# Patient Record
Sex: MALE | Race: BLACK OR AFRICAN AMERICAN | Employment: UNEMPLOYED | URBAN - METROPOLITAN AREA
[De-identification: names, ages, dates, MRNs, and addresses within clinical notes are randomized per-mention and may not be internally consistent; named-entity substitution may affect disease eponyms.]

---

## 2017-01-01 ENCOUNTER — HOSPITAL ENCOUNTER (EMERGENCY)
Age: 52
Discharge: HOME | End: 2017-01-01
Attending: EMERGENCY MEDICINE
Payer: SELF-PAY

## 2017-01-01 VITALS
SYSTOLIC BLOOD PRESSURE: 137 MMHG | HEART RATE: 89 BPM | TEMPERATURE: 97.7 F | HEIGHT: 66 IN | RESPIRATION RATE: 16 BRPM | WEIGHT: 150 LBS | DIASTOLIC BLOOD PRESSURE: 97 MMHG | BODY MASS INDEX: 24.11 KG/M2 | OXYGEN SATURATION: 100 %

## 2017-01-01 DIAGNOSIS — Z01.30 BLOOD PRESSURE CHECK: Primary | ICD-10-CM

## 2017-01-01 PROCEDURE — 99281 EMR DPT VST MAYX REQ PHY/QHP: CPT

## 2017-01-01 NOTE — ED PROVIDER NOTES
HPI Comments: 7:16 AM Joesph Casper is a 45 y.o. male with a history of HTN who presents to ED for medical check of his blood pressure and vitals. Pt has multiple recent ED visits and was recently prescribed medication to treat Hypertension which he states he hasn't filled yet. In triage the pt's blood pressure was 137/97. Pt admits to smoking and occasional ETOH. No other complaints, associated symptoms or modifying factors at this time. The history is provided by the patient. Past Medical History:   Diagnosis Date    Back pain     Hypertension        Past Surgical History:   Procedure Laterality Date    Hx other surgical       oral sx         History reviewed. No pertinent family history. Social History     Social History    Marital status: LEGALLY      Spouse name: N/A    Number of children: N/A    Years of education: N/A     Occupational History    Not on file. Social History Main Topics    Smoking status: Current Some Day Smoker    Smokeless tobacco: Not on file    Alcohol use Yes      Comment: occasional    Drug use: Yes     Special: Marijuana    Sexual activity: Not on file     Other Topics Concern    Not on file     Social History Narrative         ALLERGIES: Review of patient's allergies indicates no known allergies. Review of Systems   Constitutional: Negative. HENT: Negative. Eyes: Negative. Respiratory: Negative. Cardiovascular: Negative. Gastrointestinal: Negative. Endocrine: Negative. Genitourinary: Negative. Musculoskeletal: Negative. Skin: Negative. Allergic/Immunologic: Negative. Neurological: Negative. Hematological: Negative. Psychiatric/Behavioral: Negative. All other systems reviewed and are negative.       Vitals:    01/01/17 0707   BP: (!) 137/97   Pulse: 89   Resp: 16   Temp: 97.7 °F (36.5 °C)   SpO2: 100%   Weight: 68 kg (150 lb)   Height: 5' 6\" (1.676 m)   Patient is 100% O2 on RA, indicating adequate oxygenation. Physical Exam   Constitutional: He is oriented to person, place, and time. He appears well-developed and well-nourished. HENT:   Head: Normocephalic and atraumatic. Nose: Nose normal.   Mouth/Throat: Oropharynx is clear and moist.   Eyes: Conjunctivae and EOM are normal. Pupils are equal, round, and reactive to light. Neck: Normal range of motion. Neck supple. Cardiovascular: Normal rate, regular rhythm, normal heart sounds and intact distal pulses. Pulmonary/Chest: Effort normal and breath sounds normal.   Abdominal: Soft. Bowel sounds are normal.   Neurological: He is alert and oriented to person, place, and time. Skin: Skin is warm and dry. Psychiatric: He has a normal mood and affect. His behavior is normal. Judgment and thought content normal.   Nursing note and vitals reviewed. MDM  Number of Diagnoses or Management Options  Blood pressure check:   Diagnosis management comments: Patient came for medical check and assessment  His BP was unremarkable  I provided him a resource sheet here in the community and discussed tobacco abuse with him    ED Course       Procedures    Vitals:  Patient Vitals for the past 12 hrs:   Temp Pulse Resp BP SpO2   01/01/17 0707 97.7 °F (36.5 °C) 89 16 (!) 137/97 100 %   Patient is 100% O2 on RA, indicating adequate oxygenation. Progress notes, Consult notes or additional Procedure notes:   7:19 AM Pt reevaluated at this time and is resting comfortably in NAD. Discussed results and findings, as well as, diagnosis and plan for discharge. Pt verbalizes understanding and agreement with plan. All questions addressed at this time. Disposition:  Diagnosis:   1.  Blood pressure check        Disposition: Discharged    Follow-up Information     Follow up With Details Comments [address] Schedule an appointment as soon as possible for a visit in 2 days  [place] [address]  [address] ([facility]) [address] [place] [address] EMERGENCY DEPT  If symptoms worsen [address]           Patient's Medications   Start Taking    No medications on file   Continue Taking    HYDROCHLOROTHIAZIDE (HYDRODIURIL) 25 MG TABLET    Take 1 Tab by mouth daily for 30 days. HYDROCHLOROTHIAZIDE (HYDRODIURIL) 25 MG TABLET    Take 1 Tab by mouth daily for 90 days. Indications: Hypertension   These Medications have changed    No medications on file   Stop Taking    No medications on file       Scribe Attestation:   Rose Marks acting as a scribe for and in the presence of Anais Neely MD January 01, 2017 at 7:18 AM     Signed by: Isrrael Covington, January 01, 2017 at 7:18 AM     Provider Attestation:   I personally performed the services described in the documentation, reviewed the documentation, as recorded by the scribe in my presence, and it accurately and completely records my words and actions.      Reviewed and signed by:  Anais Neely MD

## 2017-01-02 ENCOUNTER — HOSPITAL ENCOUNTER (EMERGENCY)
Age: 52
Discharge: HOME | End: 2017-01-02
Attending: INTERNAL MEDICINE
Payer: SELF-PAY

## 2017-01-02 VITALS
BODY MASS INDEX: 24.11 KG/M2 | OXYGEN SATURATION: 99 % | WEIGHT: 150 LBS | TEMPERATURE: 98.4 F | SYSTOLIC BLOOD PRESSURE: 146 MMHG | RESPIRATION RATE: 20 BRPM | DIASTOLIC BLOOD PRESSURE: 97 MMHG | HEIGHT: 66 IN | HEART RATE: 64 BPM

## 2017-01-02 DIAGNOSIS — Z91.14 NON COMPLIANCE W MEDICATION REGIMEN: ICD-10-CM

## 2017-01-02 DIAGNOSIS — I10 ESSENTIAL HYPERTENSION: Primary | ICD-10-CM

## 2017-01-02 DIAGNOSIS — K08.89 DENTALGIA: ICD-10-CM

## 2017-01-02 PROCEDURE — 93005 ELECTROCARDIOGRAM TRACING: CPT

## 2017-01-02 PROCEDURE — 99285 EMERGENCY DEPT VISIT HI MDM: CPT

## 2017-01-02 PROCEDURE — 74011250637 HC RX REV CODE- 250/637: Performed by: PHYSICIAN ASSISTANT

## 2017-01-02 RX ORDER — HYDROCHLOROTHIAZIDE 25 MG/1
25 TABLET ORAL
Status: COMPLETED | OUTPATIENT
Start: 2017-01-02 | End: 2017-01-02

## 2017-01-02 RX ORDER — IBUPROFEN 400 MG/1
800 TABLET ORAL
Status: COMPLETED | OUTPATIENT
Start: 2017-01-02 | End: 2017-01-02

## 2017-01-02 RX ORDER — HYDROCHLOROTHIAZIDE 25 MG/1
25 TABLET ORAL DAILY
Qty: 30 TAB | Refills: 0 | Status: SHIPPED | OUTPATIENT
Start: 2017-01-02 | End: 2017-01-14 | Stop reason: CLARIF

## 2017-01-02 RX ORDER — PENICILLIN V POTASSIUM 500 MG/1
500 TABLET, FILM COATED ORAL 4 TIMES DAILY
Qty: 28 TAB | Refills: 0 | Status: SHIPPED | OUTPATIENT
Start: 2017-01-02 | End: 2017-01-09

## 2017-01-02 RX ADMIN — IBUPROFEN 800 MG: 400 TABLET ORAL at 12:55

## 2017-01-02 RX ADMIN — HYDROCHLOROTHIAZIDE 25 MG: 25 TABLET ORAL at 12:56

## 2017-01-02 NOTE — DISCHARGE INSTRUCTIONS
I have reviewed discharge instructions with the patient. The patient verbalized understanding. Patient armband removed and given to patient to take home.   Patient was informed of the privacy risks if armband lost or stolen

## 2017-01-02 NOTE — Clinical Note
There are no signs or symptoms to indicate end organ damage. There is no hypertensive emergency at time of the emergency department visit.

## 2017-01-02 NOTE — ED TRIAGE NOTES
Patient was just seen yesterday for dizziness related to hypertension. He was prescribed medication, but hasn't had the chance to get it filled. He was [place] [employer] [place], and started having the same symptoms. He reports it is related to stressful personal situations as well.

## 2017-01-02 NOTE — ED PROVIDER NOTES
HPI Comments: Pt is a 44 yo male with a hx of HTN here today for a blood pressure check because he felt momentarily dizzy prior to arrival walking around the [employer], however dizziness has resolved. He reports he did some meditation and this is why he is feeling better now. Pt was seen in this ED yesterday for the same symptoms and complaints, as well as multiple visits this month for the same. He has been given multiple prescriptions for HCTZ but has yet to get it filled, and reports he was going to today until he had his \"spell\", which he feels was related to his stressful personal situation. He is currently unsure of where his prescription is for the HCTZ at this time and is requesting a new one. He also noted once he got here he is having a toothache, right sided. Pt denies fever, chills, HA, dizziness/lightheadedness at this time, diaphoresis, facial swelling, drainage, gum swelling, CP, SOB, N/V/D, abd pain, weakness, numbness, tingling, or gait abnormalities. Patient is a 45 y.o. male presenting with dizziness and hypertension. The history is provided by the patient. Dizziness   There was no focality noted. Pertinent negatives include no focal weakness, no loss of sensation, no loss of balance, no slurred speech, no speech difficulty, no memory loss, no movement disorder, no agitation, no visual change, no mental status change, no unresponsiveness and no disorientation. There has been no fever. Pertinent negatives include no shortness of breath, no chest pain, no vomiting, no altered mental status, no confusion, no headaches, no choking, no nausea, no bowel incontinence and no bladder incontinence. Associated medical issues do not include trauma, seizures or CVA. Hypertension    Associated symptoms include dizziness.  Pertinent negatives include no chest pain, no orthopnea, no palpitations, no PND, no anxiety, no confusion, no malaise/fatigue, no blurred vision, no headaches, no tinnitus, no neck pain, no peripheral edema, no shortness of breath, no nausea and no vomiting. Risk factors include male gender, hypertension and non-compliant. Past Medical History:   Diagnosis Date    Back pain     Hypertension        Past Surgical History:   Procedure Laterality Date    Hx other surgical       oral sx         History reviewed. No pertinent family history. Social History     Social History    Marital status: LEGALLY      Spouse name: N/A    Number of children: N/A    Years of education: N/A     Occupational History    Not on file. Social History Main Topics    Smoking status: Current Some Day Smoker    Smokeless tobacco: Not on file    Alcohol use Yes      Comment: occasional    Drug use: Yes     Special: Marijuana    Sexual activity: Not on file     Other Topics Concern    Not on file     Social History Narrative         ALLERGIES: Review of patient's allergies indicates no known allergies. Review of Systems   Constitutional: Negative for chills, fever and malaise/fatigue. HENT: Positive for dental problem. Negative for congestion, ear pain, rhinorrhea, sore throat, tinnitus, trouble swallowing and voice change. Eyes: Negative for blurred vision, pain and redness. Respiratory: Negative for cough, choking, shortness of breath and wheezing. Cardiovascular: Negative for chest pain, palpitations, orthopnea, leg swelling and PND. Gastrointestinal: Negative for abdominal pain, bowel incontinence, constipation, diarrhea, nausea and vomiting. Genitourinary: Negative for bladder incontinence and dysuria. Musculoskeletal: Negative for back pain, myalgias and neck pain. Skin: Negative. Negative for rash. Neurological: Positive for dizziness. Negative for focal weakness, speech difficulty, light-headedness, headaches and loss of balance. Psychiatric/Behavioral: Negative. Negative for agitation, confusion and memory loss.        Vitals:    01/02/17 1207 01/02/17 1208 01/02/17 1215   BP: (!) 149/94  (!) 159/92   Pulse: 76 76 77   Resp: 16 18 19   Temp: 98.4 °F (36.9 °C)     SpO2: 100% 100% 100%   Weight: 68 kg (150 lb)     Height: 5' 6\" (1.676 m)              Physical Exam   Constitutional: He is oriented to person, place, and time. He appears well-developed and well-nourished. Non-toxic appearance. He does not have a sickly appearance. He does not appear ill. No distress. HENT:   Head: Normocephalic and atraumatic. Right Ear: Tympanic membrane, external ear and ear canal normal.   Left Ear: Tympanic membrane, external ear and ear canal normal.   Nose: Nose normal. No rhinorrhea. Mouth/Throat: Uvula is midline, oropharynx is clear and moist and mucous membranes are normal. He does not have dentures. No oral lesions. No trismus in the jaw. Abnormal dentition. Dental caries present. No dental abscesses, uvula swelling or lacerations. No oropharyngeal exudate, posterior oropharyngeal edema or posterior oropharyngeal erythema. Dental pain reported at all teeth per patient, no focality when palpating   There IS evidence of dental decay. There IS tenderness on palpation. The airway IS patent. NO adjacent mucobuccal soft tissue swelling, fluctuance or gingival bleeding. NO pus/drainage. NO swelling or elevation of the tongue. NO sublingual swelling or TTP. NO facial swelling. NO neck swelling. Eyes: Conjunctivae and EOM are normal. Pupils are equal, round, and reactive to light. Neck: Normal range of motion. Cardiovascular: Normal rate, regular rhythm, normal heart sounds and intact distal pulses. Exam reveals no gallop and no friction rub. No murmur heard. Pulmonary/Chest: Effort normal and breath sounds normal. No respiratory distress. He has no wheezes. He has no rales. Abdominal: Soft. Bowel sounds are normal. He exhibits no distension. There is no tenderness. Musculoskeletal: Normal range of motion. He exhibits no edema. Lymphadenopathy:     He has no cervical adenopathy. Neurological: He is alert and oriented to person, place, and time. He has normal reflexes. He is not disoriented. He displays no atrophy, no tremor and normal reflexes. No cranial nerve deficit or sensory deficit. He exhibits normal muscle tone. He displays no seizure activity. Coordination and gait normal. GCS eye subscore is 4. GCS verbal subscore is 5. GCS motor subscore is 6. Skin: Skin is warm and dry. He is not diaphoretic. Psychiatric: He has a normal mood and affect. His behavior is normal. Judgment and thought content normal.   Nursing note and vitals reviewed. MDM  Number of Diagnoses or Management Options  Dentalgia:   Essential hypertension:   Non compliance w medication regimen:   Diagnosis management comments: DDx: vertigo, dehydration, anemia, Meniere's disease, Vestibular neuronitis, Labyrinthitis, OM, brain tumor, Acoustic neuroma, motion sickness, migraine, MS, low blood pressure/orthostatics, hypoxemia, MI, hypoglycemia, thyroid disease,  anxiety/panic disorder, hyperventilation, depression DDx: Odontalgia, Dental caries,  Periodontal disease, Periapical abscess, Trigeminal neuralgia,  space infection, Bj's angina, Retropharyngeal space infection, Infection after root canal, Dry Socket, Acute Necrotizing Ulcerative Gingivitis (ANUG). IMPRESSION AND MEDICAL DECISION MAKING:  Based upon the patient's presentation with noted HPI and PE, along with the work up done in the emergency department, I believe that the patient is having elevated blood pressure in a patient with known hypertension. Patient also having generalized right sided dentalgia, without evidence of abscess or cellulitis, will cover with antibiotics. Question possible homelessness, although patient denies. Stressed importance of follow up and medication compliance with patient. There are no signs or symptoms to indicate end organ damage.   There is no hypertensive emergency at time of the emergency department visit. DIAGNOSIS:  1. Hypertension. 2.         Dentalgia    SPECIFIC PATIENT INSTRUCTIONS FROM THE PHYSICIAN WHO TREATED YOU IN THE ER TODAY:  1. Return if any concerns or worsening of condition(s)  2. Continue taking your blood pressure medications as previously prescribed. Take antibiotics as prescribed until finished. 3.  Measure your blood pressure twice a day, at the same times each day, at home with a home blood pressure meter. Keep a log of these blood pressure reading and take them to your primary doctor for your doctor to review in your follow up appointment. 4.  FOLLOW UP APPOINTMENT:  Your primary doctor or the one provided in 1 week. Pt results have been reviewed with them. They have been counseled regarding diagnosis, treatment, and plan. Pt verbally conveys understanding and agreement of the signs, symptoms, diagnosis, treatment and prognosis and additionally agrees to follow up as discussed. Pt also agrees with the care-plan and conveys that all of their questions have been answered. I have also provided discharge instructions for them that include: educational information regarding their diagnosis and treatment, and list of reasons why they would want to return to the ED prior to their follow-up appointment, should their condition change. Ngozi Baltazar PA-C 12:55 PM       ED Course       EKG  Date/Time: 1/2/2017 12:15 PM  Performed by: Taty Muñoz   Authorized by: Taty Muñoz     ECG reviewed by ED Physician in the absence of a cardiologist: yes    Previous ECG:     Previous ECG:  Compared to current    Similarity:  No change  Interpretation:     Interpretation: non-specific    Rate:     ECG rate:  72    ECG rate assessment: normal    Rhythm:     Rhythm: sinus rhythm    Ectopy:     Ectopy: none    QRS:     QRS axis:  Normal  Conduction:     Conduction: normal    ST segments:     ST segments: Non-specific

## 2017-01-03 ENCOUNTER — HOSPITAL ENCOUNTER (EMERGENCY)
Age: 52
Discharge: STILL ADMITTED OR ADMINISTRATIVE | End: 2017-01-03
Attending: EMERGENCY MEDICINE
Payer: SELF-PAY

## 2017-01-03 PROCEDURE — 75810000275 HC EMERGENCY DEPT VISIT NO LEVEL OF CARE

## 2017-01-04 ENCOUNTER — HOSPITAL ENCOUNTER (EMERGENCY)
Age: 52
Discharge: HOME | End: 2017-01-05
Attending: EMERGENCY MEDICINE
Payer: SELF-PAY

## 2017-01-04 VITALS
SYSTOLIC BLOOD PRESSURE: 159 MMHG | WEIGHT: 150 LBS | HEART RATE: 114 BPM | DIASTOLIC BLOOD PRESSURE: 111 MMHG | BODY MASS INDEX: 24.21 KG/M2 | TEMPERATURE: 97.9 F | OXYGEN SATURATION: 99 % | RESPIRATION RATE: 15 BRPM

## 2017-01-04 DIAGNOSIS — Z76.5 MALINGERING: ICD-10-CM

## 2017-01-04 DIAGNOSIS — I10 HYPERTENSION, UNCONTROLLED: Primary | ICD-10-CM

## 2017-01-04 DIAGNOSIS — Z91.14 NONCOMPLIANCE WITH MEDICATION REGIMEN: ICD-10-CM

## 2017-01-04 LAB
ATRIAL RATE: 72 BPM
CALCULATED P AXIS, ECG09: 58 DEGREES
CALCULATED R AXIS, ECG10: -29 DEGREES
CALCULATED T AXIS, ECG11: 101 DEGREES
DIAGNOSIS, 93000: NORMAL
P-R INTERVAL, ECG05: 166 MS
Q-T INTERVAL, ECG07: 402 MS
QRS DURATION, ECG06: 84 MS
QTC CALCULATION (BEZET), ECG08: 440 MS
VENTRICULAR RATE, ECG03: 72 BPM

## 2017-01-04 PROCEDURE — 99283 EMERGENCY DEPT VISIT LOW MDM: CPT

## 2017-01-04 PROCEDURE — 81003 URINALYSIS AUTO W/O SCOPE: CPT | Performed by: EMERGENCY MEDICINE

## 2017-01-05 LAB
APPEARANCE UR: ABNORMAL
BILIRUB UR QL: NEGATIVE
COLOR UR: YELLOW
GLUCOSE UR STRIP.AUTO-MCNC: NEGATIVE MG/DL
HGB UR QL STRIP: NEGATIVE
KETONES UR QL STRIP.AUTO: ABNORMAL MG/DL
LEUKOCYTE ESTERASE UR QL STRIP.AUTO: NEGATIVE
NITRITE UR QL STRIP.AUTO: NEGATIVE
PH UR STRIP: 5 [PH] (ref 5–8)
PROT UR STRIP-MCNC: NEGATIVE MG/DL
SP GR UR REFRACTOMETRY: 1.03 (ref 1–1.03)
UROBILINOGEN UR QL STRIP.AUTO: 0.2 EU/DL (ref 0.2–1)

## 2017-01-05 NOTE — ED PROVIDER NOTES
HPI Comments: Karen Medel is a 45 y.o. Male who [facility] for concerns regarding bp and other various complaints. Has failed to get rx filled nor f/u. Wants to be checked out    The history is provided by the patient and medical records. Past Medical History:   Diagnosis Date    Back pain     Hypertension        Past Surgical History:   Procedure Laterality Date    Hx other surgical       oral sx         History reviewed. No pertinent family history. Social History     Social History    Marital status: LEGALLY      Spouse name: N/A    Number of children: N/A    Years of education: N/A     Occupational History    Not on file. Social History Main Topics    Smoking status: Current Some Day Smoker    Smokeless tobacco: Not on file    Alcohol use Yes      Comment: occasional    Drug use: Yes     Special: Marijuana    Sexual activity: Not on file     Other Topics Concern    Not on file     Social History Narrative         ALLERGIES: Review of patient's allergies indicates no known allergies. Review of Systems   Constitutional: Negative for fever. HENT: Negative for trouble swallowing. Respiratory: Negative for shortness of breath. Cardiovascular: Negative for chest pain. Gastrointestinal: Negative for abdominal pain. Genitourinary: Positive for hematuria. Musculoskeletal: Negative for gait problem. Skin: Negative for rash. Neurological: Positive for light-headedness. Psychiatric/Behavioral: Positive for sleep disturbance. Vitals:    01/04/17 2312   BP: (!) 159/111   Pulse: (!) 114   Resp: 15   Temp: 97.9 °F (36.6 °C)   SpO2: 99%   Weight: 68 kg (150 lb)            Physical Exam   Constitutional: He is oriented to person, place, and time. Non-toxic appearance. He does not appear ill. No distress. HENT:   Head: Normocephalic and atraumatic.    Right Ear: External ear normal.   Left Ear: External ear normal.   Nose: Nose normal. Mouth/Throat: Oropharynx is clear and moist. No oropharyngeal exudate. Eyes: Conjunctivae are normal.   Neck: Normal range of motion. Cardiovascular: Normal rate, regular rhythm, normal heart sounds and intact distal pulses. Pulmonary/Chest: Effort normal and breath sounds normal. No respiratory distress. Abdominal: Soft. There is no tenderness. Musculoskeletal: Normal range of motion. He exhibits no edema. Neurological: He is alert and oriented to person, place, and time. Skin: Skin is warm and dry. He is not diaphoretic. Psychiatric: His behavior is normal.   Nursing note and vitals reviewed. [facility]  ED Course       Procedures  Vitals:  Patient Vitals for the past 12 hrs:   Temp Pulse Resp BP SpO2   01/04/17 2312 97.9 °F (36.6 °C) (!) 114 15 (!) 159/111 99 %         Medications ordered:   Medications - No data to display      Lab findings:  Recent Results (from the past 12 hour(s))   URINALYSIS W/ RFLX MICROSCOPIC    Collection Time: 01/04/17 11:54 PM   Result Value Ref Range    Color YELLOW      Appearance CLOUDY      Specific gravity 1.027 1.005 - 1.030      pH (UA) 5.0 5.0 - 8.0      Protein NEGATIVE  NEG mg/dL    Glucose NEGATIVE  NEG mg/dL    Ketone TRACE (A) NEG mg/dL    Bilirubin NEGATIVE  NEG      Blood NEGATIVE  NEG      Urobilinogen 0.2 0.2 - 1.0 EU/dL    Nitrites NEGATIVE  NEG      Leukocyte Esterase NEGATIVE  NEG         EKG interpretation by ED Physician:      X-Ray, CT or other radiology findings or impressions:  No orders to display       Progress notes, Consult notes or additional Procedure notes:   Long d/w pt regarding need to comply with rec outpatient f/u recommendations and that he is misusing the Er  He does not have an emc nor requires any additional work up here    Reevaluation of patient:   Stable for d/c     Disposition:  Diagnosis:   1. Hypertension, uncontrolled    2. Noncompliance with medication regimen    3.  Malingering        Disposition: home      Follow-up Information     Follow up With Details Comments [address] [facility] (Estefania Castaneda) Schedule an appointment as soon as possible for a visit  [address] [facility] [address]  [address] [facility] Schedule an appointment as soon as possible for a visit  [address]    [address] [facility] Schedule an appointment as soon as possible for a visit  [address]  [phone]            Patient's Medications   Start Taking    No medications on file   Continue Taking    HYDROCHLOROTHIAZIDE (HYDRODIURIL) 25 MG TABLET    Take 1 Tab by mouth daily for 30 days. PENICILLIN V POTASSIUM (VEETID) 500 MG TABLET    Take 1 Tab by mouth four (4) times daily for 7 days.    These Medications have changed    No medications on file   Stop Taking    No medications on file

## 2017-01-05 NOTE — ED TRIAGE NOTES
Pt reports he \"blacked out\" twice today while walking and has blood in his urine. Pt reports symptoms started around 5pm. Pt malodorous and disheveled. Pt seen in ED the last two days and did not fill his prescriptions.

## 2017-01-05 NOTE — ED NOTES
Patient states he has painful urination and bloody urination. Purposeful rounding completed:    Side rails up x 1:  YES  Bed low and wheels and locked: YES  Call bell in reach: YES  Comfort addressed: YES    Toileting needs addressed: YES  Plan of care reviewed/updated with patient and or family members: YES  IV site assessed: N/A  Pain assessed and addressed: YES, 7.

## 2017-01-06 ENCOUNTER — HOSPITAL ENCOUNTER (EMERGENCY)
Age: 52
Discharge: HOME | End: 2017-01-06
Attending: EMERGENCY MEDICINE
Payer: SELF-PAY

## 2017-01-06 VITALS
HEART RATE: 73 BPM | SYSTOLIC BLOOD PRESSURE: 164 MMHG | RESPIRATION RATE: 16 BRPM | BODY MASS INDEX: 25.82 KG/M2 | OXYGEN SATURATION: 100 % | DIASTOLIC BLOOD PRESSURE: 100 MMHG | TEMPERATURE: 98 F | WEIGHT: 160 LBS

## 2017-01-06 DIAGNOSIS — I10 ESSENTIAL HYPERTENSION: Primary | ICD-10-CM

## 2017-01-06 DIAGNOSIS — Z59.00 HOMELESSNESS: ICD-10-CM

## 2017-01-06 PROCEDURE — 99281 EMR DPT VST MAYX REQ PHY/QHP: CPT

## 2017-01-06 SDOH — ECONOMIC STABILITY - HOUSING INSECURITY: HOMELESSNESS UNSPECIFIED: Z59.00

## 2017-01-07 NOTE — ED PROVIDER NOTES
HPI Comments: 7:02PM Nomi Silva is a 45year old male with hx of HTN who presents to ED c/o tiredness. After repeated questioned pt admit he is homeless. Pt denies HA, cp, dizziness, fever, nausea or paint. Pt arrived here walking with EMS across the lot. No further complaints at this time. Patient is a 45 y.o. male presenting with lethargy. The history is provided by the patient. Lethargy   Pertinent negatives include no chest pain, no abdominal pain, no headaches and no shortness of breath. Past Medical History:   Diagnosis Date    Back pain     Hypertension        Past Surgical History:   Procedure Laterality Date    Hx other surgical       oral sx         History reviewed. No pertinent family history. Social History     Social History    Marital status: LEGALLY      Spouse name: N/A    Number of children: N/A    Years of education: N/A     Occupational History    Not on file. Social History Main Topics    Smoking status: Current Some Day Smoker    Smokeless tobacco: Not on file    Alcohol use Yes      Comment: occasional    Drug use: Yes     Special: Marijuana    Sexual activity: Not on file     Other Topics Concern    Not on file     Social History Narrative         ALLERGIES: Review of patient's allergies indicates no known allergies. Review of Systems   Constitutional: Positive for fatigue. Negative for activity change, chills and fever. HENT: Negative for congestion and sore throat. Eyes: Negative for redness and visual disturbance. Respiratory: Negative for cough, shortness of breath and wheezing. Cardiovascular: Negative for chest pain. Gastrointestinal: Negative for abdominal pain and nausea. Endocrine: Negative for cold intolerance and polyuria. Genitourinary: Negative for dysuria. Musculoskeletal: Negative for arthralgias and neck stiffness. Skin: Negative for rash. Neurological: Negative for dizziness, weakness and headaches. Psychiatric/Behavioral: Negative for confusion, hallucinations and sleep disturbance. All other systems reviewed and are negative. Vitals:    01/06/17 1903   BP: (!) 164/100   Pulse: 73   Resp: 16   Temp: 98 °F (36.7 °C)   SpO2: 100%   Weight: 72.6 kg (160 lb)            Physical Exam   Constitutional: He is oriented to person, place, and time. He appears well-developed and well-nourished. No distress. Alert and oriented X3  Ambulating with difficulty   HENT:   Head: Normocephalic and atraumatic. Mouth/Throat: Oropharynx is clear and moist.   FROM of head and neck   Eyes: Conjunctivae are normal. Pupils are equal, round, and reactive to light. No scleral icterus. Neck: Normal range of motion. Neck supple. Cardiovascular: Intact distal pulses. Capillary refill < 3 seconds   Pulmonary/Chest: Effort normal and breath sounds normal. No respiratory distress. He has no wheezes. Lungs clear  Normal heart rate   Abdominal: Soft. Bowel sounds are normal. He exhibits no distension. There is no tenderness. Musculoskeletal: Normal range of motion. He exhibits no edema. Lymphadenopathy:     He has no cervical adenopathy. Neurological: He is alert and oriented to person, place, and time. No cranial nerve deficit. Strength 5/5  no cerebellar or ataxia on finger to nose test     Skin: Skin is warm and dry. He is not diaphoretic. Psychiatric: Thought content normal.   Nursing note and vitals reviewed. MDM  Number of Diagnoses or Management Options  Essential hypertension:   Homelessness:   Diagnosis management comments: Pt seen here in ED multiple times in past 10 days. Pt admits now that he is homeless. He has been given multiple px's for BP meds. His bp is elevated again today. He denies HA, dizziness or cp. He has not filled px. Told him importance of getting bp meds and about risk of uncontrolled bp including cva, mi, death. He fully understands.        Patient Progress  Patient progress: stable    ED Course       Procedures    PROGRESS NOTES  7:08 PM: Bel Ramirez DO arrives to the bedside to evaluate the patient. Answered the patient's questions regarding the treatment plan. 7:11 PM Patient given multiple prescriptions in the past couple weeks for BP. I will issue a discount card. Also gave patient resources for homeless shelters. Will have pt contact . Discussed dc, fu and plan with pt. ED DIAGNOSIS & DISPOSITION INFORMATION  Diagnosis:   1. Essential hypertension    2. Homelessness            Disposition: Discharge    Follow-up Information     Follow up With Details Comments [address] [facility] Schedule an appointment as soon as possible for a visit  [address] [facility] Schedule an appointment as soon as possible for a visit in 3 days  [address]  [phone]            Discharge Medication List as of 1/6/2017  7:13 PM      CONTINUE these medications which have NOT CHANGED    Details   hydroCHLOROthiazide (HYDRODIURIL) 25 mg tablet Take 1 Tab by mouth daily for 30 days. , Print, Disp-30 Tab, R-0      penicillin v potassium (VEETID) 500 mg tablet Take 1 Tab by mouth four (4) times daily for 7 days. , Print, Disp-28 Tab, R-0               SCRIBE ATTESTATION STATEMENT  Documented by: Bryce Jara scribing for, and in the presence of, Bel Ramirez DO 7:02 PM     PROVIDER ATTESTATION STATEMENT  I personally performed the services described in the documentation, reviewed the documentation, as recorded by the scribe in my presence, and it accurately and completely records my words and actions.   Bel Ramirez DO    Signed by: Isrrael Kenney, 1/6/2017, 7:02 PM

## 2017-01-07 NOTE — DISCHARGE INSTRUCTIONS

## 2017-01-07 NOTE — ED NOTES
I have reviewed discharge instructions with the patient. The patient verbalized understanding.  Pt assisted with homeless shelter phonecalls

## 2017-01-14 ENCOUNTER — HOSPITAL ENCOUNTER (EMERGENCY)
Age: 52
Discharge: HOME | End: 2017-01-14
Attending: EMERGENCY MEDICINE
Payer: SELF-PAY

## 2017-01-14 VITALS
BODY MASS INDEX: 24.11 KG/M2 | WEIGHT: 150 LBS | DIASTOLIC BLOOD PRESSURE: 127 MMHG | OXYGEN SATURATION: 98 % | SYSTOLIC BLOOD PRESSURE: 201 MMHG | HEIGHT: 66 IN | TEMPERATURE: 98 F | HEART RATE: 63 BPM | RESPIRATION RATE: 16 BRPM

## 2017-01-14 DIAGNOSIS — I10 ESSENTIAL HYPERTENSION: ICD-10-CM

## 2017-01-14 DIAGNOSIS — Z59.00 HOMELESSNESS: ICD-10-CM

## 2017-01-14 DIAGNOSIS — Z01.30 BLOOD PRESSURE CHECK: Primary | ICD-10-CM

## 2017-01-14 DIAGNOSIS — Z91.14 NON COMPLIANCE W MEDICATION REGIMEN: ICD-10-CM

## 2017-01-14 PROCEDURE — 74011250637 HC RX REV CODE- 250/637: Performed by: PHYSICIAN ASSISTANT

## 2017-01-14 PROCEDURE — 99283 EMERGENCY DEPT VISIT LOW MDM: CPT

## 2017-01-14 RX ORDER — HYDROCHLOROTHIAZIDE 25 MG/1
25 TABLET ORAL DAILY
Qty: 30 TAB | Refills: 0 | Status: SHIPPED | OUTPATIENT
Start: 2017-01-14 | End: 2017-02-04

## 2017-01-14 RX ORDER — HYDROCHLOROTHIAZIDE 25 MG/1
25 TABLET ORAL
Status: COMPLETED | OUTPATIENT
Start: 2017-01-14 | End: 2017-01-14

## 2017-01-14 RX ADMIN — HYDROCHLOROTHIAZIDE 25 MG: 25 TABLET ORAL at 11:54

## 2017-01-14 SDOH — ECONOMIC STABILITY - HOUSING INSECURITY: HOMELESSNESS UNSPECIFIED: Z59.00

## 2017-01-14 NOTE — ED NOTES
Per [place], [place], pt able to be d/c'd with current BP. Pt discharged to home ambulatory and in company of self  Discharge instructions provided via discussion and handout. Teaching to patient. Verbalized understanding. No questions voiced. Discharged with 1 RX.

## 2017-01-14 NOTE — ED PROVIDER NOTES
HPI Comments: Pt is a 46 yo male with hx of HTN and non-compliance here today to [facility] sure his pressure is ok\". He reports that he has gotten his BP medications filled however he hasn't taken the in a week because \"life has been too crazy\". He also notes he isn't sure where the bottle was. Pt had dizziness yesterday and was seen at [facility] with a full work up. He reports he has not been dizzy since that time. He also notes he has ongoing fatigue that has been happening \"for years now\" from all the stresses in his life, including seperation from wife and homelessness. He has been \"living [facility] a [facility]\", but states he has a place to move into in a week. He notes that he uses meditation to help with his stress. He denies fever, chills, HA, blurred vision, lightheadedness, weakness, CP, SOB, palpitations, numbness, tingling, N/V/D. Pt without any other complaints at this time. Patient is a 45 y.o. male presenting with dizziness and fatigue. The history is provided by the patient. Dizziness   The problem has been resolved. There was no focality noted. Pertinent negatives include no focal weakness, no loss of sensation, no loss of balance, no slurred speech, no speech difficulty, no memory loss, no movement disorder, no agitation, no visual change, no auditory change, no mental status change, no unresponsiveness and no disorientation. There has been no fever. Pertinent negatives include no shortness of breath, no chest pain, no vomiting, no altered mental status, no confusion, no headaches, no choking, no nausea, no bowel incontinence and no bladder incontinence. Associated medical issues do not include trauma, mood changes, bleeding disorder, seizures, dementia, CVA or clotting disorder. Fatigue   This is a chronic problem. The problem has not changed since onset. There was no focality noted.  Pertinent negatives include no focal weakness, no loss of sensation, no loss of balance, no slurred speech, no speech difficulty, no memory loss, no movement disorder, no agitation, no visual change, no auditory change, no mental status change, no unresponsiveness and no disorientation. Pertinent negatives include no shortness of breath, no chest pain, no vomiting, no altered mental status, no confusion, no headaches, no choking, no nausea, no bowel incontinence and no bladder incontinence. Associated medical issues do not include trauma, mood changes, bleeding disorder, seizures, dementia, CVA or clotting disorder. Past Medical History:   Diagnosis Date    Back pain     Hypertension        Past Surgical History:   Procedure Laterality Date    Hx other surgical       oral sx         History reviewed. No pertinent family history. Social History     Social History    Marital status: LEGALLY      Spouse name: N/A    Number of children: N/A    Years of education: N/A     Occupational History    Not on file. Social History Main Topics    Smoking status: Current Some Day Smoker    Smokeless tobacco: Not on file    Alcohol use Yes      Comment: occasional    Drug use: Yes     Special: Marijuana, Cocaine    Sexual activity: Not on file     Other Topics Concern    Not on file     Social History Narrative         ALLERGIES: Review of patient's allergies indicates no known allergies. Review of Systems   Constitutional: Positive for fatigue. Negative for activity change, chills, fever and unexpected weight change. HENT: Negative for ear pain, rhinorrhea and sore throat. Eyes: Negative for pain, redness and visual disturbance. Respiratory: Negative for apnea, cough, choking, chest tightness, shortness of breath, wheezing and stridor. Cardiovascular: Negative for chest pain. Gastrointestinal: Negative for abdominal distention, abdominal pain, bowel incontinence, constipation, diarrhea, nausea and vomiting. Genitourinary: Negative for bladder incontinence and dysuria.    Musculoskeletal: Negative for back pain, gait problem, myalgias, neck pain and neck stiffness. Skin: Negative. Neurological: Positive for dizziness. Negative for tremors, focal weakness, seizures, syncope, facial asymmetry, speech difficulty, weakness, light-headedness, numbness, headaches and loss of balance. Hematological: Negative for adenopathy. Psychiatric/Behavioral: Negative. Negative for agitation, confusion and memory loss. Vitals:    01/14/17 1055   BP: (!) 177/116   Pulse: 66   Resp: 16   Temp: 98 °F (36.7 °C)   SpO2: 98%   Weight: 68 kg (150 lb)   Height: 5' 6\" (1.676 m)            Physical Exam   Constitutional: He is oriented to person, place, and time. He appears well-developed and well-nourished. No distress. Disheveled, malodorous   HENT:   Head: Normocephalic and atraumatic. Right Ear: Tympanic membrane, external ear and ear canal normal.   Left Ear: Tympanic membrane, external ear and ear canal normal.   Nose: Nose normal.   Mouth/Throat: Oropharynx is clear and moist and mucous membranes are normal. No oropharyngeal exudate. Eyes: Conjunctivae and EOM are normal. Pupils are equal, round, and reactive to light. Neck: Normal range of motion. Cardiovascular: Normal rate, regular rhythm and intact distal pulses. Exam reveals no gallop and no friction rub. No murmur heard. Pulmonary/Chest: Effort normal and breath sounds normal. No respiratory distress. He has no wheezes. He has no rales. He exhibits no tenderness. Abdominal: Soft. Bowel sounds are normal. He exhibits no distension. There is no tenderness. There is no rebound and no guarding. Musculoskeletal: Normal range of motion. Lymphadenopathy:     He has no cervical adenopathy. Neurological: He is alert and oriented to person, place, and time. He has normal reflexes. He is not disoriented. He displays no atrophy, no tremor and normal reflexes. No cranial nerve deficit or sensory deficit. He exhibits normal muscle tone.  He displays no seizure activity. Coordination and gait normal. GCS eye subscore is 4. GCS verbal subscore is 5. GCS motor subscore is 6. Skin: Skin is warm and dry. He is not diaphoretic. Psychiatric: He has a normal mood and affect. His behavior is normal. Judgment and thought content normal.   Nursing note and vitals reviewed. MDM  Number of Diagnoses or Management Options  Diagnosis management comments: DDx: vertigo, dehydration, anemia, Meniere's disease, Vestibular neuronitis, Labyrinthitis, OM, brain tumor, Acoustic neuroma, motion sickness, migraine, MS, low blood pressure/orthostatics, hypoxemia, MI, hypoglycemia, thyroid disease,  anxiety/panic disorder, hyperventilation, depression    Pt has been seen multiple times for the same complaint. His primary concern today is BP check. He is without symptoms at this time, exam unremarkable. Pt seen yesterday at [facility] with full workup. Discussed with pt importance of taking BP medications daily. Also provided list of shelters. Pt indicated understanding. Will give one dose of BP meds here and give new RX as pt is unsure where his pill bottle went. Blood pressure not elevated or with symptoms/exam that would indicated urgency or emergency. Do not feel further workup needed at this time. Pt stable and non-toxic with normal neuro exam as well. Will discharge pt to home. Pt results have been reviewed with them. They have been counseled regarding diagnosis, treatment, and plan. Pt verbally conveys understanding and agreement of the signs, symptoms, diagnosis, treatment and prognosis and additionally agrees to follow up as discussed. Pt also agrees with the care-plan and conveys that all of their questions have been answered.  I have also provided discharge instructions for them that include: educational information regarding their diagnosis and treatment, and list of reasons why they would want to return to the ED prior to their follow-up appointment, should their condition change. Alek Baldwin PA-C .[email or web address]          Amount and/or Complexity of Data Reviewed  Discussion of test results with the performing providers: yes  Decide to obtain previous medical records or to obtain history from someone other than the patient: yes  Obtain history from someone other than the patient: yes  Review and summarize past medical records: yes  Discuss the patient with other providers: yes  Independent visualization of images, tracings, or specimens: yes    Risk of Complications, Morbidity, and/or Mortality  Presenting problems: low  Diagnostic procedures: low  Management options: low    Patient Progress  Patient progress: stable    ED Course       Procedures      Diagnosis:   1. Blood pressure check    2. Essential hypertension    3. Homelessness    4. Non compliance w medication regimen          Disposition: home    Follow-up Information     Follow up With Details Comments Contact [facility] EMERGENCY DEPT  As needed, If symptoms worsen [address] [place] [address]    [facility]  [place] [id number] [phone]          Patient's Medications   Start Taking    HYDROCHLOROTHIAZIDE (HYDRODIURIL) 25 MG TABLET    Take 1 Tab by mouth daily.    Continue Taking    No medications on file   These Medications have changed    No medications on file   Stop Taking    No medications on file

## 2017-01-14 NOTE — ED TRIAGE NOTES
Patient seen over at [facility] yesterday for dizziness and hypertension, here for same today

## 2017-01-19 ENCOUNTER — HOSPITAL ENCOUNTER (EMERGENCY)
Age: 52
Discharge: HOME | End: 2017-01-19
Attending: EMERGENCY MEDICINE
Payer: SELF-PAY

## 2017-01-19 VITALS
TEMPERATURE: 98.4 F | SYSTOLIC BLOOD PRESSURE: 160 MMHG | HEART RATE: 86 BPM | OXYGEN SATURATION: 100 % | DIASTOLIC BLOOD PRESSURE: 107 MMHG | RESPIRATION RATE: 18 BRPM

## 2017-01-19 DIAGNOSIS — I15.9 SECONDARY HYPERTENSION: Primary | ICD-10-CM

## 2017-01-19 PROCEDURE — 99282 EMERGENCY DEPT VISIT SF MDM: CPT

## 2017-01-20 NOTE — ED PROVIDER NOTES
HPI Comments: Milena Story is a 45 y.o. Male presents for check of his bp. Denies any cp, sob, edema. Felt lightheaded earlier. No syncope. Just at [facility] for same last night. Pt states has not filled rx yet. The history is provided by the patient. Past Medical History:   Diagnosis Date    Back pain     Hypertension        Past Surgical History:   Procedure Laterality Date    Hx other surgical       oral sx         History reviewed. No pertinent family history. Social History     Social History    Marital status: LEGALLY      Spouse name: N/A    Number of children: N/A    Years of education: N/A     Occupational History    Not on file. Social History Main Topics    Smoking status: Current Some Day Smoker    Smokeless tobacco: Not on file    Alcohol use Yes      Comment: occasional    Drug use: Yes     Special: Marijuana, Cocaine    Sexual activity: Not on file     Other Topics Concern    Not on file     Social History Narrative         ALLERGIES: Review of patient's allergies indicates no known allergies. Review of Systems   Constitutional: Negative for fever. HENT: Negative for sore throat. Eyes: Negative for visual disturbance. Respiratory: Negative for shortness of breath. Cardiovascular: Negative for chest pain. Gastrointestinal: Negative for abdominal pain. Genitourinary: Negative for difficulty urinating. Musculoskeletal: Negative for gait problem. Psychiatric/Behavioral: Positive for sleep disturbance. Negative for confusion. Vitals:    01/19/17 2057   BP: (!) 160/107   Pulse: 86   Resp: 18   Temp: 98.4 °F (36.9 °C)   SpO2: 100%            Physical Exam   Constitutional: He is oriented to person, place, and time. Non-toxic appearance. He does not appear ill. No distress. HENT:   Head: Normocephalic and atraumatic.    Right Ear: External ear normal.   Left Ear: External ear normal.   Nose: Nose normal.   Mouth/Throat: Oropharynx is clear and moist. No oropharyngeal exudate. Eyes: Conjunctivae are normal.   Neck: Normal range of motion. Cardiovascular: Normal rate, regular rhythm, normal heart sounds and intact distal pulses. Pulmonary/Chest: Effort normal and breath sounds normal. No respiratory distress. Abdominal: Soft. Musculoskeletal: Normal range of motion. He exhibits no edema. Neurological: He is alert and oriented to person, place, and time. Skin: Skin is warm and dry. He is not diaphoretic. Psychiatric: His behavior is normal.   Nursing note and vitals reviewed. [facility]  ED Course       Procedures    Vitals:  Patient Vitals for the past 12 hrs:   Temp Pulse Resp BP SpO2   01/19/17 2057 98.4 °F (36.9 °C) 86 18 (!) 160/107 100 %         Medications ordered:   Medications - No data to display      Lab findings:  No results found for this or any previous visit (from the past 12 hour(s)). EKG interpretation by ED Physician:      X-Ray, CT or other radiology findings or impressions:  No orders to display       Progress notes, Consult notes or additional Procedure notes:   Doubt need for any work up. Pt continued to be noncompliant with getting meds filled. D/w pt need for compliance    Reevaluation of patient:   Stable for d/c     Disposition:  Diagnosis:   1. Secondary hypertension        Disposition: home      Follow-up Information     Follow up With Details Comments [address] [place] [address] ([address] [facility]) Schedule an appointment as soon as possible for a visit  [facility]  [phone]            Patient's Medications   Start Taking    No medications on file   Continue Taking    HYDROCHLOROTHIAZIDE (HYDRODIURIL) 25 MG TABLET    Take 1 Tab by mouth daily.    These Medications have changed    No medications on file   Stop Taking    No medications on file

## 2017-01-22 ENCOUNTER — HOSPITAL ENCOUNTER (EMERGENCY)
Age: 52
Discharge: HOME | End: 2017-01-22
Attending: EMERGENCY MEDICINE
Payer: SELF-PAY

## 2017-01-22 VITALS
RESPIRATION RATE: 18 BRPM | HEART RATE: 82 BPM | SYSTOLIC BLOOD PRESSURE: 134 MMHG | TEMPERATURE: 98 F | OXYGEN SATURATION: 100 % | DIASTOLIC BLOOD PRESSURE: 85 MMHG

## 2017-01-22 DIAGNOSIS — Z01.30 BLOOD PRESSURE CHECK: Primary | ICD-10-CM

## 2017-01-22 DIAGNOSIS — Z59.00 HOMELESSNESS: ICD-10-CM

## 2017-01-22 PROCEDURE — 99282 EMERGENCY DEPT VISIT SF MDM: CPT

## 2017-01-22 SDOH — ECONOMIC STABILITY - HOUSING INSECURITY: HOMELESSNESS UNSPECIFIED: Z59.00

## 2017-01-22 NOTE — ED TRIAGE NOTES
Pt states he experienced an \"out of body\" experience while walking so he got scared. Has hx of HTN and states that he means to take his medicine but doesn't have the time to do so because his medications are in a different place than he is.

## 2017-01-22 NOTE — DISCHARGE INSTRUCTIONS

## 2017-01-22 NOTE — ED PROVIDER NOTES
HPI Comments: 4:44 PM Shahnaz Diggs is a 45 y.o. male with a history of hypertension presenting to the ED with light headedness that began today. He states he was walking down the street when the lightheadedness began. He describes the lightheadedness as [place] out of body experience. \" He is not experiencing any pain currently. He was last seen in this ED 3 days ago for these symptoms and seen here 15 times in the past 2 months for these symptoms as well. He was prescribed medication for his hypertension and has not been complaint. He states he hasn't had time to take his medication. Pt denies nausea, vomiting, diarrhea, fever, CP, and cough. He also describes having a \"fear of dying\" which is why he frequents the emergency room so often. No other complaints at this time. The history is provided by the patient. Past Medical History:   Diagnosis Date    Back pain     Hypertension        Past Surgical History:   Procedure Laterality Date    Hx other surgical       oral sx         History reviewed. No pertinent family history. Social History     Social History    Marital status: LEGALLY      Spouse name: N/A    Number of children: N/A    Years of education: N/A     Occupational History    Not on file. Social History Main Topics    Smoking status: Current Some Day Smoker    Smokeless tobacco: Not on file    Alcohol use Yes      Comment: occasional    Drug use: Yes     Special: Marijuana, Cocaine    Sexual activity: Not on file     Other Topics Concern    Not on file     Social History Narrative         ALLERGIES: Review of patient's allergies indicates no known allergies. Review of Systems   Constitutional: Negative for appetite change, chills, diaphoresis, fatigue, fever and unexpected weight change.         Hypertension   HENT: Negative for congestion, dental problem, ear discharge, ear pain, hearing loss, nosebleeds, rhinorrhea, sinus pressure, sore throat, tinnitus, trouble swallowing and voice change. Eyes: Negative for photophobia, pain, discharge, redness and visual disturbance. Respiratory: Negative for cough, choking, chest tightness, shortness of breath, wheezing and stridor. Cardiovascular: Negative for chest pain, palpitations and leg swelling. Gastrointestinal: Negative for abdominal distention, abdominal pain, anal bleeding, blood in stool, constipation, diarrhea, nausea and vomiting. Genitourinary: Negative for decreased urine volume, difficulty urinating, discharge, dysuria, flank pain, frequency, genital sores, hematuria, penile pain, penile swelling, scrotal swelling, testicular pain and urgency. Musculoskeletal: Negative for neck pain and neck stiffness. Neurological: Negative for tremors, seizures, syncope, speech difficulty, weakness, light-headedness and headaches. Hematological: Negative for adenopathy. Does not bruise/bleed easily. Psychiatric/Behavioral: Negative for agitation, behavioral problems, confusion, hallucinations, self-injury, sleep disturbance and suicidal ideas. The patient is not nervous/anxious and is not hyperactive. Vitals:    01/22/17 1645   BP: 134/85   Pulse: 82   Resp: 18   Temp: 98 °F (36.7 °C)   SpO2: 100%            Physical Exam   Constitutional: He is oriented to person, place, and time. He appears well-developed and well-nourished. No distress. 45year old  male in no acute distress   HENT:   Head: Normocephalic and atraumatic. Right Ear: External ear normal.   Left Ear: External ear normal.   Nose: Nose normal.   Mouth/Throat: Oropharynx is clear and moist. No oropharyngeal exudate. Eyes: Conjunctivae and EOM are normal. Pupils are equal, round, and reactive to light. Right eye exhibits no discharge. Left eye exhibits no discharge. No scleral icterus. Neck: Normal range of motion. Neck supple. No JVD present. No tracheal deviation present. No thyromegaly present. Cardiovascular: Normal rate, regular rhythm, normal heart sounds and intact distal pulses. Exam reveals no gallop and no friction rub. No murmur heard. Pulmonary/Chest: Effort normal and breath sounds normal. No stridor. No respiratory distress. He has no wheezes. He has no rales. He exhibits no tenderness. Abdominal: Soft. Bowel sounds are normal. He exhibits no distension and no mass. There is no tenderness. There is no rebound and no guarding. Musculoskeletal: Normal range of motion. He exhibits no edema or tenderness. Lymphadenopathy:     He has no cervical adenopathy. Neurological: He is alert and oriented to person, place, and time. No cranial nerve deficit. Skin: Skin is warm and dry. No rash noted. He is not diaphoretic. No erythema. No pallor. Psychiatric: He has a normal mood and affect. His behavior is normal. Judgment and thought content normal.   Nursing note and vitals reviewed. MDM  Number of Diagnoses or Management Options  Blood pressure check:   Homelessness:   Diagnosis management comments: Patient is homeless and has many visits to this ED and [facility] in the past 30 days. Amount and/or Complexity of Data Reviewed  Decide to obtain previous medical records or to obtain history from someone other than the patient: yes  Review and summarize past medical records: yes    Risk of Complications, Morbidity, and/or Mortality  Presenting problems: low  Diagnostic procedures: minimal  Management options: low    Patient Progress  Patient progress: stable    ED Course       Procedures    -------------------------------------------------------------------------------------------------------------------  PROGRESS NOTE:  5:07 PM Pt reevaluated at this time and is resting comfortably in NAD. Discussed results and findings, as well as, diagnosis and plan for discharge. Pt verbalizes understanding and agreement with plan. All questions addressed at this time.         ORDERS:  No orders of the defined types were placed in this encounter. DISPOSITION:  Diagnosis:   1. Blood pressure check    2. Homelessness            Disposition: discharged      Follow-up Information     Follow up With Details Comments Contact Info    [employer] Schedule an appointment as soon as possible for a visit Return to ED, If symptoms worsen Paul Zheng  [phone]                 Patient's Medications   Start Taking    No medications on file   Continue Taking    HYDROCHLOROTHIAZIDE (HYDRODIURIL) 25 MG TABLET    Take 1 Tab by mouth daily. These Medications have changed    No medications on file   Stop Taking    No medications on file         -------------------------------------------------------------------------------------------------------------------  ICharisse, am scribing for and in the presence of Milena Sánchez DO. Signed by: Isrrael Baumann, 01/22/17, 4:50 PM        Provider Attestation:  I personally performed the services described in the documentation, reviewed the documentation, as recorded by the scribe in my presence, and it accurately and completely records my words and actions. Dr. Johanne Benson.  Graeme VALLADARES 4:50 PM

## 2017-02-04 ENCOUNTER — HOSPITAL ENCOUNTER (EMERGENCY)
Age: 52
Discharge: HOME | End: 2017-02-04
Attending: EMERGENCY MEDICINE | Admitting: EMERGENCY MEDICINE
Payer: SELF-PAY

## 2017-02-04 VITALS
DIASTOLIC BLOOD PRESSURE: 121 MMHG | HEIGHT: 67 IN | HEART RATE: 70 BPM | SYSTOLIC BLOOD PRESSURE: 171 MMHG | RESPIRATION RATE: 14 BRPM | WEIGHT: 155 LBS | BODY MASS INDEX: 24.33 KG/M2 | OXYGEN SATURATION: 98 % | TEMPERATURE: 97.9 F

## 2017-02-04 DIAGNOSIS — Z91.14 NONCOMPLIANCE WITH MEDICATION REGIMEN: ICD-10-CM

## 2017-02-04 PROCEDURE — 74011250637 HC RX REV CODE- 250/637: Performed by: EMERGENCY MEDICINE

## 2017-02-04 PROCEDURE — 99283 EMERGENCY DEPT VISIT LOW MDM: CPT

## 2017-02-04 RX ORDER — HYDROCHLOROTHIAZIDE 25 MG/1
25 TABLET ORAL DAILY
Qty: 30 TAB | Refills: 0 | Status: SHIPPED | OUTPATIENT
Start: 2017-02-04 | End: 2017-02-13

## 2017-02-04 RX ORDER — LISINOPRIL 20 MG/1
20 TABLET ORAL DAILY
Qty: 30 TAB | Refills: 0 | Status: SHIPPED | OUTPATIENT
Start: 2017-02-04 | End: 2017-02-13

## 2017-02-04 RX ORDER — HYDROCHLOROTHIAZIDE 25 MG/1
50 TABLET ORAL ONCE
Status: COMPLETED | OUTPATIENT
Start: 2017-02-04 | End: 2017-02-04

## 2017-02-04 RX ADMIN — HYDROCHLOROTHIAZIDE 50 MG: 25 TABLET ORAL at 12:22

## 2017-02-04 NOTE — DISCHARGE INSTRUCTIONS
Acute High Blood Pressure: Care Instructions  Your Care Instructions  Acute high blood pressure is very high blood pressure. It's a serious problem. Very high blood pressure can damage your brain, heart, eyes, and kidneys. You may have been given medicines to lower your blood pressure. You may have gotten them as pills or through a needle in one of your veins. This is called an IV. And maybe you were given other medicines too. These can be needed when high blood pressure causes other problems. To keep your blood pressure at a lower level, you may need to make healthy lifestyle changes. And you will probably need to take medicines. Be sure to follow up with your doctor about your blood pressure and what you can do about it. Follow-up care is a key part of your treatment and safety. Be sure to make and go to all appointments, and call your doctor if you are having problems. It's also a good idea to know your test results and keep a list of the medicines you take. How can you care for yourself at home? · See your doctor as often as he or she recommends. This is to make sure your blood pressure is under control. You will probably go at least 2 times a year. But it may be more often at first.  · Take your blood pressure medicine exactly as prescribed. You may take one or more types. They include diuretics, beta-blockers, ACE inhibitors, calcium channel blockers, and angiotensin II receptor blockers. Call your doctor if you think you are having a problem with your medicine. · If you take blood pressure medicine, talk to your doctor before you take decongestants or anti-inflammatory medicine, such as ibuprofen. These can raise blood pressure. · Learn how to check your blood pressure at home. Check it often. · Ask your doctor if it's okay to drink alcohol. · Talk to your doctor about lifestyle changes that can help blood pressure. These include being active and not smoking.   When should you call for help?  Call 911 anytime you think you may need emergency care. This may mean having symptoms that suggest that your blood pressure is causing a serious heart or blood vessel problem. Your blood pressure may be over 180/110. For example, call 911 if:  · You have symptoms of a heart attack. These may include:  ¨ Chest pain or pressure, or a strange feeling in the chest.  ¨ Sweating. ¨ Shortness of breath. ¨ Nausea or vomiting. ¨ Pain, pressure, or a strange feeling in the back, neck, jaw, or upper belly or in one or both shoulders or arms. ¨ Lightheadedness or sudden weakness. ¨ A fast or irregular heartbeat. · You have symptoms of a stroke. These may include:  ¨ Sudden numbness, tingling, weakness, or loss of movement in your face, arm, or leg, especially on only one side of your body. ¨ Sudden vision changes. ¨ Sudden trouble speaking. ¨ Sudden confusion or trouble understanding simple statements. ¨ Sudden problems with walking or balance. ¨ A sudden, severe headache that is different from past headaches. · You have severe back or belly pain. Do not wait until your blood pressure comes down on its own. Get help right away. Call your doctor now or seek immediate care if:  · Your blood pressure is much higher than normal (such as 180/110 or higher), but you don't have symptoms. · You think high blood pressure is causing symptoms, such as:  ¨ Severe headache. ¨ Blurry vision. Watch closely for changes in your health, and be sure to contact your doctor if:  · Your blood pressure measures 140/90 or higher at least 2 times. That means the top number is 140 or higher or the bottom number is 90 or higher, or both. · You think you may be having side effects from your blood pressure medicine. · Your blood pressure is usually normal, but it goes above normal at least 2 times. Where can you learn more? Go to http://jasper-teresa.info/.   Enter Q562 in the search box to learn more about \"Acute High Blood Pressure: Care Instructions. \"  Current as of: August 8, 2016  Content Version: 11.1  © 1467-3997 [employer]. Care instructions adapted under license by [employer] (which disclaims liability or warranty for this information). If you have questions about a medical condition or this instruction, always ask your healthcare professional. [address] any warranty or liability for your use of this information.

## 2017-02-04 NOTE — ED PROVIDER NOTES
HPI Comments: 12:11 PM Karen Medel is a 45 y.o. Male with a hx of HTN who presents to the ED c/o elevated blood pressure over the past several days. He reports that he accidentally left his HCTZ at a friend's house and has been unable to get to it. He is reporting to the ED in order to get his blood pressure regulated. He was recently assigned a PCP at [facility]. He denies headache, chest pain, leg swelling, SOB, or ant further complaints      The history is provided by the patient. Past Medical History:   Diagnosis Date    Back pain     Hypertension        Past Surgical History:   Procedure Laterality Date    Hx other surgical       oral sx         History reviewed. No pertinent family history. Social History     Social History    Marital status: LEGALLY      Spouse name: N/A    Number of children: N/A    Years of education: N/A     Occupational History    Not on file. Social History Main Topics    Smoking status: Current Some Day Smoker    Smokeless tobacco: Not on file    Alcohol use Yes      Comment: occasional    Drug use: Yes     Special: Marijuana, Cocaine    Sexual activity: Not on file     Other Topics Concern    Not on file     Social History Narrative         ALLERGIES: Review of patient's allergies indicates no known allergies. Review of Systems   Respiratory: Negative for shortness of breath. Cardiovascular: Negative for chest pain and leg swelling. Positive for elevated BP   Neurological: Negative for headaches. All other systems reviewed and are negative. Vitals:    02/04/17 1202   BP: (!) 180/132   Pulse: 72   Resp: 15   Temp: 97.9 °F (36.6 °C)   SpO2: 99%   Weight: 70.3 kg (155 lb)   Height: 5' 7\" (1.702 m)            Physical Exam   Constitutional:   General:  Well-developed, well-nourished, no apparent distress. Head:  Normocephalic atraumatic. Eyes:  Pupils midrange extraocular movements intact.   No pallor or conjunctival injection. Nose:  No rhinorrhea, inspection grossly normal.    Ears:  Grossly normal to inspection, no discharge. Mouth:  Mucous membranes moist, no appreciable intraoral lesion. Neck:  Trachea midline, no asymmetry. No JVD  Chest:  Grossly normal inspection, symmetric chest rise. Pulmonary:  Clear to auscultation bilaterally no wheezes rhonchi or rales. Cardiovascular:  S1-S2 no murmurs rubs or gallops. Extremities:  Grossly normal to inspection, peripheral pulses intact. No edema  Neurologic:  Alert and oriented no appreciable focal neurologic deficit. Psychiatric:  Grossly normal mood and affect. Nursing note reviewed, vital signs reviewed. MDM  Number of Diagnoses or Management Options  Elevated blood pressure:   Noncompliance with medication regimen:   Diagnosis management comments: ED course:  Patient presents with elevated blood pressure noncompliant medication since he LEFT at a friend's house. He has no symptoms at this time. No chest pain or shortness breath no headache. No signs of heart failure    Recent labs done December 20, 2016 creatinine within normal limits    He had no indications for labs at this time, will be given blood pressure medication and understands the importance of follow-up with primary care doctor. He did have a primary care doctor arrange for him during his last visit to [facility]. Patient noted to have elevated blood pressure with  prior diagnosis/treatment of hypertension. No indication for emergent blood pressure management at this time, patient comfortable with outpatient recheck within 1 week and possible medical management by outpatient healthcare provider. Will return here with any concerns. Patient's presentation, history, physical exam and laboratory evaluations were reviewed.   At this time patient was felt to be stable for outpatient management and follow with primary care/specialist.  Patient was instructed to return to the emergency department with any concerns. Disposition:    Discharged home      Portions of this chart were created with Dragon medical speech to text program.   Unrecognized errors may be present. ED Course       Procedures    SCRIBE ATTESTATION STATEMENT  Documented by: Kaye Patel for, and in the presence of, Jabari Hankins MD 12:16 PM     Signed by: Isrrael Coronado, 02/04/17 12:16 PM    PROVIDER ATTESTATION STATEMENT  I personally performed the services described in the documentation, reviewed the documentation, as recorded by the scribe in my presence, and it accurately and completely records my words and actions.   Jabari Hankins MD

## 2017-02-11 ENCOUNTER — HOSPITAL ENCOUNTER (EMERGENCY)
Age: 52
Discharge: HOME | End: 2017-02-11
Attending: EMERGENCY MEDICINE
Payer: SELF-PAY

## 2017-02-11 ENCOUNTER — HOSPITAL ENCOUNTER (EMERGENCY)
Age: 52
Discharge: HOME | End: 2017-02-11
Attending: EMERGENCY MEDICINE | Admitting: EMERGENCY MEDICINE
Payer: SELF-PAY

## 2017-02-11 VITALS
TEMPERATURE: 98.7 F | HEART RATE: 70 BPM | OXYGEN SATURATION: 100 % | RESPIRATION RATE: 18 BRPM | SYSTOLIC BLOOD PRESSURE: 150 MMHG | DIASTOLIC BLOOD PRESSURE: 102 MMHG

## 2017-02-11 VITALS
OXYGEN SATURATION: 100 % | HEIGHT: 66 IN | BODY MASS INDEX: 24.11 KG/M2 | WEIGHT: 150 LBS | HEART RATE: 63 BPM | TEMPERATURE: 99 F | RESPIRATION RATE: 17 BRPM | SYSTOLIC BLOOD PRESSURE: 155 MMHG | DIASTOLIC BLOOD PRESSURE: 96 MMHG

## 2017-02-11 DIAGNOSIS — I10 ESSENTIAL HYPERTENSION: Primary | ICD-10-CM

## 2017-02-11 LAB
ANION GAP BLD CALC-SCNC: 6 MMOL/L (ref 3–18)
BASOPHILS # BLD AUTO: 0 K/UL (ref 0–0.06)
BASOPHILS # BLD: 0 % (ref 0–2)
BUN SERPL-MCNC: 11 MG/DL (ref 7–18)
BUN/CREAT SERPL: 14 (ref 12–20)
CALCIUM SERPL-MCNC: 8.5 MG/DL (ref 8.5–10.1)
CHLORIDE SERPL-SCNC: 106 MMOL/L (ref 100–108)
CK MB CFR SERPL CALC: 1.4 % (ref 0–4)
CK MB SERPL-MCNC: 2.5 NG/ML (ref 0.5–3.6)
CK SERPL-CCNC: 174 U/L (ref 39–308)
CO2 SERPL-SCNC: 32 MMOL/L (ref 21–32)
CREAT SERPL-MCNC: 0.81 MG/DL (ref 0.6–1.3)
DIFFERENTIAL METHOD BLD: ABNORMAL
EOSINOPHIL # BLD: 0 K/UL (ref 0–0.4)
EOSINOPHIL NFR BLD: 1 % (ref 0–5)
ERYTHROCYTE [DISTWIDTH] IN BLOOD BY AUTOMATED COUNT: 15.9 % (ref 11.6–14.5)
GLUCOSE SERPL-MCNC: 99 MG/DL (ref 74–99)
HCT VFR BLD AUTO: 35.7 % (ref 36–48)
HGB BLD-MCNC: 11.4 G/DL (ref 13–16)
LYMPHOCYTES # BLD AUTO: 33 % (ref 21–52)
LYMPHOCYTES # BLD: 1.6 K/UL (ref 0.9–3.6)
MCH RBC QN AUTO: 26.3 PG (ref 24–34)
MCHC RBC AUTO-ENTMCNC: 31.9 G/DL (ref 31–37)
MCV RBC AUTO: 82.3 FL (ref 74–97)
MONOCYTES # BLD: 0.3 K/UL (ref 0.05–1.2)
MONOCYTES NFR BLD AUTO: 6 % (ref 3–10)
NEUTS SEG # BLD: 2.8 K/UL (ref 1.8–8)
NEUTS SEG NFR BLD AUTO: 60 % (ref 40–73)
PLATELET # BLD AUTO: 223 K/UL (ref 135–420)
PMV BLD AUTO: 9.8 FL (ref 9.2–11.8)
POTASSIUM SERPL-SCNC: 3.2 MMOL/L (ref 3.5–5.5)
RBC # BLD AUTO: 4.34 M/UL (ref 4.7–5.5)
SODIUM SERPL-SCNC: 144 MMOL/L (ref 136–145)
TROPONIN I SERPL-MCNC: <0.02 NG/ML (ref 0–0.04)
WBC # BLD AUTO: 4.7 K/UL (ref 4.6–13.2)

## 2017-02-11 PROCEDURE — 80048 BASIC METABOLIC PNL TOTAL CA: CPT | Performed by: PHYSICIAN ASSISTANT

## 2017-02-11 PROCEDURE — 99283 EMERGENCY DEPT VISIT LOW MDM: CPT

## 2017-02-11 PROCEDURE — 99282 EMERGENCY DEPT VISIT SF MDM: CPT

## 2017-02-11 PROCEDURE — 96360 HYDRATION IV INFUSION INIT: CPT

## 2017-02-11 PROCEDURE — 93005 ELECTROCARDIOGRAM TRACING: CPT

## 2017-02-11 PROCEDURE — 74011250636 HC RX REV CODE- 250/636: Performed by: PHYSICIAN ASSISTANT

## 2017-02-11 PROCEDURE — 85025 COMPLETE CBC W/AUTO DIFF WBC: CPT | Performed by: PHYSICIAN ASSISTANT

## 2017-02-11 PROCEDURE — 82550 ASSAY OF CK (CPK): CPT | Performed by: PHYSICIAN ASSISTANT

## 2017-02-11 RX ADMIN — SODIUM CHLORIDE 1000 ML: 900 INJECTION, SOLUTION INTRAVENOUS at 02:39

## 2017-02-11 NOTE — ED NOTES
Orthostatic blood pressure completed    Lying 144/97, HR 60  Sitting 150/110, HR 64  Standing 160/114, HR 63

## 2017-02-11 NOTE — ED PROVIDER NOTES
HPI Comments: 44yo M with h/o HTN presents with complaint of near syncope. He was walking earlier today and felt like he was about to \"blank out\". This was not associated with exertion or positional changes. He has been prescribed blood pressure medication but does not take it for financial reasons. He denies headache, chest pain, shortness of breath, dizziness. His only complaint today is lightheadedness. Patient is a 45 y.o. male presenting with hypertension. Hypertension    Pertinent negatives include no chest pain, no confusion, no headaches, no neck pain, no dizziness and no shortness of breath. Past Medical History:   Diagnosis Date    Back pain     Hypertension        Past Surgical History:   Procedure Laterality Date    Hx other surgical       oral sx         History reviewed. No pertinent family history. Social History     Social History    Marital status: LEGALLY      Spouse name: N/A    Number of children: N/A    Years of education: N/A     Occupational History    Not on file. Social History Main Topics    Smoking status: Current Some Day Smoker    Smokeless tobacco: Not on file    Alcohol use Yes      Comment: occasional    Drug use: Yes     Special: Marijuana, Cocaine    Sexual activity: Not on file     Other Topics Concern    Not on file     Social History Narrative         ALLERGIES: Review of patient's allergies indicates no known allergies. Review of Systems   Constitutional: Negative for fever. HENT: Negative for facial swelling. Eyes: Negative for visual disturbance. Respiratory: Negative for shortness of breath. Cardiovascular: Negative for chest pain. Gastrointestinal: Negative for abdominal pain. Genitourinary: Negative for dysuria. Musculoskeletal: Negative for neck pain. Skin: Negative for rash. Neurological: Positive for light-headedness. Negative for dizziness, syncope, weakness, numbness and headaches. Psychiatric/Behavioral: Negative for confusion. All other systems reviewed and are negative. Vitals:    02/11/17 0219   BP: (!) 161/112   Pulse: 88   Resp: 17   Temp: 99 °F (37.2 °C)   SpO2: 100%   Weight: 68 kg (150 lb)   Height: 5' 6\" (1.676 m)            Physical Exam   Constitutional: He is oriented to person, place, and time. He appears well-developed and well-nourished. No distress. HENT:   Head: Normocephalic and atraumatic. Eyes: Conjunctivae are normal.   Neck: Normal range of motion. Neck supple. Cardiovascular: Normal rate, regular rhythm and normal heart sounds. Pulmonary/Chest: Effort normal and breath sounds normal.   Abdominal: Soft. Bowel sounds are normal.   Musculoskeletal: Normal range of motion. Neurological: He is alert and oriented to person, place, and time. No cranial nerve deficit. Skin: Skin is warm and dry. He is not diaphoretic. Psychiatric: He has a normal mood and affect. Nursing note and vitals reviewed. MDM  Number of Diagnoses or Management Options  Diagnosis management comments: Lightheadedness and near syncope earlier today. Normal exam.  Vitals stable, BP elevated. Lowering BP in ED is not recommended,  This is a long term issue that should be corrected over a more chronic time period. Care will be turned over to Dr. Gretel Crowe at shift change 0300.          Amount and/or Complexity of Data Reviewed  Clinical lab tests: ordered and reviewed  Tests in the medicine section of CPT®: ordered and reviewed    Risk of Complications, Morbidity, and/or Mortality  Presenting problems: moderate  Diagnostic procedures: moderate  Management options: moderate      ED Course       Procedures

## 2017-02-11 NOTE — ED NOTES
Rounded on patient who is resting comfortably on the stretcher in no distress. Will continue to monitor.

## 2017-02-11 NOTE — DISCHARGE INSTRUCTIONS

## 2017-02-12 LAB
ATRIAL RATE: 62 BPM
CALCULATED P AXIS, ECG09: 57 DEGREES
CALCULATED R AXIS, ECG10: -26 DEGREES
CALCULATED T AXIS, ECG11: 145 DEGREES
DIAGNOSIS, 93000: NORMAL
P-R INTERVAL, ECG05: 194 MS
Q-T INTERVAL, ECG07: 424 MS
QRS DURATION, ECG06: 96 MS
QTC CALCULATION (BEZET), ECG08: 430 MS
VENTRICULAR RATE, ECG03: 62 BPM

## 2017-02-12 NOTE — DISCHARGE INSTRUCTIONS

## 2017-02-12 NOTE — ED NOTES
I have reviewed discharge instructions with the patient. The patient verbalized understanding. Patient armband removed and shredded. Pt d/c'd awake, alert and in NAD. All questions answered.

## 2017-02-12 NOTE — ED PROVIDER NOTES
HPI Comments: 10:43 PM Bettina Donato is a 45 y.o. male with a history of HTN who presents to the ED for elevated blood pressure. Pt claims he is here for a \"follow up\" from yesterday. Pt came in yesterday for same complaint. States he does have a place to go. Per nursing staff patient was found sleeping in the lobby. When told he could not sleep there, patient then requested to be evaluated. No other concerns. The history is provided by the patient. Past Medical History:   Diagnosis Date    Back pain     Hypertension        Past Surgical History:   Procedure Laterality Date    Hx other surgical       oral sx         History reviewed. No pertinent family history. Social History     Social History    Marital status: LEGALLY      Spouse name: N/A    Number of children: N/A    Years of education: N/A     Occupational History    Not on file. Social History Main Topics    Smoking status: Current Some Day Smoker    Smokeless tobacco: Not on file    Alcohol use Yes      Comment: occasional    Drug use: Yes     Special: Marijuana, Cocaine    Sexual activity: Not on file     Other Topics Concern    Not on file     Social History Narrative         ALLERGIES: Review of patient's allergies indicates no known allergies. Review of Systems   Constitutional: Negative for fever. HENT: Negative for trouble swallowing. Respiratory: Negative for shortness of breath. Cardiovascular: Negative for chest pain. Gastrointestinal: Negative for abdominal pain, diarrhea and vomiting. Genitourinary: Negative for difficulty urinating. Musculoskeletal: Negative for back pain and neck pain. Skin: Negative for wound. Neurological: Negative for syncope. Psychiatric/Behavioral: Negative for behavioral problems. All other systems reviewed and are negative.       Vitals:    02/11/17 2233   BP: (!) 150/102   Pulse: 70   Resp: 18   Temp: 98.7 °F (37.1 °C)   SpO2: 100%            Physical Exam   Constitutional: He is oriented to person, place, and time. He appears well-developed. No distress. Chronically ill-appearing, nad   HENT:   Head: Normocephalic and atraumatic. Eyes: EOM are normal.   Neck: Normal range of motion. Cardiovascular: Normal rate. Pulmonary/Chest: Effort normal and breath sounds normal. No respiratory distress. Abdominal: Soft. There is no tenderness. Musculoskeletal: Normal range of motion. Mechanically stable   Neurological: He is alert and oriented to person, place, and time. No focal deficits noted   Psychiatric: His behavior is normal.   Nursing note and vitals reviewed. MDM  Number of Diagnoses or Management Options  Essential hypertension:   Diagnosis management comments: 46 yo AAM with PMHx HTN presents with uncertain complaints. Pt was here yesterday, seen and evaluated by me for HTN. Pt was reportedly sleeping in hospital lobby and chose to check in when asked to leave. Pt tells me he is here for \"follow-up\" of his HTN, has no other complaints. Examination unremarkable. I do not suspect hypertensive emergency or other emergent medical condition. Dc home, symptom management, follow-up, return precautions. ED Course       Procedures      Vitals:  Patient Vitals for the past 12 hrs:   Temp Pulse Resp BP SpO2   02/11/17 2233 98.7 °F (37.1 °C) 70 18 (!) 150/102 100 %   100%. Percentage is within normal limits. Progress Notes: Pt reevaluated at this time and is resting comfortably in NAD. Discussed results and findings, as well as, diagnosis and plan for discharge. Pt verbalizes understanding and agreement with plan. All questions addressed at this time. Disposition: Discharge    Diagnosis:   1.  Essential hypertension        Follow-up Information     Follow up With Details Comments [facility] Call As needed [address] [facility] EMERGENCY DEPT Go to If symptoms worsen, As needed [address]  [phone]          Scribe Attestation:     Joseph Thakkar, scribing for and in the presence of Karen Gao MD February 11, 2017 at 10:48 PM     Signed by: Isrrael Robison, February 11, 2017 at 10:48 PM     Physician Attestation:   I personally performed the services described in this documentation, reviewed and edited the documentation which was dictated to the scribe in my presence, and it accurately records my words and actions.  Kaern Gao MD  February 11, 2017

## 2017-02-12 NOTE — ED TRIAGE NOTES
PT was seen this morning for HTN and discharged with the instructions to follow up at [facility]. Pt returned stating - i haven't had a chance to follow up.

## 2017-02-13 ENCOUNTER — HOSPITAL ENCOUNTER (EMERGENCY)
Age: 52
Discharge: HOME | End: 2017-02-13
Attending: EMERGENCY MEDICINE
Payer: SELF-PAY

## 2017-02-13 VITALS
OXYGEN SATURATION: 100 % | SYSTOLIC BLOOD PRESSURE: 171 MMHG | DIASTOLIC BLOOD PRESSURE: 115 MMHG | RESPIRATION RATE: 16 BRPM | WEIGHT: 160 LBS | HEART RATE: 66 BPM | TEMPERATURE: 98 F | BODY MASS INDEX: 25.82 KG/M2

## 2017-02-13 DIAGNOSIS — Z91.14 NONCOMPLIANCE WITH MEDICATION REGIMEN: ICD-10-CM

## 2017-02-13 DIAGNOSIS — I10 HYPERTENSION, UNCONTROLLED: Primary | ICD-10-CM

## 2017-02-13 PROCEDURE — 99282 EMERGENCY DEPT VISIT SF MDM: CPT

## 2017-02-13 RX ORDER — HYDROCHLOROTHIAZIDE 25 MG/1
25 TABLET ORAL DAILY
Qty: 30 TAB | Refills: 1 | Status: SHIPPED | OUTPATIENT
Start: 2017-02-13 | End: 2017-04-23

## 2017-02-13 RX ORDER — LISINOPRIL 20 MG/1
20 TABLET ORAL DAILY
Qty: 30 TAB | Refills: 1 | Status: SHIPPED | OUTPATIENT
Start: 2017-02-13 | End: 2017-04-23

## 2017-02-13 NOTE — ED NOTES
I have reviewed discharge instructions with the patient. The patient verbalized understanding. Reviewed discharge instructions with patient and patient verbalized understanding of all instructions. Patient signed paper discharge instructions due to computer signature pad unavailable at this time.

## 2017-02-13 NOTE — ED PROVIDER NOTES
HPI Comments: Marilee Suarez is a 45 y.o. Male who presents again for recheck of his blood pressure. Seen at [facility] about 5 hours ago for same and here or other ers almost everyday for same. Has not gotten meds filled. No nvd, headache, cp, edema    The history is provided by the patient. Past Medical History:   Diagnosis Date    Back pain     Hypertension        Past Surgical History:   Procedure Laterality Date    Hx other surgical       oral sx         History reviewed. No pertinent family history. Social History     Social History    Marital status: LEGALLY      Spouse name: N/A    Number of children: N/A    Years of education: N/A     Occupational History    Not on file. Social History Main Topics    Smoking status: Current Some Day Smoker    Smokeless tobacco: Not on file    Alcohol use Yes      Comment: occasional    Drug use: Yes     Special: Marijuana, Cocaine    Sexual activity: Not on file     Other Topics Concern    Not on file     Social History Narrative         ALLERGIES: Review of patient's allergies indicates no known allergies. Review of Systems   Constitutional: Negative for fever. HENT: Negative for trouble swallowing. Respiratory: Negative for chest tightness. Cardiovascular: Negative for chest pain. Gastrointestinal: Negative for abdominal pain. Musculoskeletal: Negative for back pain. Skin: Negative for wound. Neurological: Negative for syncope. Psychiatric/Behavioral: Positive for sleep disturbance. There were no vitals filed for this visit. Physical Exam   Constitutional: He is oriented to person, place, and time. Non-toxic appearance. He does not appear ill. No distress. HENT:   Head: Normocephalic and atraumatic. Right Ear: External ear normal.   Left Ear: External ear normal.   Nose: Nose normal.   Mouth/Throat: Oropharynx is clear and moist.   Eyes: Conjunctivae are normal.   Neck: Normal range of motion. Cardiovascular: Normal rate, regular rhythm, normal heart sounds and intact distal pulses. Pulmonary/Chest: Effort normal and breath sounds normal. No respiratory distress. Abdominal: Soft. There is no tenderness. Musculoskeletal: Normal range of motion. He exhibits no edema. Neurological: He is alert and oriented to person, place, and time. Skin: Skin is warm and dry. He is not diaphoretic. Psychiatric: His behavior is normal.   Nursing note and vitals reviewed. [facility]  ED Course       Procedures  Vitals:  Patient Vitals for the past 12 hrs:   Temp Pulse Resp BP SpO2   02/13/17 0307 98 °F (36.7 °C) 66 16 (!) 171/115 100 %         Medications ordered:   Medications - No data to display      Lab findings:  No results found for this or any previous visit (from the past 12 hour(s)). EKG interpretation by ED Physician:      X-Ray, CT or other radiology findings or impressions:  No orders to display       Progress notes, Consult notes or additional Procedure notes:   Strongly suspect pt is malingering and had to be told to leave waiting room last night  Will refill his rx again as he is unsure whether he has one or not    Reevaluation of patient:   Stable for d/c    Disposition:  Diagnosis:   1. Hypertension, uncontrolled    2. Noncompliance with medication regimen        Disposition: home      Follow-up Information     Follow up With Details Comments [address] [place] [address] Schedule an appointment as soon as possible for a visit  [address] [place] [address]  [phone]            Patient's Medications   Start Taking    No medications on file   Continue Taking    No medications on file   These Medications have changed    Modified Medication Previous Medication    HYDROCHLOROTHIAZIDE (HYDRODIURIL) 25 MG TABLET hydroCHLOROthiazide (HYDRODIURIL) 25 mg tablet       Take 1 Tab by mouth daily. Take 1 Tab by mouth daily.     LISINOPRIL (Yasmine [place]) 20 MG TABLET lisinopril (PRINIVIL, ZESTRIL) 20 mg tablet       Take 1 Tab by mouth daily. Take 1 Tab by mouth daily.    Stop Taking    No medications on file

## 2017-02-13 NOTE — ED TRIAGE NOTES
Pt reports he is here for a blood pressure check. Hx of Hypertension. Pt reports he has not filled the prescriptions for his blood pressure medicine. Pt seen at [facility] earlier tonight.

## 2017-02-15 ENCOUNTER — HOSPITAL ENCOUNTER (EMERGENCY)
Age: 52
Discharge: HOME | End: 2017-02-15
Attending: EMERGENCY MEDICINE
Payer: SELF-PAY

## 2017-02-15 VITALS
TEMPERATURE: 98.5 F | DIASTOLIC BLOOD PRESSURE: 105 MMHG | HEART RATE: 77 BPM | SYSTOLIC BLOOD PRESSURE: 157 MMHG | OXYGEN SATURATION: 100 % | RESPIRATION RATE: 16 BRPM

## 2017-02-15 DIAGNOSIS — Z91.199 NON COMPLIANCE WITH MEDICAL TREATMENT: ICD-10-CM

## 2017-02-15 DIAGNOSIS — Z59.00 HOMELESSNESS: Primary | ICD-10-CM

## 2017-02-15 DIAGNOSIS — I10 ESSENTIAL HYPERTENSION: ICD-10-CM

## 2017-02-15 PROCEDURE — 99282 EMERGENCY DEPT VISIT SF MDM: CPT

## 2017-02-15 SDOH — ECONOMIC STABILITY - HOUSING INSECURITY: HOMELESSNESS UNSPECIFIED: Z59.00

## 2017-02-16 NOTE — ED PROVIDER NOTES
HPI Comments: 7:01 PM Félix Leal is a 45 y.o. Male with hypertension presenting to the ED with elevated blood pressure. The patient has been in the ED numerous times with the same complaints. He states that he has prescriptions for his medicaiton but was unable to fill the prescription because they are at his ex wife's house. He is concerned that he will \"stroke out. \" Pt denies nausea, vomiting, diarrhea, fever, CP, and cough. No other complaints at this time. The history is provided by the patient. Past Medical History:   Diagnosis Date    Back pain     Hypertension        Past Surgical History:   Procedure Laterality Date    Hx other surgical       oral sx         History reviewed. No pertinent family history. Social History     Social History    Marital status: LEGALLY      Spouse name: N/A    Number of children: N/A    Years of education: N/A     Occupational History    Not on file. Social History Main Topics    Smoking status: Current Some Day Smoker    Smokeless tobacco: Not on file    Alcohol use Yes      Comment: occasional    Drug use: Yes     Special: Marijuana, Cocaine    Sexual activity: Not on file     Other Topics Concern    Not on file     Social History Narrative         ALLERGIES: Review of patient's allergies indicates no known allergies. Review of Systems   Constitutional: Negative for appetite change, chills, diaphoresis, fatigue, fever and unexpected weight change. HENT: Negative for congestion, dental problem, ear discharge, ear pain, hearing loss, nosebleeds, rhinorrhea, sinus pressure, sore throat, tinnitus, trouble swallowing and voice change. Eyes: Negative for photophobia, pain, discharge, redness and visual disturbance. Respiratory: Negative for cough, choking, chest tightness, shortness of breath, wheezing and stridor. Cardiovascular: Negative for chest pain, palpitations and leg swelling.    Gastrointestinal: Negative for abdominal distention, abdominal pain, anal bleeding, blood in stool, constipation, diarrhea, nausea and vomiting. Genitourinary: Negative for decreased urine volume, difficulty urinating, discharge, dysuria, flank pain, frequency, genital sores, hematuria, penile pain, penile swelling, scrotal swelling, testicular pain and urgency. Musculoskeletal: Negative for neck pain and neck stiffness. Neurological: Negative for tremors, seizures, syncope, speech difficulty, weakness, light-headedness and headaches. Hematological: Negative for adenopathy. Does not bruise/bleed easily. Psychiatric/Behavioral: Negative for agitation, behavioral problems, confusion, hallucinations, self-injury, sleep disturbance and suicidal ideas. The patient is not nervous/anxious and is not hyperactive. Vitals:    02/15/17 1904   BP: (!) 157/105   Pulse: 77   Resp: 16   Temp: 98.5 °F (36.9 °C)   SpO2: 100%            Physical Exam   Constitutional: He is oriented to person, place, and time. He appears well-developed and well-nourished. No distress. 45year old  male in no acute distress. Disheveled   HENT:   Head: Normocephalic and atraumatic. Right Ear: External ear normal.   Left Ear: External ear normal.   Nose: Nose normal.   Mouth/Throat: Oropharynx is clear and moist. No oropharyngeal exudate. Eyes: Conjunctivae and EOM are normal. Pupils are equal, round, and reactive to light. Right eye exhibits no discharge. Left eye exhibits no discharge. No scleral icterus. Neck: Normal range of motion. Neck supple. No JVD present. No tracheal deviation present. No thyromegaly present. Cardiovascular: Normal rate, regular rhythm, normal heart sounds and intact distal pulses. Exam reveals no gallop and no friction rub. No murmur heard. Pulmonary/Chest: Effort normal and breath sounds normal. No stridor. No respiratory distress. He has no wheezes. He has no rales. He exhibits no tenderness. Abdominal: Soft. Bowel sounds are normal. He exhibits no distension and no mass. There is no tenderness. There is no rebound and no guarding. Musculoskeletal: Normal range of motion. He exhibits no edema, tenderness or deformity. Lymphadenopathy:     He has no cervical adenopathy. Neurological: He is alert and oriented to person, place, and time. No cranial nerve deficit. Skin: Skin is warm and dry. No rash noted. He is not diaphoretic. No erythema. No pallor. Psychiatric: He has a normal mood and affect. His behavior is normal. Judgment and thought content normal.   Nursing note and vitals reviewed. MDM  Number of Diagnoses or Management Options  Essential hypertension:   Homelessness:   Non compliance with medical treatment:   Diagnosis management comments: Patient has over 40 visit to ED's in this area over the past 3 month. He was seen in this ED on 2/13 and at [facility] on 2/14 for the same complaint. He was counseled extensively on the need to follow up as instructed and to fill his prescriptions. He was offered medication assistance and a  referral       Amount and/or Complexity of Data Reviewed  Decide to obtain previous medical records or to obtain history from someone other than the patient: yes  Obtain history from someone other than the patient: yes  Review and summarize past medical records: yes    Risk of Complications, Morbidity, and/or Mortality  Presenting problems: low  Diagnostic procedures: minimal  Management options: low      ED Course       Procedures      -------------------------------------------------------------------------------------------------------------------  PROGRESS NOTE:  7:10 PM Pt reevaluated at this time and is resting comfortably in NAD. Discussed diagnosis and plan for discharge. Pt verbalizes understanding and agreement with plan. All questions addressed at this time.         ORDERS:  No orders of the defined types were placed in this encounter. DISPOSITION:  Diagnosis:   1. Homelessness    2. Essential hypertension    3. Non compliance with medical treatment            Disposition: discharged      Follow-up Information     Follow up With Details Comments [address] Schedule an appointment as soon as possible for a visit Return to ED, If symptoms worsen [address]  [phone]                 Discharge Medication List as of 2/15/2017  7:25 PM      CONTINUE these medications which have NOT CHANGED    Details   lisinopril (PRINIVIL, ZESTRIL) 20 mg tablet Take 1 Tab by mouth daily. , Print, Disp-30 Tab, R-1      hydroCHLOROthiazide (HYDRODIURIL) 25 mg tablet Take 1 Tab by mouth daily. , Print, Disp-30 Tab, R-1               -------------------------------------------------------------------------------------------------------------------  I, Stiven Nassar, am scribing for and in the presence of Vandana Harrison DO. Signed by: Isrrael Caraballo, 02/15/17, 7:03 PM        Provider Attestation:  I personally performed the services described in the documentation, reviewed the documentation, as recorded by the scribe in my presence, and it accurately and completely records my words and actions. Dr. Mone Robles.  Graeme VALLADARES 7:03 PM

## 2017-02-16 NOTE — ED TRIAGE NOTES
Pt states his pressure is high and he doesn't want to Fillistorf out. \" When questioned as to why he is not taking his BP medications that have been prescribed to him several times, he states that there are \"complications\" and that he cannot get to his medications because they are at his ex's house.

## 2017-02-18 ENCOUNTER — HOSPITAL ENCOUNTER (EMERGENCY)
Age: 52
Discharge: HOME | End: 2017-02-18
Attending: EMERGENCY MEDICINE
Payer: SELF-PAY

## 2017-02-18 VITALS
WEIGHT: 160 LBS | OXYGEN SATURATION: 98 % | DIASTOLIC BLOOD PRESSURE: 83 MMHG | RESPIRATION RATE: 18 BRPM | HEART RATE: 104 BPM | SYSTOLIC BLOOD PRESSURE: 140 MMHG | TEMPERATURE: 98.7 F | BODY MASS INDEX: 25.82 KG/M2

## 2017-02-18 DIAGNOSIS — I10 ESSENTIAL HYPERTENSION: Primary | ICD-10-CM

## 2017-02-18 PROCEDURE — 93005 ELECTROCARDIOGRAM TRACING: CPT

## 2017-02-18 PROCEDURE — 99283 EMERGENCY DEPT VISIT LOW MDM: CPT

## 2017-02-19 LAB
ATRIAL RATE: 84 BPM
CALCULATED P AXIS, ECG09: 64 DEGREES
CALCULATED R AXIS, ECG10: -16 DEGREES
CALCULATED T AXIS, ECG11: 93 DEGREES
DIAGNOSIS, 93000: NORMAL
P-R INTERVAL, ECG05: 174 MS
Q-T INTERVAL, ECG07: 378 MS
QRS DURATION, ECG06: 90 MS
QTC CALCULATION (BEZET), ECG08: 446 MS
VENTRICULAR RATE, ECG03: 84 BPM

## 2017-02-19 NOTE — ED TRIAGE NOTES
Pt came in today stating that his pressure feels high. Pt seen multiple times for this issue and is homeless.

## 2017-02-19 NOTE — ED PROVIDER NOTES
HPI Comments: 46yo M requests to have a \"check up\" on his heart because his \"heart feels funny\" and \"somethings wrong with it\". He denies chest pain. He has no specific symptoms. No abdominal pain, nausea, or dizziness. No other complaints today. Patient is a 45 y.o. male presenting with hypertension. Hypertension    Pertinent negatives include no chest pain, no confusion, no neck pain, no dizziness and no shortness of breath. Past Medical History:   Diagnosis Date    Back pain     Hypertension        Past Surgical History:   Procedure Laterality Date    Hx other surgical       oral sx         History reviewed. No pertinent family history. Social History     Social History    Marital status: LEGALLY      Spouse name: N/A    Number of children: N/A    Years of education: N/A     Occupational History    Not on file. Social History Main Topics    Smoking status: Current Some Day Smoker    Smokeless tobacco: Not on file    Alcohol use Yes      Comment: occasional    Drug use: Yes     Special: Marijuana, Cocaine    Sexual activity: Not on file     Other Topics Concern    Not on file     Social History Narrative         ALLERGIES: Review of patient's allergies indicates no known allergies. Review of Systems   Constitutional: Negative for fever. HENT: Negative for facial swelling. Eyes: Negative for visual disturbance. Respiratory: Negative for shortness of breath. Cardiovascular: Negative for chest pain. Gastrointestinal: Negative for abdominal pain. Genitourinary: Negative for dysuria. Musculoskeletal: Negative for neck pain. Skin: Negative for rash. Neurological: Negative for dizziness. Psychiatric/Behavioral: Negative for confusion. All other systems reviewed and are negative.       Vitals:    02/18/17 2115   BP: 140/83   Pulse: (!) 104   Resp: 18   Temp: 98.7 °F (37.1 °C)   SpO2: 98%   Weight: 72.6 kg (160 lb)            Physical Exam Constitutional: He appears well-developed and well-nourished. No distress. HENT:   Head: Normocephalic and atraumatic. Eyes: Conjunctivae are normal.   Neck: Normal range of motion. Neck supple. Cardiovascular: Normal rate and regular rhythm. Murmur heard. Pulmonary/Chest: Effort normal and breath sounds normal. He has no wheezes. He has no rales. Abdominal: Soft. Musculoskeletal: Normal range of motion. Neurological: He is alert. Skin: Skin is warm and dry. He is not diaphoretic. Psychiatric: He has a normal mood and affect. Nursing note and vitals reviewed. MDM  Number of Diagnoses or Management Options  Essential hypertension:   Diagnosis management comments: Abnormal EKG but unchanged from all previous EKGs. No chest pain or symptoms. Only requesting \"check up\". Discharge, as no specific symptoms were described. Instructed to return if chest pain, abdominal pain, arm pain, nausea or dizziness occurs. ED Course       Procedures           Diagnosis:   1. Essential hypertension          Disposition: home    Follow-up Information     Follow up With Details Comments [address] Schedule an appointment as soon as possible for a visit in 2 days  [place] [address]  [address] ([facility]) [facility] EMERGENCY DEPT  Immediately if symptoms worsen [address]  [phone]          Patient's Medications   Start Taking    No medications on file   Continue Taking    HYDROCHLOROTHIAZIDE (HYDRODIURIL) 25 MG TABLET    Take 1 Tab by mouth daily. LISINOPRIL (PRINIVIL, ZESTRIL) 20 MG TABLET    Take 1 Tab by mouth daily.    These Medications have changed    No medications on file   Stop Taking    No medications on file     Bogdan Dent PA-C

## 2017-02-19 NOTE — DISCHARGE INSTRUCTIONS
Continue medications as directed. Follow up with PCP if symptoms do not improve. Return to ER if you develop chest pain, shortness of breath, nausea, dizziness, fevers, or other worsening symptoms. High Blood Pressure: Care Instructions  Your Care Instructions  If your blood pressure is usually above 140/90, you have high blood pressure, or hypertension. That means the top number is 140 or higher or the bottom number is 90 or higher, or both. Despite what a lot of people think, high blood pressure usually doesn't cause headaches or make you feel dizzy or lightheaded. It usually has no symptoms. But it does increase your risk for heart attack, stroke, and kidney or eye damage. The higher your blood pressure, the more your risk increases. Your doctor will give you a goal for your blood pressure. Your goal will be based on your health and your age. An example of a goal is to keep your blood pressure below 140/90. Lifestyle changes, such as eating healthy and being active, are always important to help lower blood pressure. You might also take medicine to reach your blood pressure goal.  Follow-up care is a key part of your treatment and safety. Be sure to make and go to all appointments, and call your doctor if you are having problems. It's also a good idea to know your test results and keep a list of the medicines you take. How can you care for yourself at home? Medical treatment  · If you stop taking your medicine, your blood pressure will go back up. You may take one or more types of medicine to lower your blood pressure. Be safe with medicines. Take your medicine exactly as prescribed. Call your doctor if you think you are having a problem with your medicine. · Talk to your doctor before you start taking aspirin every day. Aspirin can help certain people lower their risk of a heart attack or stroke. But taking aspirin isn't right for everyone, because it can cause serious bleeding.   · See your doctor regularly. You may need to see the doctor more often at first or until your blood pressure comes down. · If you are taking blood pressure medicine, talk to your doctor before you take decongestants or anti-inflammatory medicine, such as ibuprofen. Some of these medicines can raise blood pressure. · Learn how to check your blood pressure at home. Lifestyle changes  · Stay at a healthy weight. This is especially important if you put on weight around the waist. Losing even 10 pounds can help you lower your blood pressure. · If your doctor recommends it, get more exercise. Walking is a good choice. Bit by bit, increase the amount you walk every day. Try for at least 30 minutes on most days of the week. You also may want to swim, bike, or do other activities. · Avoid or limit alcohol. Talk to your doctor about whether you can drink any alcohol. · Try to limit how much sodium you eat to less than 2,300 milligrams (mg) a day. Your doctor may ask you to try to eat less than 1,500 mg a day. · Eat plenty of fruits (such as bananas and oranges), vegetables, legumes, whole grains, and low-fat dairy products. · Lower the amount of saturated fat in your diet. Saturated fat is found in animal products such as milk, cheese, and meat. Limiting these foods may help you lose weight and also lower your risk for heart disease. · Do not smoke. Smoking increases your risk for heart attack and stroke. If you need help quitting, talk to your doctor about stop-smoking programs and medicines. These can increase your chances of quitting for good. When should you call for help? Call 911 anytime you think you may need emergency care. This may mean having symptoms that suggest that your blood pressure is causing a serious heart or blood vessel problem. Your blood pressure may be over 180/110. For example, call 911 if:  · You have symptoms of a heart attack.  These may include:  ¨ Chest pain or pressure, or a strange feeling in the chest.  ¨ Sweating. ¨ Shortness of breath. ¨ Nausea or vomiting. ¨ Pain, pressure, or a strange feeling in the back, neck, jaw, or upper belly or in one or both shoulders or arms. ¨ Lightheadedness or sudden weakness. ¨ A fast or irregular heartbeat. · You have symptoms of a stroke. These may include:  ¨ Sudden numbness, tingling, weakness, or loss of movement in your face, arm, or leg, especially on only one side of your body. ¨ Sudden vision changes. ¨ Sudden trouble speaking. ¨ Sudden confusion or trouble understanding simple statements. ¨ Sudden problems with walking or balance. ¨ A sudden, severe headache that is different from past headaches. · You have severe back or belly pain. Do not wait until your blood pressure comes down on its own. Get help right away. Call your doctor now or seek immediate care if:  · Your blood pressure is much higher than normal (such as 180/110 or higher), but you don't have symptoms. · You think high blood pressure is causing symptoms, such as:  ¨ Severe headache. ¨ Blurry vision. Watch closely for changes in your health, and be sure to contact your doctor if:  · Your blood pressure measures 140/90 or higher at least 2 times. That means the top number is 140 or higher or the bottom number is 90 or higher, or both. · You think you may be having side effects from your blood pressure medicine. · Your blood pressure is usually normal, but it goes above normal at least 2 times. Where can you learn more? Go to http://jasper-teresa.info/. Enter T169 in the search box to learn more about \"High Blood Pressure: Care Instructions. \"  Current as of: August 8, 2016  Content Version: 11.1  © 8609-5521 [employer]. Care instructions adapted under license by [employer] (which disclaims liability or warranty for this information).  If you have questions about a medical condition or this instruction, always ask your healthcare professional. [address] any warranty or liability for your use of this information. DASH Diet: Care Instructions  Your Care Instructions  The DASH diet is an eating plan that can help lower your blood pressure. DASH stands for Dietary Approaches to Stop Hypertension. Hypertension is high blood pressure. The DASH diet focuses on eating foods that are high in calcium, potassium, and magnesium. These nutrients can lower blood pressure. The foods that are highest in these nutrients are fruits, vegetables, low-fat dairy products, nuts, seeds, and legumes. But taking calcium, potassium, and magnesium supplements instead of eating foods that are high in those nutrients does not have the same effect. The DASH diet also includes whole grains, fish, and poultry. The DASH diet is one of several lifestyle changes your doctor may recommend to lower your high blood pressure. Your doctor may also want you to decrease the amount of sodium in your diet. Lowering sodium while following the DASH diet can lower blood pressure even further than just the DASH diet alone. Follow-up care is a key part of your treatment and safety. Be sure to make and go to all appointments, and call your doctor if you are having problems. It's also a good idea to know your test results and keep a list of the medicines you take. How can you care for yourself at home? Following the DASH diet  · Eat 4 to 5 servings of fruit each day. A serving is 1 medium-sized piece of fruit, ½ cup chopped or canned fruit, 1/4 cup dried fruit, or 4 ounces (½ cup) of fruit juice. Choose fruit more often than fruit juice. · Eat 4 to 5 servings of vegetables each day. A serving is 1 cup of lettuce or raw leafy vegetables, ½ cup of chopped or cooked vegetables, or 4 ounces (½ cup) of vegetable juice. Choose vegetables more often than vegetable juice. · Get 2 to 3 servings of low-fat and fat-free dairy each day.  A serving is 8 ounces of milk, 1 cup of yogurt, or 1 ½ ounces of cheese. · Eat 6 to 8 servings of grains each day. A serving is 1 slice of bread, 1 ounce of dry cereal, or ½ cup of cooked rice, pasta, or cooked cereal. Try to choose whole-grain products as much as possible. · Limit lean meat, poultry, and fish to 2 servings each day. A serving is 3 ounces, about the size of a deck of cards. · Eat 4 to 5 servings of nuts, seeds, and legumes (cooked dried beans, lentils, and split peas) each week. A serving is 1/3 cup of nuts, 2 tablespoons of seeds, or ½ cup of cooked beans or peas. · Limit fats and oils to 2 to 3 servings each day. A serving is 1 teaspoon of vegetable oil or 2 tablespoons of salad dressing. · Limit sweets and added sugars to 5 servings or less a week. A serving is 1 tablespoon jelly or jam, ½ cup sorbet, or 1 cup of lemonade. · Eat less than 2,300 milligrams (mg) of sodium a day. If you limit your sodium to 1,500 mg a day, you can lower your blood pressure even more. Tips for success  · Start small. Do not try to make dramatic changes to your diet all at once. You might feel that you are missing out on your favorite foods and then be more likely to not follow the plan. Make small changes, and stick with them. Once those changes become habit, add a few more changes. · Try some of the following:  ¨ Make it a goal to eat a fruit or vegetable at every meal and at snacks. This will make it easy to get the recommended amount of fruits and vegetables each day. ¨ Try yogurt topped with fruit and nuts for a snack or healthy dessert. ¨ Add lettuce, tomato, cucumber, and onion to sandwiches. ¨ Combine a ready-made pizza crust with low-fat mozzarella cheese and lots of vegetable toppings. Try using tomatoes, squash, spinach, broccoli, carrots, cauliflower, and onions. ¨ Have a variety of cut-up vegetables with a low-fat dip as an appetizer instead of chips and dip. ¨ Sprinkle sunflower seeds or chopped almonds over salads. Or try adding chopped walnuts or almonds to cooked vegetables. ¨ Try some vegetarian meals using beans and peas. Add garbanzo or kidney beans to salads. Make burritos and tacos with mashed ni beans or black beans. Where can you learn more? Go to http://jasper-teresa.info/. Enter X091 in the search box to learn more about \"DASH Diet: Care Instructions. \"  Current as of: March 23, 2016  Content Version: 11.1  © 8031-9329 [employer]. Care instructions adapted under license by [employer] (which disclaims liability or warranty for this information). If you have questions about a medical condition or this instruction, always ask your healthcare professional. [address] any warranty or liability for your use of this information.

## 2017-02-20 PROCEDURE — 99283 EMERGENCY DEPT VISIT LOW MDM: CPT

## 2017-02-21 ENCOUNTER — HOSPITAL ENCOUNTER (EMERGENCY)
Age: 52
Discharge: HOME | End: 2017-02-21
Attending: EMERGENCY MEDICINE
Payer: SELF-PAY

## 2017-02-21 VITALS
TEMPERATURE: 97.7 F | RESPIRATION RATE: 16 BRPM | OXYGEN SATURATION: 97 % | DIASTOLIC BLOOD PRESSURE: 92 MMHG | SYSTOLIC BLOOD PRESSURE: 135 MMHG | HEART RATE: 89 BPM

## 2017-02-21 DIAGNOSIS — I10 ESSENTIAL HYPERTENSION: Primary | ICD-10-CM

## 2017-02-21 DIAGNOSIS — F10.929 ALCOHOL INTOXICATION, WITH UNSPECIFIED COMPLICATION (HCC): ICD-10-CM

## 2017-02-21 NOTE — DISCHARGE INSTRUCTIONS
Alcohol, Drug, or Poison Ingestion: Care Instructions  Your Care Instructions  A person can become very sick, or die, from swallowing or using alcohol, drugs, or poisons. Alcohol poisoning occurs when a person drinks a large amount of alcohol. Alcohol can stop nerve signals that control breathing. It can also stop the gag reflex that prevents choking. Alcohol poisoning is serious. It can lead to brain damage or death if it's not treated right away. Drugs can be used by accident or on purpose. They can be swallowed, inhaled, injected, or absorbed through the skin. Drugs include over-the-counter medicine (such as aspirin or acetaminophen) and prescription medicine. They also include vitamins and supplements. And they include illegal drugs such as cocaine and heroin. And poisons are all around us. They include household , cosmetics, houseplants, and garden chemicals. The doctor has checked you carefully, but problems can develop later. If you notice any problems or new symptoms, get medical treatment right away. Follow-up care is a key part of your treatment and safety. Be sure to make and go to all appointments, and call your doctor if you are having problems. It's also a good idea to know your test results and keep a list of the medicines you take. How can you care for yourself at home? Alcohol problems  · Talk to your doctor or counselor about programs that can help you stop using alcohol. · Plan ways to avoid being tempted to drink. ¨ Get rid of all alcohol in your home. ¨ Avoid places where you tend to drink. ¨ Stay away from places or events that offer alcohol. ¨ Stay away from people who drink a lot. Drug problems  · Talk to your doctor about programs that can help you stop using drugs. · Get rid of any drugs you might be tempted to misuse. · Learn how to say no when other people use drugs. · Don't spend time with people who use drugs.   Poison prevention  · Keep products in the containers they came in. Keep them with the original labels. · Be careful when you use cleaning products, paints, solvents, and pesticides. Read labels before use. Use a fan to move strong odors and fumes out of your home. · Do not mix cleaning products. Try to use nontoxic . These include vinegar, lemon juice, and baking soda. When should you call for help? Poison control centers, hospitals, or your doctor can give immediate advice in the case of a poisoning. [facility] [employer] number is [phone]. Have the poison container with you so you can give complete information to the poison control center, such as what the poison or substance is, how much was taken and when. Do not try to make the person vomit. Call 911 anytime you think you may need emergency care. For example, call if you or someone else:  · Has used or currently uses alcohol or drugs and is very confused or can't stay awake. · Has passed out (lost consciousness). · Has severe trouble breathing. · Is having a seizure. Call your doctor now or seek immediate medical care if you or someone else:  · Has new symptoms, or is not acting normally. Watch closely for changes in your health, and be sure to contact your doctor if:  · You do not get better as expected. · You need help with drug or alcohol problems. · You have problems with depression or other mental health issues. Where can you learn more? Go to http://jasper-teresa.info/. Enter S491 in the search box to learn more about \"Alcohol, Drug, or Poison Ingestion: Care Instructions. \"  Current as of: May 27, 2016  Content Version: 11.1  © 6603-3020 [employer]. Care instructions adapted under license by [employer] (which disclaims liability or warranty for this information).  If you have questions about a medical condition or this instruction, always ask your healthcare professional. [employer] disclaims any warranty or liability for your use of this information. DASH Diet: Care Instructions  Your Care Instructions  The DASH diet is an eating plan that can help lower your blood pressure. DASH stands for Dietary Approaches to Stop Hypertension. Hypertension is high blood pressure. The DASH diet focuses on eating foods that are high in calcium, potassium, and magnesium. These nutrients can lower blood pressure. The foods that are highest in these nutrients are fruits, vegetables, low-fat dairy products, nuts, seeds, and legumes. But taking calcium, potassium, and magnesium supplements instead of eating foods that are high in those nutrients does not have the same effect. The DASH diet also includes whole grains, fish, and poultry. The DASH diet is one of several lifestyle changes your doctor may recommend to lower your high blood pressure. Your doctor may also want you to decrease the amount of sodium in your diet. Lowering sodium while following the DASH diet can lower blood pressure even further than just the DASH diet alone. Follow-up care is a key part of your treatment and safety. Be sure to make and go to all appointments, and call your doctor if you are having problems. It's also a good idea to know your test results and keep a list of the medicines you take. How can you care for yourself at home? Following the DASH diet  · Eat 4 to 5 servings of fruit each day. A serving is 1 medium-sized piece of fruit, ½ cup chopped or canned fruit, 1/4 cup dried fruit, or 4 ounces (½ cup) of fruit juice. Choose fruit more often than fruit juice. · Eat 4 to 5 servings of vegetables each day. A serving is 1 cup of lettuce or raw leafy vegetables, ½ cup of chopped or cooked vegetables, or 4 ounces (½ cup) of vegetable juice. Choose vegetables more often than vegetable juice. · Get 2 to 3 servings of low-fat and fat-free dairy each day.  A serving is 8 ounces of milk, 1 cup of yogurt, or 1 ½ ounces of cheese. · Eat 6 to 8 servings of grains each day. A serving is 1 slice of bread, 1 ounce of dry cereal, or ½ cup of cooked rice, pasta, or cooked cereal. Try to choose whole-grain products as much as possible. · Limit lean meat, poultry, and fish to 2 servings each day. A serving is 3 ounces, about the size of a deck of cards. · Eat 4 to 5 servings of nuts, seeds, and legumes (cooked dried beans, lentils, and split peas) each week. A serving is 1/3 cup of nuts, 2 tablespoons of seeds, or ½ cup of cooked beans or peas. · Limit fats and oils to 2 to 3 servings each day. A serving is 1 teaspoon of vegetable oil or 2 tablespoons of salad dressing. · Limit sweets and added sugars to 5 servings or less a week. A serving is 1 tablespoon jelly or jam, ½ cup sorbet, or 1 cup of lemonade. · Eat less than 2,300 milligrams (mg) of sodium a day. If you limit your sodium to 1,500 mg a day, you can lower your blood pressure even more. Tips for success  · Start small. Do not try to make dramatic changes to your diet all at once. You might feel that you are missing out on your favorite foods and then be more likely to not follow the plan. Make small changes, and stick with them. Once those changes become habit, add a few more changes. · Try some of the following:  ¨ Make it a goal to eat a fruit or vegetable at every meal and at snacks. This will make it easy to get the recommended amount of fruits and vegetables each day. ¨ Try yogurt topped with fruit and nuts for a snack or healthy dessert. ¨ Add lettuce, tomato, cucumber, and onion to sandwiches. ¨ Combine a ready-made pizza crust with low-fat mozzarella cheese and lots of vegetable toppings. Try using tomatoes, squash, spinach, broccoli, carrots, cauliflower, and onions. ¨ Have a variety of cut-up vegetables with a low-fat dip as an appetizer instead of chips and dip. ¨ Sprinkle sunflower seeds or chopped almonds over salads.  Or try adding chopped walnuts or almonds to cooked vegetables. ¨ Try some vegetarian meals using beans and peas. Add garbanzo or kidney beans to salads. Make burritos and tacos with mashed ni beans or black beans. Where can you learn more? Go to http://jasper-teresa.info/. Enter S492 in the search box to learn more about \"DASH Diet: Care Instructions. \"  Current as of: March 23, 2016  Content Version: 11.1  © 3926-4142 [employer]. Care instructions adapted under license by [employer] (which disclaims liability or warranty for this information). If you have questions about a medical condition or this instruction, always ask your healthcare professional. [address] any warranty or liability for your use of this information.

## 2017-02-21 NOTE — ED PROVIDER NOTES
HPI Comments: 2:54 AM Stephanie Ta is a 45 y.o. male with hx of HTN who presents to the ED for a medical evaluation. Patient states that he wants to get his blood pressure checked, because he is having troubles regulating it. Notes that he hasn't been able to get any of his prescribed HTN medications. Denies fever, chest pain, abdominal pain, and any other pertinent sx. Admits to alcohol consumption tonight. No further complaints at this time. The history is provided by the patient. Past Medical History:   Diagnosis Date    Back pain     Hypertension        Past Surgical History:   Procedure Laterality Date    Hx other surgical       oral sx         History reviewed. No pertinent family history. Social History     Social History    Marital status: LEGALLY      Spouse name: N/A    Number of children: N/A    Years of education: N/A     Occupational History    Not on file. Social History Main Topics    Smoking status: Current Some Day Smoker    Smokeless tobacco: Not on file    Alcohol use Yes      Comment: occasional    Drug use: Yes     Special: Marijuana, Cocaine    Sexual activity: Not on file     Other Topics Concern    Not on file     Social History Narrative         ALLERGIES: Review of patient's allergies indicates no known allergies. Review of Systems   Constitutional: Negative for fever. HENT: Negative for trouble swallowing. Respiratory: Negative for shortness of breath. Cardiovascular: Negative for chest pain. Gastrointestinal: Negative for abdominal pain, diarrhea and vomiting. Genitourinary: Negative for difficulty urinating. Musculoskeletal: Negative for back pain and neck pain. Skin: Negative for wound. Neurological: Negative for syncope. Psychiatric/Behavioral: Negative for behavioral problems. All other systems reviewed and are negative.       Vitals:    02/21/17 0000 02/21/17 0250   BP: (!) 155/114 (!) 135/92   Pulse: 74 89   Resp: 17 16   Temp: 97.7 °F (36.5 °C)    SpO2: 97%             Physical Exam   Constitutional: He is oriented to person, place, and time. He appears well-developed. No distress. intoxicated-appearing, nad   HENT:   Head: Normocephalic and atraumatic. Eyes: EOM are normal.   Neck: Normal range of motion. Cardiovascular: Normal rate. Pulmonary/Chest: Effort normal and breath sounds normal. No respiratory distress. Abdominal: Soft. There is no tenderness. Musculoskeletal: Normal range of motion. Mechanically stable   Neurological: He is alert and oriented to person, place, and time. No focal deficits noted   Psychiatric: His behavior is normal.   Nursing note and vitals reviewed. MDM  Number of Diagnoses or Management Options  Alcohol intoxication, with unspecified complication [facility]):   Essential hypertension:   Diagnosis management comments: 46 yo AAM with PMHx HTN presents with HTN. Pt well known to me and to ED staff, presents multiple times for similar, usually related to needing place to sleep. Pt seems intoxicated, admits to +etoh, otherwise at baseline. BP ok with no signs or symptoms of hypertensive emergency. Pt with asymptomatic HTN, has been given prescriptions multiple times but does not fill. Dc home, symptom management, follow-up, return precautions. ED Course       Procedures    Vitals:  Patient Vitals for the past 12 hrs:   Temp Pulse Resp BP SpO2   02/21/17 0250 - 89 16 (!) 135/92 -   02/21/17 0000 97.7 °F (36.5 °C) 74 17 (!) 155/114 97 %     97% on RA, indicating adequate oxygenation. Reevaluation of patient:   3:00 AM  I have reassessed the patient. Patient was discharged in stable condition. Patient is to return to emergency department if any new or worsening condition. Disposition:  Diagnosis:   1. Essential hypertension    2.  Alcohol intoxication, with unspecified complication [facility])        Disposition: Discharge    Follow-up Information     Follow up With Details Comments [address] Call in 1 day For ED visit follow up, As needed [address] [facility] EMERGENCY DEPT  As needed, If symptoms worsen [address] [facility] 76. [phone]           Patient's Medications   Start Taking    No medications on file   Continue Taking    HYDROCHLOROTHIAZIDE (HYDRODIURIL) 25 MG TABLET    Take 1 Tab by mouth daily. LISINOPRIL (PRINIVIL, ZESTRIL) 20 MG TABLET    Take 1 Tab by mouth daily. These Medications have changed    No medications on file   Stop Taking    No medications on file       SCRIBE ATTESTATION STATEMENT  Documented by: Sunitha Guerrier scribing for, and in the presence of, Rasheed Oconnor MD 3:00 AM     Signed by: Isrrael Amaya, 2/21/2017, 3:00 AM    PROVIDER ATTESTATION STATEMENT  I personally performed the services described in the documentation, reviewed the documentation, as recorded by the scribe in my presence, and it accurately and completely records my words and actions.   Rasheed Oconnor MD

## 2017-02-21 NOTE — ED NOTES
Patient instructed to assure that he fills the HTN prescriptions that were given to him. Patient states that he has been busy. Instructed patient to make it his first priority to get the prescriptions filled. Patient verbalized that he will.

## 2017-02-21 NOTE — ED TRIAGE NOTES
Pt reports he is having \"problems with my psychiatric being\". Pt states he does not know how to answer the questions if he is suicidal, homicidal or hearing voices etc., because that is \"too deep of a question and I need time to think about it rational\". Pt is homeless and has been seen multiple times at this ED and surrounding hospitals for homelessness and malingering.

## 2017-02-21 NOTE — ED NOTES
Pt called to [address] Pt acknowledged nurse but continues to sit in chair and goes back to sleep.

## 2017-02-22 ENCOUNTER — HOSPITAL ENCOUNTER (EMERGENCY)
Age: 52
Discharge: LEFT BEFORE DISCHARGE | End: 2017-02-22
Attending: EMERGENCY MEDICINE
Payer: SELF-PAY

## 2017-02-22 VITALS
RESPIRATION RATE: 16 BRPM | DIASTOLIC BLOOD PRESSURE: 105 MMHG | SYSTOLIC BLOOD PRESSURE: 153 MMHG | WEIGHT: 160 LBS | BODY MASS INDEX: 25.82 KG/M2 | HEART RATE: 71 BPM | TEMPERATURE: 98.5 F | OXYGEN SATURATION: 98 %

## 2017-02-22 DIAGNOSIS — I10 HYPERTENSION, UNCONTROLLED: ICD-10-CM

## 2017-02-22 DIAGNOSIS — Z59.00 HOMELESS: Primary | ICD-10-CM

## 2017-02-22 PROCEDURE — 99281 EMR DPT VST MAYX REQ PHY/QHP: CPT

## 2017-02-22 SDOH — ECONOMIC STABILITY - HOUSING INSECURITY: HOMELESSNESS UNSPECIFIED: Z59.00

## 2017-02-23 NOTE — ED TRIAGE NOTES
Pt reports \"just help me please\". Pt seen multiple times each week at different hospitals in the area. Pt is homeless.

## 2017-02-23 NOTE — ED PROVIDER NOTES
HPI Comments: Idris Sequeira is a 45 y.o. Male who is here and other er daily for evaluation of his bp and not feeling well. Keeps attempting to find reason so he can stay in er. Does not have a current physical complaint. When I attempted to ask him what he does during the day, how he eats, where he stays, he got upset and did not want to answer any more questions. He refused any further questioning and refused a medical screening exam at this time    The history is provided by the patient. Past Medical History:   Diagnosis Date    Back pain     Hypertension        Past Surgical History:   Procedure Laterality Date    HX OTHER SURGICAL      oral sx         History reviewed. No pertinent family history. Social History     Social History    Marital status: LEGALLY      Spouse name: N/A    Number of children: N/A    Years of education: N/A     Occupational History    Not on file. Social History Main Topics    Smoking status: Current Some Day Smoker    Smokeless tobacco: Not on file    Alcohol use Yes      Comment: occasional    Drug use: Yes     Special: Marijuana, Cocaine    Sexual activity: Not on file     Other Topics Concern    Not on file     Social History Narrative         ALLERGIES: Review of patient's allergies indicates no known allergies. Review of Systems   Unable to perform ROS: Other (refused medical screening exam)       Vitals:    02/22/17 2023   BP: (!) 153/105   Pulse: 71   Resp: 16   Temp: 98.5 °F (36.9 °C)   SpO2: 98%   Weight: 72.6 kg (160 lb)            Physical Exam   Constitutional: He is oriented to person, place, and time. He appears well-developed and well-nourished. No distress. Neurological: He is alert and oriented to person, place, and time. Skin: He is not diaphoretic. Nursing note and vitals reviewed.        [facility]  ED Course       Procedures  Vitals:  Patient Vitals for the past 12 hrs:   Temp Pulse Resp BP SpO2   02/22/17 2023 98.5 °F (36.9 °C) 71 16 (!) 153/105 98 %         Medications ordered:   Medications - No data to display      Lab findings:  No results found for this or any previous visit (from the past 12 hour(s)). EKG interpretation by ED Physician:      X-Ray, CT or other radiology findings or impressions:  No orders to display       Progress notes, Consult notes or additional Procedure notes:   Pt refused medical screening exam. Walked out of er without paperwork    Reevaluation of patient:   stable    Disposition:  Diagnosis:   1. Homeless    2. Hypertension, uncontrolled        Disposition: left prior to d/c    Follow-up Information     Follow up With Details Comments [address] [facility] Call office in the morning for further help Gaudencio Mares [address] [facility]            Patient's Medications   Start Taking    No medications on file   Continue Taking    HYDROCHLOROTHIAZIDE (HYDRODIURIL) 25 MG TABLET    Take 1 Tab by mouth daily. LISINOPRIL (PRINIVIL, ZESTRIL) 20 MG TABLET    Take 1 Tab by mouth daily.    These Medications have changed    No medications on file   Stop Taking    No medications on file

## 2017-02-25 ENCOUNTER — HOSPITAL ENCOUNTER (EMERGENCY)
Age: 52
Discharge: HOME | End: 2017-02-26
Attending: EMERGENCY MEDICINE
Payer: SELF-PAY

## 2017-02-25 DIAGNOSIS — R42 DIZZINESS: ICD-10-CM

## 2017-02-25 PROCEDURE — 99285 EMERGENCY DEPT VISIT HI MDM: CPT

## 2017-02-25 PROCEDURE — 93005 ELECTROCARDIOGRAM TRACING: CPT

## 2017-02-26 VITALS
BODY MASS INDEX: 25.82 KG/M2 | HEART RATE: 75 BPM | DIASTOLIC BLOOD PRESSURE: 95 MMHG | OXYGEN SATURATION: 97 % | SYSTOLIC BLOOD PRESSURE: 156 MMHG | TEMPERATURE: 99.1 F | WEIGHT: 160 LBS | RESPIRATION RATE: 16 BRPM

## 2017-02-26 LAB
ATRIAL RATE: 70 BPM
CALCULATED P AXIS, ECG09: 54 DEGREES
CALCULATED R AXIS, ECG10: -27 DEGREES
CALCULATED T AXIS, ECG11: 86 DEGREES
DIAGNOSIS, 93000: NORMAL
P-R INTERVAL, ECG05: 190 MS
Q-T INTERVAL, ECG07: 406 MS
QRS DURATION, ECG06: 88 MS
QTC CALCULATION (BEZET), ECG08: 438 MS
VENTRICULAR RATE, ECG03: 70 BPM

## 2017-02-26 NOTE — DISCHARGE INSTRUCTIONS
High Blood Pressure: Care Instructions  Your Care Instructions  If your blood pressure is usually above 140/90, you have high blood pressure, or hypertension. That means the top number is 140 or higher or the bottom number is 90 or higher, or both. Despite what a lot of people think, high blood pressure usually doesn't cause headaches or make you feel dizzy or lightheaded. It usually has no symptoms. But it does increase your risk for heart attack, stroke, and kidney or eye damage. The higher your blood pressure, the more your risk increases. Your doctor will give you a goal for your blood pressure. Your goal will be based on your health and your age. An example of a goal is to keep your blood pressure below 140/90. Lifestyle changes, such as eating healthy and being active, are always important to help lower blood pressure. You might also take medicine to reach your blood pressure goal.  Follow-up care is a key part of your treatment and safety. Be sure to make and go to all appointments, and call your doctor if you are having problems. It's also a good idea to know your test results and keep a list of the medicines you take. How can you care for yourself at home? Medical treatment  · If you stop taking your medicine, your blood pressure will go back up. You may take one or more types of medicine to lower your blood pressure. Be safe with medicines. Take your medicine exactly as prescribed. Call your doctor if you think you are having a problem with your medicine. · Talk to your doctor before you start taking aspirin every day. Aspirin can help certain people lower their risk of a heart attack or stroke. But taking aspirin isn't right for everyone, because it can cause serious bleeding. · See your doctor regularly. You may need to see the doctor more often at first or until your blood pressure comes down.   · If you are taking blood pressure medicine, talk to your doctor before you take decongestants or anti-inflammatory medicine, such as ibuprofen. Some of these medicines can raise blood pressure. · Learn how to check your blood pressure at home. Lifestyle changes  · Stay at a healthy weight. This is especially important if you put on weight around the waist. Losing even 10 pounds can help you lower your blood pressure. · If your doctor recommends it, get more exercise. Walking is a good choice. Bit by bit, increase the amount you walk every day. Try for at least 30 minutes on most days of the week. You also may want to swim, bike, or do other activities. · Avoid or limit alcohol. Talk to your doctor about whether you can drink any alcohol. · Try to limit how much sodium you eat to less than 2,300 milligrams (mg) a day. Your doctor may ask you to try to eat less than 1,500 mg a day. · Eat plenty of fruits (such as bananas and oranges), vegetables, legumes, whole grains, and low-fat dairy products. · Lower the amount of saturated fat in your diet. Saturated fat is found in animal products such as milk, cheese, and meat. Limiting these foods may help you lose weight and also lower your risk for heart disease. · Do not smoke. Smoking increases your risk for heart attack and stroke. If you need help quitting, talk to your doctor about stop-smoking programs and medicines. These can increase your chances of quitting for good. When should you call for help? Call 911 anytime you think you may need emergency care. This may mean having symptoms that suggest that your blood pressure is causing a serious heart or blood vessel problem. Your blood pressure may be over 180/110. For example, call 911 if:  · You have symptoms of a heart attack. These may include:  ¨ Chest pain or pressure, or a strange feeling in the chest.  ¨ Sweating. ¨ Shortness of breath. ¨ Nausea or vomiting. ¨ Pain, pressure, or a strange feeling in the back, neck, jaw, or upper belly or in one or both shoulders or arms.   ¨ Lightheadedness or sudden weakness. ¨ A fast or irregular heartbeat. · You have symptoms of a stroke. These may include:  ¨ Sudden numbness, tingling, weakness, or loss of movement in your face, arm, or leg, especially on only one side of your body. ¨ Sudden vision changes. ¨ Sudden trouble speaking. ¨ Sudden confusion or trouble understanding simple statements. ¨ Sudden problems with walking or balance. ¨ A sudden, severe headache that is different from past headaches. · You have severe back or belly pain. Do not wait until your blood pressure comes down on its own. Get help right away. Call your doctor now or seek immediate care if:  · Your blood pressure is much higher than normal (such as 180/110 or higher), but you don't have symptoms. · You think high blood pressure is causing symptoms, such as:  ¨ Severe headache. ¨ Blurry vision. Watch closely for changes in your health, and be sure to contact your doctor if:  · Your blood pressure measures 140/90 or higher at least 2 times. That means the top number is 140 or higher or the bottom number is 90 or higher, or both. · You think you may be having side effects from your blood pressure medicine. · Your blood pressure is usually normal, but it goes above normal at least 2 times. Where can you learn more? Go to http://jasper-teresa.info/. Enter S204 in the search box to learn more about \"High Blood Pressure: Care Instructions. \"  Current as of: August 8, 2016  Content Version: 11.1  © 7464-3721 [employer]. Care instructions adapted under license by [employer] (which disclaims liability or warranty for this information). If you have questions about a medical condition or this instruction, always ask your healthcare professional. [place] [address] any warranty or liability for your use of this information.          Dizziness: Care Instructions  Your Care Instructions  Dizziness is the feeling of unsteadiness or fuzziness in your head. It is different than having vertigo, which is a feeling that the room is spinning or that you are moving or falling. It is also different from lightheadedness, which is the feeling that you are about to faint. It can be hard to know what causes dizziness. Some people feel dizzy when they have migraine headaches. Sometimes bouts of flu can make you feel dizzy. Some medical conditions, such as heart problems or high blood pressure, can make you feel dizzy. Many medicines can cause dizziness, including medicines for high blood pressure, pain, or anxiety. If a medicine causes your symptoms, your doctor may recommend that you stop or change the medicine. If it is a problem with your heart, you may need medicine to help your heart work better. If there is no clear reason for your symptoms, your doctor may suggest watching and waiting for a while to see if the dizziness goes away on its own. Follow-up care is a key part of your treatment and safety. Be sure to make and go to all appointments, and call your doctor if you are having problems. It's also a good idea to know your test results and keep a list of the medicines you take. How can you care for yourself at home? · If your doctor recommends or prescribes medicine, take it exactly as directed. Call your doctor if you think you are having a problem with your medicine. · Do not drive while you feel dizzy. · Try to prevent falls. Steps you can take include:  ¨ Using nonskid mats, adding grab bars near the tub, and using night-lights. ¨ Clearing your home so that walkways are free of anything you might trip on. ¨ Letting family and friends know that you have been feeling dizzy. This will help them know how to help you. When should you call for help? Call 911 anytime you think you may need emergency care. For example, call if:  · You passed out (lost consciousness). · You have dizziness along with symptoms of a heart attack.  These may include:  ¨ Chest pain or pressure, or a strange feeling in the chest.  ¨ Sweating. ¨ Shortness of breath. ¨ Nausea or vomiting. ¨ Pain, pressure, or a strange feeling in the back, neck, jaw, or upper belly or in one or both shoulders or arms. ¨ Lightheadedness or sudden weakness. ¨ A fast or irregular heartbeat. · You have symptoms of a stroke. These may include:  ¨ Sudden numbness, tingling, weakness, or loss of movement in your face, arm, or leg, especially on only one side of your body. ¨ Sudden vision changes. ¨ Sudden trouble speaking. ¨ Sudden confusion or trouble understanding simple statements. ¨ Sudden problems with walking or balance. ¨ A sudden, severe headache that is different from past headaches. Call your doctor now or seek immediate medical care if:  · You feel dizzy and have a fever, headache, or ringing in your ears. · You have new or increased nausea and vomiting. · Your dizziness does not go away or comes back. Watch closely for changes in your health, and be sure to contact your doctor if:  · You do not get better as expected. Where can you learn more? Go to http://jasper-teresa.info/. Enter Y706 in the search box to learn more about \"Dizziness: Care Instructions. \"  Current as of: May 27, 2016  Content Version: 11.1  © 1185-4363 [employer]. Care instructions adapted under license by [employer] (which disclaims liability or warranty for this information). If you have questions about a medical condition or this instruction, always ask your healthcare professional. [place] [address] any warranty or liability for your use of this information. [employer] Activation    Thank you for requesting access to [employer]. Please follow the instructions below to securely access and download your online medical record.  [employer] allows you to send messages to your doctor, view your test results, renew your prescriptions, schedule appointments, and more. How Do I Sign Up? 1. In your internet browser, go to [email or web address]  2. Click on the First Time User? Click Here link in the Sign In box. You will be redirect to the New Member Sign Up page. 3. Enter your elmenus Access Code exactly as it appears below. You will not need to use this code after youve completed the sign-up process. If you do not sign up before the expiration date, you must request a new code. elmenus Access Code: [email or web address]  Expires: 2017  5:26 PM (This is the date your elmenus access code will )    4. Enter the last four digits of your Social Security Number (xxxx) and Date of Birth (mm/dd/yyyy) as indicated and click Submit. You will be taken to the next sign-up page. 5. Create a elmenus ID. This will be your elmenus login ID and cannot be changed, so think of one that is secure and easy to remember. 6. Create a elmenus password. You can change your password at any time. 7. Enter your Password Reset Question and Answer. This can be used at a later time if you forget your password. 8. Enter your e-mail address. You will receive e-mail notification when new information is available in [address] 9. Click Sign Up. You can now view and download portions of your medical record. 10. Click the Download Summary menu link to download a portable copy of your medical information. Additional Information    If you have questions, please visit the Frequently Asked Questions section of the elmenus website at [email or web address] Remember, elmenus is NOT to be used for urgent needs. For medical emergencies, dial 911.

## 2017-02-26 NOTE — ED PROVIDER NOTES
HPI Comments:   9:44 PM   45 y.o. male presents to ED C/O elevated blood pressure. Patient has a HX of back pain and HTN. Patient reports he is coming to the ED for his blood pressure. patient reports he has had issues with his blood pressure recently. Patient started HCTZ yesterday has had had two doses. patient seen in the department multiple times for same complaint, he was given a prescription for HCTZ and lisinopril, he only got HCTZ filled, he only didn't get Lisinopril filled. Patient reports short episode of dizziness while in the park earlier today, this occurred at 5pm, only lasted for a few minutes. Patient denies CP, SOB, diaphoresis now or when he was dizzy. Patient denies complaints at this time. Patient smokes 3 black and milds daily. Patient admits to 1 beer at 6pm.    Pt denies any other sxs or complaints. Written by Nena HONG      The history is provided by the patient. History limited by: No language barrier. Past Medical History:   Diagnosis Date    Back pain     Hypertension        Past Surgical History:   Procedure Laterality Date    HX OTHER SURGICAL      oral sx         History reviewed. No pertinent family history. Social History     Social History    Marital status: LEGALLY      Spouse name: N/A    Number of children: N/A    Years of education: N/A     Occupational History    Not on file. Social History Main Topics    Smoking status: Current Some Day Smoker    Smokeless tobacco: Not on file    Alcohol use Yes      Comment: occasional    Drug use: Yes     Special: Marijuana, Cocaine    Sexual activity: Not on file     Other Topics Concern    Not on file     Social History Narrative         ALLERGIES: Review of patient's allergies indicates no known allergies. Review of Systems   Constitutional: Negative for activity change, appetite change, chills, fatigue and fever.    HENT: Negative for congestion, dental problem, ear pain, rhinorrhea, sinus pressure, sneezing, sore throat, trouble swallowing and voice change. Eyes: Negative for pain, discharge, redness and itching. Respiratory: Negative for cough, chest tightness, shortness of breath and wheezing. Cardiovascular: Negative for chest pain and palpitations. Gastrointestinal: Negative for abdominal distention, abdominal pain, blood in stool, constipation, diarrhea, nausea, rectal pain and vomiting. Endocrine: Negative for polyuria. Genitourinary: Negative for discharge, dysuria, flank pain, hematuria, penile pain and testicular pain. Musculoskeletal: Negative for arthralgias, back pain, joint swelling, neck pain and neck stiffness. Skin: Negative for color change, rash and wound. Allergic/Immunologic: Negative for immunocompromised state. Neurological: Positive for dizziness. Negative for weakness, light-headedness, numbness and headaches. Hematological: Negative for adenopathy. Psychiatric/Behavioral: Negative for agitation and behavioral problems. The patient is not nervous/anxious. Vitals:    02/25/17 2044 02/25/17 2308 02/25/17 2334   BP: (!) 163/115 (!) 143/92 146/90   Pulse: 81  75   Resp: 18  16   Temp: 99.1 °F (37.3 °C)     SpO2: 96%  98%   Weight: 72.6 kg (160 lb)              Physical Exam   Constitutional: He appears well-developed and well-nourished. No distress. HENT:   Head: Normocephalic and atraumatic. Right Ear: External ear normal.   Left Ear: External ear normal.   Eyes: Conjunctivae and EOM are normal. Pupils are equal, round, and reactive to light. Neck: Normal range of motion. Neck supple. Cardiovascular: Normal rate, regular rhythm and normal heart sounds. Pulmonary/Chest: Effort normal and breath sounds normal. No respiratory distress. He has no wheezes. He has no rales. Musculoskeletal: Normal range of motion. Neurological: He is alert. He has normal strength. No sensory deficit.  Coordination and gait normal. GCS eye subscore is 4. GCS verbal subscore is 5. GCS motor subscore is 6. Cranial nerves 2-12 checked and are intact. No unilateral weakness,  strength equal bilaterally, gait steady, speech clear, no facial droop noted. Skin: Skin is warm and dry. No rash noted. He is not diaphoretic. No erythema. No pallor. Psychiatric: He has a normal mood and affect. His behavior is normal. Judgment and thought content normal.   Nursing note and vitals reviewed. MDM  Number of Diagnoses or Management Options  Dizziness:   Elevated blood pressure:   Diagnosis management comments: Clinical Impression - elevated blood pressure    MDM:  Plan - EKG -  EKG - Normal sinus rhythm T wave abnormality, consider lateral ischemia Abnormal ECG When compared with ECG of 18-FEB-2017 21:24, T wave inversion no longer evident in Anterior leads   -patient has been seen multiple times for complaints for elevated blood pressure, he has been noncomplaint with medication, recently started HCTZ only two doses. At this time patient's blood pressure is WNL, improved from arrival.  No indication for imagining or blood work at this time, patient has no complaints, has had no chest pain, no dizziness, steady gait. Patient educated to return to the ED for any new or worsening symptoms. Patient denies questions.       ED Course       Procedures             RESULTS:    No orders to display       Labs Reviewed - No data to display    Recent Results (from the past 12 hour(s))   EKG, 12 LEAD, INITIAL    Collection Time: 02/25/17 11:04 PM   Result Value Ref Range    Ventricular Rate 70 BPM    Atrial Rate 70 BPM    P-R Interval 190 ms    QRS Duration 88 ms    Q-T Interval 406 ms    QTC Calculation (Bezet) 438 ms    Calculated P Axis 54 degrees    Calculated R Axis -27 degrees    Calculated T Axis 86 degrees    Diagnosis       Normal sinus rhythm  T wave abnormality, consider lateral ischemia  Abnormal ECG  When compared with ECG of 18-FEB-2017 21:24,  T wave inversion no longer evident in Anterior leads         PROGRESS NOTE:   9:45 PM   Initial assessment completed. Written by Nancy HONG     One or more blood pressure readings were noted elevated during the Pt's presentation in the emergency department this date. This abnormal reading has been cited in the Pt's diagnosis, and they have been encouraged to follow up with their primary care physician, or referred to a consultant for further evaluation and treatment. Mickey Hoover NP 12:20 AM     DISCHARGE NOTE:  12:20 AM   Rox Damico's  results have been reviewed with him. He has been counseled regarding his diagnosis, treatment, and plan. He verbally conveys understanding and agreement of the signs, symptoms, diagnosis, treatment and prognosis and additionally agrees to follow up as discussed. He also agrees with the care-plan and conveys that all of his questions have been answered. I have also provided discharge instructions for him that include: educational information regarding their diagnosis and treatment, and list of reasons why they would want to return to the ED prior to their follow-up appointment, should his condition change. CLINICAL IMPRESSION:    1. Elevated blood pressure    2. Dizziness        AFTER VISIT PLAN:    Current Discharge Medication List      CONTINUE these medications which have NOT CHANGED    Details   hydroCHLOROthiazide (HYDRODIURIL) 25 mg tablet Take 1 Tab by mouth daily. Qty: 30 Tab, Refills: 1      lisinopril (PRINIVIL, ZESTRIL) 20 mg tablet Take 1 Tab by mouth daily.   Qty: 30 Tab, Refills: 1              Follow-up Information     Follow up With Details Comments [address] Schedule an appointment as soon as possible for a visit in 3 days Further evaluation [address]  [phone]           Written by Nancy HONG

## 2017-02-26 NOTE — ED TRIAGE NOTES
Pt seen multiple times for blood pressure issues. States he followed up at [facility] this week and started taking meds yesterday. C/o dizziness today. Pt has one bottle of pills with him and that is HCTZ. And states he filled it yesterday.

## 2017-02-28 ENCOUNTER — HOSPITAL ENCOUNTER (EMERGENCY)
Age: 52
Discharge: HOME | End: 2017-02-28
Attending: EMERGENCY MEDICINE
Payer: SELF-PAY

## 2017-02-28 VITALS
WEIGHT: 155 LBS | TEMPERATURE: 98.8 F | SYSTOLIC BLOOD PRESSURE: 147 MMHG | OXYGEN SATURATION: 97 % | DIASTOLIC BLOOD PRESSURE: 90 MMHG | RESPIRATION RATE: 19 BRPM | HEIGHT: 66 IN | BODY MASS INDEX: 24.91 KG/M2 | HEART RATE: 79 BPM

## 2017-02-28 DIAGNOSIS — R42 ORTHOSTATIC LIGHTHEADEDNESS: Primary | ICD-10-CM

## 2017-02-28 DIAGNOSIS — I15.9 SECONDARY HYPERTENSION: ICD-10-CM

## 2017-02-28 LAB
ALBUMIN SERPL BCP-MCNC: 3.9 G/DL (ref 3.4–5)
ALBUMIN/GLOB SERPL: 1.3 {RATIO} (ref 0.8–1.7)
ALP SERPL-CCNC: 67 U/L (ref 45–117)
ALT SERPL-CCNC: 26 U/L (ref 16–61)
ANION GAP BLD CALC-SCNC: 12 MMOL/L (ref 3–18)
AST SERPL W P-5'-P-CCNC: 14 U/L (ref 15–37)
BASOPHILS # BLD AUTO: 0 K/UL (ref 0–0.06)
BASOPHILS # BLD: 0 % (ref 0–2)
BILIRUB SERPL-MCNC: 0.4 MG/DL (ref 0.2–1)
BUN SERPL-MCNC: 24 MG/DL (ref 7–18)
BUN/CREAT SERPL: 25 (ref 12–20)
CALCIUM SERPL-MCNC: 8.8 MG/DL (ref 8.5–10.1)
CHLORIDE SERPL-SCNC: 101 MMOL/L (ref 100–108)
CK MB CFR SERPL CALC: 1.2 % (ref 0–4)
CK MB SERPL-MCNC: 2.2 NG/ML (ref 5–25)
CK SERPL-CCNC: 183 U/L (ref 39–308)
CO2 SERPL-SCNC: 28 MMOL/L (ref 21–32)
CREAT SERPL-MCNC: 0.96 MG/DL (ref 0.6–1.3)
DIFFERENTIAL METHOD BLD: ABNORMAL
EOSINOPHIL # BLD: 0.1 K/UL (ref 0–0.4)
EOSINOPHIL NFR BLD: 1 % (ref 0–5)
ERYTHROCYTE [DISTWIDTH] IN BLOOD BY AUTOMATED COUNT: 15.6 % (ref 11.6–14.5)
GLOBULIN SER CALC-MCNC: 3.1 G/DL (ref 2–4)
GLUCOSE SERPL-MCNC: 110 MG/DL (ref 74–99)
HCT VFR BLD AUTO: 40.8 % (ref 36–48)
HGB BLD-MCNC: 12.8 G/DL (ref 13–16)
LYMPHOCYTES # BLD AUTO: 34 % (ref 21–52)
LYMPHOCYTES # BLD: 1.6 K/UL (ref 0.9–3.6)
MAGNESIUM SERPL-MCNC: 2.2 MG/DL (ref 1.8–2.4)
MCH RBC QN AUTO: 26 PG (ref 24–34)
MCHC RBC AUTO-ENTMCNC: 31.4 G/DL (ref 31–37)
MCV RBC AUTO: 82.8 FL (ref 74–97)
MONOCYTES # BLD: 0.3 K/UL (ref 0.05–1.2)
MONOCYTES NFR BLD AUTO: 6 % (ref 3–10)
NEUTS SEG # BLD: 2.7 K/UL (ref 1.8–8)
NEUTS SEG NFR BLD AUTO: 59 % (ref 40–73)
PLATELET # BLD AUTO: 223 K/UL (ref 135–420)
PMV BLD AUTO: 9.9 FL (ref 9.2–11.8)
POTASSIUM SERPL-SCNC: 3.1 MMOL/L (ref 3.5–5.5)
PROT SERPL-MCNC: 7 G/DL (ref 6.4–8.2)
RBC # BLD AUTO: 4.93 M/UL (ref 4.7–5.5)
SODIUM SERPL-SCNC: 141 MMOL/L (ref 136–145)
TROPONIN I SERPL-MCNC: <0.02 NG/ML (ref 0–0.04)
WBC # BLD AUTO: 4.6 K/UL (ref 4.6–13.2)

## 2017-02-28 PROCEDURE — 74011250636 HC RX REV CODE- 250/636: Performed by: EMERGENCY MEDICINE

## 2017-02-28 PROCEDURE — 83735 ASSAY OF MAGNESIUM: CPT | Performed by: EMERGENCY MEDICINE

## 2017-02-28 PROCEDURE — 96360 HYDRATION IV INFUSION INIT: CPT

## 2017-02-28 PROCEDURE — 80053 COMPREHEN METABOLIC PANEL: CPT | Performed by: EMERGENCY MEDICINE

## 2017-02-28 PROCEDURE — 74011250637 HC RX REV CODE- 250/637: Performed by: EMERGENCY MEDICINE

## 2017-02-28 PROCEDURE — 82550 ASSAY OF CK (CPK): CPT | Performed by: EMERGENCY MEDICINE

## 2017-02-28 PROCEDURE — 85025 COMPLETE CBC W/AUTO DIFF WBC: CPT | Performed by: EMERGENCY MEDICINE

## 2017-02-28 PROCEDURE — 99285 EMERGENCY DEPT VISIT HI MDM: CPT

## 2017-02-28 PROCEDURE — 93005 ELECTROCARDIOGRAM TRACING: CPT

## 2017-02-28 RX ORDER — POTASSIUM CHLORIDE 20 MEQ/1
40 TABLET, EXTENDED RELEASE ORAL
Status: COMPLETED | OUTPATIENT
Start: 2017-02-28 | End: 2017-02-28

## 2017-02-28 RX ADMIN — POTASSIUM CHLORIDE 40 MEQ: 20 TABLET, EXTENDED RELEASE ORAL at 20:38

## 2017-02-28 RX ADMIN — SODIUM CHLORIDE 1000 ML: 900 INJECTION, SOLUTION INTRAVENOUS at 19:57

## 2017-02-28 RX ADMIN — SODIUM CHLORIDE 2000 ML: 900 INJECTION, SOLUTION INTRAVENOUS at 19:58

## 2017-03-01 NOTE — ED PROVIDER NOTES
HPI Comments: Stephen Carcamo is a 45 y.o. Male who freq er with c/o feeling more weak tonight, like he was going to pass out. Per bottle of hctz he had filled at [facility] it appears pt has taken 8 hctz over last 3 days instead of one per day. No cp, sob, abd pain, edema, nvd. No syncope, but felt lightheaded. Denies taking too much medication    The history is provided by the patient and medical records. Past Medical History:   Diagnosis Date    Back pain     Hypertension        Past Surgical History:   Procedure Laterality Date    HX OTHER SURGICAL      oral sx         No family history on file. Social History     Social History    Marital status: LEGALLY      Spouse name: N/A    Number of children: N/A    Years of education: N/A     Occupational History    Not on file. Social History Main Topics    Smoking status: Current Some Day Smoker    Smokeless tobacco: Not on file    Alcohol use Yes      Comment: occasional    Drug use: Yes     Special: Marijuana, Cocaine    Sexual activity: Not on file     Other Topics Concern    Not on file     Social History Narrative         ALLERGIES: Review of patient's allergies indicates no known allergies. Review of Systems   Constitutional: Negative for fever. HENT: Negative for sore throat. Eyes: Negative for visual disturbance. Respiratory: Negative for cough and shortness of breath. Cardiovascular: Negative for chest pain and leg swelling. Gastrointestinal: Negative for abdominal pain. Endocrine: Negative for polyuria. Genitourinary: Positive for frequency. Negative for hematuria. Musculoskeletal: Negative for gait problem. Skin: Negative for rash. Neurological: Positive for weakness and light-headedness. Negative for syncope, facial asymmetry and headaches. Hematological: Does not bruise/bleed easily. Psychiatric/Behavioral: Negative for suicidal ideas.        Vitals:    02/28/17 2000 02/28/17 2015 02/28/17 2030 02/28/17 2102   BP: 143/82 (!) 146/91 (!) 150/95    Pulse: (!) 103 100 98 93   Resp: 20 19 19    Temp:       SpO2: 98% 98% 98%    Weight:       Height:                Physical Exam   Constitutional: He is oriented to person, place, and time. Non-toxic appearance. He does not appear ill. No distress. HENT:   Head: Normocephalic and atraumatic. Right Ear: External ear normal.   Left Ear: External ear normal.   Nose: Nose normal.   Mouth/Throat: Oropharynx is clear and moist. No oropharyngeal exudate. Eyes: Conjunctivae are normal.   Neck: Normal range of motion. Cardiovascular: Normal rate, regular rhythm, normal heart sounds and intact distal pulses. Pulmonary/Chest: Effort normal and breath sounds normal. No respiratory distress. Abdominal: Soft. There is no tenderness. Musculoskeletal: Normal range of motion. He exhibits no edema. Neurological: He is alert and oriented to person, place, and time. Skin: Skin is warm and dry. He is not diaphoretic. Psychiatric: His behavior is normal.   Nursing note and vitals reviewed.        [facility]  ED Course       Procedures  Vitals:  Patient Vitals for the past 12 hrs:   Temp Pulse Resp BP SpO2   02/28/17 2102 - 93 - - -   02/28/17 2030 - 98 19 (!) 150/95 98 %   02/28/17 2015 - 100 19 (!) 146/91 98 %   02/28/17 2000 - (!) 103 20 143/82 98 %   02/28/17 1945 - (!) 110 19 131/84 98 %   02/28/17 1943 98.8 °F (37.1 °C) (!) 107 16 130/86 100 %         Medications ordered:   Medications   sodium chloride 0.9 % bolus infusion 2,000 mL (2,000 mL IntraVENous New Bag 2/28/17 1958)   potassium chloride (K-DUR, KLOR-CON) SR tablet 40 mEq (40 mEq Oral Given 2/28/17 2038)         Lab findings:  Recent Results (from the past 12 hour(s))   EKG, 12 LEAD, INITIAL    Collection Time: 02/28/17  7:52 PM   Result Value Ref Range    Ventricular Rate 112 BPM    Atrial Rate 112 BPM    P-R Interval 180 ms    QRS Duration 84 ms    Q-T Interval 340 ms    QTC Calculation (Bezet) 464 ms Calculated P Axis 66 degrees    Calculated R Axis -35 degrees    Calculated T Axis 65 degrees    Diagnosis       Sinus tachycardia  Possible Left atrial enlargement  Left axis deviation  Abnormal ECG  When compared with ECG of 25-FEB-2017 23:04,  Vent. rate has increased BY  42 BPM  T wave inversion no longer evident in Lateral leads     CBC WITH AUTOMATED DIFF    Collection Time: 02/28/17  8:00 PM   Result Value Ref Range    WBC 4.6 4.6 - 13.2 K/uL    RBC 4.93 4.70 - 5.50 M/uL    HGB 12.8 (L) 13.0 - 16.0 g/dL    HCT 40.8 36.0 - 48.0 %    MCV 82.8 74.0 - 97.0 FL    MCH 26.0 24.0 - 34.0 PG    MCHC 31.4 31.0 - 37.0 g/dL    RDW 15.6 (H) 11.6 - 14.5 %    PLATELET 918 608 - 971 K/uL    MPV 9.9 9.2 - 11.8 FL    NEUTROPHILS 59 40 - 73 %    LYMPHOCYTES 34 21 - 52 %    MONOCYTES 6 3 - 10 %    EOSINOPHILS 1 0 - 5 %    BASOPHILS 0 0 - 2 %    ABS. NEUTROPHILS 2.7 1.8 - 8.0 K/UL    ABS. LYMPHOCYTES 1.6 0.9 - 3.6 K/UL    ABS. MONOCYTES 0.3 0.05 - 1.2 K/UL    ABS. EOSINOPHILS 0.1 0.0 - 0.4 K/UL    ABS. BASOPHILS 0.0 0.0 - 0.06 K/UL    DF AUTOMATED     METABOLIC PANEL, COMPREHENSIVE    Collection Time: 02/28/17  8:00 PM   Result Value Ref Range    Sodium 141 136 - 145 mmol/L    Potassium 3.1 (L) 3.5 - 5.5 mmol/L    Chloride 101 100 - 108 mmol/L    CO2 28 21 - 32 mmol/L    Anion gap 12 3.0 - 18 mmol/L    Glucose 110 (H) 74 - 99 mg/dL    BUN 24 (H) 7.0 - 18 MG/DL    Creatinine 0.96 0.6 - 1.3 MG/DL    BUN/Creatinine ratio 25 (H) 12 - 20      GFR est AA >60 >60 ml/min/1.73m2    GFR est non-AA >60 >60 ml/min/1.73m2    Calcium 8.8 8.5 - 10.1 MG/DL    Bilirubin, total 0.4 0.2 - 1.0 MG/DL    ALT (SGPT) 26 16 - 61 U/L    AST (SGOT) 14 (L) 15 - 37 U/L    Alk.  phosphatase 67 45 - 117 U/L    Protein, total 7.0 6.4 - 8.2 g/dL    Albumin 3.9 3.4 - 5.0 g/dL    Globulin 3.1 2.0 - 4.0 g/dL    A-G Ratio 1.3 0.8 - 1.7     MAGNESIUM    Collection Time: 02/28/17  8:00 PM   Result Value Ref Range    Magnesium 2.2 1.8 - 2.4 mg/dL   CARDIAC PANEL,(CK, CKMB & TROPONIN)    Collection Time: 02/28/17  8:00 PM   Result Value Ref Range     39 - 308 U/L    CK - MB 2.2 <3.6 ng/ml    CK-MB Index 1.2 0.0 - 4.0 %    Troponin-I, Qt. <0.02 0.0 - 0.045 NG/ML       EKG interpretation by ED Physician:  Cardiac monitor: sinus tach with no ectopy    Sinus tach with no acute st tw changes  Rate 112, qtc 464        X-Ray, CT or other radiology findings or impressions:  No orders to display       Progress notes, Consult notes or additional Procedure notes:   Doubt intentional overdose. D/w pt need for compliance with rec dosing    Reevaluation of patient:   Stable for d/ c    Disposition:  Diagnosis:   1. Orthostatic lightheadedness    2. Secondary hypertension        Disposition: home      Follow-up Information     Follow up With Details Comments [address] [place] [address] ([place]) Schedule an appointment as soon as possible for a visit  [facility]  [phone]            Patient's Medications   Start Taking    No medications on file   Continue Taking    HYDROCHLOROTHIAZIDE (HYDRODIURIL) 25 MG TABLET    Take 1 Tab by mouth daily. LISINOPRIL (PRINIVIL, ZESTRIL) 20 MG TABLET    Take 1 Tab by mouth daily.    These Medications have changed    No medications on file   Stop Taking    No medications on file

## 2017-03-01 NOTE — ED TRIAGE NOTES
Per patient he feels \"faint\", he was on his way to [facility] when it happened. Per EMS patient may have taken extra blood pressure medicine. Pt denies taking extra. There are 22/30 pills in the bottle. It was refilled on the 02/25/17.

## 2017-03-02 LAB
ATRIAL RATE: 112 BPM
CALCULATED P AXIS, ECG09: 66 DEGREES
CALCULATED R AXIS, ECG10: -35 DEGREES
CALCULATED T AXIS, ECG11: 65 DEGREES
DIAGNOSIS, 93000: NORMAL
P-R INTERVAL, ECG05: 180 MS
Q-T INTERVAL, ECG07: 340 MS
QRS DURATION, ECG06: 84 MS
QTC CALCULATION (BEZET), ECG08: 464 MS
VENTRICULAR RATE, ECG03: 112 BPM

## 2017-03-06 ENCOUNTER — HOSPITAL ENCOUNTER (EMERGENCY)
Age: 52
Discharge: HOME | End: 2017-03-07
Attending: EMERGENCY MEDICINE
Payer: SELF-PAY

## 2017-03-06 VITALS
BODY MASS INDEX: 24.11 KG/M2 | HEART RATE: 94 BPM | DIASTOLIC BLOOD PRESSURE: 88 MMHG | HEIGHT: 66 IN | TEMPERATURE: 98.8 F | OXYGEN SATURATION: 97 % | WEIGHT: 150 LBS | SYSTOLIC BLOOD PRESSURE: 143 MMHG | RESPIRATION RATE: 13 BRPM

## 2017-03-06 DIAGNOSIS — R06.02 SOB (SHORTNESS OF BREATH): Primary | ICD-10-CM

## 2017-03-06 PROCEDURE — 93005 ELECTROCARDIOGRAM TRACING: CPT

## 2017-03-06 PROCEDURE — 99283 EMERGENCY DEPT VISIT LOW MDM: CPT

## 2017-03-07 ENCOUNTER — APPOINTMENT (OUTPATIENT)
Dept: GENERAL RADIOLOGY | Age: 52
End: 2017-03-07
Attending: NURSE PRACTITIONER
Payer: SELF-PAY

## 2017-03-07 LAB
ATRIAL RATE: 92 BPM
CALCULATED P AXIS, ECG09: 67 DEGREES
CALCULATED R AXIS, ECG10: -25 DEGREES
CALCULATED T AXIS, ECG11: 59 DEGREES
DIAGNOSIS, 93000: NORMAL
P-R INTERVAL, ECG05: 210 MS
Q-T INTERVAL, ECG07: 384 MS
QRS DURATION, ECG06: 100 MS
QTC CALCULATION (BEZET), ECG08: 474 MS
VENTRICULAR RATE, ECG03: 92 BPM

## 2017-03-07 PROCEDURE — 71020 XR CHEST PA LAT: CPT

## 2017-03-07 NOTE — ED NOTES
Pt sleeping when called to a bed. Denies any complaint at this time.   States he wants to get his blood pressure taken

## 2017-03-07 NOTE — ED PROVIDER NOTES
HPI Comments:   12:15 AM   45 y.o. male presents to ED C/O fatigue, shortness of breath. Patient has a HX of Back pain and HTN. Patient reports he was feeling fatigued and short of breath earlier today while walking. Patient denied any chest pain or dizziness when he was feeling fatigued or short of breath, patient believes symptoms were due to having his blood pressure treated. Patient denies CP, SOB, dizziness, fatigue, cough, fever. Patient smokes 1 black and mild daily, patient reports compliance with his blood pressure medication. Pt denies any other sxs or complaints. Written by Tal HONG      The history is provided by the patient. History limited by: no language barrier. Past Medical History:   Diagnosis Date    Back pain     Hypertension        Past Surgical History:   Procedure Laterality Date    HX OTHER SURGICAL      oral sx         History reviewed. No pertinent family history. Social History     Social History    Marital status: LEGALLY      Spouse name: N/A    Number of children: N/A    Years of education: N/A     Occupational History    Not on file. Social History Main Topics    Smoking status: Current Some Day Smoker    Smokeless tobacco: Not on file    Alcohol use Yes      Comment: occasional    Drug use: Yes     Special: Marijuana, Cocaine    Sexual activity: Not on file     Other Topics Concern    Not on file     Social History Narrative         ALLERGIES: Review of patient's allergies indicates no known allergies. Review of Systems   Constitutional: Positive for fatigue. Negative for activity change, appetite change, chills and fever. HENT: Negative for congestion, dental problem, ear pain, rhinorrhea, sinus pressure, sneezing, sore throat, trouble swallowing and voice change. Eyes: Negative for pain, discharge, redness and itching. Respiratory: Positive for shortness of breath. Negative for cough, chest tightness and wheezing. Cardiovascular: Negative for chest pain and palpitations. Gastrointestinal: Negative for abdominal distention, abdominal pain, blood in stool, constipation, diarrhea, nausea, rectal pain and vomiting. Endocrine: Negative for polyuria. Genitourinary: Negative for discharge, dysuria, flank pain, hematuria, penile pain and testicular pain. Musculoskeletal: Negative for arthralgias, back pain, joint swelling, neck pain and neck stiffness. Skin: Negative for color change, rash and wound. Allergic/Immunologic: Negative for immunocompromised state. Neurological: Negative for dizziness, weakness, light-headedness, numbness and headaches. Hematological: Negative for adenopathy. Psychiatric/Behavioral: Negative for agitation and behavioral problems. The patient is not nervous/anxious. Vitals:    03/06/17 2148   BP: 143/88   Pulse: 94   Resp: 13   Temp: 98.8 °F (37.1 °C)   SpO2: 97%   Weight: 68 kg (150 lb)   Height: 5' 6\" (1.676 m)            Physical Exam   Constitutional: He is oriented to person, place, and time. He appears well-developed and well-nourished. No distress. Speaking in complete sentences, appears in no distress. HENT:   Head: Normocephalic and atraumatic. Right Ear: External ear normal.   Left Ear: External ear normal.   Nose: Nose normal.   Eyes: Conjunctivae and EOM are normal.   Cardiovascular: Normal rate, regular rhythm and normal heart sounds. No peripheral edema noted. Pulmonary/Chest: Effort normal and breath sounds normal. No respiratory distress. He has no wheezes. He has no rales. Musculoskeletal: Normal range of motion. Neurological: He is alert and oriented to person, place, and time. He exhibits normal muscle tone. Coordination normal.   Skin: Skin is warm and dry. No rash noted. He is not diaphoretic. No erythema. No pallor. Psychiatric: He has a normal mood and affect.  His behavior is normal. Judgment and thought content normal.   Nursing note and vitals reviewed. MDM  Number of Diagnoses or Management Options  SOB (shortness of breath):   Diagnosis management comments: Clinical Impression - Shortness of breath    MDM:  Plan - Chest xray, EKG  Progress chest xray - no acute cardiopulmonary abnormality noted. EKG - Sinus rhythm with 1st degree AV block Otherwise normal ECG When compared with ECG of 28-FEB-2017 19:52, MI interval has increased   2:39 AM Patient is well appearing, he has denied any complaints the entire time i have been taking care of him, he basically admitted he just wanted his blood pressure checked when he checked in, which is WNL. Patient is well known to this department for similar complaints, has been worked up multiple times for same. At this time I do not believe blood work is indicated, patient has no complaint, and has had no chest pain or dizziness today. Patient educated to return to the ED for any new or worsening symptoms. Patient denies questions. Amount and/or Complexity of Data Reviewed  Clinical lab tests: ordered and reviewed      ED Course       Procedures             RESULTS:    XR CHEST PA LAT    (Results Pending)       Labs Reviewed - No data to display    Recent Results (from the past 12 hour(s))   EKG, 12 LEAD, INITIAL    Collection Time: 03/06/17  9:10 PM   Result Value Ref Range    Ventricular Rate 92 BPM    Atrial Rate 92 BPM    P-R Interval 210 ms    QRS Duration 100 ms    Q-T Interval 384 ms    QTC Calculation (Bezet) 474 ms    Calculated P Axis 67 degrees    Calculated R Axis -25 degrees    Calculated T Axis 59 degrees    Diagnosis       Sinus rhythm with 1st degree AV block  Otherwise normal ECG  When compared with ECG of 28-FEB-2017 19:52,  MI interval has increased         PROGRESS NOTE:   12:15 AM   Initial assessment completed. Written by Agustina HOGN     DISCHARGE NOTE:  2:42 AM   Chante Damico's  results have been reviewed with him.   He has been counseled regarding his diagnosis, treatment, and plan. He verbally conveys understanding and agreement of the signs, symptoms, diagnosis, treatment and prognosis and additionally agrees to follow up as discussed. He also agrees with the care-plan and conveys that all of his questions have been answered. I have also provided discharge instructions for him that include: educational information regarding their diagnosis and treatment, and list of reasons why they would want to return to the ED prior to their follow-up appointment, should his condition change. CLINICAL IMPRESSION:    1. SOB (shortness of breath)        AFTER VISIT PLAN:    Current Discharge Medication List      CONTINUE these medications which have NOT CHANGED    Details   lisinopril (PRINIVIL, ZESTRIL) 20 mg tablet Take 1 Tab by mouth daily. Qty: 30 Tab, Refills: 1      hydroCHLOROthiazide (HYDRODIURIL) 25 mg tablet Take 1 Tab by mouth daily.   Qty: 30 Tab, Refills: 1              Follow-up Information     Follow up With Details Comments [address] Schedule an appointment as soon as possible for a visit in 3 days Further evaluation [address]  [phone]           Written by True HONG

## 2017-03-07 NOTE — DISCHARGE INSTRUCTIONS
Shortness of Breath: Care Instructions  Your Care Instructions  Shortness of breath has many causes. Sometimes conditions such as anxiety can lead to shortness of breath. Some people get mild shortness of breath when they exercise. Trouble breathing also can be a symptom of a serious problem, such as asthma, lung disease, emphysema, heart problems, and pneumonia. If your shortness of breath continues, you may need tests and treatment. Watch for any changes in your breathing and other symptoms. Follow-up care is a key part of your treatment and safety. Be sure to make and go to all appointments, and call your doctor if you are having problems. Its also a good idea to know your test results and keep a list of the medicines you take. How can you care for yourself at home? · Do not smoke or allow others to smoke around you. If you need help quitting, talk to your doctor about stop-smoking programs and medicines. These can increase your chances of quitting for good. · Get plenty of rest and sleep. · Take your medicines exactly as prescribed. Call your doctor if you think you are having a problem with your medicine. · Find healthy ways to deal with stress. ¨ Exercise daily. ¨ Get plenty of sleep. ¨ Eat regularly and well. When should you call for help? Call 911 anytime you think you may need emergency care. For example, call if:  · You have severe shortness of breath. · You have symptoms of a heart attack. These may include:  ¨ Chest pain or pressure, or a strange feeling in the chest.  ¨ Sweating. ¨ Shortness of breath. ¨ Nausea or vomiting. ¨ Pain, pressure, or a strange feeling in the back, neck, jaw, or upper belly or in one or both shoulders or arms. ¨ Lightheadedness or sudden weakness. ¨ A fast or irregular heartbeat. After you call 911, the  may tell you to chew 1 adult-strength or 2 to 4 low-dose aspirin. Wait for an ambulance. Do not try to drive yourself.   Call your doctor now or seek immediate medical care if:  · Your shortness of breath gets worse or you start to wheeze. Wheezing is a high-pitched sound when you breathe. · You wake up at night out of breath or have to prop your head up on several pillows to breathe. · You are short of breath after only light activity or while at rest.  Watch closely for changes in your health, and be sure to contact your doctor if:  · You do not get better over the next 1 to 2 days. Where can you learn more? Go to http://jasper-teresa.info/. Enter S780 in the search box to learn more about \"Shortness of Breath: Care Instructions. \"  Current as of: May 23, 2016  Content Version: 11.1  © 0171-7165 [employer]. Care instructions adapted under license by [employer] (which disclaims liability or warranty for this information). If you have questions about a medical condition or this instruction, always ask your healthcare professional. [place] [address] any warranty or liability for your use of this information. MyChart Activation    Thank you for requesting access to [employer]. Please follow the instructions below to securely access and download your online medical record. [employer] allows you to send messages to your doctor, view your test results, renew your prescriptions, schedule appointments, and more. How Do I Sign Up? 1. In your internet browser, go to [email or web address]  2. Click on the First Time User? Click Here link in the Sign In box. You will be redirect to the New Member Sign Up page. 3. Enter your [employer] Access Code exactly as it appears below. You will not need to use this code after youve completed the sign-up process. If you do not sign up before the expiration date, you must request a new code. [employer] Access Code: [email or web address]  Expires: 2017  8:58 PM (This is the date your [employer] access code will )    4.  Enter the last four digits of your Social Security Number (xxxx) and Date of Birth (mm/dd/yyyy) as indicated and click Submit. You will be taken to the next sign-up page. 5. Create a Verifico ID. This will be your Verifico login ID and cannot be changed, so think of one that is secure and easy to remember. 6. Create a Verifico password. You can change your password at any time. 7. Enter your Password Reset Question and Answer. This can be used at a later time if you forget your password. 8. Enter your e-mail address. You will receive e-mail notification when new information is available in [address] 9. Click Sign Up. You can now view and download portions of your medical record. 10. Click the Download Summary menu link to download a portable copy of your medical information. Additional Information    If you have questions, please visit the Frequently Asked Questions section of the Verifico website at [email or web address] Remember, Ver[employer] is NOT to be used for urgent needs. For medical emergencies, dial 911.

## 2017-03-11 ENCOUNTER — HOSPITAL ENCOUNTER (EMERGENCY)
Age: 52
Discharge: HOME | End: 2017-03-11
Attending: EMERGENCY MEDICINE
Payer: SELF-PAY

## 2017-03-11 VITALS
HEART RATE: 83 BPM | DIASTOLIC BLOOD PRESSURE: 96 MMHG | BODY MASS INDEX: 24.21 KG/M2 | TEMPERATURE: 97.7 F | SYSTOLIC BLOOD PRESSURE: 166 MMHG | OXYGEN SATURATION: 100 % | WEIGHT: 150 LBS | RESPIRATION RATE: 18 BRPM

## 2017-03-11 DIAGNOSIS — R42 DIZZINESS: Primary | ICD-10-CM

## 2017-03-11 DIAGNOSIS — I15.9 SECONDARY HYPERTENSION: ICD-10-CM

## 2017-03-11 LAB — GLUCOSE BLD STRIP.AUTO-MCNC: 98 MG/DL (ref 70–110)

## 2017-03-11 PROCEDURE — 82962 GLUCOSE BLOOD TEST: CPT

## 2017-03-11 PROCEDURE — 99284 EMERGENCY DEPT VISIT MOD MDM: CPT

## 2017-03-11 PROCEDURE — 93005 ELECTROCARDIOGRAM TRACING: CPT

## 2017-03-12 LAB
ATRIAL RATE: 79 BPM
CALCULATED P AXIS, ECG09: 53 DEGREES
CALCULATED R AXIS, ECG10: -35 DEGREES
CALCULATED T AXIS, ECG11: 46 DEGREES
DIAGNOSIS, 93000: NORMAL
P-R INTERVAL, ECG05: 170 MS
Q-T INTERVAL, ECG07: 386 MS
QRS DURATION, ECG06: 100 MS
QTC CALCULATION (BEZET), ECG08: 442 MS
VENTRICULAR RATE, ECG03: 79 BPM

## 2017-03-12 NOTE — ED NOTES
I have reviewed discharge instructions with the patient. The patient verbalized understanding. Patient armband removed and shredded. Patient discharged ambulatory to [facility].

## 2017-03-12 NOTE — DISCHARGE INSTRUCTIONS
One or more blood pressure readings were noted elevated above normal while you were here in the emergency department. This finding was included in your diagnosis. You are encouraged to follow up with your family doctor or the consultant provided for further evaluation and treatment. Dizziness: Care Instructions  Your Care Instructions  Dizziness is the feeling of unsteadiness or fuzziness in your head. It is different than having vertigo, which is a feeling that the room is spinning or that you are moving or falling. It is also different from lightheadedness, which is the feeling that you are about to faint. It can be hard to know what causes dizziness. Some people feel dizzy when they have migraine headaches. Sometimes bouts of flu can make you feel dizzy. Some medical conditions, such as heart problems or high blood pressure, can make you feel dizzy. Many medicines can cause dizziness, including medicines for high blood pressure, pain, or anxiety. If a medicine causes your symptoms, your doctor may recommend that you stop or change the medicine. If it is a problem with your heart, you may need medicine to help your heart work better. If there is no clear reason for your symptoms, your doctor may suggest watching and waiting for a while to see if the dizziness goes away on its own. Follow-up care is a key part of your treatment and safety. Be sure to make and go to all appointments, and call your doctor if you are having problems. It's also a good idea to know your test results and keep a list of the medicines you take. How can you care for yourself at home? · If your doctor recommends or prescribes medicine, take it exactly as directed. Call your doctor if you think you are having a problem with your medicine. · Do not drive while you feel dizzy. · Try to prevent falls. Steps you can take include:  ¨ Using nonskid mats, adding grab bars near the tub, and using night-lights.   ¨ Clearing your home so that walkways are free of anything you might trip on. ¨ Letting family and friends know that you have been feeling dizzy. This will help them know how to help you. When should you call for help? Call 911 anytime you think you may need emergency care. For example, call if:  · You passed out (lost consciousness). · You have dizziness along with symptoms of a heart attack. These may include:  ¨ Chest pain or pressure, or a strange feeling in the chest.  ¨ Sweating. ¨ Shortness of breath. ¨ Nausea or vomiting. ¨ Pain, pressure, or a strange feeling in the back, neck, jaw, or upper belly or in one or both shoulders or arms. ¨ Lightheadedness or sudden weakness. ¨ A fast or irregular heartbeat. · You have symptoms of a stroke. These may include:  ¨ Sudden numbness, tingling, weakness, or loss of movement in your face, arm, or leg, especially on only one side of your body. ¨ Sudden vision changes. ¨ Sudden trouble speaking. ¨ Sudden confusion or trouble understanding simple statements. ¨ Sudden problems with walking or balance. ¨ A sudden, severe headache that is different from past headaches. Call your doctor now or seek immediate medical care if:  · You feel dizzy and have a fever, headache, or ringing in your ears. · You have new or increased nausea and vomiting. · Your dizziness does not go away or comes back. Watch closely for changes in your health, and be sure to contact your doctor if:  · You do not get better as expected. Where can you learn more? Go to http://jasper-teresa.info/. Enter D507 in the search box to learn more about \"Dizziness: Care Instructions. \"  Current as of: May 27, 2016  Content Version: 11.1  © 4038-1811 [employer]. Care instructions adapted under license by [employer] (which disclaims liability or warranty for this information).  If you have questions about a medical condition or this instruction, always ask your healthcare professional. [address] any warranty or liability for your use of this information.

## 2017-03-12 NOTE — ED PROVIDER NOTES
HPI Comments: 9:50 PM Nicanor Neely is a 45 y.o. male with a hx of chronic back pain and HTN who presents to the ED c/o dizziness in 5 minute intervals. Pt states \"I got scared and blanked out\". No other associated symptoms or modifying factors at this time. The history is provided by the patient. Past Medical History:   Diagnosis Date    Back pain     Hypertension        Past Surgical History:   Procedure Laterality Date    HX OTHER SURGICAL      oral sx         History reviewed. No pertinent family history. Social History     Social History    Marital status: LEGALLY      Spouse name: N/A    Number of children: N/A    Years of education: N/A     Occupational History    Not on file. Social History Main Topics    Smoking status: Current Some Day Smoker    Smokeless tobacco: Not on file    Alcohol use Yes      Comment: occasional    Drug use: Yes     Special: Marijuana, Cocaine    Sexual activity: Not on file     Other Topics Concern    Not on file     Social History Narrative         ALLERGIES: Review of patient's allergies indicates no known allergies. Review of Systems   Constitutional: Negative. HENT: Negative. Eyes: Negative. Respiratory: Negative. Cardiovascular: Negative. Gastrointestinal: Negative. Endocrine: Negative. Genitourinary: Negative. Musculoskeletal: Negative. Skin: Negative. Allergic/Immunologic: Negative. Neurological: Positive for dizziness. Hematological: Negative. Psychiatric/Behavioral: Negative. All other systems reviewed and are negative. Vitals:    03/11/17 2145   BP: (!) 166/96   Pulse: 83   Resp: 18   Temp: 97.7 °F (36.5 °C)   SpO2: 100%   Weight: 68 kg (150 lb)            Physical Exam   Constitutional: He is oriented to person, place, and time. He appears well-developed and well-nourished. HENT:   Head: Normocephalic and atraumatic.    Nose: Nose normal.   Mouth/Throat: Oropharynx is clear and moist.   Nares are clear   Eyes: Conjunctivae and EOM are normal. Pupils are equal, round, and reactive to light. 3mm   Neck: Normal range of motion. Neck supple. Cardiovascular: Normal rate, regular rhythm, normal heart sounds and intact distal pulses. Pulses:       Radial pulses are 2+ on the right side, and 2+ on the left side. Pulmonary/Chest: Effort normal and breath sounds normal.   Abdominal: Soft. Bowel sounds are normal.   Neurological: He is alert and oriented to person, place, and time. Skin: Skin is warm and dry. Psychiatric: He has a normal mood and affect. His behavior is normal. Judgment and thought content normal.   Nursing note and vitals reviewed. [facility]  ED Course       Procedures  Vitals:  Patient Vitals for the past 12 hrs:   Temp Pulse Resp BP SpO2   03/11/17 2145 97.7 °F (36.5 °C) 83 18 (!) 166/96 100 %         Medications ordered:   Medications - No data to display      Lab findings:  Recent Results (from the past 12 hour(s))   EKG, 12 LEAD, INITIAL    Collection Time: 03/11/17  9:35 PM   Result Value Ref Range    Ventricular Rate 79 BPM    Atrial Rate 79 BPM    P-R Interval 170 ms    QRS Duration 100 ms    Q-T Interval 386 ms    QTC Calculation (Bezet) 442 ms    Calculated P Axis 53 degrees    Calculated R Axis -35 degrees    Calculated T Axis 46 degrees    Diagnosis       Normal sinus rhythm  Possible Left atrial enlargement  Left axis deviation  T wave abnormality, consider inferolateral ischemia  Abnormal ECG  When compared with ECG of 06-MAR-2017 21:10,  MS interval has decreased  T wave inversion now evident in Inferior leads     GLUCOSE, POC    Collection Time: 03/11/17  9:44 PM   Result Value Ref Range    Glucose (POC) 98 70 - 110 mg/dL     EKG interpretation by ED physcian:   21:45 Normal sinus rhythm with a rate of 79. Left axis deviation. MS interval: 170. QRS duration: 100. QTc:442. Inverted t wave in V6/II/Avf.     Progress notes, Consult notes or additional Procedure notes:  9:52 PM I have reassessed the patient and discussed their results and diagnosis. I discussed elevated blood pressure and told him to follow up with PCP regarding the matter. Pt will be discharged in stable condition. Patient is to return to emergency department if any new or worsening condition. Patient understands and verbalizes agreement with plan. Disposition:  Diagnosis:   1. Dizziness    2. Secondary hypertension        Disposition: Discharged    Follow-up Information     Follow up With Details Comments [address] Schedule an appointment as soon as possible for a visit in 2 days  [address] EMERGENCY DEPT  If symptoms worsen [address]  [phone]           Patient's Medications   Start Taking    No medications on file   Continue Taking    HYDROCHLOROTHIAZIDE (HYDRODIURIL) 25 MG TABLET    Take 1 Tab by mouth daily. LISINOPRIL (PRINIVIL, ZESTRIL) 20 MG TABLET    Take 1 Tab by mouth daily. These Medications have changed    No medications on file   Stop Taking    No medications on file     SCRIBE ATTESTATION STATEMENT  Documented by: Fransico Lee for, and in the presence of, Cheryl Flowers MD 9:52 PM    Signed by: Isrrael Barnes, 03/11/17 9:52 PM    PROVIDER ATTESTATION STATEMENT  I personally performed the services described in the documentation, reviewed the documentation, as recorded by the scribe in my presence, and it accurately and completely records my words and actions.   Cheryl Flowers MD

## 2017-03-29 ENCOUNTER — HOSPITAL ENCOUNTER (EMERGENCY)
Age: 52
Discharge: HOME | End: 2017-03-29
Attending: EMERGENCY MEDICINE
Payer: SELF-PAY

## 2017-03-29 VITALS
RESPIRATION RATE: 18 BRPM | HEART RATE: 80 BPM | SYSTOLIC BLOOD PRESSURE: 150 MMHG | TEMPERATURE: 99.3 F | DIASTOLIC BLOOD PRESSURE: 106 MMHG | OXYGEN SATURATION: 98 %

## 2017-03-29 DIAGNOSIS — Z59.00 HOMELESSNESS: ICD-10-CM

## 2017-03-29 DIAGNOSIS — M54.50 CHRONIC BILATERAL LOW BACK PAIN WITHOUT SCIATICA: Primary | ICD-10-CM

## 2017-03-29 DIAGNOSIS — G89.29 CHRONIC BILATERAL LOW BACK PAIN WITHOUT SCIATICA: Primary | ICD-10-CM

## 2017-03-29 DIAGNOSIS — I10 HYPERTENSION, UNCONTROLLED: ICD-10-CM

## 2017-03-29 PROCEDURE — 99282 EMERGENCY DEPT VISIT SF MDM: CPT

## 2017-03-29 SDOH — ECONOMIC STABILITY - HOUSING INSECURITY: HOMELESSNESS UNSPECIFIED: Z59.00

## 2017-03-29 NOTE — DISCHARGE INSTRUCTIONS
Back Pain: Care Instructions  Your Care Instructions    Back pain has many possible causes. It is often related to problems with muscles and ligaments of the back. It may also be related to problems with the nerves, discs, or bones of the back. Moving, lifting, standing, sitting, or sleeping in an awkward way can strain the back. Sometimes you don't notice the injury until later. Arthritis is another common cause of back pain. Although it may hurt a lot, back pain usually improves on its own within several weeks. Most people recover in 12 weeks or less. Using good home treatment and being careful not to stress your back can help you feel better sooner. Follow-up care is a key part of your treatment and safety. Be sure to make and go to all appointments, and call your doctor if you are having problems. Its also a good idea to know your test results and keep a list of the medicines you take. How can you care for yourself at home? · Sit or lie in positions that are most comfortable and reduce your pain. Try one of these positions when you lie down:  ¨ Lie on your back with your knees bent and supported by large pillows. ¨ Lie on the floor with your legs on the seat of a sofa or chair. [place]  on your side with your knees and hips bent and a pillow between your legs. ¨ Lie on your stomach if it does not make pain worse. · Do not sit up in bed, and avoid soft couches and twisted positions. Bed rest can help relieve pain at first, but it delays healing. Avoid bed rest after the first day of back pain. · Change positions every 30 minutes. If you must sit for long periods of time, take breaks from sitting. Get up and walk around, or lie in a comfortable position. · Try using a heating pad on a low or medium setting for 15 to 20 minutes every 2 or 3 hours. Try a warm shower in place of one session with the heating pad. · You can also try an ice pack for 10 to 15 minutes every 2 to 3 hours.  Put a thin cloth between the ice pack and your skin. · Take pain medicines exactly as directed. ¨ If the doctor gave you a prescription medicine for pain, take it as prescribed. ¨ If you are not taking a prescription pain medicine, ask your doctor if you can take an over-the-counter medicine. · Take short walks several times a day. You can start with 5 to 10 minutes, 3 or 4 times a day, and work up to longer walks. Walk on level surfaces and avoid hills and stairs until your back is better. · Return to work and other activities as soon as you can. Continued rest without activity is usually not good for your back. · To prevent future back pain, do exercises to stretch and strengthen your back and stomach. Learn how to use good posture, safe lifting techniques, and proper body mechanics. When should you call for help? Call your doctor now or seek immediate medical care if:  · You have new or worsening numbness in your legs. · You have new or worsening weakness in your legs. (This could make it hard to stand up.)  · You lose control of your bladder or bowels. Watch closely for changes in your health, and be sure to contact your doctor if:  · Your pain gets worse. · You are not getting better after 2 weeks. Where can you learn more? Go to http://jasper-teresa.info/. Enter K639 in the search box to learn more about \"Back Pain: Care Instructions. \"  Current as of: May 23, 2016  Content Version: 11.2  © 5224-4021 [employer]. Care instructions adapted under license by [employer] (which disclaims liability or warranty for this information). If you have questions about a medical condition or this instruction, always ask your healthcare professional. [address] any warranty or liability for your use of this information.          Chronic Pain: Care Instructions  Your Care Instructions  Chronic pain is pain that lasts a long time (months or even years) and may or may not have a clear cause. It is different from acute pain, which usually does have a clear cause--like an injury or illness--and gets better over time. Chronic pain:  · Lasts over time but may vary from day to day. · Does not go away despite efforts to end it. · May disrupt your sleep and lead to fatigue. · May cause depression or anxiety. · May make your muscles tense, causing more pain. · Can disrupt your work, hobbies, home life, and relationships with friends and family. Chronic pain is a very real condition. It is not just in your head. Treatment can help and usually includes several methods used together, such as medicines, physical therapy, exercise, and other treatments. Learning how to relax and changing negative thought patterns can also help you cope. Chronic pain is complex. Taking an active role in your treatment will help you better manage your pain. Tell your doctor if you have trouble dealing with your pain. You may have to try several things before you find what works best for you. Follow-up care is a key part of your treatment and safety. Be sure to make and go to all appointments, and call your doctor if you are having problems. Its also a good idea to know your test results and keep a list of the medicines you take. How can you care for yourself at home? · Pace yourself. Break up large jobs into smaller tasks. Save harder tasks for days when you have less pain, or go back and forth between hard tasks and easier ones. Take rest breaks. · Relax, and reduce stress. Relaxation techniques such as deep breathing or meditation can help. · Keep moving. Gentle, daily exercise can help reduce pain over the long run. Try low- or no-impact exercises such as walking, swimming, and stationary biking. Do stretches to stay flexible. · Try heat, cold packs, and massage. · Get enough sleep. Chronic pain can make you tired and drain your energy.  Talk with your doctor if you have trouble sleeping because of pain. · Think positive. Your thoughts can affect your pain level. Do things that you enjoy to distract yourself when you have pain instead of focusing on the pain. See a movie, read a book, listen to music, or spend time with a friend. · If you think you are depressed, talk to your doctor about treatment. · Keep a daily pain diary. Record how your moods, thoughts, sleep patterns, activities, and medicine affect your pain. You may find that your pain is worse during or after certain activities or when you are feeling a certain emotion. Having a record of your pain can help you and your doctor find the best ways to treat your pain. · Take pain medicines exactly as directed. ¨ If the doctor gave you a prescription medicine for pain, take it as prescribed. ¨ If you are not taking a prescription pain medicine, ask your doctor if you can take an over-the-counter medicine. Reducing constipation caused by pain medicine  · Include fruits, vegetables, beans, and whole grains in your diet each day. These foods are high in fiber. · Drink plenty of fluids, enough so that your urine is light yellow or clear like water. If you have kidney, heart, or liver disease and have to limit fluids, talk with your doctor before you increase the amount of fluids you drink. · If your doctor recommends it, get more exercise. Walking is a good choice. Bit by bit, increase the amount you walk every day. Try for at least 30 minutes on most days of the week. · Schedule time each day for a bowel movement. A daily routine may help. Take your time and do not strain when having a bowel movement. When should you call for help? Call your doctor now or seek immediate medical care if:  · Your pain gets worse or is out of control. · You feel down or blue, or you do not enjoy things like you once did. You may be depressed, which is common in people with chronic pain. Depression can be treated.   · You have vomiting or cramps for more than 2 hours. Watch closely for changes in your health, and be sure to contact your doctor if:  · You cannot sleep because of pain. · You are very worried or anxious about your pain. · You have trouble taking your pain medicine. · You have any concerns about your pain medicine. · You have trouble with bowel movements, such as:  ¨ No bowel movement in 3 days. ¨ Blood in the anal area, in your stool, or on the toilet paper. ¨ Diarrhea for more than 24 hours. Where can you learn more? Go to http://jasper-teresa.info/. Enter N004 in the search box to learn more about \"Chronic Pain: Care Instructions. \"  Current as of: October 14, 2016  Content Version: 11.2  © 3262-5722 [employer]. Care instructions adapted under license by [employer] (which disclaims liability or warranty for this information). If you have questions about a medical condition or this instruction, always ask your healthcare professional. [place] [address] any warranty or liability for your use of this information. High Blood Pressure: Care Instructions  Your Care Instructions  If your blood pressure is usually above 140/90, you have high blood pressure, or hypertension. That means the top number is 140 or higher or the bottom number is 90 or higher, or both. Despite what a lot of people think, high blood pressure usually doesn't cause headaches or make you feel dizzy or lightheaded. It usually has no symptoms. But it does increase your risk for heart attack, stroke, and kidney or eye damage. The higher your blood pressure, the more your risk increases. Your doctor will give you a goal for your blood pressure. Your goal will be based on your health and your age. An example of a goal is to keep your blood pressure below 140/90. Lifestyle changes, such as eating healthy and being active, are always important to help lower blood pressure.  You might also take medicine to reach your blood pressure goal.  Follow-up care is a key part of your treatment and safety. Be sure to make and go to all appointments, and call your doctor if you are having problems. It's also a good idea to know your test results and keep a list of the medicines you take. How can you care for yourself at home? Medical treatment  · If you stop taking your medicine, your blood pressure will go back up. You may take one or more types of medicine to lower your blood pressure. Be safe with medicines. Take your medicine exactly as prescribed. Call your doctor if you think you are having a problem with your medicine. · Talk to your doctor before you start taking aspirin every day. Aspirin can help certain people lower their risk of a heart attack or stroke. But taking aspirin isn't right for everyone, because it can cause serious bleeding. · See your doctor regularly. You may need to see the doctor more often at first or until your blood pressure comes down. · If you are taking blood pressure medicine, talk to your doctor before you take decongestants or anti-inflammatory medicine, such as ibuprofen. Some of these medicines can raise blood pressure. · Learn how to check your blood pressure at home. Lifestyle changes  · Stay at a healthy weight. This is especially important if you put on weight around the waist. Losing even 10 pounds can help you lower your blood pressure. · If your doctor recommends it, get more exercise. Walking is a good choice. Bit by bit, increase the amount you walk every day. Try for at least 30 minutes on most days of the week. You also may want to swim, bike, or do other activities. · Avoid or limit alcohol. Talk to your doctor about whether you can drink any alcohol. · Try to limit how much sodium you eat to less than 2,300 milligrams (mg) a day. Your doctor may ask you to try to eat less than 1,500 mg a day.   · Eat plenty of fruits (such as bananas and oranges), vegetables, legumes, whole grains, and low-fat dairy products. · Lower the amount of saturated fat in your diet. Saturated fat is found in animal products such as milk, cheese, and meat. Limiting these foods may help you lose weight and also lower your risk for heart disease. · Do not smoke. Smoking increases your risk for heart attack and stroke. If you need help quitting, talk to your doctor about stop-smoking programs and medicines. These can increase your chances of quitting for good. When should you call for help? Call 911 anytime you think you may need emergency care. This may mean having symptoms that suggest that your blood pressure is causing a serious heart or blood vessel problem. Your blood pressure may be over 180/110. For example, call 911 if:  · You have symptoms of a heart attack. These may include:  ¨ Chest pain or pressure, or a strange feeling in the chest.  ¨ Sweating. ¨ Shortness of breath. ¨ Nausea or vomiting. ¨ Pain, pressure, or a strange feeling in the back, neck, jaw, or upper belly or in one or both shoulders or arms. ¨ Lightheadedness or sudden weakness. ¨ A fast or irregular heartbeat. · You have symptoms of a stroke. These may include:  ¨ Sudden numbness, tingling, weakness, or loss of movement in your face, arm, or leg, especially on only one side of your body. ¨ Sudden vision changes. ¨ Sudden trouble speaking. ¨ Sudden confusion or trouble understanding simple statements. ¨ Sudden problems with walking or balance. ¨ A sudden, severe headache that is different from past headaches. · You have severe back or belly pain. Do not wait until your blood pressure comes down on its own. Get help right away. Call your doctor now or seek immediate care if:  · Your blood pressure is much higher than normal (such as 180/110 or higher), but you don't have symptoms. · You think high blood pressure is causing symptoms, such as:  ¨ Severe headache. ¨ Blurry vision.   Watch closely for changes in your health, and be sure to contact your doctor if:  · Your blood pressure measures 140/90 or higher at least 2 times. That means the top number is 140 or higher or the bottom number is 90 or higher, or both. · You think you may be having side effects from your blood pressure medicine. · Your blood pressure is usually normal, but it goes above normal at least 2 times. Where can you learn more? Go to http://jasper-teresa.info/. Enter L097 in the search box to learn more about \"High Blood Pressure: Care Instructions. \"  Current as of: August 8, 2016  Content Version: 11.2  © 1735-7165 [employer]. Care instructions adapted under license by [employer] (which disclaims liability or warranty for this information). If you have questions about a medical condition or this instruction, always ask your healthcare professional. [address] any warranty or liability for your use of this information.

## 2017-03-29 NOTE — ED PROVIDER NOTES
HPI Comments: 4:27 PM Bettina Donato is a 45 y.o. male with a history of HTN presenting to the ED with intermittent lower back pain that began 5 month ago. He denies any recent injury or trauma. Pt denies urinary symptoms, nausea, vomiting, diarrhea, fever, CP, and cough. The patient was seen at [facility] earlier today for the same symptoms and prescribed Valium, Tramadol, and Ibuprofen. He states he is at [place] [address] for a second opinion. No other complaints at this time. The history is provided by the patient. Past Medical History:   Diagnosis Date    Back pain     Hypertension        Past Surgical History:   Procedure Laterality Date    HX OTHER SURGICAL      oral sx         History reviewed. No pertinent family history. Social History     Social History    Marital status: LEGALLY      Spouse name: N/A    Number of children: N/A    Years of education: N/A     Occupational History    Not on file. Social History Main Topics    Smoking status: Current Some Day Smoker    Smokeless tobacco: Not on file    Alcohol use Yes      Comment: occasional    Drug use: Yes     Special: Marijuana, Cocaine    Sexual activity: Not on file     Other Topics Concern    Not on file     Social History Narrative         ALLERGIES: Review of patient's allergies indicates no known allergies. Review of Systems   Constitutional: Negative for appetite change, chills, diaphoresis, fatigue, fever and unexpected weight change. HENT: Negative for congestion, dental problem, ear discharge, ear pain, hearing loss, nosebleeds, rhinorrhea, sinus pressure, sore throat, tinnitus, trouble swallowing and voice change. Eyes: Negative for photophobia, pain, discharge, redness and visual disturbance. Respiratory: Negative for cough, choking, chest tightness, shortness of breath, wheezing and stridor. Cardiovascular: Negative for chest pain, palpitations and leg swelling.    Gastrointestinal: Negative for abdominal distention, abdominal pain, anal bleeding, blood in stool, constipation, diarrhea, nausea and vomiting. Genitourinary: Negative for decreased urine volume, difficulty urinating, discharge, dysuria, flank pain, frequency, genital sores, hematuria, penile pain, penile swelling, scrotal swelling, testicular pain and urgency. Musculoskeletal: Positive for back pain. Negative for neck pain and neck stiffness. Neurological: Negative for tremors, seizures, syncope, speech difficulty, weakness, light-headedness and headaches. Hematological: Negative for adenopathy. Does not bruise/bleed easily. Psychiatric/Behavioral: Negative for agitation, behavioral problems, confusion, hallucinations, self-injury, sleep disturbance and suicidal ideas. The patient is not nervous/anxious and is not hyperactive. Vitals:    03/29/17 1628   BP: (!) 150/106   Pulse: 80   Resp: 18   Temp: 99.3 °F (37.4 °C)   SpO2: 98%            Physical Exam   Constitutional: He is oriented to person, place, and time. He appears well-developed and well-nourished. No distress. 45year old  male in no acute distress. Disheveled. HENT:   Head: Normocephalic and atraumatic. Right Ear: External ear normal.   Left Ear: External ear normal.   Nose: Nose normal.   Mouth/Throat: Oropharynx is clear and moist. No oropharyngeal exudate. Eyes: Conjunctivae and EOM are normal. Pupils are equal, round, and reactive to light. Right eye exhibits no discharge. Left eye exhibits no discharge. No scleral icterus. Neck: Normal range of motion. Neck supple. No JVD present. No tracheal deviation present. No thyromegaly present. Cardiovascular: Normal rate, regular rhythm, normal heart sounds and intact distal pulses. Exam reveals no gallop and no friction rub. No murmur heard. Pulmonary/Chest: Effort normal and breath sounds normal. No stridor. No respiratory distress. He has no wheezes. He has no rales.  He exhibits no tenderness. Abdominal: Soft. Bowel sounds are normal. He exhibits no distension and no mass. There is no tenderness. There is no rebound and no guarding. Musculoskeletal: Normal range of motion. He exhibits tenderness. He exhibits no edema or deformity. Mild lumbar bilateral para-spinal tenderness. No midline tenderness. Lymphadenopathy:     He has no cervical adenopathy. Neurological: He is alert and oriented to person, place, and time. No cranial nerve deficit. He exhibits normal muscle tone. Coordination normal.   Skin: Skin is warm and dry. No rash noted. He is not diaphoretic. No erythema. No pallor. Psychiatric: He has a normal mood and affect. His behavior is normal. Judgment and thought content normal.   Nursing note and vitals reviewed. MDM  Number of Diagnoses or Management Options  Chronic bilateral low back pain without sciatica:   Homelessness:   Hypertension, uncontrolled:   Diagnosis management comments: Patient with multiple visit to various ED in this community for different complaints. He was seen at [facility] for similar complaint today. Amount and/or Complexity of Data Reviewed  Decide to obtain previous medical records or to obtain history from someone other than the patient: yes  Review and summarize past medical records: yes    Risk of Complications, Morbidity, and/or Mortality  Presenting problems: low  Diagnostic procedures: minimal  Management options: low    Patient Progress  Patient progress: stable    ED Course       Procedures    -------------------------------------------------------------------------------------------------------------------  PROGRESS NOTE:  4:35 PM Pt reevaluated at this time and is resting comfortably in NAD. Discussed results and findings, as well as, diagnosis and plan for discharge. Pt verbalizes understanding and agreement with plan. All questions addressed at this time.         ORDERS:  No orders of the defined types were placed in this encounter. DISPOSITION:  Diagnosis:   1. Chronic bilateral low back pain without sciatica    2. Homelessness    3. Hypertension, uncontrolled          Disposition: discharged      Follow-up Information     Follow up With Details Comments [address] [facility] Schedule an appointment as soon as possible for a visit Return to ED, If symptoms worsen [address] [facility]  [phone]             Patient's Medications   Start Taking    No medications on file   Continue Taking    HYDROCHLOROTHIAZIDE (HYDRODIURIL) 25 MG TABLET    Take 1 Tab by mouth daily. LISINOPRIL (PRINIVIL, ZESTRIL) 20 MG TABLET    Take 1 Tab by mouth daily. These Medications have changed    No medications on file   Stop Taking    No medications on file         -------------------------------------------------------------------------------------------------------------------  Scribe Attestation:  I, Jenny Ceron, am scribing for and in the presence of Dedrick Vasquez DO. Signed by: Isrrael Nelson, 03/29/17, 4:29 PM      Provider Attestation:  I personally performed the services described in the documentation, reviewed the documentation, as recorded by the scribe in my presence, and it accurately and completely records my words and actions. Dr. Gina Brandt.  Boneta Handler PANTERAOMilka 4:29 PM

## 2017-04-13 ENCOUNTER — HOSPITAL ENCOUNTER (EMERGENCY)
Age: 52
Discharge: HOME | End: 2017-04-13
Attending: EMERGENCY MEDICINE
Payer: SELF-PAY

## 2017-04-13 VITALS
OXYGEN SATURATION: 97 % | WEIGHT: 156 LBS | BODY MASS INDEX: 25.18 KG/M2 | TEMPERATURE: 98.4 F | HEART RATE: 68 BPM | SYSTOLIC BLOOD PRESSURE: 156 MMHG | RESPIRATION RATE: 16 BRPM | DIASTOLIC BLOOD PRESSURE: 114 MMHG

## 2017-04-13 DIAGNOSIS — G89.29 CHRONIC BILATERAL LOW BACK PAIN WITHOUT SCIATICA: Primary | ICD-10-CM

## 2017-04-13 DIAGNOSIS — M54.50 CHRONIC BILATERAL LOW BACK PAIN WITHOUT SCIATICA: Primary | ICD-10-CM

## 2017-04-13 DIAGNOSIS — Z91.14 NONCOMPLIANCE WITH MEDICATION REGIMEN: ICD-10-CM

## 2017-04-13 DIAGNOSIS — I10 HYPERTENSION, UNCONTROLLED: ICD-10-CM

## 2017-04-13 PROCEDURE — 99282 EMERGENCY DEPT VISIT SF MDM: CPT

## 2017-04-14 NOTE — ED PROVIDER NOTES
HPI Comments: Sandra Epperson is a 45 y.o. Male with c/o continued bilateral low back pain, and also concerned about his bp. No cp, sob, nvd, fcs, diff urinating, walking, weakness. Seen for similar issues in past and remains noncompliant with medication regimen    The history is provided by the patient. Past Medical History:   Diagnosis Date    Back pain     Hypertension        Past Surgical History:   Procedure Laterality Date    HX OTHER SURGICAL      oral sx         History reviewed. No pertinent family history. Social History     Social History    Marital status: LEGALLY      Spouse name: N/A    Number of children: N/A    Years of education: N/A     Occupational History    Not on file. Social History Main Topics    Smoking status: Current Some Day Smoker    Smokeless tobacco: Not on file    Alcohol use Yes      Comment: occasional    Drug use: Yes     Special: Marijuana, Cocaine    Sexual activity: Not on file     Other Topics Concern    Not on file     Social History Narrative         ALLERGIES: Review of patient's allergies indicates no known allergies. Review of Systems   Constitutional: Negative for fever. HENT: Negative for trouble swallowing. Eyes: Negative for visual disturbance. Respiratory: Negative for shortness of breath. Cardiovascular: Negative for chest pain. Gastrointestinal: Negative for abdominal pain. Endocrine: Negative for polyuria. Genitourinary: Negative for difficulty urinating. Musculoskeletal: Negative for gait problem. Skin: Negative for rash. Neurological: Negative for syncope. Psychiatric/Behavioral: Positive for sleep disturbance. Vitals:    04/13/17 2216   BP: (!) 156/114   Pulse: 68   Resp: 16   Temp: 98.4 °F (36.9 °C)   SpO2: 97%   Weight: 70.8 kg (156 lb)            Physical Exam   Constitutional: He is oriented to person, place, and time. Non-toxic appearance. He does not appear ill. No distress.    HENT: Head: Normocephalic and atraumatic. Right Ear: External ear normal.   Left Ear: External ear normal.   Nose: Nose normal.   Mouth/Throat: Oropharynx is clear and moist. No oropharyngeal exudate. Eyes: Conjunctivae are normal.   Neck: Normal range of motion. Cardiovascular: Normal rate, regular rhythm, normal heart sounds and intact distal pulses. Pulmonary/Chest: Effort normal and breath sounds normal. No respiratory distress. Abdominal: Soft. There is no tenderness. Musculoskeletal: Normal range of motion. He exhibits no edema. Neurological: He is alert and oriented to person, place, and time. Skin: Skin is warm and dry. He is not diaphoretic. Psychiatric: His behavior is normal.   Nursing note and vitals reviewed. [facility]  ED Course       Procedures  Vitals:  Patient Vitals for the past 12 hrs:   Temp Pulse Resp BP SpO2   04/13/17 2216 98.4 °F (36.9 °C) 68 16 (!) 156/114 97 %         Medications ordered:   Medications - No data to display      Lab findings:  No results found for this or any previous visit (from the past 12 hour(s)). EKG interpretation by ED Physician:      X-Ray, CT or other radiology findings or impressions:  No orders to display       Progress notes, Consult notes or additional Procedure notes: All chronic issues. Doubt need for any labs other work up. D/w pt need for compliance with medication    Reevaluation of patient:   Stable for d/c     Disposition:  Diagnosis:   1. Chronic bilateral low back pain without sciatica    2. Hypertension, uncontrolled    3.  Noncompliance with medication regimen        Disposition: home      Follow-up Information     Follow up With Details Comments [address] [place] [address] ([address] [facility]) Schedule an appointment as soon as possible for a visit  [facility]  [phone]            Patient's Medications   Start Taking    No medications on file   Continue Taking HYDROCHLOROTHIAZIDE (HYDRODIURIL) 25 MG TABLET    Take 1 Tab by mouth daily. LISINOPRIL (PRINIVIL, ZESTRIL) 20 MG TABLET    Take 1 Tab by mouth daily.    These Medications have changed    No medications on file   Stop Taking    No medications on file no dryness/no itching/no change in size/color of mole/no rash

## 2017-04-15 NOTE — ED NOTES
I have reviewed discharge instructions with the patient. The patient verbalized understanding. Discharge instructions reviewed with patient and patient verbalized understanding of all discharge instructions. Patient signed paper discharge instructions due to electronic signature pad unavailable. (2) potential problem

## 2017-04-23 ENCOUNTER — HOSPITAL ENCOUNTER (EMERGENCY)
Age: 52
Discharge: HOME | End: 2017-04-23
Attending: EMERGENCY MEDICINE
Payer: SELF-PAY

## 2017-04-23 VITALS
RESPIRATION RATE: 16 BRPM | HEART RATE: 56 BPM | DIASTOLIC BLOOD PRESSURE: 100 MMHG | TEMPERATURE: 97.7 F | SYSTOLIC BLOOD PRESSURE: 165 MMHG | OXYGEN SATURATION: 98 %

## 2017-04-23 DIAGNOSIS — I10 ESSENTIAL HYPERTENSION: Primary | ICD-10-CM

## 2017-04-23 PROCEDURE — 74011250637 HC RX REV CODE- 250/637: Performed by: EMERGENCY MEDICINE

## 2017-04-23 PROCEDURE — 99284 EMERGENCY DEPT VISIT MOD MDM: CPT

## 2017-04-23 RX ORDER — LISINOPRIL 20 MG/1
20 TABLET ORAL
Status: COMPLETED | OUTPATIENT
Start: 2017-04-23 | End: 2017-04-23

## 2017-04-23 RX ORDER — LISINOPRIL 20 MG/1
20 TABLET ORAL DAILY
Qty: 30 TAB | Refills: 1 | Status: SHIPPED | OUTPATIENT
Start: 2017-04-23 | End: 2017-07-14

## 2017-04-23 RX ORDER — HYDROCHLOROTHIAZIDE 25 MG/1
25 TABLET ORAL DAILY
Qty: 30 TAB | Refills: 1 | Status: SHIPPED | OUTPATIENT
Start: 2017-04-23 | End: 2017-07-14

## 2017-04-23 RX ORDER — HYDROCHLOROTHIAZIDE 25 MG/1
25 TABLET ORAL
Status: COMPLETED | OUTPATIENT
Start: 2017-04-23 | End: 2017-04-23

## 2017-04-23 RX ADMIN — LISINOPRIL 20 MG: 20 TABLET ORAL at 19:21

## 2017-04-23 RX ADMIN — HYDROCHLOROTHIAZIDE 25 MG: 25 TABLET ORAL at 19:21

## 2017-04-23 NOTE — DISCHARGE INSTRUCTIONS

## 2017-04-23 NOTE — ED NOTES
I have reviewed discharge instructions with the patient. The patient verbalized understanding. Patient armband removed and shredded. Patient discharged ambulatory to [facility].

## 2017-04-23 NOTE — ED PROVIDER NOTES
HPI Comments: 7:15 PM Sandra Epperson is a 45 y.o. male with a hx of HTN who presents to the ED via EMS c/o lightheadedness X 2 days secondary to HTN complications. Pt states that he lost his BP medication (HCTZ) and he \"feels his BP elevating\". He was seen yesterday at [facility] and was discharged. He states that they were not helpful. He also c/o blurry vision. He denies fever, CP, SOB, difficulty urinating, cough, and abdominal pain. No other associated symptoms or modifying factors at this time. The history is provided by the patient. Past Medical History:   Diagnosis Date    Back pain     Hypertension        Past Surgical History:   Procedure Laterality Date    HX OTHER SURGICAL      oral sx         History reviewed. No pertinent family history. Social History     Social History    Marital status: LEGALLY      Spouse name: N/A    Number of children: N/A    Years of education: N/A     Occupational History    Not on file. Social History Main Topics    Smoking status: Current Some Day Smoker    Smokeless tobacco: Not on file    Alcohol use Yes      Comment: occasional    Drug use: Yes     Special: Marijuana, Cocaine    Sexual activity: Not on file     Other Topics Concern    Not on file     Social History Narrative         ALLERGIES: Review of patient's allergies indicates no known allergies. Review of Systems   Constitutional: Negative for fever. HENT: Negative for trouble swallowing. Eyes: Positive for visual disturbance (blurry). Respiratory: Negative for cough and shortness of breath. Cardiovascular: Negative for chest pain. Gastrointestinal: Negative for abdominal pain, diarrhea and vomiting. Genitourinary: Negative for difficulty urinating. Musculoskeletal: Negative for back pain and neck pain. Skin: Negative for wound. Neurological: Positive for light-headedness. Negative for syncope. Psychiatric/Behavioral: Negative for behavioral problems. All other systems reviewed and are negative. Vitals:    04/23/17 1913 04/23/17 1921   BP: (!) 185/122 (!) 181/110   Pulse: 73 (!) 56   Resp: 16    Temp: 97.7 °F (36.5 °C)    SpO2: 98%             Physical Exam   Constitutional: He is oriented to person, place, and time. He appears well-developed. No distress. Chronically ill-appearing, nad   HENT:   Head: Normocephalic and atraumatic. Eyes: EOM are normal. Pupils are equal, round, and reactive to light. Neck: Normal range of motion. Cardiovascular: Normal rate. Pulmonary/Chest: Effort normal and breath sounds normal. No respiratory distress. Abdominal: Soft. There is no tenderness. Musculoskeletal: Normal range of motion. Mechanically stable   Neurological: He is alert and oriented to person, place, and time. No cranial nerve deficit. Coordination normal. GCS eye subscore is 4. GCS verbal subscore is 5. GCS motor subscore is 6. No focal deficits noted   Psychiatric: His behavior is normal.   Nursing note and vitals reviewed. MDM  Number of Diagnoses or Management Options  Essential hypertension:   Diagnosis management comments: 44 yo AAM with PMHx HTN presents by EMS for HTN. Pt well known to me and to ED, many similar presentations for same. Pt BP is elevated, but pt has no signs or symptoms of hypertensive emergency. Pt also with psychiatric history, went to [facility] yesterday for psych evaluation, but has no psych complaints here today. Examination unremarkable. Will give home meds in ED, new rx for home. Dc home, symptom management, follow-up, return precautions.     ED Course       Procedures  Vitals:  Patient Vitals for the past 12 hrs:   Temp Pulse Resp BP SpO2   04/23/17 1921 - (!) 56 - (!) 181/110 -   04/23/17 1913 97.7 °F (36.5 °C) 73 16 (!) 185/122 98 %         Medications ordered:   Medications   lisinopril (PRINIVIL, ZESTRIL) tablet 20 mg (20 mg Oral Given 4/23/17 1921)   hydroCHLOROthiazide (HYDRODIURIL) tablet 25 mg (25 mg Oral Given 4/23/17 1921)         Lab findings:  No results found for this or any previous visit (from the past 12 hour(s)). Disposition:  Diagnosis:   1. Essential hypertension        Disposition: Discharged    Follow-up Information     Follow up With Details Comments [address] Schedule an appointment as soon as possible for a visit follow up [address] [facility] EMERGENCY DEPT Go to As needed, If symptoms worsen [address]  [phone]           Patient's Medications   Start Taking    No medications on file   Continue Taking    No medications on file   These Medications have changed    Modified Medication Previous Medication    HYDROCHLOROTHIAZIDE (HYDRODIURIL) 25 MG TABLET hydroCHLOROthiazide (HYDRODIURIL) 25 mg tablet       Take 1 Tab by mouth daily. Take 1 Tab by mouth daily. LISINOPRIL (PRINIVIL, ZESTRIL) 20 MG TABLET lisinopril (PRINIVIL, ZESTRIL) 20 mg tablet       Take 1 Tab by mouth daily. Take 1 Tab by mouth daily. Stop Taking    No medications on file     SCRIBE ATTESTATION STATEMENT  Documented by: Ishmael San for, and in the presence of, Antonette Lopez MD 7:19 PM    Signed by: Isrrael Vargas, 04/23/17 7:19 PM    PROVIDER ATTESTATION STATEMENT  I personally performed the services described in the documentation, reviewed the documentation, as recorded by the scribe in my presence, and it accurately and completely records my words and actions.   Antonette Lopez MD

## 2017-05-07 ENCOUNTER — HOSPITAL ENCOUNTER (EMERGENCY)
Age: 52
Discharge: HOME | End: 2017-05-07
Attending: EMERGENCY MEDICINE
Payer: SELF-PAY

## 2017-05-07 VITALS
HEART RATE: 69 BPM | OXYGEN SATURATION: 98 % | DIASTOLIC BLOOD PRESSURE: 120 MMHG | TEMPERATURE: 98.3 F | SYSTOLIC BLOOD PRESSURE: 164 MMHG | RESPIRATION RATE: 18 BRPM

## 2017-05-07 VITALS
BODY MASS INDEX: 25.02 KG/M2 | HEART RATE: 76 BPM | SYSTOLIC BLOOD PRESSURE: 170 MMHG | WEIGHT: 155 LBS | RESPIRATION RATE: 18 BRPM | DIASTOLIC BLOOD PRESSURE: 98 MMHG | OXYGEN SATURATION: 95 % | TEMPERATURE: 97.8 F

## 2017-05-07 DIAGNOSIS — Z91.14 NONCOMPLIANCE WITH MEDICATION REGIMEN: ICD-10-CM

## 2017-05-07 DIAGNOSIS — I10 HYPERTENSION, UNCONTROLLED: Primary | ICD-10-CM

## 2017-05-07 DIAGNOSIS — F22 DELUSION (HCC): Primary | ICD-10-CM

## 2017-05-07 LAB
AMPHET UR QL SCN: NEGATIVE
ANION GAP BLD CALC-SCNC: 8 MMOL/L (ref 3–18)
APAP SERPL-MCNC: <2 UG/ML (ref 10–30)
BARBITURATES UR QL SCN: NEGATIVE
BASOPHILS # BLD AUTO: 0 K/UL (ref 0–0.06)
BASOPHILS # BLD: 0 % (ref 0–2)
BENZODIAZ UR QL: NEGATIVE
BUN SERPL-MCNC: 19 MG/DL (ref 7–18)
BUN/CREAT SERPL: 18 (ref 12–20)
CALCIUM SERPL-MCNC: 8.6 MG/DL (ref 8.5–10.1)
CANNABINOIDS UR QL SCN: NEGATIVE
CHLORIDE SERPL-SCNC: 104 MMOL/L (ref 100–108)
CO2 SERPL-SCNC: 30 MMOL/L (ref 21–32)
COCAINE UR QL SCN: NEGATIVE
CREAT SERPL-MCNC: 1.07 MG/DL (ref 0.6–1.3)
DIFFERENTIAL METHOD BLD: ABNORMAL
EOSINOPHIL # BLD: 0.1 K/UL (ref 0–0.4)
EOSINOPHIL NFR BLD: 1 % (ref 0–5)
ERYTHROCYTE [DISTWIDTH] IN BLOOD BY AUTOMATED COUNT: 16 % (ref 11.6–14.5)
ETHANOL SERPL-MCNC: <3 MG/DL (ref 0–3)
GLUCOSE SERPL-MCNC: 150 MG/DL (ref 74–99)
HCT VFR BLD AUTO: 40.6 % (ref 36–48)
HDSCOM,HDSCOM: NORMAL
HGB BLD-MCNC: 13.2 G/DL (ref 13–16)
LYMPHOCYTES # BLD AUTO: 41 % (ref 21–52)
LYMPHOCYTES # BLD: 1.8 K/UL (ref 0.9–3.6)
MCH RBC QN AUTO: 25.7 PG (ref 24–34)
MCHC RBC AUTO-ENTMCNC: 32.5 G/DL (ref 31–37)
MCV RBC AUTO: 79 FL (ref 74–97)
METHADONE UR QL: NEGATIVE
MONOCYTES # BLD: 0.2 K/UL (ref 0.05–1.2)
MONOCYTES NFR BLD AUTO: 3 % (ref 3–10)
NEUTS SEG # BLD: 2.4 K/UL (ref 1.8–8)
NEUTS SEG NFR BLD AUTO: 55 % (ref 40–73)
OPIATES UR QL: NEGATIVE
PCP UR QL: NEGATIVE
PLATELET # BLD AUTO: 236 K/UL (ref 135–420)
PMV BLD AUTO: 9.8 FL (ref 9.2–11.8)
POTASSIUM SERPL-SCNC: 3.2 MMOL/L (ref 3.5–5.5)
RBC # BLD AUTO: 5.14 M/UL (ref 4.7–5.5)
SALICYLATES SERPL-MCNC: <2.8 MG/DL (ref 2.8–20)
SODIUM SERPL-SCNC: 142 MMOL/L (ref 136–145)
WBC # BLD AUTO: 4.5 K/UL (ref 4.6–13.2)

## 2017-05-07 PROCEDURE — 99284 EMERGENCY DEPT VISIT MOD MDM: CPT

## 2017-05-07 PROCEDURE — 85025 COMPLETE CBC W/AUTO DIFF WBC: CPT | Performed by: EMERGENCY MEDICINE

## 2017-05-07 PROCEDURE — 80307 DRUG TEST PRSMV CHEM ANLYZR: CPT | Performed by: EMERGENCY MEDICINE

## 2017-05-07 PROCEDURE — 80048 BASIC METABOLIC PNL TOTAL CA: CPT | Performed by: EMERGENCY MEDICINE

## 2017-05-07 PROCEDURE — 99282 EMERGENCY DEPT VISIT SF MDM: CPT

## 2017-05-07 NOTE — ED PROVIDER NOTES
HPI Comments: 4:56 AM Mary Jo Betancur is a 45 y.o. male with a hx of back pain and HTN who presents to the ED c/o SI. Pt states \"I have been experiencing delusions of grander, psychosomatic  symptoms, and has had thoughts of jumping off the [place] bridge\". He also c/o HI and high blood pressure complications. No other associated symptoms or modifying factors at this time. The history is provided by the patient. Past Medical History:   Diagnosis Date    Back pain     Hypertension     Psychiatric disorder     hallucinations       Past Surgical History:   Procedure Laterality Date    HX OTHER SURGICAL      oral sx         History reviewed. No pertinent family history. Social History     Social History    Marital status:      Spouse name: N/A    Number of children: N/A    Years of education: N/A     Occupational History    Not on file. Social History Main Topics    Smoking status: Current Some Day Smoker    Smokeless tobacco: Not on file    Alcohol use Yes      Comment: beer    Drug use: Yes     Special: Marijuana, Cocaine    Sexual activity: Not on file     Other Topics Concern    Not on file     Social History Narrative         ALLERGIES: Review of patient's allergies indicates no known allergies. Review of Systems   Constitutional: Negative. HENT: Negative. Eyes: Negative. Respiratory: Negative. Cardiovascular: Negative. Gastrointestinal: Negative. Endocrine: Negative. Genitourinary: Negative. Musculoskeletal: Negative. Skin: Negative. Allergic/Immunologic: Negative. Neurological: Negative. Hematological: Negative. Psychiatric/Behavioral: Positive for suicidal ideas. All other systems reviewed and are negative.       Vitals:    05/07/17 0439 05/07/17 0831   BP: (!) 150/114 (!) 170/98   Pulse: 76 76   Resp: 16 18   Temp: 97.8 °F (36.6 °C)    SpO2: 95%    Weight: 70.3 kg (155 lb)             Physical Exam   Constitutional: He is oriented to person, place, and time. He appears well-developed and well-nourished. HENT:   Head: Normocephalic and atraumatic. Nose: Nose normal.   Mouth/Throat: Oropharynx is clear and moist.   Eyes: Conjunctivae and EOM are normal. Pupils are equal, round, and reactive to light. Neck: Normal range of motion. Neck supple. Cardiovascular: Normal rate, regular rhythm, normal heart sounds and intact distal pulses. Pulses:       Radial pulses are 2+ on the left side. Pulmonary/Chest: Effort normal and breath sounds normal.   Lungs clear to auscultation X 4 fields    Abdominal: Soft. Bowel sounds are normal.   Neurological: He is alert and oriented to person, place, and time. Skin: Skin is warm and dry. Psychiatric: He expresses homicidal and suicidal ideation. Nursing note and vitals reviewed. [facility]  ED Course       Procedures  Vitals:  No data found. Pulsox interpreted within normal limits. Medications ordered:   Medications - No data to display      Lab findings:  No results found for this or any previous visit (from the past 12 hour(s)). Progress notes, Consult notes or additional Procedure notes:   I informed the patient about their BP and instructed them to follow up with their PCP regarding their elevated blood pressure. 5:16 AM Call out to telepsych made. 6:56 AM Consulted with Dr. Hakeem Guidry, ED attending concerning patient Rogelio Maddox. Standard discussion of reason for visit, HPI, ROS, PE, and current results available. Report was given at this time. He will assume care at this time. Pending psych stabilization. Disposition:  Diagnosis:   1.  Delusion [facility])        Disposition: Pending    Follow-up Information     Follow up With Details Comments [address] [facility] [address]  [phone]           Discharge Medication List as of 5/7/2017  7:30 AM      CONTINUE these medications which have NOT CHANGED    Details lisinopril (PRINIVIL, ZESTRIL) 20 mg tablet Take 1 Tab by mouth daily. , Print, Disp-30 Tab, R-1      hydroCHLOROthiazide (HYDRODIURIL) 25 mg tablet Take 1 Tab by mouth daily. , Print, Disp-30 Tab, R-1             SCRIBE ATTESTATION STATEMENT  Documented by: Ashok Bedolla scribing for, and in the presence of, Josy Bang MD 4:57 AM    Signed by: Isrrael Duran, 05/07/17 4:57 AM    PROVIDER ATTESTATION STATEMENT  I personally performed the services described in the documentation, reviewed the documentation, as recorded by the scribe in my presence, and it accurately and completely records my words and actions.   Josy Bang MD

## 2017-05-07 NOTE — ED TRIAGE NOTES
\"I was walking here and I almost jumped off the [place] bridge then I saw the ground and I stopped. I need some mental help. \"  Denies SI and HI at this time. Reports auditory hallucinations.

## 2017-05-07 NOTE — CONSULTS
PSYCHIATRIC  CONSULTATION    HPI: The patient is a 70-year-old -American male with no psychiatric history. He presents to the hospital complaining of \"delusions of grandeur\" and thought of jumping off a bridge. According to the hospital staff, this patient presents to the hospital numerous times per week with various medical complaints. He is treated in the ER and then released home. Psychiatry was consulted. When interviewed by psychiatry, the patient stated that he came to the hospital initially because of problems with hypertension. The patient was reminded that this doctor was a psychiatrist and was referred to the patient due to psychiatric symptoms. The patient paused briefly before, complementing the hospital staff and the treatment that he received. \"You know, I received such good care at this hospital that I feel better. \" The patient denied any thoughts of hurting himself or anyone else. He denied auditory, visual, tactile hallucinations. No delusions were elicited. Patient was unable to articulate why he was stressed or upset earlier. Past Psychiatric History: No prior psychiatric admissions. Current outpatient treatment-none. No prior suicide attempts. Family Psychiatric History:  father-he was a mental patient  Medical History: HTN  Current Meds: high blood pressure medication, Im not sure which one. 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   Mental Status Examination: Cooperative, good eye contact, speech within normal limits no psychomotor retardation, restricted affect, ok mood, linear thought processes,  no  hallucinations, no delusions, no SI, no HI, AAO x 3  Diagnosis:  Adjustment Disorder with depressed mood  Assessment/Plan: The patient has a history of the numerous presentations to the emergency room for various complaints. Tonight, he reported \"delusions of grandeur\" and suicidal thoughts. When interviewed by psychiatry, the patient stated that he felt better and had no symptoms. Outpatient care recommended. Georgina Ruiz M.D.   [facility]

## 2017-05-07 NOTE — ED NOTES
7:26 AM Dr. Dharmesh Lopes discussed care with Dr. Isabella Young [facility]). Standard discussion; including history of patients chief complaint, available diagnostic results, and treatment course. Pt is denying any symptoms, states he feels much better. He is well known to this department. 7:27 AM Dr. Dharmesh Lopes spoke with the patient who states he feels better. He will return should any symptoms recur. Will discharge home. SCRIBE ATTESTATION STATEMENT  Documented by: Cyril lora for, and in the presence Augustine Clayton MD      Signed by: Isrrael Becerra, 05/07/17, 7:28 AM    PROVIDER ATTESTATION STATEMENT  I personally performed the services described in the documentation, reviewed the documentation, as recorded by the scribe in my presence, and it accurately and completely records my words and actions.   Nay Fine MD

## 2017-05-08 NOTE — ED NOTES
Pt states he has not gotten his medications scripts filled yet. States it's because he's been \"feeling this way. \"

## 2017-05-08 NOTE — ED PROVIDER NOTES
HPI Comments: Gordon Rosario is a 45 y.o. Male here for check of his bp. Feels like it is high. No other complaints. Just seen this morning by telepsych and rec for outpt treatment. Also at [facility] for same. Has been given mult rx in past but has not filled    The history is provided by the patient and medical records. Past Medical History:   Diagnosis Date    Back pain     Hypertension        Past Surgical History:   Procedure Laterality Date    HX OTHER SURGICAL      oral sx         No family history on file. Social History     Social History    Marital status:      Spouse name: N/A    Number of children: N/A    Years of education: N/A     Occupational History    Not on file. Social History Main Topics    Smoking status: Current Some Day Smoker    Smokeless tobacco: Not on file    Alcohol use Yes      Comment: occasional    Drug use: Yes     Special: Marijuana, Cocaine    Sexual activity: Not on file     Other Topics Concern    Not on file     Social History Narrative         ALLERGIES: Review of patient's allergies indicates no known allergies. Review of Systems   Constitutional: Negative for fever. HENT: Negative for trouble swallowing. Respiratory: Negative for cough. Cardiovascular: Negative for chest pain. Gastrointestinal: Negative for abdominal pain. Genitourinary: Negative for difficulty urinating. Musculoskeletal: Negative for gait problem. Skin: Negative for rash. Neurological: Negative for syncope. Psychiatric/Behavioral: Negative for suicidal ideas. There were no vitals filed for this visit. Physical Exam   Constitutional: He is oriented to person, place, and time. Non-toxic appearance. He does not appear ill. No distress. HENT:   Head: Normocephalic and atraumatic. Right Ear: External ear normal.   Left Ear: External ear normal.   Nose: Nose normal.   Mouth/Throat: Oropharynx is clear and moist. No oropharyngeal exudate. Eyes: Conjunctivae are normal.   Neck: Normal range of motion. Cardiovascular: Normal rate, regular rhythm, normal heart sounds and intact distal pulses. Pulmonary/Chest: Effort normal and breath sounds normal. No respiratory distress. Abdominal: Soft. There is no tenderness. Musculoskeletal: Normal range of motion. He exhibits no edema. Neurological: He is alert and oriented to person, place, and time. Skin: Skin is warm and dry. He is not diaphoretic. Psychiatric: He has a normal mood and affect. His speech is normal and behavior is normal. He expresses no homicidal and no suicidal ideation. Nursing note and vitals reviewed. [facility]  ED Course       Procedures  Vitals:  Patient Vitals for the past 12 hrs:   Temp Pulse Resp BP SpO2   05/07/17 2128 98.3 °F (36.8 °C) 69 18 (!) 164/120 98 %         Medications ordered:   Medications - No data to display      Lab findings:  No results found for this or any previous visit (from the past 12 hour(s)). EKG interpretation by ED Physician:    X-Ray, CT or other radiology findings or impressions:  No orders to display       Progress notes, Consult notes or additional Procedure notes:   D/w pt need for compliance with meds. No need for new work up    Reevaluation of patient:   Stable for d/ c    Disposition:  Diagnosis:   1. Hypertension, uncontrolled    2. Noncompliance with medication regimen        Disposition: home      Follow-up Information     Follow up With Details Comments [address] [place] [address] ([facility]) Schedule an appointment as soon as possible for a visit  [facility]  [phone]            Patient's Medications   Start Taking    No medications on file   Continue Taking    HYDROCHLOROTHIAZIDE (HYDRODIURIL) 25 MG TABLET    Take 1 Tab by mouth daily. LISINOPRIL (PRINIVIL, ZESTRIL) 20 MG TABLET    Take 1 Tab by mouth daily.    These Medications have changed    No medications on file   Stop Taking    No medications on file

## 2017-05-22 ENCOUNTER — HOSPITAL ENCOUNTER (EMERGENCY)
Age: 52
Discharge: HOME | End: 2017-05-23
Attending: EMERGENCY MEDICINE
Payer: SELF-PAY

## 2017-05-22 VITALS
SYSTOLIC BLOOD PRESSURE: 148 MMHG | DIASTOLIC BLOOD PRESSURE: 98 MMHG | OXYGEN SATURATION: 95 % | RESPIRATION RATE: 20 BRPM | TEMPERATURE: 98.7 F | HEART RATE: 71 BPM

## 2017-05-22 DIAGNOSIS — R44.3 HALLUCINATIONS: Primary | ICD-10-CM

## 2017-05-22 DIAGNOSIS — I15.9 SECONDARY HYPERTENSION: ICD-10-CM

## 2017-05-22 LAB
AMPHET UR QL SCN: NEGATIVE
ANION GAP BLD CALC-SCNC: 10 MMOL/L (ref 3–18)
BARBITURATES UR QL SCN: NEGATIVE
BASOPHILS # BLD AUTO: 0 K/UL (ref 0–0.06)
BASOPHILS # BLD: 0 % (ref 0–2)
BENZODIAZ UR QL: NEGATIVE
BUN SERPL-MCNC: 13 MG/DL (ref 7–18)
BUN/CREAT SERPL: 13 (ref 12–20)
CALCIUM SERPL-MCNC: 9.3 MG/DL (ref 8.5–10.1)
CANNABINOIDS UR QL SCN: NEGATIVE
CHLORIDE SERPL-SCNC: 107 MMOL/L (ref 100–108)
CO2 SERPL-SCNC: 27 MMOL/L (ref 21–32)
COCAINE UR QL SCN: NEGATIVE
CREAT SERPL-MCNC: 1.04 MG/DL (ref 0.6–1.3)
DIFFERENTIAL METHOD BLD: ABNORMAL
EOSINOPHIL # BLD: 0.1 K/UL (ref 0–0.4)
EOSINOPHIL NFR BLD: 1 % (ref 0–5)
ERYTHROCYTE [DISTWIDTH] IN BLOOD BY AUTOMATED COUNT: 16.6 % (ref 11.6–14.5)
ETHANOL SERPL-MCNC: <3 MG/DL (ref 0–3)
GLUCOSE SERPL-MCNC: 94 MG/DL (ref 74–99)
HCT VFR BLD AUTO: 39 % (ref 36–48)
HDSCOM,HDSCOM: NORMAL
HGB BLD-MCNC: 12.7 G/DL (ref 13–16)
LYMPHOCYTES # BLD AUTO: 48 % (ref 21–52)
LYMPHOCYTES # BLD: 2.2 K/UL (ref 0.9–3.6)
MCH RBC QN AUTO: 25.4 PG (ref 24–34)
MCHC RBC AUTO-ENTMCNC: 32.6 G/DL (ref 31–37)
MCV RBC AUTO: 78 FL (ref 74–97)
METHADONE UR QL: NEGATIVE
MONOCYTES # BLD: 0.3 K/UL (ref 0.05–1.2)
MONOCYTES NFR BLD AUTO: 7 % (ref 3–10)
NEUTS SEG # BLD: 2 K/UL (ref 1.8–8)
NEUTS SEG NFR BLD AUTO: 44 % (ref 40–73)
OPIATES UR QL: NEGATIVE
PCP UR QL: NEGATIVE
PLATELET # BLD AUTO: 210 K/UL (ref 135–420)
PMV BLD AUTO: 9.6 FL (ref 9.2–11.8)
POTASSIUM SERPL-SCNC: 3.9 MMOL/L (ref 3.5–5.5)
RBC # BLD AUTO: 5 M/UL (ref 4.7–5.5)
SODIUM SERPL-SCNC: 144 MMOL/L (ref 136–145)
WBC # BLD AUTO: 4.6 K/UL (ref 4.6–13.2)

## 2017-05-22 PROCEDURE — 80307 DRUG TEST PRSMV CHEM ANLYZR: CPT | Performed by: EMERGENCY MEDICINE

## 2017-05-22 PROCEDURE — 99284 EMERGENCY DEPT VISIT MOD MDM: CPT

## 2017-05-22 PROCEDURE — 85025 COMPLETE CBC W/AUTO DIFF WBC: CPT | Performed by: EMERGENCY MEDICINE

## 2017-05-22 PROCEDURE — 80048 BASIC METABOLIC PNL TOTAL CA: CPT | Performed by: EMERGENCY MEDICINE

## 2017-05-23 ENCOUNTER — HOSPITAL ENCOUNTER (EMERGENCY)
Age: 52
Discharge: HOME | End: 2017-05-24
Attending: EMERGENCY MEDICINE
Payer: SELF-PAY

## 2017-05-23 DIAGNOSIS — I15.9 SECONDARY HYPERTENSION: Primary | ICD-10-CM

## 2017-05-23 PROCEDURE — 99283 EMERGENCY DEPT VISIT LOW MDM: CPT

## 2017-05-23 NOTE — ED NOTES
Auditory hallucinations to jump off bridge. I performed a brief evaluation, including history and physical, of the patient here in triage and I have determined that pt will need further treatment and evaluation from the main side ER physician. I have placed initial orders to help in expediting patients care.      May 22, 2017 at 8:38 PM - Dale Montgomery MD

## 2017-05-23 NOTE — ED PROVIDER NOTES
HPI Comments: Ilda Bal is a 45 y.o. [facility] with initial complaint of hearing voices. Denies to me any desire to self harm at this time. Denies voices at this time. The history is provided by the patient and medical records. Past Medical History:   Diagnosis Date    Back pain     Hypertension     Psychiatric disorder     hallucinations       Past Surgical History:   Procedure Laterality Date    HX OTHER SURGICAL      oral sx         No family history on file. Social History     Social History    Marital status:      Spouse name: N/A    Number of children: N/A    Years of education: N/A     Occupational History    Not on file. Social History Main Topics    Smoking status: Current Some Day Smoker    Smokeless tobacco: Not on file    Alcohol use Yes      Comment: beer    Drug use: Yes     Special: Marijuana, Cocaine    Sexual activity: Not on file     Other Topics Concern    Not on file     Social History Narrative         ALLERGIES: Review of patient's allergies indicates no known allergies. Review of Systems   Constitutional: Negative for fever. HENT: Negative for sore throat. Eyes: Negative for visual disturbance. Respiratory: Negative for shortness of breath. Cardiovascular: Negative for chest pain. Gastrointestinal: Negative for abdominal pain. Endocrine: Negative for polyuria. Genitourinary: Negative for difficulty urinating. Musculoskeletal: Negative for gait problem. Skin: Negative for rash. Allergic/Immunologic: Negative for immunocompromised state. Neurological: Negative for facial asymmetry. Psychiatric/Behavioral: Negative for sleep disturbance and suicidal ideas. The patient is nervous/anxious.         Vitals:    05/22/17 2040   BP: (!) 147/101   Pulse: 92   Resp: 20   Temp: 98.8 °F (37.1 °C)   SpO2: 97%            Physical Exam   Constitutional: He is oriented to person, place, and time. Non-toxic appearance. He does not appear ill. No distress. HENT:   Head: Normocephalic and atraumatic. Right Ear: External ear normal.   Left Ear: External ear normal.   Nose: Nose normal.   Mouth/Throat: Oropharynx is clear and moist. No oropharyngeal exudate. Eyes: Conjunctivae are normal.   Neck: Normal range of motion. Cardiovascular: Normal rate, regular rhythm, normal heart sounds and intact distal pulses. Pulmonary/Chest: Effort normal and breath sounds normal. No respiratory distress. Abdominal: Soft. There is no tenderness. Musculoskeletal: Normal range of motion. He exhibits no edema. Neurological: He is alert and oriented to person, place, and time. Skin: Skin is warm and dry. He is not diaphoretic. Psychiatric: His behavior is normal. Thought content is not paranoid. He expresses no homicidal and no suicidal ideation. Nursing note and vitals reviewed.        [facility]  ED Course       Procedures    Vitals:  Patient Vitals for the past 12 hrs:   Temp Pulse Resp BP SpO2   05/22/17 2250 98.7 °F (37.1 °C) 71 20 (!) 148/98 95 %   05/22/17 2040 98.8 °F (37.1 °C) 92 20 (!) 147/101 97 %         Medications ordered:   Medications - No data to display      Lab findings:  Recent Results (from the past 12 hour(s))   DRUG SCREEN, URINE    Collection Time: 05/22/17  8:15 PM   Result Value Ref Range    BENZODIAZEPINE NEGATIVE  NEG      BARBITURATES NEGATIVE  NEG      THC (TH-CANNABINOL) NEGATIVE  NEG      OPIATES NEGATIVE  NEG      PCP(PHENCYCLIDINE) NEGATIVE  NEG      COCAINE NEGATIVE  NEG      AMPHETAMINE NEGATIVE  NEG      METHADONE NEGATIVE       HDSCOM (NOTE)    CBC WITH AUTOMATED DIFF    Collection Time: 05/22/17  9:45 PM   Result Value Ref Range    WBC 4.6 4.6 - 13.2 K/uL    RBC 5.00 4.70 - 5.50 M/uL    HGB 12.7 (L) 13.0 - 16.0 g/dL    HCT 39.0 36.0 - 48.0 %    MCV 78.0 74.0 - 97.0 FL    MCH 25.4 24.0 - 34.0 PG    MCHC 32.6 31.0 - 37.0 g/dL    RDW 16.6 (H) 11.6 - 14.5 % PLATELET 299 080 - 753 K/uL    MPV 9.6 9.2 - 11.8 FL    NEUTROPHILS 44 40 - 73 %    LYMPHOCYTES 48 21 - 52 %    MONOCYTES 7 3 - 10 %    EOSINOPHILS 1 0 - 5 %    BASOPHILS 0 0 - 2 %    ABS. NEUTROPHILS 2.0 1.8 - 8.0 K/UL    ABS. LYMPHOCYTES 2.2 0.9 - 3.6 K/UL    ABS. MONOCYTES 0.3 0.05 - 1.2 K/UL    ABS. EOSINOPHILS 0.1 0.0 - 0.4 K/UL    ABS. BASOPHILS 0.0 0.0 - 0.06 K/UL    DF AUTOMATED     METABOLIC PANEL, BASIC    Collection Time: 05/22/17  9:45 PM   Result Value Ref Range    Sodium 144 136 - 145 mmol/L    Potassium 3.9 3.5 - 5.5 mmol/L    Chloride 107 100 - 108 mmol/L    CO2 27 21 - 32 mmol/L    Anion gap 10 3.0 - 18 mmol/L    Glucose 94 74 - 99 mg/dL    BUN 13 7.0 - 18 MG/DL    Creatinine 1.04 0.6 - 1.3 MG/DL    BUN/Creatinine ratio 13 12 - 20      GFR est AA >60 >60 ml/min/1.73m2    GFR est non-AA >60 >60 ml/min/1.73m2    Calcium 9.3 8.5 - 10.1 MG/DL   ETHYL ALCOHOL    Collection Time: 05/22/17  9:45 PM   Result Value Ref Range    ALCOHOL(ETHYL),SERUM <3 0 - 3 MG/DL       EKG interpretation by ED Physician:      X-Ray, CT or other radiology findings or impressions:  No orders to display       Progress notes, Consult notes or additional Procedure notes:   Doubt need for emergent telepsych eval. These are chronic issues which he has had before in the past      Reevaluation of patient:   Stable for d/ c    Disposition:  Diagnosis:   1. Hallucinations    2. Secondary hypertension        Disposition: home      Follow-up Information     Follow up With Details Comments Contact [facility] Schedule an appointment as soon as possible for a visit  [facility]  [phone]            Patient's Medications   Start Taking    No medications on file   Continue Taking    HYDROCHLOROTHIAZIDE (HYDRODIURIL) 25 MG TABLET    Take 1 Tab by mouth daily. LISINOPRIL (PRINIVIL, ZESTRIL) 20 MG TABLET    Take 1 Tab by mouth daily.    These Medications have changed    No medications on file   Stop Taking    No medications on file

## 2017-05-24 VITALS
WEIGHT: 155 LBS | TEMPERATURE: 97.4 F | HEART RATE: 60 BPM | BODY MASS INDEX: 24.33 KG/M2 | RESPIRATION RATE: 15 BRPM | DIASTOLIC BLOOD PRESSURE: 110 MMHG | OXYGEN SATURATION: 96 % | HEIGHT: 67 IN | SYSTOLIC BLOOD PRESSURE: 154 MMHG

## 2017-05-24 NOTE — ED PROVIDER NOTES
HPI Comments: Robina Palma is a 45 y.o. Male here for bp check. Felt lightheaded. Seen mult times for same    The history is provided by the patient. Past Medical History:   Diagnosis Date    Back pain     Hypertension     Psychiatric disorder     hallucinations       Past Surgical History:   Procedure Laterality Date    HX OTHER SURGICAL      oral sx         History reviewed. No pertinent family history. Social History     Social History    Marital status:      Spouse name: N/A    Number of children: N/A    Years of education: N/A     Occupational History    Not on file. Social History Main Topics    Smoking status: Current Some Day Smoker    Smokeless tobacco: Not on file    Alcohol use Yes      Comment: beer    Drug use: Yes     Special: Marijuana, Cocaine    Sexual activity: Not on file     Other Topics Concern    Not on file     Social History Narrative         ALLERGIES: Review of patient's allergies indicates no known allergies. Review of Systems   Constitutional: Negative for fever. HENT: Negative for trouble swallowing. Cardiovascular: Negative for chest pain. Gastrointestinal: Negative for abdominal pain. Genitourinary: Negative for difficulty urinating. Musculoskeletal: Negative for gait problem. Neurological: Negative for syncope. Psychiatric/Behavioral: Positive for sleep disturbance. Vitals:    05/23/17 2308   BP: (!) 154/121   Pulse: 80   Resp: 18   Temp: 97.4 °F (36.3 °C)   SpO2: 98%   Weight: 70.3 kg (155 lb)   Height: 5' 7\" (1.702 m)            Physical Exam   Constitutional: He is oriented to person, place, and time. Non-toxic appearance. He does not appear ill. No distress. HENT:   Head: Normocephalic and atraumatic. Right Ear: External ear normal.   Left Ear: External ear normal.   Nose: Nose normal.   Mouth/Throat: Oropharynx is clear and moist. No oropharyngeal exudate.    Eyes: Conjunctivae are normal.   Neck: Normal range of motion. Cardiovascular: Normal rate, regular rhythm, normal heart sounds and intact distal pulses. Pulmonary/Chest: Effort normal and breath sounds normal. No respiratory distress. Abdominal: Soft. There is no tenderness. Musculoskeletal: Normal range of motion. He exhibits no edema. Neurological: He is alert and oriented to person, place, and time. Skin: Skin is warm and dry. He is not diaphoretic. Psychiatric: His behavior is normal.   Nursing note and vitals reviewed. [facility]  ED Course       Procedures    Vitals:  Patient Vitals for the past 12 hrs:   Temp Pulse Resp BP SpO2   05/23/17 2308 97.4 °F (36.3 °C) 80 18 (!) 154/121 98 %         Medications ordered:   Medications - No data to display      Lab findings:  No results found for this or any previous visit (from the past 12 hour(s)). EKG interpretation by ED Physician:      X-Ray, CT or other radiology findings or impressions:  No orders to display       Progress notes, Consult notes or additional Procedure notes:   Doubt need for any further evaluation. Pt with cont noncompliance. No emc    Reevaluation of patient:   Stable for d/c    Disposition:  Diagnosis:   1. Secondary hypertension        Disposition: home      Follow-up Information     Follow up With Details Comments [address] [place] [address] ([facility]) Schedule an appointment as soon as possible for a visit  [facility]  [phone]            Patient's Medications   Start Taking    No medications on file   Continue Taking    HYDROCHLOROTHIAZIDE (HYDRODIURIL) 25 MG TABLET    Take 1 Tab by mouth daily. LISINOPRIL (PRINIVIL, ZESTRIL) 20 MG TABLET    Take 1 Tab by mouth daily.    These Medications have changed    No medications on file   Stop Taking    No medications on file

## 2017-05-24 NOTE — ED NOTES
Pt arrives alert and oriented c/o \"I just need to take my blood pressure medicine. \" Pt denies any complaints to the RN. No distress noted.

## 2017-07-14 ENCOUNTER — HOSPITAL ENCOUNTER (EMERGENCY)
Age: 52
Discharge: HOME | End: 2017-07-14
Attending: EMERGENCY MEDICINE | Admitting: EMERGENCY MEDICINE
Payer: SELF-PAY

## 2017-07-14 VITALS
HEART RATE: 71 BPM | RESPIRATION RATE: 16 BRPM | BODY MASS INDEX: 24.28 KG/M2 | WEIGHT: 155 LBS | OXYGEN SATURATION: 98 % | DIASTOLIC BLOOD PRESSURE: 109 MMHG | TEMPERATURE: 98.4 F | SYSTOLIC BLOOD PRESSURE: 156 MMHG

## 2017-07-14 DIAGNOSIS — I10 ACCELERATED HYPERTENSION: Primary | ICD-10-CM

## 2017-07-14 PROCEDURE — 99284 EMERGENCY DEPT VISIT MOD MDM: CPT

## 2017-07-14 PROCEDURE — 93005 ELECTROCARDIOGRAM TRACING: CPT

## 2017-07-14 PROCEDURE — 74011250637 HC RX REV CODE- 250/637: Performed by: EMERGENCY MEDICINE

## 2017-07-14 RX ORDER — LISINOPRIL 20 MG/1
20 TABLET ORAL
Status: COMPLETED | OUTPATIENT
Start: 2017-07-14 | End: 2017-07-14

## 2017-07-14 RX ORDER — LISINOPRIL 10 MG/1
20 TABLET ORAL DAILY
Qty: 20 TAB | Refills: 0 | Status: SHIPPED | OUTPATIENT
Start: 2017-07-14 | End: 2017-07-24

## 2017-07-14 RX ORDER — HYDROCHLOROTHIAZIDE 25 MG/1
25 TABLET ORAL
Status: COMPLETED | OUTPATIENT
Start: 2017-07-14 | End: 2017-07-14

## 2017-07-14 RX ORDER — HYDROCHLOROTHIAZIDE 25 MG/1
25 TABLET ORAL DAILY
Qty: 10 TAB | Refills: 0 | Status: SHIPPED | OUTPATIENT
Start: 2017-07-14 | End: 2017-07-24

## 2017-07-14 RX ADMIN — LISINOPRIL 20 MG: 20 TABLET ORAL at 20:32

## 2017-07-14 RX ADMIN — HYDROCHLOROTHIAZIDE 25 MG: 25 TABLET ORAL at 20:32

## 2017-07-15 LAB
ATRIAL RATE: 57 BPM
CALCULATED P AXIS, ECG09: 54 DEGREES
CALCULATED R AXIS, ECG10: -9 DEGREES
CALCULATED T AXIS, ECG11: 69 DEGREES
DIAGNOSIS, 93000: NORMAL
P-R INTERVAL, ECG05: 180 MS
Q-T INTERVAL, ECG07: 444 MS
QRS DURATION, ECG06: 100 MS
QTC CALCULATION (BEZET), ECG08: 432 MS
VENTRICULAR RATE, ECG03: 57 BPM

## 2017-07-15 NOTE — DISCHARGE INSTRUCTIONS
Return immediately for increasing pain, fever, weakness, difficulty breathing, or any other concerns  Resume taking your blood pressure medications     High Blood Pressure: Care Instructions  Your Care Instructions  If your blood pressure is usually above 140/90, you have high blood pressure, or hypertension. That means the top number is 140 or higher or the bottom number is 90 or higher, or both. Despite what a lot of people think, high blood pressure usually doesn't cause headaches or make you feel dizzy or lightheaded. It usually has no symptoms. But it does increase your risk for heart attack, stroke, and kidney or eye damage. The higher your blood pressure, the more your risk increases. Your doctor will give you a goal for your blood pressure. Your goal will be based on your health and your age. An example of a goal is to keep your blood pressure below 140/90. Lifestyle changes, such as eating healthy and being active, are always important to help lower blood pressure. You might also take medicine to reach your blood pressure goal.  Follow-up care is a key part of your treatment and safety. Be sure to make and go to all appointments, and call your doctor if you are having problems. It's also a good idea to know your test results and keep a list of the medicines you take. How can you care for yourself at home? Medical treatment  · If you stop taking your medicine, your blood pressure will go back up. You may take one or more types of medicine to lower your blood pressure. Be safe with medicines. Take your medicine exactly as prescribed. Call your doctor if you think you are having a problem with your medicine. · Talk to your doctor before you start taking aspirin every day. Aspirin can help certain people lower their risk of a heart attack or stroke. But taking aspirin isn't right for everyone, because it can cause serious bleeding. · See your doctor regularly.  You may need to see the doctor more often at first or until your blood pressure comes down. · If you are taking blood pressure medicine, talk to your doctor before you take decongestants or anti-inflammatory medicine, such as ibuprofen. Some of these medicines can raise blood pressure. · Learn how to check your blood pressure at home. Lifestyle changes  · Stay at a healthy weight. This is especially important if you put on weight around the waist. Losing even 10 pounds can help you lower your blood pressure. · If your doctor recommends it, get more exercise. Walking is a good choice. Bit by bit, increase the amount you walk every day. Try for at least 30 minutes on most days of the week. You also may want to swim, bike, or do other activities. · Avoid or limit alcohol. Talk to your doctor about whether you can drink any alcohol. · Try to limit how much sodium you eat to less than 2,300 milligrams (mg) a day. Your doctor may ask you to try to eat less than 1,500 mg a day. · Eat plenty of fruits (such as bananas and oranges), vegetables, legumes, whole grains, and low-fat dairy products. · Lower the amount of saturated fat in your diet. Saturated fat is found in animal products such as milk, cheese, and meat. Limiting these foods may help you lose weight and also lower your risk for heart disease. · Do not smoke. Smoking increases your risk for heart attack and stroke. If you need help quitting, talk to your doctor about stop-smoking programs and medicines. These can increase your chances of quitting for good. When should you call for help? Call 911 anytime you think you may need emergency care. This may mean having symptoms that suggest that your blood pressure is causing a serious heart or blood vessel problem. Your blood pressure may be over 180/110. For example, call 911 if:  · You have symptoms of a heart attack. These may include:  ¨ Chest pain or pressure, or a strange feeling in the chest.  ¨ Sweating. ¨ Shortness of breath.   ¨ Nausea or vomiting. ¨ Pain, pressure, or a strange feeling in the back, neck, jaw, or upper belly or in one or both shoulders or arms. ¨ Lightheadedness or sudden weakness. ¨ A fast or irregular heartbeat. · You have symptoms of a stroke. These may include:  ¨ Sudden numbness, tingling, weakness, or loss of movement in your face, arm, or leg, especially on only one side of your body. ¨ Sudden vision changes. ¨ Sudden trouble speaking. ¨ Sudden confusion or trouble understanding simple statements. ¨ Sudden problems with walking or balance. ¨ A sudden, severe headache that is different from past headaches. · You have severe back or belly pain. Do not wait until your blood pressure comes down on its own. Get help right away. Call your doctor now or seek immediate care if:  · Your blood pressure is much higher than normal (such as 180/110 or higher), but you don't have symptoms. · You think high blood pressure is causing symptoms, such as:  ¨ Severe headache. ¨ Blurry vision. Watch closely for changes in your health, and be sure to contact your doctor if:  · Your blood pressure measures 140/90 or higher at least 2 times. That means the top number is 140 or higher or the bottom number is 90 or higher, or both. · You think you may be having side effects from your blood pressure medicine. · Your blood pressure is usually normal, but it goes above normal at least 2 times. Where can you learn more? Go to http://jasper-teresa.info/. Enter A609 in the search box to learn more about \"High Blood Pressure: Care Instructions. \"  Current as of: August 8, 2016  Content Version: 11.3  © 7512-1427 [employer]. Care instructions adapted under license by [employer] (which disclaims liability or warranty for this information).  If you have questions about a medical condition or this instruction, always ask your healthcare professional. [employer] disclaims any warranty or liability for your use of this information.

## 2017-07-15 NOTE — ED TRIAGE NOTES
Pt states he feels faint and SOB and states \"i can tell when my blood pressure is up. \" Denies taking anything for his pressure.

## 2017-07-15 NOTE — ED PROVIDER NOTES
HPI Comments: 8:20 PM Shea Borrero is a 47 y.o. male who presents to the ED c/o feeling faint for the past 45 minutes. He notes associated SOB. He states that his sx are similar to his past sx when his blood pressure is elevated. He states that he is supposed to be on HCTZ and Lisinopril; however, he has not had any for two weeks. He states that he went on a long walk today in the heat. He denies chest pain, LOC, fever, recent cold sx, HA, and any further complaints. The history is provided by the patient. Past Medical History:   Diagnosis Date    Back pain     Hypertension     Psychiatric disorder     hallucinations       Past Surgical History:   Procedure Laterality Date    HX OTHER SURGICAL      oral sx         History reviewed. No pertinent family history. Social History     Social History    Marital status:      Spouse name: N/A    Number of children: N/A    Years of education: N/A     Occupational History    Not on file. Social History Main Topics    Smoking status: Current Some Day Smoker    Smokeless tobacco: Not on file    Alcohol use Yes      Comment: beer    Drug use: Yes     Special: Marijuana, Cocaine    Sexual activity: Not on file     Other Topics Concern    Not on file     Social History Narrative         ALLERGIES: Review of patient's allergies indicates no known allergies. Review of Systems   Constitutional: Negative for activity change, appetite change, chills, diaphoresis, fatigue, fever and unexpected weight change. HENT: Negative for congestion, dental problem, drooling, ear discharge, ear pain, facial swelling, hearing loss, mouth sores, nosebleeds, postnasal drip, rhinorrhea, sinus pressure, sneezing, sore throat, tinnitus and trouble swallowing. Eyes: Negative for photophobia, pain, discharge, redness, itching and visual disturbance. Respiratory: Positive for shortness of breath.  Negative for apnea, cough, choking, chest tightness, wheezing and stridor. Cardiovascular: Negative for chest pain, palpitations and leg swelling. Positive for elevated blood pressure   Gastrointestinal: Negative for abdominal distention, abdominal pain, anal bleeding, blood in stool, constipation, diarrhea, nausea, rectal pain and vomiting. Endocrine: Negative for cold intolerance, heat intolerance, polydipsia, polyphagia and polyuria. Genitourinary: Negative for decreased urine volume, difficulty urinating, dysuria, enuresis, flank pain, frequency, genital sores, hematuria and urgency. Musculoskeletal: Negative for arthralgias, back pain, gait problem, joint swelling, myalgias, neck pain and neck stiffness. Skin: Negative for color change, pallor, rash and wound. Allergic/Immunologic: Negative for environmental allergies, food allergies and immunocompromised state. Neurological: Positive for light-headedness. Negative for dizziness, tremors, seizures, syncope, facial asymmetry, speech difficulty, weakness, numbness and headaches. Positive for feeling faint   Hematological: Negative for adenopathy. Does not bruise/bleed easily. Psychiatric/Behavioral: Negative for agitation, behavioral problems, confusion, decreased concentration, dysphoric mood, hallucinations, self-injury, sleep disturbance and suicidal ideas. The patient is not nervous/anxious and is not hyperactive. Vitals:    07/14/17 2018   BP: (!) 156/109   Pulse: 71   Resp: 16   Temp: 98.4 °F (36.9 °C)   SpO2: 98%   Weight: 70.3 kg (155 lb)            Physical Exam   Constitutional: He is oriented to person, place, and time. He appears well-developed and well-nourished. Alert and appropriate in no apparent marked discomfort or acute respiratory distress   HENT:   Head: Normocephalic and atraumatic. Right Ear: External ear normal.   Left Ear: External ear normal.   Mouth/Throat: Oropharynx is clear and moist. No oropharyngeal exudate.    Eyes: Conjunctivae and EOM are normal. Pupils are equal, round, and reactive to light. Right eye exhibits no discharge. Left eye exhibits no discharge. No scleral icterus. Neck: Normal range of motion. Neck supple. No JVD present. No tracheal deviation present. No thyromegaly present. Cardiovascular: Normal rate, regular rhythm, normal heart sounds and intact distal pulses. Exam reveals no gallop and no friction rub. No murmur heard. Pulmonary/Chest: Effort normal and breath sounds normal. No stridor. No respiratory distress. He has no wheezes. He has no rales. Abdominal: Soft. Bowel sounds are normal. He exhibits no distension and no mass. There is no tenderness. There is no rebound and no guarding. Musculoskeletal: Normal range of motion. He exhibits no edema or tenderness. Lymphadenopathy:     He has no cervical adenopathy. Neurological: He is alert and oriented to person, place, and time. No cranial nerve deficit. Coordination normal.   Skin: Skin is warm. No rash noted. No erythema. Psychiatric: He has a normal mood and affect. His behavior is normal. Judgment and thought content normal.   Nursing note and vitals reviewed. MDM  Number of Diagnoses or Management Options  Accelerated hypertension:   Diagnosis management comments: Patient here with lightheadedness and mild SOB with reassuring examination and multiple visits for similar symptoms in the past. Prior visits reviewed and work-ups have been reassuring. Exam today reveals a normal neurologic and cardiovascular examination. Mild hypertension is c/w his mediation non-compliance. Will restart medications and check an EKG for possible arrhythmia or strain pattern. If EKG and serial examinations are reassuring, patient appears appropriate for close outpatient follow-up. No signs of hypertensive emergency, sepsis, ACS, or dehydration. The patient agrees with this plan.         Amount and/or Complexity of Data Reviewed  Review and summarize past medical records: yes  Independent visualization of images, tracings, or specimens: yes    Risk of Complications, Morbidity, and/or Mortality  Presenting problems: moderate  Diagnostic procedures: moderate  Management options: moderate    Patient Progress  Patient progress: improved    ED Course       Procedures    Vitals:  No data found. Pulse ox reviewed and WNL    Medications ordered:   Medications   lisinopril (PRINIVIL, ZESTRIL) tablet 20 mg (20 mg Oral Given 7/14/17 2032)   hydroCHLOROthiazide (HYDRODIURIL) tablet 25 mg (25 mg Oral Given 7/14/17 2032)         Lab findings:  No results found for this or any previous visit (from the past 12 hour(s)). EKG interpretation by ED Physician:  Sinus bradycardia, rate 57BPM, normal axis, normal intervals, T-wave flattening in the lateral leads without significant change from prior EKG per Dr. Greg Davies MD    Progress notes, Consult notes or additional Procedure notes:   8:44 PM Pt reevaluated at this time and is resting comfortably in NAD. Discussed results and findings, as well as, diagnosis, plan for discharge, and strict return precautions. Pt verbalizes understanding and agreement with plan. All questions addressed at this time. Disposition:  Diagnosis:   1. Accelerated hypertension        Disposition: Discharge    Follow-up Information     Follow up With Details Comments Contact Info    [facility] EMERGENCY DEPT  As needed, If symptoms worsen [address] [place] [address] Rd In 3 days  Neeraj  [phone]           Discharge Medication List as of 7/14/2017  8:45 PM      CONTINUE these medications which have CHANGED    Details   lisinopril (PRINIVIL) 10 mg tablet Take 2 Tabs by mouth daily for 10 days. , Print, Disp-20 Tab, R-0      hydroCHLOROthiazide (HYDRODIURIL) 25 mg tablet Take 1 Tab by mouth daily for 10 days. , Print, Disp-10 Tab, R-0               SCRIBE ATTESTATION STATEMENT  Documented by: Con House for, and in the presence of, Rika High MD 11:49 AM     Signed by: Isrrael Lara, 07/15/17 11:49 AM     PROVIDER ATTESTATION STATEMENT  I personally performed the services described in the documentation, reviewed the documentation, as recorded by the scribe in my presence, and it accurately and completely records my words and actions.   iRka High MD

## 2017-07-26 ENCOUNTER — HOSPITAL ENCOUNTER (EMERGENCY)
Age: 52
Discharge: HOME | End: 2017-07-26
Attending: EMERGENCY MEDICINE
Payer: SELF-PAY

## 2017-07-26 VITALS
WEIGHT: 155 LBS | DIASTOLIC BLOOD PRESSURE: 95 MMHG | RESPIRATION RATE: 20 BRPM | SYSTOLIC BLOOD PRESSURE: 158 MMHG | TEMPERATURE: 98.8 F | BODY MASS INDEX: 24.28 KG/M2 | HEART RATE: 88 BPM | OXYGEN SATURATION: 96 %

## 2017-07-26 DIAGNOSIS — Z01.30 BLOOD PRESSURE CHECK: Primary | ICD-10-CM

## 2017-07-26 PROCEDURE — 99282 EMERGENCY DEPT VISIT SF MDM: CPT

## 2017-07-27 NOTE — ED TRIAGE NOTES
Pt states \"i am seeing things come out of the water. \" Pt is not specific about what he saw tonight but states it scared him. Denies SI and HI and denies hearing voices. States he does not see anything right now. Pt states this happened while he was walking here to get his blood pressure checked tonight.

## 2017-07-27 NOTE — ED PROVIDER NOTES
HPI Comments: Gurinder Hernandez is a 47 y.o. Male here for psychological issue that he cannot specify. Thinks he may have seen something weird earlier but no current hallucinations. Denies si/hi. Seen freq for similar issues. The history is provided by the patient. Past Medical History:   Diagnosis Date    Back pain     Hypertension     Psychiatric disorder     hallucinations       Past Surgical History:   Procedure Laterality Date    HX OTHER SURGICAL      oral sx         History reviewed. No pertinent family history. Social History     Social History    Marital status:      Spouse name: N/A    Number of children: N/A    Years of education: N/A     Occupational History    Not on file. Social History Main Topics    Smoking status: Current Some Day Smoker    Smokeless tobacco: Not on file    Alcohol use Yes      Comment: beer    Drug use: Yes     Special: Marijuana, Cocaine    Sexual activity: Not on file     Other Topics Concern    Not on file     Social History Narrative         ALLERGIES: Review of patient's allergies indicates no known allergies. Review of Systems   Constitutional: Negative for fever. HENT: Negative for trouble swallowing. Gastrointestinal: Negative for abdominal pain. Genitourinary: Negative for difficulty urinating. Musculoskeletal: Negative for gait problem. Skin: Negative for rash. Neurological: Negative for syncope. Psychiatric/Behavioral: Positive for sleep disturbance. Vitals:    07/26/17 2126   BP: (!) 158/95   Pulse: 88   Resp: 20   Temp: 98.8 °F (37.1 °C)   SpO2: 96%   Weight: 70.3 kg (155 lb)            Physical Exam   Constitutional: He is oriented to person, place, and time. Non-toxic appearance. He does not appear ill. No distress. HENT:   Head: Normocephalic and atraumatic.    Right Ear: External ear normal.   Left Ear: External ear normal.   Nose: Nose normal.   Mouth/Throat: Oropharynx is clear and moist. No oropharyngeal exudate. Eyes: Conjunctivae are normal.   Neck: Normal range of motion. Cardiovascular: Normal rate, regular rhythm, normal heart sounds and intact distal pulses. Pulmonary/Chest: Effort normal and breath sounds normal. No respiratory distress. Abdominal: Soft. There is no tenderness. Musculoskeletal: Normal range of motion. He exhibits no edema. Neurological: He is alert and oriented to person, place, and time. Skin: Skin is warm and dry. He is not diaphoretic. Psychiatric: His behavior is normal. Thought content is not paranoid. He expresses no homicidal and no suicidal ideation. Nursing note and vitals reviewed. [facility]  ED Course       Procedures    Vitals:  Patient Vitals for the past 12 hrs:   Temp Pulse Resp BP SpO2   07/26/17 2126 98.8 °F (37.1 °C) 88 20 (!) 158/95 96 %         Medications ordered:   Medications - No data to display      Lab findings:  No results found for this or any previous visit (from the past 12 hour(s)). EKG interpretation by ED Physician:      X-Ray, CT or other radiology findings or impressions:  No orders to display       Progress notes, Consult notes or additional Procedure notes:   No emc. Doubt need for any work up. No si/hi  I have discussed with patient and/or family/sig other the results, interpretation of any imaging if performed, suspected diagnosis and treatment plan to include instructions regarding the diagnoses listed to which understanding was expressed with all questions answered      Reevaluation of patient:   Stable for dc    Disposition:  Diagnosis:   1. Blood pressure check        Disposition: home      Follow-up Information     Follow up With Details Comments [address] [place] [address]) Schedule an appointment as soon as possible for a visit  [facility]  [phone]            There are no discharge medications for this patient.

## 2017-08-06 ENCOUNTER — HOSPITAL ENCOUNTER (EMERGENCY)
Age: 52
Discharge: PSYCHIATRIC OR SUBSTANCE USE FACILITY | End: 2017-08-07
Attending: EMERGENCY MEDICINE
Payer: SELF-PAY

## 2017-08-06 DIAGNOSIS — I10 ESSENTIAL HYPERTENSION: Primary | ICD-10-CM

## 2017-08-06 DIAGNOSIS — R45.851 SUICIDAL IDEATIONS: ICD-10-CM

## 2017-08-06 LAB
ALBUMIN SERPL BCP-MCNC: 3.9 G/DL (ref 3.4–5)
ALBUMIN/GLOB SERPL: 1.3 {RATIO} (ref 0.8–1.7)
ALP SERPL-CCNC: 74 U/L (ref 45–117)
ALT SERPL-CCNC: 20 U/L (ref 16–61)
AMPHET UR QL SCN: NEGATIVE
ANION GAP BLD CALC-SCNC: 6 MMOL/L (ref 3–18)
APAP SERPL-MCNC: <2 UG/ML (ref 10–30)
AST SERPL W P-5'-P-CCNC: 12 U/L (ref 15–37)
BARBITURATES UR QL SCN: NEGATIVE
BASOPHILS # BLD AUTO: 0 K/UL (ref 0–0.06)
BASOPHILS # BLD: 0 % (ref 0–2)
BENZODIAZ UR QL: NEGATIVE
BILIRUB SERPL-MCNC: 1 MG/DL (ref 0.2–1)
BUN SERPL-MCNC: 13 MG/DL (ref 7–18)
BUN/CREAT SERPL: 15 (ref 12–20)
CALCIUM SERPL-MCNC: 8.9 MG/DL (ref 8.5–10.1)
CANNABINOIDS UR QL SCN: NEGATIVE
CHLORIDE SERPL-SCNC: 109 MMOL/L (ref 100–108)
CO2 SERPL-SCNC: 26 MMOL/L (ref 21–32)
COCAINE UR QL SCN: NEGATIVE
CREAT SERPL-MCNC: 0.85 MG/DL (ref 0.6–1.3)
DIFFERENTIAL METHOD BLD: ABNORMAL
EOSINOPHIL # BLD: 0 K/UL (ref 0–0.4)
EOSINOPHIL NFR BLD: 1 % (ref 0–5)
ERYTHROCYTE [DISTWIDTH] IN BLOOD BY AUTOMATED COUNT: 17 % (ref 11.6–14.5)
ETHANOL SERPL-MCNC: <3 MG/DL (ref 0–3)
GLOBULIN SER CALC-MCNC: 2.9 G/DL (ref 2–4)
GLUCOSE SERPL-MCNC: 86 MG/DL (ref 74–99)
HCT VFR BLD AUTO: 36.2 % (ref 36–48)
HDSCOM,HDSCOM: NORMAL
HGB BLD-MCNC: 11.8 G/DL (ref 13–16)
LYMPHOCYTES # BLD AUTO: 44 % (ref 21–52)
LYMPHOCYTES # BLD: 1.7 K/UL (ref 0.9–3.6)
MCH RBC QN AUTO: 26.3 PG (ref 24–34)
MCHC RBC AUTO-ENTMCNC: 32.6 G/DL (ref 31–37)
MCV RBC AUTO: 80.6 FL (ref 74–97)
METHADONE UR QL: NEGATIVE
MONOCYTES # BLD: 0.2 K/UL (ref 0.05–1.2)
MONOCYTES NFR BLD AUTO: 6 % (ref 3–10)
NEUTS SEG # BLD: 1.9 K/UL (ref 1.8–8)
NEUTS SEG NFR BLD AUTO: 49 % (ref 40–73)
OPIATES UR QL: NEGATIVE
PCP UR QL: NEGATIVE
PLATELET # BLD AUTO: 220 K/UL (ref 135–420)
PMV BLD AUTO: 9.7 FL (ref 9.2–11.8)
POTASSIUM SERPL-SCNC: 3.8 MMOL/L (ref 3.5–5.5)
PROT SERPL-MCNC: 6.8 G/DL (ref 6.4–8.2)
RBC # BLD AUTO: 4.49 M/UL (ref 4.7–5.5)
SALICYLATES SERPL-MCNC: <2.8 MG/DL (ref 2.8–20)
SODIUM SERPL-SCNC: 141 MMOL/L (ref 136–145)
WBC # BLD AUTO: 3.8 K/UL (ref 4.6–13.2)

## 2017-08-06 PROCEDURE — 80307 DRUG TEST PRSMV CHEM ANLYZR: CPT | Performed by: EMERGENCY MEDICINE

## 2017-08-06 PROCEDURE — 99285 EMERGENCY DEPT VISIT HI MDM: CPT

## 2017-08-06 PROCEDURE — 85025 COMPLETE CBC W/AUTO DIFF WBC: CPT | Performed by: EMERGENCY MEDICINE

## 2017-08-06 PROCEDURE — 74011250637 HC RX REV CODE- 250/637: Performed by: PHYSICIAN ASSISTANT

## 2017-08-06 PROCEDURE — 80053 COMPREHEN METABOLIC PANEL: CPT | Performed by: EMERGENCY MEDICINE

## 2017-08-06 RX ORDER — IBUPROFEN 600 MG/1
600 TABLET ORAL
Status: COMPLETED | OUTPATIENT
Start: 2017-08-06 | End: 2017-08-06

## 2017-08-06 RX ORDER — IPRATROPIUM BROMIDE AND ALBUTEROL SULFATE 2.5; .5 MG/3ML; MG/3ML
3 SOLUTION RESPIRATORY (INHALATION)
Status: DISCONTINUED | OUTPATIENT
Start: 2017-08-06 | End: 2017-08-06

## 2017-08-06 RX ORDER — LISINOPRIL 20 MG/1
20 TABLET ORAL
Status: COMPLETED | OUTPATIENT
Start: 2017-08-06 | End: 2017-08-06

## 2017-08-06 RX ORDER — HYDROCHLOROTHIAZIDE 25 MG/1
25 TABLET ORAL
Status: COMPLETED | OUTPATIENT
Start: 2017-08-06 | End: 2017-08-06

## 2017-08-06 RX ADMIN — LISINOPRIL 20 MG: 20 TABLET ORAL at 20:41

## 2017-08-06 RX ADMIN — HYDROCHLOROTHIAZIDE 25 MG: 25 TABLET ORAL at 20:41

## 2017-08-06 RX ADMIN — IBUPROFEN 600 MG: 600 TABLET, FILM COATED ORAL at 20:19

## 2017-08-06 NOTE — ED PROVIDER NOTES
HPI Comments: 47 yr old male, hx htn, suicidal thoughts and hallucinations presents to the ED complaining of suicidal thoughts over the past several days. Pt states he is currently homeless and \"has no one to help. \" Pt denies any direct plans or homicidal thoughts at this time. Pt is noted to be hypertensive in the ED but denies headache, blurred vision, and dizziness. Pt states he was prescribed lisinopril/hctz several weeks ago but has lost that medication. Pt is unsure of the dosage. No other complaints. Patient is a 47 y.o. male presenting with suicidal ideation. Suicidal   Pertinent negatives include no shortness of breath, no chest pain, no vomiting, no headaches and no nausea. Past Medical History:   Diagnosis Date    Back pain     Hypertension     Psychiatric disorder     hallucinations       Past Surgical History:   Procedure Laterality Date    HX OTHER SURGICAL      oral sx         History reviewed. No pertinent family history. Social History     Social History    Marital status:      Spouse name: N/A    Number of children: N/A    Years of education: N/A     Occupational History    Not on file. Social History Main Topics    Smoking status: Current Some Day Smoker    Smokeless tobacco: Never Used    Alcohol use Yes      Comment: beer    Drug use: Yes     Special: Marijuana, Cocaine    Sexual activity: Not on file     Other Topics Concern    Not on file     Social History Narrative         ALLERGIES: Review of patient's allergies indicates no known allergies. Review of Systems   Constitutional: Negative. Negative for chills, diaphoresis, fatigue and fever. HENT: Negative. Negative for congestion, ear pain, rhinorrhea and sore throat. Eyes: Negative. Negative for pain and redness. Respiratory: Negative. Negative for cough, shortness of breath, wheezing and stridor. Cardiovascular: Negative. Negative for chest pain, palpitations and leg swelling. Gastrointestinal: Negative. Negative for abdominal pain, constipation, diarrhea, nausea and vomiting. Endocrine: Negative. Genitourinary: Negative. Negative for dysuria, flank pain, frequency and hematuria. Musculoskeletal: Negative. Negative for back pain, myalgias, neck pain and neck stiffness. Skin: Negative. Negative for rash and wound. Allergic/Immunologic: Negative. Neurological: Negative. Negative for dizziness, seizures, syncope and headaches. Hematological: Negative. Psychiatric/Behavioral: Positive for suicidal ideas. Negative for hallucinations. All other systems reviewed and are negative. Vitals:    08/06/17 1842   BP: (!) 193/136   Pulse: 63   Resp: 18   Temp: 98.3 °F (36.8 °C)   SpO2: 100%   Weight: 70.3 kg (155 lb)   Height: 5' 7\" (1.702 m)            Physical Exam   Constitutional: He is oriented to person, place, and time. He appears well-developed and well-nourished. No distress. HENT:   Head: Normocephalic. Neck: Normal range of motion. Neck supple. Cardiovascular: Normal rate, regular rhythm and normal heart sounds. Exam reveals no gallop and no friction rub. No murmur heard. Pulmonary/Chest: Effort normal and breath sounds normal. No stridor. No respiratory distress. He has no wheezes. He has no rales. Musculoskeletal: Normal range of motion. Neurological: He is alert and oriented to person, place, and time. Coordination normal.   Skin: Skin is warm and dry. No rash noted. He is not diaphoretic. No erythema. Psychiatric:   Flat affect. Pt reports suicidal thoughts and a depressed mood, but is otherwise pleasant. Nursing note and vitals reviewed.        MDM  Number of Diagnoses or Management Options  Essential hypertension:   Suicidal ideations:   Diagnosis management comments: Impression:  Htn, SI    WBC 3.8, RBC 4.49, hgb 11.8, RDW 17, Cl 109, SGOT 12,  UDS negative    salic < 2.8, actmn < 2, alcohol negative     Progress: pt has remained hypertensive in the ED. Htn is asymptomatic. I have consulted with Dr. Maki Huerta who agrees with the plan to give the pt his lisinopril/hctz in the ED in order to expedite medically clearing the pt. I have reviewed recent ED noted and found his late dose of lisinopril/hctz was 20 mg/25 mg. This medication has been ordered. 8:38 PM     10:08 PM pt's BP is trending down, still hypertensive will place tele-psych consultation     11:32 PM Spoke with Dr. Maddi Aceves, tele-psych on call, who agrees to evaluate the pt.     3:00 AM Pt is turned over to Dr. Maki Huerta who agrees to assume care of this pt at this time. Discussed this case with the doctor who agrees with the plan to continue to closely monitor the pt while awaiting psych evaluation and recommendation for bed placement.  Yesy Hutchison PA-C        Amount and/or Complexity of Data Reviewed  Clinical lab tests: ordered and reviewed    Risk of Complications, Morbidity, and/or Mortality  Presenting problems: moderate  Diagnostic procedures: moderate  Management options: moderate    Patient Progress  Patient progress: stable    ED Course       Procedures

## 2017-08-07 ENCOUNTER — HOSPITAL ENCOUNTER (INPATIENT)
Age: 52
LOS: 8 days | Discharge: HOME | DRG: 885 | End: 2017-08-15
Attending: STUDENT IN AN ORGANIZED HEALTH CARE EDUCATION/TRAINING PROGRAM | Admitting: STUDENT IN AN ORGANIZED HEALTH CARE EDUCATION/TRAINING PROGRAM
Payer: SELF-PAY

## 2017-08-07 VITALS
WEIGHT: 155 LBS | TEMPERATURE: 99.3 F | BODY MASS INDEX: 24.33 KG/M2 | RESPIRATION RATE: 18 BRPM | HEIGHT: 67 IN | DIASTOLIC BLOOD PRESSURE: 100 MMHG | HEART RATE: 62 BPM | OXYGEN SATURATION: 99 % | SYSTOLIC BLOOD PRESSURE: 155 MMHG

## 2017-08-07 PROCEDURE — 74011250637 HC RX REV CODE- 250/637: Performed by: PSYCHIATRY & NEUROLOGY

## 2017-08-07 PROCEDURE — 74011250637 HC RX REV CODE- 250/637: Performed by: EMERGENCY MEDICINE

## 2017-08-07 PROCEDURE — 74011250637 HC RX REV CODE- 250/637: Performed by: STUDENT IN AN ORGANIZED HEALTH CARE EDUCATION/TRAINING PROGRAM

## 2017-08-07 PROCEDURE — 65220000005 HC RM SEMIPRIVATE PSYCH 3 OR 4 BED

## 2017-08-07 PROCEDURE — 77030034848

## 2017-08-07 RX ORDER — LORAZEPAM 1 MG/1
1-2 TABLET ORAL
Status: DISCONTINUED | OUTPATIENT
Start: 2017-08-07 | End: 2017-08-10

## 2017-08-07 RX ORDER — HYDROCHLOROTHIAZIDE 12.5 MG/1
12.5 TABLET ORAL DAILY
COMMUNITY
End: 2017-08-26

## 2017-08-07 RX ORDER — HYDROCHLOROTHIAZIDE 25 MG/1
25 TABLET ORAL DAILY
Status: DISCONTINUED | OUTPATIENT
Start: 2017-08-08 | End: 2017-08-14

## 2017-08-07 RX ORDER — HYDROCHLOROTHIAZIDE 25 MG/1
25 TABLET ORAL DAILY
Status: DISCONTINUED | OUTPATIENT
Start: 2017-08-07 | End: 2017-08-07 | Stop reason: HOSPADM

## 2017-08-07 RX ORDER — HALOPERIDOL 5 MG/ML
5 INJECTION INTRAMUSCULAR
Status: DISCONTINUED | OUTPATIENT
Start: 2017-08-07 | End: 2017-08-15 | Stop reason: HOSPADM

## 2017-08-07 RX ORDER — HALOPERIDOL 5 MG/1
5 TABLET ORAL
Status: DISCONTINUED | OUTPATIENT
Start: 2017-08-07 | End: 2017-08-15 | Stop reason: HOSPADM

## 2017-08-07 RX ORDER — LISINOPRIL 10 MG/1
10 TABLET ORAL DAILY
COMMUNITY
End: 2017-08-24 | Stop reason: SDUPTHER

## 2017-08-07 RX ORDER — LORAZEPAM 2 MG/ML
1-2 INJECTION INTRAMUSCULAR
Status: DISCONTINUED | OUTPATIENT
Start: 2017-08-07 | End: 2017-08-15 | Stop reason: HOSPADM

## 2017-08-07 RX ORDER — ACETAMINOPHEN 500 MG
1000 TABLET ORAL
Status: COMPLETED | OUTPATIENT
Start: 2017-08-07 | End: 2017-08-07

## 2017-08-07 RX ORDER — ACETAMINOPHEN 500 MG
1000 TABLET ORAL
Status: DISCONTINUED | OUTPATIENT
Start: 2017-08-07 | End: 2017-08-07 | Stop reason: HOSPADM

## 2017-08-07 RX ORDER — LISINOPRIL 20 MG/1
20 TABLET ORAL DAILY
Status: DISCONTINUED | OUTPATIENT
Start: 2017-08-07 | End: 2017-08-07 | Stop reason: HOSPADM

## 2017-08-07 RX ORDER — CLONIDINE HYDROCHLORIDE 0.1 MG/1
0.1 TABLET ORAL
Status: COMPLETED | OUTPATIENT
Start: 2017-08-07 | End: 2017-08-07

## 2017-08-07 RX ORDER — IBUPROFEN 400 MG/1
400 TABLET ORAL
Status: DISCONTINUED | OUTPATIENT
Start: 2017-08-07 | End: 2017-08-15 | Stop reason: HOSPADM

## 2017-08-07 RX ORDER — LISINOPRIL 20 MG/1
20 TABLET ORAL DAILY
Status: DISCONTINUED | OUTPATIENT
Start: 2017-08-08 | End: 2017-08-09

## 2017-08-07 RX ORDER — LISINOPRIL 20 MG/1
20 TABLET ORAL
Status: DISCONTINUED | OUTPATIENT
Start: 2017-08-07 | End: 2017-08-07

## 2017-08-07 RX ADMIN — ACETAMINOPHEN 1000 MG: 500 TABLET ORAL at 07:54

## 2017-08-07 RX ADMIN — ACETAMINOPHEN 1000 MG: 500 TABLET ORAL at 16:02

## 2017-08-07 RX ADMIN — LORAZEPAM 1 MG: 1 TABLET ORAL at 22:16

## 2017-08-07 RX ADMIN — CLONIDINE HYDROCHLORIDE 0.1 MG: 0.1 TABLET ORAL at 23:06

## 2017-08-07 RX ADMIN — IBUPROFEN 400 MG: 400 TABLET ORAL at 22:37

## 2017-08-07 RX ADMIN — HYDROCHLOROTHIAZIDE 25 MG: 25 TABLET ORAL at 10:57

## 2017-08-07 RX ADMIN — LISINOPRIL 20 MG: 20 TABLET ORAL at 10:57

## 2017-08-07 NOTE — ED NOTES
Attempted to call report to [facility] behavioral health. Call placed to give report x3 and phone immediately disconnected. Call placed again and instructed to call back in 10 minutes to give report.

## 2017-08-07 NOTE — IP AVS SNAPSHOT
Letty Sotelo 
 
 
 [address] Patient: Velvet Skiff MRN: [id number] :1965 You are allergic to the following No active allergies Recent Documentation Smoking Status Current Some Day Smoker Emergency Contacts Name Discharge Info Relation Home Work Mobile Skye Barney DISCHARGE CAREGIVER [3] Friend [5] [phone] About your hospitalization You were admitted on:  2017 You last received care in the:  [facility] 1 SPECIAL [facility] 1 You were discharged on:  August 15, 2017 Unit phone number:  [phone] Why you were hospitalized Your primary diagnosis was:  Not on File Your diagnoses also included:  Depression Providers Seen During Your Hospitalizations Provider Role Specialty Primary office phone Mitra Boss MD Attending Provider Psychiatry [phone] Your Primary Care Physician (PCP) Primary Care Physician Office Phone Office Fax NONE ** None ** ** None ** Follow-up Information Follow up With Details Comments Contact Info [address] On 2017 Patient will follow up with [facility] and will attend Intake at the [facility] location on  at 1:00 pm. Dilsh[place] bynum [address] [phone] Pt will be referred to intake (welcome group) at [address] [place] [address] #[phone]. .. At [facility], [address] [place] [address] ... Will need to go there on 17 @ 1PM.  
Pt also has an appt with Dr Jarrell Novoa  for high blood pressure at [facility] on 17 at 3:00 PM [address] [place] [address]. 0358 phone [phone] Current Discharge Medication List  
  
START taking these medications Dose & Instructions Dispensing Information Comments Morning Noon Evening Bedtime * buPROPion  mg tablet Commonly known as:  Axel Ranks Your last dose was: Your next dose is:    
   
   
 Dose:  150 mg Take 1 Tab by mouth every morning. Indications: major depressive disorder Quantity:  30 Tab Refills:  0  
     
   
   
   
  
 * buPROPion  mg XL tablet Commonly known as:  Axel Ranks Your last dose was: Your next dose is:    
   
   
 Dose:  300 mg Take 1 Tab by mouth every morning. Indications: major depressive disorder Quantity:  30 Tab Refills:  0  
     
   
   
   
  
 propranolol 40 mg tablet Commonly known as:  INDERAL Your last dose was: Your next dose is:    
   
   
 Dose:  40 mg Take 1 Tab by mouth two (2) times a day. Indications: hypertension Quantity:  14 Tab Refills:  0  
     
   
   
   
  
 * Notice: This list has 2 medication(s) that are the same as other medications prescribed for you. Read the directions carefully, and ask your doctor or other care provider to review them with you. CONTINUE these medications which have CHANGED Dose & Instructions Dispensing Information Comments Morning Noon Evening Bedtime * hydroCHLOROthiazide 12.5 mg tablet Commonly known as:  HYDRODIURIL What changed:  Another medication with the same name was added. Make sure you understand how and when to take each. Your last dose was: Your next dose is:    
   
   
 Dose:  12.5 mg Take 12.5 mg by mouth daily. Indications: Patient does not recall dosage of HCTZ Refills:  0  
     
   
   
   
  
 * hydroCHLOROthiazide 50 mg tablet Commonly known as:  HYDRODIURIL What changed: You were already taking a medication with the same name, and this prescription was added. Make sure you understand how and when to take each. Your last dose was:     
   
Your next dose is:    
   
   
 Dose:  50 mg  
 Take 1 Tab by mouth daily. Indications: hypertension Quantity:  14 Tab Refills:  0  
     
   
   
   
  
 * lisinopril 10 mg tablet Commonly known as:  Lisa Golder What changed:  Another medication with the same name was added. Make sure you understand how and when to take each. Your last dose was: Your next dose is:    
   
   
 Dose:  10 mg Take 10 mg by mouth daily. Refills:  0  
     
   
   
   
  
 * lisinopril 40 mg tablet Commonly known as:  Lisa Golder What changed: You were already taking a medication with the same name, and this prescription was added. Make sure you understand how and when to take each. Your last dose was: Your next dose is:    
   
   
 Dose:  40 mg Take 1 Tab by mouth daily. Indications: hypertension Quantity:  14 Tab Refills:  0  
     
   
   
   
  
 * Notice: This list has 4 medication(s) that are the same as other medications prescribed for you. Read the directions carefully, and ask your doctor or other care provider to review them with you. Where to Get Your Medications Information on where to get these meds will be given to you by the nurse or doctor. ! Ask your nurse or doctor about these medications buPROPion  mg tablet buPROPion  mg XL tablet  
 hydroCHLOROthiazide 50 mg tablet  
 lisinopril 40 mg tablet  
 propranolol 40 mg tablet Discharge Instructions BEHAVIORAL HEALTH NURSING DISCHARGE NOTE The following personal items collected during your admission are returned to you:  
Dental Appliance: Dental Appliances: None Vision: Visual Aid: None Hearing Aid:   
Jewelry: Jewelry: None Clothing: Clothing: Footwear, Belt, Pants, Shirt ([place] and sneakers; Locker 106-4) Other Valuables: Other Valuables: Lighter/matches (3 Lighters; Locker 106-4) Valuables sent to safe: Personal Items Sent to Safe: None PATIENT INSTRUCTIONS: 
 
 
 Follow-up with Pt will be referred to intake (welcome group) at [address] #[phone]. .. At [facility], [facility] [address]. ... Will need to go there on 17 @ 1PM.  
 
Pt also has an appt with Dr Jeffry Moore  for high blood pressure at [facility] on 17 at 3:00 PM [address], [place]. 8[address] phone [phone] Numbers to remember [facility] Desk [facility] 36 Emergency Services [phone] Suicide [phone](talk) The discharge information has been reviewed with the patient. The patient verbalized understanding. Leotus Activation Thank you for requesting access to Leotus. Please follow the instructions below to securely access and download your online medical record. Leotus allows you to send messages to your doctor, view your test results, renew your prescriptions, schedule appointments, and more. How Do I Sign Up? 1. In your internet browser, go to [email or web address] 
2. Click on the First Time User? Click Here link in the Sign In box. You will be redirect to the New Member Sign Up page. 3. Enter your Leotus Access Code exactly as it appears below. You will not need to use this code after youve completed the sign-up process. If you do not sign up before the expiration date, you must request a new code. Leotus Access Code: [email or web address] Expires: 2017  8:20 PM (This is the date your Leotus access code will ) 4. Enter the last four digits of your Social Security Number (xxxx) and Date of Birth (mm/dd/yyyy) as indicated and click Submit. You will be taken to the next sign-up page. 5. Create a Leotus ID. This will be your Leotus login ID and cannot be changed, so think of one that is secure and easy to remember. 6. Create a Leotus password. You can change your password at any time. 7. Enter your Password Reset Question and Answer. This can be used at a later time if you forget your password. 8. Enter your e-mail address. You will receive e-mail notification when new information is available in [address] 9. Click Sign Up. You can now view and download portions of your medical record. 10. Click the Download Summary menu link to download a portable copy of your medical information. Additional Information If you have questions, please visit the Frequently Asked Questions section of the c-crowd website at [email or web address]. [email or web address] Website/. Remember, c-crowd is NOT to be used for urgent needs. For medical emergencies, dial 911. Patient armband removed and shredded BEHAVIORAL HEALTH NURSING DISCHARGE NOTE Emergency Numbers [facility] Desk: [phone] [facility] Emergency Services: [phone] Suicide Prevention Line: [phone] (TALK) The following personal items collected during your admission are returned to you:  
Dental Appliance: Dental Appliances: None Vision: Visual Aid: None Hearing Aid:   
Jewelry: Jewelry: None Clothing: Clothing: Footwear, Belt, Pants, Shirt ([place] and sneakers; Locker 106-4) Other Valuables: Other Valuables: Lighter/matches (3 Lighters; Locker 106-4) Valuables sent to safe: Personal Items Sent to Safe: None The discharge information has been reviewed with the patient. The patient verbalized understanding. c-crowd Activation Thank you for requesting access to c-crowd. Please follow the instructions below to securely access and download your online medical record. c-crowd allows you to send messages to your doctor, view your test results, renew your prescriptions, schedule appointments, and more. How Do I Sign Up? 
 
11. In your internet browser, go to [email or web address] 
12. Click on the First Time User? Click Here link in the Sign In box. You will be redirect to the New Member Sign Up page. 15. Enter your c-crowd Access Code exactly as it appears below.  You will not need to use this code after youve completed the sign-up process. If you do not sign up before the expiration date, you must request a new code. unbound technologies Access Code: [email or web address] Expires: 2017  8:20 PM (This is the date your unbound technologies access code will ) 14. Enter the last four digits of your Social Security Number (xxxx) and Date of Birth (mm/dd/yyyy) as indicated and click Submit. You will be taken to the next sign-up page. 15. Create a unbound technologies ID. This will be your unbound technologies login ID and cannot be changed, so think of one that is secure and easy to remember. 12. Create a unbound technologies password. You can change your password at any time. 16. Enter your Password Reset Question and Answer. This can be used at a later time if you forget your password. 25. Enter your e-mail address. You will receive e-mail notification when new information is available in [address] 19. Click Sign Up. You can now view and download portions of your medical record. 20. Click the Download Summary menu link to download a portable copy of your medical information. Additional Information If you have questions, please visit the Frequently Asked Questions section of the unbound technologies website at [email or web address] Light and Power Grouphart/. Remember, unbound technologies is NOT to be used for urgent needs. For medical emergencies, dial 911. Patient armband removed and shredded Discharge Orders None Introducing [facility] & HEALTH SERVICES! [employer] introduces unbound technologies patient portal. Now you can access parts of your medical record, email your doctor's office, and request medication refills online. 1. In your internet browser, go to [email or web address] [employer] Grouphart 2. Click on the First Time User? Click Here link in the Sign In box. You will see the New Member Sign Up page. 3. Enter your unbound technologies Access Code exactly as it appears below. You will not need to use this code after youve completed the sign-up process.  If you do not sign up before the expiration date, you must request a new code. · XRONet Access Code: [email or web address] Expires: 9/2/2017  8:20 PM 
 
4. Enter the last four digits of your Social Security Number (xxxx) and Date of Birth (mm/dd/yyyy) as indicated and click Submit. You will be taken to the next sign-up page. 5. Create a XRONet ID. This will be your XRONet login ID and cannot be changed, so think of one that is secure and easy to remember. 6. Create a XRONet password. You can change your password at any time. 7. Enter your Password Reset Question and Answer. This can be used at a later time if you forget your password. 8. Enter your e-mail address. You will receive e-mail notification when new information is available in [address] 9. Click Sign Up. You can now view and download portions of your medical record. 10. Click the Download Summary menu link to download a portable copy of your medical information. If you have questions, please visit the Frequently Asked Questions section of the XRONet website. Remember, XRONet is NOT to be used for urgent needs. For medical emergencies, dial 911. Now available from your iPhone and Android! General Information Please provide this summary of care documentation to your next provider. Patient Signature:  ____________________________________________________________ Date:  ____________________________________________________________  
  
Cici Cintron Provider Signature:  ____________________________________________________________ Date:  ____________________________________________________________

## 2017-08-07 NOTE — PROGRESS NOTES
No bed available at [facility]. Spoke with pt regarding placement in [place] and he's agreeable. Referred to [facility] and spoke with Sergio.

## 2017-08-07 NOTE — PROGRESS NOTES
Seen by Rose Ge from [address] and pt not appropriate for CSU and [facility] made aware.

## 2017-08-07 NOTE — PROGRESS NOTES
Referred to [facility] for CSU pre screening, spoke with Dane Escamilla.

## 2017-08-07 NOTE — ED NOTES
Verbal shift change report given to Claudine Garcia (oncoming nurse) by Helga Jerez RN (offgoing nurse). Report included the following information SBAR, Kardex and MAR.

## 2017-08-07 NOTE — CONSULTS
History of Present Illness: Pt is a 46 yo male who reports a hx of depression, psychosis and polysubtance abuse. Reports currently abstinent. Pt presents self referred due to suicidality. The pt reports an approximate 2 month period of worsening depressive sxs. Cites sxs. Inclusive of depressed mood, poor sleep, feelings of hopelessness and helplessness. Reports over the past several days having thoughts of killing himself. Reports he has been thinking of quick and painless ways to harm himself. Including to jump off a bridge. Reports that he always has easy access to guns. On ROS, pt reports CAHs to kill himself. Denies VHs, delusions nor HI. Reports continued SI and contemplating plans. Pt presents as a danger to himself and requires acute inpt psychiatric admission for safety, stabilization and treatment. Pt is voluntary for inpt treatment. Inpt  prior admissions  Outpt  non compliant  SAttempts  none reported  Medical History: see chart  Substance Use Hx: hx of EOH   Forensic Hx : prior hx of incarceration  Access to weapons/guns: Reports I always have access to guns very easily.     Medications & Freq: non compliant  Allergies: NKDA  Mental Status Exam:   Appearance and attire: Dressed in hospital attire  Attitude and behavior: cooperative  Speech: WNL  Affect and mood: depressed/constricted  Association and thought processes: linear  Thought content: Denies delusions. Denies HI.  + SI with plan   Perceptual: + CAHs to kill self. Denies VHs   Sensorium, memory, and orientation: AxOx2, date July 2017  Insight and judgment: poor / impaired   Diagnosis:   Unspecified Depressive Disorder, Other psychotic disorder, Alcohol Use Disorder  Recommendations:  Pt requires acute inpt psychiatric admission  For safety, stabilization and treatment  Pt is voluntary for inpt treatment.

## 2017-08-07 NOTE — PROGRESS NOTES
Referred pt to [facility] and spoke with Mandi Ortiz, charge nurse. Chart faxed.

## 2017-08-07 NOTE — ED NOTES
Vitals:  Patient Vitals for the past 12 hrs:   Temp Pulse Resp BP SpO2   08/07/17 1611 99 °F (37.2 °C) 74 14 138/81 99 %   08/07/17 1304 98.1 °F (36.7 °C) (!) 54 16 (!) 178/110 99 %   08/07/17 0812 97.3 °F (36.3 °C) (!) 53 20 168/89 99 %         Medications ordered:   Medications   hydroCHLOROthiazide (HYDRODIURIL) tablet 25 mg (25 mg Oral Given 8/7/17 1057)   lisinopril (PRINIVIL, ZESTRIL) tablet 20 mg (20 mg Oral Given 8/7/17 1057)   acetaminophen (TYLENOL) tablet 1,000 mg (1,000 mg Oral Given 8/7/17 1602)   ibuprofen (MOTRIN) tablet 600 mg (600 mg Oral Given 8/6/17 2019)   lisinopril (PRINIVIL, ZESTRIL) tablet 20 mg (20 mg Oral Given 8/6/17 2041)   hydroCHLOROthiazide (HYDRODIURIL) tablet 25 mg (25 mg Oral Given 8/6/17 2041)   acetaminophen (TYLENOL) tablet 1,000 mg (1,000 mg Oral Given 8/7/17 0754)         Lab findings:  No results found for this or any previous visit (from the past 12 hour(s)). EKG interpretation by ED Physician:      X-Ray, CT or other radiology findings or impressions:  No orders to display       Progress notes, Consult notes or additional Procedure notes:   T/o from dr Wen Miller to follow pt as he is pending placement for psychiatric issues  Pt accepted at [facility] crisis [facility]    Reevaluation of patient:   Stable for transfer    Disposition:  Diagnosis:   1. Essential hypertension    2.  Suicidal ideations        Disposition: [facility] crisis inpt    Follow-up Information     None            Patient's Medications    No medications on file

## 2017-08-07 NOTE — ED NOTES
Bedside and Verbal shift change report given to Grabiel Win (oncoming nurse) by Sofia Peñaloza (offgoing nurse). Report included the following information SBAR, ED Summary and MAR.

## 2017-08-07 NOTE — IP AVS SNAPSHOT
Summary of Care Report The Summary of Care report has been created to help improve care coordination. Users with access to [employer] or [address] (Web-based application) may access additional patient information including the Discharge Summary. If you are not currently a [address] user and need more information, please call the number listed below in the [place] section and ask to be connected with Medical Records. Facility Information Name Address Phone [address] [facility]  [address] [facility] [phone] Patient Information Patient Name Sex  Cornelia Schreiber ([id number]) Male 1965 Discharge Information Admitting Provider Service Area Unit Randall Jessica MD / [address] / [phone] Discharge Provider Discharge Date/Time Discharge Disposition Destination (none) 8/15/2017 (Pending) AHR (none) Patient Language Language ENGLISH [13] Hospital Problems as of 8/15/2017  Never Reviewed Class Noted - Resolved Last Modified POA Active Problems Depression  2017 - Present 2017 by Randall Jessica MD Unknown Entered by Randall Jessica MD  
  
You are allergic to the following No active allergies Current Discharge Medication List  
  
START taking these medications Dose & Instructions Dispensing Information Comments * buPROPion  mg tablet Commonly known as:  Irwin Presser Dose:  150 mg Take 1 Tab by mouth every morning. Indications: major depressive disorder Quantity:  30 Tab Refills:  0  
   
 * buPROPion  mg XL tablet Commonly known as:  Irwin Presser Dose:  300 mg Take 1 Tab by mouth every morning. Indications: major depressive disorder Quantity:  30 Tab Refills:  0 propranolol 40 mg tablet Commonly known as:  INDERAL Dose:  40 mg Take 1 Tab by mouth two (2) times a day. Indications: hypertension Quantity:  14 Tab Refills:  0  
   
 * Notice: This list has 2 medication(s) that are the same as other medications prescribed for you. Read the directions carefully, and ask your doctor or other care provider to review them with you. CONTINUE these medications which have CHANGED Dose & Instructions Dispensing Information Comments * hydroCHLOROthiazide 12.5 mg tablet Commonly known as:  HYDRODIURIL What changed:  Another medication with the same name was added. Make sure you understand how and when to take each. Dose:  12.5 mg Take 12.5 mg by mouth daily. Indications: Patient does not recall dosage of HCTZ Refills:  0  
   
 * hydroCHLOROthiazide 50 mg tablet Commonly known as:  HYDRODIURIL What changed: You were already taking a medication with the same name, and this prescription was added. Make sure you understand how and when to take each. Dose:  50 mg Take 1 Tab by mouth daily. Indications: hypertension Quantity:  14 Tab Refills:  0  
   
 * lisinopril 10 mg tablet Commonly known as:  Helon Estrin What changed:  Another medication with the same name was added. Make sure you understand how and when to take each. Dose:  10 mg Take 10 mg by mouth daily. Refills:  0  
   
 * lisinopril 40 mg tablet Commonly known as:  Helon Estrin What changed: You were already taking a medication with the same name, and this prescription was added. Make sure you understand how and when to take each. Dose:  40 mg Take 1 Tab by mouth daily. Indications: hypertension Quantity:  14 Tab Refills:  0  
   
 * Notice: This list has 4 medication(s) that are the same as other medications prescribed for you. Read the directions carefully, and ask your doctor or other care provider to review them with you. Current Immunizations Name Date Influenza Vaccine 3/2/2017 Follow-up Information Follow up With Details Comments Contact Info [address] [place] On 8/17/2017 Patient will follow up with [facility] and will attend Intake at the [facility] on Thursday August 17th, 2017 at 1:00 pm. Dilshad Aguayo [address] 
[phone] Pt will be referred to intake (welcome group) at [address] [place] [address] #[phone]. .. At [facility], [address] [place] [address]. ... Will need to go there on Thursday 8/17/17 @ 1PM.  
Pt also has an appt with Dr Ryder Ellis  for high blood pressure at [facility] on 8/22/17 at 3:00 PM [address] [place] [address]. [phone] phone [phone] Discharge Instructions BEHAVIORAL HEALTH NURSING DISCHARGE NOTE The following personal items collected during your admission are returned to you:  
Dental Appliance: Dental Appliances: None Vision: Visual Aid: None Hearing Aid:   
Jewelry: Jewelry: None Clothing: Clothing: Footwear, Belt, Pants, Shirt ([place] and sneakers; Locker 106-4) Other Valuables: Other Valuables: Lighter/matches (3 Lighters; Locker 106-4) Valuables sent to safe: Personal Items Sent to Safe: None PATIENT INSTRUCTIONS: 
 
 
Follow-up with Pt will be referred to intake (welcome group) at [address] [place] [address] #[phone]. .. At [facility], [address] [place] [address]. ... Will need to go there on Thursday 8/17/17 @ 1PM.  
 
Pt also has an appt with Dr Ryder Ellis  for high blood pressure at [facility] on 8/22/17 at 3:00 PM [address] [place] [address]. [phone] phone [phone] Numbers to remember [facility] Desk [place] [address] Emergency Services [phone] Suicide [phone](talk) The discharge information has been reviewed with the patient. The patient verbalized understanding. MyChart Activation Thank you for requesting access to [email or web address]. Please follow the instructions below to securely access and download your online medical record. [email or web address] allows you to send messages to your doctor, view your test results, renew your prescriptions, schedule appointments, and more. How Do I Sign Up? 1. In your internet browser, go to [email or web address] 
2. Click on the First Time User? Click Here link in the Sign In box. You will be redirect to the New Member Sign Up page. 3. Enter your [email or web address] Access Code exactly as it appears below. You will not need to use this code after youve completed the sign-up process. If you do not sign up before the expiration date, you must request a new code. [email or web address] Access Code: [email or web address] Expires: 2017  8:20 PM (This is the date your [email or web address] access code will ) 4. Enter the last four digits of your Social Security Number (xxxx) and Date of Birth (mm/dd/yyyy) as indicated and click Submit. You will be taken to the next sign-up page. 5. Create a [email or web address] ID. This will be your [email or web address] login ID and cannot be changed, so think of one that is secure and easy to remember. 6. Create a [email or web address] password. You can change your password at any time. 7. Enter your Password Reset Question and Answer. This can be used at a later time if you forget your password. 8. Enter your e-mail address. You will receive e-mail notification when new information is available in [address] 9. Click Sign Up. You can now view and download portions of your medical record. 10. Click the Download Summary menu link to download a portable copy of your medical information. Additional Information If you have questions, please visit the Frequently Asked Questions section of the [email or web address] website at [email or web address] Remember, [email or web address] is NOT to be used for urgent needs. For medical emergencies, dial 911. Patient armband removed and shredded BEHAVIORAL HEALTH NURSING DISCHARGE NOTE Emergency Numbers [address] [facility] Desk: [phone] [place] [facility]: [phone] Suicide Prevention Line: [phone] (TALK) The following personal items collected during your admission are returned to you:  
Dental Appliance: Dental Appliances: None Vision: Visual Aid: None Hearing Aid:   
Jewelry: Jewelry: None Clothing: Clothing: Footwear, Belt, Pants, Shirt ([place] and sneakers; Locker 106-4) Other Valuables: Other Valuables: Lighter/matches (3 Lighters; Locker 106-4) Valuables sent to safe: Personal Items Sent to Safe: None The discharge information has been reviewed with the patient. The patient verbalized understanding. Fanzila Activation Thank you for requesting access to Fanzila. Please follow the instructions below to securely access and download your online medical record. Fanzila allows you to send messages to your doctor, view your test results, renew your prescriptions, schedule appointments, and more. How Do I Sign Up? 
 
11. In your internet browser, go to [email or web address] 
12. Click on the First Time User? Click Here link in the Sign In box. You will be redirect to the New Member Sign Up page. 15. Enter your Fanzila Access Code exactly as it appears below. You will not need to use this code after youve completed the sign-up process. If you do not sign up before the expiration date, you must request a new code. Fanzila Access Code: [email or web address] Expires: 2017  8:20 PM (This is the date your Fanzila access code will ) 14. Enter the last four digits of your Social Security Number (xxxx) and Date of Birth (mm/dd/yyyy) as indicated and click Submit. You will be taken to the next sign-up page. 15. Create a Fanzila ID. This will be your Fanzila login ID and cannot be changed, so think of one that is secure and easy to remember. 12. Create a [facility] password. You can change your password at any time. 16. Enter your Password Reset Question and Answer. This can be used at a later time if you forget your password. 25. Enter your e-mail address. You will receive e-mail notification when new information is available in [address] 19. Click Sign Up. You can now view and download portions of your medical record. 20. Click the Download Summary menu link to download a portable copy of your medical information. Additional Information If you have questions, please visit the Frequently Asked Questions section of the [employer] website at https://[email or web address] [email or web address]. [employer] ([employer])hart/. Remember, [facility] [facility] is NOT to be used for urgent needs. For medical emergencies, dial 911. Patient armband removed and shredded Chart Review Routing History No Routing History on File

## 2017-08-07 NOTE — PROGRESS NOTES
Sree Gómez from [facility] called back and states that they will accept pt only on a TDO.

## 2017-08-07 NOTE — IP AVS SNAPSHOT
[address] [place] [id number] [address] Patient: Claudio Schneider MRN: [id number] :1965 Current Discharge Medication List  
  
START taking these medications Dose & Instructions Dispensing Information Comments Morning Noon Evening Bedtime * buPROPion  mg tablet Commonly known as:  Fede Correia Your last dose was: Your next dose is:    
   
   
 Dose:  150 mg Take 1 Tab by mouth every morning. Indications: major depressive disorder Quantity:  30 Tab Refills:  0  
     
   
   
   
  
 * buPROPion  mg XL tablet Commonly known as:  Fede Correia Your last dose was: Your next dose is:    
   
   
 Dose:  300 mg Take 1 Tab by mouth every morning. Indications: major depressive disorder Quantity:  30 Tab Refills:  0  
     
   
   
   
  
 propranolol 40 mg tablet Commonly known as:  INDERAL Your last dose was: Your next dose is:    
   
   
 Dose:  40 mg Take 1 Tab by mouth two (2) times a day. Indications: hypertension Quantity:  14 Tab Refills:  0  
     
   
   
   
  
 * Notice: This list has 2 medication(s) that are the same as other medications prescribed for you. Read the directions carefully, and ask your doctor or other care provider to review them with you. CONTINUE these medications which have CHANGED Dose & Instructions Dispensing Information Comments Morning Noon Evening Bedtime * hydroCHLOROthiazide 12.5 mg tablet Commonly known as:  HYDRODIURIL What changed:  Another medication with the same name was added. Make sure you understand how and when to take each. Your last dose was: Your next dose is:    
   
   
 Dose:  12.5 mg Take 12.5 mg by mouth daily. Indications: Patient does not recall dosage of HCTZ Refills:  0  
     
   
   
   
  
 * hydroCHLOROthiazide 50 mg tablet Commonly known as:  HYDRODIURIL What changed: You were already taking a medication with the same name, and this prescription was added. Make sure you understand how and when to take each. Your last dose was: Your next dose is:    
   
   
 Dose:  50 mg Take 1 Tab by mouth daily. Indications: hypertension Quantity:  14 Tab Refills:  0  
     
   
   
   
  
 * lisinopril 10 mg tablet Commonly known as:  Francois Guerrier What changed:  Another medication with the same name was added. Make sure you understand how and when to take each. Your last dose was: Your next dose is:    
   
   
 Dose:  10 mg Take 10 mg by mouth daily. Refills:  0  
     
   
   
   
  
 * lisinopril 40 mg tablet Commonly known as:  Francois Guerrier What changed: You were already taking a medication with the same name, and this prescription was added. Make sure you understand how and when to take each. Your last dose was: Your next dose is:    
   
   
 Dose:  40 mg Take 1 Tab by mouth daily. Indications: hypertension Quantity:  14 Tab Refills:  0  
     
   
   
   
  
 * Notice: This list has 4 medication(s) that are the same as other medications prescribed for you. Read the directions carefully, and ask your doctor or other care provider to review them with you. Where to Get Your Medications Information on where to get these meds will be given to you by the nurse or doctor. ! Ask your nurse or doctor about these medications buPROPion  mg tablet buPROPion  mg XL tablet  
 hydroCHLOROthiazide 50 mg tablet  
 lisinopril 40 mg tablet  
 propranolol 40 mg tablet

## 2017-08-07 NOTE — ED NOTES
0 700: I assumed care of this patient from Dr. Antonieta Jewell. Patient presented with suicidal ideation, was medically cleared and evaluated by psychiatry. Pending placement at this time    Patient complains of RIGHT leg pain, history of ankle fracture diagnosed at [facility] on 7/3/17, he was placed in a splint initially but had taken it off himself and now wears a ankle wrap. He is 2+ dorsalis pedis pulses, no tenderness on palpation, requested Tylenol and was obliged    Patient was evaluated by [facility] felt to be too acute for crisis stabilization and will be referred to inpatient psychiatry. 1100 Consult:  Discussed care with Mike Garrett. Standard discussion; including history of patients chief complaint, available diagnostic results, and treatment course. It is now the end of my shift, I am still awaiting placement. Patient will be signed out to the oncoming physician Dr. Mahesh Durham at [phone]. Disposition:    Pending      Portions of this chart were created with Dragon medical speech to text program.   Unrecognized errors may be present.

## 2017-08-07 NOTE — ED NOTES
April PA notified that patients BP is trending down however still elevated. No orders received at this time.

## 2017-08-08 PROBLEM — F32.A DEPRESSION: Status: ACTIVE | Noted: 2017-08-08

## 2017-08-08 LAB
ALBUMIN SERPL BCP-MCNC: 3.3 G/DL (ref 3.4–5)
ALBUMIN/GLOB SERPL: 1.1 {RATIO} (ref 0.8–1.7)
ALP SERPL-CCNC: 67 U/L (ref 45–117)
ALT SERPL-CCNC: 24 U/L (ref 16–61)
ANION GAP BLD CALC-SCNC: 5 MMOL/L (ref 3–18)
AST SERPL W P-5'-P-CCNC: 13 U/L (ref 15–37)
BASOPHILS # BLD AUTO: 0 K/UL (ref 0–0.06)
BASOPHILS # BLD: 0 % (ref 0–2)
BILIRUB SERPL-MCNC: 0.5 MG/DL (ref 0.2–1)
BUN SERPL-MCNC: 14 MG/DL (ref 7–18)
BUN/CREAT SERPL: 14 (ref 12–20)
CALCIUM SERPL-MCNC: 8.7 MG/DL (ref 8.5–10.1)
CHLORIDE SERPL-SCNC: 105 MMOL/L (ref 100–108)
CO2 SERPL-SCNC: 31 MMOL/L (ref 21–32)
CREAT SERPL-MCNC: 0.99 MG/DL (ref 0.6–1.3)
DIFFERENTIAL METHOD BLD: ABNORMAL
EOSINOPHIL # BLD: 0 K/UL (ref 0–0.4)
EOSINOPHIL NFR BLD: 1 % (ref 0–5)
ERYTHROCYTE [DISTWIDTH] IN BLOOD BY AUTOMATED COUNT: 16.6 % (ref 11.6–14.5)
GLOBULIN SER CALC-MCNC: 3.1 G/DL (ref 2–4)
GLUCOSE SERPL-MCNC: 92 MG/DL (ref 74–99)
HCT VFR BLD AUTO: 37.6 % (ref 36–48)
HGB BLD-MCNC: 12 G/DL (ref 13–16)
LYMPHOCYTES # BLD AUTO: 37 % (ref 21–52)
LYMPHOCYTES # BLD: 1.4 K/UL (ref 0.9–3.6)
MCH RBC QN AUTO: 25.9 PG (ref 24–34)
MCHC RBC AUTO-ENTMCNC: 31.9 G/DL (ref 31–37)
MCV RBC AUTO: 81 FL (ref 74–97)
MONOCYTES # BLD: 0.3 K/UL (ref 0.05–1.2)
MONOCYTES NFR BLD AUTO: 7 % (ref 3–10)
NEUTS SEG # BLD: 2.1 K/UL (ref 1.8–8)
NEUTS SEG NFR BLD AUTO: 55 % (ref 40–73)
PLATELET # BLD AUTO: 218 K/UL (ref 135–420)
PMV BLD AUTO: 10.4 FL (ref 9.2–11.8)
POTASSIUM SERPL-SCNC: 3.4 MMOL/L (ref 3.5–5.5)
PROT SERPL-MCNC: 6.4 G/DL (ref 6.4–8.2)
RBC # BLD AUTO: 4.64 M/UL (ref 4.7–5.5)
SODIUM SERPL-SCNC: 141 MMOL/L (ref 136–145)
TSH SERPL DL<=0.05 MIU/L-ACNC: 0.7 UIU/ML (ref 0.36–3.74)
WBC # BLD AUTO: 3.7 K/UL (ref 4.6–13.2)

## 2017-08-08 PROCEDURE — 65220000005 HC RM SEMIPRIVATE PSYCH 3 OR 4 BED

## 2017-08-08 PROCEDURE — 85025 COMPLETE CBC W/AUTO DIFF WBC: CPT | Performed by: PSYCHIATRY & NEUROLOGY

## 2017-08-08 PROCEDURE — 74011250637 HC RX REV CODE- 250/637: Performed by: PSYCHIATRY & NEUROLOGY

## 2017-08-08 PROCEDURE — 80053 COMPREHEN METABOLIC PANEL: CPT | Performed by: PSYCHIATRY & NEUROLOGY

## 2017-08-08 PROCEDURE — 84443 ASSAY THYROID STIM HORMONE: CPT | Performed by: PSYCHIATRY & NEUROLOGY

## 2017-08-08 PROCEDURE — 36415 COLL VENOUS BLD VENIPUNCTURE: CPT | Performed by: PSYCHIATRY & NEUROLOGY

## 2017-08-08 PROCEDURE — 74011250637 HC RX REV CODE- 250/637: Performed by: STUDENT IN AN ORGANIZED HEALTH CARE EDUCATION/TRAINING PROGRAM

## 2017-08-08 RX ADMIN — HYDROCHLOROTHIAZIDE 25 MG: 25 TABLET ORAL at 08:20

## 2017-08-08 RX ADMIN — LORAZEPAM 2 MG: 1 TABLET ORAL at 20:29

## 2017-08-08 RX ADMIN — LISINOPRIL 20 MG: 20 TABLET ORAL at 08:20

## 2017-08-08 RX ADMIN — IBUPROFEN 400 MG: 400 TABLET ORAL at 10:26

## 2017-08-08 NOTE — H&P
[address] [facility] ADMIT NOTE-P    Name:  Monica Oates  MR#:  [id number]  :  1965  Account #:  [de-identified]  Date of Adm:  2017      CHIEF COMPLAINT: \"I guess I am here because of psychological  distress. \"    HISTORY OF PRESENT ILLNESS: This is a 30-year-old [facility]  American male with unclear psychiatric history who presents to the  hospital for possible worsening of depression and psychological  distress in the context of homelessness and separation from his wife  and recent death of his sister and his mother. Of note, the patient is  well known to [facility]. From chart  review, he was recently discharged from [address] [place] [address] on 2017 for similar complaints. The patient has been on the  streets. 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 The patient is somewhat bizarre and a little  disoriented. He has significant profound difficulty concentrating and  answering questions, though does not quite appear internally  distracted. His thought process is tangential and there is difficulty  redirecting the patient at times in interview. He admits that his biggest  stressor began about 4 years ago when he  from his wife  and says that divorce papers are not yet finalized. Also, he has a  stressor of not seeing his children. He has 3 children, 2 are in college  and one is in high school. He is not interested in medications at this  time, but more interested in \"psychological evaluation\" for his  symptoms. The patient admits to depressed mood, feeling more guilt. Denies hallucinations. No overt delusions but some paranoia toward  brother-in-law. His brother-in-law \"worked his way into the family\" to  separate he and his ex-wife.  Collateral will be required to determine  further history for the patient as the patient could not provide sufficient  information. PSYCHIATRIC HISTORY: He has been diagnosed with unspecified  depression and mood disorder. He never followed up with any  outpatient providers. It does appear that he has had a few  hospitalizations at [facility] for similar psychiatric symptoms. No history of suicide attempts noted. The patient is not currently on  medications and denies any medications in the past.    PAST MEDICAL/SURGICAL HISTORY: The patient has high blood  pressure. ALLERGIES: NO KNOWN DRUG ALLERGIES. FAMILY HISTORY: Mental illnesses unknown. SOCIAL HISTORY: The patient grew up locally. 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 He is still  legally , but  from his wife with 3 children and 1 son in  high school and 2 daughters in college. He finished high school and  went to some trade school for IT. 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 No outstanding legal issues as noted. Substance  use: The patient denies alcohol or illicit drug use. Smokes about 1/2  pack of cigarettes a day. REVIEW OF SYSTEMS: Review of 10-organ systems was negative  except as noted in HPI. MENTAL STATUS: The patient is significantly malodorous and  disheveled, poor grooming. He is calm, cooperative. Concentration is  impaired. Motor positive for psychomotor retardation. Speech is slowed  and soft and difficulty in answering questions fully, though clear speech  when he does speak. Mood is \"I am not too good these days. \" Affect is  flat. Thought process is impoverished and tangential. Thought content:  He denies suicidal and homicidal ideation. Perception: He denies  auditory or visual hallucinations. Memory is impaired. The patient could  not remember 3 objects after 5 minutes, even with prompting and  difficulty giving full information and long-term memory as well. Abstract  thinking is impaired.  The patient is somewhat concrete. The patient's  clock drawing is also impaired, and executive functioning appears to  be poor, though cognition overall intellect appears mostly intact. Insight  is poor. judgment is fair. Risk assessment, he has no prior suicide  attempts, no current ideation or plan. No access to weapons. No active  substance use, though he has very limited protective factors and  multiple stressors of recent death of loved ones and separation from  family, overall social isolation, homelessness and no access to [facility] at this time. ASSESSMENT: This is a 48-year-old [facility] American male with  unclear psychiatric history who presents to the psychiatric unit for  worsening depression. Per history and chart review, it does appear  that the patient may suffer from major depressive disorder with  significant cognitive impairments including memory, concentration  and abstract thinking deficits as well as executive functioning deficits. This could also be due to an underlying general medical condition,  possibly worsening that if he does not have good outpatient medical  services. He also could be suffering from underlying psychotic  disorder, predominantly negative symptoms, although there is no  active positive symptoms of psychosis at this time. Bipolar is less likely  as well as post-traumatic stress disorder is less likely at this time. DIAGNOSES:  1. Major depressive disorder, severe, with cognitive deficit. 2. Adjustment disorder with depressed mood. PLAN:  1. Continue with inpatient psychiatric treatment for safety stabilization  and medication management. 2. Continue with suicide and assault precautions. 3. The patient is to continue with art and OT and family therapy  sessions. 4. We will need to talk with outpatient providers and continue to review  chart for more information.   5. We will need to talk with any family, friends or other contacts for  possibly more collateral.  6. We will hold on psychiatric medications at this time as the patient is  not agreeable. 7. Labs are reviewed, within normal limits. 8. Social work to help with disposition. The patient will need outpatient  psychiatric followup. 9. Estimated length of stay 5-7 days. ANGE Aguayo MD SR / PILAR  D:  08/08/2017   13:25  T:  08/08/2017   14:07  Job #:  [id number]

## 2017-08-08 NOTE — PROGRESS NOTES
Problem: Suicide/Homicide (Adult/Pediatric)  Goal: *STG: Remains safe in hospital  Patient will demonstrate safe behavior and contract for safety every shift during hospital stay. Outcome: Progressing Towards Goal  No attempts to harm self or others   Goal: *STG: Attends activities and groups  Patient will attend 2-3 groups daily during hospital stay. Outcome: Progressing Towards Goal  Not participating   Goal: *STG/LTG: No longer expresses self destructive or suicidal/homicidal thoughts  Patient will no longer express suicidal/homicidal thoughts during hospital stay. Outcome: Progressing Towards Goal  Denies     Problem: Hypertension  Goal: *Blood pressure within specified parameters  Outcome: Progressing Towards Goal  B/P elevated but pt was compliant with HTN medications today    Comments:   Pt is mostly quiet and isolative but pleasant on approach. He states he was suicidal because he was overwhelmed. Pt states he lost both his sister and mother. He stated he jumped from a bridge but it wasn't high and he landed on the ground. Pt denies feeling suicidal today. This nurse explained he would see the doctor today and MD may want to start him on medication. Pt was resistant to being on medication. Explained to pt that may or may not be what the doctor would do. Pt is very malodorous.

## 2017-08-08 NOTE — H&P
History and Physical        Patient: Ana Bowers               Sex: male          DOA: 8/7/2017         YOB: 1965      Age:  47 y.o.        LOS:  LOS: 1 day        HPI:     Ana Bowers is a 47 y.o. male who was admitted experiencing hopelessness, helplessness, suicidal ideation with a plan to jump off a bridge. Active Problems:    * No active hospital problems. *      Past Medical History:   Diagnosis Date    Back pain     Hypertension     Psychiatric disorder     hallucinations       Past Surgical History:   Procedure Laterality Date    HX OTHER SURGICAL      oral sx       No family history on file. Social History     Social History    Marital status:      Spouse name: N/A    Number of children: 3    Years of education:  graduate     Social History Main Topics    Smoking status: Current Some Day Smoker    Smokeless tobacco: Never Used    Alcohol use Yes      Comment: beer    Drug use: Yes     Special: Marijuana, Cocaine    Sexual activity: Not on file     Other Topics Concern    Not on file     Social History Narrative   Patient states that he homeless. States appetite and sleep are poor. He states he works for [employer]. Prior to Admission medications    Medication Sig Start Date End Date Taking? Authorizing Provider   lisinopril (PRINIVIL, ZESTRIL) 10 mg tablet Take 10 mg by mouth daily. Yes Historical Provider   hydroCHLOROthiazide (HYDRODIURIL) 12.5 mg tablet Take 12.5 mg by mouth daily. Indications: Patient does not recall dosage of HCTZ   Yes Historical Provider       No Known Allergies    Review of Systems  A comprehensive review of systems was negative. Physical Exam:      Visit Vitals    BP (!) 156/109 (BP 1 Location: Right arm, BP Patient Position: Sitting)    Pulse 68    Temp 97.8 °F (36.6 °C)    Resp 17       Physical Exam:    General:  Alert, cooperative, well developed, well nourished AA male, no distress, appears stated age.    Eyes: Conjunctivae/corneas clear. PERRL, EOMs intact. Fundi benign   Ears:  Normal TMs and external ear canals both ears. Nose: Nares normal. Septum midline. Mucosa normal. No drainage or sinus tenderness. Mouth/Throat: Lips, mucosa, and tongue normal. Poor dentition and gums normal.   Neck: Supple, symmetrical, trachea midline, no adenopathy, thyroid: no enlargement/tenderness/nodules, no carotid bruit and no JVD. Back:   Symmetric, no curvature. ROM normal. No CVA tenderness. Lungs:   Clear to auscultation bilaterally. Heart:  Regular rate and rhythm, S1, S2 normal, no murmur, click, rub or gallop. Abdomen:   Soft, non-tender. Bowel sounds normal. No masses,  No organomegaly. Extremities: Right ankle is swollen, wrapped with ACE bandage. Pulses: 2+ and symmetric all extremities. Skin: Skin color, texture, turgor normal. No rashes or lesions   Lymph nodes: Cervical, supraclavicular, and axillary nodes normal.   Neurologic: CNII-XII intact. Normal strength, sensation and reflexes throughout.              Assessment/Plan     Hopeless  Helpless  Depression  Suicidal ideation  Right ankle bandaged  Labs reviewed   Continue treatment per physician's orders

## 2017-08-08 NOTE — BH NOTES
GROUP THERAPY PROGRESS NOTE    Shea Borrero is not  participating in Goals Group and Community. Staff encouraged and pt refused.

## 2017-08-08 NOTE — BSMART NOTE
OCCUPATIONAL THERAPY PROGRESS NOTE    Group Time:  8276  Attendance: The patient attended full group. .  Participation:  The patient participated with moderate elaboration in the activity. Attention:  The patient was able to focus on the activity. Interaction:  The patient occasionally  interacts with others. Participated as asked with occasional spontaneous comments (superficial).

## 2017-08-08 NOTE — BSMART NOTE
SW Contact:  While introducing self to pt he seemed somewhat distracted but made effort to focus. His brief summary of hospitalization is consistent with Dr & charge nurse admission notes. . Theme of being overwhelmed by numerous stressors impacts / triggers an apparent overload of negative self statements & irrational thinking.  Presented pt with goal sheets to start working on to review with his

## 2017-08-08 NOTE — BH NOTES
Patient arrived to unit alert and oriented X 3 via stretcher, accompanied by 2 paramedics and a hospital . Patient cooperative with admission assessment, patient rights explained and he verbalized understanding of patient rights. Patient denies thoughts to harm self or others and contracted to safety. Patient stated that is has been depressed for couple of months due to homelessness and inability to care for self properly. 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 Patient stated that this is his 2nd psych admission in which the 1st was at [facility] [place]. Patient observed with a mildly swollen right ankle, he stated that it was fractured in that late June into July of 2017 and the ankle is healing. Patient verbalized pain on a scale of 7/10 to his right ankle, received Motrin 400 mg PO PRN. Patient observed ambulates unsteadily without any assistance, encouraged to utilize non slip socks for safety.  Dr Bel Tucker was notified about patient's increased blood pressure readings, new order received and carried out

## 2017-08-08 NOTE — BH NOTES
Pt came to the desk asking for aspirin for his knee shortly after being medicated for pain with Motrin. He did not remember taking the Motrin. He now appears very distracted with an odd affect.

## 2017-08-08 NOTE — BH NOTES
GROUP THERAPY PROGRESS NOTE    Flaco Valencia is not participating in Recreational Therapy. Pt chose to stay in bed and rest, due to stated leg pain.

## 2017-08-09 ENCOUNTER — APPOINTMENT (OUTPATIENT)
Dept: CT IMAGING | Age: 52
DRG: 885 | End: 2017-08-09
Attending: STUDENT IN AN ORGANIZED HEALTH CARE EDUCATION/TRAINING PROGRAM
Payer: SELF-PAY

## 2017-08-09 PROCEDURE — 70450 CT HEAD/BRAIN W/O DYE: CPT

## 2017-08-09 PROCEDURE — 65220000005 HC RM SEMIPRIVATE PSYCH 3 OR 4 BED

## 2017-08-09 PROCEDURE — 74011250637 HC RX REV CODE- 250/637: Performed by: STUDENT IN AN ORGANIZED HEALTH CARE EDUCATION/TRAINING PROGRAM

## 2017-08-09 PROCEDURE — 74011250637 HC RX REV CODE- 250/637: Performed by: PSYCHIATRY & NEUROLOGY

## 2017-08-09 RX ORDER — LISINOPRIL 40 MG/1
40 TABLET ORAL DAILY
Status: DISCONTINUED | OUTPATIENT
Start: 2017-08-10 | End: 2017-08-15 | Stop reason: HOSPADM

## 2017-08-09 RX ORDER — CLONIDINE HYDROCHLORIDE 0.1 MG/1
0.1 TABLET ORAL
Status: COMPLETED | OUTPATIENT
Start: 2017-08-09 | End: 2017-08-09

## 2017-08-09 RX ORDER — BUPROPION HYDROCHLORIDE 150 MG/1
150 TABLET ORAL
Status: DISCONTINUED | OUTPATIENT
Start: 2017-08-09 | End: 2017-08-14

## 2017-08-09 RX ADMIN — IBUPROFEN 400 MG: 400 TABLET ORAL at 22:07

## 2017-08-09 RX ADMIN — IBUPROFEN 400 MG: 400 TABLET ORAL at 10:34

## 2017-08-09 RX ADMIN — CLONIDINE HYDROCHLORIDE 0.1 MG: 0.1 TABLET ORAL at 21:12

## 2017-08-09 RX ADMIN — IBUPROFEN 400 MG: 400 TABLET ORAL at 17:20

## 2017-08-09 RX ADMIN — LISINOPRIL 20 MG: 20 TABLET ORAL at 08:21

## 2017-08-09 RX ADMIN — BUPROPION HYDROCHLORIDE 150 MG: 150 TABLET, FILM COATED, EXTENDED RELEASE ORAL at 14:15

## 2017-08-09 RX ADMIN — HYDROCHLOROTHIAZIDE 25 MG: 25 TABLET ORAL at 08:21

## 2017-08-09 NOTE — BH NOTES
Psychiatric Progress Note    Date: 17  Patient Name: Ed Pandey  : 1965  MRN: [id number]  Hospital Day: 3      INTERVAL HISTORY:   Patient is calm, slept \"okay\" last night, eating well, endorses depressed mood and says he \"needs help to piece his life back together,\" though can't state what that means. Still disheveled and unaware of surroundings, poor memory, confusion, slowed speech and movements. Denies hallucinations. Not suicidal/ homicidal at this time. MENTAL STATUS EXAM:  Appearance: Dressed in hospital gown with poor grooming and hygiene  Behavior: Cooperative with good eye contact  Motor: + psychomotor retardation  Speech: slowed, soft speech   Mood: \"depressed\"  Affect: flat  Thought Process: thought blocking, impoverished  Thought Content: Denies SI and HI  Perception: Denies AH or VH  Concentration: fair  Memory: impaired  Cognition: Alert and oriented  Insight: Fair  Judgment: Fair    RISK ASSESSMENT:   Prior Attempts: no noted prior  Lethality of Attempts: none noted prior  Current Ideation/Plan: denies  Protective Factors: limited  Future Orientation: limited    ASSESSMENT:   This is a 21[facility] with unclear psychiatric history who presents to the hospital for possible worsening of depression and possible psychotic features. 17- Pt remains flat affect, poor concentration, thought blocking, memory deficits. Depressed mood. Diagnoses:  Major Depressive Disorder with psychotic features    Plan:  1. Continue with inpatient psychiatric treatment for containment, stabilization and medication management  2. Patient is to continue with group therapy  3. Medications: Wellbutrin  mg po daily for depression, concentration, focus  4. Head CT to rule out medical etiology  5. SW to help with disposition  6.  ELOS 5-7 days

## 2017-08-09 NOTE — PROGRESS NOTES
conducted an initial consultation and Spiritual Assessment for Otilia Welch, who is a 47 y. o.,male. Patients Primary Language is: [place]. According to the patients EMR Shinto Affiliation is: Other. The reason the Patient came to the hospital is:   Patient Active Problem List    Diagnosis Date Noted    Depression 08/08/2017        The  provided the following Interventions:  Initiated a relationship of care and support. Explored issues of angela, belief, spirituality. Listened empathically. Offered prayer on patient's behalf. Chart reviewed. The following outcomes where achieved:  Patient shared limited information about both their medical narrative and spiritual journey/beliefs. Patient processed feelings. Patient expressed gratitude for 's visit. Assessment:  Patient does not have any Rastafari/cultural needs that will affect patients preferences in health care. There are no spiritual or Rastafari issues which require intervention at this time. Plan:  Chaplains will continue to follow and will provide pastoral care on an as needed/requested basis.  recommends bedside caregivers page  on duty if patient shows signs of acute spiritual or emotional distress. Bard Chol.  [place] [phone]

## 2017-08-09 NOTE — BSMART NOTE
ART THERAPY GROUP PROGRESS NOTE    PATIENT SCHEDULED FOR GROUP AT: 14:45    ATTENDANCE: Full    PARTICIPATION LEVEL: Participates fully in the art process    ATTENTION LEVEL: Able to focus on task    FOCUS: Problem-solving    SYMBOLIC & THEMATIC CONTENT AS NOTED IN IMAGERY: He was alert and made appropriate eye-contact with this writer, and also interacted with group members. His associations were relevant at times, however had a loose quality.

## 2017-08-09 NOTE — BH NOTES
GROUP THERAPY PROGRESS NOTE    Velvet Skiff is not  participating in [place]. Group time: 30 minutes    Personal goal for participation: rules/ regulations    Goal orientation: community    Group therapy participation: pt refused group     Therapeutic interventions reviewed and discussed: Pt refused group with much encouragement by staff. Impression of participation: The above pt refused to participate in group this shift.

## 2017-08-09 NOTE — PROGRESS NOTES
conducted an initial consultation and Spiritual Assessment for Belia Nicolas, who is a 47 y. o.,male. Patients Primary Language is: [place]. According to the patients EMR Jewish Affiliation is: Other. The reason the Patient came to the hospital is:   Patient Active Problem List    Diagnosis Date Noted    Depression 08/08/2017        The  provided the following Interventions:  Initiated a relationship of care and support. Explored issues of angela, belief, spirituality and Mormon/ritual needs while hospitalized. Listened empathically as patient shared. Provided chaplaincy education. Provided information about Spiritual Care Services. Offered prayer and assurance of continued prayers on patient's behalf. Chart reviewed. The following outcomes where achieved:  Patient shared limited information about both their medical narrative and spiritual journey/beliefs. Patient processed feeling about current hospitalization. Patient expressed gratitude for 's visit. Plan:  Chaplains will continue to follow and will provide pastoral care on an as needed/requested basis.  recommends bedside caregivers page  on duty if patient shows signs of acute spiritual or emotional distress.       Clarisa Alford [facility]  [phone]

## 2017-08-09 NOTE — PROGRESS NOTES
Problem: Suicide/Homicide (Adult/Pediatric)  Goal: *STG: Remains safe in hospital  Patient will demonstrate safe behavior and contract for safety every shift during hospital stay. Outcome: Progressing Towards Goal  Pt has not engaged in any self injurious behaviors  Goal: *STG/LTG: No longer expresses self destructive or suicidal/homicidal thoughts  Patient will no longer express suicidal/homicidal thoughts during hospital stay. Outcome: Progressing Towards Goal  Pt not verbalizing SI    Comments:   Pt isolating to self in room. Pt pleasant and cooperative with staff however does not seem interested in engaging in conversations related to admission. Pt at times has difficulty remembering recent behaviors in re: vs being obtained and compliance with medications. Pt has asked this writer twice if his BP has been taken and if he needed to take his BP meds. Pt does report feeling better from a medical standpoint. Pt approached desk stating \"I guess they are going to get ready to discharge me. I have to prepare myself mentally and wrap my head around it\" again pt seemed uninterested when informed no arrangements for discharge had been made. Pt not verbalizing SI at present time. Rounds maintained q 15 mins for safety.  Staff will continue to offer a safe and supportive environment

## 2017-08-09 NOTE — BH NOTES
GROUP THERAPY PROGRESS NOTE    Fely Dunaway is participating in Recreational Therapy. Group time: 45 minutes    Personal goal for participation: Exercise,Social    Goal orientation: social    Group therapy participation: active    Therapeutic interventions reviewed and discussed: Exercise, Social    Impression of participation: Pt fully engaged in Recreational group. Pt played basketball,social with peers, and played corn hole with this writer. Pt was competitive ( in a good way) and encouraging. Pt appeared to really enjoy getting off the unit, especially since he was not up to it yesterday. Expressed his gratitude to staff.

## 2017-08-09 NOTE — BH NOTES
Patient B/P 168/98 Pulse 64 Temp 97.2 RR 18 on call physician  notified of patient B/P patient is asymptomatic no new orders given will continue to monitor.

## 2017-08-09 NOTE — BH NOTES
Pt has been very pleasant this evening. \"I'm feeling good today. \" I feel a lot more relaxed\". I participate in groups more. \" Pt did not participate in Recreational group due to stated leg pain. \"I was doing work on a roof and fell. \" Patient's affect brightened up even more when he spoke to this writer and a fellow MHT about his love for football and wrestling. \"I may be small, but I'm very fast.\" \"They couldn't catch me back in the day:\". Pt ate all of his dinner meal and snack. Pt did not have visitors this shift. Pt is utlizing non-skid footwear and is free of falls. Pt denies SI, HI, AH, and VH. Pt contarcts for safety on the unit. Will continue to monitor.

## 2017-08-10 PROCEDURE — 74011250637 HC RX REV CODE- 250/637: Performed by: STUDENT IN AN ORGANIZED HEALTH CARE EDUCATION/TRAINING PROGRAM

## 2017-08-10 PROCEDURE — 65220000005 HC RM SEMIPRIVATE PSYCH 3 OR 4 BED

## 2017-08-10 PROCEDURE — 74011250637 HC RX REV CODE- 250/637: Performed by: PSYCHIATRY & NEUROLOGY

## 2017-08-10 RX ORDER — LORAZEPAM 1 MG/1
1 TABLET ORAL
Status: DISCONTINUED | OUTPATIENT
Start: 2017-08-10 | End: 2017-08-14

## 2017-08-10 RX ADMIN — LORAZEPAM 1 MG: 1 TABLET ORAL at 17:29

## 2017-08-10 RX ADMIN — LORAZEPAM 1 MG: 1 TABLET ORAL at 14:39

## 2017-08-10 RX ADMIN — LISINOPRIL 40 MG: 40 TABLET ORAL at 08:34

## 2017-08-10 RX ADMIN — LORAZEPAM 1 MG: 1 TABLET ORAL at 21:12

## 2017-08-10 RX ADMIN — BUPROPION HYDROCHLORIDE 150 MG: 150 TABLET, FILM COATED, EXTENDED RELEASE ORAL at 06:26

## 2017-08-10 RX ADMIN — HYDROCHLOROTHIAZIDE 25 MG: 25 TABLET ORAL at 08:34

## 2017-08-10 RX ADMIN — IBUPROFEN 400 MG: 400 TABLET ORAL at 21:12

## 2017-08-10 NOTE — BSMART NOTE
Patient displayed a bland affect during shift. Patient stated he fells a lot better since he had a chance to clear his head. Patient has know thoughts of harming himself. Patient will talk to staff when feeling un safe.

## 2017-08-10 NOTE — PROGRESS NOTES
Problem: Suicide/Homicide (Adult/Pediatric)  Goal: *STG: Remains safe in hospital  Patient will demonstrate safe behavior and contract for safety every shift during hospital stay. Outcome: Progressing Towards Goal  Patient contracts for safety and demonstrated safe behavior on the unit; monitored for safety per protocol. Patient is alert and oriented x 4; pleasant and cooperative, denied thoughts of harm to self or others; denied hallucinations; dull affect with euthymic mood, \"not really depressed. \" Patient stated that he's feeling better. Artist Nicholas Artkenya Peterson \"just got to get my game plan together! My direction in life was broken and my fortitude tested, but right now, I feel outside the box. \" Patient stated the he needed to be in the hospital to get back on track; attended and participated in group activities; \"pretty good\" appetite and completed personal hygiene care this am; complaint with medications; will continue to monitor and maintain safe environment.

## 2017-08-10 NOTE — BH NOTES
Pt has been thanking staff repeatedly, throughout the entire shift, for our service and professionalism  to him. \" I had a good day\". \"This is like a vacation\". \"I have a chance to clear my head and relax\". Pt quickly forgets that he's either made a statement or a task has been performed already; has to be constantly reminded of what he's said or done. Pt is able to carry on a meaningful conversation, but does exhibit thought blocking. Pt is very pleasant and mild mannered; not a management issue. Pt played cards with this writer at the end of the day and picked on the rules of the game rather quickly;apperared to have a lot of fun. Pt reminded to speak to his physician concerning his discharge; states he's ready for a \"fresh start\". Informed him that the MHTs do not decide when he's discharged. Encouraged to write down questions and to come up with a step-by-step game plan due to to being homeless. Appeared to be taking in the advice and encouragement given by staff. He did not have visitors, nor was he observed on the phone. Pt ate 100% of his dinner meal and snack. Pt is utilizing non-skid footwear and is free of falls. Pt denies SI,HI. VH and AH. Pt contracts for safety and agrees to speak to staff if  feels unsafe towards himself or others. Will continue to monitor.

## 2017-08-10 NOTE — BH NOTES
Psychiatric Progress Note    Date: 08/10/17  Patient Name: Lyle Serrano  : 1965  MRN: [id number]  Hospital Day: 4      INTERVAL HISTORY:   Patient is calm, moving slowly. Takes several minutes to respond when name is called and even longer to walk down the hallway to meet with this provider. Bizarre communication and concrete thinking. He can't say whether he is depressed or not, then eventually says \"yes depressed\" when we were talking about something unrelated. He makes odd statements like \"I'm seeking help\" \"It's hard to tell what great changes physically and emotionally I could expect\" and \"hard to feel the mind changes\" but out of context to conversation and not answering any particular questions. He then randomly says he's \"clairvoyant\" for no apparent reason and can't state why he said that. Denies hallucinations. No delusions apparent. Not suicidal/ homicidal at this time. MENTAL STATUS EXAM:  Appearance: Dressed in hospital gown with poor grooming and hygiene  Behavior: Cooperative with good eye contact  Motor: + psychomotor retardation  Speech: slowed, soft speech   Mood: \"depressed\"  Affect: flat  Thought Process: thought blocking, impoverished  Thought Content: Denies SI and HI  Perception: Denies AH or VH  Concentration: fair  Memory: impaired  Cognition: Alert and oriented  Insight: Fair  Judgment: Fair    RISK ASSESSMENT:   Prior Attempts: no noted prior  Lethality of Attempts: none noted prior  Current Ideation/Plan: denies  Protective Factors: limited  Future Orientation: limited    ASSESSMENT:   This is a 59-year-old [facility] American male with unclear psychiatric history who presents to the hospital for possible worsening of depression and possible psychotic features. 8/10/17- Pt continues to exhibit odd clinical picture, including slowed movements, slow to answer questions, confusion, flat affect, poor concentration, thought blocking, memory deficits.  Depressed mood.      Diagnoses:  Major Depressive Disorder with psychotic features    Plan:  1. Continue with inpatient psychiatric treatment for containment, stabilization and medication management  2. Patient is to continue with group therapy  3. Medications: Wellbutrin  mg po daily for depression, concentration, focus  Start Ativan 1 mg po q4h while awake for catatonia like features  4. Head CT to rule out medical etiology  5. SW to help with disposition  6.  ELOS 5-7 days

## 2017-08-10 NOTE — BH NOTES
GROUP THERAPY PROGRESS NOTE    Olamide Rouse is participating in Goals Group.      Group time: 30 min    Personal goal for participation: Staying focus on goals and follow treatment plan    Goal orientation: personal    Group therapy participation: active    Therapeutic interventions reviewed and discussed: Having goals and coping skills in place upon discharge    Impression of participation: Pt actively participated in group activity

## 2017-08-10 NOTE — PROGRESS NOTES
Dr. Carolyne Horton made aware of pt's b/p 158/110; order received to give Now dose of Clonidine 0.1 mg po; patient given Clonidine 0.1 mg po as ordered; will continue to monitor. 22:05 pm  B/P-140/96, patient without c/o or distress noted.

## 2017-08-10 NOTE — BSMART NOTE
SW Contact:  Worked with pt at looking for housing / shelter options. .. Also helped him look at some basic CBT principles. Handouts for both given. Although upbeat there appears to be some thought blocking. He also was encouraged to journal to help sort out relationship between negative statements & his dysphoric mood.

## 2017-08-10 NOTE — BH NOTES
GROUP THERAPY PROGRESS NOTE    Fely Dunaway is participating in [place].      Group time: 30 minutes    Personal goal for participation: write down a plan for fresh start    Goal orientation: community    Group therapy participation: active    Therapeutic interventions reviewed and discussed: unit rules and personal goals

## 2017-08-11 PROCEDURE — 74011250637 HC RX REV CODE- 250/637: Performed by: STUDENT IN AN ORGANIZED HEALTH CARE EDUCATION/TRAINING PROGRAM

## 2017-08-11 PROCEDURE — 65220000005 HC RM SEMIPRIVATE PSYCH 3 OR 4 BED

## 2017-08-11 PROCEDURE — 74011250637 HC RX REV CODE- 250/637: Performed by: PSYCHIATRY & NEUROLOGY

## 2017-08-11 RX ORDER — BUPROPION HYDROCHLORIDE 150 MG/1
150 TABLET ORAL
Qty: 30 TAB | Refills: 0 | Status: SHIPPED | OUTPATIENT
Start: 2017-08-11 | End: 2017-08-26

## 2017-08-11 RX ADMIN — LORAZEPAM 1 MG: 1 TABLET ORAL at 19:51

## 2017-08-11 RX ADMIN — BUPROPION HYDROCHLORIDE 150 MG: 150 TABLET, FILM COATED, EXTENDED RELEASE ORAL at 08:22

## 2017-08-11 RX ADMIN — LISINOPRIL 40 MG: 40 TABLET ORAL at 08:22

## 2017-08-11 RX ADMIN — LORAZEPAM 1 MG: 1 TABLET ORAL at 15:30

## 2017-08-11 RX ADMIN — LORAZEPAM 1 MG: 1 TABLET ORAL at 11:19

## 2017-08-11 RX ADMIN — HYDROCHLOROTHIAZIDE 25 MG: 25 TABLET ORAL at 08:22

## 2017-08-11 NOTE — BH NOTES
Patient is pending discharge to self and will follow up with [address] [place] [address]. Patient has been provided with information regarding mental health follow up care, with medications and follow up appointment in place. Patient has been educated regarding seeking additional support if needed with information listed on discharge paper work.

## 2017-08-11 NOTE — BSMART NOTE
SW Contact:  Aftercare plan reviewed with client to include Intake appointment at [facility] (see fyi). . Safety plan also discussed.

## 2017-08-11 NOTE — BH NOTES
GROUP THERAPY PROGRESS NOTE    Myrna Desouza is not participating in [place].      Group time: 30 minutes    Personal goal for participation: none    Goal orientation: community    Group therapy participation: sleeping    Therapeutic interventions reviewed and discussed: goals and procedures    Impression of participation: enocouraged

## 2017-08-11 NOTE — BSMART NOTE
ACTIVITIES THERAPY PROGRESS NOTE    Group time:1220    The patient did not refuse, but, did not come to group.

## 2017-08-11 NOTE — PROGRESS NOTES
Problem: Suicide/Homicide (Adult/Pediatric)  Goal: *STG/LTG: Complies with medication therapy  Daily, patient will comply with medications and verbalize the importance of medication compliance. Outcome: Progressing Towards Goal  Patient has been compliant with prescribed medications. Goal: *STG/LTG: No longer expresses self destructive or suicidal/homicidal thoughts  Patient will no longer express suicidal/homicidal thoughts during hospital stay. Outcome: Progressing Towards Goal  Patient has been free of suicidal/homicidal ideation. Comments:   Writer attempted to contact MD regarding patient's change in condition, Dr. HARRELL notified of change in patient's condition with discharge cancelled and patient to be evaluated by MD in the AM. Patient preparing for discharge and appeared confused and disorganized stating \"yeah the medicine you guys gave me helped me\". Patient then continued to focus on belongings that were not present within facility. Patient was unaware of his previous locations stating \"ah I have a strong mind, but I guess I just lost my thoughts for a while or so\". Patient standing at desk confused and unsure of what to do \"ah should I stay here with this or should I do what? \". Patient asking writer to use cell phone with no phone in property along with credit card and various other items not in patient's property. Patient requesting cake from t stating \"another piece please\", when advised there was no cake patient stated Helena Luevano there is always an extra slice of cake, please\". Patient's medication placed in locked medication cabinet. Will discuss BP medication scripts with MD prior to pending discharge. Will monitor for safety.

## 2017-08-11 NOTE — BH NOTES
GROUP THERAPY PROGRESS NOTE    Ekaterina Liriano is participating in Positive thoughts.      Group time: 30 minutes    Personal goal for participation: remaining positive    Goal orientation: personal    Group therapy participation: active    Therapeutic interventions reviewed and discussed: PT was encouraged by staff    Impression of participation: calm

## 2017-08-12 PROCEDURE — 65220000005 HC RM SEMIPRIVATE PSYCH 3 OR 4 BED

## 2017-08-12 PROCEDURE — 74011250637 HC RX REV CODE- 250/637: Performed by: PSYCHIATRY & NEUROLOGY

## 2017-08-12 PROCEDURE — 74011250637 HC RX REV CODE- 250/637: Performed by: STUDENT IN AN ORGANIZED HEALTH CARE EDUCATION/TRAINING PROGRAM

## 2017-08-12 RX ADMIN — IBUPROFEN 400 MG: 400 TABLET ORAL at 07:12

## 2017-08-12 RX ADMIN — LISINOPRIL 40 MG: 40 TABLET ORAL at 08:11

## 2017-08-12 RX ADMIN — LORAZEPAM 1 MG: 1 TABLET ORAL at 14:09

## 2017-08-12 RX ADMIN — LORAZEPAM 1 MG: 1 TABLET ORAL at 06:40

## 2017-08-12 RX ADMIN — IBUPROFEN 400 MG: 400 TABLET ORAL at 18:07

## 2017-08-12 RX ADMIN — BUPROPION HYDROCHLORIDE 150 MG: 150 TABLET, FILM COATED, EXTENDED RELEASE ORAL at 06:40

## 2017-08-12 RX ADMIN — HYDROCHLOROTHIAZIDE 25 MG: 25 TABLET ORAL at 08:10

## 2017-08-12 NOTE — BH NOTES
Patient spent time in day room participating in groups, socializing with peers, reading and watching a movie. Patient presents calm.  Will continue to monitor per safety precautions

## 2017-08-12 NOTE — BH NOTES
GROUP THERAPY PROGRESS NOTE    Velvet Skiff is not participating in Recreational Therapy. Group time: 30 minutes    Personal goal for participation: The Pt will learn new ways to utilize exercise and relaxation technique to deal with stress. Goal orientation: relaxation    Group therapy participation: The Pt refused to participate in the group    Therapeutic interventions reviewed and discussed:     Impression of participation: The Pt refused to participate in the group.

## 2017-08-12 NOTE — PROGRESS NOTES
Problem: Suicide/Homicide (Adult/Pediatric)  Goal: *STG: Remains safe in hospital  Patient will demonstrate safe behavior and contract for safety every shift during hospital stay. Outcome: Progressing Towards Goal  Pt has not engaged in any self injurious behaviors  Goal: *STG/LTG: Complies with medication therapy  Daily, patient will comply with medications and verbalize the importance of medication compliance. Outcome: Progressing Towards Goal  Pt compliant with prescribed medications    Comments:   Pt has been in day area for majority of shift. Pt denies current SI. Pt continues to demonstrate poverty of thought and appears confused at times. Pt stated that ativan is helping him to feel \"calm\". Rounds maintained Q 15 mins for safety.  Staff will continue to offer a safe and supportive environment

## 2017-08-12 NOTE — PROGRESS NOTES
GROUP THERAPY PROGRESS NOTE    Jessica Hill is participating in Medication Group. Group time: 30 minutes    Personal goal for participation: Medication compliance. Goal orientation: medication education    Group therapy participation: active    Therapeutic interventions reviewed and discussed:     Impression of participation: Patient discussed staying compliant, barriers that keep pt. s from taking medication, for ex. cost, insurance and transportation.

## 2017-08-12 NOTE — BH NOTES
GROUP THERAPY PROGRESS NOTE    Veronica Cervantes is not participating in [place]. Group time: 15 minutes    Patient was encouraged to attend. He remained in bed.

## 2017-08-13 PROCEDURE — 74011250637 HC RX REV CODE- 250/637: Performed by: STUDENT IN AN ORGANIZED HEALTH CARE EDUCATION/TRAINING PROGRAM

## 2017-08-13 PROCEDURE — 65220000005 HC RM SEMIPRIVATE PSYCH 3 OR 4 BED

## 2017-08-13 PROCEDURE — 74011250637 HC RX REV CODE- 250/637: Performed by: PSYCHIATRY & NEUROLOGY

## 2017-08-13 RX ORDER — PROPRANOLOL HYDROCHLORIDE 20 MG/1
20 TABLET ORAL 2 TIMES DAILY
Status: DISCONTINUED | OUTPATIENT
Start: 2017-08-13 | End: 2017-08-14

## 2017-08-13 RX ADMIN — LISINOPRIL 40 MG: 40 TABLET ORAL at 08:08

## 2017-08-13 RX ADMIN — BUPROPION HYDROCHLORIDE 150 MG: 150 TABLET, FILM COATED, EXTENDED RELEASE ORAL at 06:28

## 2017-08-13 RX ADMIN — LORAZEPAM 1 MG: 1 TABLET ORAL at 18:03

## 2017-08-13 RX ADMIN — PROPRANOLOL HYDROCHLORIDE 20 MG: 20 TABLET ORAL at 20:20

## 2017-08-13 RX ADMIN — PROPRANOLOL HYDROCHLORIDE 20 MG: 20 TABLET ORAL at 14:18

## 2017-08-13 RX ADMIN — HYDROCHLOROTHIAZIDE 25 MG: 25 TABLET ORAL at 08:08

## 2017-08-13 RX ADMIN — LORAZEPAM 1 MG: 1 TABLET ORAL at 14:13

## 2017-08-13 RX ADMIN — IBUPROFEN 400 MG: 400 TABLET ORAL at 08:08

## 2017-08-13 RX ADMIN — IBUPROFEN 400 MG: 400 TABLET ORAL at 18:03

## 2017-08-13 NOTE — PROGRESS NOTES
Problem: Suicide/Homicide (Adult/Pediatric)  Goal: *STG: Remains safe in hospital  Patient will demonstrate safe behavior and contract for safety every shift during hospital stay. Outcome: Progressing Towards Goal  No attempts to harm self or others   Goal: *STG: Attends activities and groups  Patient will attend 2-3 groups daily during hospital stay. Outcome: Progressing Towards Goal  Participating in an occasional gorup  Goal: *STG/LTG: Complies with medication therapy  Daily, patient will comply with medications and verbalize the importance of medication compliance. Outcome: Progressing Towards Goal  Compliant   Goal: *STG/LTG: No longer expresses self destructive or suicidal/homicidal thoughts  Patient will no longer express suicidal/homicidal thoughts during hospital stay. Outcome: Progressing Towards Goal  Denies     Problem: Falls - Risk of  Goal: *Absence of falls  Patient will remain free from falls while hospitalized. Outcome: Progressing Towards Goal  No falls     Problem: Hypertension  Goal: *Blood pressure within specified parameters  Vital signs will remain within defined limits; report abnormal reading to MD as needed. Outcome: Not Progressing Towards Goal  B/P is low     Problem: Falls - Risk of  Goal: *Absence of Falls  Document Roxann Fall Risk and appropriate interventions in the flowsheet. Outcome: Not Progressing Towards Goal  Fall Risk Interventions:    yellow arm band   Fall sign   Close to nursing station                                    Comments:   Pt was compliant with morning medications. He is attending groups occasionally. He is isolative and quiet. When asked why he came to the hospital he stated we have been so good to him he can't remember why he came here. Pt then stated he came here to get better. When asked better from what he stated to get better from his position. When asked what position he was talking about he could not answer.   Pt appears confused at times with pauses in his responses. He has difficulty getting his thoughts out. He denies any thoughts of harming self or others and hallucinations of any kind.

## 2017-08-13 NOTE — BH NOTES
The pt was discussed with nursing staff and was seen during rounds. The pt reports doing well. He is tolerating medications without difficulty. He states he is concerned about his BP and his \"state of mind. \"  He further described his state of mind as his \"comprehension. \"  He is eating and sleeping well. MSE-  49yo AAM.  Kateryna Crews stated age. Cooperative. Speech is normal in rate, volume, and tone. No psychomotor agitation or retardation present. Stated mood is, \"Going forward to get back to the real world. \"  Affect is mildly constricted. Thoughts are mostly goal-directed with few episodes of unrelated discussion and tangential thoughts. Denies SI and HI. Denies psychosis. Insight and judgment are mildly impaired. A/P-MDD with psychotic features  1. Continue Wellbutrin XL 150mg daily. 2. Continue Ativan 1mg q4 hrs while awake. 3. Continue hospitalization.

## 2017-08-13 NOTE — BH NOTES
GROUP THERAPY PROGRESS NOTE    Khalida Ryder is participating in [place].      Group time: 30 minutes    Personal goal for participation: discuss unit issues and guideline compliance, discuss daily Tx goal(s)                 Goal orientation: personal    Group therapy participation: active

## 2017-08-13 NOTE — BH NOTES
The pt was discussed with nursing staff and was seen during rounds. The pt reports tolerating meds without difficulty. He was educated about elevated BP. Pt agreeable to starting Inderal.  The pt is eating and sleeping well. He states he feels ready for discharge.      MSE-  49yo AAM.  Carmen Person stated age. Cooperative. Speech is normal in rate, volume, and tone. No psychomotor agitation or retardation present. Stated mood is, \"Calm. \"  Affect is mildly constricted. Thoughts are goal-directed. Denies SI and HI. Denies psychosis. Insight and judgment are fair.      A/P-MDD with psychotic features  1. Continue Wellbutrin XL 150mg daily. 2. Continue Ativan 1mg q4 hrs while awake. 3. Will add Inderal 20mg bid for elevated BP. 4. Continue hospitalization.

## 2017-08-13 NOTE — BH NOTES
GROUP THERAPY PROGRESS NOTE    Sanjay Lim is participating in Recreational Therapy. Group time: 30 min    Personal goal for participation: Being able to use coping skills to relax. Goal orientation: relaxation    Group therapy participation: active    Therapeutic interventions reviewed and discussed: Ways to relax. Impression of participation: Pt actively participated in group activity.

## 2017-08-14 PROCEDURE — 74011250637 HC RX REV CODE- 250/637: Performed by: PSYCHIATRY & NEUROLOGY

## 2017-08-14 PROCEDURE — 74011250637 HC RX REV CODE- 250/637: Performed by: STUDENT IN AN ORGANIZED HEALTH CARE EDUCATION/TRAINING PROGRAM

## 2017-08-14 PROCEDURE — 65220000005 HC RM SEMIPRIVATE PSYCH 3 OR 4 BED

## 2017-08-14 RX ORDER — HYDROCHLOROTHIAZIDE 25 MG/1
50 TABLET ORAL DAILY
Status: DISCONTINUED | OUTPATIENT
Start: 2017-08-15 | End: 2017-08-15 | Stop reason: HOSPADM

## 2017-08-14 RX ORDER — ESCITALOPRAM OXALATE 10 MG/1
10 TABLET ORAL DAILY
Status: DISCONTINUED | OUTPATIENT
Start: 2017-08-14 | End: 2017-08-14

## 2017-08-14 RX ORDER — PROPRANOLOL HYDROCHLORIDE 20 MG/1
40 TABLET ORAL 2 TIMES DAILY
Status: DISCONTINUED | OUTPATIENT
Start: 2017-08-14 | End: 2017-08-15 | Stop reason: HOSPADM

## 2017-08-14 RX ORDER — BUPROPION HYDROCHLORIDE 150 MG/1
300 TABLET ORAL
Status: DISCONTINUED | OUTPATIENT
Start: 2017-08-14 | End: 2017-08-15 | Stop reason: HOSPADM

## 2017-08-14 RX ADMIN — HYDROCHLOROTHIAZIDE 25 MG: 25 TABLET ORAL at 08:12

## 2017-08-14 RX ADMIN — PROPRANOLOL HYDROCHLORIDE 20 MG: 20 TABLET ORAL at 08:12

## 2017-08-14 RX ADMIN — BUPROPION HYDROCHLORIDE 300 MG: 150 TABLET, FILM COATED, EXTENDED RELEASE ORAL at 11:09

## 2017-08-14 RX ADMIN — LORAZEPAM 1 MG: 1 TABLET ORAL at 10:16

## 2017-08-14 RX ADMIN — IBUPROFEN 400 MG: 400 TABLET ORAL at 21:16

## 2017-08-14 RX ADMIN — LISINOPRIL 40 MG: 40 TABLET ORAL at 08:12

## 2017-08-14 RX ADMIN — PROPRANOLOL HYDROCHLORIDE 40 MG: 20 TABLET ORAL at 20:25

## 2017-08-14 RX ADMIN — BUPROPION HYDROCHLORIDE 150 MG: 150 TABLET, FILM COATED, EXTENDED RELEASE ORAL at 06:27

## 2017-08-14 RX ADMIN — LORAZEPAM 1 MG: 1 TABLET ORAL at 14:12

## 2017-08-14 NOTE — BH NOTES
Pt's /100 at 1900. Pt started Inderal 20 mg today. Recheck of BP after administration of Inderal: 154/100 with a pulse of 61. MD on call notified and BP reviewed. MD on call recommends consulting with attending about possible consult with Internal Medicine.  Pt asymptomatic at present time

## 2017-08-14 NOTE — BH NOTES
GROUP THERAPY PROGRESS NOTE    Myrna Desouza is not  participating in [place]. Staff encouraged and pt refused.

## 2017-08-14 NOTE — BSMART NOTE
SW Contact:  Pt having some concerns about medical issues (B P mostly). Doesn't recall much about his episode of confusion last week, which extended his d/c date. .. Still willing to comply with outpt & go to [facility] for outpt tx (see FYI).

## 2017-08-14 NOTE — PROGRESS NOTES
Problem: Suicide/Homicide (Adult/Pediatric)  Goal: *STG: Remains safe in hospital  Patient will demonstrate safe behavior and contract for safety every shift during hospital stay. Outcome: Progressing Towards Goal  No attempts to harm self or others   Goal: *STG: Attends activities and groups  Patient will attend 2-3 groups daily during hospital stay. Outcome: Not Progressing Towards Goal  Variance: Patient Condition  Comments: Not attending   Goal: *STG/LTG: Complies with medication therapy  Daily, patient will comply with medications and verbalize the importance of medication compliance. Outcome: Progressing Towards Goal  Compliant   Goal: *STG/LTG: No longer expresses self destructive or suicidal/homicidal thoughts  Patient will no longer express suicidal/homicidal thoughts during hospital stay. Outcome: Progressing Towards Goal  Denies     Problem: Hypertension  Goal: *Blood pressure within specified parameters  Vital signs will remain within defined limits; report abnormal reading to MD as needed. Outcome: Not Progressing Towards Goal  Variance: Patient Condition  Comments: B/P elevated MD is aware and pt is compliant with HTN medications    Comments:   Pt continues to have difficulty getting his thoughts out. He has pauses with his responses and does not seem to process what he is told at times. He also does not remember what he is told. Pt denies any thoughts of harming self or others and hallucinations. He told this nurse he thinks about things for a moment before responding. He asked for some of his belongings today from behind the nurse's station that he did not come in with because they were not listed on his belongings sheet. Pt mostly stays in bed but comes out for meals and meds.

## 2017-08-14 NOTE — PROGRESS NOTES
NUTRITION    Nutrition Screen      RECOMMENDATIONS / PLAN:     - No nutrition intervention indicated at this time. Will re-screen as appropriate. NUTRITION DIAGNOSIS & INTERVENTIONS:     [x] Meals/Snacks: general/healthful diet, bedtime snack  [] Medical food supplementation   [] Coordination of nutrition care     Nutrition Diagnosis:  No nutrition diagnosis at this time    ASSESSMENT:     Pt reports good po intake, requesting larger portions. Average intake adequate to meet patients estimated nutritional needs:   [x] Yes     [] No      [] Unable to determine at this time    Diet: DIET REGULAR    Food Allergies: none  Current Appetite:   [x] Good     [] Fair     [] Poor     [] Other:  Appetite/meal intake prior to admission:   [] Good     [x] Fair     [] Poor     [] Other:   Feeding Limitations:  [] Swallowing Difficulty       [] Chewing Difficulty       [] Other   Current Meal Intake: No data found. Gastrointestinal Issues:  [] Yes    [x] No LBM 8/13  Skin Integrity:  WDL    Pertinent Medications:  Reviewed   Labs:  Reviewed     Anthropometrics:  Ht Readings from Last 1 Encounters:   08/06/17 5' 7\" (1.702 m)       There were no vitals filed for this visit. There is no height or weight on file to calculate BMI.       BMI 24.28  Weight History:   Weight Metrics 8/6/2017 7/26/2017 7/14/2017 5/23/2017 5/7/2017 4/13/2017 3/11/2017   Weight 155 lb 155 lb 155 lb 155 lb 155 lb 156 lb 150 lb   BMI 24.28 kg/m2 24.28 kg/m2 24.28 kg/m2 24.28 kg/m2 25.02 kg/m2 25.18 kg/m2 24.21 kg/m2       Admitting Diagnosis: Depression  Depression  Past Medical History:   Diagnosis Date    Back pain     Hypertension     Psychiatric disorder     hallucinations        Education Needs:        [x] None identified  [] Identified - Not appropriate at this time  []  Identified and addressed - refer to education log  Learning Limitations:   [x] None identified  [] Identified    Cultural, Temple & ethnic food preferences identified:  [x] None    [] Yes      ESTIMATED NUTRITION NEEDS:     7103-4571 kcal (MSJx1.2-1.3),  56-70 gm protein (0.8-1 gm/kg), 1 mL/kcal  Based on: 70.31 kg       [x] Actual BW      [] IBW          MONITORING & EVALUATION:     Nutrition Goal(s):   1. Po intake of meals will meet >75% of patient estimated nutritional needs within the next 7 days.   Outcome:   [] Met    []  Not Met   [x] New/Initial Goal    Monitor:  [x] Meal intake    [x] Diet tolerance    [] Supplement intake    Previous Recommendations (for follow-up assessments only):     []   Implemented       []   Not Implemented (RD to address)    [] No Recommendation Made      Discharge Planning: regular diet   [x]  Participated in care planning, discharge planning, & interdisciplinary rounds as appropriate      Marilee Trent, [address] [place] [address]   Pager: [phone]

## 2017-08-14 NOTE — BSMART NOTE
ACTIVITIES THERAPY PROGRESS NOTE    Group time:1220    The patient declined group. Said he had just had medicine. In bed.

## 2017-08-14 NOTE — BH NOTES
Psychiatric Progress Note    Date: 17  Patient Name: Fely Dunaway  : 1965  MRN: [id number]  Hospital Day: 8      INTERVAL HISTORY:   Patient is cooperative, says he's still \"working through things. \" Mood is \"okay\" but still feels depressed. When discussing plans to leave hospital, he cannot state where he will go, how he will care for self, how he will pay for things or how to accomplish ADL's. Takes long to answer questions and still impaired. Not suicidal/ homicidal at this time. MENTAL STATUS EXAM:  Appearance: Dressed in casual clothes with fair grooming and hygiene  Behavior: Cooperative with good eye contact  Motor: No psychomotor agitation/retardation  Speech: soft volume  Mood: \"okay\"  Affect: constricted  Thought Process: impoverished, thought blocking  Thought Content: Denies SI and HI  Perception: Denies AH or VH  Concentration: fair  Memory: poor  Cognition: Alert and oriented, unaware of situation, exec function deficits, impaired clock drawing  Insight: Fair  Judgment: Fair    RISK ASSESSMENT:   Prior Attempts: no noted prior  Lethality of Attempts: none noted prior  Current Ideation/Plan: denies  Protective Factors: limited  Future Orientation: limited    ASSESSMENT:   This is a 48 yo AAM unclear psych hx and uncontrolled HTN admitted for confusion and worsening depression. Improved minimally in hospital, continues to have thought blocking and depressed mood. Unaware of situation and cognitive deficits make him high risk for harm if outside hospital.    Diagnoses:  1. Major depressive disorder, severe, with cognitive deficit. 2. Adjustment disorder with depressed mood. Plan:  1. Continue with inpatient psychiatric treatment for containment, stabilization and medication management  2. Patient is to continue with group therapy  3. Medications: Wellbutrin  mg po daily  Incr Indural to 40 mg po bid for HTN--> to monitor BP q4h while awake  4. SW to help with disposition  5. ELOS 5-7 days

## 2017-08-15 ENCOUNTER — HOSPITAL ENCOUNTER (EMERGENCY)
Age: 52
Discharge: HOME | End: 2017-08-15
Attending: EMERGENCY MEDICINE
Payer: SELF-PAY

## 2017-08-15 VITALS
TEMPERATURE: 98.4 F | DIASTOLIC BLOOD PRESSURE: 76 MMHG | HEART RATE: 76 BPM | SYSTOLIC BLOOD PRESSURE: 112 MMHG | RESPIRATION RATE: 18 BRPM

## 2017-08-15 VITALS
WEIGHT: 150 LBS | HEART RATE: 77 BPM | OXYGEN SATURATION: 99 % | HEIGHT: 67 IN | SYSTOLIC BLOOD PRESSURE: 112 MMHG | RESPIRATION RATE: 16 BRPM | DIASTOLIC BLOOD PRESSURE: 69 MMHG | TEMPERATURE: 98.8 F | BODY MASS INDEX: 23.54 KG/M2

## 2017-08-15 DIAGNOSIS — R42 DIZZINESS: Primary | ICD-10-CM

## 2017-08-15 LAB
ANION GAP BLD CALC-SCNC: 17 MMOL/L (ref 10–20)
BUN BLD-MCNC: 16 MG/DL (ref 7–18)
CA-I BLD-MCNC: 1.14 MMOL/L (ref 1.12–1.32)
CHLORIDE BLD-SCNC: 100 MMOL/L (ref 100–108)
CO2 BLD-SCNC: 27 MMOL/L (ref 19–24)
CREAT UR-MCNC: 1.3 MG/DL (ref 0.6–1.3)
GLUCOSE BLD STRIP.AUTO-MCNC: 125 MG/DL (ref 74–106)
HCT VFR BLD CALC: 42 % (ref 36–49)
HGB BLD-MCNC: 14.3 G/DL (ref 12–16)
POTASSIUM BLD-SCNC: 3.4 MMOL/L (ref 3.5–5.5)
SODIUM BLD-SCNC: 140 MMOL/L (ref 136–145)

## 2017-08-15 PROCEDURE — 74011250637 HC RX REV CODE- 250/637: Performed by: STUDENT IN AN ORGANIZED HEALTH CARE EDUCATION/TRAINING PROGRAM

## 2017-08-15 PROCEDURE — 93005 ELECTROCARDIOGRAM TRACING: CPT

## 2017-08-15 PROCEDURE — 99284 EMERGENCY DEPT VISIT MOD MDM: CPT

## 2017-08-15 PROCEDURE — 80047 BASIC METABLC PNL IONIZED CA: CPT

## 2017-08-15 RX ORDER — PROPRANOLOL HYDROCHLORIDE 40 MG/1
40 TABLET ORAL 2 TIMES DAILY
Qty: 14 TAB | Refills: 0 | Status: SHIPPED | OUTPATIENT
Start: 2017-08-15 | End: 2017-08-26

## 2017-08-15 RX ORDER — BUPROPION HYDROCHLORIDE 300 MG/1
300 TABLET ORAL
Qty: 30 TAB | Refills: 0 | Status: SHIPPED | OUTPATIENT
Start: 2017-08-15 | End: 2017-08-26

## 2017-08-15 RX ORDER — HYDROCHLOROTHIAZIDE 50 MG/1
50 TABLET ORAL DAILY
Qty: 14 TAB | Refills: 0 | Status: SHIPPED | OUTPATIENT
Start: 2017-08-15 | End: 2017-08-26

## 2017-08-15 RX ORDER — LISINOPRIL 40 MG/1
40 TABLET ORAL DAILY
Qty: 14 TAB | Refills: 0 | Status: SHIPPED | OUTPATIENT
Start: 2017-08-15 | End: 2017-08-26

## 2017-08-15 RX ADMIN — HYDROCHLOROTHIAZIDE 50 MG: 25 TABLET ORAL at 08:21

## 2017-08-15 RX ADMIN — BUPROPION HYDROCHLORIDE 300 MG: 150 TABLET, FILM COATED, EXTENDED RELEASE ORAL at 08:21

## 2017-08-15 RX ADMIN — PROPRANOLOL HYDROCHLORIDE 40 MG: 20 TABLET ORAL at 08:21

## 2017-08-15 RX ADMIN — LISINOPRIL 40 MG: 40 TABLET ORAL at 08:21

## 2017-08-15 NOTE — BH NOTES
Discharge instructions reviewed and given to pt along with Indigent meds. All belongings returned to pt. Follow up intact with [facility] and with [facility] for his HTN. Pt transported home by staff with [facility]. Discharged as ordered.

## 2017-08-15 NOTE — BSMART NOTE
SW Contact:  Pt still isolating. Writer & nursing staff continue to emphasize need for pt to comply with outpt services. Reality orientation continues along with general review of \"safety\" plan.

## 2017-08-15 NOTE — DISCHARGE INSTRUCTIONS
BEHAVIORAL HEALTH NURSING DISCHARGE NOTE      The following personal items collected during your admission are returned to you:   Dental Appliance: Dental Appliances: None  Vision: Visual Aid: None  Hearing Aid:    Jewelry: Jewelry: None  Clothing: Clothing: Footwear, Belt, Pants, Shirt ([place] and sneakers; [place] 106-4)  Other Valuables: Other Valuables: Lighter/matches (3 Lighters; Marilyn [place] 106-4)  Valuables sent to safe: Personal Items Sent to Safe: None      PATIENT INSTRUCTIONS:      Follow-up with Pt will be referred to intake (welcome group) at [phone]. .. At [facility], [address] [place] [address] ... Will need to go there on 17 @ 1PM.     Pt also has an appt with Dr Josias Fraire  for high blood pressure at [facility] on 17 at 3:00 PM [address] [place], [place]. [place] phone [phone]     Numbers to remember [facility] [phone] Suicide [phone](talk)    The discharge information has been reviewed with the patient. The patient verbalized understanding. [employer] Activation    Thank you for requesting access to [employer]. Please follow the instructions below to securely access and download your online medical record. [employer] allows you to send messages to your doctor, view your test results, renew your prescriptions, schedule appointments, and more. How Do I Sign Up? 1. In your internet browser, go to [email or web address]  2. Click on the First Time User? Click Here link in the Sign In box. You will be redirect to the New Member Sign Up page. 3. Enter your [employer] Access Code exactly as it appears below. You will not need to use this code after youve completed the sign-up process. If you do not sign up before the expiration date, you must request a new code. [employer] Access Code: [email or web address]  Expires: 2017  8:20 PM (This is the date your [employer] access code will )    4.  Enter the last four digits of your Social Security Number (xxxx) and Date of Birth (mm/dd/yyyy) as indicated and click Submit. You will be taken to the next sign-up page. 5. Create a [employer] ID. This will be your [employer] login ID and cannot be changed, so think of one that is secure and easy to remember. 6. Create a [facility] password. You can change your password at any time. 7. Enter your Password Reset Question and Answer. This can be used at a later time if you forget your password. 8. Enter your e-mail address. You will receive e-mail notification when new information is available in [address] 9. Click Sign Up. You can now view and download portions of your medical record. 10. Click the Download Summary menu link to download a portable copy of your medical information. Additional Information    If you have questions, please visit the Frequently Asked Questions section of the [employer] website at [email or web address] Remember, [employer] is NOT to be used for urgent needs. For medical emergencies, dial 911. Patient armband removed and shredded    [address] NOTE      Emergency Numbers    : [facility]: [phone]  Suicide Prevention Line: 1 [phone] (TALK)      The following personal items collected during your admission are returned to you:   Dental Appliance: Dental Appliances: None  Vision: Visual Aid: None  Hearing Aid:    Jewelry: Jewelry: None  Clothing: Clothing: Footwear, Belt, Pants, Shirt ([place] and sneakers; [facility] 106-4)  Other Valuables: Other Valuables: Lighter/matches (3 Lighters; [place] 106-4)  Valuables sent to safe: Personal Items Sent to Safe: None        The discharge information has been reviewed with the patient. The patient verbalized understanding. Ec[employer] Activation    Thank you for requesting access to [employer].  Please follow the instructions below to securely access and download your online medical record. [facility] allows you to send messages to your doctor, view your test results, renew your prescriptions, schedule appointments, and more. How Do I Sign Up?    11. In your internet browser, go to [email or web address]  12. Click on the First Time User? Click Here link in the Sign In box. You will be redirect to the New Member Sign Up page. 15. Enter your [facility] Access Code exactly as it appears below. You will not need to use this code after youve completed the sign-up process. If you do not sign up before the expiration date, you must request a new code. [facility] Access Code: [email or web address]  Expires: 2017  8:20 PM (This is the date your [facility] access code will )    14. Enter the last four digits of your Social Security Number (xxxx) and Date of Birth (mm/dd/yyyy) as indicated and click Submit. You will be taken to the next sign-up page. 15. Create a [facility] ID. This will be your [facility] login ID and cannot be changed, so think of one that is secure and easy to remember. 12. Create a [facility] password. You can change your password at any time. 16. Enter your Password Reset Question and Answer. This can be used at a later time if you forget your password. 25. Enter your e-mail address. You will receive e-mail notification when new information is available in [address] 19. Click Sign Up. You can now view and download portions of your medical record. 20. Click the Download Summary menu link to download a portable copy of your medical information. Additional Information    If you have questions, please visit the Frequently Asked Questions section of the [facility] website at [email or web address] Remember, [facility] is NOT to be used for urgent needs. For medical emergencies, dial 911.     Patient armband removed and shredded

## 2017-08-15 NOTE — BH NOTES
PT did not participate in group today , he came out of his room and sat with the other patients he took his  medication and ate all meals staff will continue to monitor fr health and safety.

## 2017-08-15 NOTE — PROGRESS NOTES
Problem: Suicide/Homicide (Adult/Pediatric)  Goal: *STG: Remains safe in hospital  Patient will demonstrate safe behavior and contract for safety every shift during hospital stay. Outcome: Progressing Towards Goal  No attempts to harm self or others   Goal: *STG: Attends activities and groups  Patient will attend 2-3 groups daily during hospital stay. Outcome: Progressing Towards Goal  Not participating   Goal: *STG/LTG: Complies with medication therapy  Daily, patient will comply with medications and verbalize the importance of medication compliance. Outcome: Progressing Towards Goal  Compliant   Goal: *STG/LTG: No longer expresses self destructive or suicidal/homicidal thoughts  Patient will no longer express suicidal/homicidal thoughts during hospital stay. Outcome: Progressing Towards Goal  Denies     Problem: Hypertension  Goal: *Blood pressure within specified parameters  Vital signs will remain within defined limits; report abnormal reading to MD as needed. Outcome: Not Progressing Towards Goal  B/P remains elevated. Pt is compliant with medications and meds have been increased     Comments:   Pt smiling in one to one today. He continues to be slow with his responses. He stays mostly in bed is not attending groups but is compliant with medications. Pt denies any thoughts of harming self or others and hallucinations.

## 2017-08-16 LAB
ATRIAL RATE: 75 BPM
CALCULATED P AXIS, ECG09: 55 DEGREES
CALCULATED R AXIS, ECG10: -20 DEGREES
CALCULATED T AXIS, ECG11: 67 DEGREES
DIAGNOSIS, 93000: NORMAL
P-R INTERVAL, ECG05: 212 MS
Q-T INTERVAL, ECG07: 418 MS
QRS DURATION, ECG06: 88 MS
QTC CALCULATION (BEZET), ECG08: 466 MS
VENTRICULAR RATE, ECG03: 75 BPM

## 2017-08-16 NOTE — ED PROVIDER NOTES
HPI Comments: Ana Bowers is a 47 y.o. Male who is well known to me with c/o feeling dizzy, not right about 20 min ago. Was drinking a beer then stopped because he was feeling strange. No cp, sob, sweats, nvd, abd pain, syncope, headache. No fcs. The history is provided by the patient. Past Medical History:   Diagnosis Date    Back pain     Hypertension     Psychiatric disorder     hallucinations       Past Surgical History:   Procedure Laterality Date    HX OTHER SURGICAL      oral sx         History reviewed. No pertinent family history. Social History     Social History    Marital status:      Spouse name: N/A    Number of children: N/A    Years of education: N/A     Occupational History    Not on file. Social History Main Topics    Smoking status: Current Some Day Smoker    Smokeless tobacco: Never Used    Alcohol use Yes      Comment: beer    Drug use: Yes     Special: Marijuana, Cocaine    Sexual activity: Not on file     Other Topics Concern    Not on file     Social History Narrative         ALLERGIES: Review of patient's allergies indicates no known allergies. Review of Systems   Constitutional: Negative for fever. HENT: Negative for sore throat. Gastrointestinal: Negative for abdominal pain. Genitourinary: Negative for difficulty urinating. Musculoskeletal: Negative for gait problem. Skin: Negative for rash. Hematological: Does not bruise/bleed easily. Psychiatric/Behavioral: Positive for sleep disturbance. Vitals:    08/15/17 2114 08/15/17 2123   BP: 112/69    Pulse: 77    Resp: 16    Temp:  98.8 °F (37.1 °C)   SpO2: 99%    Weight: 68 kg (150 lb)    Height: 5' 7\" (1.702 m)             Physical Exam   Constitutional: He is oriented to person, place, and time. Non-toxic appearance. He does not appear ill. No distress. HENT:   Head: Normocephalic and atraumatic.    Right Ear: External ear normal.   Left Ear: External ear normal.   Nose: Nose normal.   Mouth/Throat: Oropharynx is clear and moist. No oropharyngeal exudate. Eyes: Conjunctivae are normal.   Neck: Normal range of motion. Cardiovascular: Normal rate, regular rhythm, normal heart sounds and intact distal pulses. Pulmonary/Chest: Effort normal and breath sounds normal. No respiratory distress. Abdominal: Soft. There is no tenderness. Musculoskeletal: Normal range of motion. He exhibits no edema. Neurological: He is alert and oriented to person, place, and time. Skin: Skin is warm and dry. He is not diaphoretic. Psychiatric: His behavior is normal.   Nursing note and vitals reviewed. [facility]  ED Course       Procedures    Vitals:  Patient Vitals for the past 12 hrs:   Temp Pulse Resp BP SpO2   08/15/17 2123 98.8 °F (37.1 °C) - - - -   08/15/17 2114 - 77 16 112/69 99 %         Medications ordered:   Medications - No data to display      Lab findings:  Recent Results (from the past 12 hour(s))   EKG, 12 LEAD, SUBSEQUENT    Collection Time: 08/15/17  9:27 PM   Result Value Ref Range    Ventricular Rate 75 BPM    Atrial Rate 75 BPM    P-R Interval 212 ms    QRS Duration 88 ms    Q-T Interval 418 ms    QTC Calculation (Bezet) 466 ms    Calculated P Axis 55 degrees    Calculated R Axis -20 degrees    Calculated T Axis 67 degrees    Diagnosis       Sinus rhythm with 1st degree AV block  Possible Left atrial enlargement  Borderline ECG  When compared with ECG of 14-JUL-2017 20:32,  IL interval has increased         EKG interpretation by ED Physician:  Sinus with 1st avb. No acute st tw changes  Rate 75, qtc 466  Not sig changed      X-Ray, CT or other radiology findings or impressions:  No orders to display       Progress notes, Consult notes or additional Procedure notes:   Doubt need for other work up.    I have discussed with patient and/or family/sig other the results, interpretation of any imaging if performed, suspected diagnosis and treatment plan to include instructions regarding the diagnoses listed to which understanding was expressed with all questions answered      Reevaluation of patient:   Stable for dc    Disposition:  Diagnosis:   1. Dizziness        Disposition: home      Follow-up Information     Follow up With Details Comments [address] ([address] [facility]) Schedule an appointment as soon as possible for a visit  [facility]  [phone]            Patient's Medications   Start Taking    No medications on file   Continue Taking    BUPROPION XL (WELLBUTRIN XL) 150 MG TABLET    Take 1 Tab by mouth every morning. Indications: major depressive disorder    BUPROPION XL (WELLBUTRIN XL) 300 MG XL TABLET    Take 1 Tab by mouth every morning. Indications: major depressive disorder    HYDROCHLOROTHIAZIDE (HYDRODIURIL) 12.5 MG TABLET    Take 12.5 mg by mouth daily. Indications: Patient does not recall dosage of HCTZ    HYDROCHLOROTHIAZIDE (HYDRODIURIL) 50 MG TABLET    Take 1 Tab by mouth daily. Indications: hypertension    LISINOPRIL (PRINIVIL, ZESTRIL) 10 MG TABLET    Take 10 mg by mouth daily. LISINOPRIL (PRINIVIL, ZESTRIL) 40 MG TABLET    Take 1 Tab by mouth daily. Indications: hypertension    PROPRANOLOL (INDERAL) 40 MG TABLET    Take 1 Tab by mouth two (2) times a day.  Indications: hypertension   These Medications have changed    No medications on file   Stop Taking    No medications on file

## 2017-08-16 NOTE — DISCHARGE SUMMARY
Psychiatric Discharge Summary    Date: 17   Patient Name: Jessica Hill  : 1965  MRN: [id number]  Admission Date: 2017  Discharge Date: 8/15/2017    HISTORY OF PRESENT ILLNESS: This is a 53-year-old [facility]  American male with unclear psychiatric history who presents to the  hospital for possible worsening of depression and psychological  distress in the context of homelessness and separation from his wife  and recent death of his sister and his mother. Of note, the patient is  well known to [facility] Emergency [facility]. From chart  review, he was recently discharged from [address] [place] [address] on 2017 for similar complaints. The patient has been on the  streets. 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 The patient is somewhat bizarre and a little  disoriented. He has significant profound difficulty concentrating and  answering questions, though does not quite appear internally  distracted. His thought process is tangential and there is difficulty  redirecting the patient at times in interview. He admits that his biggest  stressor began about 4 years ago when he  from his wife  and says that divorce papers are not yet finalized. Also, he has a  stressor of not seeing his children. He has 3 children, 2 are in college  and one is in high school. He is not interested in medications at this  time, but more interested in \"psychological evaluation\" for his  symptoms. The patient admits to depressed mood, feeling more guilt. Denies hallucinations. No overt delusions but some paranoia toward  brother-in-law. His brother-in-law \"worked his way into the family\" to  separate he and his ex-wife. Collateral will be required to determine  further history for the patient as the patient could not provide sufficient  information.     HOSPITAL COURSE:   Once on the unit, patient was very impaired. He barely could answer questions. His exec functioning and abstract thinking was severely impaired. He was calm, pleasant, but was very unaware of surroundings. C/o depressed ssx. Started on wellbutrin xl 150 and titrated up to 300 mg over 5 days. Patient's blood pressure was uncontrolled on admission. Pt had been off HTN meds and couldn't state names, so new meds were started and titrated. Blood pressure was elevated for 3 days before coming down. Due to patient's cognitive and exec functioning impairments, Head CT was obtained which showed:  Similar mild global volume loss. Mild periventricular and subcortical white  matter hypoattenuation, presumed chronic microvascular ischemic change. Probable  old lacunar infarcts bilateral basal ganglia, left thalamus and left corona  radiata. There is no intracranial hemorrhage, extra-axial collection, mass, mass  effect or midline shift. The basilar cisterns are open. No acute infarct is  identified. The bone windows demonstrate no acute abnormalities. The visualized  portions of the paranasal sinuses and mastoid air cells are clear. It was determined patient's clinical presentation likely worsened by elevated blood pressure and brain damage from likely old infarcts from uncontrolled blood pressure. On day of discharge, patient was more talkative, still some thought blocking at times, but much improved and not suicidal/ homicidal, not psychotic.     MENTAL STATUS EXAM:  Orientation: oriented to person, place, time, and situation  Appearance: Dressed in casual clothes with fair grooming and hygiene  Behavior: Cooperative with good eye contact  Motor: No psychomotor agitation/retardation  Speech: Normal rate, tone and volume  Mood: \"better \"  Affect: euthymic  Thought Process: linear, goal-directed  Thought Content: Denies SI and HI  Perception: Denies AH or VH  Concentration: fair  Memory: fair  Cognition: Alert and oriented  Insight: fair  Judgment: fair    ASSESSMENT at time of discharge:   Stable, calm, cooperative, not suicidal, not homicidal, not psychotic    Diagnoses:  Major Depressive Disorder, recurrent, severe    Discharge Instructions:  1. Continue psychiatric medications of Wellbutrin  mg po daily  2. Please continue taking all meds for blood pressure and make appointment with primary care physician  3. Please make all follow up appointments with doctors and , as provided by inpatient behavioral health . 4. If you feel unsafe or begin experiencing suicidal thoughts again, please call 9-1-1 or return to the nearest emergency department. Disposition:  Homeless shelter with outpatient follow-up      Fabby Carter MD

## 2017-08-16 NOTE — ED NOTES
Ricky Ahumada  [facility] EMERGENCY DEPT      47 y.o. male with noted past medical history who presents to the emergency department with feeling of being 'disoriented' and having trouble concentrating that began 1 hour ago. He states it was difficult trying to drive to ER and he actions/ movements were much slower than normal.     I performed a brief evaluation, including history and physical, of the patient here in triage and I have determined that pt will need further treatment and evaluation from the main side ER physician. I have placed initial orders to help in expediting patients care. Rose Marie Lee M.D.

## 2017-08-22 ENCOUNTER — HOSPITAL ENCOUNTER (EMERGENCY)
Age: 52
Discharge: HOME | End: 2017-08-26
Attending: EMERGENCY MEDICINE | Admitting: EMERGENCY MEDICINE
Payer: SELF-PAY

## 2017-08-22 DIAGNOSIS — F32.A DEPRESSION, UNSPECIFIED DEPRESSION TYPE: Primary | ICD-10-CM

## 2017-08-22 LAB
BASOPHILS # BLD: 0 K/UL (ref 0–0.06)
BASOPHILS NFR BLD: 0 % (ref 0–2)
DIFFERENTIAL METHOD BLD: ABNORMAL
EOSINOPHIL # BLD: 0.1 K/UL (ref 0–0.4)
EOSINOPHIL NFR BLD: 1 % (ref 0–5)
ERYTHROCYTE [DISTWIDTH] IN BLOOD BY AUTOMATED COUNT: 16.2 % (ref 11.6–14.5)
HCT VFR BLD AUTO: 37.6 % (ref 36–48)
HGB BLD-MCNC: 12.5 G/DL (ref 13–16)
LYMPHOCYTES # BLD: 2.2 K/UL (ref 0.9–3.6)
LYMPHOCYTES NFR BLD: 42 % (ref 21–52)
MCH RBC QN AUTO: 27 PG (ref 24–34)
MCHC RBC AUTO-ENTMCNC: 33.2 G/DL (ref 31–37)
MCV RBC AUTO: 81.2 FL (ref 74–97)
MONOCYTES # BLD: 0.3 K/UL (ref 0.05–1.2)
MONOCYTES NFR BLD: 5 % (ref 3–10)
NEUTS SEG # BLD: 2.7 K/UL (ref 1.8–8)
NEUTS SEG NFR BLD: 52 % (ref 40–73)
PLATELET # BLD AUTO: 194 K/UL (ref 135–420)
PMV BLD AUTO: 9.5 FL (ref 9.2–11.8)
RBC # BLD AUTO: 4.63 M/UL (ref 4.7–5.5)
WBC # BLD AUTO: 5.2 K/UL (ref 4.6–13.2)

## 2017-08-22 PROCEDURE — 85025 COMPLETE CBC W/AUTO DIFF WBC: CPT | Performed by: EMERGENCY MEDICINE

## 2017-08-22 PROCEDURE — 80048 BASIC METABOLIC PNL TOTAL CA: CPT | Performed by: EMERGENCY MEDICINE

## 2017-08-22 PROCEDURE — 80307 DRUG TEST PRSMV CHEM ANLYZR: CPT | Performed by: EMERGENCY MEDICINE

## 2017-08-22 PROCEDURE — 99285 EMERGENCY DEPT VISIT HI MDM: CPT

## 2017-08-23 LAB
ALBUMIN SERPL-MCNC: 3.6 G/DL (ref 3.4–5)
ALBUMIN/GLOB SERPL: 1.3 {RATIO} (ref 0.8–1.7)
ALP SERPL-CCNC: 79 U/L (ref 45–117)
ALT SERPL-CCNC: 29 U/L (ref 16–61)
AMPHET UR QL SCN: NEGATIVE
ANION GAP SERPL CALC-SCNC: 9 MMOL/L (ref 3–18)
AST SERPL-CCNC: 15 U/L (ref 15–37)
BARBITURATES UR QL SCN: NEGATIVE
BENZODIAZ UR QL: NEGATIVE
BILIRUB DIRECT SERPL-MCNC: 0.1 MG/DL (ref 0–0.2)
BILIRUB SERPL-MCNC: 0.5 MG/DL (ref 0.2–1)
BUN SERPL-MCNC: 15 MG/DL (ref 7–18)
BUN/CREAT SERPL: 14 (ref 12–20)
CALCIUM SERPL-MCNC: 8.4 MG/DL (ref 8.5–10.1)
CANNABINOIDS UR QL SCN: NEGATIVE
CHLORIDE SERPL-SCNC: 110 MMOL/L (ref 100–108)
CO2 SERPL-SCNC: 27 MMOL/L (ref 21–32)
COCAINE UR QL SCN: NEGATIVE
CREAT SERPL-MCNC: 1.11 MG/DL (ref 0.6–1.3)
ETHANOL SERPL-MCNC: 85 MG/DL (ref 0–3)
GLOBULIN SER CALC-MCNC: 2.8 G/DL (ref 2–4)
GLUCOSE SERPL-MCNC: 97 MG/DL (ref 74–99)
HDSCOM,HDSCOM: NORMAL
METHADONE UR QL: NEGATIVE
OPIATES UR QL: NEGATIVE
PCP UR QL: NEGATIVE
POTASSIUM SERPL-SCNC: 3.3 MMOL/L (ref 3.5–5.5)
PROT SERPL-MCNC: 6.4 G/DL (ref 6.4–8.2)
SODIUM SERPL-SCNC: 146 MMOL/L (ref 136–145)

## 2017-08-23 PROCEDURE — 80076 HEPATIC FUNCTION PANEL: CPT | Performed by: EMERGENCY MEDICINE

## 2017-08-23 PROCEDURE — 74011250637 HC RX REV CODE- 250/637: Performed by: EMERGENCY MEDICINE

## 2017-08-23 RX ORDER — PROPRANOLOL HYDROCHLORIDE 40 MG/1
40 TABLET ORAL 2 TIMES DAILY
Status: DISCONTINUED | OUTPATIENT
Start: 2017-08-23 | End: 2017-08-26 | Stop reason: HOSPADM

## 2017-08-23 RX ORDER — BUPROPION HYDROCHLORIDE 300 MG/1
300 TABLET ORAL
Status: DISCONTINUED | OUTPATIENT
Start: 2017-08-23 | End: 2017-08-26 | Stop reason: HOSPADM

## 2017-08-23 RX ORDER — HYDROCHLOROTHIAZIDE 25 MG/1
50 TABLET ORAL DAILY
Status: DISCONTINUED | OUTPATIENT
Start: 2017-08-23 | End: 2017-08-26 | Stop reason: HOSPADM

## 2017-08-23 RX ADMIN — BUPROPION HYDROCHLORIDE 300 MG: 300 TABLET, FILM COATED, EXTENDED RELEASE ORAL at 09:35

## 2017-08-23 RX ADMIN — PROPRANOLOL HYDROCHLORIDE 40 MG: 40 TABLET ORAL at 18:48

## 2017-08-23 RX ADMIN — PROPRANOLOL HYDROCHLORIDE 40 MG: 40 TABLET ORAL at 09:34

## 2017-08-23 RX ADMIN — HYDROCHLOROTHIAZIDE 50 MG: 25 TABLET ORAL at 09:33

## 2017-08-23 NOTE — PROGRESS NOTES
Lab results faxed to [facility] and Regina Cowden confirmed receipt.

## 2017-08-23 NOTE — ED NOTES
Per [facility] patient is voluntary and [facility] bed will be sought at this time.  Dr. Sara Polanco and charge nurse Bairon Andrew.RICH made aware

## 2017-08-23 NOTE — ED NOTES
Purposeful rounding completed:    Side rails up x 1:  YES  Bed in low position and wheels locked: YES  Call bell within reach: NO  Comfort addressed: YES    Toileting needs addressed: NO  Plan of care reviewed/updated with patient and or family members: NO  IV site assessed: N/A  Pain assessed and addressed: NO,     Patient sleeping in bed at this time.  Sitter at bedside

## 2017-08-23 NOTE — ED NOTES
Received report from nurse, sitter at the bedside, patient sleeping,  This patient has been recommended for inpatient treatment and is awaiting placement. The ED provider has reviewed the patients history and medications, diet, and treatments and will order as necessary.  The patient will be observed and attended to as their medical needs require and/or policy dictates

## 2017-08-23 NOTE — ED NOTES
\"just trying to get better\", endorses depression. No SI or HI, +etoh    I performed a brief evaluation, including history and physical, of the patient here in triage and I have determined that pt will need further treatment and evaluation from the main side ER physician. I have placed initial orders to help in expediting patients care. August 22, 2017 at 10:42 PM - Dana Zarco MD        There were no vitals taken for this visit.

## 2017-08-23 NOTE — CONSULTS
PSYCHIATRIC  CONSULTATION  This evaluation was conducted via telepsychiatry with the assistance of onsite staff. HPI: The patient is a 53-year-old -American male with a history of depression. He presents to the hospital in a confused state reporting that he was depressed. Psychiatry was consulted. When interviewed by psychiatry, the patient was bizarre and confused. He had trouble answering questions. When asked why he was there, the patient responded \"my reasoning was off. \" \"I have trouble understanding reality. \" The psychiatrist asked for clarification. The patient was unable to explain what he meant and became visibly irritated with the interview. He stated that he was having trouble going to work and \"difficulty completing small tasks. \" \"I'm not seeing things correctly. \" The patient denied auditory or visual hallucinations. He admitted to problems sleeping. When asked if he had thoughts of suicide, the patient responded \"I was contemplating it. \" He stated that he began playing depressed. Approximately one month ago after jumping off a bridge and fracturing his ankle. The patient then corrected himself and said he jumped off a building instead of a bridge. He reported that he walked to the hospital, but he did not know where he was and could not give the name of the hospital. He was oriented to month, but not the year. \"I don't know what year it is. \" He knew the current President, but he was unable to provide the names of the past 2 President (news that Alfred was the current Pres. and said that Annemarie Reardon was the previous present). He is currently prescribed Wellbutrin  mg daily. He states that the medication was started 2 days ago by persons unknown. \"I don't know what his medical qualifications were. \"  SI/ Self harm: See above  HI/Violence: none  Access to weapons: none  Past Psychiatric History: No prior inpatient admissions. Current outpatient treatment-sees PCP.  2 prior suicide attempts Family Psychiatric History:  father-committed to [facility] in the past  Medical History: Hypertension    Current Meds: see list in chart  Allergies: pollen  Substance Abuse History: Alcohol-drinks 40 oz daily, last drink yesterday. History of MJ use, details unknown. Social History: , homeless, living with friends, college grad, [employer]   History: none   Abuse: possession of marijuana, failure to appear in court  Legal History: possession of marijuana, failure to appear in court   Mental Status Examination: Cooperative, good eye contact, speech within normal limits, no psychomotor agitation, anxious affect, sad mood, linear thought processes,  no  hallucinations, bizarre delusions, + SI, no HI, AAO x 1  Diagnosis:  Major Depressive Disorder, Recurrent Severe with Psychotic Features, Alcohol Use Disorder, Cannabis Use Disorder  Assessment/Plan: The patient is a 59-year-old male with a history of depression. He presents with suicidal ideations with no plan. He is confused and giving bizarre responses to questions. Other questions are difficult to answer. The patient seems to have problems articulating his thoughts. The patient is currently unable to care for himself. He had trouble providing basic information about his history and current treatment. He has extreme difficulty answering questions regarding his current presentation. He is disoriented to year and location. He appears to be abusing alcohol and possibly marijuana. Furthermore, the patient has a history of two previous suicide attempts and one in the past month. Inpatient care is recommended. Admit as voluntary. Ginette Reyes M.D.   [facility]

## 2017-08-23 NOTE — ED NOTES
Spoke with Deion Samples from [facility] and he stated they are still looking for [facility] bed and there will be none available today.  Dr. Celestino Lundberg and charge nurse Elise Miller, RN made aware

## 2017-08-23 NOTE — ED NOTES
Purposeful rounding completed:    Side rails up x 0:  YES  Bed in low position and wheels locked: YES  Call bell within reach: YES  Comfort addressed: YES    Toileting needs addressed: YES  Plan of care reviewed/updated with patient and or family members: YES  IV site assessed: N/A (No IV)  Pain assessed and addressed: YES, 0    Patient ambulated to bathroom.

## 2017-08-23 NOTE — ED NOTES
Patient states that he wants to [facility] like himself again. \" Stated he was started on a new medication 2 days ago and it does not feel like it is working. Reports SI without plan, denies HI. Denies any pain.

## 2017-08-23 NOTE — ED NOTES
Checked on patient. He is awake and quiet in bed. Pt asked me about food and we spoke about recipes. Pt is calm and cooperative.

## 2017-08-23 NOTE — ED NOTES
Spoke with [employer] from [address] [place] [address]. Requesting a liver enzyme panel to be drawn and the results to be faxed to [phone] and [phone].

## 2017-08-23 NOTE — ED NOTES
Received patient report from Lieutenant Cheikh RN at this time.  Patient resting in bed with sitter at bedside at this time

## 2017-08-23 NOTE — ED NOTES
Purposeful rounding completed:    Side rails up x 2:  YES  Bed in low position and wheels locked: YES  Call bell within reach: YES  Comfort addressed: YES    Toileting needs addressed: YES  Plan of care reviewed/updated with patient and or family members: YES  IV site assessed: N/A (No IV)  Pain assessed and addressed: YES, 0  Patient resting quietly in bed. Sitter at bedside.

## 2017-08-23 NOTE — ED PROVIDER NOTES
[facility] EMERGENCY DEPT      47 y.o. male with noted past medical history who presents to the emergency department c/o increased depression. Patient states that he wants to [facility] like himself again. \" Stated he was started on a new medication 2 days ago and it does not feel like it is working. Reports he wants to get better so he can go back to work. Reports having a recent Ankle fx, ~ 1 month ago, which adds to stress and anxiety. Admits to EtOH use daily and smoking 1-2 small cigars daily. Reports currently sleeping between friends' residency at the moment. Denies SI/HI, pain, and any other sxs or complaints. No other complaints. Nursing nurses regarding the HPI and triage nursing notes were reviewed. No current facility-administered medications for this encounter. Current Outpatient Prescriptions   Medication Sig    buPROPion XL (WELLBUTRIN XL) 300 mg XL tablet Take 1 Tab by mouth every morning. Indications: major depressive disorder    hydroCHLOROthiazide (HYDRODIURIL) 50 mg tablet Take 1 Tab by mouth daily. Indications: hypertension    propranolol (INDERAL) 40 mg tablet Take 1 Tab by mouth two (2) times a day. Indications: hypertension    lisinopril (PRINIVIL, ZESTRIL) 40 mg tablet Take 1 Tab by mouth daily. Indications: hypertension    buPROPion XL (WELLBUTRIN XL) 150 mg tablet Take 1 Tab by mouth every morning. Indications: major depressive disorder    lisinopril (PRINIVIL, ZESTRIL) 10 mg tablet Take 10 mg by mouth daily.  hydroCHLOROthiazide (HYDRODIURIL) 12.5 mg tablet Take 12.5 mg by mouth daily. Indications: Patient does not recall dosage of HCTZ       Past Medical History:   Diagnosis Date    Back pain     Hypertension     Psychiatric disorder     hallucinations       Past Surgical History:   Procedure Laterality Date    HX OTHER SURGICAL      oral sx       No family history on file.     Social History     Social History    Marital status:      Spouse name: N/A    Number of children: N/A    Years of education: N/A     Occupational History    Not on file. Social History Main Topics    Smoking status: Current Some Day Smoker    Smokeless tobacco: Never Used    Alcohol use Yes      Comment: beer    Drug use: Yes     Special: Marijuana, Cocaine    Sexual activity: Not on file     Other Topics Concern    Not on file     Social History Narrative       No Known Allergies    Patient's primary care provider (as noted in EPIC):  None    REVIEW OF SYSTEMS:    Constitutional:  Negative for diaphoresis. Eyes:  Negative for diploplia. HENT:  Negative for congestion. Respiratory:  Negative for stridor. Cardiovascular:  Negative for palpitations. Gastrointestinal:  Negative for diarrhea. Genitourinary:  Negative for flank pain. Musculoskeletal:  Negative for back pain. Skin:  Negative for pallor. Neurological:  Negative for weakness. Psychiatric:  Negative for hallucinations. Negative for SI/HI. (+) Dysphoric mood. Visit Vitals    /87 (BP 1 Location: Right arm, BP Patient Position: At rest)    Pulse (!) 117    Temp 98.4 °F (36.9 °C)    Resp 18    Ht 5' 6\" (1.676 m)    Wt 63.5 kg (140 lb)    SpO2 99%    BMI 22.6 kg/m2       PHYSICAL EXAM:    CONSTITUTIONAL:  Alert, in no apparent distress;  well developed;  well nourished. HEAD:  Normocephalic, atraumatic. EYES:  EOMI. Non-icteric sclera. Normal conjunctiva. ENTM:  Nose:  no rhinorrhea. Throat:  no erythema or exudate, mucous membranes moist.  NECK:  No JVD. Supple  RESPIRATORY:  Chest clear, equal breath sounds, good air movement. CARDIOVASCULAR:  Regular rate and rhythm. No murmurs, rubs, or gallops. GI:  Normal bowel sounds, abdomen soft and non-tender. No rebound or guarding. BACK:  Non-tender. UPPER EXT:  Normal inspection. LOWER EXT:  No edema, no calf tenderness. Distal pulses intact.   NEURO:  Moves all four extremities, and grossly normal motor exam.  SKIN:  No rashes;  Normal for age. PSYCH:  Alert and normal affect. DIFFERENTIAL DIAGNOSES/ MEDICAL DECISION MAKING:   Differential diagnoses/impression: Need to rule out obvious organic causes versus psychological etiology. Based on patient's presentation and lab work, I do not believe that there is an obvious organic etiology for the patient's suicidal ideation. I believe the patient needs psychiatric evaluation and treatment for the suicidal ideation. ED COURSE:      Abnormal lab results from this emergency department encounter:  Labs Reviewed   ETHYL ALCOHOL - Abnormal; Notable for the following:        Result Value    ALCOHOL(ETHYL),SERUM 85 (*)     All other components within normal limits   CBC WITH AUTOMATED DIFF - Abnormal; Notable for the following:     RBC 4.63 (*)     HGB 12.5 (*)     RDW 16.2 (*)     All other components within normal limits   METABOLIC PANEL, BASIC - Abnormal; Notable for the following:     Sodium 146 (*)     Potassium 3.3 (*)     Chloride 110 (*)     Calcium 8.4 (*)     All other components within normal limits   DRUG SCREEN, URINE       Lab values for this patient within approximately the last 12 hours:  Recent Results (from the past 12 hour(s))   ETHYL ALCOHOL    Collection Time: 08/22/17 11:35 PM   Result Value Ref Range    ALCOHOL(ETHYL),SERUM 85 (H) 0 - 3 MG/DL   CBC WITH AUTOMATED DIFF    Collection Time: 08/22/17 11:35 PM   Result Value Ref Range    WBC 5.2 4.6 - 13.2 K/uL    RBC 4.63 (L) 4.70 - 5.50 M/uL    HGB 12.5 (L) 13.0 - 16.0 g/dL    HCT 37.6 36.0 - 48.0 %    MCV 81.2 74.0 - 97.0 FL    MCH 27.0 24.0 - 34.0 PG    MCHC 33.2 31.0 - 37.0 g/dL    RDW 16.2 (H) 11.6 - 14.5 %    PLATELET 661 427 - 329 K/uL    MPV 9.5 9.2 - 11.8 FL    NEUTROPHILS 52 40 - 73 %    LYMPHOCYTES 42 21 - 52 %    MONOCYTES 5 3 - 10 %    EOSINOPHILS 1 0 - 5 %    BASOPHILS 0 0 - 2 %    ABS. NEUTROPHILS 2.7 1.8 - 8.0 K/UL    ABS. LYMPHOCYTES 2.2 0.9 - 3.6 K/UL    ABS.  MONOCYTES 0.3 0.05 - 1.2 K/UL ABS. EOSINOPHILS 0.1 0.0 - 0.4 K/UL    ABS. BASOPHILS 0.0 0.0 - 0.06 K/UL    DF AUTOMATED     METABOLIC PANEL, BASIC    Collection Time: 08/22/17 11:35 PM   Result Value Ref Range    Sodium 146 (H) 136 - 145 mmol/L    Potassium 3.3 (L) 3.5 - 5.5 mmol/L    Chloride 110 (H) 100 - 108 mmol/L    CO2 27 21 - 32 mmol/L    Anion gap 9 3.0 - 18 mmol/L    Glucose 97 74 - 99 mg/dL    BUN 15 7.0 - 18 MG/DL    Creatinine 1.11 0.6 - 1.3 MG/DL    BUN/Creatinine ratio 14 12 - 20      GFR est AA >60 >60 ml/min/1.73m2    GFR est non-AA >60 >60 ml/min/1.73m2    Calcium 8.4 (L) 8.5 - 10.1 MG/DL       Radiologist and cardiologist interpretations if available at time of this note:  No results found. Medication(s) ordered for patient during this emergency visit encounter:  Medications - No data to display    ED COURSE:  The patient has no active medical issues. I believe that the patient is medically cleared for admission to a psychiatric unit if this is deemed appropriate by the crisis staff after their evaluation of the patient. IMPRESSION AND MEDICAL DECISION MAKING:  Based upon the patient's presentation with noted HPI and PE, along with the work up done in the emergency department, I believe that the patient is having depression that MAY require admission and further evaluation on a psychiatric/ behavioral medicine unit. THE PATIENT IS MEDICALLY CLEARED FOR ADMISSION TO A PSYCHIATRIC UNIT. I spoke to the [address] [place] [address], Dr. Imani Peoples, who recommends inpatient admission of depressed patient. Final disposition of the patient will be determined based on the recommendation    Condition:  Stable    Signout Note      Pt care transferred to Dr. Sony Warren  ,ED provider. History of patient complaint(s), available diagnostic reports and current treatment plan has been discussed thoroughly. Bedside rounding on patient occured : no .   Intended disposition of patient : Transfer  Pending diagnostics reports and/or labs (please list): Transfer of VOLUNTARY patient to community behavioral medicine bed [facility]    DIAGNOSIS:  1. Depression. PLAN:   1. Admit. SMITH Gastelum Attestation:   August 23, 2017 at 12:16 AM - [address] [place] [address] for and in the presence of Yessy Hand M.D. Signed by: Isrrael Singh, 08/23/17 12:16 AM    Provider Attestation:  If a scribe was utilized in generation of this patient record, I personally performed the services described in the documentation, reviewed the documentation, as recorded by the scribe in my presence, and it accurately records the patient's history of presenting illness, review of systems, patient physical examination, and procedures performed by me as the attending physician. Yessy Hand M.D. [facility] Board Certified Emergency Physician  8/22/2017.  12:16 AM    Note:  Assuming care of patient at beginning of shift    8:01 PM  I, Elizabeth Kang MD, assumed care of patient at the beginning of my shift. Nursing nurses regarding the HPI and triage nursing notes were reviewed. Current Facility-Administered Medications   Medication Dose Route Frequency    buPROPion XL (WELLBUTRIN XL) tablet 300 mg  300 mg Oral 7am    hydroCHLOROthiazide (HYDRODIURIL) tablet 50 mg  50 mg Oral DAILY    propranolol (INDERAL) tablet 40 mg  40 mg Oral BID     Current Outpatient Prescriptions   Medication Sig    buPROPion XL (WELLBUTRIN XL) 300 mg XL tablet Take 1 Tab by mouth every morning. Indications: major depressive disorder    hydroCHLOROthiazide (HYDRODIURIL) 50 mg tablet Take 1 Tab by mouth daily. Indications: hypertension    propranolol (INDERAL) 40 mg tablet Take 1 Tab by mouth two (2) times a day. Indications: hypertension    lisinopril (PRINIVIL, ZESTRIL) 40 mg tablet Take 1 Tab by mouth daily. Indications: hypertension    buPROPion XL (WELLBUTRIN XL) 150 mg tablet Take 1 Tab by mouth every morning.  Indications: major depressive disorder    lisinopril (PRINIVIL, ZESTRIL) 10 mg tablet Take 10 mg by mouth daily.  hydroCHLOROthiazide (HYDRODIURIL) 12.5 mg tablet Take 12.5 mg by mouth daily. Indications: Patient does not recall dosage of HCTZ       Past Medical History:   Diagnosis Date    Back pain     Hypertension     Psychiatric disorder     hallucinations       Past Surgical History:   Procedure Laterality Date    HX OTHER SURGICAL      oral sx       No family history on file. Social History     Social History    Marital status:      Spouse name: N/A    Number of children: N/A    Years of education: N/A     Occupational History    Not on file. Social History Main Topics    Smoking status: Current Some Day Smoker    Smokeless tobacco: Never Used    Alcohol use Yes      Comment: beer    Drug use: Yes     Special: Marijuana, Cocaine    Sexual activity: Not on file     Other Topics Concern    Not on file     Social History Narrative       No Known Allergies    Patient's primary care provider (as noted in EPIC):  None    Abnormal lab results from this emergency department encounter:  Labs Reviewed   ETHYL ALCOHOL - Abnormal; Notable for the following:        Result Value    ALCOHOL(ETHYL),SERUM 85 (*)     All other components within normal limits   CBC WITH AUTOMATED DIFF - Abnormal; Notable for the following:     RBC 4.63 (*)     HGB 12.5 (*)     RDW 16.2 (*)     All other components within normal limits   METABOLIC PANEL, BASIC - Abnormal; Notable for the following:     Sodium 146 (*)     Potassium 3.3 (*)     Chloride 110 (*)     Calcium 8.4 (*)     All other components within normal limits   DRUG SCREEN, URINE   HEPATIC FUNCTION PANEL       Lab values for this patient within approximately the last 12 hours:  No results found for this or any previous visit (from the past 12 hour(s)).     Radiologist and cardiologist interpretations if available at time of this note:  Radiology results:  No results found. Medication(s) ordered for patient during this emergency visit encounter:  Medications   buPROPion XL (WELLBUTRIN XL) tablet 300 mg (300 mg Oral Given 8/23/17 0935)   hydroCHLOROthiazide (HYDRODIURIL) tablet 50 mg (50 mg Oral Given 8/23/17 0933)   propranolol (INDERAL) tablet 40 mg (40 mg Oral Given 8/23/17 5848)       Pt care assumed from Dr. Tae Kirby , ED provider. Pt complaint(s), current treatment plan, progression and available diagnostic results have been discussed thoroughly. Rounding occurred: no  Intended Disposition: Transfer   Pending diagnostic reports and/or labs (please list): Transfer of VOLUNTARY patient to community behavioral medicine bed by the [facility] worder. 5:51 AM  Patient resting comfortably overnight. DIAGNOSIS:  1. Depression. Lacy Casey M.D. Signout Note      Pt care transferred to Dr. Booker Noland ,ED provider. History of patient complaint(s), available diagnostic reports and current treatment plan has been discussed thoroughly. Bedside rounding on patient occured : yes . Intended disposition of patient : Transfer  Pending diagnostics reports and/or labs (please list): Transfer of VOLUNTARY patient to community behavioral medicine bed by the [facility] worder    Lacy Casey M.D. Note:  Assuming care of patient at beginning of shift    7:23 PM  I, Juan José Madera MD, assumed care of patient at the beginning of my shift. Nursing nurses regarding the HPI and triage nursing notes were reviewed.      Current Facility-Administered Medications   Medication Dose Route Frequency    lisinopril (PRINIVIL, ZESTRIL) tablet 40 mg  40 mg Oral DAILY    [START ON 8/25/2017] Thiamine Mononitrate (B-1) tablet 100 mg  100 mg Oral DAILY    [START ON 6/62/1666] folic acid (FOLVITE) tablet 1 mg  1 mg Oral DAILY    LORazepam (ATIVAN) tablet 2 mg  2 mg Oral Q2H PRN    LORazepam (ATIVAN) tablet 1 mg  1 mg Oral Q2H PRN    buPROPion XL (WELLBUTRIN XL) tablet 300 mg  300 mg Oral 7am    hydroCHLOROthiazide (HYDRODIURIL) tablet 50 mg  50 mg Oral DAILY    propranolol (INDERAL) tablet 40 mg  40 mg Oral BID     Current Outpatient Prescriptions   Medication Sig    buPROPion XL (WELLBUTRIN XL) 300 mg XL tablet Take 1 Tab by mouth every morning. Indications: major depressive disorder    hydroCHLOROthiazide (HYDRODIURIL) 50 mg tablet Take 1 Tab by mouth daily. Indications: hypertension    propranolol (INDERAL) 40 mg tablet Take 1 Tab by mouth two (2) times a day. Indications: hypertension    lisinopril (PRINIVIL, ZESTRIL) 40 mg tablet Take 1 Tab by mouth daily. Indications: hypertension    buPROPion XL (WELLBUTRIN XL) 150 mg tablet Take 1 Tab by mouth every morning. Indications: major depressive disorder    hydroCHLOROthiazide (HYDRODIURIL) 12.5 mg tablet Take 12.5 mg by mouth daily. Indications: Patient does not recall dosage of HCTZ       Past Medical History:   Diagnosis Date    Back pain     Hypertension     Psychiatric disorder     hallucinations       Past Surgical History:   Procedure Laterality Date    HX OTHER SURGICAL      oral sx       No family history on file. Social History     Social History    Marital status:      Spouse name: N/A    Number of children: N/A    Years of education: N/A     Occupational History    Not on file.      Social History Main Topics    Smoking status: Current Some Day Smoker    Smokeless tobacco: Never Used    Alcohol use Yes      Comment: beer    Drug use: Yes     Special: Marijuana, Cocaine    Sexual activity: Not on file     Other Topics Concern    Not on file     Social History Narrative       No Known Allergies    Patient's primary care provider (as noted in EPIC):  None    Abnormal lab results from this emergency department encounter:  Labs Reviewed   ETHYL ALCOHOL - Abnormal; Notable for the following:        Result Value    ALCOHOL(ETHYL),SERUM 85 (*)     All other components within normal limits   CBC WITH AUTOMATED DIFF - Abnormal; Notable for the following:     RBC 4.63 (*)     HGB 12.5 (*)     RDW 16.2 (*)     All other components within normal limits   METABOLIC PANEL, BASIC - Abnormal; Notable for the following:     Sodium 146 (*)     Potassium 3.3 (*)     Chloride 110 (*)     Calcium 8.4 (*)     All other components within normal limits   DRUG SCREEN, URINE   HEPATIC FUNCTION PANEL       Lab values for this patient within approximately the last 12 hours:  No results found for this or any previous visit (from the past 12 hour(s)). Radiologist and cardiologist interpretations if available at time of this note:  Radiology results:  No results found. Medication(s) ordered for patient during this emergency visit encounter:  Medications   buPROPion XL (WELLBUTRIN XL) tablet 300 mg (300 mg Oral Given 8/24/17 1044)   hydroCHLOROthiazide (HYDRODIURIL) tablet 50 mg (50 mg Oral Given 8/24/17 0937)   propranolol (INDERAL) tablet 40 mg (40 mg Oral Given 8/24/17 1833)   lisinopril (PRINIVIL, ZESTRIL) tablet 40 mg (40 mg Oral Given 8/24/17 0936)   Thiamine Mononitrate (B-1) tablet 100 mg (not administered)   folic acid (FOLVITE) tablet 1 mg (not administered)   LORazepam (ATIVAN) tablet 2 mg (not administered)   LORazepam (ATIVAN) tablet 1 mg (not administered)   acetaminophen (TYLENOL) tablet 1,000 mg (1,000 mg Oral Given 8/24/17 1741)       Pt care assumed from Dr. Sanjay Whaley , ED provider. Pt complaint(s), current treatment plan, progression and available diagnostic results have been discussed thoroughly. Rounding occurred: no  Intended Disposition: Transfer   Pending diagnostic reports and/or labs (please list): Transfer of VOLUNTARY patient to [facility] behavioral medicine bed by the [facility] crisis worder. Diagnoses:  1. Depression    7:24 PM  Patient is resting comfortably and is aware of transfer to behavioral medicine bed. Amye Rouse L. Romayne Monica, M.D.     Signout Note      Pt care transferred to Dr. Eileen Lundberg  ,ED provider. History of patient complaint(s), available diagnostic reports and current treatment plan has been discussed thoroughly. Bedside rounding on patient occured : no . Intended disposition of patient : Transfer  Pending diagnostics reports and/or labs (please list): Transfer of VOLUNTARY patient to community behavioral medicine [facility] wor[facility]. Shanell Cage M.D.

## 2017-08-23 NOTE — ED NOTES
7:00 AM :Pt care assumed from Dr. Tiffanie Benson, ED provider. Pt complaint(s), current treatment plan, progression and available diagnostic results have been discussed thoroughly. Rounding occurred: yes  Intended Disposition: Transfer   Pending diagnostic reports and/or labs (please list): Transfer of VOLUNTARY patient to community behavioral medicine bed by the [facility] worderTransfer of VOLUNTARY patient to community behavioral medicine bed by the [facility] worder    Scribe [id number] Lamont Daniels acting as a scribe for and in the presence of [address] [place] [address]      August 23, 2017 at 9:13 AM       Provider Attestation:      I personally performed the services described in the documentation, reviewed the documentation, as recorded by the scribe in my presence, and it accurately and completely records my words and actions. August 23, 2017 at 9:13 AM - [address] [place] [address]      Pt remained stable without complaints in the ED while awaiting placement. 7:00 PM : Pt care transferred to Dr. Jessenia Sanchez, ED provider. History of patient complaint(s), available diagnostic reports and current treatment plan has been discussed thoroughly.    Bedside rounding on patient occured : yes   Intended disposition of patient : TBD  Pending diagnostics reports and/or labs (please list): Transfer of VOLUNTARY patient to [facility] behavioral medicine bed by the [facility] worderTransfer of VOLUNTARY patient to community behavioral medicine bed by the [facility] word

## 2017-08-24 PROCEDURE — 74011250637 HC RX REV CODE- 250/637: Performed by: EMERGENCY MEDICINE

## 2017-08-24 PROCEDURE — 74011250637 HC RX REV CODE- 250/637

## 2017-08-24 RX ORDER — FOLIC ACID 1 MG/1
1 TABLET ORAL DAILY
Status: DISCONTINUED | OUTPATIENT
Start: 2017-08-25 | End: 2017-08-26 | Stop reason: HOSPADM

## 2017-08-24 RX ORDER — ACETAMINOPHEN 500 MG
1000 TABLET ORAL
Status: COMPLETED | OUTPATIENT
Start: 2017-08-24 | End: 2017-08-24

## 2017-08-24 RX ORDER — LORAZEPAM 1 MG/1
1 TABLET ORAL
Status: DISCONTINUED | OUTPATIENT
Start: 2017-08-24 | End: 2017-08-26 | Stop reason: HOSPADM

## 2017-08-24 RX ORDER — LORAZEPAM 1 MG/1
2 TABLET ORAL
Status: DISCONTINUED | OUTPATIENT
Start: 2017-08-24 | End: 2017-08-26 | Stop reason: HOSPADM

## 2017-08-24 RX ORDER — LISINOPRIL 20 MG/1
40 TABLET ORAL DAILY
Status: DISCONTINUED | OUTPATIENT
Start: 2017-08-24 | End: 2017-08-26 | Stop reason: HOSPADM

## 2017-08-24 RX ORDER — ASPIRIN 325 MG/1
100 TABLET, FILM COATED ORAL DAILY
Status: DISCONTINUED | OUTPATIENT
Start: 2017-08-25 | End: 2017-08-26 | Stop reason: HOSPADM

## 2017-08-24 RX ORDER — ACETAMINOPHEN 500 MG
TABLET ORAL
Status: COMPLETED
Start: 2017-08-24 | End: 2017-08-24

## 2017-08-24 RX ADMIN — PROPRANOLOL HYDROCHLORIDE 40 MG: 40 TABLET ORAL at 18:33

## 2017-08-24 RX ADMIN — ACETAMINOPHEN 1000 MG: 500 TABLET ORAL at 17:41

## 2017-08-24 RX ADMIN — LISINOPRIL 40 MG: 20 TABLET ORAL at 09:36

## 2017-08-24 RX ADMIN — BUPROPION HYDROCHLORIDE 300 MG: 300 TABLET, FILM COATED, EXTENDED RELEASE ORAL at 10:44

## 2017-08-24 RX ADMIN — PROPRANOLOL HYDROCHLORIDE 40 MG: 40 TABLET ORAL at 10:45

## 2017-08-24 RX ADMIN — HYDROCHLOROTHIAZIDE 50 MG: 25 TABLET ORAL at 09:37

## 2017-08-24 RX ADMIN — Medication 1000 MG: at 22:26

## 2017-08-24 RX ADMIN — ACETAMINOPHEN 1000 MG: 500 TABLET ORAL at 22:26

## 2017-08-24 NOTE — ED NOTES
6:29 PM : Pt care transferred to Dr. Dr. Barrera Galvez, ED provider. History of patient complaint(s), available diagnostic reports and current treatment plan has been discussed thoroughly.

## 2017-08-24 NOTE — ED NOTES
Purposeful rounding completed:    Side rails up x 2:  YES  Bed in low position and wheels locked: YES  Call bell within reach: YES  Comfort addressed: YES    Toileting needs addressed: YES  Plan of care reviewed/updated with patient and or family members: YES  IV site assessed: N/A  Pain assessed and addressed: YES, 0

## 2017-08-24 NOTE — ED NOTES
Assumed care of patient, received report from Esa Saldana. Patient resting in bed with no complaints at this time. Hospital sitter at bedside. See scanned suicidal check sheet.

## 2017-08-24 NOTE — ED NOTES
Verbal shift change report given to Arlo Blizzard, RN (oncoming nurse) by Juan Ramon Reyes RN (offgoing nurse). Report included the following information SBAR.

## 2017-08-24 NOTE — CONSULTS
Chief Complaint: Telepsychiatry consultation   Location of patient:  [facility] ED   Location of doctor: [facility]  This evaluation was conducted via Telepsychiatry with the assistance of onsite staff. History of Present Illness: This pt is a 47 yr old male who came to the ED on 8/22. At that time he was endorsing feeling confused, decreased sleep, depression, SI, recent suicide attempt    Pt was previously seen by telepsychiatry services. He is currently under voluntary status and is pending placement. He has been restarted on his home med of wellbutrin XL 300mg qam.      Telepsychiatry was consulted for follow up. Pt reports that he came to the hospital because he was feeling confused, is going through a lot, has been feeling depressed and suicidal. He reports daily alcohol use in the past week. UDS negative. He denies any hx of seizures and denies any current withdrawal sx. His vitals are stable now but sometimes elevated- pulse 56, /101. Mental Status Exam:   Appearance and attire: hospital attire, lying in bed  Attitude and behavior:  Calm but guarded  Speech:  Clear, coherent   Affect and mood:  constricted  Association and thought processes: goal directed   Thought content: no overt delusions. +SI  Perception: pt does not appear to be responding to internal sitmuil  Sensorium, memory, and orientation: AAOX3  Intellectual functioning: unable to assess  Insight and judgment: fair    Diagnosis:  Unspecified depressive disorder, Alcohol use disorder, Cannabis use disorder. Impression/Risk Assessment/Treatment Recommendations: The patient presents with depression, SI, recent suicide attempt, alcohol use. The patient thus continues to be an acute danger to self and requires inpatient psychiatric hospitalization for stabilization and treatment. Pt is currently under voluntary status and still agrees to hospitalization and treatment.  If he should change his mind regarding his status, recommend contacting [facility] for TDO. Continue his current med of wellbutrin. Pt denies hx of seizures. Start alcohol detox protocol : vitals with CIWA q4hrs, ativan 2mg PO q4hr prn for withdrawal sx as based on elevated vitals/ciwa scales (hold for somnolence, ataxia or dysarthria),  multivit 1 tab daily, thiamine 887OY daily, folic acid 1 mg daily. Pt expressed agreement with this plan. Case discussed with ED attending.          Aziza Polanco MD  Telepsychiatry

## 2017-08-24 NOTE — ED NOTES
Verbal shift change report given to Carson Mandel RN (oncoming nurse) by Adelaida Cody RN (offgoing nurse). Report included the following information SBAR. This patient has been recommended for inpatient treatment and is awaiting placement. The ED provider has reviewed the patients history and medications, diet, and treatments and will order as necessary. The patient will be observed and attended to as their medical needs require and/or policy dictates. Pt sitting up in stretcher in NAD at this time, sitter at bedside. Continues to await placement.

## 2017-08-24 NOTE — ED NOTES
7:12 AM : Pt care transferred to Dr. Shilpa Li, ED provider. History of patient complaint(s), available diagnostic reports and current treatment plan has been discussed thoroughly.

## 2017-08-25 PROCEDURE — 74011250637 HC RX REV CODE- 250/637: Performed by: EMERGENCY MEDICINE

## 2017-08-25 RX ADMIN — LISINOPRIL 40 MG: 20 TABLET ORAL at 09:48

## 2017-08-25 RX ADMIN — BUPROPION HYDROCHLORIDE 300 MG: 300 TABLET, FILM COATED, EXTENDED RELEASE ORAL at 09:47

## 2017-08-25 RX ADMIN — FOLIC ACID 1 MG: 1 TABLET ORAL at 09:47

## 2017-08-25 RX ADMIN — THIAMINE HCL TAB 100 MG 100 MG: 100 TAB at 09:48

## 2017-08-25 RX ADMIN — HYDROCHLOROTHIAZIDE 50 MG: 25 TABLET ORAL at 09:48

## 2017-08-25 NOTE — ED NOTES
Pt administered medications per MAR. Behavioral Health Assessment complete. Pt sitting quietly in bed, denies SI/HI.

## 2017-08-25 NOTE — ED NOTES
Assumed care of patient from [facility]. Patient is currently awake in bed. Spoke with patient. He is pleasant and cooperative. Denies any needs at this time.

## 2017-08-25 NOTE — ED NOTES
7:06 AM :Pt care assumed from Dr. Laura Lee, ED provider. Pt complaint(s), current treatment plan, progression and available diagnostic results have been discussed thoroughly.   Rounding occurred: yes  Intended Disposition: Transfer   Pending diagnostic reports and/or labs (please list): psych bed placement      4:42 PM]  Pt not candidate for [facility] called [facility] no male beds available     7:10 AM 8/26/2017    Continuing boarding at present looking for bed availability   l  7:04 PM  Care turned over Dr Vijay Harmon for further care and disposition

## 2017-08-25 NOTE — ED NOTES
7:59 PM :Pt care assumed from Dr. Cyndy Phillips , ED provider. Pt complaint(s), current treatment plan, progression and available diagnostic results have been discussed thoroughly. Rounding occurred: yes  Intended Disposition: Transfer Patient awaiting placement possibly at [facility] when available. Pending diagnostic reports and/or labs (please list):    7:00 AM : Pt care transferred to Dr. Cyndy Phillips ED provider. History of patient complaint(s), available diagnostic reports and current treatment plan has been discussed thoroughly. Bedside rounding on patient occured : yes .   Intended disposition of patient : Transfer  Pending diagnostics reports and/or labs (please list):

## 2017-08-25 NOTE — ANCILLARY DISCHARGE INSTRUCTIONS
Unit Ozan stated that [facility] called asking if we can see if [facility] has any beds available because patient does not qualify for crisis stabilization. Called madt to Emily rodriguez, waiting for a return call.

## 2017-08-26 VITALS
TEMPERATURE: 98.6 F | HEIGHT: 66 IN | RESPIRATION RATE: 16 BRPM | DIASTOLIC BLOOD PRESSURE: 75 MMHG | HEART RATE: 67 BPM | OXYGEN SATURATION: 96 % | BODY MASS INDEX: 22.5 KG/M2 | WEIGHT: 140 LBS | SYSTOLIC BLOOD PRESSURE: 115 MMHG

## 2017-08-26 PROCEDURE — 74011250637 HC RX REV CODE- 250/637: Performed by: EMERGENCY MEDICINE

## 2017-08-26 RX ORDER — HYDROCHLOROTHIAZIDE 50 MG/1
50 TABLET ORAL DAILY
Qty: 30 TAB | Refills: 0 | Status: SHIPPED | OUTPATIENT
Start: 2017-08-26 | End: 2017-10-13

## 2017-08-26 RX ORDER — BUPROPION HYDROCHLORIDE 300 MG/1
300 TABLET ORAL
Qty: 30 TAB | Refills: 0 | Status: SHIPPED | OUTPATIENT
Start: 2017-08-26 | End: 2017-11-10

## 2017-08-26 RX ORDER — PROPRANOLOL HYDROCHLORIDE 40 MG/1
40 TABLET ORAL 2 TIMES DAILY
Qty: 60 TAB | Refills: 0 | Status: SHIPPED | OUTPATIENT
Start: 2017-08-26 | End: 2017-09-25

## 2017-08-26 RX ORDER — LISINOPRIL 40 MG/1
40 TABLET ORAL DAILY
Qty: 30 TAB | Refills: 0 | Status: SHIPPED | OUTPATIENT
Start: 2017-08-26 | End: 2017-10-13

## 2017-08-26 RX ADMIN — LISINOPRIL 40 MG: 20 TABLET ORAL at 09:51

## 2017-08-26 RX ADMIN — PROPRANOLOL HYDROCHLORIDE 40 MG: 40 TABLET ORAL at 09:51

## 2017-08-26 RX ADMIN — FOLIC ACID 1 MG: 1 TABLET ORAL at 09:51

## 2017-08-26 RX ADMIN — THIAMINE HCL TAB 100 MG 100 MG: 100 TAB at 09:51

## 2017-08-26 RX ADMIN — BUPROPION HYDROCHLORIDE 300 MG: 300 TABLET, FILM COATED, EXTENDED RELEASE ORAL at 09:51

## 2017-08-26 RX ADMIN — PROPRANOLOL HYDROCHLORIDE 40 MG: 40 TABLET ORAL at 19:32

## 2017-08-26 RX ADMIN — HYDROCHLOROTHIAZIDE 50 MG: 25 TABLET ORAL at 09:51

## 2017-08-26 NOTE — ED NOTES
Assumed care of patient, report received from [address]. Patient resting on stretcher in no apparent distress, 1:1 with sitter at this time. Patient in paper scrubs, belongings secured by prior shift.

## 2017-08-26 NOTE — ED NOTES
.7:20 PM :Pt care assumed from Dr. Wan Prasad, ED provider. Pt complaint(s), current treatment plan, progression and available diagnostic results have been discussed thoroughly. Rounding occurred: Yes  Intended Disposition: TBD  Pending diagnostic reports and/or labs (please list): Evaluation and placement       Vitals:  Patient Vitals for the past 12 hrs:   Temp Pulse Resp BP SpO2   08/26/17 1546 98.6 °F (37 °C) 67 16 115/75 96 %   08/26/17 0830 97.6 °F (36.4 °C) 65 18 124/81 98 %       Medications ordered:   Medications   buPROPion XL (WELLBUTRIN XL) tablet 300 mg (300 mg Oral Given 8/26/17 0951)   hydroCHLOROthiazide (HYDRODIURIL) tablet 50 mg (50 mg Oral Given 8/26/17 0951)   propranolol (INDERAL) tablet 40 mg (40 mg Oral Given 8/26/17 1932)   lisinopril (PRINIVIL, ZESTRIL) tablet 40 mg (40 mg Oral Given 8/26/17 0951)   Thiamine Mononitrate (B-1) tablet 100 mg (100 mg Oral Given 3/90/79 9598)   folic acid (FOLVITE) tablet 1 mg (1 mg Oral Given 8/26/17 0951)   LORazepam (ATIVAN) tablet 2 mg (not administered)   LORazepam (ATIVAN) tablet 1 mg (not administered)   acetaminophen (TYLENOL) tablet 1,000 mg (1,000 mg Oral Given 8/24/17 1741)   acetaminophen (TYLENOL) tablet 1,000 mg (1,000 mg Oral Given 8/24/17 2226)         Lab findings:  No results found for this or any previous visit (from the past 12 hour(s)). EKG interpretation by ED Physician:    X-Ray, CT or other radiology findings or impressions:  No orders to display       Progress notes, Consult notes or additional Procedure notes:   7:35 PM, 8/22/2017   Consult:  Discussed care with celina Hernandez. Standard discussion; including history of patients chief complaint, available diagnostic results, and treatment course.  reviewed chart; appears pt has been seen by CS[facility] a few days ago and was awaiting a CSU bed; she will re-contact [facility] to follow up as there has been no notes in the past 3 days regarding this         Reevaluation of patient:   I have reassessed the patient. Pt seen by Yoshi Kay, is no longer suicidal, feeling better although is homeless. Contracts for safety. Will give scripts for meds. Follow up with miriam ZURITA. Stable for dc home. Disposition:  Diagnosis:   1. Depression, unspecified depression type        Disposition: discharged    Follow-up Information     Follow up With Details Comments [address] [facility] Call in 1 day for re-evaluation and further treatment [place] [address] [id number]  [phone]    [facility] EMERGENCY DEPT  As needed, If symptoms worsen [address] [facility] [address]  [phone]           Patient's Medications   Start Taking    No medications on file   Continue Taking    No medications on file   These Medications have changed    Modified Medication Previous Medication    BUPROPION XL (WELLBUTRIN XL) 300 MG XL TABLET buPROPion XL (WELLBUTRIN XL) 300 mg XL tablet       Take 1 Tab by mouth every morning. Indications: major depressive disorder    Take 1 Tab by mouth every morning. Indications: major depressive disorder    HYDROCHLOROTHIAZIDE (HYDRODIURIL) 50 MG TABLET hydroCHLOROthiazide (HYDRODIURIL) 50 mg tablet       Take 1 Tab by mouth daily. Indications: hypertension    Take 1 Tab by mouth daily. Indications: hypertension    LISINOPRIL (PRINIVIL, ZESTRIL) 40 MG TABLET lisinopril (PRINIVIL, ZESTRIL) 40 mg tablet       Take 1 Tab by mouth daily. Indications: hypertension    Take 1 Tab by mouth daily. Indications: hypertension    PROPRANOLOL (INDERAL) 40 MG TABLET propranolol (INDERAL) 40 mg tablet       Take 1 Tab by mouth two (2) times a day for 30 days. Indications: hypertension    Take 1 Tab by mouth two (2) times a day. Indications: hypertension   Stop Taking    BUPROPION XL (WELLBUTRIN XL) 150 MG TABLET    Take 1 Tab by mouth every morning. Indications: major depressive disorder    HYDROCHLOROTHIAZIDE (HYDRODIURIL) 12.5 MG TABLET    Take 12.5 mg by mouth daily.  Indications: Patient does not recall dosage of HCTZ    LISINOPRIL (PRINIVIL, ZESTRIL) 10 MG TABLET    Take 10 mg by mouth daily. Scribe Attestation:   I, Fabiana Alfaro, am scribing for and in the presence of Lennox Jason DO on this day 08/26/17 at 7:21 PM   ernestina Bloom    Provider Attestation:  I personally performed the services described in the documentation, reviewed the documentation, as recorded by the scribe in my presence, and it accurately and completely records my words and actions. Lennox Jason DO.        Signed by: Ernestina Bloom, 7:21 PM

## 2017-08-26 NOTE — ED NOTES
Purposeful rounding completed:    Side rails up x 2:  YES  Bed in low position and wheels locked: YES  Call bell within reach: YES  Comfort addressed: YES    Toileting needs addressed: YES  Plan of care reviewed/updated with patient and or family members: YES  IV site assessed: N/A  Pain assessed and addressed: YES, 0    Patient laying in bed. Speaking with bedside sitter.

## 2017-08-26 NOTE — ED NOTES
Purposeful rounding completed at this time by this RN. Patient on suicide precautions and has a sitter 1:1 at this time. Patient resting on stretcher in no apparent distress, side rails up and reporting no pain at this time. Patient aware of current plan of care and has no concerns at this time. This RN to continue to monitor patient.

## 2017-08-27 ENCOUNTER — HOSPITAL ENCOUNTER (EMERGENCY)
Age: 52
Discharge: HOME | End: 2017-08-27
Attending: EMERGENCY MEDICINE
Payer: SELF-PAY

## 2017-08-27 VITALS
OXYGEN SATURATION: 97 % | TEMPERATURE: 97.4 F | DIASTOLIC BLOOD PRESSURE: 70 MMHG | BODY MASS INDEX: 24.11 KG/M2 | HEART RATE: 90 BPM | WEIGHT: 150 LBS | RESPIRATION RATE: 16 BRPM | SYSTOLIC BLOOD PRESSURE: 112 MMHG | HEIGHT: 66 IN

## 2017-08-27 DIAGNOSIS — Z87.81 HISTORY OF FRACTURE OF RIGHT ANKLE: ICD-10-CM

## 2017-08-27 DIAGNOSIS — M25.571 CHRONIC PAIN OF RIGHT ANKLE: Primary | ICD-10-CM

## 2017-08-27 DIAGNOSIS — G89.29 CHRONIC PAIN OF RIGHT ANKLE: Primary | ICD-10-CM

## 2017-08-27 PROCEDURE — 99283 EMERGENCY DEPT VISIT LOW MDM: CPT

## 2017-08-27 NOTE — BSMART NOTE
Emily Goldsmith was seen at the request of Dr. Mandi Alcantar for re-evaluation since being in the ER for 92 hours awaiting a voluntary psychiatric bed. Per [facility] this morning, there were no male beds available. Upon meeting with patient, he was pleasant, cooperative and stated that he was doing much better and is feeling good. He was oriented to person, place, month and year and was uncertain of the date due to being here for four days. He states that he has been taking his medications for the past few days and they have circulated through his system and that has improved his mental state. He denies suicidal or homicidal thoughts. He has a bright affect and appropriate mood. We discussed that fact that his condition has improved and he does not appear to meet hospital criteria at this point. 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 Mr. Bea Khan was able to complete a safety contract and sign it. He was provided a brochure with information about the [facility] and the phone numbers to call for intake. He was asked if I could call someone for him and he stated that there was no one that could be called at the present time. He stated that he is afraid that he lost his phone. Talked with Dr. Mandi Alcantar about the patient's current mental status and she agrees that he can be discharged. Jose Hernandez.  [place], [place]  Crisis Intervention

## 2017-08-27 NOTE — DISCHARGE INSTRUCTIONS
Learning About Depression Screening  What is depression screening? Depression screening is a way to see if you have depression symptoms. It may be done by a doctor or counselor. This screening is often part of a routine checkup. That's because your mental health is just as important as your physical health. Depression is a medical illness that affects how you feel, think, and act. You may:  · Have less energy. · Lose interest in your daily activities. · Feel sad and grouchy for a long time. Depression is very common. It affects men and women of all ages. Many things can trigger depression. Some people become depressed after they have a stroke or find out they have a major illness like cancer or heart disease. The death of a loved one, a breakup, or changes in the natural brain chemicals may lead to depression. It can run in families. Most experts believe that a combination of family history (a person's genes) and stressful life events can cause depression. What happens during screening? Your doctor may ask about your feelings, any changes in eating habits, your energy level, and your interest in your daily tasks. He or she may ask other questions, such as how well you are sleeping and if you can focus on the things you do. This may be an informal talk between the two of you. Or your doctor may ask you to fill out a quick form and then talk about your answers. Some diseases or changes in your body can cause symptoms that look like depression. So your doctor may do blood tests to help rule out other problems, such as hormone changes, a low thyroid level, or anemia. What happens after screening? If you have signs of depression, your doctor will talk to you about your options. Doctors usually treat depression with medicines or counseling. Often, combining the two works best. Many people don't get help because they think that they'll get over the depression on their own.  But people with depression may not get better unless they get treatment. Many people feel embarrassed or ashamed about having depression. But it isn't a sign of personal weakness. It's not a character flaw. A person who is depressed is not \"crazy. \" Depression is caused by changes in the brain. A serious symptom of depression is thinking about death or suicide. If you or someone you care about talks about this or about feeling hopeless, get help right away. It's important to know that depression can be treated. The first step toward feeling better is often just seeing that the problem exists. Where can you learn more? Go to http://jasper[employer]anghai Xik[employer] Technologyteresa.info/. Enter T185 in the search box to learn more about \"Learning About Depression Screening. \"  Current as of: October 28, 2016  Content Version: 11.3  © 4780-5341 [employer]. Care instructions adapted under license by [employer] (which disclaims liability or warranty for this information). If you have questions about a medical condition or this instruction, always ask your healthcare professional. [place] [address] any warranty or liability for your use of this information. Preventing a Relapse of Depression in Teens: Care Instructions  Your Care Instructions  A relapse of depression means your symptoms have come back after you have gotten better. This happens to many people who have depression. But you can do a lot to keep depression from coming back. Follow-up care is a key part of your treatment and safety. Be sure to make and go to all appointments, and call your doctor if you are having problems. It's also a good idea to know your test results and keep a list of the medicines you take. What do you need to know? Know your risk of relapse  Some people are more likely to have a relapse than others. Talk to your doctor to find out how likely you are to have a relapse.   You may be at risk for relapse if:  · You have a family member who has had depression. · You are dealing with serious problems in a relationship or a job or at school. · You have a serious medical condition. · You are abusing drugs or alcohol. If you know your risk of relapse and the warning signs, you will be better able to prevent a relapse. Know the warning signs of relapse  The two most common signs of relapse are:  · Feeling sad or hopeless. · Losing interest in your daily activities. You may have other symptoms, such as:  · You lose or gain weight. · You sleep too much or not enough. · You feel restless and unable to sit still. · You feel unable to move. · You feel tired all the time. · You feel unworthy or guilty without an obvious reason. · You have problems concentrating, remembering, or making decisions. · You think often about death or suicide. · You feel angry or have panic attacks. How can you care for yourself at home? · Take your medicines exactly as prescribed. Call your doctor if you think you are having a problem with your medicine. ¨ Many people take their antidepressant medicines for at least 6 months after they have recovered. This often helps keep symptoms from coming back. ¨ If your depression keeps coming back, you may have to take antidepressants for the rest of your life. · Continue counseling even after you have stopped taking medicine. · Eat healthy foods. Include fruits, vegetables, beans, and whole grains in your diet each day. · Get plenty of exercise every day. Go for a walk or jog, ride your bike, or play sports with friends. · See your doctor right away if you have new symptoms or feel that your depression is coming back. · Keep a regular sleep schedule. Try for 8 hours of sleep a night. · Do not drink alcohol or use illegal drugs. · Keep the numbers for these national suicide hotlines: 8-851-456-TALK (3-501.545.8370) and 9-173-CWMJNSV (5-890.845.5337).  If you or someone you know talks about suicide or feeling hopeless, get help right away. When should you call for help? Call 911 anytime you think you may need emergency care. For example, call if:  · You are thinking about suicide or are threatening suicide. · You feel you cannot stop from hurting yourself or someone else. · You hear or see things that aren't real.  · You think or speak in a bizarre way that is not like your usual behavior. Call your doctor now or seek immediate medical care if:  · You are drinking a lot of alcohol or using illegal drugs. · You are talking or writing about death. Watch closely for changes in your health, and be sure to contact your doctor if:  · You find it hard or it's getting harder to deal with school, a job, family, or friends. · You think your treatment is not helping or you are not getting better. · Your symptoms get worse or you get new symptoms. · You have any problems with your antidepressant medicines, such as side effects, or you are thinking about stopping your medicine. · You are having manic behavior, such as having very high energy, needing less sleep than normal, or showing risky behavior such as spending money you don't have or abusing others verbally or physically. Where can you learn more? Go to http://jasper-teresa.info/. Enter I508 in the search box to learn more about \"Preventing a Relapse of Depression in Teens: Care Instructions. \"  Current as of: July 26, 2016  Content Version: 11.3  © 2897-1854 [employer]. Care instructions adapted under license by [employer] (which disclaims liability or warranty for this information). If you have questions about a medical condition or this instruction, always ask your healthcare professional. [place] [address] any warranty or liability for your use of this information.          Recovering From Depression: Care Instructions  Your Care Instructions  Taking good care of yourself is important as you recover from depression. In time, your symptoms will fade as your treatment takes hold. Do not give up. Instead, focus your energy on getting better. Your mood will improve. It just takes some time. Focus on things that can help you feel better, such as being with friends and family, eating well, and getting enough rest. But take things slowly. Do not do too much too soon. You will begin to feel better gradually. Follow-up care is a key part of your treatment and safety. Be sure to make and go to all appointments, and call your doctor if you are having problems. It's also a good idea to know your test results and keep a list of the medicines you take. How can you care for yourself at home? Be realistic  · If you have a large task to do, break it up into smaller steps you can handle, and just do what you can. · You may want to put off important decisions until your depression has lifted. If you have plans that will have a major impact on your life, such as marriage, divorce, or a job change, try to wait a bit. Talk it over with friends and loved ones who can help you look at the overall picture first.  · Reaching out to people for help is important. Do not isolate yourself. Let your family and friends help you. Find someone you can trust and confide in, and talk to that person. · Be patient, and be kind to yourself. Remember that depression is not your fault and is not something you can overcome with willpower alone. Treatment is necessary for depression, just like for any other illness. Feeling better takes time, and your mood will improve little by little. Stay active  · Stay busy and get outside. Take a walk, or try some other light exercise. · Talk with your doctor about an exercise program. Exercise can help with mild depression. · Go to a movie or concert. Take part in a Jehovah's witness activity or other social gathering. Go to a ball game. · Ask a friend to have dinner with you.   Take care of yourself  · Eat a balanced diet with plenty of fresh fruits and vegetables, whole grains, and lean protein. If you have lost your appetite, eat small snacks rather than large meals. · Avoid drinking alcohol or using illegal drugs. Do not take medicines that have not been prescribed for you. They may interfere with medicines you may be taking for depression, or they may make your depression worse. · Take your medicines exactly as they are prescribed. You may start to feel better within 1 to 3 weeks of taking antidepressant medicine. But it can take as many as 6 to 8 weeks to see more improvement. If you have questions or concerns about your medicines, or if you do not notice any improvement by 3 weeks, talk to your doctor. · If you have any side effects from your medicine, tell your doctor. Antidepressants can make you feel tired, dizzy, or nervous. Some people have dry mouth, constipation, headaches, sexual problems, or diarrhea. Many of these side effects are mild and will go away on their own after you have been taking the medicine for a few weeks. Some may last longer. Talk to your doctor if side effects are bothering you too much. You might be able to try a different medicine. · Get enough sleep. If you have problems sleeping:  ¨ Go to bed at the same time every night, and get up at the same time every morning. ¨ Keep your bedroom dark and quiet. ¨ Do not exercise after 5:00 p.m. ¨ Avoid drinks with caffeine after 5:00 p.m. · Avoid sleeping pills unless they are prescribed by the doctor treating your depression. Sleeping pills may make you groggy during the day, and they may interact with other medicine you are taking. · If you have any other illnesses, such as diabetes, heart disease, or high blood pressure, make sure to continue with your treatment. Tell your doctor about all of the medicines you take, including those with or without a prescription.   · Keep the numbers for these national suicide hotlines: 9-169-940-TALK (4-584.307.8034) and 7-666-EGXXSEW (6-778.913.8370). If you or someone you know talks about suicide or feeling hopeless, get help right away. When should you call for help? Call 911 anytime you think you may need emergency care. For example, call if:  · You feel like hurting yourself or someone else. · Someone you know has depression and is about to attempt or is attempting suicide. Call your doctor now or seek immediate medical care if:  · You hear voices. · Someone you know has depression and:  ¨ Starts to give away his or her possessions. ¨ Uses illegal drugs or drinks alcohol heavily. ¨ Talks or writes about death, including writing suicide notes or talking about guns, knives, or pills. ¨ Starts to spend a lot of time alone. ¨ Acts very aggressively or suddenly appears calm. Watch closely for changes in your health, and be sure to contact your doctor if:  · You do not get better as expected. Where can you learn more? Go to http://jasper-teresa.info/. Enter E608 in the search box to learn more about \"Recovering From Depression: Care Instructions. \"  Current as of: July 26, 2016  Content Version: 11.3  © 6460-7340 [employer]. Care instructions adapted under license by [employer] (which disclaims liability or warranty for this information). If you have questions about a medical condition or this instruction, always ask your healthcare professional. [place] [address] any warranty or liability for your use of this information.

## 2017-08-27 NOTE — ED NOTES
I have reviewed discharge instruction and prescriptions with patient at length. Patient verbalized understanding and has no further questions at this time. Education taught and patient verbalized understanding of education. Teach back method used. Belonging bag and bag from security obtained and given back to patient. Patients pain 0/10. Belongings given to patient. Patient discharged with himself to home. Patient denies any SI/ HI at this time.

## 2017-08-28 NOTE — ED PROVIDER NOTES
HPI Comments: Frances Lopez is a 47 y.o. Male who has h/o right ankle fracture, seen at [facility] previously for same, placed in splint. Seen late last week with repeat xray showing no worsening. No new fall, other injury, new swelling. Worse with walking. The history is provided by the patient. Past Medical History:   Diagnosis Date    Back pain     Hypertension     Psychiatric disorder     hallucinations       Past Surgical History:   Procedure Laterality Date    HX OTHER SURGICAL      oral sx         History reviewed. No pertinent family history. Social History     Social History    Marital status:      Spouse name: N/A    Number of children: N/A    Years of education: N/A     Occupational History    Not on file. Social History Main Topics    Smoking status: Current Some Day Smoker    Smokeless tobacco: Never Used    Alcohol use Yes      Comment: beer    Drug use: Yes     Special: Marijuana, Cocaine    Sexual activity: Not on file     Other Topics Concern    Not on file     Social History Narrative         ALLERGIES: Review of patient's allergies indicates no known allergies. Review of Systems   Constitutional: Negative for fever. Respiratory: Negative for shortness of breath. Cardiovascular: Negative for chest pain. Gastrointestinal: Negative for abdominal pain. Musculoskeletal: Negative for gait problem and joint swelling. Skin: Negative for rash. Neurological: Negative for numbness. Psychiatric/Behavioral: Positive for sleep disturbance. Vitals:    08/27/17 2234   BP: 112/70   Pulse: 90   Resp: 16   Temp: 97.4 °F (36.3 °C)   SpO2: 97%   Weight: 68 kg (150 lb)   Height: 5' 6\" (1.676 m)            Physical Exam   Constitutional: He is oriented to person, place, and time. Non-toxic appearance. He does not appear ill. No distress. HENT:   Head: Normocephalic and atraumatic.    Right Ear: External ear normal.   Left Ear: External ear normal.   Nose: Nose normal.   Mouth/Throat: Oropharynx is clear and moist.   Eyes: Conjunctivae are normal.   Neck: Normal range of motion. Cardiovascular: Normal rate, regular rhythm, normal heart sounds and intact distal pulses. Pulmonary/Chest: Effort normal and breath sounds normal. No respiratory distress. Abdominal: Soft. There is no tenderness. Musculoskeletal: Normal range of motion. He exhibits tenderness (there is only min lateral ttp to right ankle. no swelling, bruising. nl pulses, sensation gross touch. no instability). He exhibits no edema. Neurological: He is alert and oriented to person, place, and time. Skin: Skin is warm and dry. He is not diaphoretic. Psychiatric: His behavior is normal.   Nursing note and vitals reviewed. [facility]  ED Course       Procedures    Vitals:  Patient Vitals for the past 12 hrs:   Temp Pulse Resp BP SpO2   08/27/17 2234 97.4 °F (36.3 °C) 90 16 112/70 97 %         Medications ordered:   Medications - No data to display      Lab findings:  No results found for this or any previous visit (from the past 12 hour(s)). EKG interpretation by ED Physician:      X-Ray, CT or other radiology findings or impressions:  No orders to display       Progress notes, Consult notes or additional Procedure notes:   Doubt need for reimaging. Reviewed recent imaging from [address] No new swelling on exam, sig pain to palpation  Has splint in place which I rec he cont wearing until f/u    Reevaluation of patient:   Stable for dc    Disposition:  Diagnosis:   1. Chronic pain of right ankle    2.  History of fracture of right ankle        Disposition: home      Follow-up Information     Follow up With Details Comments [address] , DPM Schedule an appointment as soon as possible for a visit  [address] [facility]  [phone]              Patient's Medications   Start Taking    No medications on file   Continue Taking    BUPROPION XL (WELLBUTRIN XL) 300 MG XL TABLET    Take 1 Tab by mouth every morning. Indications: major depressive disorder    HYDROCHLOROTHIAZIDE (HYDRODIURIL) 50 MG TABLET    Take 1 Tab by mouth daily. Indications: hypertension    LISINOPRIL (PRINIVIL, ZESTRIL) 40 MG TABLET    Take 1 Tab by mouth daily. Indications: hypertension    PROPRANOLOL (INDERAL) 40 MG TABLET    Take 1 Tab by mouth two (2) times a day for 30 days.  Indications: hypertension   These Medications have changed    No medications on file   Stop Taking    No medications on file

## 2017-08-29 ENCOUNTER — HOSPITAL ENCOUNTER (EMERGENCY)
Age: 52
Discharge: HOME | End: 2017-08-30
Attending: EMERGENCY MEDICINE
Payer: SELF-PAY

## 2017-08-29 VITALS
OXYGEN SATURATION: 100 % | SYSTOLIC BLOOD PRESSURE: 158 MMHG | HEART RATE: 76 BPM | RESPIRATION RATE: 16 BRPM | BODY MASS INDEX: 25.02 KG/M2 | WEIGHT: 155 LBS | DIASTOLIC BLOOD PRESSURE: 108 MMHG | TEMPERATURE: 98.5 F

## 2017-08-29 DIAGNOSIS — F32.A CHRONIC DEPRESSION: ICD-10-CM

## 2017-08-29 DIAGNOSIS — I10 HYPERTENSION, UNCONTROLLED: Primary | ICD-10-CM

## 2017-08-29 LAB
ALBUMIN SERPL-MCNC: 3.9 G/DL (ref 3.4–5)
ALBUMIN/GLOB SERPL: 1.3 {RATIO} (ref 0.8–1.7)
ALP SERPL-CCNC: 78 U/L (ref 45–117)
ALT SERPL-CCNC: 44 U/L (ref 16–61)
AMPHET UR QL SCN: NEGATIVE
ANION GAP SERPL CALC-SCNC: 6 MMOL/L (ref 3–18)
AST SERPL-CCNC: 19 U/L (ref 15–37)
BARBITURATES UR QL SCN: NEGATIVE
BASOPHILS # BLD: 0 K/UL (ref 0–0.06)
BASOPHILS NFR BLD: 0 % (ref 0–2)
BENZODIAZ UR QL: NEGATIVE
BILIRUB SERPL-MCNC: 0.5 MG/DL (ref 0.2–1)
BUN SERPL-MCNC: 17 MG/DL (ref 7–18)
BUN/CREAT SERPL: 15 (ref 12–20)
CALCIUM SERPL-MCNC: 9.2 MG/DL (ref 8.5–10.1)
CANNABINOIDS UR QL SCN: NEGATIVE
CHLORIDE SERPL-SCNC: 105 MMOL/L (ref 100–108)
CO2 SERPL-SCNC: 31 MMOL/L (ref 21–32)
COCAINE UR QL SCN: NEGATIVE
CREAT SERPL-MCNC: 1.1 MG/DL (ref 0.6–1.3)
DIFFERENTIAL METHOD BLD: ABNORMAL
EOSINOPHIL # BLD: 0.1 K/UL (ref 0–0.4)
EOSINOPHIL NFR BLD: 1 % (ref 0–5)
ERYTHROCYTE [DISTWIDTH] IN BLOOD BY AUTOMATED COUNT: 15.8 % (ref 11.6–14.5)
ETHANOL SERPL-MCNC: <3 MG/DL (ref 0–3)
GLOBULIN SER CALC-MCNC: 3.1 G/DL (ref 2–4)
GLUCOSE SERPL-MCNC: 89 MG/DL (ref 74–99)
HCT VFR BLD AUTO: 40.5 % (ref 36–48)
HDSCOM,HDSCOM: NORMAL
HGB BLD-MCNC: 13.3 G/DL (ref 13–16)
LYMPHOCYTES # BLD: 1.6 K/UL (ref 0.9–3.6)
LYMPHOCYTES NFR BLD: 45 % (ref 21–52)
MCH RBC QN AUTO: 26.5 PG (ref 24–34)
MCHC RBC AUTO-ENTMCNC: 32.8 G/DL (ref 31–37)
MCV RBC AUTO: 80.8 FL (ref 74–97)
METHADONE UR QL: NEGATIVE
MONOCYTES # BLD: 0.2 K/UL (ref 0.05–1.2)
MONOCYTES NFR BLD: 6 % (ref 3–10)
NEUTS SEG # BLD: 1.7 K/UL (ref 1.8–8)
NEUTS SEG NFR BLD: 48 % (ref 40–73)
OPIATES UR QL: NEGATIVE
PCP UR QL: NEGATIVE
PLATELET # BLD AUTO: 198 K/UL (ref 135–420)
PMV BLD AUTO: 9.9 FL (ref 9.2–11.8)
POTASSIUM SERPL-SCNC: 3.8 MMOL/L (ref 3.5–5.5)
PROT SERPL-MCNC: 7 G/DL (ref 6.4–8.2)
RBC # BLD AUTO: 5.01 M/UL (ref 4.7–5.5)
SODIUM SERPL-SCNC: 142 MMOL/L (ref 136–145)
WBC # BLD AUTO: 3.6 K/UL (ref 4.6–13.2)

## 2017-08-29 PROCEDURE — 80053 COMPREHEN METABOLIC PANEL: CPT | Performed by: EMERGENCY MEDICINE

## 2017-08-29 PROCEDURE — 80307 DRUG TEST PRSMV CHEM ANLYZR: CPT | Performed by: EMERGENCY MEDICINE

## 2017-08-29 PROCEDURE — 99285 EMERGENCY DEPT VISIT HI MDM: CPT

## 2017-08-29 PROCEDURE — 85025 COMPLETE CBC W/AUTO DIFF WBC: CPT | Performed by: EMERGENCY MEDICINE

## 2017-08-29 RX ORDER — LISINOPRIL 20 MG/1
40 TABLET ORAL
Status: COMPLETED | OUTPATIENT
Start: 2017-08-29 | End: 2017-08-30

## 2017-08-29 RX ORDER — HYDROCHLOROTHIAZIDE 25 MG/1
25 TABLET ORAL
Status: COMPLETED | OUTPATIENT
Start: 2017-08-29 | End: 2017-08-30

## 2017-08-29 NOTE — ED TRIAGE NOTES
Pt states he was on his way here to see a psych doctor so he wouldn't feel like hurting himself and then while on the way here he decided he wants to hurt himself. Plans to jump off [address] bridge that he walked past to get here.

## 2017-08-30 PROCEDURE — 74011250637 HC RX REV CODE- 250/637: Performed by: EMERGENCY MEDICINE

## 2017-08-30 RX ADMIN — LISINOPRIL 40 MG: 20 TABLET ORAL at 00:49

## 2017-08-30 RX ADMIN — HYDROCHLOROTHIAZIDE 25 MG: 25 TABLET ORAL at 00:49

## 2017-08-30 NOTE — ED NOTES
I have reviewed discharge instructions with the patient. The patient verbalized understanding. Pt in nad ambulatory to ED arturo clifford agreeable to dc plan.

## 2017-08-30 NOTE — ED PROVIDER NOTES
HPI Comments: Claudio Schneider is a 47 y.o. Male who tells me he is here for his blood pressure being high and then also some mental health issues. When I asked him to further explain the mental health issues he states it was hard to explain. He had noted at triage he had thought of jumping off bridge earlier but denies any thoughts of hurting himself or others at this time and states \"he would never do that\" pt has been seen mult times for same both here and [facility] and actually stayed in our ER for 2 days then was cleared to go home after he contracted for safety. Denies any current cp, sob, abd pain, fcs. Currently still homeless. The history is provided by the patient and medical records. Past Medical History:   Diagnosis Date    Back pain     Hypertension     Psychiatric disorder     hallucinations       Past Surgical History:   Procedure Laterality Date    HX OTHER SURGICAL      oral sx         History reviewed. No pertinent family history. Social History     Social History    Marital status:      Spouse name: N/A    Number of children: N/A    Years of education: N/A     Occupational History    Not on file. Social History Main Topics    Smoking status: Current Some Day Smoker    Smokeless tobacco: Never Used    Alcohol use Yes      Comment: beer    Drug use: Yes     Special: Marijuana, Cocaine    Sexual activity: Not on file     Other Topics Concern    Not on file     Social History Narrative         ALLERGIES: Review of patient's allergies indicates no known allergies. Review of Systems   Constitutional: Negative for fever. HENT: Negative for sore throat. Eyes: Negative for visual disturbance. Respiratory: Negative for cough and shortness of breath. Gastrointestinal: Negative for abdominal pain. Genitourinary: Negative for difficulty urinating. Musculoskeletal: Negative for gait problem. Skin: Negative for rash. Neurological: Negative for syncope. Psychiatric/Behavioral: Positive for sleep disturbance. Vitals:    08/29/17 1956   BP: (!) 158/108   Pulse: 76   Resp: 16   Temp: 98.5 °F (36.9 °C)   SpO2: 100%   Weight: 70.3 kg (155 lb)            Physical Exam   Constitutional: He is oriented to person, place, and time. Non-toxic appearance. He does not appear ill. No distress. HENT:   Head: Normocephalic and atraumatic. Right Ear: External ear normal.   Left Ear: External ear normal.   Nose: Nose normal.   Mouth/Throat: Oropharynx is clear and moist. No oropharyngeal exudate. Eyes: Conjunctivae are normal.   Neck: Normal range of motion. Cardiovascular: Normal rate, regular rhythm, normal heart sounds and intact distal pulses. Pulmonary/Chest: Effort normal and breath sounds normal. No respiratory distress. Abdominal: Soft. There is no tenderness. Musculoskeletal: Normal range of motion. He exhibits no edema. Neurological: He is alert and oriented to person, place, and time. Skin: Skin is warm and dry. He is not diaphoretic. Psychiatric: His speech is normal and behavior is normal. Thought content normal. Thought content is not paranoid. He expresses no homicidal and no suicidal ideation. Nursing note and vitals reviewed.        [facility]  ED Course       Procedures    Vitals:  Patient Vitals for the past 12 hrs:   Temp Pulse Resp BP SpO2   08/29/17 1956 98.5 °F (36.9 °C) 76 16 (!) 158/108 100 %         Medications ordered:   Medications   lisinopril (PRINIVIL, ZESTRIL) tablet 40 mg (40 mg Oral Given 8/30/17 0049)   hydroCHLOROthiazide (HYDRODIURIL) tablet 25 mg (25 mg Oral Given 8/30/17 0049)         Lab findings:  Recent Results (from the past 12 hour(s))   DRUG SCREEN, URINE    Collection Time: 08/29/17  8:10 PM   Result Value Ref Range    BENZODIAZEPINE NEGATIVE  NEG      BARBITURATES NEGATIVE  NEG      THC (TH-CANNABINOL) NEGATIVE  NEG      OPIATES NEGATIVE  NEG      PCP(PHENCYCLIDINE) NEGATIVE  NEG      COCAINE NEGATIVE  NEG AMPHETAMINES NEGATIVE  NEG      METHADONE NEGATIVE       HDSCOM (NOTE)    CBC WITH AUTOMATED DIFF    Collection Time: 08/29/17  8:25 PM   Result Value Ref Range    WBC 3.6 (L) 4.6 - 13.2 K/uL    RBC 5.01 4.70 - 5.50 M/uL    HGB 13.3 13.0 - 16.0 g/dL    HCT 40.5 36.0 - 48.0 %    MCV 80.8 74.0 - 97.0 FL    MCH 26.5 24.0 - 34.0 PG    MCHC 32.8 31.0 - 37.0 g/dL    RDW 15.8 (H) 11.6 - 14.5 %    PLATELET 300 547 - 481 K/uL    MPV 9.9 9.2 - 11.8 FL    NEUTROPHILS 48 40 - 73 %    LYMPHOCYTES 45 21 - 52 %    MONOCYTES 6 3 - 10 %    EOSINOPHILS 1 0 - 5 %    BASOPHILS 0 0 - 2 %    ABS. NEUTROPHILS 1.7 (L) 1.8 - 8.0 K/UL    ABS. LYMPHOCYTES 1.6 0.9 - 3.6 K/UL    ABS. MONOCYTES 0.2 0.05 - 1.2 K/UL    ABS. EOSINOPHILS 0.1 0.0 - 0.4 K/UL    ABS. BASOPHILS 0.0 0.0 - 0.06 K/UL    DF AUTOMATED     METABOLIC PANEL, COMPREHENSIVE    Collection Time: 08/29/17  8:25 PM   Result Value Ref Range    Sodium 142 136 - 145 mmol/L    Potassium 3.8 3.5 - 5.5 mmol/L    Chloride 105 100 - 108 mmol/L    CO2 31 21 - 32 mmol/L    Anion gap 6 3.0 - 18 mmol/L    Glucose 89 74 - 99 mg/dL    BUN 17 7.0 - 18 MG/DL    Creatinine 1.10 0.6 - 1.3 MG/DL    BUN/Creatinine ratio 15 12 - 20      GFR est AA >60 >60 ml/min/1.73m2    GFR est non-AA >60 >60 ml/min/1.73m2    Calcium 9.2 8.5 - 10.1 MG/DL    Bilirubin, total 0.5 0.2 - 1.0 MG/DL    ALT (SGPT) 44 16 - 61 U/L    AST (SGOT) 19 15 - 37 U/L    Alk. phosphatase 78 45 - 117 U/L    Protein, total 7.0 6.4 - 8.2 g/dL    Albumin 3.9 3.4 - 5.0 g/dL    Globulin 3.1 2.0 - 4.0 g/dL    A-G Ratio 1.3 0.8 - 1.7     ETHYL ALCOHOL    Collection Time: 08/29/17  8:25 PM   Result Value Ref Range    ALCOHOL(ETHYL),SERUM <3 0 - 3 MG/DL       EKG interpretation by ED Physician:      X-Ray, CT or other radiology findings or impressions:  No orders to display       Progress notes, Consult notes or additional Procedure notes:     No sig lab abnl.  Pt not currently suicidal, homicidal. Doubt need for formal psych evaluation at this time    Reevaluation of patient:   Stable with no acute medical condition that would require admission for medical management    Disposition:  Diagnosis:   1. Hypertension, uncontrolled    2. Chronic depression        Disposition: home    Follow-up Information     Follow up With Details Comments Contact Info    [employer] Schedule an appointment as soon as possible for a visit  Paul Zheng  [phone]            Patient's Medications   Start Taking    No medications on file   Continue Taking    BUPROPION XL (WELLBUTRIN XL) 300 MG XL TABLET    Take 1 Tab by mouth every morning. Indications: major depressive disorder    HYDROCHLOROTHIAZIDE (HYDRODIURIL) 50 MG TABLET    Take 1 Tab by mouth daily. Indications: hypertension    LISINOPRIL (PRINIVIL, ZESTRIL) 40 MG TABLET    Take 1 Tab by mouth daily. Indications: hypertension    PROPRANOLOL (INDERAL) 40 MG TABLET    Take 1 Tab by mouth two (2) times a day for 30 days.  Indications: hypertension   These Medications have changed    No medications on file   Stop Taking    No medications on file

## 2017-09-15 ENCOUNTER — HOSPITAL ENCOUNTER (EMERGENCY)
Age: 52
Discharge: HOME | End: 2017-09-15
Attending: GENERAL PRACTICE
Payer: SELF-PAY

## 2017-09-15 VITALS
HEART RATE: 78 BPM | OXYGEN SATURATION: 100 % | RESPIRATION RATE: 16 BRPM | BODY MASS INDEX: 24.11 KG/M2 | DIASTOLIC BLOOD PRESSURE: 85 MMHG | HEIGHT: 66 IN | TEMPERATURE: 97.8 F | WEIGHT: 150 LBS | SYSTOLIC BLOOD PRESSURE: 122 MMHG

## 2017-09-15 DIAGNOSIS — Z01.30 BLOOD PRESSURE CHECK: Primary | ICD-10-CM

## 2017-09-15 PROCEDURE — 99282 EMERGENCY DEPT VISIT SF MDM: CPT

## 2017-09-15 NOTE — ED PROVIDER NOTES
HPI Comments: 7:00 PM Leonel Lemus is a 47 y.o. male who presents to the ED for evaluation of blood pressure. Pt claimed he was feeling faint, which brought him to the ED. Pt did not take BP medication this morning because he did not feel bad. Pt claims he has had suicidal ideations at times but not currently in the ED. Pt did not take psychiatric medications today either. Pt has no other sx or complaints. He denies chest pain, headache, shortness of breath. States he would like his blood pressure checked. Has no way to monitor at home. Past Medical History:   Diagnosis Date    Back pain     Hypertension     Psychiatric disorder     hallucinations       Past Surgical History:   Procedure Laterality Date    HX OTHER SURGICAL      oral sx         History reviewed. No pertinent family history. Social History     Social History    Marital status:      Spouse name: N/A    Number of children: N/A    Years of education: N/A     Occupational History    Not on file. Social History Main Topics    Smoking status: Current Some Day Smoker    Smokeless tobacco: Never Used    Alcohol use Yes      Comment: beer    Drug use: Yes     Special: Marijuana, Cocaine    Sexual activity: Not on file     Other Topics Concern    Not on file     Social History Narrative         ALLERGIES: Review of patient's allergies indicates no known allergies. Review of Systems   Constitutional: Negative. Negative for chills and fever. HENT: Negative. Negative for congestion. Eyes: Negative. Negative for visual disturbance. Respiratory: Negative. Negative for chest tightness and shortness of breath. Cardiovascular: Negative. Negative for chest pain and leg swelling. Gastrointestinal: Negative. Negative for abdominal pain, diarrhea, nausea and vomiting. Genitourinary: Negative. Negative for difficulty urinating and dysuria. Musculoskeletal: Negative. Negative for back pain and myalgias. Skin: Negative. Negative for rash and wound. Neurological: Negative. Negative for dizziness, speech difficulty, weakness and light-headedness. Psychiatric/Behavioral: Negative. Negative for self-injury. All other systems reviewed and are negative. Vitals:    09/15/17 1854   BP: 122/85   Pulse: 78   Resp: 16   Temp: 97.8 °F (36.6 °C)   SpO2: 100%   Weight: 68 kg (150 lb)   Height: 5' 6\" (1.676 m)            Physical Exam   Constitutional: He is oriented to person, place, and time. He appears well-developed and well-nourished. No distress. HENT:   Head: Normocephalic and atraumatic. Eyes: Conjunctivae and EOM are normal. Pupils are equal, round, and reactive to light. No scleral icterus. Neck: Normal range of motion. Neck supple. No JVD present. No thyromegaly present. Cardiovascular: Normal rate, regular rhythm, S1 normal and S2 normal.  Exam reveals no gallop and no friction rub. Murmur heard. Systolic murmur is present with a grade of 2/6   Pulmonary/Chest: Effort normal and breath sounds normal. No accessory muscle usage. No respiratory distress. Abdominal: Soft. Normal appearance. He exhibits no distension. There is no tenderness. There is no rigidity, no rebound and no guarding. Musculoskeletal: Normal range of motion. He exhibits no edema or tenderness. Neurological: He is alert and oriented to person, place, and time. He has normal strength. No cranial nerve deficit or sensory deficit. Coordination normal.   Skin: Skin is warm and intact. No rash noted. Psychiatric: His speech is normal and behavior is normal.   Flat affect, calm and cooperative   Vitals reviewed. MDM  Number of Diagnoses or Management Options  Diagnosis management comments: Pt presents for blood pressure check; pt's blood pressure is normal at this time; pt advised to take all medications as prescribed; pt to follow up with PCP in 1 week.      ED Course       Procedures  Scribe Attestation      Karthikeyan Weston Jr Matthews acting as a scribe for and in the presence of Lorena Castanon MD      September 15, 2017 at 7:00 PM       Provider Attestation:      I personally performed the services described in the documentation, reviewed the documentation, as recorded by the scribe in my presence, and it accurately and completely records my words and actions.  September 15, 2017 at 7:00 PM - Lorena Castanon MD

## 2017-09-22 ENCOUNTER — HOSPITAL ENCOUNTER (EMERGENCY)
Age: 52
Discharge: PSYCHIATRIC OR SUBSTANCE USE FACILITY | End: 2017-09-23
Attending: EMERGENCY MEDICINE
Payer: SELF-PAY

## 2017-09-22 DIAGNOSIS — F32.A DEPRESSION, UNSPECIFIED DEPRESSION TYPE: Primary | ICD-10-CM

## 2017-09-22 DIAGNOSIS — R44.3 HALLUCINATIONS: ICD-10-CM

## 2017-09-22 LAB
AMPHET UR QL SCN: NEGATIVE
ANION GAP SERPL CALC-SCNC: 5 MMOL/L (ref 3–18)
APAP SERPL-MCNC: <2 UG/ML (ref 10–30)
APPEARANCE UR: CLEAR
BACTERIA URNS QL MICRO: NEGATIVE /HPF
BARBITURATES UR QL SCN: NEGATIVE
BASOPHILS # BLD: 0 K/UL (ref 0–0.06)
BASOPHILS NFR BLD: 0 % (ref 0–2)
BENZODIAZ UR QL: NEGATIVE
BILIRUB UR QL: NEGATIVE
BUN SERPL-MCNC: 15 MG/DL (ref 7–18)
BUN/CREAT SERPL: 16 (ref 12–20)
CALCIUM SERPL-MCNC: 8.7 MG/DL (ref 8.5–10.1)
CANNABINOIDS UR QL SCN: NEGATIVE
CHLORIDE SERPL-SCNC: 107 MMOL/L (ref 100–108)
CO2 SERPL-SCNC: 30 MMOL/L (ref 21–32)
COCAINE UR QL SCN: NEGATIVE
COLOR UR: YELLOW
CREAT SERPL-MCNC: 0.92 MG/DL (ref 0.6–1.3)
DIFFERENTIAL METHOD BLD: ABNORMAL
EOSINOPHIL # BLD: 0 K/UL (ref 0–0.4)
EOSINOPHIL NFR BLD: 1 % (ref 0–5)
EPITH CASTS URNS QL MICRO: NORMAL /LPF (ref 0–5)
ERYTHROCYTE [DISTWIDTH] IN BLOOD BY AUTOMATED COUNT: 16 % (ref 11.6–14.5)
ETHANOL SERPL-MCNC: <3 MG/DL (ref 0–3)
GLUCOSE SERPL-MCNC: 125 MG/DL (ref 74–99)
GLUCOSE UR STRIP.AUTO-MCNC: NEGATIVE MG/DL
HCT VFR BLD AUTO: 35 % (ref 36–48)
HDSCOM,HDSCOM: NORMAL
HGB BLD-MCNC: 11.5 G/DL (ref 13–16)
HGB UR QL STRIP: NEGATIVE
KETONES UR QL STRIP.AUTO: NEGATIVE MG/DL
LEUKOCYTE ESTERASE UR QL STRIP.AUTO: NEGATIVE
LYMPHOCYTES # BLD: 1.8 K/UL (ref 0.9–3.6)
LYMPHOCYTES NFR BLD: 38 % (ref 21–52)
MCH RBC QN AUTO: 26.4 PG (ref 24–34)
MCHC RBC AUTO-ENTMCNC: 32.9 G/DL (ref 31–37)
MCV RBC AUTO: 80.5 FL (ref 74–97)
METHADONE UR QL: NEGATIVE
MONOCYTES # BLD: 0.5 K/UL (ref 0.05–1.2)
MONOCYTES NFR BLD: 11 % (ref 3–10)
NEUTS SEG # BLD: 2.3 K/UL (ref 1.8–8)
NEUTS SEG NFR BLD: 50 % (ref 40–73)
NITRITE UR QL STRIP.AUTO: NEGATIVE
OPIATES UR QL: NEGATIVE
PCP UR QL: NEGATIVE
PH UR STRIP: 6 [PH] (ref 5–8)
PLATELET # BLD AUTO: 248 K/UL (ref 135–420)
PMV BLD AUTO: 9.6 FL (ref 9.2–11.8)
POTASSIUM SERPL-SCNC: 3.7 MMOL/L (ref 3.5–5.5)
PROT UR STRIP-MCNC: ABNORMAL MG/DL
RBC # BLD AUTO: 4.35 M/UL (ref 4.7–5.5)
RBC #/AREA URNS HPF: 0 /HPF (ref 0–5)
SALICYLATES SERPL-MCNC: <2.8 MG/DL (ref 2.8–20)
SODIUM SERPL-SCNC: 142 MMOL/L (ref 136–145)
SP GR UR REFRACTOMETRY: 1.02 (ref 1–1.03)
UROBILINOGEN UR QL STRIP.AUTO: 1 EU/DL (ref 0.2–1)
WBC # BLD AUTO: 4.7 K/UL (ref 4.6–13.2)
WBC URNS QL MICRO: 0 /HPF (ref 0–4)

## 2017-09-22 PROCEDURE — 99285 EMERGENCY DEPT VISIT HI MDM: CPT

## 2017-09-22 PROCEDURE — 85025 COMPLETE CBC W/AUTO DIFF WBC: CPT | Performed by: NURSE PRACTITIONER

## 2017-09-22 PROCEDURE — 80307 DRUG TEST PRSMV CHEM ANLYZR: CPT | Performed by: NURSE PRACTITIONER

## 2017-09-22 PROCEDURE — 81001 URINALYSIS AUTO W/SCOPE: CPT | Performed by: NURSE PRACTITIONER

## 2017-09-22 PROCEDURE — 80048 BASIC METABOLIC PNL TOTAL CA: CPT | Performed by: NURSE PRACTITIONER

## 2017-09-22 NOTE — CONSULTS
History of Present Illness: Pt seen via televideo with the help on onsite staff. Pt is a 48 yo male who reports a hx of Schizophrenia and depression. Pt seen, chart reviewed and appreciated. Per chart review, the pt presented to the ED, self referred, reporting increased psychosis. Pt reported CAHs to kill himself. Pt reports he has been off his medications for a while.   States he lost his medications after his last inpt admission. Reports since loosing his medication he has been seeing and hearing things. Specifically reports CAHs to kill himself with a specific plan to jump off a bridge. Also reports VHs of formed shadows around him. States he has also been feeling more depressed. He reports he feels that he has to harm himself due to the voices but state he wants help. Reports he has limited supports, has been unable to work and also reports limited access to care currently. On ROS, pt reports CAHs to kill self via jumping off a bridge. Denies VHs. Reports non specific paranoid ideation. Denies HI.  + SI with plan. Pt presents as a danger to himself and requires acute inpt psychiatric admission for safety, stabilization and treatment. Pt is voluntary for inpt treatment. Inpt  prior admissions  Outpt  non compliant  SAttempts: prior hx of attempts  Medical History: see chart  Medications & Freq: see chart  Allergies: NKDA  Substance Use Hx: denies  Forensic Hx: prior hx of arrest and incarceration. Did not want to disclose  Access to weapons/guns: reports he has friends who own guns. Denies personal ownership  Family Psychiatric hx: unknown  Mental Status Exam:   Appearance and attire: Dressed in hospital attire  Attitude and behavior: cooperative  Affect and mood:  depressed / constricted  Association and thought processes: linear  Thought content: non specific paranoid ideation. Denies HI.  + SI with plan   Perceptual: + CAHs to kill self.   Denies VHs    Sensorium, memory, and orientation AxOx3  Insight and judgment: poor impaired  Diagnosis:   Schizophrenia, by hx, unspecified Depressive Disorder  Recommendations:  Pt requires acute inpt psychiatric admission   For safety, stabilization and treatment  Pt is voluntary for inpt treatment

## 2017-09-22 NOTE — ANCILLARY DISCHARGE INSTRUCTIONS
Called [phone], Spoke with Claudio Thurman, she states that Dr. Mao Hebert has accepted this patient  [address] [facility] [address] [facility]. Patient can arrive tomorrow 9/23/17 at 10:00 am. Nurse should call report to [phone]. ED provider made aware.       Luke Snider LPN    [phone] (office)  [phone] (pager)

## 2017-09-22 NOTE — ED NOTES
Patient sitting on the floor, refusing to sit in her bed, requesting meds, security paged as it is not safe for the patient to be sitting outside of the room and on the floor

## 2017-09-22 NOTE — ED PROVIDER NOTES
HPI Comments: 7:59 AM   47 y.o. male presents to ED C/O hallucinations. Patient has a HX of HTN, schizophrenia. Patient reports he is worried about his mental health, he feels like he is seeing things that are not real, he says it is like walking through virtual reality, seeing things that he knows are not there. patient recently discharged from [facility], he \"ina\" was was psych part, has lost prescriptions he was discharged with, including Prozac which was prescribed and his BP medication. Patient denies recent ETOH or drug use. Patient denies SI or HI, although reported SI to triage nurse. Patient smokes 1 black and mild daily. Spoke with triage nurse, patient told her the voices he is hearing are telling him to walk to a bridge and jump off. Patient denies any other symptoms or complaints. The history is provided by the patient. History limited by: No language barrier. Past Medical History:   Diagnosis Date    Back pain     Hypertension     Psychiatric disorder     hallucinations       Past Surgical History:   Procedure Laterality Date    HX OTHER SURGICAL      oral sx         History reviewed. No pertinent family history. Social History     Social History    Marital status:      Spouse name: N/A    Number of children: N/A    Years of education: N/A     Occupational History    Not on file. Social History Main Topics    Smoking status: Current Some Day Smoker    Smokeless tobacco: Never Used    Alcohol use Yes      Comment: beer    Drug use: Yes     Special: Marijuana, Cocaine    Sexual activity: Not on file     Other Topics Concern    Not on file     Social History Narrative         ALLERGIES: Review of patient's allergies indicates no known allergies. Review of Systems   Constitutional: Negative for activity change, appetite change, chills, fatigue and fever. HENT: Negative for congestion, ear pain, rhinorrhea and sore throat.     Eyes: Negative for pain, discharge, redness and itching. Respiratory: Negative for cough, chest tightness, shortness of breath and wheezing. Cardiovascular: Negative for chest pain and palpitations. Gastrointestinal: Negative for abdominal pain, blood in stool, constipation, diarrhea, nausea and vomiting. Endocrine: Negative for polyuria. Genitourinary: Negative for discharge, dysuria, flank pain, hematuria, penile pain and testicular pain. Musculoskeletal: Negative for back pain, joint swelling and neck pain. Skin: Negative for rash and wound. Allergic/Immunologic: Negative for immunocompromised state. Neurological: Negative for dizziness, weakness, light-headedness, numbness and headaches. Hematological: Negative for adenopathy. Psychiatric/Behavioral: Positive for agitation, hallucinations and suicidal ideas. Negative for confusion. The patient is not nervous/anxious. Vitals:    09/22/17 0750 09/22/17 1131 09/22/17 1513   BP: (!) 157/100 126/83 127/83   Pulse: 90  75   Resp: 17 18 18   Temp: 98 °F (36.7 °C) 98.7 °F (37.1 °C) 98.1 °F (36.7 °C)   SpO2: 95% 100% 100%   Weight: 74.8 kg (165 lb)              Physical Exam   Constitutional: He is oriented to person, place, and time. He appears well-developed and well-nourished. No distress. HENT:   Head: Normocephalic and atraumatic. Right Ear: External ear normal.   Left Ear: External ear normal.   Nose: Nose normal.   Cardiovascular: Normal rate, regular rhythm and normal heart sounds. Pulmonary/Chest: Effort normal and breath sounds normal. No respiratory distress. He has no wheezes. He has no rales. Musculoskeletal: Normal range of motion. Neurological: He is alert and oriented to person, place, and time. He exhibits normal muscle tone. Coordination normal.   Skin: He is not diaphoretic. Psychiatric: His speech is normal. He is actively hallucinating. He expresses inappropriate judgment. He expresses suicidal ideation.    Nursing note and vitals reviewed. MDM  Number of Diagnoses or Management Options  Diagnosis management comments: Clinical Impression - Hallucinations, suicidal ideations , elevated blood pressure    MDM:  Plan - labs for medical clearance and psych consult, meal tray ordered   10:30 AM Progress - mildly decreased H&H, BMP nonconcerning, negative UDS, UA, ACTMN, ALC, or SALIC. Patient medically cleared, awaiting tele psych consult   CONSULT NOTE:   10:56 AM  I spoke with Dr Hodges Gave: Psych  Discussed pt's hx, disposition, and available diagnostic and imaging results. Reviewed care plans. Consulting physician agrees with plans as outlined. Will evaluate patient. .   12:03 PM Dr Cheryle Christy recommendations  - Recommendations:  Pt requires acute inpt psychiatric admission   For safety, stabilization and treatment  Pt is voluntary for inpt treatment   12:04 PM Carla from [facility] is speaking with patient. 12:44 PM Carla with [facility] has interviewed patient and will look for crisis stab bed.   4:07 PM Called to check in with [facility] concerning placement, informed one facility was contemplating  accepting, requesting full ED chart, this has been fazed to B as requested. 6:32 PM : Pt care transferred to [facility] provider. History of patient complaint(s), available diagnostic reports and current treatment plan has been discussed thoroughly. Bedside rounding on patient occured : no .   Intended disposition of patient : TBD  Pending diagnostics reports and/or labs (please list): awaiting [facility] placement          Amount and/or Complexity of Data Reviewed  Clinical lab tests: ordered and reviewed      ED Course       Procedures             RESULTS:    No orders to display       Labs Reviewed   CBC WITH AUTOMATED DIFF - Abnormal; Notable for the following:        Result Value    RBC 4.35 (*)     HGB 11.5 (*)     HCT 35.0 (*)     RDW 16.0 (*)     MONOCYTES 11 (*)     All other components within normal limits   METABOLIC PANEL, BASIC - Abnormal; Notable for the following:     Glucose 125 (*)     All other components within normal limits   URINALYSIS W/ RFLX MICROSCOPIC - Abnormal; Notable for the following:     Protein TRACE (*)     All other components within normal limits   SALICYLATE - Abnormal; Notable for the following:     Salicylate level <7.6 (*)     All other components within normal limits   ACETAMINOPHEN - Abnormal; Notable for the following:     Acetaminophen level <2 (*)     All other components within normal limits   DRUG SCREEN, URINE   ETHYL ALCOHOL   URINE MICROSCOPIC ONLY       Recent Results (from the past 12 hour(s))   CBC WITH AUTOMATED DIFF    Collection Time: 09/22/17  9:10 AM   Result Value Ref Range    WBC 4.7 4.6 - 13.2 K/uL    RBC 4.35 (L) 4.70 - 5.50 M/uL    HGB 11.5 (L) 13.0 - 16.0 g/dL    HCT 35.0 (L) 36.0 - 48.0 %    MCV 80.5 74.0 - 97.0 FL    MCH 26.4 24.0 - 34.0 PG    MCHC 32.9 31.0 - 37.0 g/dL    RDW 16.0 (H) 11.6 - 14.5 %    PLATELET 034 291 - 936 K/uL    MPV 9.6 9.2 - 11.8 FL    NEUTROPHILS 50 40 - 73 %    LYMPHOCYTES 38 21 - 52 %    MONOCYTES 11 (H) 3 - 10 %    EOSINOPHILS 1 0 - 5 %    BASOPHILS 0 0 - 2 %    ABS. NEUTROPHILS 2.3 1.8 - 8.0 K/UL    ABS. LYMPHOCYTES 1.8 0.9 - 3.6 K/UL    ABS. MONOCYTES 0.5 0.05 - 1.2 K/UL    ABS. EOSINOPHILS 0.0 0.0 - 0.4 K/UL    ABS.  BASOPHILS 0.0 0.0 - 0.06 K/UL    DF AUTOMATED     METABOLIC PANEL, BASIC    Collection Time: 09/22/17  9:10 AM   Result Value Ref Range    Sodium 142 136 - 145 mmol/L    Potassium 3.7 3.5 - 5.5 mmol/L    Chloride 107 100 - 108 mmol/L    CO2 30 21 - 32 mmol/L    Anion gap 5 3.0 - 18 mmol/L    Glucose 125 (H) 74 - 99 mg/dL    BUN 15 7.0 - 18 MG/DL    Creatinine 0.92 0.6 - 1.3 MG/DL    BUN/Creatinine ratio 16 12 - 20      GFR est AA >60 >60 ml/min/1.73m2    GFR est non-AA >60 >60 ml/min/1.73m2    Calcium 8.7 8.5 - 10.1 MG/DL   URINALYSIS W/ RFLX MICROSCOPIC    Collection Time: 09/22/17  9:10 AM   Result Value Ref Range    Color YELLOW Appearance CLEAR      Specific gravity 1.024 1.005 - 1.030      pH (UA) 6.0 5.0 - 8.0      Protein TRACE (A) NEG mg/dL    Glucose NEGATIVE  NEG mg/dL    Ketone NEGATIVE  NEG mg/dL    Bilirubin NEGATIVE  NEG      Blood NEGATIVE  NEG      Urobilinogen 1.0 0.2 - 1.0 EU/dL    Nitrites NEGATIVE  NEG      Leukocyte Esterase NEGATIVE  NEG     DRUG SCREEN, URINE    Collection Time: 09/22/17  9:10 AM   Result Value Ref Range    BENZODIAZEPINES NEGATIVE  NEG      BARBITURATES NEGATIVE  NEG      THC (TH-CANNABINOL) NEGATIVE  NEG      OPIATES NEGATIVE  NEG      PCP(PHENCYCLIDINE) NEGATIVE  NEG      COCAINE NEGATIVE  NEG      AMPHETAMINES NEGATIVE  NEG      METHADONE NEGATIVE       HDSCOM (NOTE)    SALICYLATE    Collection Time: 09/22/17  9:10 AM   Result Value Ref Range    Salicylate level <3.4 (L) 2.8 - 20.0 MG/DL   ACETAMINOPHEN    Collection Time: 09/22/17  9:10 AM   Result Value Ref Range    Acetaminophen level <2 (L) 10 - 30 ug/mL   ETHYL ALCOHOL    Collection Time: 09/22/17  9:10 AM   Result Value Ref Range    ALCOHOL(ETHYL),SERUM <3 0 - 3 MG/DL   URINE MICROSCOPIC ONLY    Collection Time: 09/22/17  9:10 AM   Result Value Ref Range    WBC 0 0 - 4 /hpf    RBC 0 0 - 5 /hpf    Epithelial cells 1+ 0 - 5 /lpf    Bacteria NEGATIVE  NEG /hpf       PROGRESS NOTE:   7:59 AM   Initial assessment completed. Written by Peter Burroughs NP-C    Note:  Assuming care of patient   from leaving provider    2:35 AM  Raymundo JUSTICE MD, assumed care of patient from another provider who is ending their shift in the emergency department . Nursing nurses regarding the HPI and triage nursing notes were reviewed. No current facility-administered medications for this encounter. Current Outpatient Prescriptions   Medication Sig    propranolol (INDERAL) 40 mg tablet Take 1 Tab by mouth two (2) times a day for 30 days. Indications: hypertension    hydroCHLOROthiazide (HYDRODIURIL) 50 mg tablet Take 1 Tab by mouth daily. Indications: hypertension    buPROPion XL (WELLBUTRIN XL) 300 mg XL tablet Take 1 Tab by mouth every morning. Indications: major depressive disorder    lisinopril (PRINIVIL, ZESTRIL) 40 mg tablet Take 1 Tab by mouth daily. Indications: hypertension       Past Medical History:   Diagnosis Date    Back pain     Hypertension     Psychiatric disorder     hallucinations       Past Surgical History:   Procedure Laterality Date    HX OTHER SURGICAL      oral sx       History reviewed. No pertinent family history. Social History     Social History    Marital status:      Spouse name: N/A    Number of children: N/A    Years of education: N/A     Occupational History    Not on file.      Social History Main Topics    Smoking status: Current Some Day Smoker    Smokeless tobacco: Never Used    Alcohol use Yes      Comment: beer    Drug use: Yes     Special: Marijuana, Cocaine    Sexual activity: Not on file     Other Topics Concern    Not on file     Social History Narrative       No Known Allergies    Patient's primary care provider (as noted in EPIC):  None    Abnormal lab results from this emergency department encounter:  Labs Reviewed   CBC WITH AUTOMATED DIFF - Abnormal; Notable for the following:        Result Value    RBC 4.35 (*)     HGB 11.5 (*)     HCT 35.0 (*)     RDW 16.0 (*)     MONOCYTES 11 (*)     All other components within normal limits   METABOLIC PANEL, BASIC - Abnormal; Notable for the following:     Glucose 125 (*)     All other components within normal limits   URINALYSIS W/ RFLX MICROSCOPIC - Abnormal; Notable for the following:     Protein TRACE (*)     All other components within normal limits   SALICYLATE - Abnormal; Notable for the following:     Salicylate level <3.2 (*)     All other components within normal limits   ACETAMINOPHEN - Abnormal; Notable for the following:     Acetaminophen level <2 (*)     All other components within normal limits   DRUG SCREEN, URINE   ETHYL ALCOHOL   URINE MICROSCOPIC ONLY       Lab values for this patient within approximately the last 12 hours:  No results found for this or any previous visit (from the past 12 hour(s)). Radiologist and cardiologist interpretations if available at time of this note:  Radiology results:  No results found. Medication(s) ordered for patient during this emergency visit encounter:  Medications - No data to display    Pt care assumed from ANGELIC Ruiz , ED provider. Pt complaint(s), current treatment plan, progression and available diagnostic results have been discussed thoroughly. Rounding occurred: yes  Intended Disposition: Transfer   Pending diagnostic reports and/or labs (please list): [facility] placement of patient in [facility] behavioral medicine bed. Diagnoses:  1. Hallucinations  2. History of schizophrenia. Signout Note      Pt care transferred to Dr. Rula Whitehead  ,ED provider. History of patient complaint(s), available diagnostic reports and current treatment plan has been discussed thoroughly. Bedside rounding on patient occured : no . Intended disposition of patient : Transfer  Pending diagnostics reports and/or labs (please list): [facility] placement of patient in [facility] behavioral medicine bed. Cee Cassidy M.D.

## 2017-09-22 NOTE — ED TRIAGE NOTES
Just discharged from [facility] 2 days ago. Says \"I have mental illness\". Just today realized he has mental illness. Hears voices and sees things. SI with plans to jump off [facility].

## 2017-09-22 NOTE — PROGRESS NOTES
Per Jael Powell with [facility], pt is appropriate for crisis stabilization unit, and [facility] will locate a CSU bed.            Chance Gentile RN, BSN, [facility]  ED Outcomes Manager  Thursdays & Fridays   [phone] (phone)  [phone] (pager)

## 2017-09-23 VITALS
TEMPERATURE: 97.8 F | SYSTOLIC BLOOD PRESSURE: 146 MMHG | HEART RATE: 64 BPM | RESPIRATION RATE: 16 BRPM | BODY MASS INDEX: 26.63 KG/M2 | OXYGEN SATURATION: 100 % | DIASTOLIC BLOOD PRESSURE: 96 MMHG | WEIGHT: 165 LBS

## 2017-09-23 NOTE — ED NOTES
Purposeful rounding completed:    Side rails up x 2:  YES  Bed in low position and wheels locked: YES  Call bell within reach: [place] addressed: YES    Toileting needs addressed: YES  Plan of care reviewed/updated with patient and or family members: YES  IV site assessed: N/A  Pain assessed and addressed: YES, 0

## 2017-09-23 NOTE — ED NOTES
Purposeful rounding completed:    Side rails up x 2:  YES  Bed in low position and wheels locked: YES  Call bell within reach: NO suicidal  Comfort addressed: YES    Toileting needs addressed: YES  Plan of care reviewed/updated with patient and or family members: YES  IV site assessed: N/A  Pain assessed and addressed: YES, 0

## 2017-09-23 NOTE — ED NOTES
Pt resting in no distress, awaits transport to inpt psych @1000. Denies needs currently. Dashawn @bs.

## 2017-09-23 NOTE — ED NOTES
Purposeful rounding completed:    Side rails up x 2:  YES  Bed in low position and wheels locked: YES  Call bell within reach: NO  Comfort addressed: YES    Toileting needs addressed: YES  Plan of care reviewed/updated with patient and or family members: YES  IV site assessed: N/A  Pain assessed and addressed: YES, 0

## 2017-09-23 NOTE — ED NOTES
PATIENT TO BE TAKEN TO [facility] CRISIS STABILIZATION, [address] [phone], DR. MAR IS THE ACCEPTING PHYSICIAN (PLEASE OBTAIN FIRST NAME WHEN CALLING REPORT), COLLIN IS TO BE AT THE FACILITY ON 9/23 AT 1000. 74

## 2017-09-23 NOTE — ED NOTES
Discussed pt with . Pt will be going to [address] [place] [address] in [place] ([phone]) at 10 am tomorrow. Accepting Dr is Dr Kelly Hutchinson. Crisis did not know first name of doctor. 2:23 AM discussed pt with Dr Laura Phan who will assume care of patient at this time.

## 2017-09-23 NOTE — ED NOTES
Purposeful rounding completed:    Side rails up x 2:  YES  Bed in low position and wheels locked: YES  Call bell within reach: NO  Comfort addressed: YES    Toileting needs addressed: YES  Plan of care reviewed/updated with patient and or family members: YES  IV site assessed: NA  Pain assessed and addressed: YES, 0

## 2017-09-23 NOTE — ED NOTES
7:35 AM : Pt care assumed from Dr. Teressa Vasquez , ED provider. Pt complaint(s), current treatment plan, progression and available diagnostic results have been discussed thoroughly.   Rounding occurred: {Yes No:[id number]}  Intended Disposition: {TBD}   Pending diagnostic reports and/or labs (please list):

## 2017-09-23 NOTE — ED NOTES
Purposeful rounding completed:    Side rails up x 2:  YES  Bed in low position and wheels locked: YES  Call bell within reach: NO suicidal  Comfort addressed: YES    Toileting needs addressed: YES  Plan of care reviewed/updated with patient and or family members: YES  IV site assessed: YES, NA  Pain assessed and addressed: YES, 0

## 2017-10-13 ENCOUNTER — HOSPITAL ENCOUNTER (EMERGENCY)
Age: 52
Discharge: HOME | End: 2017-10-13
Attending: EMERGENCY MEDICINE
Payer: SELF-PAY

## 2017-10-13 VITALS
DIASTOLIC BLOOD PRESSURE: 118 MMHG | BODY MASS INDEX: 24.33 KG/M2 | RESPIRATION RATE: 18 BRPM | HEIGHT: 67 IN | HEART RATE: 79 BPM | SYSTOLIC BLOOD PRESSURE: 169 MMHG | TEMPERATURE: 98.4 F | WEIGHT: 155 LBS | OXYGEN SATURATION: 100 %

## 2017-10-13 DIAGNOSIS — I10 ESSENTIAL HYPERTENSION: Primary | ICD-10-CM

## 2017-10-13 PROCEDURE — 74011250637 HC RX REV CODE- 250/637: Performed by: EMERGENCY MEDICINE

## 2017-10-13 PROCEDURE — 99283 EMERGENCY DEPT VISIT LOW MDM: CPT

## 2017-10-13 RX ORDER — LISINOPRIL 40 MG/1
40 TABLET ORAL DAILY
Qty: 30 TAB | Refills: 1 | Status: SHIPPED | OUTPATIENT
Start: 2017-10-13 | End: 2017-11-10

## 2017-10-13 RX ORDER — LISINOPRIL 20 MG/1
40 TABLET ORAL
Status: COMPLETED | OUTPATIENT
Start: 2017-10-13 | End: 2017-10-13

## 2017-10-13 RX ORDER — HYDROCHLOROTHIAZIDE 50 MG/1
50 TABLET ORAL DAILY
Qty: 30 TAB | Refills: 1 | Status: SHIPPED | OUTPATIENT
Start: 2017-10-13 | End: 2017-11-10

## 2017-10-13 RX ORDER — HYDROCHLOROTHIAZIDE 25 MG/1
25 TABLET ORAL
Status: COMPLETED | OUTPATIENT
Start: 2017-10-13 | End: 2017-10-13

## 2017-10-13 RX ADMIN — HYDROCHLOROTHIAZIDE 25 MG: 25 TABLET ORAL at 05:49

## 2017-10-13 RX ADMIN — LISINOPRIL 40 MG: 20 TABLET ORAL at 05:49

## 2017-10-13 NOTE — DISCHARGE INSTRUCTIONS
DASH Diet: Care Instructions  Your Care Instructions  The DASH diet is an eating plan that can help lower your blood pressure. DASH stands for Dietary Approaches to Stop Hypertension. Hypertension is high blood pressure. The DASH diet focuses on eating foods that are high in calcium, potassium, and magnesium. These nutrients can lower blood pressure. The foods that are highest in these nutrients are fruits, vegetables, low-fat dairy products, nuts, seeds, and legumes. But taking calcium, potassium, and magnesium supplements instead of eating foods that are high in those nutrients does not have the same effect. The DASH diet also includes whole grains, fish, and poultry. The DASH diet is one of several lifestyle changes your doctor may recommend to lower your high blood pressure. Your doctor may also want you to decrease the amount of sodium in your diet. Lowering sodium while following the DASH diet can lower blood pressure even further than just the DASH diet alone. Follow-up care is a key part of your treatment and safety. Be sure to make and go to all appointments, and call your doctor if you are having problems. It's also a good idea to know your test results and keep a list of the medicines you take. How can you care for yourself at home? Following the DASH diet  · Eat 4 to 5 servings of fruit each day. A serving is 1 medium-sized piece of fruit, ½ cup chopped or canned fruit, 1/4 cup dried fruit, or 4 ounces (½ cup) of fruit juice. Choose fruit more often than fruit juice. · Eat 4 to 5 servings of vegetables each day. A serving is 1 cup of lettuce or raw leafy vegetables, ½ cup of chopped or cooked vegetables, or 4 ounces (½ cup) of vegetable juice. Choose vegetables more often than vegetable juice. · Get 2 to 3 servings of low-fat and fat-free dairy each day. A serving is 8 ounces of milk, 1 cup of yogurt, or 1 ½ ounces of cheese. · Eat 6 to 8 servings of grains each day.  A serving is 1 slice of bread, 1 ounce of dry cereal, or ½ cup of cooked rice, pasta, or cooked cereal. Try to choose whole-grain products as much as possible. · Limit lean meat, poultry, and fish to 2 servings each day. A serving is 3 ounces, about the size of a deck of cards. · Eat 4 to 5 servings of nuts, seeds, and legumes (cooked dried beans, lentils, and split peas) each week. A serving is 1/3 cup of nuts, 2 tablespoons of seeds, or ½ cup of cooked beans or peas. · Limit fats and oils to 2 to 3 servings each day. A serving is 1 teaspoon of vegetable oil or 2 tablespoons of salad dressing. · Limit sweets and added sugars to 5 servings or less a week. A serving is 1 tablespoon jelly or jam, ½ cup sorbet, or 1 cup of lemonade. · Eat less than 2,300 milligrams (mg) of sodium a day. If you limit your sodium to 1,500 mg a day, you can lower your blood pressure even more. Tips for success  · Start small. Do not try to make dramatic changes to your diet all at once. You might feel that you are missing out on your favorite foods and then be more likely to not follow the plan. Make small changes, and stick with them. Once those changes become habit, add a few more changes. · Try some of the following:  ¨ Make it a goal to eat a fruit or vegetable at every meal and at snacks. This will make it easy to get the recommended amount of fruits and vegetables each day. ¨ Try yogurt topped with fruit and nuts for a snack or healthy dessert. ¨ Add lettuce, tomato, cucumber, and onion to sandwiches. ¨ Combine a ready-made pizza crust with low-fat mozzarella cheese and lots of vegetable toppings. Try using tomatoes, squash, spinach, broccoli, carrots, cauliflower, and onions. ¨ Have a variety of cut-up vegetables with a low-fat dip as an appetizer instead of chips and dip. ¨ Sprinkle sunflower seeds or chopped almonds over salads. Or try adding chopped walnuts or almonds to cooked vegetables. ¨ Try some vegetarian meals using beans and peas. Add garbanzo or kidney beans to salads. Make burritos and tacos with mashed ni beans or black beans. Where can you learn more? Go to http://jasper-teresa.info/. Enter H766 in the search box to learn more about \"DASH Diet: Care Instructions. \"  Current as of: April 3, 2017  Content Version: 11.3  © 3284-1005 [employer]. Care instructions adapted under license by [employer] (which disclaims liability or warranty for this information). If you have questions about a medical condition or this instruction, always ask your healthcare professional. [place] [address] any warranty or liability for your use of this information. High Blood Pressure: Care Instructions  Your Care Instructions  If your blood pressure is usually above 140/90, you have high blood pressure, or hypertension. That means the top number is 140 or higher or the bottom number is 90 or higher, or both. Despite what a lot of people think, high blood pressure usually doesn't cause headaches or make you feel dizzy or lightheaded. It usually has no symptoms. But it does increase your risk for heart attack, stroke, and kidney or eye damage. The higher your blood pressure, the more your risk increases. Your doctor will give you a goal for your blood pressure. Your goal will be based on your health and your age. An example of a goal is to keep your blood pressure below 140/90. Lifestyle changes, such as eating healthy and being active, are always important to help lower blood pressure. You might also take medicine to reach your blood pressure goal.  Follow-up care is a key part of your treatment and safety. Be sure to make and go to all appointments, and call your doctor if you are having problems. It's also a good idea to know your test results and keep a list of the medicines you take. How can you care for yourself at home?   Medical treatment  · If you stop taking your medicine, your blood pressure will go back up. You may take one or more types of medicine to lower your blood pressure. Be safe with medicines. Take your medicine exactly as prescribed. Call your doctor if you think you are having a problem with your medicine. · Talk to your doctor before you start taking aspirin every day. Aspirin can help certain people lower their risk of a heart attack or stroke. But taking aspirin isn't right for everyone, because it can cause serious bleeding. · See your doctor regularly. You may need to see the doctor more often at first or until your blood pressure comes down. · If you are taking blood pressure medicine, talk to your doctor before you take decongestants or anti-inflammatory medicine, such as ibuprofen. Some of these medicines can raise blood pressure. · Learn how to check your blood pressure at home. Lifestyle changes  · Stay at a healthy weight. This is especially important if you put on weight around the waist. Losing even 10 pounds can help you lower your blood pressure. · If your doctor recommends it, get more exercise. Walking is a good choice. Bit by bit, increase the amount you walk every day. Try for at least 30 minutes on most days of the week. You also may want to swim, bike, or do other activities. · Avoid or limit alcohol. Talk to your doctor about whether you can drink any alcohol. · Try to limit how much sodium you eat to less than 2,300 milligrams (mg) a day. Your doctor may ask you to try to eat less than 1,500 mg a day. · Eat plenty of fruits (such as bananas and oranges), vegetables, legumes, whole grains, and low-fat dairy products. · Lower the amount of saturated fat in your diet. Saturated fat is found in animal products such as milk, cheese, and meat. Limiting these foods may help you lose weight and also lower your risk for heart disease. · Do not smoke. Smoking increases your risk for heart attack and stroke.  If you need help quitting, talk to your doctor about stop-smoking programs and medicines. These can increase your chances of quitting for good. When should you call for help? Call 911 anytime you think you may need emergency care. This may mean having symptoms that suggest that your blood pressure is causing a serious heart or blood vessel problem. Your blood pressure may be over 180/110. For example, call 911 if:  · You have symptoms of a heart attack. These may include:  ¨ Chest pain or pressure, or a strange feeling in the chest.  ¨ Sweating. ¨ Shortness of breath. ¨ Nausea or vomiting. ¨ Pain, pressure, or a strange feeling in the back, neck, jaw, or upper belly or in one or both shoulders or arms. ¨ Lightheadedness or sudden weakness. ¨ A fast or irregular heartbeat. · You have symptoms of a stroke. These may include:  ¨ Sudden numbness, tingling, weakness, or loss of movement in your face, arm, or leg, especially on only one side of your body. ¨ Sudden vision changes. ¨ Sudden trouble speaking. ¨ Sudden confusion or trouble understanding simple statements. ¨ Sudden problems with walking or balance. ¨ A sudden, severe headache that is different from past headaches. · You have severe back or belly pain. Do not wait until your blood pressure comes down on its own. Get help right away. Call your doctor now or seek immediate care if:  · Your blood pressure is much higher than normal (such as 180/110 or higher), but you don't have symptoms. · You think high blood pressure is causing symptoms, such as:  ¨ Severe headache. ¨ Blurry vision. Watch closely for changes in your health, and be sure to contact your doctor if:  · Your blood pressure measures 140/90 or higher at least 2 times. That means the top number is 140 or higher or the bottom number is 90 or higher, or both. · You think you may be having side effects from your blood pressure medicine. · Your blood pressure is usually normal, but it goes above normal at least 2 times.   Where can you learn more? Go to http://jasper-teresa.info/. Enter G265 in the search box to learn more about \"High Blood Pressure: Care Instructions. \"  Current as of: August 8, 2016  Content Version: 11.3  © 4708-1091 [employer]. Care instructions adapted under license by [employer] (which disclaims liability or warranty for this information). If you have questions about a medical condition or this instruction, always ask your healthcare professional. [address] any warranty or liability for your use of this information. Learning About High Blood Pressure  What is high blood pressure? Blood pressure is a measure of how hard the blood pushes against the walls of your arteries. It's normal for blood pressure to go up and down throughout the day, but if it stays up, you have high blood pressure. Another name for high blood pressure is hypertension. Two numbers tell you your blood pressure. The first number is the systolic pressure. It shows how hard the blood pushes when your heart is pumping. The second number is the diastolic pressure. It shows how hard the blood pushes between heartbeats, when your heart is relaxed and filling with blood. A blood pressure of less than 120/80 (say \"120 over 80\") is ideal for an adult. High blood pressure is 140/90 or higher. You have high blood pressure if your top number is 140 or higher or your bottom number is 90 or higher, or both. Many people fall into the category in between, called prehypertension. People with prehypertension need to make lifestyle changes to bring their blood pressure down and help prevent or delay high blood pressure. What happens when you have high blood pressure? · Blood flows through your arteries with too much force. Over time, this damages the walls of your arteries. But you can't feel it. High blood pressure usually doesn't cause symptoms.   · Fat and calcium start to build up in your arteries. This buildup is called plaque. Plaque makes your arteries narrower and stiffer. Blood can't flow through them as easily. · This lack of good blood flow starts to damage some of the organs in your body. This can lead to problems such as coronary artery disease and heart attack, heart failure, stroke, kidney failure, and eye damage. How can you prevent high blood pressure? · Stay at a healthy weight. · Try to limit how much sodium you eat to less than 2,300 milligrams (mg) a day. If you limit your sodium to 1,500 mg a day, you can lower your blood pressure even more. ¨ Buy foods that are labeled \"unsalted,\" \"sodium-free,\" or \"low-sodium. \" Foods labeled \"reduced-sodium\" and \"light sodium\" may still have too much sodium. ¨ Flavor your food with garlic, lemon juice, onion, vinegar, herbs, and spices instead of salt. Do not use soy sauce, steak sauce, onion salt, garlic salt, mustard, or ketchup on your food. ¨ Use less salt (or none) when recipes call for it. You can often use half the salt a recipe calls for without losing flavor. · Be physically active. Get at least 30 minutes of exercise on most days of the week. Walking is a good choice. You also may want to do other activities, such as running, swimming, cycling, or playing tennis or team sports. · Limit alcohol to 2 drinks a day for men and 1 drink a day for women. · Eat plenty of fruits, vegetables, and low-fat dairy products. Eat less saturated and total fats. How is high blood pressure treated? · Your doctor will suggest making lifestyle changes. For example, your doctor may ask you to eat healthy foods, quit smoking, lose extra weight, and be more active. · If lifestyle changes don't help enough or your blood pressure is very high, you will have to take medicine every day. Follow-up care is a key part of your treatment and safety. Be sure to make and go to all appointments, and call your doctor if you are having problems.  It's also a good idea to know your test results and keep a list of the medicines you take. Where can you learn more? Go to http://jasper-teresa.info/. Enter P501 in the search box to learn more about \"Learning About High Blood Pressure. \"  Current as of: March 23, 2016  Content Version: 11.3  © 1756-0970 [employer]. Care instructions adapted under license by [employer] (which disclaims liability or warranty for this information). If you have questions about a medical condition or this instruction, always ask your healthcare professional. [place] [address] any warranty or liability for your use of this information.

## 2017-10-13 NOTE — ED TRIAGE NOTES
Alert male c/o elevated bp over past week, out of bp medications, denies pain. Pt reports \"I can just tell its running high\".

## 2017-10-13 NOTE — ED PROVIDER NOTES
Patient is a 47 y.o. male presenting with hypertension. The history is provided by the patient. Hypertension       pt presents requesting Rx refill on his HTN meds. 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 Denies HA, CP, SOB. Smokes 1 cigarette daily; infrequent ETOH. No longer does recreational drugs. Past Medical History:   Diagnosis Date    Back pain     Hypertension     Psychiatric disorder     hallucinations       Past Surgical History:   Procedure Laterality Date    HX OTHER SURGICAL      oral sx         History reviewed. No pertinent family history. Social History     Social History    Marital status:      Spouse name: N/A    Number of children: N/A    Years of education: N/A     Occupational History    Not on file. Social History Main Topics    Smoking status: Current Some Day Smoker    Smokeless tobacco: Never Used    Alcohol use Yes      Comment: beer    Drug use: Yes     Special: Marijuana, Cocaine    Sexual activity: Not on file     Other Topics Concern    Not on file     Social History Narrative         ALLERGIES: Review of patient's allergies indicates no known allergies. Review of Systems   Neurological: Positive for light-headedness. All other systems reviewed and are negative. Vitals:    10/13/17 0537 10/13/17 0541 10/13/17 0616   BP: (!) 202/141  (!) 169/118   Pulse: 98  79   Resp: 16  18   Temp:  98.4 °F (36.9 °C)    SpO2: 99%  100%   Weight: 70.3 kg (155 lb)     Height: 5' 7\" (1.702 m)              Physical Exam   Constitutional: He is oriented to person, place, and time. Vital signs are normal. He appears well-developed and well-nourished. He is active. Non-toxic appearance. He does not appear ill. No distress. HENT:   Head: Normocephalic and atraumatic. Eyes: EOM are normal.   Neck: Normal range of motion.  Neck supple. Carotid bruit is not present. No tracheal deviation present. No thyromegaly present. No carotid bruits auscultated. Cardiovascular: Normal rate, regular rhythm and normal heart sounds. Exam reveals no gallop and no friction rub. No murmur heard. Pulmonary/Chest: Effort normal and breath sounds normal. No stridor. No respiratory distress. He has no wheezes. He has no rales. He exhibits no tenderness. Abdominal: Soft. He exhibits no distension and no mass. There is no tenderness. There is no rebound, no guarding and no CVA tenderness. Musculoskeletal: Normal range of motion. Neurological: He is alert and oriented to person, place, and time. No cranial nerve deficit. Skin: Skin is warm, dry and intact. He is not diaphoretic. No pallor. Psychiatric: He has a normal mood and affect. His speech is normal and behavior is normal. Judgment and thought content normal.   Nursing note and vitals reviewed. MDM  Number of Diagnoses or Management Options  Essential hypertension:   Diagnosis management comments: Differential:HTN; vertigo; arrhythmia    Pt is currently asymptomatic. Stressed to him need for regular PCP care.  referral requested. D/c home with Rx. ED Course       Procedures      6:34 AM  Diagnosis:   1.  Essential hypertension          Disposition: home    Follow-up Information     Follow up With Details Comments [address] Schedule an appointment as soon as possible for a visit today  [address] [facility] [address]  [facility] Call today ask for assistance in making your follow up appointment [address]  [phone]    Bel Busch MD Schedule an appointment as soon as possible for a visit in 2 days  [facility] EMERGENCY DEPT  If symptoms worsen return immediately Teena Mccartney [address] [place] [address]  [phone]          Patient's Medications   Start Taking    No medications on file   Continue Taking    BUPROPION XL (WELLBUTRIN XL) 300 MG XL TABLET    Take 1 Tab by mouth every morning. Indications: major depressive disorder   These Medications have changed    Modified Medication Previous Medication    HYDROCHLOROTHIAZIDE (HYDRODIURIL) 50 MG TABLET hydroCHLOROthiazide (HYDRODIURIL) 50 mg tablet       Take 1 Tab by mouth daily. Indications: hypertension    Take 1 Tab by mouth daily. Indications: hypertension    LISINOPRIL (PRINIVIL, ZESTRIL) 40 MG TABLET lisinopril (PRINIVIL, ZESTRIL) 40 mg tablet       Take 1 Tab by mouth daily. Indications: hypertension    Take 1 Tab by mouth daily.  Indications: hypertension   Stop Taking    No medications on file

## 2017-10-14 ENCOUNTER — HOSPITAL ENCOUNTER (EMERGENCY)
Age: 52
Discharge: HOME | End: 2017-10-17
Attending: EMERGENCY MEDICINE
Payer: SELF-PAY

## 2017-10-14 DIAGNOSIS — F29 PSYCHOSIS, UNSPECIFIED PSYCHOSIS TYPE (HCC): ICD-10-CM

## 2017-10-14 DIAGNOSIS — R44.3 HALLUCINATIONS: ICD-10-CM

## 2017-10-14 DIAGNOSIS — I10 HYPERTENSION, ACCELERATED: ICD-10-CM

## 2017-10-14 DIAGNOSIS — R45.851 SUICIDAL IDEATION: Primary | ICD-10-CM

## 2017-10-14 LAB
AMPHET UR QL SCN: NEGATIVE
ANION GAP SERPL CALC-SCNC: 8 MMOL/L (ref 3–18)
APAP SERPL-MCNC: <2 UG/ML (ref 10–30)
APPEARANCE UR: CLEAR
BACTERIA URNS QL MICRO: NEGATIVE /HPF
BARBITURATES UR QL SCN: NEGATIVE
BASOPHILS # BLD: 0 K/UL (ref 0–0.06)
BASOPHILS NFR BLD: 0 % (ref 0–2)
BENZODIAZ UR QL: NEGATIVE
BILIRUB UR QL: NEGATIVE
BUN SERPL-MCNC: 14 MG/DL (ref 7–18)
BUN/CREAT SERPL: 15 (ref 12–20)
CALCIUM SERPL-MCNC: 9 MG/DL (ref 8.5–10.1)
CANNABINOIDS UR QL SCN: NEGATIVE
CHLORIDE SERPL-SCNC: 105 MMOL/L (ref 100–108)
CO2 SERPL-SCNC: 27 MMOL/L (ref 21–32)
COCAINE UR QL SCN: NEGATIVE
COLOR UR: YELLOW
CREAT SERPL-MCNC: 0.95 MG/DL (ref 0.6–1.3)
DIFFERENTIAL METHOD BLD: ABNORMAL
EOSINOPHIL # BLD: 0 K/UL (ref 0–0.4)
EOSINOPHIL NFR BLD: 1 % (ref 0–5)
EPITH CASTS URNS QL MICRO: NORMAL /LPF (ref 0–5)
ERYTHROCYTE [DISTWIDTH] IN BLOOD BY AUTOMATED COUNT: 16 % (ref 11.6–14.5)
ETHANOL SERPL-MCNC: 5 MG/DL (ref 0–3)
GLUCOSE SERPL-MCNC: 91 MG/DL (ref 74–99)
GLUCOSE UR STRIP.AUTO-MCNC: NEGATIVE MG/DL
HCT VFR BLD AUTO: 38 % (ref 36–48)
HDSCOM,HDSCOM: NORMAL
HGB BLD-MCNC: 12.7 G/DL (ref 13–16)
HGB UR QL STRIP: NEGATIVE
HYALINE CASTS URNS QL MICRO: NORMAL /LPF (ref 0–2)
KETONES UR QL STRIP.AUTO: NEGATIVE MG/DL
LEUKOCYTE ESTERASE UR QL STRIP.AUTO: NEGATIVE
LYMPHOCYTES # BLD: 1.4 K/UL (ref 0.9–3.6)
LYMPHOCYTES NFR BLD: 34 % (ref 21–52)
MCH RBC QN AUTO: 26.6 PG (ref 24–34)
MCHC RBC AUTO-ENTMCNC: 33.4 G/DL (ref 31–37)
MCV RBC AUTO: 79.7 FL (ref 74–97)
METHADONE UR QL: NEGATIVE
MONOCYTES # BLD: 0.3 K/UL (ref 0.05–1.2)
MONOCYTES NFR BLD: 6 % (ref 3–10)
NEUTS SEG # BLD: 2.5 K/UL (ref 1.8–8)
NEUTS SEG NFR BLD: 59 % (ref 40–73)
NITRITE UR QL STRIP.AUTO: NEGATIVE
OPIATES UR QL: NEGATIVE
PCP UR QL: NEGATIVE
PH UR STRIP: 5 [PH] (ref 5–8)
PLATELET # BLD AUTO: 238 K/UL (ref 135–420)
PMV BLD AUTO: 9.4 FL (ref 9.2–11.8)
POTASSIUM SERPL-SCNC: 3.7 MMOL/L (ref 3.5–5.5)
PROT UR STRIP-MCNC: ABNORMAL MG/DL
RBC # BLD AUTO: 4.77 M/UL (ref 4.7–5.5)
RBC #/AREA URNS HPF: NORMAL /HPF (ref 0–5)
SALICYLATES SERPL-MCNC: <2.8 MG/DL (ref 2.8–20)
SODIUM SERPL-SCNC: 140 MMOL/L (ref 136–145)
SP GR UR REFRACTOMETRY: 1.02 (ref 1–1.03)
UROBILINOGEN UR QL STRIP.AUTO: 0.2 EU/DL (ref 0.2–1)
WBC # BLD AUTO: 4.2 K/UL (ref 4.6–13.2)
WBC URNS QL MICRO: NORMAL /HPF (ref 0–4)

## 2017-10-14 PROCEDURE — 85025 COMPLETE CBC W/AUTO DIFF WBC: CPT | Performed by: EMERGENCY MEDICINE

## 2017-10-14 PROCEDURE — 80307 DRUG TEST PRSMV CHEM ANLYZR: CPT | Performed by: EMERGENCY MEDICINE

## 2017-10-14 PROCEDURE — 80048 BASIC METABOLIC PNL TOTAL CA: CPT | Performed by: EMERGENCY MEDICINE

## 2017-10-14 PROCEDURE — 74011250637 HC RX REV CODE- 250/637: Performed by: EMERGENCY MEDICINE

## 2017-10-14 PROCEDURE — 99285 EMERGENCY DEPT VISIT HI MDM: CPT

## 2017-10-14 PROCEDURE — 74011250637 HC RX REV CODE- 250/637: Performed by: PHYSICIAN ASSISTANT

## 2017-10-14 PROCEDURE — 93005 ELECTROCARDIOGRAM TRACING: CPT

## 2017-10-14 PROCEDURE — 81001 URINALYSIS AUTO W/SCOPE: CPT | Performed by: EMERGENCY MEDICINE

## 2017-10-14 RX ORDER — ARIPIPRAZOLE 2 MG/1
2 TABLET ORAL DAILY
Status: DISCONTINUED | OUTPATIENT
Start: 2017-10-14 | End: 2017-10-17 | Stop reason: HOSPADM

## 2017-10-14 RX ORDER — ACETAMINOPHEN 500 MG
500 TABLET ORAL
Status: COMPLETED | OUTPATIENT
Start: 2017-10-14 | End: 2017-10-14

## 2017-10-14 RX ADMIN — ARIPIPRAZOLE 2 MG: 2 TABLET ORAL at 13:00

## 2017-10-14 RX ADMIN — ACETAMINOPHEN 500 MG: 500 TABLET ORAL at 19:51

## 2017-10-14 NOTE — BSMART NOTE
Counselor contacted [employer] and spoke with Harriet Katie. Informed her about the pt to determine if he could be assessed for CSU. Harriet Wilson stated due to the command hallucinations, the pt was too acute for Crisis Stabilization. Lindsey Siddiqui.  Juan Antonio Valdes, Ph.D., LPC, CSAC, BCPC, CCTP, CAMS-I  Crisis Counselor

## 2017-10-14 NOTE — BSMART NOTE
Counselor contacted [facility] and spoke with Jazmine Masters. Informed Jazmine Masters that the pt was too acute for crisis stabilization and requested that the pt be put on the list to be considered once beds were available. Jazmine Masters agreed. Sisi Hua.  Levi Bonilla, Ph.D., LPC, CSAC, BCPC, CCTP, CAMS-I  Crisis Counselor

## 2017-10-14 NOTE — ED TRIAGE NOTES
Patient states he is hearing voices and seeing things. Patient states the voices tell him to hurt himself and he has had \"thoughts of hurting himself if the voices cant be stopped. \"

## 2017-10-14 NOTE — CONSULTS
Name: Rosalie Heredia  Date: 10/14/2017  Time: 11:58 AM via telepsychiatry  : 1965  Reason for consult: hallucinations, SI   History of Present Illness: Rosalie Heredia is a 47 y.o. man with recurrent depression with psychosis returning to the ED with complaints of hallucinations, paranoia, and SI. He sees shadows and hears voices telling him to \"jump. \" His suicidal ideation comes and goes and he does not currently have a plan but says that he does not feel safe in the outpatient setting. He denies any HI but does fear that others might try to hurt him. No specific fears of any plots against him. He describes his mood as depressed. He says that his wellbutrin has been helpful for him in the past but cannot remember other meds. He has not been able to restart on psych meds since discharge from the psych hospital several months ago. Patient presented to the ED at least twice in the last couple months and recommended for inpatient psychiatry. However, patient cleared and was eventually discharged back out into the community on no meds. Patient is again seeking inpatient psychiatry and is amenable to trial back on meds while boarding in the ED.     SI/HI/Self harm/Violence: +SI with past attempts; -HI    Collateral: EMR, hospital staff: ANGELIC Gaona  Psychiatric History/Treatment History: inpatient ~5months ago; no current outpatient     Drug/Alcohol History: UDS: pending but 17 was negative, serum alcohol 5 today  Medical History:   Past Medical History:   Diagnosis Date    Back pain     Hypertension     Psychiatric disorder     hallucinations     Medications & Freq: has not been on wellbutrin for months  Prior to Admission medications    Medication Sig Start Date End Date Taking? Authorizing Provider   lisinopril (PRINIVIL, ZESTRIL) 40 mg tablet Take 1 Tab by mouth daily. Indications: hypertension 10/13/17  Yes ANGELIC Ni   hydroCHLOROthiazide (HYDRODIURIL) 50 mg tablet Take 1 Tab by mouth daily. Indications: hypertension 10/13/17  Yes ANGELIC Medrano   buPROPion XL (WELLBUTRIN XL) 300 mg XL tablet Take 1 Tab by mouth every morning. Indications: major depressive disorder 8/26/17  Yes Ephraim Caraballo DO     Allergies: No Known Allergies  Family Psych History/History of suicide: father had a psychotic disorder  Social History:    Employment: none   Living situation: homeless   Stressors: limited supports    Mental Status Exam:   Appearance and attire: hospital attire in no distress  Attitude and behavior: cooperative  Speech: soft, somewhat latent  Affect and mood: blunted, \"depressed\"  Association and thought processes: goal directed  Thought content: SI: intermittent, currently passive; HI: denies  Perception: AVH: commands to harm self, shadows; Delusions: nonspecific paranoia  Sensorium and orientation: drowsy but oriented to situation  Memory and Intellectual functioning: grossly unimpaired  Insight and judgment: fair    Impression/Risk Assessment:   Marilee Suarez is a 47 y.o. man with recurrent depression with psychosis returns to the ED with complaints of hallucinations, paranoia, and SI. He does not feel safe outside of the hospital and had had past suicide attempts. He is amenable to both inpatient psychiatry and trial of psychiatric medication while waiting for placement. No HI. Principal Diagnosis: F33.3 MDD recurrent with psychosis  Other Diagnoses:  Patient Active Problem List   Diagnosis Code    Depression F32.9     Treatment Recommendations:  1. Disposition: inpatient psychiatry  2. Psychiatric medications: abilify 2mg daily    The above were discussed with the patient and the referring provider; able parties stated understanding and agreement with the recommendations.

## 2017-10-14 NOTE — ED PROVIDER NOTES
Patient is a 47 y.o. male presenting with mental health disorder. The history is provided by the patient. Mental Health Problem       pt with h/o schizophrenia is c/o auditory and visual hallucinations telling him to 'jump' and kill himself. Denies HI. Prior hospitalization perhaps 4-5mos ago; is unsure. Not currently taking any medications. Doesn't follow with [facility] or psychiatry. Drinks 12 beers weekly. 1-2 cigars daily. Uses marijuana every other day. Past Medical History:   Diagnosis Date    Back pain     Hypertension     Psychiatric disorder     hallucinations       Past Surgical History:   Procedure Laterality Date    HX OTHER SURGICAL      oral sx         No family history on file. Social History     Social History    Marital status:      Spouse name: N/A    Number of children: N/A    Years of education: N/A     Occupational History    Not on file. Social History Main Topics    Smoking status: Current Some Day Smoker     Packs/day: 0.25    Smokeless tobacco: Never Used    Alcohol use Yes      Comment: beer    Drug use: Yes     Special: Marijuana, Cocaine    Sexual activity: Not on file     Other Topics Concern    Not on file     Social History Narrative         ALLERGIES: Review of patient's allergies indicates no known allergies. Review of Systems   Psychiatric/Behavioral: Positive for suicidal ideas. All other systems reviewed and are negative. Vitals:    10/14/17 0930   BP: (!) 151/110   Pulse: 84   Resp: 16   Temp: 97.6 °F (36.4 °C)   SpO2: 100%   Weight: 68 kg (150 lb)   Height: 5' 6\" (1.676 m)            Physical Exam   Constitutional: Vital signs are normal. He appears well-developed and well-nourished. He is active. Non-toxic appearance. He does not appear ill. No distress. HENT:   Head: Normocephalic and atraumatic. Neck: Normal range of motion. Neck supple. Carotid bruit is not present. No tracheal deviation present. No thyromegaly present. Cardiovascular: Normal rate, regular rhythm and normal heart sounds. Exam reveals no gallop and no friction rub. No murmur heard. Pulmonary/Chest: Effort normal and breath sounds normal. No stridor. No respiratory distress. He has no wheezes. He has no rales. He exhibits no tenderness. Abdominal: Soft. He exhibits no distension and no mass. There is no tenderness. There is no rebound, no guarding and no CVA tenderness. Musculoskeletal: Normal range of motion. Neurological: He is alert. Skin: Skin is warm, dry and intact. He is not diaphoretic. No pallor. Psychiatric: He has a normal mood and affect. His speech is normal and behavior is normal. Judgment and thought content normal.   Nursing note and vitals reviewed. MDM  Number of Diagnoses or Management Options  Hallucinations:   Psychosis, unspecified psychosis type:   Suicidal ideation:   Diagnosis management comments: Differential: psychosis; SI; ETO intoxication; drug abuse    11:59 AM  Dr. Jaret Flood coming to interview patient now. 12:28 PM  Dr. Jaret Flood recommending inpatient txmt for his issues. Recommending 2mg Abilify daily. Have ordered. Sarkis Brown from crisis is here and will help w/placement. 2:06 PM  Pt too acute for [facility] (command hallucinations and w/in 24hrs ofr being SI). On [facility] waiting list, confirmed with Polly Roland at [facility].    3:01 AM  Consulted with Dr. David Green  concerning patient Idris Sequeira, standard discussion of reason for visit, HPI, ROS, PE, and current results available. Report was given at this time and pt was turned over to the provider above, who will assume care of pt at this time and disposition.  ANGELIC Gordon          Amount and/or Complexity of Data Reviewed  Clinical lab tests: ordered and reviewed      ED Course       Procedures      Recent Results (from the past 12 hour(s))   CBC WITH AUTOMATED DIFF    Collection Time: 10/14/17  9:51 AM   Result Value Ref Range    WBC 4.2 (L) 4.6 - 13.2 K/uL    RBC 4. 77 4.70 - 5.50 M/uL    HGB 12.7 (L) 13.0 - 16.0 g/dL    HCT 38.0 36.0 - 48.0 %    MCV 79.7 74.0 - 97.0 FL    MCH 26.6 24.0 - 34.0 PG    MCHC 33.4 31.0 - 37.0 g/dL    RDW 16.0 (H) 11.6 - 14.5 %    PLATELET 979 705 - 489 K/uL    MPV 9.4 9.2 - 11.8 FL    NEUTROPHILS 59 40 - 73 %    LYMPHOCYTES 34 21 - 52 %    MONOCYTES 6 3 - 10 %    EOSINOPHILS 1 0 - 5 %    BASOPHILS 0 0 - 2 %    ABS. NEUTROPHILS 2.5 1.8 - 8.0 K/UL    ABS. LYMPHOCYTES 1.4 0.9 - 3.6 K/UL    ABS. MONOCYTES 0.3 0.05 - 1.2 K/UL    ABS. EOSINOPHILS 0.0 0.0 - 0.4 K/UL    ABS.  BASOPHILS 0.0 0.0 - 0.06 K/UL    DF AUTOMATED     METABOLIC PANEL, BASIC    Collection Time: 10/14/17  9:51 AM   Result Value Ref Range    Sodium 140 136 - 145 mmol/L    Potassium 3.7 3.5 - 5.5 mmol/L    Chloride 105 100 - 108 mmol/L    CO2 27 21 - 32 mmol/L    Anion gap 8 3.0 - 18 mmol/L    Glucose 91 74 - 99 mg/dL    BUN 14 7.0 - 18 MG/DL    Creatinine 0.95 0.6 - 1.3 MG/DL    BUN/Creatinine ratio 15 12 - 20      GFR est AA >60 >60 ml/min/1.73m2    GFR est non-AA >60 >60 ml/min/1.73m2    Calcium 9.0 8.5 - 10.1 MG/DL   ETHYL ALCOHOL    Collection Time: 10/14/17  9:51 AM   Result Value Ref Range    ALCOHOL(ETHYL),SERUM 5 (H) 0 - 3 MG/DL   SALICYLATE    Collection Time: 10/14/17  9:51 AM   Result Value Ref Range    Salicylate level <9.1 (L) 2.8 - 20.0 MG/DL   ACETAMINOPHEN    Collection Time: 10/14/17  9:51 AM   Result Value Ref Range    Acetaminophen level <2 (L) 10 - 30 ug/mL   EKG, 12 LEAD, INITIAL    Collection Time: 10/14/17 10:36 AM   Result Value Ref Range    Ventricular Rate 70 BPM    Atrial Rate 70 BPM    P-R Interval 166 ms    QRS Duration 82 ms    Q-T Interval 418 ms    QTC Calculation (Bezet) 451 ms    Calculated P Axis 52 degrees    Calculated R Axis -32 degrees    Calculated T Axis 90 degrees    Diagnosis       Sinus rhythm with premature ventricular complexes or fusion complexes  Left axis deviation  ST & T wave abnormality, consider lateral ischemia  Abnormal ECG  When compared with ECG of 15-AUG-2017 21:27,  fusion complexes are now present  premature ventricular complexes are now present  KY interval has decreased  Nonspecific T wave abnormality now evident in Inferior leads  T wave inversion now evident in Lateral leads     URINALYSIS W/ RFLX MICROSCOPIC    Collection Time: 10/14/17 10:40 AM   Result Value Ref Range    Color YELLOW      Appearance CLEAR      Specific gravity 1.023 1.005 - 1.030      pH (UA) 5.0 5.0 - 8.0      Protein TRACE (A) NEG mg/dL    Glucose NEGATIVE  NEG mg/dL    Ketone NEGATIVE  NEG mg/dL    Bilirubin NEGATIVE  NEG      Blood NEGATIVE  NEG      Urobilinogen 0.2 0.2 - 1.0 EU/dL    Nitrites NEGATIVE  NEG      Leukocyte Esterase NEGATIVE  NEG     DRUG SCREEN, URINE    Collection Time: 10/14/17 10:40 AM   Result Value Ref Range    BENZODIAZEPINES NEGATIVE  NEG      BARBITURATES NEGATIVE  NEG      THC (TH-CANNABINOL) NEGATIVE  NEG      OPIATES NEGATIVE  NEG      PCP(PHENCYCLIDINE) NEGATIVE  NEG      COCAINE NEGATIVE  NEG      AMPHETAMINES NEGATIVE  NEG      METHADONE NEGATIVE       HDSCOM (NOTE)    URINE MICROSCOPIC ONLY    Collection Time: 10/14/17 10:40 AM   Result Value Ref Range    WBC NONE 0 - 4 /hpf    RBC NONE 0 - 5 /hpf    Epithelial cells FEW 0 - 5 /lpf    Bacteria NEGATIVE  NEG /hpf    Hyaline cast 0 to 3 0 - 2 /lpf     5:47 PM  Diagnosis:   1. Suicidal ideation    2. Psychosis, unspecified psychosis type    3. Hallucinations          Disposition: tbd; care turned over to oncoming provider at end of shift    Follow-up Information     None          Patient's Medications   Start Taking    No medications on file   Continue Taking    BUPROPION XL (WELLBUTRIN XL) 300 MG XL TABLET    Take 1 Tab by mouth every morning. Indications: major depressive disorder    HYDROCHLOROTHIAZIDE (HYDRODIURIL) 50 MG TABLET    Take 1 Tab by mouth daily. Indications: hypertension    LISINOPRIL (PRINIVIL, ZESTRIL) 40 MG TABLET    Take 1 Tab by mouth daily.  Indications: hypertension   These Medications have changed    No medications on file   Stop Taking    No medications on file        Signout Note      Pt care transferred to Dr. oVn Patterson  ,ED provider. History of patient complaint(s), available diagnostic reports and current treatment plan has been discussed thoroughly. Bedside rounding on patient occured : no . Intended disposition of patient : Transfer  Pending diagnostics reports and/or labs (please list):     Diagnosis:   1. Suicidal ideation    2. Psychosis, unspecified psychosis type    3. Hallucinations      Satnam Medrano M.D.

## 2017-10-15 PROCEDURE — 74011250637 HC RX REV CODE- 250/637: Performed by: PHYSICIAN ASSISTANT

## 2017-10-15 PROCEDURE — 74011250637 HC RX REV CODE- 250/637: Performed by: EMERGENCY MEDICINE

## 2017-10-15 RX ORDER — HYDROCHLOROTHIAZIDE 25 MG/1
25 TABLET ORAL
Status: COMPLETED | OUTPATIENT
Start: 2017-10-15 | End: 2017-10-15

## 2017-10-15 RX ORDER — LISINOPRIL 20 MG/1
20 TABLET ORAL
Status: COMPLETED | OUTPATIENT
Start: 2017-10-15 | End: 2017-10-15

## 2017-10-15 RX ORDER — ACETAMINOPHEN 500 MG
1000 TABLET ORAL ONCE
Status: COMPLETED | OUTPATIENT
Start: 2017-10-15 | End: 2017-10-15

## 2017-10-15 RX ORDER — ACETAMINOPHEN 500 MG
1000 TABLET ORAL
Status: COMPLETED | OUTPATIENT
Start: 2017-10-15 | End: 2017-10-15

## 2017-10-15 RX ADMIN — HYDROCHLOROTHIAZIDE 25 MG: 25 TABLET ORAL at 17:56

## 2017-10-15 RX ADMIN — LISINOPRIL 20 MG: 20 TABLET ORAL at 17:56

## 2017-10-15 RX ADMIN — ARIPIPRAZOLE 2 MG: 2 TABLET ORAL at 09:12

## 2017-10-15 RX ADMIN — ACETAMINOPHEN 1000 MG: 500 TABLET ORAL at 20:47

## 2017-10-15 RX ADMIN — ACETAMINOPHEN 1000 MG: 500 TABLET ORAL at 16:22

## 2017-10-15 NOTE — ED NOTES
Patient asking for something for his back pain. Provider notified. Patient awake on stretcher. Denies any other needs at this time. Sitter at the bedside.

## 2017-10-15 NOTE — ED NOTES
Pt cooperative all day. No new bed assignment.  Care to be turned over to oncoming provider at end of shift. - mario, noah

## 2017-10-15 NOTE — ED NOTES
Vitals:  Patient Vitals for the past 12 hrs:   Temp Pulse Resp BP SpO2   10/15/17 2141 96.8 °F (36 °C) 70 18 (!) 148/101 (!) 10 %   10/15/17 1729 97.3 °F (36.3 °C) 84 - (!) 165/110 99 %   10/15/17 1434 - - - (!) 148/99 -         Medications ordered:   Medications   ARIPiprazole (ABILIFY) tablet 2 mg (2 mg Oral Given 10/15/17 0912)   acetaminophen (TYLENOL) tablet 500 mg (500 mg Oral Given 10/14/17 1951)   acetaminophen (TYLENOL) tablet 1,000 mg (1,000 mg Oral Given 10/15/17 1622)   hydroCHLOROthiazide (HYDRODIURIL) tablet 25 mg (25 mg Oral Given 10/15/17 1756)   lisinopril (PRINIVIL, ZESTRIL) tablet 20 mg (20 mg Oral Given 10/15/17 1756)   acetaminophen (TYLENOL) tablet 1,000 mg (1,000 mg Oral Given 10/15/17 2047)         Lab findings:  No results found for this or any previous visit (from the past 12 hour(s)). EKG interpretation by ED Physician:      X-Ray, CT or other radiology findings or impressions:  No orders to display       Progress notes, Consult notes or additional Procedure notes:   Assumed care of patient at approx 7pm as he is pending placement for suicidal ideation, depression  Will turn over to dr Nic Lawson at approx 7am in the morning for same    Reevaluation of patient:   stable    Disposition:  Diagnosis:   1. Suicidal ideation    2. Psychosis, unspecified psychosis type    3. Hallucinations        Disposition: pending placement    Follow-up Information     None            Patient's Medications   Start Taking    No medications on file   Continue Taking    BUPROPION XL (WELLBUTRIN XL) 300 MG XL TABLET    Take 1 Tab by mouth every morning. Indications: major depressive disorder    HYDROCHLOROTHIAZIDE (HYDRODIURIL) 50 MG TABLET    Take 1 Tab by mouth daily. Indications: hypertension    LISINOPRIL (PRINIVIL, ZESTRIL) 40 MG TABLET    Take 1 Tab by mouth daily.  Indications: hypertension   These Medications have changed    No medications on file   Stop Taking    No medications on file

## 2017-10-16 LAB
ATRIAL RATE: 70 BPM
CALCULATED P AXIS, ECG09: 52 DEGREES
CALCULATED R AXIS, ECG10: -32 DEGREES
CALCULATED T AXIS, ECG11: 90 DEGREES
DIAGNOSIS, 93000: NORMAL
P-R INTERVAL, ECG05: 166 MS
Q-T INTERVAL, ECG07: 418 MS
QRS DURATION, ECG06: 82 MS
QTC CALCULATION (BEZET), ECG08: 451 MS
VENTRICULAR RATE, ECG03: 70 BPM

## 2017-10-16 PROCEDURE — 74011250637 HC RX REV CODE- 250/637: Performed by: EMERGENCY MEDICINE

## 2017-10-16 RX ORDER — LISINOPRIL 20 MG/1
20 TABLET ORAL DAILY
Status: DISCONTINUED | OUTPATIENT
Start: 2017-10-16 | End: 2017-10-17 | Stop reason: HOSPADM

## 2017-10-16 RX ORDER — IBUPROFEN 400 MG/1
400 TABLET ORAL
Status: DISCONTINUED | OUTPATIENT
Start: 2017-10-16 | End: 2017-10-17 | Stop reason: HOSPADM

## 2017-10-16 RX ORDER — AMLODIPINE BESYLATE 5 MG/1
5 TABLET ORAL DAILY
Status: DISCONTINUED | OUTPATIENT
Start: 2017-10-16 | End: 2017-10-17 | Stop reason: HOSPADM

## 2017-10-16 RX ORDER — HYDROCHLOROTHIAZIDE 25 MG/1
25 TABLET ORAL DAILY
Status: DISCONTINUED | OUTPATIENT
Start: 2017-10-16 | End: 2017-10-17 | Stop reason: HOSPADM

## 2017-10-16 RX ORDER — ACETAMINOPHEN 500 MG
1000 TABLET ORAL
Status: DISCONTINUED | OUTPATIENT
Start: 2017-10-16 | End: 2017-10-17 | Stop reason: HOSPADM

## 2017-10-16 RX ADMIN — HYDROCHLOROTHIAZIDE 25 MG: 25 TABLET ORAL at 09:32

## 2017-10-16 RX ADMIN — ARIPIPRAZOLE 2 MG: 2 TABLET ORAL at 09:32

## 2017-10-16 RX ADMIN — LISINOPRIL 20 MG: 20 TABLET ORAL at 09:32

## 2017-10-16 RX ADMIN — AMLODIPINE BESYLATE 5 MG: 5 TABLET ORAL at 23:21

## 2017-10-16 RX ADMIN — IBUPROFEN 400 MG: 400 TABLET, FILM COATED ORAL at 23:21

## 2017-10-16 RX ADMIN — ACETAMINOPHEN 1000 MG: 500 TABLET ORAL at 23:12

## 2017-10-16 NOTE — PROGRESS NOTES
Dayana Hudson from [facility] made me aware that [facility] declined pt and [facility] not appropriate due no substance abuse.

## 2017-10-16 NOTE — ED NOTES
Vitals:  Patient Vitals for the past 12 hrs:   Temp Pulse Resp BP SpO2   10/16/17 2113 97.6 °F (36.4 °C) 88 16 (!) 159/116 100 %   10/16/17 1558 97.6 °F (36.4 °C) 83 16 (!) 161/91 100 %         Medications ordered:   Medications   ARIPiprazole (ABILIFY) tablet 2 mg (2 mg Oral Given 10/16/17 0932)   lisinopril (PRINIVIL, ZESTRIL) tablet 20 mg (20 mg Oral Given 10/16/17 0932)   hydroCHLOROthiazide (HYDRODIURIL) tablet 25 mg (25 mg Oral Given 10/16/17 0932)   acetaminophen (TYLENOL) tablet 1,000 mg (1,000 mg Oral Given 10/16/17 2312)   ibuprofen (MOTRIN) tablet 400 mg (400 mg Oral Given 10/16/17 2321)   amLODIPine (NORVASC) tablet 5 mg (5 mg Oral Given 10/16/17 2321)   acetaminophen (TYLENOL) tablet 500 mg (500 mg Oral Given 10/14/17 1951)   acetaminophen (TYLENOL) tablet 1,000 mg (1,000 mg Oral Given 10/15/17 1622)   hydroCHLOROthiazide (HYDRODIURIL) tablet 25 mg (25 mg Oral Given 10/15/17 1756)   lisinopril (PRINIVIL, ZESTRIL) tablet 20 mg (20 mg Oral Given 10/15/17 1756)   acetaminophen (TYLENOL) tablet 1,000 mg (1,000 mg Oral Given 10/15/17 2047)         Lab findings:  No results found for this or any previous visit (from the past 12 hour(s)). EKG interpretation by ED Physician:      X-Ray, CT or other radiology findings or impressions:  No orders to display       Progress notes, Consult notes or additional Procedure notes:   Assumed care from dr Viri Li pending placement  Seen again by telepsych who still feels warrants inpt treatment  bp remained not well controlled. Will add norvasc for additional bp control  Will turn over to dr Caesar Crain in the morning pending placement    Reevaluation of patient:   stable    Disposition:  Diagnosis:   1. Suicidal ideation    2. Psychosis, unspecified psychosis type    3.  Hallucinations        Disposition: pending placement    Follow-up Information     None            Patient's Medications   Start Taking    No medications on file   Continue Taking    BUPROPION XL (WELLBUTRIN XL) 300 MG XL TABLET    Take 1 Tab by mouth every morning. Indications: major depressive disorder    HYDROCHLOROTHIAZIDE (HYDRODIURIL) 50 MG TABLET    Take 1 Tab by mouth daily. Indications: hypertension    LISINOPRIL (PRINIVIL, ZESTRIL) 40 MG TABLET    Take 1 Tab by mouth daily.  Indications: hypertension   These Medications have changed    No medications on file   Stop Taking    No medications on file

## 2017-10-16 NOTE — ED NOTES
Sitter at the bedside recording 15 minute checks     This patient has been recommended for inpatient treatment and is awaiting placement. The ED provider has reviewed the patients history and medications, diet, and treatments and will order as necessary.  The patient will be observed and attended to as their medical needs require and/or policy dictates

## 2017-10-16 NOTE — ED NOTES
7:24 AM :Pt care assumed from Dr. Pat Contreras, ED provider. Pt complaint(s), current treatment plan, progression and available diagnostic results have been discussed thoroughly. Rounding occurred: No  Intended Disposition: waiting for placement    Patient Vitals for the past 12 hrs:   Temp Pulse Resp BP SpO2   10/16/17 1558 97.6 °F (36.4 °C) 83 16 (!) 161/91 100 %   10/16/17 1208 - 62 16 (!) 149/104 97 %   10/16/17 0931 - - 16 (!) 150/106 -   10/16/17 0931 - 70 18 (!) 150/105 99 %     Pt remains stable and calm. Awaiting placement. Care transferred to Dr. Pat Contreras at change of shift.

## 2017-10-16 NOTE — ED NOTES
Pt updated on bed search status. Pt verbalizes understanding. Pt cooperative and quiet. This patient has been recommended for inpatient treatment and is awaiting placement. The ED provider has reviewed the patients history and medications, diet, and treatments and will order as necessary.  The patient will be observed and attended to as their medical needs require and/or policy dictates

## 2017-10-17 VITALS
HEART RATE: 66 BPM | SYSTOLIC BLOOD PRESSURE: 129 MMHG | TEMPERATURE: 97.6 F | RESPIRATION RATE: 16 BRPM | HEIGHT: 66 IN | DIASTOLIC BLOOD PRESSURE: 93 MMHG | OXYGEN SATURATION: 99 % | BODY MASS INDEX: 24.11 KG/M2 | WEIGHT: 150 LBS

## 2017-10-17 PROCEDURE — 74011250637 HC RX REV CODE- 250/637: Performed by: EMERGENCY MEDICINE

## 2017-10-17 RX ADMIN — HYDROCHLOROTHIAZIDE 25 MG: 25 TABLET ORAL at 08:13

## 2017-10-17 RX ADMIN — LISINOPRIL 20 MG: 20 TABLET ORAL at 08:13

## 2017-10-17 RX ADMIN — ARIPIPRAZOLE 2 MG: 2 TABLET ORAL at 08:13

## 2017-10-17 NOTE — ED NOTES
11:00 AM :Pt care assumed from Dr. Chucky Montgomery , ED provider. Pt complaint(s), current treatment plan, progression and available diagnostic results have been discussed thoroughly. Rounding occurred: yes  Intended Disposition: Transfer   Pending diagnostic reports and/or labs (please list): Awaiting transfer to [address] [place] [address] after 1 PM.      Scribe Attestation      [employer] acting as a scribe for and in the presence of Low Burroughs MD      October 17, 2017 at 11:50 AM       Provider Attestation:      I personally performed the services described in the documentation, reviewed the documentation, as recorded by the scribe in my presence, and it accurately and completely records my words and actions.  October 17, 2017 at 11:50 AM - Low Burroughs MD

## 2017-10-17 NOTE — CONSULTS
Reason for Consult: Depression/ SI/ Hallucinations  History of Present Illness: Pt seen via televideo with the help on onsite staff. Pt is a 46 yo male who reports a hx of depression with psychosis. Pt is currently awaiting inpt placement on a voluntary status. Pt initially presented to the ED on 10/14 reporting a several month hx of worsening depressive sxs. Cites sxs inclusive of depressed mood, poor sleep, decreased appetite, feelings of helplessness and hopelessness. Reports also during this period with increased thoughts of death and suicide. Prior to coming to the ED reports suicidal thoughts intensified over the course of an approximate week. Reports had thoughts to jump off a bridge. Reports going to a bridge prior to coming to the ED. States he looked over however decided instead to come to the ED to get help. Reports a prior hx of 2 suicide attempts. Last he reports was 1 approximately 1 year prior. Reports multiple stressors and limited supports. Reports he feels safe in the hospital however worries about his possible actions towards harming himself outside of the hospital.  States he has been non compliant with treatment and medications for over a year. On ROS, pt reports AHs, negative and derogatory. Prior to coming into the hospital reports the voices told him to jump, now they are more derogatory. Denies VHS, delusions nor HI. Reports SI however states he is suppressing it in the hospital as he feels safe. Not able to contract for safety outside of the hospital.  Pt presents as a danger to himself and requires inpt psychiatric admission for safety, stabilization and treatment. Pt states he needs help and continues to be voluntary for inpt psychiatric admission. Inpt Admission  prior admissions  Outpt  non compliant  SAttempts  2 prior attempts  Medical History: see chart  Substance Use Hx: denies current substance abuse.    Medications & Freq: see chart  Allergies: NKDA  Family Hx: none reported  Forensic Hx: prior hx of arrest/incarceration  Access to weapons: denies  Mental Status Exam:   Appearance and attire: hospital attire   Attitude and behavior: guarded  Affect and mood:  depressed / constricted  Association and thought processes: linear  Thought content:  Denies delusions nor HI.  + SI   Perceptual: + AHs, Denies VH    Sensorium, memory, and orientation AxOx3  Insight and judgment: fair/fair  Diagnosis:   MDD recurrent, severe, with psychotic features  Recommendations:  Pt requires acute inpt psychiatric admission  For safety, stabilization and treatment  Pt continues to be voluntary for inpt treatment   Med Reccs:  Start Abilify 10mg Daily

## 2017-10-17 NOTE — ED NOTES
Bedside and Verbal shift change report given to [address] [place] [address] (oncoming nurse) by Kolton Hong (offgoing nurse). Report included the following information SBAR.

## 2017-10-17 NOTE — PROGRESS NOTES
Pt accepted at [facility] by Dr Deanna Hayward after 1300 today. Nursing to call report to [phone]. ED MD and RN made aware.

## 2017-10-17 NOTE — ED NOTES
TRANSFER - OUT REPORT:    Verbal report given to [employer], RN(name) on Rodrick Shante  being transferred to [facility] Unit(unit) for transfer and admission. Report consisted of patients Situation, Background, Assessment and   Recommendations(SBAR). Information from the following report(s) SBAR was reviewed with the receiving nurse. Opportunity for questions and clarification was provided.

## 2017-10-17 NOTE — ED NOTES
Pt escorted by EDT per patient request to [facility] with visit to REBEKA and Ken Anders. Pt was transported via wheelchair.

## 2017-10-17 NOTE — ED NOTES
Two bags of belongings given to transport team. 1 bag from hospital safe obtained as well. EMTALA and chart given to transport staff. Pt was provided with lunch tray. No new complaints or concerns. Denies further needs. Transferred out of department in stable condition.

## 2017-10-17 NOTE — ED NOTES
7:08 AM :Pt care assumed from Dr. Missy Clark , ED provider. Pt complaint(s), current treatment plan, progression and available diagnostic results have been discussed thoroughly. Rounding occurred: yes  Intended Disposition: Transfer   Pending diagnostic reports and/or labs (please list): Awaiting placement at [facility]    11:09 AM : Pt care transferred to Dr. Elbert Mendez  ,ED provider. History of patient complaint(s), available diagnostic reports and current treatment plan has been discussed thoroughly. Bedside rounding on patient occured : yes . Intended disposition of patient : Transfer  Pending diagnostics reports and/or labs (please list): Patient has placement at [facility] [address] with [address]. Almaz Verma. Scribe Attestation      [employer] acting as a scribe for and in the presence of Dharmesh Thompson MD      October 17, 2017 at 11:10 AM       Provider Attestation:      I personally performed the services described in the documentation, reviewed the documentation, as recorded by the scribe in my presence, and it accurately and completely records my words and actions.  October 17, 2017 at 11:10 AM - Dharmesh Thompson MD

## 2017-10-17 NOTE — ED NOTES
Verbal shift change report given to Gretel Jane RN (oncoming nurse) by RICH Lee (offgoing nurse). Report included the following information SBAR. This patient has been recommended for inpatient treatment and is awaiting placement. The ED provider has reviewed the patients history and medications, diet, and treatments and will order as necessary. The patient will be observed and attended to as their medical needs require and/or policy dictates. Pt was accepted at [address] [facility] at 1300 today. Will attempt to call report shortly. Pt remains with sitter, no new complaints or concerns at this time.

## 2017-11-10 ENCOUNTER — HOSPITAL ENCOUNTER (INPATIENT)
Age: 52
LOS: 7 days | Discharge: HOME | DRG: 885 | End: 2017-11-17
Attending: PSYCHIATRY & NEUROLOGY | Admitting: PSYCHIATRY & NEUROLOGY
Payer: SELF-PAY

## 2017-11-10 ENCOUNTER — HOSPITAL ENCOUNTER (EMERGENCY)
Age: 52
Discharge: ANOTHER HOSPITAL | End: 2017-11-10
Attending: EMERGENCY MEDICINE
Payer: SELF-PAY

## 2017-11-10 VITALS
WEIGHT: 160 LBS | SYSTOLIC BLOOD PRESSURE: 151 MMHG | DIASTOLIC BLOOD PRESSURE: 111 MMHG | OXYGEN SATURATION: 100 % | TEMPERATURE: 97.9 F | BODY MASS INDEX: 25.11 KG/M2 | RESPIRATION RATE: 18 BRPM | HEIGHT: 67 IN | HEART RATE: 63 BPM

## 2017-11-10 DIAGNOSIS — F32.A DEPRESSION, UNSPECIFIED DEPRESSION TYPE: ICD-10-CM

## 2017-11-10 DIAGNOSIS — R45.851 SUICIDAL IDEATIONS: Primary | ICD-10-CM

## 2017-11-10 LAB
AMPHET UR QL SCN: NEGATIVE
ANION GAP SERPL CALC-SCNC: 7 MMOL/L (ref 3–18)
APAP SERPL-MCNC: <2 UG/ML (ref 10–30)
APPEARANCE UR: ABNORMAL
ATRIAL RATE: 78 BPM
BACTERIA URNS QL MICRO: ABNORMAL /HPF
BARBITURATES UR QL SCN: NEGATIVE
BASOPHILS # BLD: 0 K/UL (ref 0–0.06)
BASOPHILS NFR BLD: 0 % (ref 0–2)
BENZODIAZ UR QL: NEGATIVE
BILIRUB UR QL: NEGATIVE
BUN SERPL-MCNC: 12 MG/DL (ref 7–18)
BUN/CREAT SERPL: 11 (ref 12–20)
CALCIUM SERPL-MCNC: 8.5 MG/DL (ref 8.5–10.1)
CALCULATED P AXIS, ECG09: 63 DEGREES
CALCULATED R AXIS, ECG10: 6 DEGREES
CALCULATED T AXIS, ECG11: 53 DEGREES
CANNABINOIDS UR QL SCN: NEGATIVE
CHLORIDE SERPL-SCNC: 106 MMOL/L (ref 100–108)
CO2 SERPL-SCNC: 28 MMOL/L (ref 21–32)
COCAINE UR QL SCN: NEGATIVE
COLOR UR: YELLOW
CREAT SERPL-MCNC: 1.06 MG/DL (ref 0.6–1.3)
DIAGNOSIS, 93000: NORMAL
DIFFERENTIAL METHOD BLD: ABNORMAL
EOSINOPHIL # BLD: 0.1 K/UL (ref 0–0.4)
EOSINOPHIL NFR BLD: 1 % (ref 0–5)
EPITH CASTS URNS QL MICRO: ABNORMAL /LPF (ref 0–5)
ERYTHROCYTE [DISTWIDTH] IN BLOOD BY AUTOMATED COUNT: 15.8 % (ref 11.6–14.5)
ETHANOL SERPL-MCNC: <3 MG/DL (ref 0–3)
GLUCOSE SERPL-MCNC: 92 MG/DL (ref 74–99)
GLUCOSE UR STRIP.AUTO-MCNC: NEGATIVE MG/DL
GRAN CASTS URNS QL MICRO: ABNORMAL /LPF
HCT VFR BLD AUTO: 37.7 % (ref 36–48)
HDSCOM,HDSCOM: NORMAL
HGB BLD-MCNC: 12.4 G/DL (ref 13–16)
HGB UR QL STRIP: NEGATIVE
HYALINE CASTS URNS QL MICRO: ABNORMAL /LPF (ref 0–2)
KETONES UR QL STRIP.AUTO: ABNORMAL MG/DL
LEUKOCYTE ESTERASE UR QL STRIP.AUTO: NEGATIVE
LYMPHOCYTES # BLD: 1.7 K/UL (ref 0.9–3.6)
LYMPHOCYTES NFR BLD: 36 % (ref 21–52)
MCH RBC QN AUTO: 26.3 PG (ref 24–34)
MCHC RBC AUTO-ENTMCNC: 32.9 G/DL (ref 31–37)
MCV RBC AUTO: 79.9 FL (ref 74–97)
METHADONE UR QL: NEGATIVE
MONOCYTES # BLD: 0.3 K/UL (ref 0.05–1.2)
MONOCYTES NFR BLD: 5 % (ref 3–10)
MUCOUS THREADS URNS QL MICRO: ABNORMAL /LPF
NEUTS SEG # BLD: 2.7 K/UL (ref 1.8–8)
NEUTS SEG NFR BLD: 58 % (ref 40–73)
NITRITE UR QL STRIP.AUTO: NEGATIVE
OPIATES UR QL: NEGATIVE
P-R INTERVAL, ECG05: 178 MS
PCP UR QL: NEGATIVE
PH UR STRIP: 5 [PH] (ref 5–8)
PLATELET # BLD AUTO: 200 K/UL (ref 135–420)
PMV BLD AUTO: 9.5 FL (ref 9.2–11.8)
POTASSIUM SERPL-SCNC: 3.7 MMOL/L (ref 3.5–5.5)
PROT UR STRIP-MCNC: ABNORMAL MG/DL
Q-T INTERVAL, ECG07: 394 MS
QRS DURATION, ECG06: 82 MS
QTC CALCULATION (BEZET), ECG08: 449 MS
RBC # BLD AUTO: 4.72 M/UL (ref 4.7–5.5)
RBC #/AREA URNS HPF: ABNORMAL /HPF (ref 0–5)
SALICYLATES SERPL-MCNC: <2.8 MG/DL (ref 2.8–20)
SODIUM SERPL-SCNC: 141 MMOL/L (ref 136–145)
SP GR UR REFRACTOMETRY: 1.03 (ref 1–1.03)
UROBILINOGEN UR QL STRIP.AUTO: 0.2 EU/DL (ref 0.2–1)
VENTRICULAR RATE, ECG03: 78 BPM
WBC # BLD AUTO: 4.7 K/UL (ref 4.6–13.2)
WBC URNS QL MICRO: ABNORMAL /HPF (ref 0–4)

## 2017-11-10 PROCEDURE — 85025 COMPLETE CBC W/AUTO DIFF WBC: CPT | Performed by: NURSE PRACTITIONER

## 2017-11-10 PROCEDURE — 65220000005 HC RM SEMIPRIVATE PSYCH 3 OR 4 BED

## 2017-11-10 PROCEDURE — 80307 DRUG TEST PRSMV CHEM ANLYZR: CPT | Performed by: NURSE PRACTITIONER

## 2017-11-10 PROCEDURE — 81001 URINALYSIS AUTO W/SCOPE: CPT | Performed by: NURSE PRACTITIONER

## 2017-11-10 PROCEDURE — 74011250637 HC RX REV CODE- 250/637: Performed by: NURSE PRACTITIONER

## 2017-11-10 PROCEDURE — 99285 EMERGENCY DEPT VISIT HI MDM: CPT

## 2017-11-10 PROCEDURE — 93005 ELECTROCARDIOGRAM TRACING: CPT

## 2017-11-10 PROCEDURE — 80048 BASIC METABOLIC PNL TOTAL CA: CPT | Performed by: NURSE PRACTITIONER

## 2017-11-10 RX ORDER — ACETAMINOPHEN 500 MG
1000 TABLET ORAL
Status: COMPLETED | OUTPATIENT
Start: 2017-11-10 | End: 2017-11-10

## 2017-11-10 RX ORDER — SERTRALINE HYDROCHLORIDE 25 MG/1
25 TABLET, FILM COATED ORAL EVERY EVENING
Status: DISCONTINUED | OUTPATIENT
Start: 2017-11-10 | End: 2017-11-10 | Stop reason: HOSPADM

## 2017-11-10 RX ORDER — HALOPERIDOL DECANOATE 100 MG/ML
50 INJECTION INTRAMUSCULAR
Status: CANCELLED | OUTPATIENT
Start: 2017-11-10

## 2017-11-10 RX ORDER — SERTRALINE HYDROCHLORIDE 25 MG/1
25 TABLET, FILM COATED ORAL EVERY EVENING
Status: DISCONTINUED | OUTPATIENT
Start: 2017-11-11 | End: 2017-11-10

## 2017-11-10 RX ADMIN — SERTRALINE HYDROCHLORIDE 25 MG: 25 TABLET ORAL at 19:19

## 2017-11-10 RX ADMIN — ACETAMINOPHEN 1000 MG: 500 TABLET ORAL at 11:38

## 2017-11-10 NOTE — ED NOTES
Assumed care of patient with report from [address]. Patient is resting comfortably in bed. Gave patient a juice per request. Patient updated on plan of care. Denies further needs.

## 2017-11-10 NOTE — ED NOTES
This patient has been recommended for inpatient treatment and is awaiting placement. The ED provider has reviewed the patients history and medications, diet, and treatments and will order as necessary. The patient will be observed and attended to as their medical needs require and/or policy dictates.

## 2017-11-10 NOTE — PROGRESS NOTES
Call received from [facility] with Jesse LEWISB notifying me that pt is \"too acute\" for their crisis stabilization unit. Call placed to [facility] and spoke with Lul Williamson, crisis worker. Provided Sandi with pt's name, MRN, and . Lul Williamson will review case with physician and get back with me.            Kimberly Greenberg RN, BSN, [facility]  ED Outcomes Manager   &    [phone] (phone)  [phone] (pager)

## 2017-11-10 NOTE — ED TRIAGE NOTES
Several days of feeling like can't catch breath. Headache for years. Says can't go to work,  afraid will fall off cause of dizzy feeling. At end of triage, requested if we are a psych facility.  If headache doesn't go away will be suicidal

## 2017-11-10 NOTE — CONSULTS
This evaluation was conducted via telepsychiatry with the assistance of onsite staff. Reason for consult: SI  History of Present Illness: 47 y.o. male with reported history of depression, presenting to ED for vague physical complaints and then reporting SI with plan to jump off the bridge near the hospital.  Pt reports \"I've been in a bad way\" for the past couple of weeks, feeling more and more depressed. His depression comes and goes, often related to external stressors but not always. States that currently, \"I'm just in a rough part of life\". With return of depression recently, he has had frequent SI and reports also hearing voices telling him to jump off the bridge. States that every time he passes it, he considers jumping. Pt states that the voices only happen when he is depressed. These voices were more intense today, so pt came to the ED. He does not feel safe to go home, is concerned that he may jump if he leaves. Denies HI, denies visual hallucinations or paranoia. Denies history of excess energy, decreased need for sleep, euphoric mood, rapid speech, or increased productivity. States that he wants help and is agreeable to inpatient treatment if necessary. Past SI/Self harm: \"Once or twice\" tried to kill himself, jumped off of bridge and broke his ankle about a year ago, and also overdosed in the past.  Past HI/Violence: Denies, \"I would never hurt anybody\". Access to firearms: Denies  Legal: Denies  Collateral: EMR, NP Thur  Psychiatric History/Treatment History: Prior admission about a year ago after SA. Was started on Zoloft, \"unable\" to follow up so ran out of medication but states that it did help. Drug/Alcohol History: Drinks beer once a month, \"if that\". Prior use of marijuana, last time over 6 months ago. Medical History:   Past Medical History:   Diagnosis Date    Back pain     Hypertension     Psychiatric disorder     hallucinations     Medications & Freq:    No current facility-administered medications for this encounter. No current outpatient prescriptions on file. Allergies:   No Known Allergies  Family Psych History/History of suicide: Father was hospitalized at [facility], not sure what dx. Social History: Pt is , has 3 children who live out of state. Claire Rodgers is in high school. Pt was staying with friends and family, but now \"due to this situation, I have found myself entering into homelessness\". However, states this does not cause stress for him, \"I can fix that issue\". Education: High school graduate, went to [employer] but did not graduate. Employment: Owns a [employer]. Stressors: \"A multitude of things\", \"Being without a family\"  Mental Status Exam:   Appearance and attire: Well-groomed, appears stated age, wearing hospital gown, sitting in bed. Attitude and behavior: Pleasant, calm, cooperative  Speech: Low volume, slow with latency at times. Mood: Dysthymic  Affect: Restricted  Association and thought processes: Linear, logical, goal-directed  Thought content: Reports SI with plan to jump off of bridge. Denies HI. Perception: Reports AH with commands to kill himself. Occasionally looks around during interview, possibly due to internal stimuli. No evident delusions. Sensorium and orientation: Alert, oriented x 3  Memory and intellectual functioning: Pt reports difficulty remembering timelines, but no specific deficits on exam.  Insight and judgment: Fair  Impression/Risk Assessment: 48 y/o male with history of depression with command AH, presenting to ED for the same, as well as SI with plan to jump off a bridge. Pt has history of prior attempts, including previously jumping off of the same bridge. Symptoms most consistent with psychotic depression. No evidence for bipolar disorder at this time. Pt has decreased social support, no safety plan. Imminent risk of self-harm is high.   Diagnosis: F33.3  Major depressive disorder, recurrent, severe with psychosis, provisional  Treatment Recommendations:  1. Disposition: Recommend inpatient psychiatric admission for safety/stabilization. Pt is voluntary at this time. If this changes, recommend contacting [facility] for TDO evaluation. 2. Medications: Zoloft 25 mg daily. The above recommendations were discussed with pt who expressed understanding, and referring provider who expressed agreement with plan.

## 2017-11-10 NOTE — ED NOTES
Pt placed in ED bed 17, room precautions observed. Sitter at bedside. Pt vague and evasive in answers to psychological assessment. Pt reports non-specific suicidal thoughts and/or plan \"if I did I would jump off the [facility] bridge but it's probably not high enough\". Pt unable to recall place/time of last inpatient hospitalization. Pt unable to recall last medication list or when medications were taken at first saying \"they were stolen, I really don't know\" then recanting \"I don't know maybe 3 days ago\". Pt changed into paper scrubs, belongings secured, no items sent to security or pharmacy.

## 2017-11-10 NOTE — IP AVS SNAPSHOT
[address] [facility] 
 
 
 [address] Patient: Joesph Casper MRN: [id number] :1965 My Medications TAKE these medications as instructed Instructions Each Dose to Equal  
 Morning Noon Evening Bedtime  
 amLODIPine 10 mg tablet Commonly known as:  Vonkeshia [place] Your last dose was: Your next dose is: Take 1 Tab by mouth daily. Indications: hypertension 10 mg  
    
   
   
   
  
 ARIPiprazole 15 mg tablet Commonly known as:  ABILIFY Your last dose was: Your next dose is: Take 1 Tab by mouth daily. Indications: DEPRESSION TREATMENT ADJUNCT  
 15 mg  
    
   
   
   
  
 carvedilol 6.25 mg tablet Commonly known as:  Media [place] Your last dose was: Your next dose is: Take 1 Tab by mouth every twelve (12) hours. Indications: hypertension 6.25 mg FLUoxetine 20 mg capsule Commonly known as:  PROzac Your last dose was: Your next dose is: Take 1 Cap by mouth daily. Indications: major depressive disorder 20 mg  
    
   
   
   
  
 hydroCHLOROthiazide 25 mg tablet Commonly known as:  HYDRODIURIL Your last dose was: Your next dose is: Take 1 Tab by mouth daily. Indications: hypertension 25 mg Where to Get Your Medications Information on where to get these meds will be given to you by the nurse or doctor. ! Ask your nurse or doctor about these medications  
  amLODIPine 10 mg tablet ARIPiprazole 15 mg tablet  
 carvedilol 6.25 mg tablet FLUoxetine 20 mg capsule  
 hydroCHLOROthiazide 25 mg tablet

## 2017-11-10 NOTE — PROGRESS NOTES
Call received from [facility] with [facility] unit notifying me that Dr. Kitty Fernandez is willing to accept pt pending documentation that pt \"does not have asthma\" or require a nebulizer treatment.      Notified Owen Velasquez of [place] request.           Joni Alberto RN, BSN, [facility]  ED Outcomes Manager  Thursdays & Fridays   [phone] (phone)  [phone] (pager)

## 2017-11-10 NOTE — ED NOTES
Spoke with Guillermo Coates from [facility]. States patient with be accepted but bed not given at this time.  Will call back when bed ready,

## 2017-11-10 NOTE — BSMART NOTE
Spoke with Dr. Melina Varela regarding patient. Previous admission  On 08/7-15/2017 identifies a diagnosis of depression and psychological distress in the context of homelessness and separation from his wife and recent death of his sister and his mother. 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 Patient presents to the [facility] ER today, 11/10/17, with mental health disorder, shortness of breath and headaches. Dr. Melina Varela has concerns regarding patient's:    · Shortness of breath. What is the underlying cause (asthma, nebulizer use, cardiac, etc.)? [facility] ER to determine this prior to any admission to [facility]. · Headaches. Although blood pressure is 143/100, this should be addressed while patient is in the [facility] ER. · EKG status. (Update:  Results indicate Normal Sinus Rhythm (NSR))      This writer will contact Audra Orozco and request that she consult with ER Physician and call Crisis back with the responses to Dr. iMra Campa concerns.         Manda Gleason, RN, BSN

## 2017-11-10 NOTE — ED PROVIDER NOTES
HPI Comments: Nomi Silva is a 47year old male who presents to the ED with a myriad of complaints. Pt states he is concerned about his heart. He denies pain, but adds that he wants to get it checked to make sure it is working properly. States he was told in the past that he had a murmur. He finds this to be a stressor. He has HTN, but states he is not taking his BP medication. He goes on to say that he is feeling depressed. He has fleeting thoughts of hurting himself, adding that he has a plan stating \"everytime I come here I have to cross that bridge. When I go across it, I feel like jumping off into the water. \"  States he has hurt himself in the past jumping off a bridge. The net result of that occurrence was that he broke his ankle. He has required hospitalization fot his psychiatric illness in the past.  Pt admits to drinking occasional.  States he doesn't drink as much as he used to.v  He smokes marijuana on occasion, \"but only when I can get the good stuff. \"   Denies cocaine and heroin. Patient is a 47 y.o. male presenting with mental health disorder, shortness of breath, and headaches. The history is provided by the patient. History limited by: No communication barrier. Mental Health Problem    This is a recurrent problem. The current episode started 6 to 12 hours ago. The problem has not changed since onset. Pertinent negatives include no confusion, no delusions and no hallucinations. Mental status baseline is normal.  Risk factors include illicit drug use and the patient not taking meds correctly. Shortness of Breath   Associated symptoms include headaches. Headache    Associated symptoms include shortness of breath. Past Medical History:   Diagnosis Date    Back pain     Hypertension     Psychiatric disorder     hallucinations       Past Surgical History:   Procedure Laterality Date    HX OTHER SURGICAL      oral sx         History reviewed.  No pertinent family history. Social History     Social History    Marital status:      Spouse name: N/A    Number of children: N/A    Years of education: N/A     Occupational History    Not on file. Social History Main Topics    Smoking status: Current Some Day Smoker     Packs/day: 0.25    Smokeless tobacco: Never Used    Alcohol use Yes      Comment: beer    Drug use: Yes     Special: Marijuana, Cocaine    Sexual activity: Not on file     Other Topics Concern    Not on file     Social History Narrative         ALLERGIES: Review of patient's allergies indicates no known allergies. Review of Systems   Constitutional: Negative. HENT: Negative. Eyes: Negative. Respiratory: Positive for shortness of breath. Cardiovascular: Negative. Gastrointestinal: Negative. Endocrine: Negative. Genitourinary: Negative. Musculoskeletal: Negative. Skin: Negative. Allergic/Immunologic: Negative. Neurological: Positive for headaches. Hematological: Negative. Psychiatric/Behavioral: Negative. Negative for confusion and hallucinations. Vitals:    11/10/17 0721   BP: (!) 143/100   Pulse: (!) 114   Resp: 15   Temp: 98 °F (36.7 °C)   SpO2: 96%   Weight: 72.6 kg (160 lb)   Height: 5' 7\" (1.702 m)            Physical Exam   Constitutional: He is oriented to person, place, and time. He appears well-developed and well-nourished. No distress. HENT:   Head: Normocephalic and atraumatic. Eyes: Pupils are equal, round, and reactive to light. Neck: Normal range of motion. Neck supple. Cardiovascular: Normal rate, regular rhythm, normal heart sounds and intact distal pulses. Pulmonary/Chest: Effort normal and breath sounds normal. He has no wheezes. He has no rales. Abdominal: Soft. Bowel sounds are normal. There is no tenderness. There is no rebound. Genitourinary:   Genitourinary Comments: NE   Musculoskeletal: Normal range of motion.    Neurological: He is alert and oriented to person, place, and time. No cranial nerve deficit. Coordination normal.   Skin: Skin is warm and dry. Psychiatric:   Pt has fleeting thoughts of why he is really here. He does not stay with a particular idea on why he has come to the ED today. The fact that he has suggested he may hurt himself is concerning, making his mental decision making suspect. Nursing note and vitals reviewed. MDM  Number of Diagnoses or Management Options  Diagnosis management comments: MDM:  Will medically clear the Pt and consult tele psychiatry at the earliest opportunity. Ghislaine Arias NP   7:48 AM    CONSULT:    Discussed the Pt with Dr Denver Morelos. She advised that the Pt is voluntary for admission and should be admitted for crisis. She recommended that if he changes his mind he should be TDO'ed. She also recommended Zoloft 25 mg daily. Ghislaine Arias NP  11:25 AM    PROGRESS NOTE:  Rounded on Pt who is resting comfortably in his gurney without complaint or request.   Ghislaine Arias NP  11:30 AM    CONSULT:  Discussed the Pt with the [facility] michael. She agreed with Tele Psychiatry that the Pt need to be hospitalized as an in patient. She plans to speak with [facility] and advise to seek in patient bed. Ghislaine Arias NP  1:41 PM    PROGRESS NOTE:  Rounded on Pt. He is resting comfortably in his gurney without complaint or request.  Of note, he has no adventitious lung sounds. He is ventilating freely and easily. His oxygen saturation is 96 - 100%. He has required no respiratory treatments here in the ED thus far. Ghislaine Arias NP  3:26 PM    PROGRESS NOTE:  Pt is resting comfortably in his gurney. Sitter at bedside. No complaint or request.  Awaiting word from [facility] for acceptance for transfer. Ghislaine Arias NP  5:12 PM    PROGRESS NOTE:  Care of the PT has hunter turned over to YESY Price PA-C at time of shift change.   Ghislaine Arias NP  6:29 PM           Amount and/or Complexity of Data Reviewed  Clinical lab tests: ordered      ED Course       Procedures

## 2017-11-10 NOTE — ED NOTES
2 bags of patient belongings labeled and placed in locker 3. EDT at bedside obtaining labs and EKG. Urine requested from patient.

## 2017-11-10 NOTE — PROGRESS NOTES
Call placed to [address] [place] [address], and spoke with Jenni Ching, crisis worker. Requested pt to be screened for crisis stabilization. Per Jenni Ching, someone will come out to see pt. Call placed to Latonya Rowe at [facility] and requested his review of pt's record, in the event that pt is not appropriate for crisis stabilization.          Kimberly Greenberg RN, BSN, [place]  ED Outcomes Manager  Thursdays & Fridays   [phone] (phone)  [phone] (pager)

## 2017-11-10 NOTE — IP AVS SNAPSHOT
[facility] 
 
 
 [address] Patient: Rhianna Nash MRN: [id number] :1965 About your hospitalization You were admitted on:  November 10, 2017 You last received care in the:  [facility] You were discharged on:  2017 Why you were hospitalized Your primary diagnosis was:  Mdd (Major Depressive Disorder), Recurrent, Severe, With Psychosis (Hcc) Your diagnoses also included:  Depression, Suicidal Thoughts Things You Need To Do (next 8 weeks) Follow up with [address] [place] [address] Patient will follow up with [facility] with patient provided with intake hours. Intake office hours Monday-Friday from 8:00 am - 11:30 am and 2:00 pm - 4:30 pm.  
  
Phone:  [phone] Where:  [address] [facility] [address] [address] Discharge Orders None A check preeti indicates which time of day the medication should be taken. My Medications TAKE these medications as instructed Instructions Each Dose to Equal  
 Morning Noon Evening Bedtime  
 amLODIPine 10 mg tablet Commonly known as:  [place] Your last dose was: Your next dose is: Take 1 Tab by mouth daily. Indications: hypertension 10 mg  
    
   
   
   
  
 ARIPiprazole 15 mg tablet Commonly known as:  ABILIFY Your last dose was: Your next dose is: Take 1 Tab by mouth daily. Indications: DEPRESSION TREATMENT ADJUNCT  
 15 mg  
    
   
   
   
  
 carvedilol 6.25 mg tablet Commonly known as:  Daryn [place] Your last dose was: Your next dose is: Take 1 Tab by mouth every twelve (12) hours. Indications: hypertension 6.25 mg FLUoxetine 20 mg capsule Commonly known as:  PROzac Your last dose was: Your next dose is: Take 1 Cap by mouth daily. Indications: major depressive disorder 20 mg  
    
   
   
   
  
 hydroCHLOROthiazide 25 mg tablet Commonly known as:  HYDRODIURIL Your last dose was: Your next dose is: Take 1 Tab by mouth daily. Indications: hypertension 25 mg Where to Get Your Medications Information on where to get these meds will be given to you by the nurse or doctor. ! Ask your nurse or doctor about these medications  
  amLODIPine 10 mg tablet ARIPiprazole 15 mg tablet  
 carvedilol 6.25 mg tablet FLUoxetine 20 mg capsule  
 hydroCHLOROthiazide 25 mg tablet Discharge Instructions BEHAVIORAL HEALTH NURSING DISCHARGE NOTE Emergency Numbers [facility] Desk: [phone] [place] [facility]: [phone] Suicide Prevention Line: [phone] (TALK) The following personal items collected during your admission are returned to you:  
Dental Appliance: Dental Appliances: None Vision: Visual Aid: None Hearing Aid:   
Jewelry: Jewelry: None Clothing: Clothing: Belt, Footwear, Jacket/Coat, Pants, Shirt, Undergarments Other Valuables: Other Valuables: Cell Phone, Eyeglasses, Lighter/matches (EBT Card, Pt aware cell phone is cracked) Valuables sent to safe: Personal Items Sent to Safe: None The discharge information has been reviewed with the patient. The patient verbalized understanding. Tresata Activation Thank you for requesting access to Tresata. Please follow the instructions below to securely access and download your online medical record. Tresata allows you to send messages to your doctor, view your test results, renew your prescriptions, schedule appointments, and more. How Do I Sign Up? 1. In your internet browser, go to [email or web address] 
2. Click on the First Time User? Click Here link in the Sign In box. You will be redirect to the New Member Sign Up page. 3. Enter your Jade Magnet Access Code exactly as it appears below. You will not need to use this code after youve completed the sign-up process. If you do not sign up before the expiration date, you must request a new code. Jade Magnet Access Code: [email or web address] Expires: 2017 12:52 PM (This is the date your Jade Magnet access code will ) 4. Enter the last four digits of your Social Security Number (xxxx) and Date of Birth (mm/dd/yyyy) as indicated and click Submit. You will be taken to the next sign-up page. 5. Create a [employer] (pre[employer])t ID. This will be your Jade Magnet login ID and cannot be changed, so think of one that is secure and easy to remember. 6. Create a Jade [employer]et password. You can change your password at any time. 7. Enter your Password Reset Question and Answer. This can be used at a later time if you forget your password. 8. Enter your e-mail address. You will receive e-mail notification when new information is available in [address] 9. Click Sign Up. You can now view and download portions of your medical record. 10. Click the Download Summary menu link to download a portable copy of your medical information. Additional Information If you have questions, please visit the Frequently Asked Questions section of the Jade Magnet website at [email or web address]. Mobile Safe Case/Pockets [employer]/. Remember, J[employer] is NOT to be used for urgent needs. For medical emergencies, dial 911. Patient armband removed and shredded Introducing [facility]! Mandy Saxena introduces Jade Magnet patient portal. Now you can access parts of your medical record, email your doctor's office, and request medication refills online. 1. In your internet browser, go to [email or web address]. Mobile Safe Case/Pockets [employer] 2. Click on the First Time User? Click Here link in the Sign In box. You will see the New Member Sign Up page. 3. Enter your Jade Magnet Access Code exactly as it appears below.  You will not need to use this code after youve completed the sign-up process. If you do not sign up before the expiration date, you must request a new code. · Renovagen Access Code: [email or web address] Expires: 11/25/2017 12:52 PM 
 
4. Enter the last four digits of your Social Security Number (xxxx) and Date of Birth (mm/dd/yyyy) as indicated and click Submit. You will be taken to the next sign-up page. 5. Create a Renovagen ID. This will be your Renovagen login ID and cannot be changed, so think of one that is secure and easy to remember. 6. Create a Renovagen password. You can change your password at any time. 7. Enter your Password Reset Question and Answer. This can be used at a later time if you forget your password. 8. Enter your e-mail address. You will receive e-mail notification when new information is available in [address] 9. Click Sign Up. You can now view and download portions of your medical record. 10. Click the Download Summary menu link to download a portable copy of your medical information. If you have questions, please visit the Frequently Asked Questions section of the [employer] website. Remember, [employer]ovagen is NOT to be used for urgent needs. For medical emergencies, dial 911. Now available from your iPhone and Android! Providers Seen During Your Hospitalization Provider Specialty Primary office phone Melina Villasenor MD Psychiatry [phone] Immunizations Administered for This Admission Name Date Influenza Vaccine (Quad) PF  Deferred () Your Primary Care Physician (PCP) Primary Care Physician Office Phone Office Fax NONE ** None ** ** None ** You are allergic to the following No active allergies Recent Documentation Height Weight BMI Smoking Status 1.702 m 72.6 kg 25.06 kg/m2 Current Some Day Smoker Emergency Contacts Name Discharge Info Relation Home Work Mobile Skye Barney DISCHARGE CAREGIVER [3] Friend [5] [phone] Patient Belongings The following personal items are in your possession at time of discharge: 
  Dental Appliances: None  Visual Aid: None      Home Medications: None   Jewelry: None  Clothing: Belt, Footwear, Jacket/Coat, Pants, Shirt, Undergarments    Other Valuables: Cell Phone, Eyeglasses, Lighter/matches (EBT Card, Pt aware cell phone is cracked)  Personal Items Sent to Safe: None Please provide this summary of care documentation to your next provider. Signatures-by signing, you are acknowledging that this After Visit Summary has been reviewed with you and you have received a copy. Patient Signature:  ____________________________________________________________ Date:  ____________________________________________________________  
  
[facility] Provider Signature:  ____________________________________________________________ Date:  ____________________________________________________________

## 2017-11-10 NOTE — IP AVS SNAPSHOT
Summary of Care Report The Summary of Care report has been created to help improve care coordination. Users with access to [employer] or [address] (Web-based application) may access additional patient information including the Discharge Summary. If you are not currently a [address] user and need more information, please call the number listed below in the [place] [address] section and ask to be connected with Medical Records. Facility Information Name Address Phone [address] [facility]  [address] [facility] [address] [phone] Patient Information Patient Name Sex GEOVANY Turner ([id number]) Male 1965 Discharge Information Admitting Provider Service Area Unit Mynor Albert MD / [address] [place] [address] [address] / [phone] Discharge Provider Discharge Date/Time Discharge Disposition Destination (none) 2017 Morning (Pending) AHR (none) Patient Language Language ENGLISH [13] Hospital Problems as of 2017  Never Reviewed Class Noted - Resolved Last Modified POA Active Problems * (Principal)MDD (major depressive disorder), recurrent, severe, with psychosis ([facility] [address]  2017 - Present 2017 by Mynor Albert MD Yes Entered by Mynor Albert MD  
  
You are allergic to the following No active allergies Current Discharge Medication List  
  
START taking these medications Dose & Instructions Dispensing Information Comments  
 amLODIPine 10 mg tablet Commonly known as:  Navjot Chano Dose:  10 mg Take 1 Tab by mouth daily. Indications: hypertension Quantity:  30 Tab Refills:  0  
   
 ARIPiprazole 15 mg tablet Commonly known as:  ABILIFY Dose:  15 mg Take 1 Tab by mouth daily. Indications: DEPRESSION TREATMENT ADJUNCT Quantity:  30 Tab Refills:  0 carvedilol 6.25 mg tablet Commonly known as:  Shukri Tyler Dose:  6.25 mg Take 1 Tab by mouth every twelve (12) hours. Indications: hypertension Quantity:  60 Tab Refills:  0 FLUoxetine 20 mg capsule Commonly known as:  PROzac Dose:  20 mg Take 1 Cap by mouth daily. Indications: major depressive disorder Quantity:  30 Cap Refills:  0  
   
 hydroCHLOROthiazide 25 mg tablet Commonly known as:  HYDRODIURIL Dose:  25 mg Take 1 Tab by mouth daily. Indications: hypertension Quantity:  30 Tab Refills:  0 Current Immunizations Name Date Influenza Vaccine 3/2/2017 Influenza Vaccine (Quad) PF  Deferred () Follow-up Information Follow up With Details Comments Contact Info [address] [place]  Patient will follow up with [facility] with patient provided with intake hours. Intake office hours Monday-Friday from 8:00 am - 11:30 am and 2:00 pm - 4:30 pm. Jarod Heller [address] 
[phone] Discharge Instructions BEHAVIORAL HEALTH NURSING DISCHARGE NOTE Emergency Numbers [address] [facility] Desk: [phone] [facility] Emergency Services: [phone] Suicide Prevention Line: [phone] (TALK) The following personal items collected during your admission are returned to you:  
Dental Appliance: Dental Appliances: None Vision: Visual Aid: None Hearing Aid:   
Jewelry: Jewelry: None Clothing: Clothing: Belt, Footwear, Jacket/Coat, Pants, Shirt, Undergarments Other Valuables: Other Valuables: Cell Phone, Eyeglasses, Lighter/matches (EBT Card, Pt aware cell phone is cracked) Valuables sent to safe: Personal Items Sent to Safe: None The discharge information has been reviewed with the patient. The patient verbalized understanding. [employer] Activation Thank you for requesting access to [employer].  Please follow the instructions below to securely access and download your online medical record. [employer] allows you to send messages to your doctor, view your test results, renew your prescriptions, schedule appointments, and more. How Do I Sign Up? 1. In your internet browser, go to [email or web address] 
2. Click on the First Time User? Click Here link in the Sign In box. You will be redirect to the New Member Sign Up page. 3. Enter your [employer] Access Code exactly as it appears below. You will not need to use this code after youve completed the sign-up process. If you do not sign up before the expiration date, you must request a new code. [employer] Access Code: [email or web address] Expires: 2017 12:52 PM (This is the date your [employer] access code will ) 4. Enter the last four digits of your Social Security Number (xxxx) and Date of Birth (mm/dd/yyyy) as indicated and click Submit. You will be taken to the next sign-up page. 5. Create a [employer] ID. This will be your [employer] login ID and cannot be changed, so think of one that is secure and easy to remember. 6. Create a [employer] password. You can change your password at any time. 7. Enter your Password Reset Question and Answer. This can be used at a later time if you forget your password. 8. Enter your e-mail address. You will receive e-mail notification when new information is available in [address] 9. Click Sign Up. You can now view and download portions of your medical record. 10. Click the Download Summary menu link to download a portable copy of your medical information. Additional Information If you have questions, please visit the Frequently Asked Questions section of the [employer] website at [email or web address] Remember, [employer] is NOT to be used for urgent needs. For medical emergencies, dial 911. Patient armband removed and shredded Chart Review Routing History No Routing History on File

## 2017-11-11 PROBLEM — F32.A DEPRESSION: Status: RESOLVED | Noted: 2017-08-08 | Resolved: 2017-11-11

## 2017-11-11 PROBLEM — F29 PSYCHOTIC DISORDER (HCC): Status: ACTIVE | Noted: 2017-11-11

## 2017-11-11 PROBLEM — R45.851 SUICIDAL THOUGHTS: Status: ACTIVE | Noted: 2017-11-11

## 2017-11-11 PROBLEM — R45.851 SUICIDAL THOUGHTS: Status: RESOLVED | Noted: 2017-11-11 | Resolved: 2017-11-11

## 2017-11-11 PROCEDURE — 74011250637 HC RX REV CODE- 250/637: Performed by: PSYCHIATRY & NEUROLOGY

## 2017-11-11 PROCEDURE — 65220000005 HC RM SEMIPRIVATE PSYCH 3 OR 4 BED

## 2017-11-11 RX ORDER — HALOPERIDOL 5 MG/1
5 TABLET ORAL
Status: DISCONTINUED | OUTPATIENT
Start: 2017-11-11 | End: 2017-11-17 | Stop reason: HOSPADM

## 2017-11-11 RX ORDER — HALOPERIDOL 5 MG/ML
5 INJECTION INTRAMUSCULAR
Status: DISCONTINUED | OUTPATIENT
Start: 2017-11-11 | End: 2017-11-17 | Stop reason: HOSPADM

## 2017-11-11 RX ORDER — HYDROCHLOROTHIAZIDE 25 MG/1
25 TABLET ORAL DAILY
Status: DISCONTINUED | OUTPATIENT
Start: 2017-11-12 | End: 2017-11-17 | Stop reason: HOSPADM

## 2017-11-11 RX ORDER — HYDROCHLOROTHIAZIDE 25 MG/1
12.5 TABLET ORAL DAILY
Status: DISCONTINUED | OUTPATIENT
Start: 2017-11-11 | End: 2017-11-11

## 2017-11-11 RX ORDER — ARIPIPRAZOLE 5 MG/1
5 TABLET ORAL DAILY
Status: DISCONTINUED | OUTPATIENT
Start: 2017-11-11 | End: 2017-11-13

## 2017-11-11 RX ORDER — TRAZODONE HYDROCHLORIDE 50 MG/1
50 TABLET ORAL
Status: DISCONTINUED | OUTPATIENT
Start: 2017-11-11 | End: 2017-11-17 | Stop reason: HOSPADM

## 2017-11-11 RX ORDER — HYDROCHLOROTHIAZIDE 25 MG/1
12.5 TABLET ORAL DAILY
Status: COMPLETED | OUTPATIENT
Start: 2017-11-11 | End: 2017-11-11

## 2017-11-11 RX ORDER — LORAZEPAM 2 MG/ML
1-2 INJECTION INTRAMUSCULAR
Status: DISCONTINUED | OUTPATIENT
Start: 2017-11-11 | End: 2017-11-17 | Stop reason: HOSPADM

## 2017-11-11 RX ORDER — LORAZEPAM 1 MG/1
1-2 TABLET ORAL
Status: DISCONTINUED | OUTPATIENT
Start: 2017-11-11 | End: 2017-11-17 | Stop reason: HOSPADM

## 2017-11-11 RX ORDER — IBUPROFEN 600 MG/1
600 TABLET ORAL
Status: DISCONTINUED | OUTPATIENT
Start: 2017-11-11 | End: 2017-11-17 | Stop reason: HOSPADM

## 2017-11-11 RX ADMIN — ARIPIPRAZOLE 5 MG: 5 TABLET ORAL at 11:41

## 2017-11-11 RX ADMIN — HYDROCHLOROTHIAZIDE 12.5 MG: 25 TABLET ORAL at 08:56

## 2017-11-11 RX ADMIN — HYDROCHLOROTHIAZIDE 12.5 MG: 25 TABLET ORAL at 14:12

## 2017-11-11 RX ADMIN — IBUPROFEN 600 MG: 600 TABLET ORAL at 08:57

## 2017-11-11 NOTE — BH NOTES
Patient spent the majority of this shift in bed resting with the exception of meals and medication times. Patient requested hygiene products and performed personal hygiene in the evening. No concerns voiced to this writer. Will contineu to monitor per safety precautions.

## 2017-11-11 NOTE — ED NOTES
Patient transported via [address] to [facility] for admission into [facility] unit. 2 bags of belongings given to medical transport personnel along with printed chart and EMTALA.

## 2017-11-11 NOTE — H&P
History and Physical        Patient: Rodrick Frey               Sex: male          DOA: 11/10/2017         YOB: 1965      Age:  47 y.o.        LOS:  LOS: 1 day        HPI:     Rodrick Frey is a 47 y.o. male who was admitted experiencing depression and suicidal ideation. Active Problems:    Depression (8/8/2017)      Suicidal thoughts (11/11/2017)        Past Medical History:   Diagnosis Date    Back pain     Hypertension     Psychiatric disorder     hallucinations       Past Surgical History:   Procedure Laterality Date    HX OTHER SURGICAL      oral sx       No family history on file. Social History     Social History    Marital status:      Spouse name: N/A    Number of children: 3    Years of education: Some college     Social History Main Topics    Smoking status: Current Some Day Smoker     Packs/day: 0.25    Smokeless tobacco: Never Used    Alcohol use Yes      Comment: beer    Drug use: Yes     Special: Marijuana, Cocaine    Sexual activity: Not on file     Other Topics Concern    Not on file     Social History Narrative   Homeless. Sleep and appetite poor. Unemployed. Prior to Admission medications    Not on File       No Known Allergies    Review of Systems  A comprehensive review of systems was negative. Physical Exam:      Visit Vitals    BP (!) 159/95    Pulse 73    Temp 98 °F (36.7 °C)    Resp 18       Physical Exam:    General:  Alert, cooperative, well developed, well nourished, AA male,no distress, appears stated age. Eyes:  Conjunctivae/corneas clear. PERRL, EOMs intact. Fundi benign   Ears:  Normal TMs and external ear canals both ears. Nose: Nares normal. Septum midline. Mucosa normal. No drainage or sinus tenderness.    Mouth/Throat: Lips, mucosa, and tongue normal. Teeth in disrepair and gums normal.   Neck: Supple, symmetrical, trachea midline, no adenopathy, thyroid: no enlargement/tenderness/nodules, no carotid bruit and no JVD.   Back:   Symmetric, no curvature. ROM normal. No CVA tenderness. Lungs:   Clear to auscultation bilaterally. Heart:  Regular rate and rhythm, S1, S2 normal, no murmur, click, rub or gallop. Abdomen:   Soft, non-tender. Bowel sounds normal. No masses,  No organomegaly. Extremities: Extremities normal, atraumatic, bilateral legs and thighs TTP. Pulses: 2+ and symmetric all extremities. Skin: Skin color, texture, turgor normal. No rashes or lesions   Lymph nodes: Cervical, supraclavicular, and axillary nodes normal.   Neurologic: CNII-XII intact. Normal strength, sensation and reflexes throughout.              Assessment/Plan     Depression  Suicidal ideation  Labs reviewed  Continue treatment per physician's orders

## 2017-11-11 NOTE — H&P
[address] [facility] [address]  Inpatient Admission Note    Date of Service:  11/11/17    Historian(s): Walter Brooke and chart review  Referral Source: self    Chief Complaint   \"I needed hep with getting back into the mainstream of society. \"     History of Present Illness     Walter Brooke is a 47 y.o. male with a history of psychosis who presents for inpatient psychiatric hospitalization after stating if headache does not resolve he will be suicidal.  Pt states headache has been present for years. He is a  and afraid he will fall off of the roof due to dizziness. Upon evaluation by the ED providers, the pt states he had HTN but is not taking his antihypertensive medication. He reported fleeting SI with a plan stating, \"every time I come here I have to cross that bridge. When I go across it, I feel like jumping off into the water. \"   Pt reports jumping off of a bridge to harm himself in the past and broke his ankle. The pt explained to psychiatry he also hears voices telling him to jump off the bridge. When he passes a bridge, he considers jumping. Pt explained he is sleeping well, about 8 hours per night. He last used illicit drugs he believes to be about a month ago including cocaine and THC. He also drank alcohol and smoked cigarettes. Pt denied access to weapons. Today on interview, the pt is tangential and disorganized. Pt states his current stressors include homelessness, family, friends, not working and hopelessness. The states the inciting factor was worthless due to feeling overwhelmed. The pt shared he is no longer feeling depressed. He is vague and makes statements such as, \"I need to adjust myself to a new avenue to go down. \"       Pt denies having weapons at home. Initially when asked about weapons, the pt stated, \"I'm a weapon\" then reports he's a .       The pt is hyper-Spiritism and would not answer questions in regards to thought insertion and thought broadcasting. When asked about those symptoms, the pt stated, \"How would that not occur if we were not mortal.\"     Protective factors include Mosque. The pt denies SI, HI and AVH. Pt did not answer directly when asked about AVH/psychotic symptoms when his mood is normal.      Psychiatric Review of Systems   Depression:  Denies depressed mood, episodic tearfulness, anhedonia and feelings of guilt, hopelessness, helplessness and worthlessness. Energy is adequate. Denies any thoughts of self-harm or suicidal ideation. Anxiety: Denies any excessive worrying, social anxiety, panic attacks and OCD. Irritability: Denies low threshold of frustration or anger. Bipolar symptoms: Denies history of decreased need for sleep associated with increased energy, racing thoughts, rapid speech and risky behavior. Abuse/Trauma/PTSD: Denies history of verbal, physical or sexual abuse. Denies avoidant behavior related to trauma triggers, flashbacks, hypervigilance or nightmares. Psychosis: Denies AVH or delusions. Medical Review of Systems     Sleep: ok  Appetite: increased    14 point review of systems was completed. Significant findings are found in the HPI or MSE. Psychiatric Treatment History     Self-injurious behavior/risky thoughts or behaviors (past suicidal ideation/attempt): Endorses prior history of thoughts of self-harm or suicidal actions. Violence/Risk to others (past homicidal ideation/attempt):    Denies any prior history of violence or homicidal ideation. Previous psychiatric medication trials:    1. Sertraline but unable to follow-up and therefore ran out of medication.       Previous psychiatric hospitalizations: Endorses    Current therapist: Denies    Current psychiatric provider: Denies    Allergies    No Known Allergies    Medical History     Past Medical History:   Diagnosis Date    Back pain     Hypertension     Psychiatric disorder     hallucinations History of neurological illness: Pt feels he had a stroke in the past due to stress. History of head injuries: Endorses        Medication(s)     Current Facility-Administered Medications on File Prior to Encounter   Medication Dose Route Frequency Provider Last Rate Last Dose    [COMPLETED] acetaminophen (TYLENOL) tablet 1,000 mg  1,000 mg Oral NOW Meryl Zepeda NP   1,000 mg at 11/10/17 1138     No current outpatient prescriptions on file prior to encounter. Substance Abuse History     Tobacco: denied  Alcohol: Endorses a beer once per month  Marijuana: Endorses but last use over 6 months ago. Cocaine: denied  Opiate: denied  Benzodiazepine: denied  Other: denied    Consequences: none    History of detox: none    History of substance abuse treatment: none    Family History     Medical Family History  Maternal: HTN  Paternal: \"My father had a mental illness. \"     Psychiatric Family History  Maternal: pt did not answer  Paternal: Endorses    Family history of suicide? NO    Social History     Living Situation: pt is now homeless but was living with family and friends previously. 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       Legal: Denies     Spirituality/Jain: pt did not answer directly     Vitals/Labs      Vitals:    11/10/17 2350 11/11/17 0828   BP: (!) 159/95 (!) 154/105   Pulse: 73 67   Resp: 18 18   Temp: 98 °F (36.7 °C) 97.7 °F (36.5 °C)       Labs:   Results for orders placed or performed during the hospital encounter of 11/10/17   CBC WITH AUTOMATED DIFF   Result Value Ref Range    WBC 4.7 4.6 - 13.2 K/uL    RBC 4.72 4.70 - 5.50 M/uL    HGB 12.4 (L) 13.0 - 16.0 g/dL    HCT 37.7 36.0 - 48.0 %    MCV 79.9 74.0 - 97.0 FL    MCH 26.3 24.0 - 34.0 PG    MCHC 32.9 31.0 - 37.0 g/dL    RDW 15.8 (H) 11.6 - 14.5 % PLATELET 687 530 - 240 K/uL    MPV 9.5 9.2 - 11.8 FL    NEUTROPHILS 58 40 - 73 %    LYMPHOCYTES 36 21 - 52 %    MONOCYTES 5 3 - 10 %    EOSINOPHILS 1 0 - 5 %    BASOPHILS 0 0 - 2 %    ABS. NEUTROPHILS 2.7 1.8 - 8.0 K/UL    ABS. LYMPHOCYTES 1.7 0.9 - 3.6 K/UL    ABS. MONOCYTES 0.3 0.05 - 1.2 K/UL    ABS. EOSINOPHILS 0.1 0.0 - 0.4 K/UL    ABS.  BASOPHILS 0.0 0.0 - 0.06 K/UL    DF AUTOMATED     METABOLIC PANEL, BASIC   Result Value Ref Range    Sodium 141 136 - 145 mmol/L    Potassium 3.7 3.5 - 5.5 mmol/L    Chloride 106 100 - 108 mmol/L    CO2 28 21 - 32 mmol/L    Anion gap 7 3.0 - 18 mmol/L    Glucose 92 74 - 99 mg/dL    BUN 12 7.0 - 18 MG/DL    Creatinine 1.06 0.6 - 1.3 MG/DL    BUN/Creatinine ratio 11 (L) 12 - 20      GFR est AA >60 >60 ml/min/1.73m2    GFR est non-AA >60 >60 ml/min/1.73m2    Calcium 8.5 8.5 - 10.1 MG/DL   URINALYSIS W/ RFLX MICROSCOPIC   Result Value Ref Range    Color YELLOW      Appearance HAZY      Specific gravity 1.028 1.003 - 1.030      pH (UA) 5.0 5.0 - 8.0      Protein TRACE (A) NEG mg/dL    Glucose NEGATIVE  NEG mg/dL    Ketone TRACE (A) NEG mg/dL    Bilirubin NEGATIVE  NEG      Blood NEGATIVE  NEG      Urobilinogen 0.2 0.2 - 1.0 EU/dL    Nitrites NEGATIVE  NEG      Leukocyte Esterase NEGATIVE  NEG     DRUG SCREEN, URINE   Result Value Ref Range    BENZODIAZEPINES NEGATIVE  NEG      BARBITURATES NEGATIVE  NEG      THC (TH-CANNABINOL) NEGATIVE  NEG      OPIATES NEGATIVE  NEG      PCP(PHENCYCLIDINE) NEGATIVE  NEG      COCAINE NEGATIVE  NEG      AMPHETAMINES NEGATIVE  NEG      METHADONE NEGATIVE       HDSCOM (NOTE)    ETHYL ALCOHOL   Result Value Ref Range    ALCOHOL(ETHYL),SERUM <3 0 - 3 MG/DL   SALICYLATE   Result Value Ref Range    Salicylate level <4.5 (L) 2.8 - 20.0 MG/DL   ACETAMINOPHEN   Result Value Ref Range    Acetaminophen level <2 (L) 10 - 30 ug/mL   URINE MICROSCOPIC ONLY   Result Value Ref Range    WBC 0 to 1 0 - 4 /hpf    RBC 0 to 1 0 - 5 /hpf    Epithelial cells 1+ 0 - 5 /lpf Bacteria FEW (A) NEG /hpf    Mucus 4+ (A) NEG /lpf    Hyaline cast 0 to 3 0 - 2 /lpf    Granular cast 0 to 1 NEG /lpf   EKG, 12 LEAD, INITIAL   Result Value Ref Range    Ventricular Rate 78 BPM    Atrial Rate 78 BPM    P-R Interval 178 ms    QRS Duration 82 ms    Q-T Interval 394 ms    QTC Calculation (Bezet) 449 ms    Calculated P Axis 63 degrees    Calculated R Axis 6 degrees    Calculated T Axis 53 degrees    Diagnosis       Normal sinus rhythm  Nonspecific T wave abnormality  Abnormal ECG  When compared with ECG of 14-OCT-2017 10:36,  No significant change was found  Confirmed by hZou Cota MD, --- ([id number]) on 11/10/2017 9:34:33 AM         Mental Status Examination     Appearance/Hygiene 47 y.o. AAM. +Body odor, poor hygiene   Behavior/Social Relatedness Appropriate   Musculoskeletal Gait/Station: appropriate  Tone (flaccid, cogwheeling, spastic): not assessed  Psychomotor (hyperkinetic, hypokinetic): appropriate   Involuntary movements (tics, dyskinesias, akathisia, stereotypies): none   Speech   Rate, rhythm, volume, fluency and articulation are appropriate   Mood   euthymic   Affect    Restricted    Thought Process Vague, loose associations  Hyper-Protestant    Vagueness, incoherence, circumstantiality, tangentiality, neologisms, perseveration, flight of ideas, or self-contradictory statements not present on assessment   Thought Content and Perceptual Disturbances +delusions    Denies self-injurious behavior (SIB), suicidal ideation (SI), aggressive behavior or homicidal ideation (HI)    Denies auditory and visual hallucinations   Sensorium and Cognition  AOx4, attention intact, memory intact, language use appropriate, and fund of knowledge age appropriate   Insight  poor   Judgment poor       Suicide Risk Assessment     Admission  Date/Time: 11/11/17    [x] Admission  [] Discharge     Key Factors:   Current admission precipitated by suicide attempt?   []  Yes     2    [x]  No     1     Suicide Attempt History [] Past attempts of high lethality    2 []  Past attempts of low lethality    1 [x]  No previous attempts       0   Suicidal Ideation []  Constant suicidal thoughts      2 []  Intermittent or fleeting suicidal  thoughts  1 [x]  Denies current suicidal thoughts    0   Suicide Plan   []  Has plan with actual OR potential access to planned method    2 []  Has plan without access to planned method      1 [x]  No plan            0   Plan Lethality []  Highly lethal plan (Carbon monoxide, gun, hanging, jumping)    2 []  Moderate lethality of plan          1 [x]  Low lethality of plan (biting, head banging, superficial scratching, pillow over face)  0   Safety Plan Agreement  []  Unwilling OR unable to agree due to impaired reality testing   2   []  Patient is ambivalent and/or guarded      1 [x]  Reliably agrees        0   Current Morbid Thoughts (reunion fantasies, preoccupations with death) []  Constantly     2     []  Frequently    1 [x]  Rarely    0   Elopement Risk  []  High risk     2 []  Moderate risk    1 [x]   Low risk    0   Symptoms    []  Hopeless  []  Helpless  []  Anhedonia   []  Guilt/shame  []  Anger/rage  []  Anxiety  []  Insomnia   []  Agitation   []  Impulsivity  []  5-6 symptoms present    2 []  3-4 symptoms present    1  [x]  0-2 symptoms present    0     Total Score: 1  --------------------------------------------------------------------------------------------------------------  Subjective Appraisal of Risk:  []  Patient replies not trustworthy: several non-verbal cues. []  Patient replies questionable: trustworthy: at least 1 non-verbal cue. [x]  Patient replies appear trustworthy.     Protective measures (select all that apply):  [x]  Successful past responses to stress  [x]  Spiritual/Presybeterian beliefs  []  Capacity for reality testing  []  Positive therapeutic relationships  [x]  Social supports/connections  []  Positive coping skills  []  Frustration tolerance/optimism  []  Children or pets in the home  []  Sense of responsibility to family  [x]  Agrees to treatment plan and follow up    High Risk Diagnoses (select all that apply):  []  Depression/Bipolar Disorder  []  Dual Diagnosis  []  Cardiovascular Disease  []  Schizophrenia  []  Chronic Pain  []  Epilepsy  []  Cancer  []  Personality Disorder  []  HIV/AIDS  []  Multiple Sclerosis    Dangerousness Assessment (Suicide, homicide, property destruction. ..)    Risk Factors reviewed and risk assessed to be:  [] low  [] low-moderate  [x] moderate   [] moderate-high  [] high     Protection factors reviewed and risk assessed to be:  [] low  [] low-moderate  [x] moderate   [] moderate-high  [] high     Response to treatment and risk assessed to be:  [] low  [] low-moderate  [x] moderate   [] moderate-high  [] high     Support reviewed and risk assessed to be:  [] low  [x] low-moderate  [] moderate   [] moderate-high  [] high     Acceptance of Discharge and outpatient treatment reviewed and risk assessed to be:    [] low  [x] low-moderate  [] moderate   [] moderate-high  [] high   Overall risk assessed to be:  [] low  [] low-moderate  [x] moderate   [] moderate-high  [] high       Assessment and Plan     Psychiatric Diagnoses:   Patient Active Problem List   Diagnosis Code    Psychotic disorder F29     Medical Diagnoses: HTN    Psychosocial and contextual factors:    1. See HPI    Level of impairment/disability: Severe Rito Bramble is a 47 y.o. who is currently requiring acute stabilization after developing psychosis due to recent stressors. The pt denies depression at this time but remains hyper-Mosque. He is tangential and loose associations are present. 1. Admit to locked inpatient behavioral health unit. Start milieu, group, art and occupation therapy. 2. Psychotic disorder   - Start Abilify 5mg po every day. 3. Routine labs ordered and reviewed by this provider.   4. Reviewed instructions, risks, benefits and side effects. 5. Disposition: SW will assist in coordinating outpt care.    6. Tentative date of discharge: 11-       Abisai Cabrales MD  Psychiatrist  [facility]

## 2017-11-11 NOTE — BH NOTES
GROUP THERAPY PROGRESS NOTE    Latisha kaminski  participating in [facility].      Group time: 30 minutes    Personal goal for participation: none    Goal orientation: community    Group therapy participation: sleeping    Therapeutic interventions reviewed and discussed: goals and procedures  Impression of participation: encouraged

## 2017-11-11 NOTE — BSMART NOTE
Patient vital signs as of 20:17 hours:  T = 98.2F, P = 72, R = 18 and BP = 130/89    Dr. Kimberly Glynn concerns have been responded to/addressed:    ¨ Shortness of breath. What is the underlying cause (asthma, nebulizer use, cardiac, etc.)? [facility] to determine this prior to any admission to [facility]. (Update:  Documentation indicates patient had shortness of breath; however, per Owen Velasquez RN Charge Nurse, patient had no shortness of breath issues. O2 saturations have been within normal limits.)     ¨ Headaches. Although blood pressure is 143/100, this should be addressed while patient is in the [facility]. (Update, all vital signs are now within normal limits as of 20:17 hours on 11/10/2017).    ¨ EKG status. (Update:  Results indicate Normal Sinus Rhythm (NSR))     =========================================    Patient was discussed with [facility] while patient was [facility]. Kaity Darshan was provided with transport information. Patient to be admitted through the [facility] ER entrance. The plan is to transfer the patient from [address] [place] [address] to [facility] Room #106-02  on the Special Treatment Unit 1 (STU1) Unit. The unit phone is ext. [phone]. The Psychiatrist consulted was  Dr. Kitty Fernandez  The accepting Psychiatrist will be Dr. Kitty Fernandez  The admitting diagnosis is depression with suicidal thoughts.       Florentin Singh RN, BSN

## 2017-11-11 NOTE — PROGRESS NOTES
Problem: Depressed Mood (Adult/Pediatric)  Goal: *STG: Attends activities and groups  AEB attending at least 3 groups/activities daily while hospitalized. Outcome: Progressing Towards Goal  Patient has been compliant with unit activities. Goal: *STG: Remains safe in hospital  AEB remaining safe daily while hospitalized. Outcome: Progressing Towards Goal  Patient has remained free of harm to self and others while in facility. Comments: Patient reported feeling a bit better stating \"I finally got some good rest\". Patient stated \"I just want to be with program\". Patient has been isolative to room and self with no interaction with peers however has been polite upon approach. Patient received medication as prescribed with compliance. Patient denies suicidal ideation, denies auditory hallucinations. Will continue to monitor for safety with support as needed throughout treatment regimen.

## 2017-11-11 NOTE — ED NOTES
Spoke with Zerita Phoenix at [facility]. Patient admitted to [facility] (Special Treatment Unit 1) Room 106 Bed 2. Admitting doctor is Dr. Sanjay Galarza  Admitting diagnosis - Depression with SI  Call report to [phone]. Wait til 9:45pm to call report.

## 2017-11-12 PROCEDURE — 65220000005 HC RM SEMIPRIVATE PSYCH 3 OR 4 BED

## 2017-11-12 PROCEDURE — 74011250637 HC RX REV CODE- 250/637: Performed by: PSYCHIATRY & NEUROLOGY

## 2017-11-12 RX ORDER — AMLODIPINE BESYLATE 10 MG/1
10 TABLET ORAL DAILY
Status: DISCONTINUED | OUTPATIENT
Start: 2017-11-12 | End: 2017-11-17 | Stop reason: HOSPADM

## 2017-11-12 RX ADMIN — ARIPIPRAZOLE 5 MG: 5 TABLET ORAL at 08:18

## 2017-11-12 RX ADMIN — HYDROCHLOROTHIAZIDE 25 MG: 25 TABLET ORAL at 08:18

## 2017-11-12 RX ADMIN — AMLODIPINE BESYLATE 10 MG: 10 TABLET ORAL at 10:18

## 2017-11-12 NOTE — PROGRESS NOTES
Problem: Depressed Mood (Adult/Pediatric)  Goal: *STG: Remains safe in hospital  AEB remaining safe daily while hospitalized. Outcome: Progressing Towards Goal  Patient has remained free of harm to self and others. Problem: Suicide/Homicide (Adult/Pediatric)  Goal: *STG: Seeks staff when feelings of self harm or harm towards others arise  AEB seeking staff when feelings of harm towards self/others arise daily while hospitalized. Outcome: Progressing Towards Goal  Patient has been successful in seeking staff support when in need. Comments: Patient has been cooperative upon approach and has been compliant with prescribed medication. Patient reported sleeping \"alright last night\". Patient has requested to talk with the SW stating \"I have a lot of issues I need to discuss with them\". Patient is a bit odd and tend to stay resting on and off throughout the morning. Patient has no interaction with peers when in milieu and tends to be a bit watchful and paranoid when in milieu. Patient denies auditory hallucinations, denies suicidal ideation with no safety issue noted. Will continue to monitor for safety with support as needed throughout treatment regimen.

## 2017-11-12 NOTE — BH NOTES
GROUP THERAPY PROGRESS NOTE    Cassie Escobedo is participating in Medication & Discharge education group. Group time: 30 minutes    Personal goal for participation: Understanding discharge & Medication compliance. Goal orientation: community    Group therapy participation: active    Therapeutic interventions reviewed and discussed: Understanding the discharge process and the importance of Medication compliance. Impression of participation: Active and involved with participation in group.

## 2017-11-12 NOTE — BH NOTES
Patient did not particpate in group today, he spent most of the day in bed resting , he got up ate, took his medication and returned to his room where he rested for the remainder of the evening. staff will continue to monitor patient for health and safety.

## 2017-11-12 NOTE — BH NOTES
GROUP THERAPY PROGRESS NOTE    Rhianna Nash is participating in [place].      Group time: 30 minutes    Personal goal for participation: have a  Good day    Goal orientation: community    Group therapy participation: minimal    Therapeutic interventions reviewed and discussed: goals and procedures    Impression of participation: encouraged

## 2017-11-12 NOTE — BH NOTES
Patient has been more visible within milieu this shift however continually approaches nursing station with various concerns stating \"I am losing my memory you know\". Patient will just stand with blank stare saying nothing more. Patient asking to see SW despite just being educated regarding day and date with SW not coming in until Monday. Patient appeared a bit more confused and disorganized this evening. Patient denies SI/HI, Denies A/V hallucinations. Will monitor for safety with support as needed throughout treatment regimen.

## 2017-11-13 PROCEDURE — 65220000005 HC RM SEMIPRIVATE PSYCH 3 OR 4 BED

## 2017-11-13 PROCEDURE — 74011250637 HC RX REV CODE- 250/637: Performed by: PSYCHIATRY & NEUROLOGY

## 2017-11-13 RX ORDER — CARVEDILOL 6.25 MG/1
6.25 TABLET ORAL EVERY 12 HOURS
Status: DISCONTINUED | OUTPATIENT
Start: 2017-11-13 | End: 2017-11-17 | Stop reason: HOSPADM

## 2017-11-13 RX ORDER — ARIPIPRAZOLE 10 MG/1
10 TABLET ORAL DAILY
Status: DISCONTINUED | OUTPATIENT
Start: 2017-11-13 | End: 2017-11-15

## 2017-11-13 RX ADMIN — CARVEDILOL 6.25 MG: 6.25 TABLET, FILM COATED ORAL at 20:19

## 2017-11-13 RX ADMIN — IBUPROFEN 600 MG: 600 TABLET ORAL at 18:24

## 2017-11-13 RX ADMIN — HYDROCHLOROTHIAZIDE 25 MG: 25 TABLET ORAL at 08:38

## 2017-11-13 RX ADMIN — ARIPIPRAZOLE 10 MG: 10 TABLET ORAL at 08:38

## 2017-11-13 RX ADMIN — CARVEDILOL 6.25 MG: 6.25 TABLET, FILM COATED ORAL at 10:57

## 2017-11-13 RX ADMIN — AMLODIPINE BESYLATE 10 MG: 10 TABLET ORAL at 08:38

## 2017-11-13 NOTE — BSMART NOTE
ART THERAPY GROUP PROGRESS NOTE    PATIENT SCHEDULED FOR GROUP AT: 1400    ATTENDANCE: Full    PARTICIPATION LEVEL: Participates fully in the art process    ATTENTION LEVEL: Able to focus on task    FOCUS: Grounding/ relaxation     SYMBOLIC & THEMATIC CONTENT AS NOTED IN IMAGERY: He was calm, alert, and oriented. He was invested in the task at hand and occasionally interacted with group members. His approach to task was planned out and controlled, associations were logical and thought process was linear.

## 2017-11-13 NOTE — BH NOTES
Pt.is a 47year old male with history of depression and past hospitalization at this facility. Pt was admitted for ideations to harm self. SW Contact:  SW met with pt to discuss d/c planning. Pt expressed he has been feeling depressed lately. Pt stated he was having thoughts to harm self and was feeling unsafe. Pt expressed the last time he had feelings to harm self, he jumped off a bridge. Pt. 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 Pt. than reflected on the loss of his family (divorce), recent death of sibling and mother in the same month, loss of business and financial stability. Pt. Stated he has been homes less and has loss of income. Pt. Expressed he has not been andre to get back on track. Pt shared he has been experiencing anxiety, memory loss and some paranoia. SW  Talked about ways to cope wit losses, provided pt with mental health education. SW discussed the benefits of medications and mental health counseling. SW discussed community resources such as mental health supports, shelters, and community services for oupt mental health counseling. Pt. Was cooperative, guarded and denies ideations and hallucinations.

## 2017-11-13 NOTE — BSMART NOTE
OCCUPATIONAL THERAPY PROGRESS NOTE    Group Time:  7199  Attendance: The patient attended full group. .  Participation:  The patient participated with moderate elaboration in the activity. Attention:  The patient was able to focus on the activity. Interaction:  The patient acknowledges others or responds to questions,  with no spontaneous interaction. Answers generally appropriate to activity. Alert and oriented.

## 2017-11-13 NOTE — PROGRESS NOTES
[address]  Inpatient Progress Note     Date of Service: 11/13/17  Hospital Day: 3     Subjective/Interval History   11/13/17    Treatment Team Notes:  Notes reviewed and/or discussed and report that Nan Franklin is odd, watchful and paranoid at times. Pt stated to one staff member, \"I am losing my memory you know. \"  Will stand with a blank stare at times. Appeared more confused and disorganized in the evening. Patient interview: Nan Franklin was interviewed by this writer today. Vital signs reviewed and BP elevated. Calm, cooperative. Pt feels his thoughts are more organized but he still makes bizarre statements at times, \"Maybe it was a substitute for myself to deal with reality. \"   Continues to make Religion references. Stare is piercing at times. States he is sleeping and eating well. Pt is medication compliant and denies medication side effects including TD, EPS, akathisia and mood derangements. No complaints. Denies SI, HI and AVH. Objective     Vitals:    11/12/17 0220 11/12/17 0719 11/12/17 1922 11/13/17 0731   BP:  (!) 156/113 (!) 149/93 144/89   Pulse:  70 78 73   Resp:  16 18 20   Temp:  97.4 °F (36.3 °C) 97.5 °F (36.4 °C) 97.5 °F (36.4 °C)   Weight: 72.6 kg (160 lb)      Height: 5' 7\" (1.702 m)          Mental Status Examination     Appearance/Hygiene 47 y.o. AAM. No body odor noted. Good hygiene   Behavior/Social Relatedness Appropriate, piercing eye contact at times. Musculoskeletal Gait/Station: appropriate  Tone (flaccid, cogwheeling, spastic): not assessed  Psychomotor (hyperkinetic, hypokinetic): appropriate   Involuntary movements (tics, dyskinesias, akathisia, stereotypies): none   Speech                          Rate, rhythm, volume, fluency and articulation are appropriate   Mood                          euthymic   Affect                                                   Restricted    Thought Process Bizarre at times.      Vagueness, incoherence, circumstantiality, tangentiality, neologisms, perseveration, flight of ideas, or self-contradictory statements not present on assessment   Thought Content and Perceptual Disturbances +delusions  Judaism references noted on exam.       Denies self-injurious behavior (SIB), suicidal ideation (SI), aggressive behavior or homicidal ideation (HI)     Denies auditory and visual hallucinations   Sensorium and Cognition              AOx4, attention intact, memory intact, language use appropriate, and fund of knowledge age appropriate   Insight              fair   Judgment fair        Assessment/Plan      Psychiatric Diagnoses:   Patient Active Problem List   Diagnosis Code    Psychotic disorder F29     Medical Diagnoses: HTN    Psychosocial and contextual factors:    1. Medication non-compliance   2. Separation from family. Level of impairment/disability: Severe    Nomi Silva is a 47 y.o. who is currently slowly improving. Remains bizarre with Restorationism references noted on exam.  Piercing stare noted at times. Will increase aripiprazole to 10mg po every day on 11-. 1.  Psychotic disorder   - Increase aripiprazole to 10mg po every day today. 2.  Reviewed instructions, risks, benefits and side effects of medications  3.   Disposition/Discharge Date: self-care/11-    Claire Peterson MD [facility]. [facility]  [facility]iat[facility]

## 2017-11-13 NOTE — BH NOTES
GROUP THERAPY PROGRESS NOTE    Shahnaz Diggs is participating in [place].      Group time: 30 minutes    Personal goal for participation:talk w/sw    Goal orientation: community    Group therapy participation: active    Therapeutic interventions reviewed and discussed: unit rules and feelings card game

## 2017-11-13 NOTE — PROGRESS NOTES
Problem: Depressed Mood (Adult/Pediatric)  Goal: *STG: Attends activities and groups  AEB attending at least 3 groups/activities daily while hospitalized. Outcome: Progressing Towards Goal  Attending groups   Goal: *STG: Remains safe in hospital  AEB remaining safe daily while hospitalized. Outcome: Progressing Towards Goal  No attempts to harm self or others     Problem: Suicide/Homicide (Adult/Pediatric)  Goal: *STG: Seeks staff when feelings of self harm or harm towards others arise  AEB seeking staff when feelings of harm towards self/others arise daily while hospitalized. Outcome: Progressing Towards Goal  Contracts for safety     Comments: Pt's thought process is disorganized and at times states he does not remember what he was just told. Pt states he does not remember his previous admission. He seems to be perplexed when given any explanations. He asked if a doctor would see him while here and Dr. Gisselle Lopez had just seen him. Pt is compliant with medications and attending groups. When asked why he needed to come to the hospital he stated he was severely depressed. This nurse asked him if he was suicidal and he stated prior to admission he was. Pt denies any thoughts of harming himself or others and hallucinations at this time. He verbally contracts for safety. He also stated we have been great to him. Pt is very pleasant and cooperative.

## 2017-11-14 PROBLEM — F33.3 MDD (MAJOR DEPRESSIVE DISORDER), RECURRENT, SEVERE, WITH PSYCHOSIS (HCC): Status: ACTIVE | Noted: 2017-11-11

## 2017-11-14 PROCEDURE — 74011250637 HC RX REV CODE- 250/637: Performed by: PSYCHIATRY & NEUROLOGY

## 2017-11-14 PROCEDURE — 65220000005 HC RM SEMIPRIVATE PSYCH 3 OR 4 BED

## 2017-11-14 RX ORDER — FLUOXETINE HYDROCHLORIDE 20 MG/1
20 CAPSULE ORAL DAILY
Status: DISCONTINUED | OUTPATIENT
Start: 2017-11-15 | End: 2017-11-17 | Stop reason: HOSPADM

## 2017-11-14 RX ORDER — FLUOXETINE 10 MG/1
10 CAPSULE ORAL DAILY
Status: COMPLETED | OUTPATIENT
Start: 2017-11-14 | End: 2017-11-14

## 2017-11-14 RX ADMIN — AMLODIPINE BESYLATE 10 MG: 10 TABLET ORAL at 08:33

## 2017-11-14 RX ADMIN — CARVEDILOL 6.25 MG: 6.25 TABLET, FILM COATED ORAL at 08:33

## 2017-11-14 RX ADMIN — HYDROCHLOROTHIAZIDE 25 MG: 25 TABLET ORAL at 08:33

## 2017-11-14 RX ADMIN — CARVEDILOL 6.25 MG: 6.25 TABLET, FILM COATED ORAL at 20:22

## 2017-11-14 RX ADMIN — FLUOXETINE 10 MG: 10 CAPSULE ORAL at 14:54

## 2017-11-14 RX ADMIN — ARIPIPRAZOLE 10 MG: 10 TABLET ORAL at 08:33

## 2017-11-14 NOTE — BH NOTES
The patient was monitored by the staff. Mood was fine and compliant to all the rules. No issues with any negative or any outbursts. Able to have his vitals checked before receiving his scheduled medications. Participated in all groups and activities . Socialized well with both his peers and the staff. Able to eat all meals from the cafe and snacks. Continues to stay motivated on working on his treatment. Staff will continue to monitor for safety and locations.

## 2017-11-14 NOTE — PROGRESS NOTES
[address]  Inpatient Progress Note     Date of Service: 11/14/17  Hospital Day: 4     Subjective/Interval History   11/14/17    Treatment Team Notes:  Notes reviewed and/or discussed and report that Nicanor Neely is disorganized at times. Medication compliant and attending groups. Patient interview: Nicanor Neely was interviewed by this writer today. Vital signs reviewed and BP elevated. Calm, cooperative. More organized and less bizarre. Will start fluoxetine for depression. Continues to makes some bizarre statements but pt feels his mind is more clear than it has been. States he is sleeping and eating well. Pt is medication compliant and denies medication side effects including TD, EPS, akathisia and mood derangements. No complaints. Denies SI, HI and AVH. Objective     Vitals:    11/12/17 1922 11/13/17 0731 11/13/17 2018 11/14/17 0853   BP: (!) 149/93 144/89 (!) 133/97 (!) 132/95   Pulse: 78 73 80 68   Resp: 18 20 18 18   Temp: 97.5 °F (36.4 °C) 97.5 °F (36.4 °C) 96.4 °F (35.8 °C) 96.2 °F (35.7 °C)   Weight:       Height:           Mental Status Examination     Appearance/Hygiene 47 y.o. AAM. No body odor noted. Good hygiene   Behavior/Social Relatedness Appropriate, piercing eye contact at times. Musculoskeletal Gait/Station: appropriate  Tone (flaccid, cogwheeling, spastic): not assessed  Psychomotor (hyperkinetic, hypokinetic): appropriate   Involuntary movements (tics, dyskinesias, akathisia, stereotypies): none   Speech                          Rate, rhythm, volume, fluency and articulation are appropriate   Mood                          euthymic   Affect                                                   Restricted    Thought Process Bizarre at times.      Vagueness, incoherence, circumstantiality, tangentiality, neologisms, perseveration, flight of ideas, or self-contradictory statements not present on assessment   Thought Content and Perceptual Disturbances +delusions  Synagogue references noted on exam.       Denies self-injurious behavior (SIB), suicidal ideation (SI), aggressive behavior or homicidal ideation (HI)     Denies auditory and visual hallucinations   Sensorium and Cognition              AOx4, attention intact, memory intact, language use appropriate, and fund of knowledge age appropriate   Insight              fair   Judgment fair        Assessment/Plan      Psychiatric Diagnoses:   Patient Active Problem List   Diagnosis Code    MDD (major depressive disorder), recurrent, severe, with psychosis ([facility] [address] F33.3     Medical Diagnoses: HTN    Psychosocial and contextual factors:    1. Medication non-compliance   2. Separation from family. Level of impairment/disability: Severe    Félix Leal is a 47 y.o. who is currently slowly improving. Remains bizarre but without Yazidi references noted on exam.  Piercing stare noted at times. 1.  Psychotic disorder   - Continue aripiprazole to 10mg po every day today. - Start fluoxetine 10mg po every day today x1 dose then increase to 20mg po every day thereafter. 2.  Reviewed instructions, risks, benefits and side effects of medications  3.   Disposition/Discharge Date: self-care/11-    MD DR. [facility]  Psychiatry

## 2017-11-14 NOTE — BH NOTES
GROUP THERAPY PROGRESS NOTE    Rodrick Frey is participating in [employer] Group. Group time: 30 minutes    Goal orientation: relaxation    Group therapy participation: active    Therapeutic interventions reviewed and discussed: We discussed the importance of relaxation skills as a coping tool. Impression of participation:   Hernanyoseph Desai enjoyed coloring a positivi affirmation coloring sheet and listening to music with peers and staff.

## 2017-11-14 NOTE — PROGRESS NOTES
Problem: Depressed Mood (Adult/Pediatric)  Goal: *STG: Attends activities and groups  AEB attending at least 3 groups/activities daily while hospitalized. Outcome: Progressing Towards Goal  Attending groups   Goal: *STG: Remains safe in hospital  AEB remaining safe daily while hospitalized. Outcome: Progressing Towards Goal  No attempts to harm self or others     Problem: Suicide/Homicide (Adult/Pediatric)  Goal: *STG: Seeks staff when feelings of self harm or harm towards others arise  AEB seeking staff when feelings of harm towards self/others arise daily while hospitalized. Outcome: Progressing Towards Goal  Verbally contracts for safety     Comments: Pt is pleasant and cooperative. He denies hallucinations and  any thoughts of harming self or others. Pt verbally contracts for safety. He is participating in groups and compliant with medications. Pt continues to have difficulty getting his thoughts out with a disorganized thought process. He starts to say something stops then forgets but eventually is able to express himself. He also has difficulty with his short term memory.

## 2017-11-14 NOTE — BSMART NOTE
OCCUPATIONAL THERAPY PROGRESS NOTE    Group Time:  1671  Attendance: The patient attended full group. .  Participation:  The patient participated fully in the activity. Attention:  The patient was able to focus on the activity. Interaction:  The patient occasionally  interacts with others. 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 Lists several skills related to this and lists as a goal to get back to this business listing skills such as holden, painting, landscaping.

## 2017-11-14 NOTE — BH NOTES
GROUP THERAPY PROGRESS NOTE    Jessica Noel is participating in [place]. Group time: 15 minutes    Goal orientation: community    Group therapy participation: active    Therapeutic interventions reviewed and discussed:   Unit guidelines and daily routine were reviewed. Impression of participation:   Patients told the group he is continuing to feel better every day and his medicine is working. He said \"I feel like total recall and system feels back online. \"  He did not express any concerns about the unit.

## 2017-11-15 PROCEDURE — 65220000005 HC RM SEMIPRIVATE PSYCH 3 OR 4 BED

## 2017-11-15 PROCEDURE — 74011250637 HC RX REV CODE- 250/637: Performed by: PSYCHIATRY & NEUROLOGY

## 2017-11-15 RX ORDER — ARIPIPRAZOLE 15 MG/1
15 TABLET ORAL DAILY
Status: DISCONTINUED | OUTPATIENT
Start: 2017-11-15 | End: 2017-11-17 | Stop reason: HOSPADM

## 2017-11-15 RX ADMIN — ARIPIPRAZOLE 15 MG: 15 TABLET ORAL at 08:21

## 2017-11-15 RX ADMIN — CARVEDILOL 6.25 MG: 6.25 TABLET, FILM COATED ORAL at 08:21

## 2017-11-15 RX ADMIN — IBUPROFEN 600 MG: 600 TABLET ORAL at 08:21

## 2017-11-15 RX ADMIN — CARVEDILOL 6.25 MG: 6.25 TABLET, FILM COATED ORAL at 20:22

## 2017-11-15 RX ADMIN — FLUOXETINE 20 MG: 20 CAPSULE ORAL at 08:21

## 2017-11-15 RX ADMIN — AMLODIPINE BESYLATE 10 MG: 10 TABLET ORAL at 08:21

## 2017-11-15 RX ADMIN — HYDROCHLOROTHIAZIDE 25 MG: 25 TABLET ORAL at 08:21

## 2017-11-15 NOTE — PROGRESS NOTES
Problem: Depressed Mood (Adult/Pediatric)  Goal: *STG: Remains safe in hospital  AEB remaining safe daily while hospitalized. Outcome: Progressing Towards Goal  Patient demonstrated safe behavior; monitored for safety per protocol. Comments: Patient is alert, oriented to person and place; reoriented to time; pleasant and cooperative; smiling affect, \"optimistic\" mood, stated that he is looking forward to \"life\" and he spoke with the SW and another lady about housing this afternoon; denied thoughts of harm to self or others; denied hallucination; tolerated well dinner, verbalized that he completed personal hygiene care this morning; will monitor and maintain safe environment.

## 2017-11-15 NOTE — BSMART NOTE
ACTIVITIES THERAPY PROGRESS NOTE    Group time:1030    Patient attended about 10 minutes of group before being called out. Participated as asked with some spontaneous comments in general discussion.

## 2017-11-15 NOTE — BH NOTES
Treatment team met -     Medical Director: ____present   Psychiatrist: __x___present   Charge nurse: _x____present   MSW: __x___present   : _____present   Nurse Manager: __x___present   Student RNs: ____present   Medical Students: _____present   Art Therapist: __x___present   Clinical Coordinator: __x___present   Internal : __x___present   Occupational Therapist: __x___present   Chaplain garcia  present    Plan of care discussed and updated as appropriate.

## 2017-11-15 NOTE — BH NOTES
GROUP THERAPY PROGRESS NOTE    Astrid Godinez is not  participating in [employer]. Staff encouraged and pt declined.

## 2017-11-15 NOTE — BH NOTES
Pt.is a 47year old male with history of depression and past hospitalization at this facility. Pt was admitted for ideations to harm self. Case was discussed in treatment team and with the  treating psychiatrist    MARILU Collateral:  Mrs. Larson [address] board and care provider to meet with pt.  the board and care provider met with pt to discuss housing. The pt. currently has no income nor insurance. The provider referred SW to  mental health  team [facility]. MARILU contacted Mr. Disla at [facility] to see if he is able to assist the pt with supports in the community. MARILU forward pt.'s information to Mr. Patrice Flores for review. MARILU Contact:  MARILU met with pt. To discuss d/c planning. MARILU is assisting pt with a support system in the community. Pt is currently denying ideations and hallucinations. MARILU assisted the pt with contacting [employer] to find out about status of disability.

## 2017-11-15 NOTE — BH NOTES
GROUP THERAPY PROGRESS NOTE    Naa Henderson is participating in Recreational Therapy. Group time: 45 minutes    Personal goal for participation: Physical Exercise/Social    Goal orientation: social    Group therapy participation: active    Therapeutic interventions reviewed and discussed: Physical Exercise/Social    Impression of participation: Pt fully active during Recreational Therapy. Pt played pinAtomShockwave, and corn hole with a peer/this writer. Pt adapted to the the rules rather quickly and appeared to enjoy himself. \"What's the name of this game\"? \"Oh, I like corn hole; it takes finesse and concentration.

## 2017-11-15 NOTE — BSMART NOTE
ART THERAPY GROUP PROGRESS NOTE    PATIENT SCHEDULED FOR GROUP AT: 14:00    ATTENDANCE: Full    PARTICIPATION LEVEL: Participates fully in the art process    ATTENTION LEVEL: Able to focus on task    FOCUS: Reality Orientation     SYMBOLIC & THEMATIC CONTENT AS NOTED IN IMAGERY: He was calm, compliant, and alert. He was invested in the task at hand and his approach was planned-out and organized. Imagery was logical and relevant. He occasionally trailed off topic about being an  and having his own brand of \"Angel Irene paint. \"

## 2017-11-15 NOTE — PROGRESS NOTES
Patient out of his room today for meals and he did attend groups, he received motrin this morning for hand discomfort, stated \" I must have slept on it\". Social with select peers and staff.

## 2017-11-15 NOTE — PROGRESS NOTES
conducted an initial consultation and Spiritual Assessment for Rodrick Frey, who is a 47 y. o.,male. Patients Primary Language is: [place]. According to the patients EMR Holiness Affiliation is: Other. The reason the Patient came to the hospital is:   Patient Active Problem List    Diagnosis Date Noted    MDD (major depressive disorder), recurrent, severe, with psychosis ([facility] [address] 11/11/2017        The  provided the following Interventions:  Initiated a relationship of care and support with patient at [facility]. Explored issues of angela, belief, spirituality and Yarsanism/ritual needs while hospitalized. Patient appreciated confirming support from . Listened empathically. Provided chaplaincy education. Provided information about [facility]. Offered prayer and assurance of continued prayers on patient's behalf. Chart reviewed. The following outcomes where achieved:  Patient shared comprehensive information about both their medical narrative and spiritual journey/beliefs.  did not confirmed Patient's Holiness Affiliation. Patient processed feeling about current hospitalization. Patient expressed gratitude for 's visit. Assessment:  Patient does not have any Yarsanism/cultural needs that will affect patients preferences in health care. There are no spiritual or Yarsanism issues which require intervention at this time. Plan:  Chaplains will continue to follow and will provide pastoral care on an as needed/requested basis.  recommends bedside caregivers page  on duty if patient shows signs of acute spiritual or emotional distress.     Chaplain ResidentRASHMI. [address]   [phone]

## 2017-11-15 NOTE — PROGRESS NOTES
[address]  Inpatient Progress Note     Date of Service: 11/15/17  Hospital Day: 5     Subjective/Interval History   11/15/17    Treatment Team Notes:  Notes reviewed and/or discussed and report that Uziel Lay is disorganized at times. Medication compliant and attending groups. Patient interview: Uziel Lay was interviewed by this writer today. Vital signs reviewed and BP improving. Calm, cooperative. Remains somewhat bizarre but more organized this morning. Pt feels 85% back to normal.  Pt discussed the remaining 15% of which 5% is RITU and \"In reserves\" and the other 10% is his thinking negatively. Pt discussed negative thinking and pt challenged to improve/change negative automatic thinking. Conversational pragmatics are improved. Sleep and appetite are improved. Pt asking for double portions. Pt is medication compliant and denies medication side effects including TD, EPS, akathisia and mood derangements. No complaints. Denies SI, HI and AVH. Objective     Vitals:    11/13/17 0731 11/13/17 2018 11/14/17 0853 11/14/17 2032   BP: 144/89 (!) 133/97 (!) 132/95 125/84   Pulse: 73 80 68    Resp: 20 18 18    Temp: 97.5 °F (36.4 °C) 96.4 °F (35.8 °C) 96.2 °F (35.7 °C)    Weight:       Height:           Mental Status Examination     Appearance/Hygiene 47 y.o. AAM. Good hygiene   Behavior/Social Relatedness Appropriate, piercing eye contact at times. Musculoskeletal Gait/Station: appropriate  Tone (flaccid, cogwheeling, spastic): not assessed  Psychomotor (hyperkinetic, hypokinetic): appropriate   Involuntary movements (tics, dyskinesias, akathisia, stereotypies): none   Speech                          Rate, rhythm, volume, fluency and articulation are appropriate   Mood                          euthymic   Affect                                                   Restricted but improving. Remains bizarre at times. Thought Process Bizarre at times. Vagueness, incoherence, circumstantiality, tangentiality, neologisms, perseveration, flight of ideas, or self-contradictory statements not present on assessment   Thought Content and Perceptual Disturbances Delusions present and continues to speak about RITU but this is not as prominent as on previous interviews.       Denies self-injurious behavior (SIB), suicidal ideation (SI), aggressive behavior or homicidal ideation (HI)     Denies auditory and visual hallucinations   Sensorium and Cognition              AOx4, attention intact, memory intact, language use appropriate, and fund of knowledge age appropriate   Insight              fair   Judgment fair        Assessment/Plan      Psychiatric Diagnoses:   Patient Active Problem List   Diagnosis Code    MDD (major depressive disorder), recurrent, severe, with psychosis ([facility] [address] F33.3     Medical Diagnoses: HTN    Psychosocial and contextual factors:    1. Medication non-compliance   2. Separation from family. Level of impairment/disability: Severe Jerald El is a 47 y.o. who is currently slowly improving. Remains bizarre but without Denominational references noted on exam.  Piercing stare noted at times. 1.  Psychotic disorder   - Increase aripiprazole to 15mg po every day today. - Increase fluoxetine to 20mg po every day thereafter. 2.  Challenged pt to think positively and to change negative automatic thinking. 3.  Reviewed instructions, risks, benefits and side effects of medications  4. Disposition/Discharge Date: self-care/11- due to increase in aripiprazole and fluoxetine.      Asia Gallegos MD DR. \[facility] [place] [facility]\""[facility]  Psychiatry

## 2017-11-15 NOTE — ROUTINE PROCESS
The rounds during connect care down time are in the patient's chart. Please refer to the sleep chart sheet that was filled out during that time.

## 2017-11-16 VITALS
HEIGHT: 67 IN | HEART RATE: 72 BPM | RESPIRATION RATE: 16 BRPM | SYSTOLIC BLOOD PRESSURE: 110 MMHG | WEIGHT: 160 LBS | TEMPERATURE: 98.7 F | BODY MASS INDEX: 25.11 KG/M2 | DIASTOLIC BLOOD PRESSURE: 71 MMHG

## 2017-11-16 PROCEDURE — 65220000005 HC RM SEMIPRIVATE PSYCH 3 OR 4 BED

## 2017-11-16 PROCEDURE — 74011250637 HC RX REV CODE- 250/637: Performed by: PSYCHIATRY & NEUROLOGY

## 2017-11-16 RX ADMIN — IBUPROFEN 600 MG: 600 TABLET ORAL at 12:43

## 2017-11-16 RX ADMIN — AMLODIPINE BESYLATE 10 MG: 10 TABLET ORAL at 08:25

## 2017-11-16 RX ADMIN — ARIPIPRAZOLE 15 MG: 15 TABLET ORAL at 08:25

## 2017-11-16 RX ADMIN — CARVEDILOL 6.25 MG: 6.25 TABLET, FILM COATED ORAL at 20:16

## 2017-11-16 RX ADMIN — CARVEDILOL 6.25 MG: 6.25 TABLET, FILM COATED ORAL at 08:25

## 2017-11-16 RX ADMIN — TRAZODONE HYDROCHLORIDE 50 MG: 50 TABLET ORAL at 20:16

## 2017-11-16 RX ADMIN — HYDROCHLOROTHIAZIDE 25 MG: 25 TABLET ORAL at 08:25

## 2017-11-16 RX ADMIN — FLUOXETINE 20 MG: 20 CAPSULE ORAL at 08:25

## 2017-11-16 NOTE — PROGRESS NOTES
Problem: Suicide/Homicide (Adult/Pediatric)  Goal: *STG: Seeks staff when feelings of self harm or harm towards others arise  AEB seeking staff when feelings of harm towards self/others arise daily while hospitalized. Outcome: Progressing Towards Goal  Denies suicidal thoughts. Problem: Depressed Mood (Adult/Pediatric)  Goal: *STG: Complies with medication therapy  Patient will comply with medications as ordered; verbalize the importance for medications and compliance; report side effects to doctor or nurse during hospital stay. Outcome: Progressing Towards Goal  Medication compliant. Comments: Patient is pleasant , mood is calm. Medication compliant. Social with peers. Denies suicidal thoughts.

## 2017-11-16 NOTE — PROGRESS NOTES
[address]  Inpatient Progress Note     Date of Service: 11/16/17  Hospital Day: 6     Subjective/Interval History   11/16/17    Treatment Team Notes:  Notes reviewed and/or discussed and report that Stephen Carcamo is behaviorally appropriate. Medication compliant and attending groups. No mentions of disorganization. Patient interview: Stephen Carcamo was interviewed by this writer today. Vital signs reviewed and BP improving. Calm, cooperative. In bed and easily awakened. States medications are helpful to control his mood. He states his thoughts are clear and logical.  He states he is focused on the positive today. Pt is medication compliant and denies medication side effects including TD, EPS, akathisia and mood derangements. No complaints. Denies SI, HI and AVH. Objective     Vitals:    11/14/17 2032 11/15/17 0833 11/15/17 1927 11/16/17 0803   BP: 125/84 140/89 119/78 139/85   Pulse:  82 81 80   Resp:  16 18 16   Temp:  97.7 °F (36.5 °C) 98.4 °F (36.9 °C) 97.7 °F (36.5 °C)   Weight:       Height:           Mental Status Examination     Appearance/Hygiene 47 y.o. AAM. Good hygiene   Behavior/Social Relatedness Appropriate, piercing eye contact at times. Musculoskeletal Gait/Station: appropriate  Tone (flaccid, cogwheeling, spastic): not assessed  Psychomotor (hyperkinetic, hypokinetic): appropriate   Involuntary movements (tics, dyskinesias, akathisia, stereotypies): none   Speech                          Rate, rhythm, volume, fluency and articulation are appropriate   Mood                          euthymic   Affect                                                   Constricted    Thought Process Linear, logical and goal directed. Vagueness, incoherence, circumstantiality, tangentiality, neologisms, perseveration, flight of ideas, or self-contradictory statements not present on assessment   Thought Content and Perceptual Disturbances Delusions are absent.    Denies self-injurious behavior (SIB), suicidal ideation (SI), aggressive behavior or homicidal ideation (HI)     Denies auditory and visual hallucinations   Sensorium and Cognition              AOx4, attention intact, memory intact, language use appropriate, and fund of knowledge age appropriate   Insight              fair   Judgment fair        Assessment/Plan      Psychiatric Diagnoses:   Patient Active Problem List   Diagnosis Code    MDD (major depressive disorder), recurrent, severe, with psychosis ([facility] [address] F33.3     Medical Diagnoses: HTN    Psychosocial and contextual factors:    1. Medication non-compliance   2. Separation from family. Level of impairment/disability: Severe    Darlene Carballo is a 47 y.o. who is currently slowly improving. Much less bizarre. Likely near baseline. Will likely discharge on 11-. Continue current medication regimen. No complaints. Denies SI, HI and AVH. 1.  Psychotic disorder   - Continue aripiprazole to 15mg po every day today. - Continue fluoxetine to 20mg po every day thereafter. 2.  Challenged pt to think positively and to change negative automatic thinking. 3.  Reviewed instructions, risks, benefits and side effects of medications  4. Disposition/Discharge Date: self-care/11-.      Tiffanie Godwin MD DR. [facility]  [facility]iatry

## 2017-11-16 NOTE — BH NOTES
GROUP THERAPY PROGRESS NOTE    Ranulfo Client is participating in [place].      Group time: 15 minutes    Personal goal for participation:  Talk w/sw    Goal orientation: community    Group therapy participation: active    Therapeutic interventions reviewed and discussed: unit rules and personal  goals

## 2017-11-16 NOTE — BSMART NOTE
OCCUPATIONAL THERAPY PROGRESS NOTE    Group Time:  1168  Attendance: The patient attended 1/4 of group. Interaction:  The patient occasionally  interacts with others. Said he had a \"migraine\" and needed to lie down. Of note the unit was noisy at the time.

## 2017-11-16 NOTE — BH NOTES
Pt.is a 47year old male with history of depression and past hospitalization at this facility. Pt was admitted for ideations to harm self. MARILU Contact:  SW met with pt. To discuss d/c planning. SW assisted the pt with contacting  this a.m.   SW and pt stayed on the phone for over an hour with social security to discuss pt.'s case. Pt.has an appointment scheduled with the social disability office  A week from now. SW discussed pt.'s d/c plan. MARILU provided pt with a shelter list because he currently has no income. Pt. Will follow-up with [facility]. Pt is currently denying ideations and hallucinations. Pt. Was cooperative, has insight and was able to remain focus.

## 2017-11-16 NOTE — BH NOTES
Patient participated in recreational group today, he was in good spirits he ate all his meal and took his medication staff will continue to monitor Patient for health and safety. Perry Rai

## 2017-11-17 ENCOUNTER — HOSPITAL ENCOUNTER (EMERGENCY)
Age: 52
Discharge: HOME | End: 2017-11-18
Attending: EMERGENCY MEDICINE | Admitting: EMERGENCY MEDICINE
Payer: SELF-PAY

## 2017-11-17 DIAGNOSIS — F32.A DEPRESSION, UNSPECIFIED DEPRESSION TYPE: Primary | ICD-10-CM

## 2017-11-17 LAB
ALBUMIN SERPL-MCNC: 4.2 G/DL (ref 3.4–5)
ALBUMIN/GLOB SERPL: 1.1 {RATIO} (ref 0.8–1.7)
ALP SERPL-CCNC: 69 U/L (ref 45–117)
ALT SERPL-CCNC: 44 U/L (ref 16–61)
ANION GAP SERPL CALC-SCNC: 7 MMOL/L (ref 3–18)
AST SERPL-CCNC: 18 U/L (ref 15–37)
BASOPHILS # BLD: 0 K/UL (ref 0–0.06)
BASOPHILS NFR BLD: 0 % (ref 0–2)
BILIRUB SERPL-MCNC: 0.3 MG/DL (ref 0.2–1)
BUN SERPL-MCNC: 16 MG/DL (ref 7–18)
BUN/CREAT SERPL: 15 (ref 12–20)
CALCIUM SERPL-MCNC: 9.5 MG/DL (ref 8.5–10.1)
CHLORIDE SERPL-SCNC: 104 MMOL/L (ref 100–108)
CO2 SERPL-SCNC: 30 MMOL/L (ref 21–32)
CREAT SERPL-MCNC: 1.07 MG/DL (ref 0.6–1.3)
DIFFERENTIAL METHOD BLD: ABNORMAL
EOSINOPHIL # BLD: 0 K/UL (ref 0–0.4)
EOSINOPHIL NFR BLD: 1 % (ref 0–5)
ERYTHROCYTE [DISTWIDTH] IN BLOOD BY AUTOMATED COUNT: 16 % (ref 11.6–14.5)
ETHANOL SERPL-MCNC: 120 MG/DL (ref 0–3)
GLOBULIN SER CALC-MCNC: 3.7 G/DL (ref 2–4)
GLUCOSE SERPL-MCNC: 96 MG/DL (ref 74–99)
HCT VFR BLD AUTO: 39.4 % (ref 36–48)
HGB BLD-MCNC: 12.7 G/DL (ref 13–16)
LYMPHOCYTES # BLD: 2 K/UL (ref 0.9–3.6)
LYMPHOCYTES NFR BLD: 44 % (ref 21–52)
MCH RBC QN AUTO: 26.2 PG (ref 24–34)
MCHC RBC AUTO-ENTMCNC: 32.2 G/DL (ref 31–37)
MCV RBC AUTO: 81.2 FL (ref 74–97)
MONOCYTES # BLD: 0.2 K/UL (ref 0.05–1.2)
MONOCYTES NFR BLD: 5 % (ref 3–10)
NEUTS SEG # BLD: 2.3 K/UL (ref 1.8–8)
NEUTS SEG NFR BLD: 50 % (ref 40–73)
PLATELET # BLD AUTO: 269 K/UL (ref 135–420)
PMV BLD AUTO: 9.4 FL (ref 9.2–11.8)
POTASSIUM SERPL-SCNC: 3.5 MMOL/L (ref 3.5–5.5)
PROT SERPL-MCNC: 7.9 G/DL (ref 6.4–8.2)
RBC # BLD AUTO: 4.85 M/UL (ref 4.7–5.5)
SODIUM SERPL-SCNC: 141 MMOL/L (ref 136–145)
WBC # BLD AUTO: 4.5 K/UL (ref 4.6–13.2)

## 2017-11-17 PROCEDURE — 74011250637 HC RX REV CODE- 250/637: Performed by: PSYCHIATRY & NEUROLOGY

## 2017-11-17 PROCEDURE — 99285 EMERGENCY DEPT VISIT HI MDM: CPT

## 2017-11-17 PROCEDURE — 85025 COMPLETE CBC W/AUTO DIFF WBC: CPT | Performed by: EMERGENCY MEDICINE

## 2017-11-17 PROCEDURE — 80307 DRUG TEST PRSMV CHEM ANLYZR: CPT | Performed by: EMERGENCY MEDICINE

## 2017-11-17 PROCEDURE — 80053 COMPREHEN METABOLIC PANEL: CPT | Performed by: EMERGENCY MEDICINE

## 2017-11-17 RX ORDER — ARIPIPRAZOLE 15 MG/1
15 TABLET ORAL DAILY
Qty: 30 TAB | Refills: 0 | Status: ON HOLD | OUTPATIENT
Start: 2017-11-17 | End: 2018-05-07

## 2017-11-17 RX ORDER — FLUOXETINE HYDROCHLORIDE 20 MG/1
20 CAPSULE ORAL DAILY
Qty: 30 CAP | Refills: 0 | Status: ON HOLD | OUTPATIENT
Start: 2017-11-17 | End: 2018-05-07

## 2017-11-17 RX ORDER — HYDROCHLOROTHIAZIDE 25 MG/1
25 TABLET ORAL DAILY
Qty: 30 TAB | Refills: 0 | Status: ON HOLD | OUTPATIENT
Start: 2017-11-17 | End: 2018-07-18

## 2017-11-17 RX ORDER — AMLODIPINE BESYLATE 10 MG/1
10 TABLET ORAL DAILY
Qty: 30 TAB | Refills: 0 | Status: ON HOLD | OUTPATIENT
Start: 2017-11-17 | End: 2018-05-07

## 2017-11-17 RX ORDER — CARVEDILOL 6.25 MG/1
6.25 TABLET ORAL EVERY 12 HOURS
Qty: 60 TAB | Refills: 0 | Status: ON HOLD | OUTPATIENT
Start: 2017-11-17 | End: 2018-05-07

## 2017-11-17 RX ADMIN — ARIPIPRAZOLE 15 MG: 15 TABLET ORAL at 09:59

## 2017-11-17 RX ADMIN — AMLODIPINE BESYLATE 10 MG: 10 TABLET ORAL at 09:59

## 2017-11-17 RX ADMIN — CARVEDILOL 6.25 MG: 6.25 TABLET, FILM COATED ORAL at 09:59

## 2017-11-17 RX ADMIN — HYDROCHLOROTHIAZIDE 25 MG: 25 TABLET ORAL at 09:59

## 2017-11-17 RX ADMIN — FLUOXETINE 20 MG: 20 CAPSULE ORAL at 09:59

## 2017-11-17 NOTE — BH NOTES
Patient was in good spirits today,  he was asked by this writer,  How are you feeling , he responded  Saying ,\" I'm feeling better\",  He was med compliant, this writer will continue to monitor patient for health and safety.

## 2017-11-17 NOTE — BH NOTES
Patient refused to accept FLU shot despite education and encouragement stating \"the problem with that is they give you the flu in that shot\". Patient has been discharged to [facility] and will follow up with [facility]. Patient has been provided with information regarding mental health follow up care, with medications provided and intake hours for follow up appointment in place. Patient has been educated regarding seeking additional support if needed with information listed on discharge paper work and emergency card provided. Patient has been escorted off unit and provided with HRT ticket for transport to home.

## 2017-11-17 NOTE — BH NOTES
GROUP THERAPY PROGRESS NOTE    Cassie Escobedo is participating in Recreational Therapy. Group time: 30 minutes    Goal orientation: social    Group therapy participation: active    Therapeutic interventions reviewed and discussed: Snack and game show in day room.      Impression of participation: Patient participated in activity

## 2017-11-17 NOTE — PROGRESS NOTES
[address]  Inpatient Progress Note     Date of Service: 11/17/17  Hospital Day: 7     Subjective/Interval History   11/17/17    Treatment Team Notes:  Notes reviewed and/or discussed and report that Bo Epley is behaviorally appropriate. Medication compliant and attending groups. No mentions of disorganization. Patient interview: Bo Epley was interviewed by this writer today. Vital signs reviewed and BP improving. Calm, cooperative. States medications are helpful to control his mood. He states his thoughts are clear and logical.  He states he is focused on the positive today. Pt is medication compliant and denies medication side effects including TD, EPS, akathisia and mood derangements. No complaints. Denies SI, HI and AVH. Objective     Vitals:    11/15/17 0833 11/15/17 1927 11/16/17 0803 11/16/17 1951   BP: 140/89 119/78 139/85 110/71   Pulse: 82 81 80 72   Resp: 16 18 16 16   Temp: 97.7 °F (36.5 °C) 98.4 °F (36.9 °C) 97.7 °F (36.5 °C) 98.7 °F (37.1 °C)   Weight:       Height:           Mental Status Examination     Appearance/Hygiene 47 y.o. AAM. Good hygiene   Behavior/Social Relatedness Appropriate, piercing eye contact at times. Musculoskeletal Gait/Station: appropriate  Tone (flaccid, cogwheeling, spastic): not assessed  Psychomotor (hyperkinetic, hypokinetic): appropriate   Involuntary movements (tics, dyskinesias, akathisia, stereotypies): none   Speech                          Rate, rhythm, volume, fluency and articulation are appropriate   Mood                          euthymic   Affect                                                   Constricted    Thought Process Linear, logical and goal directed. Vagueness, incoherence, circumstantiality, tangentiality, neologisms, perseveration, flight of ideas, or self-contradictory statements not present on assessment   Thought Content and Perceptual Disturbances Delusions are absent.    Denies self-injurious behavior (SIB), suicidal ideation (SI), aggressive behavior or homicidal ideation (HI)     Denies auditory and visual hallucinations   Sensorium and Cognition              AOx4, attention intact, memory intact, language use appropriate, and fund of knowledge age appropriate   Insight              fair   Judgment fair        Assessment/Plan      Psychiatric Diagnoses:   Patient Active Problem List   Diagnosis Code    MDD (major depressive disorder), recurrent, severe, with psychosis ([facility] [address] F33.3     Medical Diagnoses: HTN    Psychosocial and contextual factors:    1. Medication non-compliance   2. Separation from family. Level of impairment/disability: Severe    Nan Parents is a 47 y.o. who is currently slowly improving. Much less bizarre. Likely near baseline. No complaints. Denies SI, HI and AVH. 1.  Psychotic disorder   - Continue aripiprazole to 15mg po every day today. - Continue fluoxetine to 20mg po every day thereafter. 2.  Challenged pt to think positively and to change negative automatic thinking. 3.  Reviewed instructions, risks, benefits and side effects of medications  4. Disposition/Discharge Date: self-care/11-.      Marlon Correia MD  [facility]  Psychiatry

## 2017-11-17 NOTE — BH NOTES
Pt.is a 47year old male with history of depression and past hospitalization at this facility. Pt was admitted for ideations to harm self.      MARILU Contact:  SW met with pt. To discuss d/c plan. SW provided pt with a shelter list because he currently has no income. Pt. Will follow-up with [facility]. Pt is currently denying ideations and hallucinations. Pt. Was assisted with obtaining indigent medications. Pt. Will require a bus pass.  Case was discussed with treating psychiatrist

## 2017-11-18 ENCOUNTER — HOSPITAL ENCOUNTER (EMERGENCY)
Age: 52
Discharge: LEFT BEFORE DISCHARGE | End: 2017-11-18
Attending: EMERGENCY MEDICINE
Payer: SELF-PAY

## 2017-11-18 VITALS
OXYGEN SATURATION: 100 % | RESPIRATION RATE: 14 BRPM | DIASTOLIC BLOOD PRESSURE: 88 MMHG | WEIGHT: 155 LBS | HEART RATE: 90 BPM | BODY MASS INDEX: 24.28 KG/M2 | SYSTOLIC BLOOD PRESSURE: 129 MMHG | TEMPERATURE: 98.1 F

## 2017-11-18 VITALS
DIASTOLIC BLOOD PRESSURE: 72 MMHG | SYSTOLIC BLOOD PRESSURE: 128 MMHG | HEART RATE: 82 BPM | OXYGEN SATURATION: 99 % | TEMPERATURE: 98 F | RESPIRATION RATE: 18 BRPM

## 2017-11-18 LAB
AMPHET UR QL SCN: NEGATIVE
BARBITURATES UR QL SCN: NEGATIVE
BENZODIAZ UR QL: NEGATIVE
CANNABINOIDS UR QL SCN: NEGATIVE
COCAINE UR QL SCN: NEGATIVE
HDSCOM,HDSCOM: NORMAL
METHADONE UR QL: NEGATIVE
OPIATES UR QL: NEGATIVE
PCP UR QL: NEGATIVE

## 2017-11-18 PROCEDURE — 75810000275 HC EMERGENCY DEPT VISIT NO LEVEL OF CARE

## 2017-11-18 NOTE — DISCHARGE INSTRUCTIONS
Depression and Chronic Disease: Care Instructions  Your Care Instructions    A chronic disease is one that you have for a long time. Some chronic diseases can be controlled, but they usually cannot be cured. Depression is common in people with chronic diseases, but it often goes unnoticed. Many people have concerns about seeking treatment for a mental health problem. You may think it's a sign of weakness, or you don't want people to know about it. It's important to overcome these reasons for not seeking treatment. Treating depression or anxiety is good for your health. Follow-up care is a key part of your treatment and safety. Be sure to make and go to all appointments, and call your doctor if you are having problems. It's also a good idea to know your test results and keep a list of the medicines you take. How can you care for yourself at home? Watch for symptoms of depression  The symptoms of depression are often subtle at first. You may think they are caused by your disease rather than depression. Or you may think it is normal to be depressed when you have a chronic disease. If you are depressed you may:  · Feel sad or hopeless. · Feel guilty or worthless. · Not enjoy the things you used to enjoy. · Feel hopeless, as though life is not worth living. · Have trouble thinking or remembering. · Have low energy, and you may not eat or sleep well. · Pull away from others. · Think often about death or killing yourself. (Keep the numbers for these national suicide hotlines: 2-907-679-TALK [1-766.396.8632] and 4-375-DEMVXCG [1-171.233.1647]. )  Get treatment  By treating your depression, you can feel more hopeful and have more energy. If you feel better, you may take better care of yourself, so your health may improve. · Talk to your doctor if you have any changes in mood during treatment for your disease. · Ask your doctor for help.  Counseling, antidepressant medicine, or a combination of the two can help most people with depression. Often a combination works best. Counseling can also help you cope with having a chronic disease. When should you call for help? Call 911 anytime you think you may need emergency care. For example, call if:  ? · You feel like hurting yourself or someone else. ? · Someone you know has depression and is about to attempt or is attempting suicide. ?Call your doctor now or seek immediate medical care if:  ? · You hear voices. ? · Someone you know has depression and:  ¨ Starts to give away his or her possessions. ¨ Uses illegal drugs or drinks alcohol heavily. ¨ Talks or writes about death, including writing suicide notes or talking about guns, knives, or pills. ¨ Starts to spend a lot of time alone. ¨ Acts very aggressively or suddenly appears calm. ? Watch closely for changes in your health, and be sure to contact your doctor if:  ? · You do not get better as expected. Where can you learn more? Go to http://jasper-teresa.info/. Enter J872 in the search box to learn more about \"Depression and Chronic Disease: Care Instructions. \"  Current as of: May 12, 2017  Content Version: 11.4  © 8253-3391 [employer]wise, [employer]. Care instructions adapted under license by [employer] (which disclaims liability or warranty for this information). If you have questions about a medical condition or this instruction, always ask your healthcare professional. [place] [address] any warranty or liability for your use of this information.

## 2017-11-18 NOTE — CONSULTS
CC: \"I have mental health issues\"    HPI: This is a 51yo AA male with reported past h/o depression and alcohol use d/o, who came to the ED under unclear circumstances. The patient says that he was discharged earlier today from a psychiatric unit with a referral to go to a program tomorrow. He can not recall the name of the program or the type of the program. He says that he went to the hospital for depression and suicidal ideation. He was feeling well this morning. He went to see some friends and then he went to a local [facility] [phone], where he felt disoriented. The staff called 911 on his behalf. It is unclear whether the patient has had alcohol with his friends. He denies feeling depressed or suicidal at the moment. He also denies any psychotic symptoms. His memory and his attention are impaired. He says that his memory has been a problem in his life. He is unable to give a good history. Past psychiatric history: the patient reports 4 past hospitalizations for depression and suicidal ideation. He has been \"in and out of the hospital\" lately. He reports that he tried to OD once. He denies any history of violence. Substance use history: alcohol use, denies other drug use    Medical history: hypertension    Family history: father has h/o schizophrenia. Denies SA. Trauma: denies    Social history: the patient is a College graduate with a degree in marketing. 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 He has not worked the past few months and lives off of his savings. He has three children but he is not .     Legal history: denies    Mental status examination:  General: lying in bed, good eye contact, appears his stated age, cooperative  Speech: regular tone/volume  Mood: \"OK\"  Affect: restricted, appropriate  Thought process: speaks in very general terms, lack of details  Thought content: no suicidal/homicidal ideation, no delusions elicited  Perceptions: denies AH/VH  Attention: impaired: unable to say the months of the year backwards  Memory: impaired; unable to perform delayed recall  Orientation: oriented to place/person and situation, believes that it is August, does not know the day or the date  Insight: fair  Judgment: fair    Assessment:  51yo AA male with MDD in remission, alcohol use d/o and unspecified memory impairment does not appear to be a danger to self or others. Plan:  Continue current medications. The patient repots that he is able to return to his friend's home tomorrow, obtain his discharge papers and f/o with the assigned program. He reports that he knows how to get there.

## 2017-11-18 NOTE — ED PROVIDER NOTES
HPI Comments: 8:12 PM  The patient is a 47 y.o. Male, some day smoker and occasional drinker with PMHx of HTN, heart murmur and mental health issues, who presents to the ED with concern for his mental well-being. He states that he felt lost today after being discharged from the psychiatric unit of [facility] where he stayed for 7 days prior to today. He, given medication upon discharge, followed a girl that was taking the bus from [facility] to [place] and went to one of his friend's house but does not recall if he drank any alcohol there. He denies drug today as well as being suicidal and homicidal. Pt admits to being homeless. He does not have any history of DM. The history is provided by the patient. No  was used. Past Medical History:   Diagnosis Date    Back pain     Hypertension     MDD (major depressive disorder), recurrent, severe, with psychosis ([facility] [address] 11/11/2017    Psychiatric disorder     hallucinations    Psychotic disorder 11/11/2017       Past Surgical History:   Procedure Laterality Date    HX OTHER SURGICAL      oral sx         History reviewed. No pertinent family history. Social History     Social History    Marital status:      Spouse name: N/A    Number of children: N/A    Years of education: N/A     Occupational History    Not on file. Social History Main Topics    Smoking status: Current Some Day Smoker     Packs/day: 0.25    Smokeless tobacco: Never Used    Alcohol use Yes      Comment: beer    Drug use: Yes     Special: Marijuana, Cocaine    Sexual activity: Not on file     Other Topics Concern    Not on file     Social History Narrative         ALLERGIES: Review of patient's allergies indicates no known allergies. Review of Systems   Psychiatric/Behavioral: Positive for confusion. Negative for suicidal ideas. No HI   All other systems reviewed and are negative.       Vitals:    11/17/17 2347   BP: 125/89   Pulse: 77 Resp: 16   Temp: 97.6 °F (36.4 °C)   SpO2: 99%            Physical Exam   Constitutional: He is oriented to person, place, and time. He appears well-developed and well-nourished. HENT:   Head: Normocephalic and atraumatic. Neck: Neck supple. No JVD present. Cardiovascular: Normal rate and regular rhythm. Pulmonary/Chest: Effort normal and breath sounds normal. No respiratory distress. Abdominal: Soft. He exhibits no distension. There is no tenderness. There is no rebound and no guarding. Musculoskeletal: He exhibits no edema. Neurological: He is alert and oriented to person, place, and time. Skin: Skin is warm and dry. No erythema. Psychiatric: Judgment normal.   Flat affect but denies SI and HI        MDM  Number of Diagnoses or Management Options  Diagnosis management comments: 48 y/o male presents for mental eval    Pt was just discharged from [facility] inpatient psych, will check labs, consult psych  Although at this time, pt denies SI or HI.         Amount and/or Complexity of Data Reviewed  Clinical lab tests: ordered and reviewed      ED Course       Procedures    Vitals:  Patient Vitals for the past 12 hrs:   Temp Pulse Resp BP SpO2   11/17/17 2347 97.6 °F (36.4 °C) 77 16 125/89 99 %       Medications Ordered:  Medications - No data to display    Lab Findings:  Recent Results (from the past 12 hour(s))   ETHYL ALCOHOL    Collection Time: 11/17/17  8:45 PM   Result Value Ref Range    ALCOHOL(ETHYL),SERUM 120 (H) 0 - 3 MG/DL   CBC WITH AUTOMATED DIFF    Collection Time: 11/17/17  8:45 PM   Result Value Ref Range    WBC 4.5 (L) 4.6 - 13.2 K/uL    RBC 4.85 4.70 - 5.50 M/uL    HGB 12.7 (L) 13.0 - 16.0 g/dL    HCT 39.4 36.0 - 48.0 %    MCV 81.2 74.0 - 97.0 FL    MCH 26.2 24.0 - 34.0 PG    MCHC 32.2 31.0 - 37.0 g/dL    RDW 16.0 (H) 11.6 - 14.5 %    PLATELET 978 411 - 395 K/uL    MPV 9.4 9.2 - 11.8 FL    NEUTROPHILS 50 40 - 73 %    LYMPHOCYTES 44 21 - 52 %    MONOCYTES 5 3 - 10 %    EOSINOPHILS 1 0 - 5 %    BASOPHILS 0 0 - 2 %    ABS. NEUTROPHILS 2.3 1.8 - 8.0 K/UL    ABS. LYMPHOCYTES 2.0 0.9 - 3.6 K/UL    ABS. MONOCYTES 0.2 0.05 - 1.2 K/UL    ABS. EOSINOPHILS 0.0 0.0 - 0.4 K/UL    ABS. BASOPHILS 0.0 0.0 - 0.06 K/UL    DF AUTOMATED     METABOLIC PANEL, COMPREHENSIVE    Collection Time: 11/17/17  8:45 PM   Result Value Ref Range    Sodium 141 136 - 145 mmol/L    Potassium 3.5 3.5 - 5.5 mmol/L    Chloride 104 100 - 108 mmol/L    CO2 30 21 - 32 mmol/L    Anion gap 7 3.0 - 18 mmol/L    Glucose 96 74 - 99 mg/dL    BUN 16 7.0 - 18 MG/DL    Creatinine 1.07 0.6 - 1.3 MG/DL    BUN/Creatinine ratio 15 12 - 20      GFR est AA >60 >60 ml/min/1.73m2    GFR est non-AA >60 >60 ml/min/1.73m2    Calcium 9.5 8.5 - 10.1 MG/DL    Bilirubin, total 0.3 0.2 - 1.0 MG/DL    ALT (SGPT) 44 16 - 61 U/L    AST (SGOT) 18 15 - 37 U/L    Alk. phosphatase 69 45 - 117 U/L    Protein, total 7.9 6.4 - 8.2 g/dL    Albumin 4.2 3.4 - 5.0 g/dL    Globulin 3.7 2.0 - 4.0 g/dL    A-G Ratio 1.1 0.8 - 1.7         Progress notes, consult notes, or additional procedure notes:  11:48 PM Consult: I discussed care with Dr. Bryon Trujillo (Telepsych). It was a standard discussion including patient history, chief complaint, available diagnostic results, and predicted treatment course. Pt should be ble to be d/c home. Although would like to have confirmation that pt has some sort of follow up set up.     12:47am  Discussed with carmencita Mcfarland, whom reviewed pt's chart. States pt was supposed to follow up with miriam lehman. He was given list of shelters upon discharge. Diagnosis:   1.  Depression, unspecified depression type        Disposition: Discharged    Follow-up Information     Follow up With Details Comments Contact Info    [employer] Schedule an appointment as soon as possible for a visit in 2 days  [facility] Mares [address] [facility] EMERGENCY DEPT  As needed, If symptoms worsen [address]  [place] [id number] Schedule an appointment as soon as possible for a visit in 2 days  [address] [place] [address]  [phone]           Patient's Medications   Start Taking    No medications on file   Continue Taking    AMLODIPINE (NORVASC) 10 MG TABLET    Take 1 Tab by mouth daily. Indications: hypertension    ARIPIPRAZOLE (ABILIFY) 15 MG TABLET    Take 1 Tab by mouth daily. Indications: DEPRESSION TREATMENT ADJUNCT    CARVEDILOL (COREG) 6.25 MG TABLET    Take 1 Tab by mouth every twelve (12) hours. Indications: hypertension    FLUOXETINE (PROZAC) 20 MG CAPSULE    Take 1 Cap by mouth daily. Indications: major depressive disorder    HYDROCHLOROTHIAZIDE (HYDRODIURIL) 25 MG TABLET    Take 1 Tab by mouth daily. Indications: hypertension   These Medications have changed    No medications on file   Stop Taking    No medications on file       Scribe Attestation     Radha Pugh acting as a scribe for and in the presence of Bailee Mitchell DO      November 17, 2017 at 8:42 PM       Provider Attestation:      I personally performed the services described in the documentation, reviewed the documentation, as recorded by the scribe in my presence, and it accurately and completely records my words and actions.  November 17, 2017 at 8:42 PM - Bailee Mitchell DO

## 2017-11-18 NOTE — ED TRIAGE NOTES
Pt arrives via ems for \"mental health\"  Not suicidal or homicidal  D/c'd from [facility]  homeless

## 2017-11-19 NOTE — ED NOTES
Patient called to triage to see the doctor. Patient didn't answer when called. Patient not found in the waiting room.   Patient called in the parking lot and didn't answer

## 2017-11-19 NOTE — ED TRIAGE NOTES
Pt requesting his blood pressure to be checked. States he was feeling faint and things \"could grow dark. \"  Denies SI/HI

## 2017-11-27 NOTE — DISCHARGE SUMMARY
[facility]  Inpatient Psychiatry   Discharge Summary     Admit date: 11/10/2017    Discharge date and time: 11/17/2017  1:48 PM    Discharge Physician: Mago Alfredo MD    DISCHARGE DIAGNOSES     Psychiatric Diagnoses:        Patient Active Problem List   Diagnosis Code    MD[facility]major [facility] F33.3      Medical Diagnoses: HTN     Psychosocial and contextual factors:                            1.  Medication non-compliance                           2.  Separation from family. Kenzie Youngblood presented to the inpatient unit  for inpatient psychiatric hospitalization after stating if headache does not resolve he will be suicidal.  Pt states headache has been present for years. He is a  and afraid he will fall off of the roof due to dizziness. Upon evaluation by the ED providers, the pt states he had HTN but is not taking his antihypertensive medication. He reported fleeting SI with a plan stating, \"every time I come here I have to cross that bridge.  When I go across it, I feel like jumping off into the water. \"   Pt reports jumping off of a bridge to harm himself in the past and broke his ankle. The pt did well while hospitalized. His delusions decreased and resolved by the end of the hospitalization. He was started on Abilify which was increased to 15mg po QHS which improved depression, psychosis and sleep. The pt tolerated the medication well without medication side effects. The pt was also started on fluoxetine which remained at 20mg po daily. Medically, the pt's demonstrated uncontrolled HTN. He was continued on his home antihypertensive which are listed below under the section entitled, \"Start taking these medications. \"  His HTN was much better controlled on his antihypertensive medications. The pt was not a behavioral concern while hospitalized.   he attended groups, was medication compliant and behaviorally appropriate. Pt denied medication side effects including TD, EPS, akathisia and mood derangements. On 11/17/2017 the pt was deemed psychiatrically stable and discharged to home. The pt denied SI, HI and AVH. DISPOSITION/FOLLOW-UP     Disposition: self-care    Follow-up Appointments:  [facility] JAIDA HyattSharonjames [place], Nolan Boyd phone [phone]. Pt. Need to go to the intake office M-F from 8:00-11:30 and 2:00-4:30      MEDICATION CHANGES   Outpatient medications:  No current facility-administered medications on file prior to encounter. No current outpatient prescriptions on file prior to encounter. Medications discontinued during hospitalization:  Medications Discontinued During This Encounter   Medication Reason    hydroCHLOROthiazide (HYDRODIURIL) tablet 12.5 mg     ARIPiprazole (ABILIFY) tablet 5 mg     ARIPiprazole (ABILIFY) tablet 10 mg     ARIPiprazole (ABILIFY) tablet 15 mg Patient Discharge    FLUoxetine (PROzac) capsule 20 mg Patient Discharge    carvedilol (COREG) tablet 6.25 mg Patient Discharge    amLODIPine (NORVASC) tablet 10 mg Patient Discharge    hydroCHLOROthiazide (HYDRODIURIL) tablet 25 mg Patient Discharge    influenza vaccine 2017-18 (3 yrs+)(PF) (FLUZONE QUAD/FLUARIX QUAD) injection 0.5 mL Patient Discharge    haloperidol lactate (HALDOL) injection 5 mg Patient Discharge    haloperidol (HALDOL) tablet 5 mg Patient Discharge    ibuprofen (MOTRIN) tablet 600 mg Patient Discharge    LORazepam (ATIVAN) injection 1-2 mg Patient Discharge    LORazepam (ATIVAN) tablet 1-2 mg Patient Discharge    traZODone (DESYREL) tablet 50 mg Patient Discharge         Discharged medication:  Discharge Medication List as of 11/17/2017 12:30 PM      START taking these medications    Details   amLODIPine (NORVASC) 10 mg tablet Take 1 Tab by mouth daily.  Indications: hypertension, Print, Disp-30 Tab, R-0      ARIPiprazole (ABILIFY) 15 mg tablet Take 1 Tab by mouth daily. Indications: DEPRESSION TREATMENT ADJUNCT, Print, Disp-30 Tab, R-0      carvedilol (COREG) 6.25 mg tablet Take 1 Tab by mouth every twelve (12) hours. Indications: hypertension, Print, Disp-60 Tab, R-0      FLUoxetine (PROZAC) 20 mg capsule Take 1 Cap by mouth daily. Indications: major depressive disorder, Print, Disp-30 Cap, R-0      hydroCHLOROthiazide (HYDRODIURIL) 25 mg tablet Take 1 Tab by mouth daily. Indications: hypertension, Print, Disp-30 Tab, R-0             Instructions, risks, benefits and side effects were discussed in detail prior to discharge. LABS/IMAGING DURING ADMISSION     Results for orders placed or performed during the hospital encounter of 11/10/17   CBC WITH AUTOMATED DIFF   Result Value Ref Range    WBC 4.7 4.6 - 13.2 K/uL    RBC 4.72 4.70 - 5.50 M/uL    HGB 12.4 (L) 13.0 - 16.0 g/dL    HCT 37.7 36.0 - 48.0 %    MCV 79.9 74.0 - 97.0 FL    MCH 26.3 24.0 - 34.0 PG    MCHC 32.9 31.0 - 37.0 g/dL    RDW 15.8 (H) 11.6 - 14.5 %    PLATELET 120 960 - 395 K/uL    MPV 9.5 9.2 - 11.8 FL    NEUTROPHILS 58 40 - 73 %    LYMPHOCYTES 36 21 - 52 %    MONOCYTES 5 3 - 10 %    EOSINOPHILS 1 0 - 5 %    BASOPHILS 0 0 - 2 %    ABS. NEUTROPHILS 2.7 1.8 - 8.0 K/UL    ABS. LYMPHOCYTES 1.7 0.9 - 3.6 K/UL    ABS. MONOCYTES 0.3 0.05 - 1.2 K/UL    ABS. EOSINOPHILS 0.1 0.0 - 0.4 K/UL    ABS.  BASOPHILS 0.0 0.0 - 0.06 K/UL    DF AUTOMATED     METABOLIC PANEL, BASIC   Result Value Ref Range    Sodium 141 136 - 145 mmol/L    Potassium 3.7 3.5 - 5.5 mmol/L    Chloride 106 100 - 108 mmol/L    CO2 28 21 - 32 mmol/L    Anion gap 7 3.0 - 18 mmol/L    Glucose 92 74 - 99 mg/dL    BUN 12 7.0 - 18 MG/DL    Creatinine 1.06 0.6 - 1.3 MG/DL    BUN/Creatinine ratio 11 (L) 12 - 20      GFR est AA >60 >60 ml/min/1.73m2    GFR est non-AA >60 >60 ml/min/1.73m2    Calcium 8.5 8.5 - 10.1 MG/DL   URINALYSIS W/ RFLX MICROSCOPIC   Result Value Ref Range    Color YELLOW      Appearance HAZY      Specific gravity 1.028 1.003 - 1.030      pH (UA) 5.0 5.0 - 8.0      Protein TRACE (A) NEG mg/dL    Glucose NEGATIVE  NEG mg/dL    Ketone TRACE (A) NEG mg/dL    Bilirubin NEGATIVE  NEG      Blood NEGATIVE  NEG      Urobilinogen 0.2 0.2 - 1.0 EU/dL    Nitrites NEGATIVE  NEG      Leukocyte Esterase NEGATIVE  NEG     DRUG SCREEN, URINE   Result Value Ref Range    BENZODIAZEPINES NEGATIVE  NEG      BARBITURATES NEGATIVE  NEG      THC (TH-CANNABINOL) NEGATIVE  NEG      OPIATES NEGATIVE  NEG      PCP(PHENCYCLIDINE) NEGATIVE  NEG      COCAINE NEGATIVE  NEG      AMPHETAMINES NEGATIVE  NEG      METHADONE NEGATIVE       HDSCOM (NOTE)    ETHYL ALCOHOL   Result Value Ref Range    ALCOHOL(ETHYL),SERUM <3 0 - 3 MG/DL   SALICYLATE   Result Value Ref Range    Salicylate level <7.9 (L) 2.8 - 20.0 MG/DL   ACETAMINOPHEN   Result Value Ref Range    Acetaminophen level <2 (L) 10 - 30 ug/mL   URINE MICROSCOPIC ONLY   Result Value Ref Range    WBC 0 to 1 0 - 4 /hpf    RBC 0 to 1 0 - 5 /hpf    Epithelial cells 1+ 0 - 5 /lpf    Bacteria FEW (A) NEG /hpf    Mucus 4+ (A) NEG /lpf    Hyaline cast 0 to 3 0 - 2 /lpf    Granular cast 0 to 1 NEG /lpf   EKG, 12 LEAD, INITIAL   Result Value Ref Range    Ventricular Rate 78 BPM    Atrial Rate 78 BPM    P-R Interval 178 ms    QRS Duration 82 ms    Q-T Interval 394 ms    QTC Calculation (Bezet) 449 ms    Calculated P Axis 63 degrees    Calculated R Axis 6 degrees    Calculated T Axis 53 degrees    Diagnosis       Normal sinus rhythm  Nonspecific T wave abnormality  Abnormal ECG  When compared with ECG of 14-OCT-2017 10:36,  No significant change was found  Confirmed by Dakotah Pink MD, --- ([id number]) on 11/10/2017 9:34:33 AM          DISCHARGE MENTAL STATUS EVALUATION     Appearance/Hygiene 47 y. o. AAMDonnamarie Hedge hygiene   Behavior/Social Relatedness Appropriate, piercing eye contact at times.     Musculoskeletal Gait/Station: appropriate  Tone (flaccid, cogwheeling, spastic): not assessed  Psychomotor (hyperkinetic, hypokinetic): appropriate   Involuntary movements (tics, dyskinesias, akathisia, stereotypies): none   Speech                          Rate, rhythm, volume, fluency and articulation are appropriate   Mood                          euthymic   Affect                                                   Constricted    Thought Process Linear, logical and goal directed.      Vagueness, incoherence, circumstantiality, tangentiality, neologisms, perseveration, flight of ideas, or self-contradictory statements not present on assessment   Thought Content and Perceptual Disturbances Delusions are absent.       Denies self-injurious behavior (SIB), suicidal ideation (SI), aggressive behavior or homicidal ideation (HI)      Denies auditory and visual hallucinations   Sensorium and Cognition              AOx4, attention intact, memory intact, language use appropriate, and fund of knowledge age appropriate   Insight              fair   Judgment fair            SUICIDE RISK ASSESSMENT     [] Admission  [x] Discharge     Key Factors:   Current admission precipitated by suicide attempt?   []  Yes     2    [x]  No     1     Suicide Attempt History  [] Past attempts of high lethality    2 []  Past attempts of low lethality    1 [x]  No previous attempts       0   Suicidal Ideation []  Constant suicidal thoughts      2 []  Intermittent or fleeting suicidal  thoughts  1 [x]  Denies current suicidal thoughts    0   Suicide Plan   []  Has plan with actual OR potential access to planned method    2 []  Has plan without access to planned method      1 [x]  No plan            0   Plan Lethality []  Highly lethal plan (Carbon monoxide, gun, hanging, jumping)    2 []  Moderate lethality of plan          1 [x]  Low lethality of plan (biting, head banging, superficial scratching, pillow over face)  0   Safety Plan Agreement  []  Unwilling OR unable to agree due to impaired reality testing   2   []  Patient is ambivalent and/or guarded      1 [x] Reliably agrees        0   Current Morbid Thoughts (reunion fantasies, preoccupations with death) []  Constantly     2     []  Frequently    1 [x]  Rarely    0   Elopement Risk  []  High risk     2 []  Moderate risk    1 [x]   Low risk    0   Symptoms    []  Hopeless  []  Helpless  []  Anhedonia   []  Guilt/shame  []  Anger/rage  []  Anxiety  []  Insomnia   []  Agitation   []  Impulsivity  []  5-6 symptoms present    2 []  3-4 symptoms present    1  [x]  0-2 symptoms present    0     Scoring Key:  10 or higher = Imminent Risk (consider 1:1)  4 - 9 = Moderate Risk (consider q 15 minute observation)Attended alcohol, tobacco, prescription and other drug psychoeducation group.   0 - 3 = Low Risk (consider q 30 minute observation)    Total Score: 1  ------------------------------------------------------------------------------------------------------------------  PLEASE ADDRESS THE FOLLOWING 5 ISSUES     Physician's Subjective Appraisal of Risk (check one):  []  Patient replies not trustworthy: several non-verbal cues. []  Patient replies questionable: trustworthy: at least 1 non-verbal cue. [x]  Patient replies appear trustworthy. Family History of Suicide?    []  Yes  [x]  No    Protective measures (select all that apply):  [x]  Successful past responses to stress  [x]  Spiritual/Druze beliefs  [x]  Capacity for reality testing  [x]  Positive therapeutic relationships  [x]  Social supports/connections  [x]  Positive coping skills  [x]  Frustration tolerance/optimism  []  Children or pets in the home  []  Sense of responsibility to family  [x]  Agrees to treatment plan and follow up    Others (list):    High Risk Diagnoses (select all that apply):  [x]  Depression/Bipolar Disorder  []  Dual Diagnosis  []  Cardiovascular Disease  []  Schizophrenia  []  Chronic Pain  []  Epilepsy  []  Cancer  []  Personality Disorder  []  HIV/AIDS  []  Multiple Sclerosis    Dangerousness Assessment (Suicide, homicide, property destruction. ..)    Risk Factors reviewed and risk assessed to be:  [] low  [] low-moderate  [] moderate   [] moderate-high  [] high     Protection factors reviewed and risk assessed to be:  [] low  [] low-moderate  [] moderate   [] moderate-high  [] high     Response to treatment and risk assessed to be:  [] low  [] low-moderate  [] moderate   [] moderate-high  [] high     Support reviewed and risk assessed to be:  [] low  [] low-moderate  [] moderate   [] moderate-high  [] high     Acceptance of Discharge and outpatient treatment reviewed and risk assessed to be:    [] low  [] low-moderate  [] moderate   [] moderate-high  [] high   Overall risk assessed to be:  [] low  [] low-moderate  [] moderate   [] moderate-high  [] high     Completion of discharge was greater than 30 minutes. Over 50% of today's discharge was geared towards counseling and coordination of care.           Nuno Bragg MD  Psychiatry  [facility]

## 2018-05-05 ENCOUNTER — HOSPITAL ENCOUNTER (EMERGENCY)
Age: 53
Discharge: PSYCHIATRIC OR SUBSTANCE USE FACILITY | End: 2018-05-06
Attending: EMERGENCY MEDICINE

## 2018-05-05 DIAGNOSIS — R45.851 SUICIDAL IDEATIONS: Primary | ICD-10-CM

## 2018-05-05 LAB
ANION GAP SERPL CALC-SCNC: 9 MMOL/L (ref 3–18)
BASOPHILS # BLD: 0 K/UL (ref 0–0.06)
BASOPHILS NFR BLD: 0 % (ref 0–2)
BUN SERPL-MCNC: 12 MG/DL (ref 7–18)
BUN/CREAT SERPL: 11 (ref 12–20)
CALCIUM SERPL-MCNC: 8.5 MG/DL (ref 8.5–10.1)
CHLORIDE SERPL-SCNC: 107 MMOL/L (ref 100–108)
CO2 SERPL-SCNC: 29 MMOL/L (ref 21–32)
CREAT SERPL-MCNC: 1.14 MG/DL (ref 0.6–1.3)
DIFFERENTIAL METHOD BLD: ABNORMAL
EOSINOPHIL # BLD: 0 K/UL (ref 0–0.4)
EOSINOPHIL NFR BLD: 1 % (ref 0–5)
ERYTHROCYTE [DISTWIDTH] IN BLOOD BY AUTOMATED COUNT: 16.2 % (ref 11.6–14.5)
ETHANOL SERPL-MCNC: 117 MG/DL (ref 0–3)
GLUCOSE SERPL-MCNC: 132 MG/DL (ref 74–99)
HCT VFR BLD AUTO: 41.2 % (ref 36–48)
HGB BLD-MCNC: 13.5 G/DL (ref 13–16)
LYMPHOCYTES # BLD: 1.8 K/UL (ref 0.9–3.6)
LYMPHOCYTES NFR BLD: 36 % (ref 21–52)
MCH RBC QN AUTO: 25.2 PG (ref 24–34)
MCHC RBC AUTO-ENTMCNC: 32.8 G/DL (ref 31–37)
MCV RBC AUTO: 77 FL (ref 74–97)
MONOCYTES # BLD: 0.2 K/UL (ref 0.05–1.2)
MONOCYTES NFR BLD: 5 % (ref 3–10)
NEUTS SEG # BLD: 3 K/UL (ref 1.8–8)
NEUTS SEG NFR BLD: 58 % (ref 40–73)
PLATELET # BLD AUTO: 211 K/UL (ref 135–420)
PMV BLD AUTO: 9.8 FL (ref 9.2–11.8)
POTASSIUM SERPL-SCNC: 3.2 MMOL/L (ref 3.5–5.5)
RBC # BLD AUTO: 5.35 M/UL (ref 4.7–5.5)
SODIUM SERPL-SCNC: 145 MMOL/L (ref 136–145)
WBC # BLD AUTO: 5.1 K/UL (ref 4.6–13.2)

## 2018-05-05 PROCEDURE — 85025 COMPLETE CBC W/AUTO DIFF WBC: CPT | Performed by: EMERGENCY MEDICINE

## 2018-05-05 PROCEDURE — 80048 BASIC METABOLIC PNL TOTAL CA: CPT | Performed by: EMERGENCY MEDICINE

## 2018-05-05 PROCEDURE — 80307 DRUG TEST PRSMV CHEM ANLYZR: CPT | Performed by: EMERGENCY MEDICINE

## 2018-05-05 PROCEDURE — 99285 EMERGENCY DEPT VISIT HI MDM: CPT

## 2018-05-06 ENCOUNTER — HOSPITAL ENCOUNTER (INPATIENT)
Age: 53
LOS: 3 days | Discharge: HOME | DRG: 885 | End: 2018-05-09
Attending: PSYCHIATRY & NEUROLOGY | Admitting: PSYCHIATRY & NEUROLOGY
Payer: SELF-PAY

## 2018-05-06 VITALS
HEIGHT: 66 IN | HEART RATE: 60 BPM | TEMPERATURE: 97.6 F | RESPIRATION RATE: 16 BRPM | SYSTOLIC BLOOD PRESSURE: 119 MMHG | BODY MASS INDEX: 25.23 KG/M2 | WEIGHT: 157 LBS | OXYGEN SATURATION: 98 % | DIASTOLIC BLOOD PRESSURE: 71 MMHG

## 2018-05-06 PROBLEM — F32.9 MAJOR DEPRESSION: Status: ACTIVE | Noted: 2018-05-06

## 2018-05-06 PROCEDURE — 74011250637 HC RX REV CODE- 250/637: Performed by: EMERGENCY MEDICINE

## 2018-05-06 PROCEDURE — 65220000003 HC RM SEMIPRIVATE PSYCH

## 2018-05-06 RX ORDER — POTASSIUM CHLORIDE 20 MEQ/1
40 TABLET, EXTENDED RELEASE ORAL
Status: COMPLETED | OUTPATIENT
Start: 2018-05-06 | End: 2018-05-06

## 2018-05-06 RX ORDER — ZOLPIDEM TARTRATE 10 MG/1
10 TABLET ORAL
Status: DISCONTINUED | OUTPATIENT
Start: 2018-05-06 | End: 2018-05-08

## 2018-05-06 RX ORDER — AMLODIPINE BESYLATE 5 MG/1
5 TABLET ORAL
Status: COMPLETED | OUTPATIENT
Start: 2018-05-06 | End: 2018-05-06

## 2018-05-06 RX ORDER — LORAZEPAM 1 MG/1
2 TABLET ORAL
Status: DISCONTINUED | OUTPATIENT
Start: 2018-05-06 | End: 2018-05-09 | Stop reason: HOSPADM

## 2018-05-06 RX ORDER — HYDROCHLOROTHIAZIDE 25 MG/1
25 TABLET ORAL
Status: COMPLETED | OUTPATIENT
Start: 2018-05-06 | End: 2018-05-06

## 2018-05-06 RX ORDER — CARVEDILOL 12.5 MG/1
6.25 TABLET ORAL
Status: COMPLETED | OUTPATIENT
Start: 2018-05-06 | End: 2018-05-06

## 2018-05-06 RX ORDER — ADHESIVE BANDAGE
30 BANDAGE TOPICAL DAILY PRN
Status: DISCONTINUED | OUTPATIENT
Start: 2018-05-06 | End: 2018-05-09 | Stop reason: HOSPADM

## 2018-05-06 RX ORDER — CLONIDINE HYDROCHLORIDE 0.1 MG/1
0.2 TABLET ORAL
Status: COMPLETED | OUTPATIENT
Start: 2018-05-06 | End: 2018-05-06

## 2018-05-06 RX ORDER — IBUPROFEN 400 MG/1
400 TABLET ORAL
Status: DISCONTINUED | OUTPATIENT
Start: 2018-05-06 | End: 2018-05-09 | Stop reason: HOSPADM

## 2018-05-06 RX ORDER — LORAZEPAM 1 MG/1
4 TABLET ORAL
Status: DISCONTINUED | OUTPATIENT
Start: 2018-05-06 | End: 2018-05-09 | Stop reason: HOSPADM

## 2018-05-06 RX ORDER — BENZTROPINE MESYLATE 1 MG/ML
2 INJECTION INTRAMUSCULAR; INTRAVENOUS
Status: DISCONTINUED | OUTPATIENT
Start: 2018-05-06 | End: 2018-05-09 | Stop reason: HOSPADM

## 2018-05-06 RX ORDER — OLANZAPINE 5 MG/1
5 TABLET ORAL
Status: DISCONTINUED | OUTPATIENT
Start: 2018-05-06 | End: 2018-05-09 | Stop reason: HOSPADM

## 2018-05-06 RX ORDER — BENZTROPINE MESYLATE 2 MG/1
2 TABLET ORAL
Status: DISCONTINUED | OUTPATIENT
Start: 2018-05-06 | End: 2018-05-09 | Stop reason: HOSPADM

## 2018-05-06 RX ORDER — LORAZEPAM 2 MG/ML
2 INJECTION INTRAMUSCULAR
Status: DISCONTINUED | OUTPATIENT
Start: 2018-05-06 | End: 2018-05-07

## 2018-05-06 RX ORDER — IBUPROFEN 200 MG
1 TABLET ORAL
Status: DISCONTINUED | OUTPATIENT
Start: 2018-05-06 | End: 2018-05-09 | Stop reason: HOSPADM

## 2018-05-06 RX ORDER — LORAZEPAM 1 MG/1
1 TABLET ORAL
Status: COMPLETED | OUTPATIENT
Start: 2018-05-06 | End: 2018-05-06

## 2018-05-06 RX ORDER — ACETAMINOPHEN 325 MG/1
650 TABLET ORAL
Status: DISCONTINUED | OUTPATIENT
Start: 2018-05-06 | End: 2018-05-09 | Stop reason: HOSPADM

## 2018-05-06 RX ORDER — LORAZEPAM 1 MG/1
1 TABLET ORAL
Status: DISCONTINUED | OUTPATIENT
Start: 2018-05-06 | End: 2018-05-07

## 2018-05-06 RX ADMIN — HYDROCHLOROTHIAZIDE 25 MG: 25 TABLET ORAL at 12:05

## 2018-05-06 RX ADMIN — CLONIDINE HYDROCHLORIDE 0.2 MG: 0.1 TABLET ORAL at 15:08

## 2018-05-06 RX ADMIN — AMLODIPINE BESYLATE 5 MG: 5 TABLET ORAL at 15:08

## 2018-05-06 RX ADMIN — CARVEDILOL 6.25 MG: 12.5 TABLET, FILM COATED ORAL at 12:05

## 2018-05-06 RX ADMIN — AMLODIPINE BESYLATE 5 MG: 5 TABLET ORAL at 12:06

## 2018-05-06 RX ADMIN — POTASSIUM CHLORIDE 40 MEQ: 20 TABLET, EXTENDED RELEASE ORAL at 18:26

## 2018-05-06 RX ADMIN — LORAZEPAM 1 MG: 1 TABLET ORAL at 20:18

## 2018-05-06 NOTE — PROGRESS NOTES
[facility] psych resident paged. Resident called back and states no bed available at this time.

## 2018-05-06 NOTE — ED NOTES
6:00AM :Pt care assumed from Dr. Laura Phan , ED provider. Pt complaint(s), current treatment plan, progression and available diagnostic results have been discussed thoroughly. Rounding occurred: yes  Intended Disposition: admit  Pending diagnostic reports and/or labs (please list): Awaiting Placement    10:36 AM awaiting for psych placement. Pt is stable and in no distress.  Juan Luis Mathur made me aware of high blood pressure. Pt's  daily meds for today are written by myself. Awaiting acceptance to a facility. Voluntarily and medically cleared last night by Dr. Laura Phan    3:00 PM : Pt care transferred to Dr. Germania Wright  ,ED provider/[facility]. History of patient complaint(s), available diagnostic reports and current treatment plan has been discussed thoroughly. Bedside rounding on patient occured : yes . Intended disposition of patient : ADMIT  Pending diagnostics reports and/or labs (please list): Awaiting Placement     Scribe [id number] [facility] [id number] acting as a scribe for and in the presence of  Chayito Little MD        May 06, 2018 at 6:00 AM       Provider Attestation:      I personally performed the services described in the documentation, reviewed the documentation, as recorded by the scribe in my presence, and it accurately and completely records my words and actions.  May 06, 2018 at 6:00 AM - Chayito Little MD      1500: nurse made me aware that patient is still hypertensive and I am currently adding additional medications to care for patients' hypertension

## 2018-05-06 NOTE — PROGRESS NOTES
Psychiatrist wanted pt's blood pressure monitored for another hour before accepting pt. ED RN made aware.

## 2018-05-06 NOTE — IP AVS SNAPSHOT
[facility] 
 
 
 [address] Juany Knowles Patient: Fely Dunaway MRN: [id number] :1965 About your hospitalization You were admitted on:  May 6, 2018 You last received care in the:  [facility]. 291 You were discharged on:  May 9, 2018 Why you were hospitalized Your primary diagnosis was:  Mdd (Major Depressive Disorder), Recurrent, Severe, With Psychosis (Hcc) Your diagnoses also included:  Alcohol Abuse, Malingering, Hypertension Follow-up Information Follow up With Details Comments Contact Info Carmelo Jones today Intake appointment for services. Due to the limited slots, you may want to arrive by 7:00am. The doors open at 7:30am and you should sign in at the registration desk. [address]  [address] Hours: Monday-Friday 8:30am- 12:00PM & 1:00PM-4:30pm  
[phone] (phone) [phone] (fax) [facility] will provide case management and outreach to homeless individuals. Address: [address] [place] [address],Third Floor, [facility], [facility] Phone: [phone] Housing Crisis Line [phone]. Call This is the number given to you in the hospital to call. When shelters have space available, clients who have accessed the [address] [place] [address] and who are homeless will be matched with shelter. The [facility] Go today This is the shelter that the housing crisis hotline referred for you to go to today. Address: [address], [facility], [address] Phone: [phone] None   None (395) Patient stated that they have no PCP Discharge Orders None A check preeti indicates which time of day the medication should be taken. My Medications CHANGE how you take these medications Instructions Each Dose to Equal  
 Morning Noon Evening Bedtime * hydroCHLOROthiazide 25 mg tablet Commonly known as:  HYDRODIURIL What changed:  Another medication with the same name was added. Make sure you understand how and when to take each. Your last dose was: Your next dose is: Take 1 Tab by mouth daily. Indications: hypertension 25 mg  
    
   
   
   
  
 * hydroCHLOROthiazide 25 mg tablet Commonly known as:  HYDRODIURIL Start taking on:  5/10/2018 What changed: You were already taking a medication with the same name, and this prescription was added. Make sure you understand how and when to take each. Your last dose was: Your next dose is: Take 1 Tab by mouth daily for 14 days. Indications: hypertension 25 mg  
    
   
   
   
  
 sertraline 50 mg tablet Commonly known as:  ZOLOFT Start taking on:  5/10/2018 What changed:   
- medication strength 
- how much to take Your last dose was: Your next dose is: Take 1 Tab by mouth daily for 14 days. Indications: major depressive disorder 50 mg  
    
   
   
   
  
 * Notice: This list has 2 medication(s) that are the same as other medications prescribed for you. Read the directions carefully, and ask your doctor or other care provider to review them with you. STOP taking these medications   
 atorvastatin 20 mg tablet Commonly known as:  LIPITOR Where to Get Your Medications Information on where to get these meds will be given to you by the nurse or doctor. ! Ask your nurse or doctor about these medications  
  hydroCHLOROthiazide 25 mg tablet  
 sertraline 50 mg tablet Discharge Instructions Shauna Laboy [facility]  [phone] [facility] [place] [address] [phone] [place] [address]  [phone] [employer] 09-   [phone] Comqhpt-  [phone] 6 [facility]  [phone] [address] [place] [address]  [phone] MyChart Announcement We are excited to announce that we are making your provider's discharge notes available to you in [facility]. You will see these notes when they are completed and signed by the physician that discharged you from your recent hospital stay. If you have any questions or concerns about any information you see in [facility], please call the Health Information Department where you were seen or reach out to your Primary Care Provider for more information about your plan of care. Introducing \[facility] [place] [facility]\"" & HEALTH SERVICES! Socorro Sandhu introduces [facility] patient portal. Now you can access parts of your medical record, email your doctor's office, and request medication refills online. 1. In your internet browser, go to [email or web address] 2. Click on the First Time User? Click Here link in the Sign In box. You will see the New Member Sign Up page. 3. Enter your [facility] Access Code exactly as it appears below. You will not need to use this code after youve completed the sign-up process. If you do not sign up before the expiration date, you must request a new code. · [facility] Access Code: [email or web address] Expires: 8/3/2018  9:03 PM 
 
4. Enter the last four digits of your Social Security Number (xxxx) and Date of Birth (mm/dd/yyyy) as indicated and click Submit. You will be taken to the next sign-up page. 5. Create a [facility] ID. This will be your [facility] login ID and cannot be changed, so think of one that is secure and easy to remember. 6. Create a [facility] password. You can change your password at any time. 7. Enter your Password Reset Question and Answer. This can be used at a later time if you forget your password. 8. Enter your e-mail address. You will receive e-mail notification when new information is available in [address] 9. Click Sign Up. You can now view and download portions of your medical record.  
10. Click the Download Summary menu link to download a portable copy of your medical information. If you have questions, please visit the Frequently Asked Questions section of the Movatuhart website. Remember, N(i)Â² is NOT to be used for urgent needs. For medical emergencies, dial 911. Now available from your iPhone and Android! Introducing Mino Muniz As a [facility]anin Pettit patient, I wanted to make you aware of our electronic visit tool called Mino Muniz. [facility]in [employer] 24/7 allows you to connect within minutes with a medical provider 24 hours a day, seven days a week via a mobile device or tablet or logging into a secure website from your computer. You can access Mino Muniz from anywhere in the [place]. A virtual visit might be right for you when you have a simple condition and feel like you just dont want to get out of bed, or cant get away from work for an appointment, when your regular [facility] provider is not available (evenings, weekends or holidays), or when youre out of town and need minor care. Electronic visits cost only $49 and if the Nazanin [email or web address] provider determines a prescription is needed to treat your condition, one can be electronically transmitted to a nearby pharmacy*. Please take a moment to enroll today if you have not already done so. The enrollment process is free and takes just a few minutes. To enroll, please download the eSoft/[email or web address].tv fish to your tablet or phone, or visit [email or web address] to enroll on your computer. And, as an [address] [place] [address] patient with a C-Note account, the results of your visits will be scanned into your electronic medical record and your primary care provider will be able to view the scanned results. We urge you to continue to see your regular [facility] provider for your ongoing medical care.   And while your primary care provider may not be the one available when you seek a Mino Muniz virtual visit, the peace of mind you get from getting a real diagnosis real time can be priceless. For more information on Mino Muniz, view our Frequently Asked Questions (FAQs) at [email or web address]. Sincerely, 
 
Sarah Gan MD 
Chief Medical Officer [employer] *:  certain medications cannot be prescribed via Mino Muniz Providers Seen During Your Hospitalization Provider Specialty Primary office phone Kyle Mariee MD Psychiatry [phone] Your Primary Care Physician (PCP) Primary Care Physician Office Phone Office Fax NONE ** None ** ** None ** You are allergic to the following No active allergies Recent Documentation Smoking Status Current Some Day Smoker Emergency Contacts Name Discharge Info Relation Home Work Mobile Skye Barney DISCHARGE CAREGIVER [3] Friend [5] [phone] Patient Belongings The following personal items are in your possession at time of discharge: 
  Dental Appliances: None  Visual Aid: None      Home Medications: None   Jewelry: None  Clothing: Footwear, Shirt, Shorts, Socks, Sweater, Undergarments    Other Valuables: Lighter/matches, Money (comment), Personal toiletries (1 dollar 37 cents, book bag)  Personal Items Sent to Safe: see valuables card Please provide this summary of care documentation to your next provider. Signatures-by signing, you are acknowledging that this After Visit Summary has been reviewed with you and you have received a copy. Patient Signature:  ____________________________________________________________ Date:  ____________________________________________________________  
  
[facility] Provider Signature:  ____________________________________________________________ Date:  ____________________________________________________________

## 2018-05-06 NOTE — ED TRIAGE NOTES
Patient reports he is suicidal because he has been trying to get back in track with live. He wants to jump off from everything that's high.

## 2018-05-06 NOTE — ED NOTES
6:59 PM :Pt care assumed from Dr. Chris Lee , ED provider. Pt complaint(s), current treatment plan, progression and available diagnostic results have been discussed thoroughly. Rounding occurred: yes  Intended Disposition: Transfer   Pending diagnostic reports and/or labs (please list): has bed in [place]. Waiting for transfer.

## 2018-05-06 NOTE — IP AVS SNAPSHOT
303 [facility] 
 
 
 [address] Charlie Nabil Dozier Patient: Gurinder Hernandez MRN: [id number] :1965 A check preeti indicates which time of day the medication should be taken. My Medications CHANGE how you take these medications Instructions Each Dose to Equal  
 Morning Noon Evening Bedtime * hydroCHLOROthiazide 25 mg tablet Commonly known as:  HYDRODIURIL What changed:  Another medication with the same name was added. Make sure you understand how and when to take each. Your last dose was: Your next dose is: Take 1 Tab by mouth daily. Indications: hypertension 25 mg  
    
   
   
   
  
 * hydroCHLOROthiazide 25 mg tablet Commonly known as:  HYDRODIURIL Start taking on:  5/10/2018 What changed: You were already taking a medication with the same name, and this prescription was added. Make sure you understand how and when to take each. Your last dose was: Your next dose is: Take 1 Tab by mouth daily for 14 days. Indications: hypertension 25 mg  
    
   
   
   
  
 sertraline 50 mg tablet Commonly known as:  ZOLOFT Start taking on:  5/10/2018 What changed:   
- medication strength 
- how much to take Your last dose was: Your next dose is: Take 1 Tab by mouth daily for 14 days. Indications: major depressive disorder 50 mg  
    
   
   
   
  
 * Notice: This list has 2 medication(s) that are the same as other medications prescribed for you. Read the directions carefully, and ask your doctor or other care provider to review them with you. STOP taking these medications   
 atorvastatin 20 mg tablet Commonly known as:  LIPITOR Where to Get Your Medications Information on where to get these meds will be given to you by the nurse or doctor. ! Ask your nurse or doctor about these medications hydroCHLOROthiazide 25 mg tablet  
 sertraline 50 mg tablet

## 2018-05-06 NOTE — IP AVS SNAPSHOT
Summary of Care Report The Summary of Care report has been created to help improve care coordination. Users with access to [employer] or [address] (Web-based application) may access additional patient information including the Discharge Summary. If you are not currently a [address] user and need more information, please call the number listed below in the [place] [address] section and ask to be connected with Medical Records. Facility Information Name Address Phone [facility] [address] [place] [id number] [phone] Patient Information Patient Name Sex  Clerance Clock ([id number]) Male 1965 Discharge Information Admitting Provider Service Area Unit Sudheer Lewis MD / [phone] [address] [facility] / [phone] Discharge Provider Discharge Date/Time Discharge Disposition Destination (none) 2018 Afternoon (Pending) AHR (none) Patient Language Language ENGLISH [13] Hospital Problems as of 2018  Never Reviewed Class Noted - Resolved Last Modified POA Active Problems * (Principal)MDD (major depressive disorder), recurrent, severe, with psychosis ([facility] [address]  2017 - Present 2018 by Margareth Duncan MD Yes Entered by Mulugeta Wynn MD  
  Alcohol abuse  2018 - Present 2018 by Margareth Duncan MD Yes Entered by Margareth Duncan MD  
  Malingering  2018 - Present 2018 by Margareth Duncan MD Yes Entered by Margareth Duncan MD  
  Hypertension  Unknown - Present 2018 by Margareth Duncan MD Yes Entered by Margareth Duncan MD  
  
Non-Hospital Problems as of 2018  Never Reviewed Class Noted - Resolved Last Modified Active Problems   Major depression  2018 - Present 2018 by Margareth Duncan MD  
 Entered by Tonya Vasquez MD  
  
You are allergic to the following No active allergies Current Discharge Medication List  
  
CONTINUE these medications which have CHANGED Dose & Instructions Dispensing Information Comments * hydroCHLOROthiazide 25 mg tablet Commonly known as:  HYDRODIURIL What changed:  Another medication with the same name was added. Make sure you understand how and when to take each. Dose:  25 mg Take 1 Tab by mouth daily. Indications: hypertension Quantity:  30 Tab Refills:  0  
   
 * hydroCHLOROthiazide 25 mg tablet Commonly known as:  HYDRODIURIL Start taking on:  5/10/2018 What changed: You were already taking a medication with the same name, and this prescription was added. Make sure you understand how and when to take each. Dose:  25 mg Take 1 Tab by mouth daily for 14 days. Indications: hypertension Quantity:  14 Tab Refills:  0  
   
 sertraline 50 mg tablet Commonly known as:  ZOLOFT Start taking on:  5/10/2018 What changed:   
- medication strength 
- how much to take Dose:  50 mg Take 1 Tab by mouth daily for 14 days. Indications: major depressive disorder Quantity:  14 Tab Refills:  0  
   
 * Notice: This list has 2 medication(s) that are the same as other medications prescribed for you. Read the directions carefully, and ask your doctor or other care provider to review them with you. STOP taking these medications Comments  
 atorvastatin 20 mg tablet Commonly known as:  LIPITOR Current Immunizations Name Date Influenza Vaccine 3/2/2017 Follow-up Information Follow up With Details Comments Contact Info VíctorMarla Aron Jones today Intake appointment for services. Due to the limited slots, you may want to arrive by 7:00am. The doors open at 7:30am and you should sign in at the registration desk. [address] [place] [address] Hours: Monday-Friday 8:30am- 12:00PM & 1:00PM-4:30pm  
[phone] (phone) [phone] (fax) The [facility] will provide case management and outreach to homeless individuals. Address: [address] [place] [address],Third Floor, [place], [facility] Phone: [phone] Housing Crisis Line [phone]. Call This is the number given to you in the hospital to call. When shelters have space available, clients who have accessed the [address] [place] [address] and who are homeless will be matched with shelter. [facility] This is the shelter that the housing crisis hotline referred for you to go to today. Address: Genia Gotti [address] [place] [address] Av [address] Phone: [phone] None   None (395) Patient stated that they have no PCP Discharge Instructions Minor Jovanni Ortega [facility]  [phone] [address] [place] [address] [phone] [place] [address]  [phone] [address]- [phone] [employer]- [phone] 3 [facility]  [phone] [address] [place] [address]  [phone] Chart Review Routing History No Routing History on File

## 2018-05-06 NOTE — CONSULTS
CC: Suicidal ideation    HPI: This is a 51yo AA male with PPHx of depression, who came to the ED today stating that he was suicidal and that he was contomplating jumping off a bridge. He said that he was asked to leave the shelter, where he was residing the last few weeks because he had some liquor and there is zero tolerance for alcohol at that shelter. The patient says that he has been struggling with depression the last two years but he has been feeling more abandoned and hopeless recently. He says that he feels unmotivated, hopeless and helpless. He denies an symptoms of aj or paranoia now or in the past. The patient denies daily alcohol use as well as other drug use. The patient is a rather vague and evasive historian. PPHx: Depression since his late 25s s/p trauma for which he does not want to talk about. Several ER visits and psychiatric hospitalizations. Two past attempts: one by OD and one by jumping off a bridge about two years ago. He broke his leg as a result. He has tried wellbutrin with poor results. He thinks that zoloft might have been better. Substance use history: \"everything during my youth\"; the patient admits to occasional use of alcohol    Medical history: TBI, HTN    Family history: father (schizophrenia), sister (depression and suicide attempt). Social history: Born and raised in [address] [place] [address] primarily by his sister, although he had both parents. He has a GED and he went to technical school. 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 He used to be  and he has three children. He is currently homeless and unemployed and he is not in touch with his family. Legal history: the patient does not want to talk about it.     Mental status exam:  General: good eye contact, cooperative, polite, no abnormal movements  Speech: somewhat dysarthric, regular tone/volume  Mood: \"very depressed\"  Affect: constricted, appropriate  Thought process: circumstantial  Thought content: suicidal ideation/no HI, no delusions elicited  Perceptions: no AH/vH  Insight: fair  Judgment: fair    Assessment:  49yo AA male with major depressive d/o in the context of multiple social stressors and possible alcohol use d/o    Plan:  Admit patient to an inpatient psychiatric unit for treatment and stabilization.   Monitor for alcohol withdrawal.

## 2018-05-06 NOTE — ED NOTES
3:28 PM :Pt care assumed from Dr. Lon Boone , ED provider. Pt complaint(s), current treatment plan, progression and available diagnostic results have been discussed thoroughly. Rounding occurred: yes  Intended Disposition: TBD   Pending diagnostic reports and/or labs (please list): waiting for placement    Pt has been accepted to psych facility in [place]. BP improved. K replaced. 7:04 PM : Pt care transferred to Dr. Shana Erwin  ,ED provider. History of patient complaint(s), available diagnostic reports and current treatment plan has been discussed thoroughly. Bedside rounding on patient occured : yes . Intended disposition of patient : TBD  Pending diagnostics reports and/or labs (please list): waiting transport for psych placement.     Oz Butler MD  5/6/2018

## 2018-05-07 PROBLEM — F10.10 ALCOHOL ABUSE: Status: ACTIVE | Noted: 2018-05-07

## 2018-05-07 PROBLEM — Z76.5 MALINGERING: Status: ACTIVE | Noted: 2018-05-07

## 2018-05-07 LAB
CHOLEST SERPL-MCNC: 193 MG/DL
GLUCOSE P FAST SERPL-MCNC: 92 MG/DL (ref 65–100)
HDLC SERPL-MCNC: 42 MG/DL
HDLC SERPL: 4.6 {RATIO} (ref 0–5)
LDLC SERPL CALC-MCNC: 111.4 MG/DL (ref 0–100)
LIPID PROFILE,FLP: ABNORMAL
TRIGL SERPL-MCNC: 198 MG/DL (ref ?–150)
TSH SERPL DL<=0.05 MIU/L-ACNC: 0.57 UIU/ML (ref 0.36–3.74)
VLDLC SERPL CALC-MCNC: 39.6 MG/DL

## 2018-05-07 PROCEDURE — 65220000003 HC RM SEMIPRIVATE PSYCH

## 2018-05-07 PROCEDURE — 36415 COLL VENOUS BLD VENIPUNCTURE: CPT | Performed by: PSYCHIATRY & NEUROLOGY

## 2018-05-07 PROCEDURE — 80061 LIPID PANEL: CPT | Performed by: PSYCHIATRY & NEUROLOGY

## 2018-05-07 PROCEDURE — 74011250637 HC RX REV CODE- 250/637: Performed by: PSYCHIATRY & NEUROLOGY

## 2018-05-07 PROCEDURE — 84443 ASSAY THYROID STIM HORMONE: CPT | Performed by: PSYCHIATRY & NEUROLOGY

## 2018-05-07 PROCEDURE — 82947 ASSAY GLUCOSE BLOOD QUANT: CPT | Performed by: PSYCHIATRY & NEUROLOGY

## 2018-05-07 RX ORDER — SERTRALINE HYDROCHLORIDE 50 MG/1
50 TABLET, FILM COATED ORAL DAILY
Status: DISCONTINUED | OUTPATIENT
Start: 2018-05-08 | End: 2018-05-07

## 2018-05-07 RX ORDER — ARIPIPRAZOLE 5 MG/1
5 TABLET ORAL DAILY
Status: ON HOLD | COMMUNITY
End: 2018-05-07

## 2018-05-07 RX ORDER — ATORVASTATIN CALCIUM 20 MG/1
20 TABLET, FILM COATED ORAL
COMMUNITY
End: 2018-05-09

## 2018-05-07 RX ORDER — SERTRALINE HYDROCHLORIDE 100 MG/1
100 TABLET, FILM COATED ORAL DAILY
COMMUNITY
End: 2018-05-09

## 2018-05-07 RX ORDER — ARIPIPRAZOLE 5 MG/1
5 TABLET ORAL DAILY
Status: DISCONTINUED | OUTPATIENT
Start: 2018-05-08 | End: 2018-05-07

## 2018-05-07 RX ORDER — SERTRALINE HYDROCHLORIDE 50 MG/1
25 TABLET, FILM COATED ORAL DAILY
Status: DISCONTINUED | OUTPATIENT
Start: 2018-05-07 | End: 2018-05-08

## 2018-05-07 RX ORDER — HYDROCHLOROTHIAZIDE 25 MG/1
25 TABLET ORAL DAILY
Status: DISCONTINUED | OUTPATIENT
Start: 2018-05-07 | End: 2018-05-09 | Stop reason: HOSPADM

## 2018-05-07 RX ORDER — ATORVASTATIN CALCIUM 10 MG/1
20 TABLET, FILM COATED ORAL
Status: DISCONTINUED | OUTPATIENT
Start: 2018-05-07 | End: 2018-05-07

## 2018-05-07 RX ORDER — SERTRALINE HYDROCHLORIDE 50 MG/1
100 TABLET, FILM COATED ORAL DAILY
Status: DISCONTINUED | OUTPATIENT
Start: 2018-05-08 | End: 2018-05-07

## 2018-05-07 RX ADMIN — ACETAMINOPHEN 650 MG: 325 TABLET, FILM COATED ORAL at 16:06

## 2018-05-07 RX ADMIN — IBUPROFEN 400 MG: 400 TABLET ORAL at 18:00

## 2018-05-07 RX ADMIN — OLANZAPINE 5 MG: 5 TABLET, FILM COATED ORAL at 21:19

## 2018-05-07 RX ADMIN — SERTRALINE HYDROCHLORIDE 25 MG: 50 TABLET ORAL at 14:02

## 2018-05-07 RX ADMIN — LORAZEPAM 4 MG: 1 TABLET ORAL at 19:24

## 2018-05-07 RX ADMIN — HYDROCHLOROTHIAZIDE 25 MG: 25 TABLET ORAL at 14:02

## 2018-05-07 RX ADMIN — ZOLPIDEM TARTRATE 10 MG: 10 TABLET ORAL at 21:19

## 2018-05-07 NOTE — PROGRESS NOTES
[facility] Medication Reconciliation     Recommendations/Findings:   1) Suspect non-compliance with medications. Patient was not able to tell me when he last took his medications. However, he did pick them up from his pharmacy the middle of April. 2) Patient is not a good medication historian. He thought sertraline was for his blood pressure. 3) Removed: aripiprazole (last filled 3/6/18, no refills)    Total Time Spent: 10 minutes    Information obtained from: [facility] in [place] ([phone])    Patient allergies: Allergies as of 05/06/2018    (No Known Allergies)       Prior to Admission Medications   Prescriptions Last Dose Informant Patient Reported? Taking?   atorvastatin (LIPITOR) 20 mg tablet   Yes Yes   Sig: Take 20 mg by mouth nightly. Indications: hyperlipidemia   hydroCHLOROthiazide (HYDRODIURIL) 25 mg tablet 4/7/2018 at Unknown time  No Yes   Sig: Take 1 Tab by mouth daily. Indications: hypertension   sertraline (ZOLOFT) 100 mg tablet 4/7/2018 at Unknown time  Yes Yes   Sig: Take 100 mg by mouth daily.  Indications: major depressive disorder      Facility-Administered Medications: None       Thank you,  Jeremy Rosado, PHARMD, BCPS

## 2018-05-07 NOTE — BH NOTES
GROUP THERAPY PROGRESS NOTE    The patient Suyapa nix 47 y.o. male is participating in Dimdim. Group time: 45 minutes    Personal goal for participation: To participate in mental health Wooshii game    Goal orientation:  personal    Group therapy participation: active    Therapeutic interventions reviewed and discussed: choices in recovery    Impression of participation:  The patient was attentive.     Quirino Buck  5/7/2018  2:22 PM

## 2018-05-07 NOTE — H&P
INITIAL PSYCHIATRIC EVALUATION            IDENTIFICATION:    Patient Name  Veronica Cervantes   Date of Birth 1965   [facility] [id number]   Medical Record Number  [id number]      Age  47 y.o. PCP None   Admit date:  5/6/2018    Room Number  321/01  @ [facility]   Date of Service  5/7/2018            HISTORY         REASON FOR HOSPITALIZATION:  CC: \"was homeless\". Pt admitted under a voluntary basis for depression with suicidal ideations proving to be an imminent danger to self . HISTORY OF PRESENT ILLNESS:    The patient, Veroinca Cervantes, is a 47 y.o. BLACK OR  male with a past psychiatric history significant for depression, who presents at this time with complaints of (and/or evidence of) the following emotional symptoms: suicidal thoughts/threats. Additional symptomatology include alcohol abuse, difficulty sleeping, increased irritability and \"I don't want to go on living like this\". Pt was admitted voluntarily stating that he was contomplating jumping off a bridge. Pt said that he was asked to leave the shelter, where he was residing the last few weeks because he had \"one beer\" and there is zero tolerance for alcohol at shelter - [facility]. Pt is vague describing his sx but states he was taking AD after he was dc from [facility] in 2017. Pt has previous suicide attempts. Pt denies aj or psychosis. The above symptoms have been present for 1 day. These symptoms are of moderate severity. These symptoms are intermittent/ fleeting in nature. The patient's condition has been precipitated by and psychosocial stressors (homeless, alcohol use ). Patient's condition made worse by alcohol use as well as treatment noncompliance. UDS: negative; BAL=117     ALLERGIES: No Known Allergies   MEDICATIONS PRIOR TO ADMISSION:   Prescriptions Prior to Admission   Medication Sig    sertraline (ZOLOFT) 100 mg tablet Take 100 mg by mouth daily.     atorvastatin (LIPITOR) 20 mg tablet Take 20 mg by mouth nightly. Indications: hyperlipidemia    ARIPiprazole (ABILIFY) 5 mg tablet Take 5 mg by mouth daily.  hydroCHLOROthiazide (HYDRODIURIL) 25 mg tablet Take 1 Tab by mouth daily. Indications: hypertension      PAST MEDICAL HISTORY:   Past Medical History:   Diagnosis Date    Back pain     Hypertension     MDD (major depressive disorder), recurrent, severe, with psychosis ([facility] [address] 11/11/2017    Psychiatric disorder     hallucinations    Psychotic disorder 11/11/2017     Past Surgical History:   Procedure Laterality Date    HX OTHER SURGICAL      oral sx      SOCIAL HISTORY:    Social History     Social History    Marital status:      Spouse name: N/A    Number of children: N/A    Years of education: N/A     Occupational History    Not on file. Social History Main Topics    Smoking status: Current Some Day Smoker     Packs/day: 0.25    Smokeless tobacco: Never Used    Alcohol use Yes      Comment: beer once prior to admission- had been sober     Drug use: Yes     Special: Marijuana, Cocaine      Comment: denies current use.  Sexual activity: Not on file     Other Topics Concern    Not on file     Social History Narrative     but  with 2 daughters. Homeless and was asked to leave due to his drinking    GED    No current legal charges pending but has possession charges in the past.          FAMILY HISTORY: History reviewed. No pertinent family history. No family history on file. REVIEW OF SYSTEMS:   Psychological ROS: positive for - behavioral disorder, concentration difficulties, sleep disturbances and suicidal ideation  Pertinent items are noted in the History of Present Illness. All other Systems reviewed and are considered negative.            MENTAL STATUS EXAM & VITALS     MENTAL STATUS EXAM (MSE):    MSE FINDINGS ARE WITHIN NORMAL LIMITS (WNL) UNLESS OTHERWISE STATED BELOW. ( ALL OF THE BELOW CATEGORIES OF THE MSE HAVE BEEN REVIEWED (reviewed 5/7/2018) AND UPDATED AS DEEMED APPROPRIATE )  General Presentation age appropriate and disheveled, unreliable and vague   Orientation oriented to time, place and person   Vital Signs  See below (reviewed 5/7/2018); Vital Signs (BP, Pulse, & Temp) are within normal limits if not listed below.    Gait and Station Stable/steady, no ataxia   Musculoskeletal System No extrapyramidal symptoms (EPS); no abnormal muscular movements or Tardive Dyskinesia (TD); muscle strength and tone are within normal limits   Language No aphasia or dysarthria   Speech:  hypoverbal   Thought Processes logical; slow rate of thoughts; fair abstract reasoning/computation   Thought Associations goal directed   Thought Content free of delusions, free of hallucinations and preoccupations   Suicidal Ideations none and contracts for safety   Homicidal Ideations none   Mood:  anxious  and sad   Affect:  anxious, constricted and mood-congruent   Memory recent  intact   Memory remote:  intact   Concentration/Attention:  distractable   Fund of Knowledge average   Insight:  fair   Reliability poor   Judgment:  poor          VITALS:     Patient Vitals for the past 24 hrs:   Temp Pulse Resp BP   05/07/18 0532 - 60 16 141/79   05/06/18 2215 98.3 °F (36.8 °C) 63 18 (!) 140/98     Wt Readings from Last 3 Encounters:   05/05/18 71.2 kg (157 lb)   11/18/17 70.3 kg (155 lb)   11/12/17 72.6 kg (160 lb)     Temp Readings from Last 3 Encounters:   05/06/18 97.6 °F (36.4 °C)   11/18/17 98.1 °F (36.7 °C)   11/18/17 98 °F (36.7 °C)     BP Readings from Last 3 Encounters:   05/07/18 141/79   05/06/18 119/71   11/18/17 129/88     Pulse Readings from Last 3 Encounters:   05/07/18 60   05/06/18 60   11/18/17 90            DATA     LABORATORY DATA:  Labs Reviewed   LIPID PANEL - Abnormal; Notable for the following:        Result Value    Triglyceride 198 (*)     LDL, calculated 111.4 (*)     All other components within normal limits   TSH 3RD GENERATION GLUCOSE, FASTING   DRUG SCREEN, URINE   POTASSIUM     Admission on 05/06/2018   Component Date Value Ref Range Status    TSH 05/07/2018 0.57  0.36 - 3.74 uIU/mL Final    LIPID PROFILE 05/07/2018        Final    Cholesterol, total 05/07/2018 193  <200 MG/DL Final    Triglyceride 05/07/2018 198* <150 MG/DL Final    HDL Cholesterol 05/07/2018 42  MG/DL Final    LDL, calculated 05/07/2018 111.4* 0 - 100 MG/DL Final    VLDL, calculated 05/07/2018 39.6  MG/DL Final    CHOL/HDL Ratio 05/07/2018 4.6  0 - 5.0   Final    Glucose 05/07/2018 92  65 - 100 MG/DL Final   Admission on 05/05/2018, Discharged on 05/06/2018   Component Date Value Ref Range Status    WBC 05/05/2018 5.1  4.6 - 13.2 K/uL Final    RBC 05/05/2018 5.35  4.70 - 5.50 M/uL Final    HGB 05/05/2018 13.5  13.0 - 16.0 g/dL Final    HCT 05/05/2018 41.2  36.0 - 48.0 % Final    MCV 05/05/2018 77.0  74.0 - 97.0 FL Final    MCH 05/05/2018 25.2  24.0 - 34.0 PG Final    MCHC 05/05/2018 32.8  31.0 - 37.0 g/dL Final    RDW 05/05/2018 16.2* 11.6 - 14.5 % Final    PLATELET 12/26/1212 650  135 - 420 K/uL Final    MPV 05/05/2018 9.8  9.2 - 11.8 FL Final    NEUTROPHILS 05/05/2018 58  40 - 73 % Final    LYMPHOCYTES 05/05/2018 36  21 - 52 % Final    MONOCYTES 05/05/2018 5  3 - 10 % Final    EOSINOPHILS 05/05/2018 1  0 - 5 % Final    BASOPHILS 05/05/2018 0  0 - 2 % Final    ABS. NEUTROPHILS 05/05/2018 3.0  1.8 - 8.0 K/UL Final    ABS. LYMPHOCYTES 05/05/2018 1.8  0.9 - 3.6 K/UL Final    ABS. MONOCYTES 05/05/2018 0.2  0.05 - 1.2 K/UL Final    ABS. EOSINOPHILS 05/05/2018 0.0  0.0 - 0.4 K/UL Final    ABS.  BASOPHILS 05/05/2018 0.0  0.0 - 0.06 K/UL Final    DF 05/05/2018 AUTOMATED    Final    Sodium 05/05/2018 145  136 - 145 mmol/L Final    Potassium 05/05/2018 3.2* 3.5 - 5.5 mmol/L Final    Chloride 05/05/2018 107  100 - 108 mmol/L Final    CO2 05/05/2018 29  21 - 32 mmol/L Final    Anion gap 05/05/2018 9  3.0 - 18 mmol/L Final    Glucose 05/05/2018 132* 74 - 99 mg/dL Final    BUN 05/05/2018 12  7.0 - 18 MG/DL Final    Creatinine 05/05/2018 1.14  0.6 - 1.3 MG/DL Final    BUN/Creatinine ratio 05/05/2018 11* 12 - 20   Final    GFR est AA 05/05/2018 >60  >60 ml/min/1.73m2 Final    GFR est non-AA 05/05/2018 >60  >60 ml/min/1.73m2 Final    Calcium 05/05/2018 8.5  8.5 - 10.1 MG/DL Final    BENZODIAZEPINES 05/05/2018 NEGATIVE   NEG   Final    BARBITURATES 05/05/2018 NEGATIVE   NEG   Final    THC (TH-CANNABINOL) 05/05/2018 NEGATIVE   NEG   Final    OPIATES 05/05/2018 NEGATIVE   NEG   Final    PCP(PHENCYCLIDINE) 05/05/2018 NEGATIVE   NEG   Final    COCAINE 05/05/2018 NEGATIVE   NEG   Final    AMPHETAMINES 05/05/2018 NEGATIVE   NEG   Final    METHADONE 05/05/2018 NEGATIVE   NEG   Final    HDSCOM 05/05/2018 (NOTE)   Final    ALCOHOL(ETHYL),SERUM 05/05/2018 117* 0 - 3 MG/DL Final        RADIOLOGY REPORTS:    Results from Hospital Encounter encounter on 03/06/17   XR CHEST PA LAT   Narrative EXAM: Two-view chest    CLINICAL HISTORY: shortness of breath. ,    COMPARISON: 12/17/2016    FINDINGS:    Frontal and lateral views of the chest demonstrate clear lungs. Cardiac  silhouette is normal in size and contour. No acute bony or soft tissue  abnormality. Impression IMPRESSION:    No acute pulmonary process identified. No results found.            MEDICATIONS       ALL MEDICATIONS  Current Facility-Administered Medications   Medication Dose Route Frequency    [START ON 5/8/2018] hydroCHLOROthiazide (HYDRODIURIL) tablet 25 mg  25 mg Oral DAILY    [START ON 5/8/2018] sertraline (ZOLOFT) tablet 50 mg  50 mg Oral DAILY    LORazepam (ATIVAN) tablet 2 mg  2 mg Oral Q1H PRN    LORazepam (ATIVAN) tablet 4 mg  4 mg Oral Q1H PRN    ziprasidone (GEODON) 20 mg in sterile water (preservative free) 1 mL injection  20 mg IntraMUSCular BID PRN    OLANZapine (ZyPREXA) tablet 5 mg  5 mg Oral Q6H PRN    benztropine (COGENTIN) tablet 2 mg  2 mg Oral BID PRN    benztropine (COGENTIN) injection 2 mg  2 mg IntraMUSCular BID PRN    zolpidem (AMBIEN) tablet 10 mg  10 mg Oral QHS PRN    acetaminophen (TYLENOL) tablet 650 mg  650 mg Oral Q4H PRN    ibuprofen (MOTRIN) tablet 400 mg  400 mg Oral Q8H PRN    magnesium hydroxide (MILK OF MAGNESIA) 400 mg/5 mL oral suspension 30 mL  30 mL Oral DAILY PRN    nicotine (NICODERM CQ) 21 mg/24 hr patch 1 Patch  1 Patch TransDERmal DAILY PRN      SCHEDULED MEDICATIONS  Current Facility-Administered Medications   Medication Dose Route Frequency    [START ON 5/8/2018] hydroCHLOROthiazide (HYDRODIURIL) tablet 25 mg  25 mg Oral DAILY    [START ON 5/8/2018] sertraline (ZOLOFT) tablet 50 mg  50 mg Oral DAILY                ASSESSMENT & PLAN        The patient, Myrna Desouza, is a 47 y.o.  male who presents at this time for treatment of the following diagnoses:  Patient Active Hospital Problem List:   MDD (major depressive disorder), recurrent, moderate vs adjustment disorder    Assessment: voiced SI to jump off the bridge -sad affect. Currently denies active SI/HI. Has tx at [facility]. No psychotic sx    Plan: will restart Zoloft   Alcohol abuse (5/7/2018)    Assessment: states he used beer once- tend to minimize use- BAL- does not co relte with his use    Plan: educate, rehab   Malingering (5/7/2018)    Assessment: sec gain- housing, inconsistent sx presentation    Plan: limit hospital stay   Hypertension ()    Assessment: per hx    Plan: started on low dose med-IM to follow          I will continue to monitor blood levels (Depakote, Tegretol, lithium, clozapine---a drug with a narrow therapeutic index= NTI) and associated labs for drug therapy implemented that require intense monitoring for toxicity as deemed appropriate based on current medication side effects and pharmacodynamically determined drug 1/2 lives.          A coordinated, multidisplinary treatment team (includes the nurse, unit pharmcist,  and writer) round was conducted for this initial evaluation with the patient present. The following regarding medications was addressed during rounds with patient:   the risks and benefits of the proposed medication. The patient was given the opportunity to ask questions. Informed consent given to the use of the above medications. I will continue to adjust psychiatric and non-psychiatric medications (see above \"medication\" section and orders section for details) as deemed appropriate & based upon diagnoses and response to treatment. I have reviewed admission (and previous/old) labs and medical tests in the EHR and or transferring hospital documents. I will continue to order blood tests/labs and diagnostic tests as deemed appropriate and review results as they become available (see orders for details). I have reviewed old psychiatric and medical records available in the EHR. I Will order additional psychiatric records from other institutions to further elucidate the nature of patient's psychopathology and review once available. I will gather additional collateral information fromfriends, family and o/p treatment team to further elucidate the nature of patient's psychopathology and baselline level of psychiatric functioning.       ESTIMATED LENGTH OF STAY:    3 days       STRENGTHS:  Exercising self-direction/Resourceful and Awareness of Substance abuse issues                                        SIGNED:    Patsy Claros MD  5/7/2018

## 2018-05-07 NOTE — BH NOTES
Admission Note:    Pt admitted to [facility] general unit as voluntary status under the services of Dr. Chava Vergara. Pt admitted d/t SI to jump off a bridge. UDS-Neg, BAL-117. On unit, pt is calm and cooperative. He appears to have some mild thought blocking. He admits to feeling depressed. He currently denies SI/HI and denies A/V hallucinations. He verbally contracts for safety to not cause harm to self or others. His blood pressure is elevated and he reports a hx of HTN but he is unsure of what medication he takes. He reports being homeless. Pt denies drinking and any use if illegal drugs. Pt body search unremarkable. Continuing to monitor pt with q15 min safety rounds.

## 2018-05-07 NOTE — CONSULTS
Medical Consult for [facility] Patient    Consult H&P   dictated, see patient chart    Impression:    Ashley Dillard a 47 y.o. male with past medical history of backpain, htn, mental health disease presents with behavioral health problems of suicidal ideation  admitted for further psychiatric evaluation and treatment. Plan:   1. Psychiatry to manage mental health issues  2. Continuing home medications once confirmed. 3. Medically stable at this time, will follow up as needed. 4. Recheck k and replete as indicated. 5. No VTE prophylaxis indicated or necessary at this time.      Thank you  Maximiliano Ford MD  5/7/2018, 8:51 AM
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
## 2018-05-07 NOTE — ED NOTES
Report to [facility] (see EMTALA). Patient is voluntary. [address] [facility] here to transport.

## 2018-05-08 PROCEDURE — 65220000003 HC RM SEMIPRIVATE PSYCH

## 2018-05-08 PROCEDURE — 74011250637 HC RX REV CODE- 250/637: Performed by: PSYCHIATRY & NEUROLOGY

## 2018-05-08 RX ORDER — TRAZODONE HYDROCHLORIDE 50 MG/1
50 TABLET ORAL
Status: DISCONTINUED | OUTPATIENT
Start: 2018-05-08 | End: 2018-05-09 | Stop reason: HOSPADM

## 2018-05-08 RX ORDER — SERTRALINE HYDROCHLORIDE 50 MG/1
50 TABLET, FILM COATED ORAL DAILY
Status: DISCONTINUED | OUTPATIENT
Start: 2018-05-09 | End: 2018-05-09 | Stop reason: HOSPADM

## 2018-05-08 RX ADMIN — HYDROCHLOROTHIAZIDE 25 MG: 25 TABLET ORAL at 08:41

## 2018-05-08 RX ADMIN — TRAZODONE HYDROCHLORIDE 50 MG: 50 TABLET ORAL at 21:40

## 2018-05-08 RX ADMIN — SERTRALINE HYDROCHLORIDE 25 MG: 50 TABLET ORAL at 08:41

## 2018-05-08 NOTE — BH NOTES
GROUP THERAPY PROGRESS NOTE    Ana Bowers is participating in Process Group.      Group time: 20 minutes    Personal goal for participation: Share and reflect     Goal orientation: community    Group therapy participation: active    Therapeutic interventions reviewed and discussed: Goal setting, reflection, anger management, coping skills     Impression of participation: Thought process loose

## 2018-05-08 NOTE — PROGRESS NOTES
Problem: Suicide/Homicide (Adult/Pediatric)  Goal: *STG: Attends activities and groups  Outcome: Progressing Towards Goal  Pt alert, cooperative but requires redirection. Pt delusional, endorses AH/VH. Pt is on CIWA protocol. Last CIWA 18. Pt given 4mg ativan, provider informed. Pt given ambien and zyprexa HS. B/P elevated throughout shift. Pt seen by [facility] today. Staff will continue to monitor for health and safety.

## 2018-05-08 NOTE — BH NOTES
Pt observed resting quietly in room with eyes closed. Respirations even and unlabored. No s/s distress noted, no complaints voiced. Pt slept 6 hours. Will continue Q 15 minute monitoring for safety.

## 2018-05-08 NOTE — BH NOTES
PSYCHIATRIC PROGRESS NOTE         Patient Name  Belia Nicolas   Date of Birth 1965   [facility] [id number]   Medical Record Number  [id number]      Age  47 y.o. PCP None   Admit date:  5/6/2018    Room Number  321/01  @ [facility]   Date of Service  5/8/2018           E & M PROGRESS NOTE:         HISTORY       CC:  \"feeling better\"  HISTORY OF PRESENT ILLNESS/INTERVAL HISTORY:  (reviewed/updated 5/8/2018). per initial evaluation: The patient, Belia Nicolas, is a 47 y.o. BLACK OR  male with a past psychiatric history significant for depression, who presents at this time with complaints of (and/or evidence of) the following emotional symptoms: suicidal thoughts/threats. Additional symptomatology include alcohol abuse, difficulty sleeping, increased irritability and \"I don't want to go on living like this\". Pt was admitted voluntarily stating that he was contomplating jumping off a bridge. Pt said that he was asked to leave the shelter, where he was residing the last few weeks because he had \"one beer\" and there is zero tolerance for alcohol at shelter - [facility]. Pt is vague describing his sx but states he was taking AD after he was dc from [facility] in 2017. Pt has previous suicide attempts. Pt denies aj or psychosis. The above symptoms have been present for 1 day. These symptoms are of moderate severity. These symptoms are intermittent/ fleeting in nature. The patient's condition has been precipitated by and psychosocial stressors (homeless, alcohol use ). Patient's condition made worse by alcohol use as well as treatment noncompliance. UDS: negative; BAL=117      Belia Nicolas presents/reports/evidences the following emotional symptoms today, 5/8/2018:depression. The above symptoms have been present for few days. These symptoms are of moderate severity. The symptoms are intermittent/ fleeting in nature.  Additional symptomatology and features include sleep is improved. Dramatic improvement in affect. No w/d sx reported. Denies active SI/HI. no psychosis or aj. SIDE EFFECTS: (reviewed/updated 5/8/2018)  None reported or admitted to. No noted toxicity with use of current med   ALLERGIES:(reviewed/updated 5/8/2018)  No Known Allergies   MEDICATIONS PRIOR TO ADMISSION:(reviewed/updated 5/8/2018)  Prescriptions Prior to Admission   Medication Sig    sertraline (ZOLOFT) 100 mg tablet Take 100 mg by mouth daily. Indications: major depressive disorder    atorvastatin (LIPITOR) 20 mg tablet Take 20 mg by mouth nightly. Indications: hyperlipidemia    hydroCHLOROthiazide (HYDRODIURIL) 25 mg tablet Take 1 Tab by mouth daily. Indications: hypertension      PAST MEDICAL HISTORY: Past medical history from the initial psychiatric evaluation has been reviewed (reviewed/updated 5/8/2018) with no additional updates (I asked patient and no additional past medical history provided). Past Medical History:   Diagnosis Date    Back pain     Hypertension     MDD (major depressive disorder), recurrent, severe, with psychosis ([facility] [address] 11/11/2017    Psychiatric disorder     hallucinations    Psychotic disorder 11/11/2017     Past Surgical History:   Procedure Laterality Date    HX OTHER SURGICAL      oral sx      SOCIAL HISTORY: Social history from the initial psychiatric evaluation has been reviewed (reviewed/updated 5/8/2018) with no additional updates (I asked patient and no additional social history provided). Social History     Social History    Marital status:      Spouse name: N/A    Number of children: N/A    Years of education: N/A     Occupational History    Not on file.      Social History Main Topics    Smoking status: Current Some Day Smoker     Packs/day: 0.25    Smokeless tobacco: Never Used    Alcohol use Yes      Comment: beer once prior to admission- had been sober     Drug use: Yes     Special: Marijuana, Cocaine      Comment: denies current use.  Sexual activity: Not on file     Other Topics Concern    Not on file     Social History Narrative     but  with 2 daughters. Homeless and was asked to leave due to his drinking    GED    No current legal charges pending but has possession charges in the past.          FAMILY HISTORY: Family history from the initial psychiatric evaluation has been reviewed (reviewed/updated 5/8/2018) with no additional updates (I asked patient and no additional family history provided). No family history on file. REVIEW OF SYSTEMS: (reviewed/updated 5/8/2018)  Appetite:improved   Sleep: improved   All other Review of Systems: Psychological ROS: positive for - anxiety, behavioral disorder and concentration difficulties  Respiratory ROS: no cough, shortness of breath, or wheezing  Cardiovascular ROS: no chest pain or dyspnea on exertion  Gastrointestinal ROS: no abdominal pain, change in bowel habits, or black or bloody stools         [facility] ([facility]):    [facility] FINDINGS ARE WITHIN NORMAL LIMITS (WNL) UNLESS OTHERWISE STATED BELOW. ( ALL OF THE BELOW CATEGORIES OF THE [facility] HAVE BEEN REVIEWED (reviewed 5/8/2018) AND UPDATED AS DEEMED APPROPRIATE )  General Presentation age appropriate and disheveled, unreliable and vague   Orientation oriented to time, place and person   Vital Signs  See below (reviewed 5/8/2018); Vital Signs (BP, Pulse, & Temp) are within normal limits if not listed below.    Gait and Station Stable/steady, no ataxia   Musculoskeletal System No extrapyramidal symptoms (EPS); no abnormal muscular movements or Tardive Dyskinesia (TD); muscle strength and tone are within normal limits   Language No aphasia or dysarthria   Speech:  monotone   Thought Processes logical; normal rate of thoughts; fair abstract reasoning/computation   Thought Associations goal directed   Thought Content free of delusions, free of hallucinations and preoccupations Suicidal Ideations none and contracts for safety   Homicidal Ideations none   Mood:  anxious  and euthymic   Affect:  anxious, mood-congruent and sad   Memory recent  intact   Memory remote:  intact   Concentration/Attention:  intact   Fund of Knowledge average   Insight:  fair   Reliability poor   Judgment:  limited          VITALS:     Patient Vitals for the past 24 hrs:   Temp Pulse Resp BP SpO2   05/08/18 0655 97.2 °F (36.2 °C) 62 16 (!) 185/125 -   05/07/18 2327 97.4 °F (36.3 °C) 70 20 (!) 159/103 97 %   05/07/18 1707 97.9 °F (36.6 °C) 66 20 (!) 158/92 100 %     Wt Readings from Last 3 Encounters:   05/05/18 71.2 kg (157 lb)   11/18/17 70.3 kg (155 lb)   11/12/17 72.6 kg (160 lb)     Temp Readings from Last 3 Encounters:   05/08/18 97.2 °F (36.2 °C)   05/06/18 97.6 °F (36.4 °C)   11/18/17 98.1 °F (36.7 °C)     BP Readings from Last 3 Encounters:   05/08/18 (!) 185/125   05/06/18 119/71   11/18/17 129/88     Pulse Readings from Last 3 Encounters:   05/08/18 62   05/06/18 60   11/18/17 90            DATA     LABORATORY DATA:(reviewed/updated 5/8/2018)  No results found for this or any previous visit (from the past 24 hour(s)). No results found for: VALF2, VALAC, VALP, VALPR, DS6, CRBAM, CRBAMP, CARB2, XCRBAM  No results found for: LITHM   RADIOLOGY REPORTS:(reviewed/updated 5/8/2018)  No results found.        MEDICATIONS     ALL MEDICATIONS:   Current Facility-Administered Medications   Medication Dose Route Frequency    traZODone (DESYREL) tablet 50 mg  50 mg Oral QHS PRN    [START ON 5/9/2018] sertraline (ZOLOFT) tablet 50 mg  50 mg Oral DAILY    hydroCHLOROthiazide (HYDRODIURIL) tablet 25 mg  25 mg Oral DAILY    LORazepam (ATIVAN) tablet 2 mg  2 mg Oral Q1H PRN    LORazepam (ATIVAN) tablet 4 mg  4 mg Oral Q1H PRN    ziprasidone (GEODON) 20 mg in sterile water (preservative free) 1 mL injection  20 mg IntraMUSCular BID PRN    OLANZapine (ZyPREXA) tablet 5 mg  5 mg Oral Q6H PRN    benztropine (COGENTIN) tablet 2 mg  2 mg Oral BID PRN    benztropine (COGENTIN) injection 2 mg  2 mg IntraMUSCular BID PRN    acetaminophen (TYLENOL) tablet 650 mg  650 mg Oral Q4H PRN    ibuprofen (MOTRIN) tablet 400 mg  400 mg Oral Q8H PRN    magnesium hydroxide (MILK OF MAGNESIA) 400 mg/5 mL oral suspension 30 mL  30 mL Oral DAILY PRN    nicotine (NICODERM CQ) 21 mg/24 hr patch 1 Patch  1 Patch TransDERmal DAILY PRN      SCHEDULED MEDICATIONS:   Current Facility-Administered Medications   Medication Dose Route Frequency    [START ON 5/9/2018] sertraline (ZOLOFT) tablet 50 mg  50 mg Oral DAILY    hydroCHLOROthiazide (HYDRODIURIL) tablet 25 mg  25 mg Oral DAILY          ASSESSMENT & PLAN     DIAGNOSES REQUIRING ACTIVE TREATMENT AND MONITORING: (reviewed/updated 5/8/2018)  Patient Active Hospital Problem List:   MDD (major depressive disorder), recurrent, moderate vs adjustment disorder    Assessment:slowp progress- no longer voicing SI stating the med are helping. Currently denies active SI/HI. Has tx at [facility]. No psychotic sx    Plan: titrate Zoloft t 50 mg daily   Alcohol abuse (5/7/2018)    Assessment: no w/d    Plan: educate, rehab recommendation   Malingering (5/7/2018)    Assessment: sec gain- housing, inconsistent sx presentation    Plan: limit hospital stay   Hypertension ()    Assessment: per hx    Plan: started on low dose med-IM to follow      In summary, Sapphire Horowitz, is a 47 y.o.  male who presents with a severe exacerbation of the principal diagnosis of MDD (major depressive disorder), recurrent, severe, with psychosis ([facility] [address]  Patient's condition is improving. Patient requires continued inpatient hospitalization for further stabilization, safety monitoring and medication management. I will continue to coordinate the provision of individual, milieu, occupational, group, and substance abuse therapies to address target symptoms/diagnoses as deemed appropriate for the individual patient.   A coordinated, multidisplinary treatment team round was conducted with the patient (this team consists of the nurse, psychiatric unit pharmcist,  and writer). Complete current electronic health record for patient has been reviewed today including consultant notes, ancillary staff notes, nurses and psychiatric tech notes. Suicide risk assessment completed and patient deemed to be of low risk for suicide at this time. The following regarding medications was addressed during rounds with patient:   the risks and benefits of the proposed medication. The patient was given the opportunity to ask questions. Informed consent given to the use of the above medications. Will continue to adjust psychiatric and non-psychiatric medications (see above \"medication\" section and orders section for details) as deemed appropriate & based upon diagnoses and response to treatment. I will continue to order blood tests/labs and diagnostic tests as deemed appropriate and review results as they become available (see orders for details and above listed lab/test results). I will order psychiatric records from previous [facility] hospitals to further elucidate the nature of patient's psychopathology and review once available. I will gather additional collateral information from friends, family and o/p treatment team to further elucidate the nature of patient's psychopathology and baselline level of psychiatric functioning. I certify that this patient's inpatient psychiatric hospital services furnished since the previous certification were, and continue to be, required for treatment that could reasonably be expected to improve the patient's condition, or for diagnostic study, and that the patient continues to need, on a daily basis, active treatment furnished directly by or requiring the supervision of inpatient psychiatric facility personnel.  In addition, the hospital records show that services furnished were intensive treatment services, admission or related services, or equivalent services.     EXPECTED DISCHARGE DATE/DAY: 1-2 days     DISPOSITION: Homeless shelter       Signed By:   Ryan Cruz MD  5/8/2018

## 2018-05-08 NOTE — BH NOTES
GROUP THERAPY PROGRESS NOTE    The patient Jessica nix 47 y.o. male is participating in [employer]. Group time: 1 hour    Personal goal for participation: To concentrate on selected task    Goal orientation: social    Group therapy participation: active    Therapeutic interventions reviewed and discussed: Crafts, games, music    Impression of participation: The patient was attentive.     Toma Johnson  5/8/2018  5:40 PM

## 2018-05-08 NOTE — BH NOTES
Pt observed isolating in his room, out for meals and meds. Pt offered no self disclosures, attended no schedule groups or activities. Pt affect flat and sad, depressed mood. Pt remain on Q 15 min checks for safety, will monitor and offer support when needed.

## 2018-05-08 NOTE — BH NOTES
Patient scored CIWA of 18. 4 mg Ativan administered. Physician on call notified. Patient given education and offered emotional support.

## 2018-05-08 NOTE — PROGRESS NOTES
Problem: Chemical Dependency (Adult/Pediatric)  Goal: *STG: Seeks staff when symptoms of withdrawal increase  Outcome: Progressing Towards Goal  Pt observed for symptoms of withdrawal, assess q 4 hours while wake. Encourage to attend groups and acitvites. Monitor for effectiveness of medication. Encourage to include plan for sobriety in discharge plan. Observe on q15' checks. Pt denies suicidal and homicidal ideation and contracts for safety.

## 2018-05-08 NOTE — CONSULTS
[address]  MR#: [id number]  : 1965  ACCOUNT #: [de-identified]   DATE OF SERVICE: 2018    REFERRING PHYSICIAN:  Sina Cassidy MD    REASON FOR CONSULT:  Medical evaluation for psychiatric admission. CHIEF COMPLAINT:  Suicidal ideation. HISTORY OF PRESENT ILLNESS:  This 54-year-old male presents suicidal, requiring further psychiatric evaluation and treatment. Denies any chest pain, shortness of breath, nausea, vomiting, diarrhea, change in bowel or bladder habits. States he usually goes to the emergency room for his primary care. Does not have a primary care physician. PAST MEDICAL HISTORY:  Back pain, hypertension, mood disorder, and schizophrenia. PAST SURGICAL HISTORY:  Appendectomy. ALLERGIES:  NONE. MEDICATIONS:  Amlodipine 10 mg daily, Coreg 6.5 mg, hydrochlorothiazide 25 mg, Abilify 15 and Prozac 20. SOCIAL HISTORY:  Denies any tobacco, alcohol or illicit drug use. , has 3 kids. No grandkids. Self-employed. PHYSICAL EXAMINATION:  VITAL SIGNS:  Temperature is 98.3, blood pressure 141/79, pulse 60, respiration 18. GENERAL:  Pleasant, no acute distress. HEENT:  Sore throat:  Oropharynx is clear. NECK:  Supple. LUNGS:  Clear to auscultation. No wheeze, rales or rhonchi. HEART:  Regular rhythm and rate. No murmurs, gallops or rubs. ABDOMEN:  Soft, nontender, nondistended. Active bowel sounds. No hepatosplenomegaly. EXTREMITIES:  No cyanosis, clubbing or edema. LABORATORY DATA:  Hemoglobin is 13.5, hematocrit is 41.2. Sodium 145, potassium 3.2, chloride 107, bicarb 29, BUN was 12, creatinine is 1.14, glucose 132. Tox screen is negative. TSH 0.57. Alcohol level was 117.     ASSESSMENT:  A 54-year-old male with past medical history of hypertension, chronic back pain, mental health disease presents with suicidal ideation, alcohol intoxication, admitted for further psychiatric evaluation. PLAN:  1. Psychiatric management of mental health issues. 2.  Psychiatry to manage alcohol withdrawal symptoms. 3.  Recheck potassium, replete as indicated. 4.  Continue home meds once confirmed. 5.  Medically stable. Follow up on a p.r.n. basis. 6.  No VTE prophylaxis indicated or ordered at this time. Thank you for this consult.       Emy Jack MD DC / Baltazar Anne  D: 05/08/2018 06:30     T: 05/08/2018 09:46  JOB #: [id number]

## 2018-05-09 VITALS
RESPIRATION RATE: 18 BRPM | SYSTOLIC BLOOD PRESSURE: 137 MMHG | DIASTOLIC BLOOD PRESSURE: 99 MMHG | OXYGEN SATURATION: 97 % | TEMPERATURE: 98.2 F | HEART RATE: 60 BPM

## 2018-05-09 PROCEDURE — 74011250637 HC RX REV CODE- 250/637: Performed by: PSYCHIATRY & NEUROLOGY

## 2018-05-09 RX ORDER — SERTRALINE HYDROCHLORIDE 50 MG/1
50 TABLET, FILM COATED ORAL DAILY
Qty: 14 TAB | Refills: 0 | Status: SHIPPED | OUTPATIENT
Start: 2018-05-10 | End: 2018-05-24

## 2018-05-09 RX ORDER — HYDROCHLOROTHIAZIDE 25 MG/1
25 TABLET ORAL DAILY
Qty: 14 TAB | Refills: 0 | Status: SHIPPED | OUTPATIENT
Start: 2018-05-10 | End: 2018-05-24

## 2018-05-09 RX ADMIN — HYDROCHLOROTHIAZIDE 25 MG: 25 TABLET ORAL at 08:53

## 2018-05-09 RX ADMIN — ACETAMINOPHEN 650 MG: 325 TABLET, FILM COATED ORAL at 13:04

## 2018-05-09 RX ADMIN — SERTRALINE HYDROCHLORIDE 50 MG: 50 TABLET ORAL at 08:53

## 2018-05-09 NOTE — DISCHARGE INSTRUCTIONS
.Sush[address] Mcmahon [address] [phone]  [address] [facility] [address] Crisis-  [phone]

## 2018-05-09 NOTE — BH NOTES
During 45min of reflection pt observed flat,sad affect however, Pleasant upon approach He did states that he feels good about being in hospital. Staff encouraged him to continue working his program by verbalizing issues,staying compliant with meds ,and participating in all activities

## 2018-05-09 NOTE — BH NOTES
The documentation for this period is being entered following the guidelines as defined in the [facility] downtime policy by Severiano Brush. Pt observed resting quietly in bed with eyes closed. Respirations even and unlabored. Pt slept 6 hours. Will continue Q 15 minute monitoring for safety.

## 2018-05-09 NOTE — BH NOTES
Behavioral Health Transition Record to Provider    Patient Name: Shea Borrero  YOB: 1965  Medical Record Number: [id number]  Date of Admission: 5/6/2018  Date of Discharge: 5/9/2018    Attending Provider: Alondra Laird MD  Discharging Provider: Alondra Laird MD  To contact this individual call [phone] and ask the  to page. If unavailable, ask to be transferred to [facility] Provider on call. [facility] Provider will be available on call 24/7 and during holidays. Primary Care Provider: None    No Known Allergies    Reason for Admission: Pt was admitted voluntarily stating that he was contomplating jumping off a bridge. Pt said that he was asked to leave the shelter, where he was residing the last few weeks because he had \"one beer\" and there is zero tolerance for alcohol at shelter - [facility]. UR reports that pt does not currently have insurance. Pt is not open to [facility] for services.      Admission Diagnosis: Major Depression  Major depression    * No surgery found *    Results for orders placed or performed during the hospital encounter of 05/06/18   TSH 3RD GENERATION   Result Value Ref Range    TSH 0.57 0.36 - 3.74 uIU/mL   LIPID PANEL   Result Value Ref Range    LIPID PROFILE          Cholesterol, total 193 <200 MG/DL    Triglyceride 198 (H) <150 MG/DL    HDL Cholesterol 42 MG/DL    LDL, calculated 111.4 (H) 0 - 100 MG/DL    VLDL, calculated 39.6 MG/DL    CHOL/HDL Ratio 4.6 0 - 5.0     GLUCOSE, FASTING   Result Value Ref Range    Glucose 92 65 - 100 MG/DL       Immunizations administered during this encounter:   Immunization History   Administered Date(s) Administered    Influenza Vaccine 03/02/2017       Screening for Metabolic Disorders for Patients on Antipsychotic Medications  (Data obtained from the EMR)    Estimated Body Mass Index  Estimated body mass index is 25.34 kg/(m^2) as calculated from the following:    Height as of 5/5/18: 5' 6\" (1.676 m). Weight as of 5/5/18: 71.2 kg (157 lb). Vital Signs/Blood Pressure  Visit Vitals    BP (!) 137/99    Pulse 60    Temp 98.2 °F (36.8 °C)    Resp 18    SpO2 97%       Blood Glucose/Hemoglobin A1c  Lab Results   Component Value Date/Time    Glucose 92 05/07/2018 04:41 AM    Glucose,  (H) 08/15/2017 09:43 PM       No results found for: HBA1C, HGBE8, [id number]     Lipid Panel  Lab Results   Component Value Date/Time    Cholesterol, total 193 05/07/2018 04:41 AM    HDL Cholesterol 42 05/07/2018 04:41 AM    LDL, calculated 111.4 (H) 05/07/2018 04:41 AM    Triglyceride 198 (H) 05/07/2018 04:41 AM    CHOL/HDL Ratio 4.6 05/07/2018 04:41 AM        Discharge Diagnosis: Please refer to physician's discharge summary. Discharge Plan: Pt will be discharging today and transported by hospital volunteer transportation to the [facility]. Pt called the housing crisis line and was instructed to go to the [facility] for shelter.  explained process to establish services to mental health and substance [facility]. Pt was given 2 weeks worth of medications. Pt is alert and oriented. Pt denies SI/HI/AVH. Pt's mood was euthymic. Pt's thought process was linear and goal oriented. Pt's insight and judgment are fair. Pt is in agreement with the plan. Discharge Medication List and Instructions:   Current Discharge Medication List          Unresulted Labs     None        To obtain results of studies pending at discharge, please contact N/A. Follow-up Information     Follow up With Details Comments [id number] [address] Go today Intake appointment for services. Due to the limited slots, you may want to arrive by 7:00am. The doors open at 7:30am and you should sign in at the registration desk.   [phone] [id number]  Hours: Monday-Friday 8:30am- 12:00PM & 1:00PM-4:30pm   [phone] (phone)  [phone] (fax)      [facility] Go to The resource center will provide case management and outreach to homeless individuals. Address: [address] [place] [address],[address] Floor, [place], [facility]  Phone: [phone]. Call This is the number given to you in the hospital to call. When shelters have space available, clients who have accessed the [address] [place] [address] and who are homeless will be matched with shelter. [facility] today This is the shelter that the housing crisis hotline referred for you to go to today. Address: [address] [facility] [address]  Phone: [phone]                Advanced Directive:   Does the patient have an appointed surrogate decision maker? Unknown   Does the patient have a Medical Advance Directive? Unknown   Does the patient have a Psychiatric Advance Directive? Unknown   If the patient does not have a surrogate or Medical Advance Directive AND Psychiatric Advance Directive, the patient was offered information on these advance directives Unknown      Patient Instructions: Please continue all medications until otherwise directed by physician. Tobacco Cessation Discharge Plan:   Is the patient a smoker and needs referral for smoking cessation? No  Patient referred to the following for smoking cessation with an appointment? No   Patient was offered medication to assist with smoking cessation at discharge? No  Was education for smoking cessation added to the discharge instructions? No    Alcohol/Substance Abuse Discharge Plan:   Does the patient have a history of substance/alcohol abuse and requires a referral for treatment? Yes  Patient referred to the following for substance/alcohol abuse treatment with an appointment? Yes  Patient was offered medication to assist with alcohol cessation at discharge? No  Was education for substance/alcohol abuse added to discharge instructions?  Yes    Patient discharged to Home; provided to the patient/caregiver either in hard copy or electronically. Continuing care paperwork was faxed to community mental health providers.

## 2018-05-09 NOTE — BH NOTES
Discharge instructions were given and explained and the patient verbalized an understanding. Staff verified the instructions. The patient had no home meications to be returned. Valuables and all other belongings were returned upon discharge,Staff took patient off the unit.

## 2018-05-09 NOTE — DISCHARGE SUMMARY
PSYCHIATRIC DISCHARGE SUMMARY         IDENTIFICATION:    Patient Name  Myrna Desouza   Date of Birth 1965   [facility] [id number]   Medical Record Number  [id number]      Age  47 y.o. PCP None   Admit date:  5/6/2018    Discharge date: 5/9/2018   Room Number  319/02  @ [facility]   Date of Service  5/9/2018            TYPE OF DISCHARGE: REGULAR               CONDITION AT DISCHARGE: fair       PROVISIONAL & DISCHARGE DIAGNOSES:    Problem List  Never Reviewed          Codes Class    Alcohol abuse ICD-10-CM: F10.10  ICD-9-CM: 305.00         Malingering ICD-10-CM: Z76.5  ICD-9-CM: V65.2         Hypertension ICD-10-CM: I10  ICD-9-CM: 401.9         Major depression ICD-10-CM: F32.9  ICD-9-CM: 296.20         * (Principal)MDD (major depressive disorder), recurrent, severe, with psychosis ([facility] ICD-10-CM: F33.3  ICD-9-CM: 296.34               Active Hospital Problems    Alcohol abuse      Malingering      Hypertension      *MDD (major depressive disorder), recurrent, severe, with psychosis ([facility]        DISCHARGE DIAGNOSIS:   Axis I:  SEE ABOVE  Axis II: SEE ABOVE  Axis III: SEE ABOVE  Axis IV:  lack of structure  Axis V:  30 on admission, 665 on discharge 65(baseline)       CC & HISTORY OF PRESENT ILLNESS:  The patient, Myrna Desouza, is a 47 y.o. BLACK OR  male with a past psychiatric history significant for depression, who presents at this time with complaints of (and/or evidence of) the following emotional symptoms: suicidal thoughts/threats. Additional symptomatology include alcohol abuse, difficulty sleeping, increased irritability and \"I don't want to go on living like this\". Pt was admitted voluntarily stating that he was contomplating jumping off a bridge. Pt said that he was asked to leave the shelter, where he was residing the last few weeks because he had \"one beer\" and there is zero tolerance for alcohol at shelter - [facility].  Pt is vague describing his sx but states he was taking AD after [facility]. Pt has previous suicide attempts. Pt denies aj or psychosis. The above symptoms have been present for 1 day. These symptoms are of moderate severity. These symptoms are intermittent/ fleeting in nature. The patient's condition has been precipitated by and psychosocial stressors (homeless, alcohol use ). Patient's condition made worse by alcohol use as well as treatment noncompliance. UDS: negative; BAL=117       SOCIAL HISTORY:    Social History     Social History    Marital status:      Spouse name: N/A    Number of children: N/A    Years of education: N/A     Occupational History    Not on file. Social History Main Topics    Smoking status: Current Some Day Smoker     Packs/day: 0.25    Smokeless tobacco: Never Used    Alcohol use Yes      Comment: beer once prior to admission- had been sober     Drug use: Yes     Special: Marijuana, Cocaine      Comment: denies current use.  Sexual activity: Not on file     Other Topics Concern    Not on file     Social History Narrative     but  with 2 daughters. Homeless and was asked to leave due to his drinking    GED    No current legal charges pending but has possession charges in the past.          FAMILY HISTORY:   No family history on file. HOSPITALIZATION COURSE:    Taran Gu was admitted to the inpatient psychiatric unit [facility] for acute psychiatric stabilization in regards to symptomatology as described in the HPI above. While on the unit Taran Gu was involved in individual, group, occupational and milieu therapy. Psychiatric medications were adjusted during this hospitalization including zoloft. Taran Gu demonstrated a slow, but progressive improvement in overall condition.   Much of patient's depression appeared to be related to situational stressors, effects of drugs of abuse, and psychological factors. Please see individual progress notes for more specific details regarding patient's hospitalization course. At time of discharge, Frances Lopez is without significant problems of depression psychosis  aj. Patient free of suicidal and homicidal ideations (appears to be at very low risk of suicide or homicide) and reports many positive predictive factors in terms of not attempting suicide or homicide. Patient with request for discharge today. There are no grounds to seek a TDO. Patient has maximized benefit to be derived from acute inpatient psychiatric treatment. All members of the treatment team concur with each other in regards to plans for discharge today per patient's request.  Patient and family are aware and in agreement with discharge and discharge plan. Per my last note:   MDD (major depressive disorder), recurrent, moderate vs adjustment disorder    Assessment: voiced SI to jump off the bridge -sad affect. Currently denies active SI/HI. Has tx at [facility].  No psychotic sx    Plan: will restart Zoloft   Alcohol abuse (5/7/2018)    Assessment: states he used beer once- tend to minimize use- BAL- does not co relate with his use    Plan: educate, rehab   Malingering (5/7/2018)    Assessment: sec gain- housing, inconsistent sx presentation    Plan: limit hospital stay   Hypertension ()    Assessment: per hx    Plan: started on low dose med-IM to follow         LABS AND IMAGAING:    Labs Reviewed   LIPID PANEL - Abnormal; Notable for the following:        Result Value    Triglyceride 198 (*)     LDL, calculated 111.4 (*)     All other components within normal limits   TSH 3RD GENERATION   GLUCOSE, FASTING   DRUG SCREEN, URINE   POTASSIUM     No results found for: DS35, PHEN, PHENO, PHENT, DILF, DS39, PHENY, PTN, VALF2, VALAC, VALP, VALPR, DS6, CRBAM, CRBAMP, CARB2, XCRBAM  Admission on 05/06/2018   Component Date Value Ref Range Status    TSH 05/07/2018 0.57  0.36 - 3.74 uIU/mL Final    LIPID PROFILE 05/07/2018        Final    Cholesterol, total 05/07/2018 193  <200 MG/DL Final    Triglyceride 05/07/2018 198* <150 MG/DL Final    HDL Cholesterol 05/07/2018 42  MG/DL Final    LDL, calculated 05/07/2018 111.4* 0 - 100 MG/DL Final    VLDL, calculated 05/07/2018 39.6  MG/DL Final    CHOL/HDL Ratio 05/07/2018 4.6  0 - 5.0   Final    Glucose 05/07/2018 92  65 - 100 MG/DL Final   Admission on 05/05/2018, Discharged on 05/06/2018   Component Date Value Ref Range Status    WBC 05/05/2018 5.1  4.6 - 13.2 K/uL Final    RBC 05/05/2018 5.35  4.70 - 5.50 M/uL Final    HGB 05/05/2018 13.5  13.0 - 16.0 g/dL Final    HCT 05/05/2018 41.2  36.0 - 48.0 % Final    MCV 05/05/2018 77.0  74.0 - 97.0 FL Final    MCH 05/05/2018 25.2  24.0 - 34.0 PG Final    MCHC 05/05/2018 32.8  31.0 - 37.0 g/dL Final    RDW 05/05/2018 16.2* 11.6 - 14.5 % Final    PLATELET 14/72/0190 006  135 - 420 K/uL Final    MPV 05/05/2018 9.8  9.2 - 11.8 FL Final    NEUTROPHILS 05/05/2018 58  40 - 73 % Final    LYMPHOCYTES 05/05/2018 36  21 - 52 % Final    MONOCYTES 05/05/2018 5  3 - 10 % Final    EOSINOPHILS 05/05/2018 1  0 - 5 % Final    BASOPHILS 05/05/2018 0  0 - 2 % Final    ABS. NEUTROPHILS 05/05/2018 3.0  1.8 - 8.0 K/UL Final    ABS. LYMPHOCYTES 05/05/2018 1.8  0.9 - 3.6 K/UL Final    ABS. MONOCYTES 05/05/2018 0.2  0.05 - 1.2 K/UL Final    ABS. EOSINOPHILS 05/05/2018 0.0  0.0 - 0.4 K/UL Final    ABS.  BASOPHILS 05/05/2018 0.0  0.0 - 0.06 K/UL Final    DF 05/05/2018 AUTOMATED    Final    Sodium 05/05/2018 145  136 - 145 mmol/L Final    Potassium 05/05/2018 3.2* 3.5 - 5.5 mmol/L Final    Chloride 05/05/2018 107  100 - 108 mmol/L Final    CO2 05/05/2018 29  21 - 32 mmol/L Final    Anion gap 05/05/2018 9  3.0 - 18 mmol/L Final    Glucose 05/05/2018 132* 74 - 99 mg/dL Final    BUN 05/05/2018 12  7.0 - 18 MG/DL Final    Creatinine 05/05/2018 1.14  0.6 - 1.3 MG/DL Final    BUN/Creatinine ratio 05/05/2018 11* 12 - 20   Final    GFR est AA 05/05/2018 >60  >60 ml/min/1.73m2 Final    GFR est non-AA 05/05/2018 >60  >60 ml/min/1.73m2 Final    Calcium 05/05/2018 8.5  8.5 - 10.1 MG/DL Final    BENZODIAZEPINES 05/05/2018 NEGATIVE   NEG   Final    BARBITURATES 05/05/2018 NEGATIVE   NEG   Final    THC (TH-CANNABINOL) 05/05/2018 NEGATIVE   NEG   Final    OPIATES 05/05/2018 NEGATIVE   NEG   Final    PCP(PHENCYCLIDINE) 05/05/2018 NEGATIVE   NEG   Final    COCAINE 05/05/2018 NEGATIVE   NEG   Final    AMPHETAMINES 05/05/2018 NEGATIVE   NEG   Final    METHADONE 05/05/2018 NEGATIVE   NEG   Final    HDSCOM 05/05/2018 (NOTE)   Final    ALCOHOL(ETHYL),SERUM 05/05/2018 117* 0 - 3 MG/DL Final     No results found. DISPOSITION:    Home. Patient to f/u with drug/etoh rehabilitation, psychiatric, and psychotherapy appointments. Patient is to f/u with internist as directed. FOLLOW-UP CARE:    Activity as tolerated  Resume previous diet  Wound Care: none needed. Follow-up Information     Follow up With Details Comments [id number] [facility] Go today Intake appointment for services. Due to the limited slots, you may want to arrive by 7:00am. The doors open at 7:30am and you should sign in at the registration desk. [phone]  Hours: Monday-Friday 8:30am- 12:00PM & 1:00PM-4:30pm   [phone] (phone)  [phone] (fax)      [facility] Go to The resource center will provide case management and outreach to homeless individuals. Address: [address] [place] [address],[place] [address] [address]  Phone: [phone]. Call This is the number given to you in the hospital to call. When shelters have space available, clients who have accessed the [address] [place] [address] and who are homeless will be matched with shelter.      [facility] Go today This is the shelter that the housing crisis hotline referred for you to go to today. Address: [address]  Phone: [phone]      None   None (395) Patient stated that they have no PCP                   PROGNOSIS:    Guarded / Poor---- based on nature of patient's pathology/ies and treatment compliance issues. Prognosis is greatly dependent upon patient's ability to remain sober and to follow up with drug/etoh rehabilitation and psychiatric/psychotherapy appointments as well as to comply with psychiatric medications as prescribed. DISCHARGE MEDICATIONS: (no changes made). Informed consent given for the use of following psychotropic medications:  Current Discharge Medication List      CONTINUE these medications which have CHANGED    Details   !! hydroCHLOROthiazide (HYDRODIURIL) 25 mg tablet Take 1 Tab by mouth daily for 14 days. Indications: hypertension  Qty: 14 Tab, Refills: 0      sertraline (ZOLOFT) 50 mg tablet Take 1 Tab by mouth daily for 14 days. Indications: major depressive disorder  Qty: 14 Tab, Refills: 0       !! - Potential duplicate medications found. Please discuss with provider. CONTINUE these medications which have NOT CHANGED    Details   !! hydroCHLOROthiazide (HYDRODIURIL) 25 mg tablet Take 1 Tab by mouth daily. Indications: hypertension  Qty: 30 Tab, Refills: 0       !! - Potential duplicate medications found. Please discuss with provider. STOP taking these medications       atorvastatin (LIPITOR) 20 mg tablet Comments:   Reason for Stopping:                      A coordinated, multidisplinary treatment team round was conducted with Justin Aguero is done daily here at [facility]. This team consists of the nurse, psychiatric unit pharmcist,  and writer. I have spent greater than 35 minutes on discharge work.     Signed:  Timo Childress MD  5/9/2018

## 2018-07-10 ENCOUNTER — HOSPITAL ENCOUNTER (EMERGENCY)
Age: 53
Discharge: OTHER INSTITUTION | End: 2018-07-11
Attending: EMERGENCY MEDICINE | Admitting: EMERGENCY MEDICINE
Payer: SELF-PAY

## 2018-07-10 DIAGNOSIS — F20.9 SCHIZOPHRENIA, UNSPECIFIED TYPE (HCC): ICD-10-CM

## 2018-07-10 DIAGNOSIS — I10 HYPERTENSION, ACCELERATED: ICD-10-CM

## 2018-07-10 DIAGNOSIS — R45.851 SUICIDAL IDEATIONS: Primary | ICD-10-CM

## 2018-07-10 LAB
ALBUMIN SERPL-MCNC: 3.7 G/DL (ref 3.4–5)
ALBUMIN/GLOB SERPL: 1.3 {RATIO} (ref 0.8–1.7)
ALP SERPL-CCNC: 74 U/L (ref 45–117)
ALT SERPL-CCNC: 40 U/L (ref 16–61)
AMPHET UR QL SCN: NEGATIVE
ANION GAP SERPL CALC-SCNC: 7 MMOL/L (ref 3–18)
AST SERPL-CCNC: 15 U/L (ref 15–37)
BARBITURATES UR QL SCN: NEGATIVE
BASOPHILS # BLD: 0 K/UL (ref 0–0.1)
BASOPHILS NFR BLD: 0 % (ref 0–2)
BENZODIAZ UR QL: NEGATIVE
BILIRUB SERPL-MCNC: 0.4 MG/DL (ref 0.2–1)
BUN SERPL-MCNC: 11 MG/DL (ref 7–18)
BUN/CREAT SERPL: 11 (ref 12–20)
CALCIUM SERPL-MCNC: 9.3 MG/DL (ref 8.5–10.1)
CANNABINOIDS UR QL SCN: NEGATIVE
CHLORIDE SERPL-SCNC: 112 MMOL/L (ref 100–108)
CO2 SERPL-SCNC: 29 MMOL/L (ref 21–32)
COCAINE UR QL SCN: NEGATIVE
CREAT SERPL-MCNC: 0.97 MG/DL (ref 0.6–1.3)
DIFFERENTIAL METHOD BLD: ABNORMAL
EOSINOPHIL # BLD: 0 K/UL (ref 0–0.4)
EOSINOPHIL NFR BLD: 1 % (ref 0–5)
ERYTHROCYTE [DISTWIDTH] IN BLOOD BY AUTOMATED COUNT: 17.1 % (ref 11.6–14.5)
ETHANOL SERPL-MCNC: <3 MG/DL (ref 0–3)
GLOBULIN SER CALC-MCNC: 2.9 G/DL (ref 2–4)
GLUCOSE SERPL-MCNC: 109 MG/DL (ref 74–99)
HCT VFR BLD AUTO: 39.5 % (ref 36–48)
HDSCOM,HDSCOM: NORMAL
HGB BLD-MCNC: 12.8 G/DL (ref 13–16)
LYMPHOCYTES # BLD: 1.6 K/UL (ref 0.9–3.6)
LYMPHOCYTES NFR BLD: 38 % (ref 21–52)
MAGNESIUM SERPL-MCNC: 2.4 MG/DL (ref 1.6–2.6)
MCH RBC QN AUTO: 25.7 PG (ref 24–34)
MCHC RBC AUTO-ENTMCNC: 32.4 G/DL (ref 31–37)
MCV RBC AUTO: 79.2 FL (ref 74–97)
METHADONE UR QL: NEGATIVE
MONOCYTES # BLD: 0.4 K/UL (ref 0.05–1.2)
MONOCYTES NFR BLD: 10 % (ref 3–10)
NEUTS SEG # BLD: 2.2 K/UL (ref 1.8–8)
NEUTS SEG NFR BLD: 51 % (ref 40–73)
OPIATES UR QL: NEGATIVE
PCP UR QL: NEGATIVE
PLATELET # BLD AUTO: 204 K/UL (ref 135–420)
PMV BLD AUTO: 10.5 FL (ref 9.2–11.8)
POTASSIUM SERPL-SCNC: 3.8 MMOL/L (ref 3.5–5.5)
PROT SERPL-MCNC: 6.6 G/DL (ref 6.4–8.2)
RBC # BLD AUTO: 4.99 M/UL (ref 4.7–5.5)
SODIUM SERPL-SCNC: 148 MMOL/L (ref 136–145)
WBC # BLD AUTO: 4.2 K/UL (ref 4.6–13.2)

## 2018-07-10 PROCEDURE — 80307 DRUG TEST PRSMV CHEM ANLYZR: CPT | Performed by: EMERGENCY MEDICINE

## 2018-07-10 PROCEDURE — 80053 COMPREHEN METABOLIC PANEL: CPT | Performed by: EMERGENCY MEDICINE

## 2018-07-10 PROCEDURE — 99285 EMERGENCY DEPT VISIT HI MDM: CPT

## 2018-07-10 PROCEDURE — 74011250636 HC RX REV CODE- 250/636: Performed by: EMERGENCY MEDICINE

## 2018-07-10 PROCEDURE — 83735 ASSAY OF MAGNESIUM: CPT | Performed by: EMERGENCY MEDICINE

## 2018-07-10 PROCEDURE — 96360 HYDRATION IV INFUSION INIT: CPT

## 2018-07-10 PROCEDURE — 93005 ELECTROCARDIOGRAM TRACING: CPT

## 2018-07-10 PROCEDURE — 85025 COMPLETE CBC W/AUTO DIFF WBC: CPT | Performed by: EMERGENCY MEDICINE

## 2018-07-10 PROCEDURE — 74011250637 HC RX REV CODE- 250/637: Performed by: EMERGENCY MEDICINE

## 2018-07-10 PROCEDURE — 96361 HYDRATE IV INFUSION ADD-ON: CPT

## 2018-07-10 RX ORDER — OLANZAPINE 5 MG/1
5 TABLET ORAL EVERY EVENING
Status: DISCONTINUED | OUTPATIENT
Start: 2018-07-10 | End: 2018-07-11

## 2018-07-10 RX ORDER — FLUOXETINE HYDROCHLORIDE 20 MG/1
20 CAPSULE ORAL DAILY
Status: DISCONTINUED | OUTPATIENT
Start: 2018-07-10 | End: 2018-07-11 | Stop reason: HOSPADM

## 2018-07-10 RX ORDER — AMLODIPINE BESYLATE 5 MG/1
10 TABLET ORAL DAILY
Status: DISCONTINUED | OUTPATIENT
Start: 2018-07-10 | End: 2018-07-11 | Stop reason: HOSPADM

## 2018-07-10 RX ORDER — LISINOPRIL 20 MG/1
TABLET ORAL
Status: DISPENSED
Start: 2018-07-10 | End: 2018-07-11

## 2018-07-10 RX ORDER — CARVEDILOL 12.5 MG/1
6.25 TABLET ORAL 2 TIMES DAILY WITH MEALS
Status: DISCONTINUED | OUTPATIENT
Start: 2018-07-10 | End: 2018-07-11 | Stop reason: HOSPADM

## 2018-07-10 RX ORDER — OLANZAPINE 5 MG/1
TABLET ORAL
Status: DISPENSED
Start: 2018-07-10 | End: 2018-07-11

## 2018-07-10 RX ORDER — LISINOPRIL 20 MG/1
20 TABLET ORAL DAILY
Status: DISCONTINUED | OUTPATIENT
Start: 2018-07-10 | End: 2018-07-11 | Stop reason: HOSPADM

## 2018-07-10 RX ADMIN — CARVEDILOL 6.25 MG: 12.5 TABLET, FILM COATED ORAL at 21:21

## 2018-07-10 RX ADMIN — SODIUM CHLORIDE 1000 ML: 900 INJECTION, SOLUTION INTRAVENOUS at 22:25

## 2018-07-10 RX ADMIN — AMLODIPINE BESYLATE 10 MG: 5 TABLET ORAL at 21:21

## 2018-07-10 RX ADMIN — OLANZAPINE 5 MG: 5 TABLET, FILM COATED ORAL at 21:21

## 2018-07-10 RX ADMIN — FLUOXETINE 20 MG: 20 CAPSULE ORAL at 21:23

## 2018-07-10 RX ADMIN — LISINOPRIL 20 MG: 20 TABLET ORAL at 21:21

## 2018-07-11 ENCOUNTER — HOSPITAL ENCOUNTER (INPATIENT)
Age: 53
LOS: 8 days | Discharge: HOME | DRG: 885 | End: 2018-07-19
Attending: PSYCHIATRY & NEUROLOGY | Admitting: PSYCHIATRY & NEUROLOGY
Payer: SELF-PAY

## 2018-07-11 VITALS
TEMPERATURE: 98.3 F | DIASTOLIC BLOOD PRESSURE: 74 MMHG | RESPIRATION RATE: 18 BRPM | BODY MASS INDEX: 23.54 KG/M2 | OXYGEN SATURATION: 100 % | HEIGHT: 67 IN | WEIGHT: 150 LBS | SYSTOLIC BLOOD PRESSURE: 106 MMHG | HEART RATE: 74 BPM

## 2018-07-11 PROBLEM — F32.A DEPRESSION: Status: ACTIVE | Noted: 2018-07-11

## 2018-07-11 LAB
ATRIAL RATE: 96 BPM
CALCULATED P AXIS, ECG09: 58 DEGREES
CALCULATED R AXIS, ECG10: -26 DEGREES
CALCULATED T AXIS, ECG11: 64 DEGREES
DIAGNOSIS, 93000: NORMAL
P-R INTERVAL, ECG05: 212 MS
Q-T INTERVAL, ECG07: 364 MS
QRS DURATION, ECG06: 84 MS
QTC CALCULATION (BEZET), ECG08: 459 MS
VENTRICULAR RATE, ECG03: 96 BPM

## 2018-07-11 PROCEDURE — 74011250637 HC RX REV CODE- 250/637: Performed by: PSYCHIATRY & NEUROLOGY

## 2018-07-11 PROCEDURE — 74011250637 HC RX REV CODE- 250/637: Performed by: EMERGENCY MEDICINE

## 2018-07-11 PROCEDURE — 96361 HYDRATE IV INFUSION ADD-ON: CPT

## 2018-07-11 PROCEDURE — 65220000005 HC RM SEMIPRIVATE PSYCH 3 OR 4 BED

## 2018-07-11 RX ORDER — OLANZAPINE 10 MG/1
5 TABLET ORAL 2 TIMES DAILY
Status: DISCONTINUED | OUTPATIENT
Start: 2018-07-11 | End: 2018-07-16

## 2018-07-11 RX ORDER — CARVEDILOL 6.25 MG/1
6.25 TABLET ORAL 2 TIMES DAILY WITH MEALS
Status: DISCONTINUED | OUTPATIENT
Start: 2018-07-11 | End: 2018-07-19 | Stop reason: HOSPADM

## 2018-07-11 RX ORDER — HYDROCHLOROTHIAZIDE 25 MG/1
25 TABLET ORAL DAILY
Status: DISCONTINUED | OUTPATIENT
Start: 2018-07-12 | End: 2018-07-19 | Stop reason: HOSPADM

## 2018-07-11 RX ORDER — LORAZEPAM 2 MG/ML
1-2 INJECTION INTRAMUSCULAR
Status: DISCONTINUED | OUTPATIENT
Start: 2018-07-11 | End: 2018-07-19 | Stop reason: HOSPADM

## 2018-07-11 RX ORDER — HALOPERIDOL 5 MG/1
5 TABLET ORAL
Status: DISCONTINUED | OUTPATIENT
Start: 2018-07-11 | End: 2018-07-19 | Stop reason: HOSPADM

## 2018-07-11 RX ORDER — FLUOXETINE HYDROCHLORIDE 20 MG/1
20 CAPSULE ORAL DAILY
Status: DISCONTINUED | OUTPATIENT
Start: 2018-07-11 | End: 2018-07-19 | Stop reason: HOSPADM

## 2018-07-11 RX ORDER — CLONIDINE HYDROCHLORIDE 0.1 MG/1
0.1 TABLET ORAL
Status: COMPLETED | OUTPATIENT
Start: 2018-07-11 | End: 2018-07-11

## 2018-07-11 RX ORDER — IBUPROFEN 400 MG/1
400 TABLET ORAL
Status: DISCONTINUED | OUTPATIENT
Start: 2018-07-11 | End: 2018-07-19 | Stop reason: HOSPADM

## 2018-07-11 RX ORDER — LORAZEPAM 1 MG/1
1-2 TABLET ORAL
Status: DISCONTINUED | OUTPATIENT
Start: 2018-07-11 | End: 2018-07-19 | Stop reason: HOSPADM

## 2018-07-11 RX ORDER — HALOPERIDOL 5 MG/ML
5 INJECTION INTRAMUSCULAR
Status: DISCONTINUED | OUTPATIENT
Start: 2018-07-11 | End: 2018-07-19 | Stop reason: HOSPADM

## 2018-07-11 RX ORDER — LISINOPRIL 10 MG/1
20 TABLET ORAL DAILY
Status: DISCONTINUED | OUTPATIENT
Start: 2018-07-12 | End: 2018-07-19 | Stop reason: HOSPADM

## 2018-07-11 RX ORDER — AMLODIPINE BESYLATE 5 MG/1
10 TABLET ORAL DAILY
Status: DISCONTINUED | OUTPATIENT
Start: 2018-07-12 | End: 2018-07-19 | Stop reason: HOSPADM

## 2018-07-11 RX ORDER — TRAZODONE HYDROCHLORIDE 50 MG/1
50 TABLET ORAL
Status: DISCONTINUED | OUTPATIENT
Start: 2018-07-11 | End: 2018-07-19 | Stop reason: HOSPADM

## 2018-07-11 RX ORDER — OLANZAPINE 5 MG/1
5 TABLET ORAL 2 TIMES DAILY
Status: DISCONTINUED | OUTPATIENT
Start: 2018-07-11 | End: 2018-07-11 | Stop reason: HOSPADM

## 2018-07-11 RX ORDER — HYDROCHLOROTHIAZIDE 25 MG/1
25 TABLET ORAL DAILY
Status: DISCONTINUED | OUTPATIENT
Start: 2018-07-11 | End: 2018-07-11 | Stop reason: HOSPADM

## 2018-07-11 RX ADMIN — CLONIDINE HYDROCHLORIDE 0.1 MG: 0.1 TABLET ORAL at 04:21

## 2018-07-11 RX ADMIN — CARVEDILOL 6.25 MG: 12.5 TABLET, FILM COATED ORAL at 09:24

## 2018-07-11 RX ADMIN — HYDROCHLOROTHIAZIDE 25 MG: 25 TABLET ORAL at 04:21

## 2018-07-11 RX ADMIN — FLUOXETINE 20 MG: 20 CAPSULE ORAL at 13:32

## 2018-07-11 RX ADMIN — CARVEDILOL 6.25 MG: 6.25 TABLET, FILM COATED ORAL at 17:17

## 2018-07-11 RX ADMIN — OLANZAPINE 5 MG: 10 TABLET, FILM COATED ORAL at 21:06

## 2018-07-11 RX ADMIN — OLANZAPINE 5 MG: 5 TABLET, FILM COATED ORAL at 09:24

## 2018-07-11 NOTE — CONSULTS
PSYCHIATRIC  CONSULTATION  This evaluation was conducted via telepsychiatry with the assistance of onsite staff. The psychiatrist is located in [place], [place]    HPI: The patient is a 59-year-old -American male who presented to the hospital complaining of auditory and visual hallucinations. He also reported suicidal thoughts. When seen by psychiatry, the patient had trouble articulating why he was in the ER. \"I'm having issues with psych. \" When asked to clarify, the patient responded \"I don't know how to explain it to you. \" \"I'm having some issues. \" While the patient expressed concern regarding hallucinations, he denied having those symptoms when interviewed by the psychiatrist. He also denied suicidal thoughts. The patient has a history of two previous suicide attempts and at one point jumped off a bridge fracturing his ankle. The patient was able to easily answer questions regarding his history (family history, medical, drug use, etc.). The patient did have trouble with the cognitive exam. When asked for the year and day of the week, the patient said, \"I'm having trouble with that. \" The psychiatrist asked the patient where he was the patient responded \"I know I am in the hospital.\" He then entered ended the interview. \"You are trying to belittle me. \" \"This is not helpful. \" The psychiatrist saw this patient almost one year ago (August 2017). He presented with suicidal thoughts at that time, but was also confused. He knew the president, but could not name the previous two presidents. He was also unable to produce the year. A few days ago, the patient presented at [facility] reporting hallucinations and suicidal thoughts. He remained in the ER for two days and was released when he recanted his symptoms. SI/ Self harm: See above  HI/Violence: none  Access to weapons: none  Past Psychiatric History: 1-2 prior inpatient admissions. Current outpatient treatment-sees PCP.  2 prior suicide attempts   Family Psychiatric History:  father-committed to [facility] in the past  Medical History: Hypertension    Current Meds: see list in chart  Allergies: pollen  Substance Abuse History: Alcohol-none in several months. Cannabis-last used one year ago. Social History: , homeless, +GED, unemployment   History: none   Abuse: none  Legal History: possession of marijuana, failure to appear in court (1980)  Mental Status Examination: Cooperative, good eye contact, speech within normal limits, no psychomotor agitation, anxious affect, sad mood, linear thought processes,  + auditory and visual  hallucinations, bizarre delusions, + SI, no HI, AAO x 1  Diagnosis:  Schizophrenia  Assessment/Plan: The patient is a 21-year-old male with a history of psychosis and suicide attempts. He presented to the hospital in a bizarre and confused state. He had difficulty answering questions about why came to the ER, but when he originally presented he reported suicidal thoughts and hallucinations. The patient became angry when the psychiatrist asked chronic questions pertaining to his cognitive state. While the patient denied suicidal thoughts or hallucinations with the psychiatrist, he cannot be deemed a safe discharge due to his impaired state. The patient has a history of attempts which only further increases his risk. Inpatient care is appropriate for stabilization. The patient should be screened by [facility] if he is not voluntary due to dangerousness to self and the inability to care for self. Shobha Castaneda M.D.   [facility]

## 2018-07-11 NOTE — PROGRESS NOTES
Patient received with complaints of suicidal thoughts, auditory and visual hallucinations. Patient states he has not been in the hospital in a year. He is homeless, unemployed and has no source of income. He states he does not have an outpatient dr. Patient oriented to unit and guidelines.

## 2018-07-11 NOTE — ED PROVIDER NOTES
HPI Comments: Rhianna Nash is a 46 y.o. Male with h/o mental health issues, alcohol abuse presents with c/o feeling suicidal hearing voice to jump off bridge. Also states he passed out earlier. Was in [address] [place] [address] for 2 days for si, same sx then released when he said he was not suicidal anymore. Denies alcohol use or any recenlty along with drugs. Has been admitted mult times for same issues. Denies taking his meds today    The history is provided by the patient. Past Medical History:   Diagnosis Date    Back pain     Hypertension     MDD (major depressive disorder), recurrent, severe, with psychosis ([facility] 11/11/2017    Psychiatric disorder     hallucinations    Psychotic disorder 11/11/2017       Past Surgical History:   Procedure Laterality Date    HX OTHER SURGICAL      oral sx         History reviewed. No pertinent family history. Social History     Social History    Marital status:      Spouse name: N/A    Number of children: N/A    Years of education: N/A     Occupational History    Not on file. Social History Main Topics    Smoking status: Current Some Day Smoker     Packs/day: 0.25    Smokeless tobacco: Never Used    Alcohol use Yes      Comment: beer once prior to admission- had been sober     Drug use: Yes     Special: Marijuana, Cocaine      Comment: denies current use.  Sexual activity: Not on file     Other Topics Concern    Not on file     Social History Narrative     but  with 2 daughters. Homeless and was asked to leave due to his drinking    GED    No current legal charges pending but has possession charges in the past.             ALLERGIES: Review of patient's allergies indicates no known allergies. Review of Systems   Constitutional: Negative for fever. HENT: Negative for sore throat. Eyes: Negative for visual disturbance. Respiratory: Negative for cough and shortness of breath.     Cardiovascular: Negative for chest pain and leg swelling. Gastrointestinal: Negative for abdominal pain. Endocrine: Negative for polyuria. Genitourinary: Negative for difficulty urinating. Musculoskeletal: Negative for gait problem. Skin: Negative for rash. Allergic/Immunologic: Negative for immunocompromised state. Neurological: Negative for syncope. Psychiatric/Behavioral: Positive for dysphoric mood and suicidal ideas. Vitals:    07/10/18 2038 07/10/18 2233   BP: (!) 160/117 (!) 168/126   Pulse: (!) 112 75   Resp: 16 16   Temp: 98.8 °F (37.1 °C)    SpO2: 98% 100%   Weight: 68 kg (150 lb)    Height: 5' 7\" (1.702 m)             Physical Exam   Constitutional: He is oriented to person, place, and time. Non-toxic appearance. He does not appear ill. No distress. HENT:   Head: Normocephalic and atraumatic. Right Ear: External ear normal.   Left Ear: External ear normal.   Nose: Nose normal.   Mouth/Throat: Oropharynx is clear and moist. No oropharyngeal exudate. Eyes: Conjunctivae are normal.   Neck: Normal range of motion. Cardiovascular: Normal rate, regular rhythm, normal heart sounds and intact distal pulses. Pulmonary/Chest: Effort normal and breath sounds normal. No respiratory distress. Abdominal: Soft. There is no tenderness. Musculoskeletal: Normal range of motion. He exhibits no edema. Neurological: He is alert and oriented to person, place, and time. Skin: Skin is warm and dry. He is not diaphoretic. Psychiatric: His speech is normal. He is withdrawn and actively hallucinating. Thought content is paranoid and delusional. He exhibits a depressed mood. He expresses suicidal ideation. He expresses no homicidal ideation. He expresses suicidal plans. He expresses no homicidal plans. Nursing note and vitals reviewed.        [facility]      ED Course       Procedures  Vitals:  Patient Vitals for the past 12 hrs:   Temp Pulse Resp BP SpO2   07/10/18 2233 - 75 16 (!) 168/126 100 %   07/10/18 2038 98.8 °F (37.1 °C) (!) 112 16 (!) 160/117 98 %         Medications ordered:   Medications   amLODIPine (NORVASC) tablet 10 mg (10 mg Oral Given 7/10/18 2121)   carvedilol (COREG) tablet 6.25 mg (6.25 mg Oral Given 7/10/18 2121)   FLUoxetine (PROzac) capsule 20 mg (20 mg Oral Given 7/10/18 2123)   lisinopril (PRINIVIL, ZESTRIL) tablet 20 mg (0 mg Oral Held 7/10/18 2136)   OLANZapine (ZyPREXA) tablet 5 mg (not administered)   sodium chloride 0.9 % bolus infusion 1,000 mL (1,000 mL IntraVENous New Bag 7/10/18 2225)         Lab findings:  Recent Results (from the past 12 hour(s))   EKG, 12 LEAD, INITIAL    Collection Time: 07/10/18  8:51 PM   Result Value Ref Range    Ventricular Rate 96 BPM    Atrial Rate 96 BPM    P-R Interval 212 ms    QRS Duration 84 ms    Q-T Interval 364 ms    QTC Calculation (Bezet) 459 ms    Calculated P Axis 58 degrees    Calculated R Axis -26 degrees    Calculated T Axis 64 degrees    Diagnosis       Sinus rhythm with 1st degree AV block  Otherwise normal ECG  When compared with ECG of 10-NOV-2017 08:12,  GA interval has increased  Nonspecific T wave abnormality no longer evident in Inferior leads     CBC WITH AUTOMATED DIFF    Collection Time: 07/10/18  9:00 PM   Result Value Ref Range    WBC 4.2 (L) 4.6 - 13.2 K/uL    RBC 4.99 4.70 - 5.50 M/uL    HGB 12.8 (L) 13.0 - 16.0 g/dL    HCT 39.5 36.0 - 48.0 %    MCV 79.2 74.0 - 97.0 FL    MCH 25.7 24.0 - 34.0 PG    MCHC 32.4 31.0 - 37.0 g/dL    RDW 17.1 (H) 11.6 - 14.5 %    PLATELET 811 366 - 145 K/uL    MPV 10.5 9.2 - 11.8 FL    NEUTROPHILS 51 40 - 73 %    LYMPHOCYTES 38 21 - 52 %    MONOCYTES 10 3 - 10 %    EOSINOPHILS 1 0 - 5 %    BASOPHILS 0 0 - 2 %    ABS. NEUTROPHILS 2.2 1.8 - 8.0 K/UL    ABS. LYMPHOCYTES 1.6 0.9 - 3.6 K/UL    ABS. MONOCYTES 0.4 0.05 - 1.2 K/UL    ABS. EOSINOPHILS 0.0 0.0 - 0.4 K/UL    ABS.  BASOPHILS 0.0 0.0 - 0.1 K/UL    DF AUTOMATED     METABOLIC PANEL, COMPREHENSIVE    Collection Time: 07/10/18  9:00 PM   Result Value Ref Range    Sodium 148 (H) 136 - 145 mmol/L    Potassium 3.8 3.5 - 5.5 mmol/L    Chloride 112 (H) 100 - 108 mmol/L    CO2 29 21 - 32 mmol/L    Anion gap 7 3.0 - 18 mmol/L    Glucose 109 (H) 74 - 99 mg/dL    BUN 11 7.0 - 18 MG/DL    Creatinine 0.97 0.6 - 1.3 MG/DL    BUN/Creatinine ratio 11 (L) 12 - 20      GFR est AA >60 >60 ml/min/1.73m2    GFR est non-AA >60 >60 ml/min/1.73m2    Calcium 9.3 8.5 - 10.1 MG/DL    Bilirubin, total 0.4 0.2 - 1.0 MG/DL    ALT (SGPT) 40 16 - 61 U/L    AST (SGOT) 15 15 - 37 U/L    Alk. phosphatase 74 45 - 117 U/L    Protein, total 6.6 6.4 - 8.2 g/dL    Albumin 3.7 3.4 - 5.0 g/dL    Globulin 2.9 2.0 - 4.0 g/dL    A-G Ratio 1.3 0.8 - 1.7     ETHYL ALCOHOL    Collection Time: 07/10/18  9:00 PM   Result Value Ref Range    ALCOHOL(ETHYL),SERUM <3 0 - 3 MG/DL   MAGNESIUM    Collection Time: 07/10/18  9:00 PM   Result Value Ref Range    Magnesium 2.4 1.6 - 2.6 mg/dL   DRUG SCREEN, URINE    Collection Time: 07/10/18  9:55 PM   Result Value Ref Range    BENZODIAZEPINES NEGATIVE  NEG      BARBITURATES NEGATIVE  NEG      THC (TH-CANNABINOL) NEGATIVE  NEG      OPIATES NEGATIVE  NEG      PCP(PHENCYCLIDINE) NEGATIVE  NEG      COCAINE NEGATIVE  NEG      AMPHETAMINES NEGATIVE  NEG      METHADONE NEGATIVE  NEG      HDSCOM (NOTE)        EKG interpretation by ED Physician:  Sinus with 1st avb no acute st tw changes  Rate 96, pr 212, qtc 459  Ns tw changes no longer present    X-Ray, CT or other radiology findings or impressions:  No orders to display       Progress notes, Consult notes or additional Procedure notes:   D/w dr Dennis Crump from telepsych who feels pt is more schizophrenic and more confused than previous eval and rec admission to which pt is voluntary. meds ordered  Will t/o to dr Coleman Meals at approx 6am to f/u on placement; if pt becomes involuntary then [facility] should be consulted for tdo evaluation    Reevaluation of patient:   stable    Disposition:  Diagnosis:   1. Suicidal ideations    2.  [facility] 3. Hypertension, accelerated        Disposition: pending placement    Follow-up Information     None            Patient's Medications   Start Taking    No medications on file   Continue Taking    HYDROCHLOROTHIAZIDE (HYDRODIURIL) 25 MG TABLET    Take 1 Tab by mouth daily.  Indications: hypertension   These Medications have changed    No medications on file   Stop Taking    No medications on file

## 2018-07-11 NOTE — H&P
[address] [place] [address]   PSYCH ADMIT NOTE      Melanie Morrow  MR#: [id number]  : 1965  ACCOUNT #: [de-identified]   ADMIT DATE: 2018    IDENTIFYING INFORMATION:  The patient is a 58-year-old  male admitted to my service on 2018. CHIEF COMPLAINT:  \"The voices in my head were getting loud. \"    HISTORY OF PRESENTING ILLNESS:  The patient has a longstanding history of mood problems and psychosis in context of substance use issues. He presented to the emergency department last night. He was evaluated by the tele psychiatrist, Dr. Beuford Gowers. At the time, the patient acknowledged having auditory hallucinations as well as having strong thoughts of hurting himself. Given the concerns for his safety, he was recommended for psychiatric admission. On interview today, he acknowledges having low mood and signs of depression such as low energy, decreased appetite, and feelings of sadness. He reports, \"Having reached the end of my rope yesterday. \"  The main stressors are his homelessness and financial difficulties. He also acknowledges that he had not been taking his medication as prescribed. He states that he finds the hospital to be a safe place and feels safe in this milieu. He does not report active thoughts to hurt himself or others while he is admitted here. He does acknowledge low-intensity auditory hallucinations which are non-command in type, and he reports some feelings of paranoia. PAST PSYCHIATRIC HISTORY:  The patient has been admitted at least 3 times to various psychiatric facilities. There may be other admissions, but he could not clarify the history. There have been several medication trials in the past, although the patient cannot recall the names of his medications. Most recently, he has been prescribed Prozac and Zyprexa, but he has not been taking medication as prescribed.     He has 2 major events where he tried hurting himself, but when asked for details he said, \"That's something I don't like to talk about. \"  According to the records, he had made a previous attempt by jumping off a bridge (in this incident his ankle got fractured). He was supposed to follow up with [facility], although his compliance with out patient treatment appears to be lacking. SUBSTANCE ABUSE HISTORY:  The patient acknowledges use of alcohol and cannabis. Drug screen in the emergency room was negative. The patient also acknowledges smoking 1 pack of cigarettes a day. He denies other illegal drug use or intravenous drug use. PAST LEGAL HISTORY:  The patient acknowledges \"minor charges such as drunk in public and trespassing. \"  He claims to have done a cumulative total of about 1 week incarcerated \"over the years. \". He denies any active legal charges at this point. PAST MEDICAL HISTORY:  There is history of hypertension. PAST SURGICAL HISTORY:  Denied by the patient. ALLERGIES TO MEDICATIONS:  NO KNOWN DRUG ALLERGIES. REVIEW OF SYMPTOMS:  The patient denies history of head or neck trauma, seizures or sexually transmitted diseases. He does acknowledge decreasing sleep and appetite. For rest of the review of systems and symptoms, please refer to the emergency physician's workup. FAMILY PSYCHIATRIC HISTORY:  The patient claims that his father had history of schizophrenia. Other than that, he denies history of any other mental illnesses or suicides in close biological relatives. PSYCHOSOCIAL HISTORY:  The patient is born and raised in this area. He has completed 12th grade education. He has been  and  once. He has 3 children, 2 daughters who are in their 25s and a son who is a teenager. 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 VITAL SIGNS:  Temperature 98.3, pulse of 74, respiratory rate of 18, blood pressure 106/74.     LABORATORY DATA: Hematology shows decrease in WBCs of 4.2, hemoglobin is 12.8, and RDW is 17.1, with the rest being normal.  Urinalysis shows trace protein with the rest being normal.  Chemistry shows sodium of 148, chloride of 112, glucose of 109, with the rest being normal.  Lipid panel from May had shown elevation of LDL and triglyceride. Drug screen was negative. TSH was 0.57. MENTAL STATUS EXAMINATION:  The patient was somewhat disheveled appearing. He was alert and oriented in all domains. Psychomotor activity was somewhat decreased. No tics or abnormal involuntary body movements were evident. Speech was decreased in rate and tone and increased in latency. Affect was anxious and constricted. Thought processes were goal directed, although he responded to most of the questions in a monosyllabic manner. There was no indication of racing thoughts, flight of ideas or loose associations. He had insight to give consent for treatment. There is difficulty with immediate recall. Overall memory is decreased. The patient acknowledges low-intensity auditory hallucinations which are intermittent in type and non-command in type. He denies any delusional symptoms at this time. He acknowledges some paranoia. No suicidal or homicidal ideation, intent or plan is expressed by the patient. DIAGNOSES:  1. Psychosis, unspecified. 2.  Rule out schizoaffective disorder. LEVEL OF IMPAIRMENT:  Moderate to severe. EXPECTED LENGTH OF STAY:  5-7 days. PLAN:  1. Admit to structured inpatient setting. 2.  Monitor for safety. 3.  Full history and physical.  4.  Complete psychiatric intake. 5.  Lab work as pertinent. 6.  Restart outpatient medications. I strongly encouraged the patient to consider long-acting injectable antipsychotic; however, at this point he refused to consider it. 7.  Obtain collateral information from outside sources and family if possible. 8.  Obtain outside treatment records if possible.   9. Group, individual, pastoral, and milieu therapy. 10.  Expect discharge planning to consider placement issues and followup appointments with outpatient agency. Maryellen Faulkner MD dictating on behalf of J. Merlinda Downs, MD JBM/RAE  D: 07/11/2018 14:52     T: 07/11/2018 15:31  JOB #: [id number]

## 2018-07-11 NOTE — PROGRESS NOTES
Received a call from Sergio from [facility] and states that pt is accepted at [facility] by Dr Sanjuana España. Pt is going to Room 106 Bed 4. Nursing to call report to [phone]. ED MD, charge nurse and pt made aware.

## 2018-07-11 NOTE — IP AVS SNAPSHOT
[address] [facility] 
 
 
 [address] Patient: Astrid Godinez MRN: [id number] :1965 A check preeti indicates which time of day the medication should be taken. My Medications START taking these medications Instructions Each Dose to Equal  
 Morning Noon Evening Bedtime  
 amLODIPine 10 mg tablet Commonly known as:  Amrita Reza Your last dose was: Your next dose is: Take 1 Tab by mouth daily. Indications: hypertension 10 mg  
    
   
   
   
  
 carvedilol 6.25 mg tablet Commonly known as:  Indra Lieberman Your last dose was: Your next dose is: Take 1 Tab by mouth two (2) times daily (with meals). Indications: hypertension 6.25 mg FLUoxetine 20 mg capsule Commonly known as:  PROzac Your last dose was: Your next dose is: Take 1 Cap by mouth daily. Indications: ANXIETY WITH DEPRESSION  
 20 mg  
    
   
   
   
  
 lisinopril 20 mg tablet Commonly known as:  Pierson Comfort Your last dose was: Your next dose is: Take 1 Tab by mouth daily. Indications: hypertension 20 mg  
    
   
   
   
  
 OLANZapine 10 mg tablet Commonly known as:  ZyPREXA Your last dose was: Your next dose is: Take 1 Tab by mouth every evening. Indications: Schizophrenia 10 mg CONTINUE taking these medications Instructions Each Dose to Equal  
 Morning Noon Evening Bedtime  
 hydroCHLOROthiazide 25 mg tablet Commonly known as:  HYDRODIURIL Your last dose was: Your next dose is: Take 1 Tab by mouth daily. Indications: hypertension 25 mg Where to Get Your Medications Information on where to get these meds will be given to you by the nurse or doctor. ! Ask your nurse or doctor about these medications  
  amLODIPine 10 mg tablet  
 carvedilol 6.25 mg tablet FLUoxetine 20 mg capsule  
 hydroCHLOROthiazide 25 mg tablet  
 lisinopril 20 mg tablet OLANZapine 10 mg tablet

## 2018-07-11 NOTE — BSMART NOTE
ACTIVITIES THERAPY PROGRESS NOTE    Group time:1530    . The patient did not awaken/get up when called for group.

## 2018-07-11 NOTE — BH NOTES
Rosalie Heredia is not participating in [facility]. Group time: [id number]    Personal goal for participation: Repairs. Community Concerns    Goal orientation: personal    Group therapy participation: refuse    Therapeutic interventions reviewed and discussed: Staff encouraged Pt to report repairs and Community concerns    Impression of participation: Pt refuse, chose to rest in bed despite staff encouragement

## 2018-07-11 NOTE — IP AVS SNAPSHOT
[address] [facility] 
 
 
 [address] Patient: Adela Can MRN: [id number] :1965 About your hospitalization You were admitted on:  2018 You last received care in the:  [facility] 1 You were discharged on:  2018 Why you were hospitalized Your primary diagnosis was:  Not on File Your diagnoses also included:  Depression Follow-up Information Follow up With Details Comments Contact Info [address] On 2018 8:30 am initial intake appointment for continuation of services. 5 [facility] [facility] 
[phone] Fax: [phone] Patient will complete intake assessment on 18 @ 8:30am 
[address] [place] [address], [address] [place] [address] [facility] 
[phone]: main [phone]:NXF Discharge Orders None A check preeti indicates which time of day the medication should be taken. My Medications START taking these medications Instructions Each Dose to Equal  
 Morning Noon Evening Bedtime  
 amLODIPine 10 mg tablet Commonly known as:  [place] Royals Your last dose was: Your next dose is: Take 1 Tab by mouth daily. Indications: hypertension 10 mg  
    
   
   
   
  
 carvedilol 6.25 mg tablet Commonly known as:  Mar Arnold Your last dose was: Your next dose is: Take 1 Tab by mouth two (2) times daily (with meals). Indications: hypertension 6.25 mg FLUoxetine 20 mg capsule Commonly known as:  PROzac Your last dose was: Your next dose is: Take 1 Cap by mouth daily. Indications: ANXIETY WITH DEPRESSION  
 20 mg  
    
   
   
   
  
 lisinopril 20 mg tablet Commonly known as:  Yasmine David Your last dose was: Your next dose is: Take 1 Tab by mouth daily. Indications: hypertension 20 mg  
    
   
   
   
  
 OLANZapine 10 mg tablet Commonly known as:  ZyPREXA Your last dose was: Your next dose is: Take 1 Tab by mouth every evening. Indications: Schizophrenia 10 mg CONTINUE taking these medications Instructions Each Dose to Equal  
 Morning Noon Evening Bedtime  
 hydroCHLOROthiazide 25 mg tablet Commonly known as:  HYDRODIURIL Your last dose was: Your next dose is: Take 1 Tab by mouth daily. Indications: hypertension 25 mg Where to Get Your Medications Information on where to get these meds will be given to you by the nurse or doctor. ! Ask your nurse or doctor about these medications  
  amLODIPine 10 mg tablet  
 carvedilol 6.25 mg tablet FLUoxetine 20 mg capsule  
 hydroCHLOROthiazide 25 mg tablet  
 lisinopril 20 mg tablet OLANZapine 10 mg tablet Discharge Instructions BEHAVIORAL HEALTH NURSING DISCHARGE NOTE The following personal items collected during your admission are returned to you:  
Dental Appliance: Dental Appliances: None Vision: Visual Aid: None Hearing Aid:   
Jewelry: Jewelry: None Clothing: Clothing: At bedside, [place], Footwear, [place], [address], With patient (belt,hat,lighter,phone numbers in locker) Other Valuables: Other Valuables: Lighter/matches, Personal toiletries, With patient Valuables sent to safe: Personal Items Sent to Safe:  (none) PATIENT INSTRUCTIONS: 
 
 
  
 
 
The discharge information has been reviewed with the patient. The patient verbalized understanding. Patient armband removed and shredded Introducing \[facility]\"" & HEALTH SERVICES! Sulma Nixon introduces SocialDial patient portal. Now you can access parts of your medical record, email your doctor's office, and request medication refills online. 1. In your internet browser, go to [email or web address] Wirelesst 2. Click on the First Time User? Click Here link in the Sign In box. You will see the New Member Sign Up page. 3. Enter your [employer] Access Code exactly as it appears below. You will not need to use this code after youve completed the sign-up process. If you do not sign up before the expiration date, you must request a new code. · [employer] Access Code: [email or web address] Expires: 8/3/2018  9:03 PM 
 
4. Enter the last four digits of your Social Security Number (xxxx) and Date of Birth (mm/dd/yyyy) as indicated and click Submit. You will be taken to the next sign-up page. 5. Create a FNDt ID. This will be your [employer] login ID and cannot be changed, so think of one that is secure and easy to remember. 6. Create a [employer] password. You can change your password at any time. 7. Enter your Password Reset Question and Answer. This can be used at a later time if you forget your password. 8. Enter your e-mail address. You will receive e-mail notification when new information is available in [address] 9. Click Sign Up. You can now view and download portions of your medical record. 10. Click the Download Summary menu link to download a portable copy of your medical information. If you have questions, please visit the Frequently Asked Questions section of the [employer] website. Remember, [employer] is NOT to be used for urgent needs. For medical emergencies, dial 911. Now available from your iPhone and Android! Introducing Mino Muniz As a Cherl Grain patient, I wanted to make you aware of our electronic visit tool called Mino Muniz. Yvette Little 24/7 allows you to connect within minutes with a medical provider 24 hours a day, seven days a week via a mobile device or tablet or logging into a secure website from your computer. You can access Mino Muniz from anywhere in the [place]. A virtual visit might be right for you when you have a simple condition and feel like you just dont want to get out of bed, or cant get away from work for an appointment, when your regular [facility] provider is not available (evenings, weekends or holidays), or when youre out of town and need minor care. Electronic visits cost only $49 and if the [employer] 24/7 provider determines a prescription is needed to treat your condition, one can be electronically transmitted to a nearby pharmacy*. Please take a moment to enroll today if you have not already done so. The enrollment process is free and takes just a few minutes. To enroll, please download the Hitlantis 24/7 fish to your tablet or phone, or visit [email or web address] to enroll on your computer. And, as an [address] [place] [address] patient with a [employer] account, the results of your visits will be scanned into your electronic medical record and your primary care provider will be able to view the scanned results. We urge you to continue to see your regular [facility] provider for your ongoing medical care. And while your primary care provider may not be the one available when you seek a Jiankongbao virtual visit, the peace of mind you get from getting a real diagnosis real time can be priceless. For more information on [email or web address], view our Frequently Asked Questions (FAQs) at [email or web address]. Sincerely, 
 
India Medrano MD 
Chief Medical Officer [address] *:  certain medications cannot be prescribed via Jiankongbao Providers Seen During Your Hospitalization Provider Specialty Primary office phone Marla Arenas MD Psychiatry [phone] Your Primary Care Physician (PCP) Primary Care Physician Office Phone Office Fax NONE ** None ** ** None ** You are allergic to the following No active allergies Recent Documentation Height Weight BMI Smoking Status 1.702 m 72.6 kg 25.06 kg/m2 Current Some Day Smoker Emergency Contacts Name Discharge Info Relation Home Work Mobile Skye Barney DISCHARGE CAREGIVER [3] Friend [5] [phone] Patient Belongings The following personal items are in your possession at time of discharge: 
  Dental Appliances: None  Visual Aid: None      Home Medications: Locked   Jewelry: None  Clothing: At bedside, [place], Footwear, [place] park, [employer], Socks, With patient (belt,hat,lighter,phone numbers in locker)    Other Valuables: Lighter/matches, Personal toiletries, With patient  Personal Items Sent to Safe:  (none) Please provide this summary of care documentation to your next provider. Signatures-by signing, you are acknowledging that this After Visit Summary has been reviewed with you and you have received a copy. Patient Signature:  ____________________________________________________________ Date:  ____________________________________________________________  
  
Pablito Monroeve Provider Signature:  ____________________________________________________________ Date:  ____________________________________________________________

## 2018-07-11 NOTE — IP AVS SNAPSHOT
Summary of Care Report The Summary of Care report has been created to help improve care coordination. Users with access to [facility] or [facility] [address] [facility] (Web-based application) may access additional patient information including the Discharge Summary. If you are not currently a [facility] [employer] [facility] user and need more information, please call the number listed below in the [place] [address] section and ask to be connected with Medical Records. Facility Information Name Address Phone [address] [facility]  [address] [facility] [address] [phone] Patient Information Patient Name Sex  Naresh Husaining ([id number]) Male 1965 Discharge Information Admitting Provider Service Area Unit Artis Felty, MD / [address] 1 / [phone] Discharge Provider Discharge Date/Time Discharge Disposition Destination (none) 2018 (Pending) AHR (none) Patient Language Language ENGLISH [13] Hospital Problems as of 2018  Never Reviewed Class Noted - Resolved Last Modified POA Active Problems Depression  2018 - Present 2018 by Artis Felty, MD Unknown Entered by Artis Felty, MD  
  
Non-Hospital Problems as of 2018  Never Reviewed Class Noted - Resolved Last Modified Active Problems   MDD (major depressive disorder), recurrent, severe, with psychosis ([facility]  2017 - Present 2018 by Ricky Damico MD  
  Entered by Sina Woodard MD  
  Major depression  2018 - Present 2018 by Ricky Damico MD  
  Entered by Alvarez Francois MD  
  Alcohol abuse  2018 - Present 2018 by Ricky Damico MD  
  Entered by Ricky Damico MD  
  Malingering  2018 - Present 2018 by Ricky Damico MD  
  Entered by Ricky Damico MD  
 Hypertension  Unknown - Present 5/7/2018 by Everett Collet, MD  
  Entered by Everett Collet, MD  
  
You are allergic to the following No active allergies Current Discharge Medication List  
  
START taking these medications Dose & Instructions Dispensing Information Comments  
 amLODIPine 10 mg tablet Commonly known as:  Martha Lama Dose:  10 mg Take 1 Tab by mouth daily. Indications: hypertension Quantity:  14 Tab Refills:  0  
   
 carvedilol 6.25 mg tablet Commonly known as:  Shukri Tyler Dose:  6.25 mg Take 1 Tab by mouth two (2) times daily (with meals). Indications: hypertension Quantity:  14 Tab Refills:  0 FLUoxetine 20 mg capsule Commonly known as:  PROzac Dose:  20 mg Take 1 Cap by mouth daily. Indications: ANXIETY WITH DEPRESSION Quantity:  14 Cap Refills:  0  
   
 lisinopril 20 mg tablet Commonly known as:  Yulisa Shames Dose:  20 mg Take 1 Tab by mouth daily. Indications: hypertension Quantity:  14 Tab Refills:  0  
   
 OLANZapine 10 mg tablet Commonly known as:  ZyPREXA Dose:  10 mg Take 1 Tab by mouth every evening. Indications: Schizophrenia Quantity:  14 Tab Refills:  0 CONTINUE these medications which have NOT CHANGED Dose & Instructions Dispensing Information Comments  
 hydroCHLOROthiazide 25 mg tablet Commonly known as:  HYDRODIURIL Dose:  25 mg Take 1 Tab by mouth daily. Indications: hypertension Quantity:  14 Tab Refills:  0 Current Immunizations Name Date Influenza Vaccine 3/2/2017 Follow-up Information Follow up With Details Comments Contact Info Avani Gallegos On 7/20/2018 8:30 am initial intake appointment for continuation of services. [address] [place] [address], [address] Abhishek Morales 229 
[phone] Fax: [phone]     Patient will complete intake assessment on 7/20/18 @ 8:30am 
 [place] [address] [place] [address],[facility] [address] [facility] Abhishek Morales 
[phone]: main [phone]:ZZQ Discharge Instructions BEHAVIORAL HEALTH NURSING DISCHARGE NOTE The following personal items collected during your admission are returned to you:  
Dental Appliance: Dental Appliances: None Vision: Visual Aid: None Hearing Aid:   
Jewelry: Jewelry: None Clothing: Clothing: At bedside, [place], Footwear, [place] haydee, [email or web address], With patient (belt,hat,lighter,phone numbers in locker) Other Valuables: Other Valuables: Lighter/matches, Personal toiletries, With patient Valuables sent to safe: Personal Items Sent to Safe:  (none) PATIENT INSTRUCTIONS: 
 
 
  
 
 
The discharge information has been reviewed with the patient. The patient verbalized understanding. Patient armband removed and shredded Chart Review Routing History No Routing History on File

## 2018-07-11 NOTE — ED NOTES
6:05 AM :Pt care assumed from Dr. Germania Webster , ED provider. Pt complaint(s), current treatment plan, progression and available diagnostic results have been discussed thoroughly. Rounding occurred: yes  Intended Disposition: Psych Placement    Pending diagnostic reports and/or labs (please list): placement; if pt becomes involuntary then csb should be consulted for tdo evaluation. Awaiting placement. Scribe Attestation     [employer] acting as a scribe for and in the presence of Gia Byrnes DO     July 11, 2018 at 6:46 AM       Provider Attestation:      I personally performed the services described in the documentation, reviewed the documentation, as recorded by the scribe in my presence, and it accurately and completely records my words and actions.  July 11, 2018 at 6:46 AM - Gia Byrnes DO

## 2018-07-11 NOTE — ROUTINE PROCESS
TRANSFER - OUT REPORT:    Verbal report given to Marck Burris (name) on [facility]       Report consisted of patients Situation, Background, Assessment and   Recommendations(SBAR). Information from the following report(s) SBAR, ED Summary and MAR was reviewed with the receiving nurse. Lines:   Peripheral IV 07/10/18 Left Antecubital (Active)   Site Assessment Clean, dry, & intact 7/10/2018  9:07 PM   Phlebitis Assessment 0 7/10/2018  9:07 PM   Infiltration Assessment 0 7/10/2018  9:07 PM   Dressing Status Clean, dry, & intact 7/10/2018  9:07 PM   Dressing Type Transparent 7/10/2018  9:07 PM   Hub Color/Line Status Green 7/10/2018  9:07 PM        Opportunity for questions and clarification was provided.       Patient awaiting transport

## 2018-07-11 NOTE — PROGRESS NOTES
Referred to [facility] and spoke with Celestina Klein. Spoke with pt and states that he's avoiding to go to [place] but willing to go [place] if no bed available at [facility]. Celestina Klein made aware.

## 2018-07-12 LAB
CHOLEST SERPL-MCNC: 247 MG/DL
HBA1C MFR BLD: 5.7 % (ref 4.2–5.6)
HDLC SERPL-MCNC: 39 MG/DL (ref 40–60)
HDLC SERPL: 6.3 {RATIO} (ref 0–5)
LDLC SERPL CALC-MCNC: 178.4 MG/DL (ref 0–100)
LIPID PROFILE,FLP: ABNORMAL
T4 FREE SERPL-MCNC: 0.8 NG/DL (ref 0.7–1.5)
TRIGL SERPL-MCNC: 148 MG/DL (ref ?–150)
TSH SERPL DL<=0.05 MIU/L-ACNC: 0.34 UIU/ML (ref 0.36–3.74)
VLDLC SERPL CALC-MCNC: 29.6 MG/DL

## 2018-07-12 PROCEDURE — 36415 COLL VENOUS BLD VENIPUNCTURE: CPT | Performed by: PSYCHIATRY & NEUROLOGY

## 2018-07-12 PROCEDURE — 84443 ASSAY THYROID STIM HORMONE: CPT | Performed by: PSYCHIATRY & NEUROLOGY

## 2018-07-12 PROCEDURE — 84439 ASSAY OF FREE THYROXINE: CPT | Performed by: PSYCHIATRY & NEUROLOGY

## 2018-07-12 PROCEDURE — 65220000005 HC RM SEMIPRIVATE PSYCH 3 OR 4 BED

## 2018-07-12 PROCEDURE — 74011250637 HC RX REV CODE- 250/637: Performed by: PSYCHIATRY & NEUROLOGY

## 2018-07-12 PROCEDURE — 80061 LIPID PANEL: CPT | Performed by: PSYCHIATRY & NEUROLOGY

## 2018-07-12 PROCEDURE — 83036 HEMOGLOBIN GLYCOSYLATED A1C: CPT | Performed by: PSYCHIATRY & NEUROLOGY

## 2018-07-12 RX ADMIN — FLUOXETINE 20 MG: 20 CAPSULE ORAL at 08:44

## 2018-07-12 RX ADMIN — AMLODIPINE BESYLATE 10 MG: 5 TABLET ORAL at 11:02

## 2018-07-12 RX ADMIN — HYDROCHLOROTHIAZIDE 25 MG: 25 TABLET ORAL at 08:44

## 2018-07-12 RX ADMIN — OLANZAPINE 5 MG: 10 TABLET, FILM COATED ORAL at 08:44

## 2018-07-12 RX ADMIN — CARVEDILOL 6.25 MG: 6.25 TABLET, FILM COATED ORAL at 17:19

## 2018-07-12 RX ADMIN — OLANZAPINE 5 MG: 10 TABLET, FILM COATED ORAL at 21:54

## 2018-07-12 RX ADMIN — CARVEDILOL 6.25 MG: 6.25 TABLET, FILM COATED ORAL at 08:48

## 2018-07-12 RX ADMIN — LISINOPRIL 20 MG: 10 TABLET ORAL at 08:44

## 2018-07-12 NOTE — BSMART NOTE
SOCIAL WORK GROUP THERAPY PROGRESS NOTE    Group Time:  11am    Group Topic:  Coping Skills    Group Participation:     Pt expressed not interested in attending session despite staff support. Wanted to sleep in room & be left alone.

## 2018-07-12 NOTE — BH NOTES
Pt was monitored for safety. Mood was calm. No issues with any negative behaviors. Able to eat his dinner. Attended his groups and activities. Staff will continue to monitor for safety and locations.

## 2018-07-12 NOTE — BH NOTES
Haresh Vicente is not participating in Recreational Therapy. Group time: 6794    Personal goal for participation: fresh air break    Goal orientation: social    Group therapy participation: refuse    Therapeutic interventions reviewed and discussed: Staff encouraged Pt.  To participate in group    Impression of participation: Pt refuse, chose to rest in bed despite staff encouragement

## 2018-07-12 NOTE — BH NOTES
Dawna Agudelo is not participating in [address] Group    Group time: 1915    Personal goal for participation:  Seek information on Alcohol  abuse    Goal orientation:  Passive    Group therapy participation:  refuse    Therapeutic interventions reviewed and discussed: Staff encouraged Pt. To participate in Group    Impression of participation:  Pt.  Refuse chose to rest in bed despite staff encouragement

## 2018-07-12 NOTE — BH NOTES
[address]  Inpatient Progress Note     Date of Service: 07/12/18  Hospital Day: 1     Subjective/Interval History   07/12/18    Chief Complaint: \" I feel rested today. \"     Interim History of Inpatient Stay:  Patient's interim clinical status was discussed with nursing and treatment team. According to the report, Stephanie Ta is denies suicidal thoughts, contracts for safety. Denies auditory hallucinations. Medication compliant. I met face to face and interviewed Stephanie Ta today. Patient has slept most of the day. Reports decrease in paranoia. He denies SI or HI    Interim Update to [address] History:  Patient denies side effects to the medicine. Sleep is increased. Appetite is normal.     Interim Update to Social History: Placement would need to be worked out.        Objective     Visit Vitals    BP (!) 135/91 (BP 1 Location: Right arm, BP Patient Position: Sitting)    Pulse 88    Temp 98.4 °F (36.9 °C)    Resp 16    Ht 5' 7\" (1.702 m)    Wt 72.6 kg (160 lb)    BMI 25.06 kg/m2       Recent Results (from the past 24 hour(s))   LIPID PANEL    Collection Time: 07/12/18  6:18 AM   Result Value Ref Range    LIPID PROFILE          Cholesterol, total 247 (H) <200 MG/DL    Triglyceride 148 <150 MG/DL    HDL Cholesterol 39 (L) 40 - 60 MG/DL    LDL, calculated 178.4 (H) 0 - 100 MG/DL    VLDL, calculated 29.6 MG/DL    CHOL/HDL Ratio 6.3 (H) 0 - 5.0     TSH 3RD GENERATION    Collection Time: 07/12/18  6:18 AM   Result Value Ref Range    TSH 0.34 (L) 0.36 - 3.74 uIU/mL   T4, FREE    Collection Time: 07/12/18  6:18 AM   Result Value Ref Range    T4, Free 0.8 0.7 - 1.5 NG/DL   HEMOGLOBIN A1C W/O EAG    Collection Time: 07/12/18  6:18 AM   Result Value Ref Range    Hemoglobin A1c 5.7 (H) 4.2 - 5.6 %       Current Facility-Administered Medications   Medication Dose Route Frequency    amLODIPine (NORVASC) tablet 10 mg  10 mg Oral DAILY    carvedilol (COREG) tablet 6.25 mg 6.25 mg Oral BID WITH MEALS    FLUoxetine (PROzac) capsule 20 mg  20 mg Oral DAILY    hydroCHLOROthiazide (HYDRODIURIL) tablet 25 mg  25 mg Oral DAILY    lisinopril (PRINIVIL, ZESTRIL) tablet 20 mg  20 mg Oral DAILY    OLANZapine (ZyPREXA) tablet 5 mg  5 mg Oral BID         Mental Status Examination     Appearance/Hygiene 46 y.o. BLACK OR  male  Hygiene: Unkempt   Behavior/Social Relatedness Appropriate   Musculoskeletal Gait/Station: appropriate  Tone (flaccid, cogwheeling, spastic): not assessed  Psychomotor (hyperkinetic, hypokinetic): appropriate   Involuntary movements (tics, dyskinesias, akathisa, stereotypies): none   Speech   Rate, rhythm, volume, fluency and articulation are appropriate   Mood   stable   Affect    stable   Thought Process Linear and goal directed   Thought Content and Perceptual Disturbances Denies self-injurious behavior (SIB), suicidal ideation (SI), aggressive behavior or homicidal ideation (HI)    Denies auditory and visual hallucinations   Sensorium and Cognition  Grossly intact   Insight  limited   Judgment fair        Assessment/Plan      Psychiatric/ Medical Diagnoses:   Patient Active Problem List   Diagnosis Code    MDD (major depressive disorder), recurrent, severe, with psychosis ([facility] [address] F33.3    Major depression F32.9    Alcohol abuse F10.10    Malingering Z76.5    Hypertension I10    Depression F32.9       Walter Brooke is a 46 y. o. who is currently being treated for psychosis and thoughts of self harm. 1.  Continue Meds as Ordered. Monitor for safety, complications and side effects to medicine. 2. I have reviewed instructions, risks, benefits and side effects of medications  3. Advised Patient to participate in Individual, Group & [facility]. 4.  Discharge Plans: Placement would need to worked out.    5. Barriers to Discharge :  Impulsivity, Chronic Illness, Housing/Family Issues, , Lack of resources, Medication/Treatment Non Adherence, Safety Issues    Raz Marquis MD DR. [facility]  Psychiatry

## 2018-07-12 NOTE — BSMART NOTE
SW assessment/Intervention:  SW did not engaged with this patient. Patient remained asleep and did not respond to the efforts of this writer.     Maggie Mitchell MSW, LCSW-E

## 2018-07-12 NOTE — PROGRESS NOTES
Problem: Falls - Risk of  Goal: *Absence of Falls  Document Roxann Fall Risk and appropriate interventions in the flowsheet daily. Outcome: Progressing Towards Goal  Fall Risk Interventions:          No falls. Problem: Suicide/Homicide (Adult/Pediatric)  Goal: *STG: Remains safe in hospital  Patient will be assessed daily for safety. Outcome: Progressing Towards Goal  Remains safe, denies suicidal thoughts. Goal: *STG/LTG: Complies with medication therapy  Patient will take prescribed medications daily. Outcome: Progressing Towards Goal  Compliant. Comments: Patient denies suicidal thoughts, contracts for safety. Denies auditory hallucinations. Medication compliant.

## 2018-07-12 NOTE — BSMART NOTE
ART THERAPY GROUP PROGRESS NOTE    PATIENT SCHEDULED FOR GROUP AT: 2:00    ATTENDANCE: Patient was asleep and did not wake for group.

## 2018-07-12 NOTE — BSMART NOTE
OCCUPATIONAL THERAPY PROGRESS NOTE  Group Time:  9248  Attendance: The patient attended full group. Participation:  The patient participated with minimal elaboration in the activity. Attention:  The patient needed redirection to activity at least once. Interaction:  The patient acknowledges others or responds to questions,  with no spontaneous interaction. Attempted to participate, but was somewhat hard to follow and patient stated he wasn't making any sense.

## 2018-07-13 PROCEDURE — 74011250637 HC RX REV CODE- 250/637: Performed by: PSYCHIATRY & NEUROLOGY

## 2018-07-13 PROCEDURE — 65220000005 HC RM SEMIPRIVATE PSYCH 3 OR 4 BED

## 2018-07-13 RX ADMIN — CARVEDILOL 6.25 MG: 6.25 TABLET, FILM COATED ORAL at 08:30

## 2018-07-13 RX ADMIN — CARVEDILOL 6.25 MG: 6.25 TABLET, FILM COATED ORAL at 17:40

## 2018-07-13 RX ADMIN — AMLODIPINE BESYLATE 10 MG: 5 TABLET ORAL at 08:30

## 2018-07-13 RX ADMIN — LISINOPRIL 20 MG: 10 TABLET ORAL at 08:30

## 2018-07-13 RX ADMIN — FLUOXETINE 20 MG: 20 CAPSULE ORAL at 08:30

## 2018-07-13 RX ADMIN — OLANZAPINE 5 MG: 10 TABLET, FILM COATED ORAL at 20:38

## 2018-07-13 RX ADMIN — TRAZODONE HYDROCHLORIDE 50 MG: 50 TABLET ORAL at 20:39

## 2018-07-13 RX ADMIN — HYDROCHLOROTHIAZIDE 25 MG: 25 TABLET ORAL at 08:30

## 2018-07-13 RX ADMIN — OLANZAPINE 5 MG: 10 TABLET, FILM COATED ORAL at 08:30

## 2018-07-13 NOTE — PROGRESS NOTES
Problem: Falls - Risk of  Goal: *Absence of Falls  Document Roxann Fall Risk and appropriate interventions in the flowsheet daily. Outcome: Progressing Towards Goal  Fall Risk Interventions:absent of falls daily while in hospital            Medication Interventions: Teach patient to arise slowly                  Problem: Suicide/Homicide (Adult/Pediatric)  Goal: *STG: Remains safe in hospital  Patient will be assessed daily for safety. Outcome: Progressing Towards Goal  Remains safe while in hospital daily. Goal: *STG/LTG: Complies with medication therapy  Patient will take prescribed medications daily. Outcome: Progressing Towards Goal  Complies with medication therapy daily while in hospital.    Comments: Denies SI HI AVH. Offers no complaints. Eats and tolerates evening meal. Quiet and keeps to self. Does interact with staff and peers when approached. Gripper socks and 15 minute checks in place for safety. Takes medicines without incident. Will continue to monitor and support.

## 2018-07-13 NOTE — BSMART NOTE
SW assessment/Intervention:  Chart reviewed and discussed it reports Nan Franklin is a 46 y. o. who is currently being treated for psychosis and thoughts of self harm. SW made contact with patient as he remained in bed. Patient was attentive and informed this writer that he is \"feeling ok\". It is reported that patient has remained in bed and has minimal contact with staff. SW encouraged patient to engage in group activities as well as positive peer interaction. Patient denies SI/HI. Patient denies AVH. SW will continue to monitor patient during hospitalization consulting with treating psychiatrist to complete disposition.     MAURISIO Spence, LCSW-E     [phone]

## 2018-07-13 NOTE — BH NOTES
[address]  Inpatient Progress Note     Date of Service: 07/13/18  Hospital Day: 2     Subjective/Interval History   07/13/18    Chief Complaint: \" I am a little better. \"    Interim History of Inpatient Stay:  Patient's interim clinical status was discussed with nursing and treatment team. According to the report, Stephen Carcamo 's mood was calm. No issues with any negative behaviors. Able to eat his dinner. Attended his groups and activities    I met face to face and interviewed Stephen Carcamo today. He denies acute concerns. He denies active psychosis. He denies SI or HI    Interim Update to [address] History:  Patient denies side effects to the medicine. Sleep is adequate. Appetite is normal.     Interim Update to Social History: No new issues      Objective     Visit Vitals    /69 (BP 1 Location: Right arm, BP Patient Position: Lying left side)    Pulse 71    Temp 97.8 °F (36.6 °C)    Resp 18    Ht 5' 7\" (1.702 m)    Wt 72.6 kg (160 lb)    BMI 25.06 kg/m2       No results found for this or any previous visit (from the past 24 hour(s)). Current Facility-Administered Medications   Medication Dose Route Frequency    amLODIPine (NORVASC) tablet 10 mg  10 mg Oral DAILY    carvedilol (COREG) tablet 6.25 mg  6.25 mg Oral BID WITH MEALS    FLUoxetine (PROzac) capsule 20 mg  20 mg Oral DAILY    hydroCHLOROthiazide (HYDRODIURIL) tablet 25 mg  25 mg Oral DAILY    lisinopril (PRINIVIL, ZESTRIL) tablet 20 mg  20 mg Oral DAILY    OLANZapine (ZyPREXA) tablet 5 mg  5 mg Oral BID         Mental Status Examination     Appearance/Hygiene 46 y.o.  BLACK OR  male  Hygiene: Adequate   Behavior/Social Relatedness Appropriate   Musculoskeletal Gait/Station: appropriate  Tone (flaccid, cogwheeling, spastic): not assessed  Psychomotor (hyperkinetic, hypokinetic): appropriate   Involuntary movements (tics, dyskinesias, akathisa, stereotypies): none   Speech Rate, rhythm, volume, fluency and articulation are appropriate   Mood   stable   Affect    stable   Thought Process Linear and goal directed   Thought Content and Perceptual Disturbances Denies self-injurious behavior (SIB), suicidal ideation (SI), aggressive behavior or homicidal ideation (HI)    Denies auditory and visual hallucinations   Sensorium and Cognition  Grossly intact   Insight  limited   Judgment fair        Assessment/Plan      Psychiatric/ Medical Diagnoses:   Patient Active Problem List   Diagnosis Code    MDD (major depressive disorder), recurrent, severe, with psychosis ([facility] [address] F33.3    Major depression F32.9    Alcohol abuse F10.10    Malingering Z76.5    Hypertension I10    Depression F32.9       Rhianna Nash is a 46 y. o. who is currently being monitored for psychosis and safety. 1.  Continue Meds as Ordered. Monitor for safety, complications and side effects to medicine. 2. I have reviewed instructions, risks, benefits and side effects of medications  3. Advised Patient to participate in Individual, Group & [facility]. 4.  Discharge Plans: 3-5 days.        Rosalinda Jeff MD DR. [facility]  [facility]iat[facility]

## 2018-07-13 NOTE — BH NOTES
GROUP THERAPY PROGRESS NOTE    Uziel Lay is not participating in Recreational Therapy. Group time: 45 minutes    Personal goal for participation: Fresh air break    Goal orientation: Refused    Group therapy participation: passive    Therapeutic interventions reviewed and discussed: Staff encouraged participation. Impression of participation: Patient remained on unit and laid in bed.

## 2018-07-14 PROCEDURE — 74011250637 HC RX REV CODE- 250/637: Performed by: PSYCHIATRY & NEUROLOGY

## 2018-07-14 PROCEDURE — 65220000005 HC RM SEMIPRIVATE PSYCH 3 OR 4 BED

## 2018-07-14 RX ADMIN — OLANZAPINE 5 MG: 10 TABLET, FILM COATED ORAL at 08:55

## 2018-07-14 RX ADMIN — LISINOPRIL 20 MG: 10 TABLET ORAL at 08:55

## 2018-07-14 RX ADMIN — HYDROCHLOROTHIAZIDE 25 MG: 25 TABLET ORAL at 08:55

## 2018-07-14 RX ADMIN — OLANZAPINE 5 MG: 10 TABLET, FILM COATED ORAL at 21:29

## 2018-07-14 RX ADMIN — CARVEDILOL 6.25 MG: 6.25 TABLET, FILM COATED ORAL at 16:44

## 2018-07-14 RX ADMIN — AMLODIPINE BESYLATE 10 MG: 5 TABLET ORAL at 08:55

## 2018-07-14 RX ADMIN — CARVEDILOL 6.25 MG: 6.25 TABLET, FILM COATED ORAL at 08:55

## 2018-07-14 RX ADMIN — TRAZODONE HYDROCHLORIDE 50 MG: 50 TABLET ORAL at 21:29

## 2018-07-14 RX ADMIN — FLUOXETINE 20 MG: 20 CAPSULE ORAL at 08:55

## 2018-07-14 NOTE — PROGRESS NOTES
Problem: Falls - Risk of  Goal: *Absence of Falls  Document Roxann Fall Risk and appropriate interventions in the flowsheet daily. Outcome: Progressing Towards Goal  Fall Risk Interventions:absent of falls daily while in hospital            Medication Interventions: Teach patient to arise slowly                  Problem: Suicide/Homicide (Adult/Pediatric)  Goal: *STG: Remains safe in hospital  Patient will be assessed daily for safety. Outcome: Progressing Towards Goal  Remains safe daily while in hospital.  Goal: *STG: Attends activities and groups  Patient will be encouraged to attend 2-3 groups daily. Outcome: Progressing Towards Goal  Attends activities and groups daily while in hospital.    Comments: Remains dull flat sad quiet withdrawn. Stays to self in room. Eats and tolerates evening meal. Takes medicines without incident. Gripper socks and 15 minute checks in place for safety. Interacts with staff and peers very little. Will continue to monitor and support.

## 2018-07-14 NOTE — BH NOTES
GROUP THERAPY PROGRESS NOTE    Edie Mendoza is participating in Anger Management. Group time: 45 minutes    Personal goal for participation: To reduce your anger to a calm level    Goal orientation: social    Group therapy participation: active    Therapeutic interventions reviewed and discussed: Controlling your anger and getting tips on how to calm your anger. Risk factors of getting angry and how angry can hinder your health and daily activities.     Impression of participation: Patient has fully participated

## 2018-07-14 NOTE — BH NOTES
Pt has continued to stay in room although has been out for meals. Is pleasant on approach but needs much   encouraging to come out. Sleeping off and on throughout the shift. Denies any self harm thoughts. To talk with   staff and doctor regarding any issues and for safety on unit if hearing voices or and to wear non skid socks to   prevent slips.

## 2018-07-14 NOTE — PROGRESS NOTES
[address]     Physician Daily Progress Note    Patient:  Purnima Gautam Age:  46 y.o. :  1965     SEX:  male MRN:  [id number] CSN:  [id number]    Admit Date:  2018 Attending:  Yessica Mallory MD    Subjective:  76-year-old  male with  Depression and alcohol use disorder. He was admitted for making suicidal statements. He claims that he was intoxicated and does not remember what he said. He has multiple bruises on this face and arms. States he fell off the bed while drunk.  He minimizes his drinking and insists that he needs to go home.             Current Medications:    Current Facility-Administered Medications   Medication Dose Route Frequency Provider Last Rate Last Dose    LORazepam (ATIVAN) tablet 1-2 mg  1-2 mg Oral Q4H PRN ROSEMARIE Montanez MD        haloperidol (HALDOL) tablet 5 mg  5 mg Oral Q4H PRN ROSEMARIE Montanez MD        haloperidol lactate (HALDOL) injection 5 mg  5 mg IntraMUSCular Q4H PRN ROSEMARIE Montanez MD        LORazepam (ATIVAN) injection 1-2 mg  1-2 mg IntraMUSCular Q4H PRN Yessica Mallory MD        traZODone (DESYREL) tablet 50 mg  50 mg Oral QHS PRN Yessica Mallory MD   50 mg at 18    ibuprofen (MOTRIN) tablet 400 mg  400 mg Oral Q4H PRN Yessica Mallory MD        amLODIPine (NORVASC) tablet 10 mg  10 mg Oral DAILY Yessica Mallory MD   10 mg at 18 0855    carvedilol (COREG) tablet 6.25 mg  6.25 mg Oral BID WITH MEALS Yessica Mallory MD   6.25 mg at 18 1644    FLUoxetine (PROzac) capsule 20 mg  20 mg Oral DAILY Yessica Mallory MD   20 mg at 18 0855    hydroCHLOROthiazide (HYDRODIURIL) tablet 25 mg  25 mg Oral DAILY Yessica Mallory MD   25 mg at 18 0855    lisinopril (PRINIVIL, ZESTRIL) tablet 20 mg  20 mg Oral DAILY Yessica Mallory MD   20 mg at 18 0855    OLANZapine (ZyPREXA) tablet 5 mg  5 mg Oral BID Yessica Mallory MD   5 mg at 07/14/18 0855       Compliant with medication:  Yes      Side effects from medications:  No     Mental Status Exam      Appearance    General Behavior   Disheveled    Speech form and content,  Language  Associations  Form of Thought   Normal flow and volume  TP : Logical, goal oriented   Mood, Affect  Self-Attitude  Vital Sense  SI/HI/PDW   Depressed   No SI, HI, hopelessness   Abnormal Perceptions and illusions   Denies     Delusions   None   Anxiety    Denies   COGNITION Intelligence Abstraction   Intact   Judgement Insight   Poor        Diagnoses/Impressions:        Psychiatric:    Active Problems:    Depression (7/11/2018) POA: Unknown            Medical:     Visit Vitals    /62 (BP 1 Location: Left arm, BP Patient Position: Sitting)    Pulse 76    Temp 99.2 °F (37.3 °C)    Resp 20    Ht 5' 7\" (1.702 m)    Wt 72.6 kg (160 lb)    BMI 25.06 kg/m2       No lab exists for component: 24H    Recommendations/Plan:      []  Dangerous and will not contract for safety in the community    []  Response to medications is not adequate    []  Appropriate disposition not finalized    []  Collateral history needed to determine safety    [x]  Continue current medications and follow MSE for sxs improvement    []  Medication changes as follows:     [x]  Continue to build rapport    [x]  Supportive psychotherapy    [x]  Insight oriented therapy    [x]  Group attendance/processing    [x]  Relapse prevention      [x]  Somatic Management    [x]  Disposition planning                                   Adilson Burns MD               7/14/2018          5:52 PM

## 2018-07-15 PROCEDURE — 74011250637 HC RX REV CODE- 250/637: Performed by: PSYCHIATRY & NEUROLOGY

## 2018-07-15 PROCEDURE — 65220000005 HC RM SEMIPRIVATE PSYCH 3 OR 4 BED

## 2018-07-15 RX ADMIN — CARVEDILOL 6.25 MG: 6.25 TABLET, FILM COATED ORAL at 08:32

## 2018-07-15 RX ADMIN — HYDROCHLOROTHIAZIDE 25 MG: 25 TABLET ORAL at 08:31

## 2018-07-15 RX ADMIN — OLANZAPINE 5 MG: 10 TABLET, FILM COATED ORAL at 20:34

## 2018-07-15 RX ADMIN — FLUOXETINE 20 MG: 20 CAPSULE ORAL at 08:31

## 2018-07-15 RX ADMIN — AMLODIPINE BESYLATE 10 MG: 5 TABLET ORAL at 08:31

## 2018-07-15 RX ADMIN — OLANZAPINE 5 MG: 10 TABLET, FILM COATED ORAL at 08:31

## 2018-07-15 RX ADMIN — LISINOPRIL 20 MG: 10 TABLET ORAL at 08:31

## 2018-07-15 RX ADMIN — CARVEDILOL 6.25 MG: 6.25 TABLET, FILM COATED ORAL at 17:52

## 2018-07-15 NOTE — BH NOTES
GROUP THERAPY PROGRESS NOTE    Edie Mendoza is participating in [place]. Group time: 25 minutes    Personal goal for participation: Discussed Unit Guidelines/Announcements    Goal orientation: community    Group therapy participation: passive    Therapeutic interventions reviewed and discussed: To seek out staff if any issues or concerns   with staff or doctor. Impression of participation: Pt sat in group but offered no concerns or complaints.

## 2018-07-15 NOTE — PROGRESS NOTES
[address]     Physician Daily Progress Note    Patient:  Stephanie Ta Age:  46 y.o. :  1965     SEX:  male MRN:  [id number] CSN:  [id number]    Admit Date:  2018 Attending:  Fred Resendiz MD    Subjective:   24-year-old  male with  Depression and alcohol use disorder. He was admitted for making suicidal statements. He minimizes his drinking and substance use issues, \" it was years ago\". He report being homeless  And not connected with any psych services.      Current Medications:    Current Facility-Administered Medications   Medication Dose Route Frequency Provider Last Rate Last Dose    LORazepam (ATIVAN) tablet 1-2 mg  1-2 mg Oral Q4H PRN ROSEMARIE Van MD        haloperidol (HALDOL) tablet 5 mg  5 mg Oral Q4H PRN ROSEMARIE Van MD        haloperidol lactate (HALDOL) injection 5 mg  5 mg IntraMUSCular Q4H PRN ROSEMARIE Van MD        LORazepam (ATIVAN) injection 1-2 mg  1-2 mg IntraMUSCular Q4H PRN Fred Resendiz MD        traZODone (DESYREL) tablet 50 mg  50 mg Oral QHS PRN Fred Resendiz MD   50 mg at 18    ibuprofen (MOTRIN) tablet 400 mg  400 mg Oral Q4H PRN Fred Resendiz MD        amLODIPine (NORVASC) tablet 10 mg  10 mg Oral DAILY Fred Resendiz MD   10 mg at 07/15/18 0831    carvedilol (COREG) tablet 6.25 mg  6.25 mg Oral BID WITH MEALS Fred Resendiz MD   6.25 mg at 07/15/18 1900    FLUoxetine (PROzac) capsule 20 mg  20 mg Oral DAILY Fred Resendiz MD   20 mg at 07/15/18 0831    hydroCHLOROthiazide (HYDRODIURIL) tablet 25 mg  25 mg Oral DAILY Fred Resendiz MD   25 mg at 07/15/18 0831    lisinopril (PRINIVIL, ZESTRIL) tablet 20 mg  20 mg Oral DAILY Fred Resendiz MD   20 mg at 07/15/18 0831    OLANZapine (ZyPREXA) tablet 5 mg  5 mg Oral BID Fred Resendiz MD   5 mg at 07/15/18 0831       Compliant with medication:  Yes      Side effects from medications: No     Mental Status Exam    Appearance    General Behavior   Disheveled    Speech form and content,  Language  Associations  Form of Thought   Normal flow and volume  TP : Logical, goal oriented   Mood, Affect  Self-Attitude  Vital Sense  SI/HI/PDW   Depressed   No SI, HI, hopelessness   Abnormal Perceptions and illusions   Denies     Delusions   None   Anxiety    Denies   COGNITION Intelligence Abstraction   Intact   Judgement Insight   Poor          Diagnoses/Impressions:         Psychiatric:    Active Problems:    Depression (7/11/2018) POA: Unknown            Medical:     Visit Vitals    BP (!) 132/92 (BP 1 Location: Left arm, BP Patient Position: Sitting)    Pulse 95    Temp 99.1 °F (37.3 °C)    Resp 17    Ht 5' 7\" (1.702 m)    Wt 72.6 kg (160 lb)    BMI 25.06 kg/m2       No lab exists for component: 24H    Recommendations/Plan:      []  Dangerous and will not contract for safety in the community    []  Response to medications is not adequate    []  Appropriate disposition not finalized    []  Collateral history needed to determine safety    []  Continue current medications and follow MSE for sxs improvement    [x]  Medication changes as follows:     []  Continue to build rapport    [x]  Supportive psychotherapy    [x]  Insight oriented therapy    [x]  Group attendance/processing    [x]  Relapse prevention      [x]  Somatic Management    [x]  Disposition planning                                   Adilson Garcia MD               7/15/2018          11:01 AM

## 2018-07-15 NOTE — BH NOTES
Shahnaz Diggs is participating in  [address] time: [id number]    Personal goal for participation:  To decrease isolation    Goal orientation: social    Group therapy participation: active    Therapeutic interventions reviewed and discussed:  Encouraged pt to play  Bingo  and socialize with staff and peers in the milieu    Impression of participation: Pt participated fully. Pt was social and smiling  during group.

## 2018-07-15 NOTE — PROGRESS NOTES
Problem: Falls - Risk of  Goal: *Absence of Falls  Document Roxann Fall Risk and appropriate interventions in the flowsheet daily. Outcome: Progressing Towards Goal  Fall Risk Interventions:            Medication Interventions: Teach patient to arise slowly                  Problem: Suicide/Homicide (Adult/Pediatric)  Goal: *STG: Remains safe in hospital  Patient will be assessed daily for safety. Outcome: Progressing Towards Goal  No attempts to harm self or others   Goal: *STG/LTG: Complies with medication therapy  Patient will take prescribed medications daily. Outcome: Progressing Towards Goal  Compliant     Problem: Hypertension  Goal: *Blood pressure within specified parameters  Patient will have blood pressure taken daily. Outcome: Progressing Towards Goal  B/P somewhat elevated but pt is compliant with HTN medication     Problem: Psychosis  Goal: *STG: Decreased hallucinations  Patient will have absence or decrease in visual  and auditory hallucinations by discharge. Outcome: Progressing Towards Goal  Denies   Goal: *STG: Decreased delusional thinking  Patient will be assessed daily for thought process and logical thinking. Outcome: Progressing Towards Goal  Not expressing any delusions     Comments: Pt is mostly withdrawn to his room. He is pleasant on approach. He denies any thoughts of harming self or others and hallucinations. Pt is compliant with all medications.

## 2018-07-15 NOTE — BH NOTES
GROUP THERAPY PROGRESS NOTE    Edie Mendoza is not participating in Relaxation. Group time: 30 minutes    Personal goal for participation: Patient did not participate    Goal orientation: social    Group therapy participation: active    Therapeutic interventions reviewed and discussed: How relaxation is defined and how it can manage stress and anxiety. How relaxation reduces stress, plus the symptoms of mental health conditions, like depression, anxiety, and schizophrenia, and ways to include relaxation in a person's life. Impression of participation: Staff encouraged patient to participate but patient refused, chose to sleep.

## 2018-07-16 PROCEDURE — 65220000005 HC RM SEMIPRIVATE PSYCH 3 OR 4 BED

## 2018-07-16 PROCEDURE — 74011250637 HC RX REV CODE- 250/637: Performed by: PSYCHIATRY & NEUROLOGY

## 2018-07-16 RX ORDER — OLANZAPINE 10 MG/1
10 TABLET ORAL EVERY EVENING
Status: DISCONTINUED | OUTPATIENT
Start: 2018-07-17 | End: 2018-07-19 | Stop reason: HOSPADM

## 2018-07-16 RX ADMIN — CARVEDILOL 6.25 MG: 6.25 TABLET, FILM COATED ORAL at 16:57

## 2018-07-16 RX ADMIN — LISINOPRIL 20 MG: 10 TABLET ORAL at 09:21

## 2018-07-16 RX ADMIN — AMLODIPINE BESYLATE 10 MG: 5 TABLET ORAL at 09:22

## 2018-07-16 RX ADMIN — OLANZAPINE 5 MG: 10 TABLET, FILM COATED ORAL at 09:21

## 2018-07-16 RX ADMIN — CARVEDILOL 6.25 MG: 6.25 TABLET, FILM COATED ORAL at 09:22

## 2018-07-16 RX ADMIN — FLUOXETINE 20 MG: 20 CAPSULE ORAL at 09:22

## 2018-07-16 RX ADMIN — HYDROCHLOROTHIAZIDE 25 MG: 25 TABLET ORAL at 09:23

## 2018-07-16 NOTE — BH NOTES
Uziel Lay is not participating in [facility]. Group time: 6376    Personal goal for participation: Repairs. Community Concerns    Goal orientation: personal    Group therapy participation: refuse    Therapeutic interventions reviewed and discussed: Staff encouraged Pt to report repairs and Community concerns    Impression of participation: Pt refuse, chose to rest in bed despite staff encouragement

## 2018-07-16 NOTE — BH NOTES
Patient has been guarded and quiet when in milieu. Patient reported that he is beginning to feeling better stating \" I'm feeling a bit better, well I'm on the other side of the line\". Patient is a bit odd at times with responses. Patient has been in bed for majority of the shift and was open to 1:1 and encouragement to participate in milieu. Patient denies A/VH, denies SI/HI, with no safety issues noted. Will monitor for safety with support as needed throughout treatment regimen.

## 2018-07-16 NOTE — BH NOTES
[address]  Inpatient Progress Note     Date of Service: 07/16/18  Hospital Day: 5     Subjective/Interval History   07/16/18    Chief Complaint: \" I feel sleepy in the daytime. \"     Interim History of Inpatient Stay:  Patient's interim clinical status was discussed with nursing and treatment team. According to the report, Shirley Butcher has been guarded and quiet when in milieu. Patient reported that he is beginning to feeling better stating \" I'm feeling a bit better, well I'm on the other side of the line\". Patient is a bit odd at times with responses. Patient has been in bed for majority of the shift and was open to 1:1 and encouragement to participate in milieu. Patient denies A/VH, denies SI/HI, with no safety issues noted. I met face to face and interviewed Shirley Butcher today. Patient notes daytime sedation. I am consolidating his Zyprexa to bedtime. He denies SI or HI    Interim Update to [address] History:  Patient denies side effects to the medicine. Sleep is increased. Appetite is normal.     Interim Update to Social History: I encouraged him to participate in groups on the unit. Objective     Visit Vitals    /77 (BP 1 Location: Right arm, BP Patient Position: Sitting)    Pulse 97    Temp 97.9 °F (36.6 °C)    Resp 18    Ht 5' 7\" (1.702 m)    Wt 72.6 kg (160 lb)    BMI 25.06 kg/m2       No results found for this or any previous visit (from the past 24 hour(s)).     Current Facility-Administered Medications   Medication Dose Route Frequency    [START ON 7/17/2018] OLANZapine (ZyPREXA) tablet 10 mg  10 mg Oral QPM    amLODIPine (NORVASC) tablet 10 mg  10 mg Oral DAILY    carvedilol (COREG) tablet 6.25 mg  6.25 mg Oral BID WITH MEALS    FLUoxetine (PROzac) capsule 20 mg  20 mg Oral DAILY    hydroCHLOROthiazide (HYDRODIURIL) tablet 25 mg  25 mg Oral DAILY    lisinopril (PRINIVIL, ZESTRIL) tablet 20 mg  20 mg Oral DAILY         Mental Status Examination     Appearance/Hygiene 46 y.o. BLACK OR  male  Hygiene: Unkempt   Behavior/Social Relatedness Appropriate   Musculoskeletal Gait/Station: appropriate  Tone (flaccid, cogwheeling, spastic): not assessed  Psychomotor (hyperkinetic, hypokinetic): appropriate   Involuntary movements (tics, dyskinesias, akathisa, stereotypies): none   Speech   Rate, rhythm, volume, fluency and articulation are appropriate   Mood   stable   Affect    stable   Thought Process Linear and goal directed   Thought Content and Perceptual Disturbances Denies self-injurious behavior (SIB), suicidal ideation (SI), aggressive behavior or homicidal ideation (HI)    Denies auditory and visual hallucinations   Sensorium and Cognition  Grossly intact   Insight  limited   Judgment fair        Assessment/Plan      Psychiatric/ Medical Diagnoses:   Patient Active Problem List   Diagnosis Code    MDD (major depressive disorder), recurrent, severe, with psychosis ([facility] [address] F33.3    Major depression F32.9    Alcohol abuse F10.10    Malingering Z76.5    Hypertension I10    Depression F32.9       Shahnaz Diggs is a 46 y. o. who is currently being monitored for psychosis and safety.       1. Continue Meds as Ordered. Monitor for safety, complications and side effects to medicine. 2. I have reviewed instructions, risks, benefits and side effects of medications  3. Advised Patient to participate in Individual, Group & [facility]. 4.  Discharge Plans: 1-2 days.        MD DR. [facility]  Psychiatry

## 2018-07-16 NOTE — BSMART NOTE
SOCIAL WORK GROUP THERAPY PROGRESS NOTE    Group Time:  11am    Group Topic:  Coping Skills    Group Participation:     Pt was unavailable for group due to continuing to isolate in room, with seeing no positives to happen in group session.

## 2018-07-16 NOTE — BSMART NOTE
MARILU discussed case with the treating psychiatrist and nursing team.    SW Contact:  SW met to review treatment goals. Pt expressed he is compliant with medication. Pt is denying ideations to harm self and others. Pt expressed he is homeless with no income. SW discussed housing options to address pt.'s housing issue. SW will provide pt with a shelter list.  SW verbally reviewed what shelters are available. Pt. Expressed he will explore housing with his family . Pt. Than stated he was not sure where his family was living. Pt. Has not had any family contact in 7 months. MARILU will provide pt with his mother's contact information. Pt. Appears depressed, limited insight and fair judgment.

## 2018-07-16 NOTE — BH NOTES
GROUP THERAPY PROGRESS NOTE    Angelica Solis is participating in [place].      Group time: 25 minutes    Personal goal for participation:  None stated     Goal orientation: community    Group therapy participation: active    Therapeutic interventions reviewed and discussed: unit rules and treatment goals

## 2018-07-16 NOTE — PROGRESS NOTES
Problem: Falls - Risk of  Goal: *Absence of Falls  Document Roxann Fall Risk and appropriate interventions in the flowsheet daily. Outcome: Progressing Towards Goal  Fall Risk Interventions:     Pt has had no fall this shift        Medication Interventions: Teach patient to arise slowly                  Problem: Suicide/Homicide (Adult/Pediatric)  Goal: *STG: Remains safe in hospital  Patient will be assessed daily for safety. Outcome: Progressing Towards Goal  Pt states he feels safe in hospital     Problem: Psychosis  Goal: *STG: Decreased hallucinations  Patient will have absence or decrease in visual  and auditory hallucinations by discharge. Outcome: Progressing Towards Goal  Pt denied SI or HI     Comments: Pt has been med and diet compliant this shift. No complaints of SI, HI, pain or discomfort. Denied to go to groups today with much staff encouragement. Has been isolative to his room except for meal times. Will continue to encourage group and activities.

## 2018-07-17 PROCEDURE — 74011250637 HC RX REV CODE- 250/637: Performed by: PSYCHIATRY & NEUROLOGY

## 2018-07-17 PROCEDURE — 65220000005 HC RM SEMIPRIVATE PSYCH 3 OR 4 BED

## 2018-07-17 RX ADMIN — TRAZODONE HYDROCHLORIDE 50 MG: 50 TABLET ORAL at 20:40

## 2018-07-17 RX ADMIN — OLANZAPINE 10 MG: 10 TABLET, FILM COATED ORAL at 18:00

## 2018-07-17 RX ADMIN — CARVEDILOL 6.25 MG: 6.25 TABLET, FILM COATED ORAL at 17:00

## 2018-07-17 NOTE — PROGRESS NOTES
Problem: Suicide/Homicide (Adult/Pediatric)  Goal: *STG: Remains safe in hospital  Patient will be assessed daily for safety. Outcome: Progressing Towards Goal  Patient contracted for safety this shift  Goal: *STG: Attends activities and groups  Patient will be encouraged to attend 2-3 groups daily. Outcome: Progressing Towards Goal  Patient participated in groups and activities this shift  Goal: *STG/LTG: Complies with medication therapy  Patient will take prescribed medications daily. Outcome: Progressing Towards Goal  Patient received medications as prescribed this shift    Problem: Hypertension  Goal: *Blood pressure within specified parameters  Patient will have blood pressure taken daily. Outcome: Progressing Towards Goal  Blood pressure readings has been within normal limitis    Problem: Psychosis  Goal: *STG: Decreased hallucinations  Patient will have absence or decrease in visual  and auditory hallucinations by discharge. Outcome: Progressing Towards Goal  Patient denies Audio - Visual Hallucinations this shift    Comments: Patient cooperative and calm, denied audio visual hallucinations and contracted for safety this shift. Patient stated that he is glad that he was able to open up today and that the psychiatrist encouraged him to have a good self esteem. Patient stated that he has started working on that and that he feels different in his mood and thoughts and could feel that his treatment is going in the right direction. Patient also stated that he is happy to interact with others during group session and not isolate self anymore. Patient participated in groups, received medications as prescribed, utilized non slip footwear and encouraged to complete his personal hygiene.  Will continue to monitor

## 2018-07-17 NOTE — BSMART NOTE
SOCIAL WORK GROUP THERAPY PROGRESS NOTE    Group Time:  11am    Group Topic:  Coping Skills    C D Issues    Group Participation:      Pt moderately involved during group discussion but remained attentive. Members discussed handout on the role of \"neurotransmitters\" and how they regulate different aspects of one's moods, cognitions & related behavior.

## 2018-07-17 NOTE — BSMART NOTE
ART THERAPY GROUP PROGRESS NOTE    PATIENT SCHEDULED FOR GROUP AT: 2:00    ATTENDANCE: Patient did not attend group.

## 2018-07-17 NOTE — BSMART NOTE
OCCUPATIONAL THERAPY PROGRESS NOTE  Group Time:  1243  Attendance: The patient attended full group. Participation:  The patient participated with minimal elaboration in the activity. Attention:  The patient needed redirection to activity at least once. Interaction:  The patient acknowledges others or responds to questions,  with no spontaneous interaction. Unclear if patient able to participate fully in activity. Seemed preoccupied at times. Discussed and emphasized medication compliance.

## 2018-07-17 NOTE — BH NOTES
MHT NOTE: The patient appeared to have a good day as evidenced by him remaining in his room for the majority of the morning and afternoon in a relaxed and quiet state. He did participate in breakfast , lunch and also actively participated in both spirituality group , and also the social work group. He has not experienced any falls this shift , and he complies with utilizing his skid free socks that were provided for him to wear for his safety. The patient will continue to be monitored for location and safety for the remainder of the shift.

## 2018-07-17 NOTE — BH NOTES
GROUP THERAPY PROGRESS NOTE    Karen Medel is participating in [place].      Group time: 30 minutes    Personal goal for participation: discuss guideline compliance, unit issues and community announcements; discuss daily Tx goal(s)                 Goal orientation: community    Group therapy participation: active

## 2018-07-17 NOTE — BSMART NOTE
SW discussed case with the treating psychiatrist and nursing team.     SW Contact:  SW met to review treatment goals. SW provided pt.with his mother's home address and phone number. Pt stated he plans to contact his mother. The pt. Prefers to stay with his family oppose to a shelter. Pt. Was provided a shelter list as a secondary option or housing resolution. SW discussed continued medication and treatment compliance. Pt is denying self harm and hallucinations. Pt.'s mood is improving. Pt. 's judgement is fair. Pt. Is encouraged to follow-up aftercare with [facility] as a walk-in for [facility] treatment.

## 2018-07-17 NOTE — BH NOTES
[address] [facility] [address]  Inpatient Progress Note     Date of Service: 07/17/18  Hospital Day: 6     Subjective/Interval History   07/17/18    Chief Complaint: \" I need a day or two to figure out which family member I can stay with. \"    Interim History of Inpatient Stay:  Patient's interim clinical status was discussed with nursing and treatment team. According to the report, Stephanie Ta is currently denying ideations and hallucinations. Pt expressed he is compliant with medication. Pt is denying ideations to harm self and others. Pt expressed he is homeless with no income. SW discussed housing options to address pt.'s housing issue. SW will provide pt with a shelter list.  SW verbally reviewed what shelters are available. Pt. Expressed he will explore housing with his family . I met face to face and interviewed Stephanie Ta today. Patient feels better with the use of medicine. He denies active psychosis or SI or HI    Interim Update to [address] History:  Patient denies side effects to the medicine. Sleep is adequate. Appetite is normal.     Interim Update to Social History: Patient will be contacting family to figure out his discharge options. Otherwise he will discharge to shelter. Objective     Visit Vitals    /76 (BP 1 Location: Right arm, BP Patient Position: Sitting)    Pulse 82    Temp 98.9 °F (37.2 °C)    Resp 18    Ht 5' 7\" (1.702 m)    Wt 72.6 kg (160 lb)    BMI 25.06 kg/m2       No results found for this or any previous visit (from the past 24 hour(s)).     Current Facility-Administered Medications   Medication Dose Route Frequency    OLANZapine (ZyPREXA) tablet 10 mg  10 mg Oral QPM    amLODIPine (NORVASC) tablet 10 mg  10 mg Oral DAILY    carvedilol (COREG) tablet 6.25 mg  6.25 mg Oral BID WITH MEALS    FLUoxetine (PROzac) capsule 20 mg  20 mg Oral DAILY    hydroCHLOROthiazide (HYDRODIURIL) tablet 25 mg  25 mg Oral DAILY    lisinopril (PRINIVIL, ZESTRIL) tablet 20 mg  20 mg Oral DAILY         Mental Status Examination     Appearance/Hygiene 46 y.o. BLACK OR  male  Hygiene: Unkempt   Behavior/Social Relatedness Appropriate   Musculoskeletal Gait/Station: appropriate  Tone (flaccid, cogwheeling, spastic): not assessed  Psychomotor (hyperkinetic, hypokinetic): appropriate   Involuntary movements (tics, dyskinesias, akathisa, stereotypies): none   Speech   Rate, rhythm, volume, fluency and articulation are appropriate   Mood   stable   Affect    stable   Thought Process Linear and goal directed   Thought Content and Perceptual Disturbances Denies self-injurious behavior (SIB), suicidal ideation (SI), aggressive behavior or homicidal ideation (HI)    Denies auditory and visual hallucinations   Sensorium and Cognition  Grossly intact   Insight  fair   Judgment fair        Assessment/Plan      Psychiatric/ Medical Diagnoses:   Patient Active Problem List   Diagnosis Code    MDD (major depressive disorder), recurrent, severe, with psychosis ([facility] [address] F33.3    Major depression F32.9    Alcohol abuse F10.10    Malingering Z76.5    Hypertension I10    Depression F32.9       Idris Sequeira is a 46 y. o. who is currently being treated for mood changes and psychosis. 1.  Continue Meds as Ordered. Monitor for safety, complications and side effects to medicine. 2. I have reviewed instructions, risks, benefits and side effects of medications  3. Advised Patient to participate in Individual, Group & [facility]. 4.  Discharge Plans: 1-2 days  5.  Barriers to Discharge : Chronic Illness, Housing/Family Issues & Lack of resources    ROSEMARIE Jackson MD DR. [facility]  [facility]iatry

## 2018-07-17 NOTE — BH NOTES
Uziel Alireza is not participating in   Healthy Diet Group    Group time: 1700    Personal goal for participation: To educate pt on eating a balance diet is important for physical and mental health    Goal orientation: passive    Group therapy participation:  non-active    Therapeutic interventions reviewed and discussed:   that the physical health needs of people with mental illness are neglected because the primary focus is upon their mental health problems. People who are depressed may eat less than usual and sometimes people with psychosis may forget to eat or worry that the food will be harmful in some way. This can cause additional problems which may have a negative impact upon mental illness    Impression of participation:   Pt  refused chose to rest in bed despite staff encouragement.

## 2018-07-17 NOTE — BH NOTES
Patient ate dinner and  had a snack. Patient had no visitors this evening. . Patient did not  attend groups. Patient had no behavioral issues this shift. Patient took nighttime medications. Patient is safe on the unit. Patient involved in no falls this shift, Skid proof footwear utilized.

## 2018-07-18 PROCEDURE — 65220000005 HC RM SEMIPRIVATE PSYCH 3 OR 4 BED

## 2018-07-18 PROCEDURE — 74011250637 HC RX REV CODE- 250/637: Performed by: PSYCHIATRY & NEUROLOGY

## 2018-07-18 RX ORDER — CARVEDILOL 6.25 MG/1
6.25 TABLET ORAL 2 TIMES DAILY WITH MEALS
Qty: 14 TAB | Refills: 0 | Status: SHIPPED | OUTPATIENT
Start: 2018-07-18 | End: 2018-08-09 | Stop reason: SDUPTHER

## 2018-07-18 RX ORDER — HYDROCHLOROTHIAZIDE 25 MG/1
25 TABLET ORAL DAILY
Qty: 14 TAB | Refills: 0 | Status: SHIPPED | OUTPATIENT
Start: 2018-07-18 | End: 2018-08-09 | Stop reason: SDUPTHER

## 2018-07-18 RX ORDER — FLUOXETINE HYDROCHLORIDE 20 MG/1
20 CAPSULE ORAL DAILY
Qty: 14 CAP | Refills: 0 | Status: SHIPPED | OUTPATIENT
Start: 2018-07-19 | End: 2018-08-21

## 2018-07-18 RX ORDER — LISINOPRIL 20 MG/1
20 TABLET ORAL DAILY
Qty: 14 TAB | Refills: 0 | Status: SHIPPED | OUTPATIENT
Start: 2018-07-19 | End: 2018-07-21

## 2018-07-18 RX ORDER — AMLODIPINE BESYLATE 10 MG/1
10 TABLET ORAL DAILY
Qty: 14 TAB | Refills: 0 | Status: SHIPPED | OUTPATIENT
Start: 2018-07-19 | End: 2018-08-09 | Stop reason: SDUPTHER

## 2018-07-18 RX ORDER — OLANZAPINE 10 MG/1
10 TABLET ORAL EVERY EVENING
Qty: 14 TAB | Refills: 0 | Status: SHIPPED | OUTPATIENT
Start: 2018-07-18 | End: 2018-08-21

## 2018-07-18 RX ADMIN — CARVEDILOL 6.25 MG: 6.25 TABLET, FILM COATED ORAL at 16:43

## 2018-07-18 RX ADMIN — AMLODIPINE BESYLATE 10 MG: 5 TABLET ORAL at 08:36

## 2018-07-18 RX ADMIN — LISINOPRIL 20 MG: 10 TABLET ORAL at 08:35

## 2018-07-18 RX ADMIN — HYDROCHLOROTHIAZIDE 25 MG: 25 TABLET ORAL at 08:36

## 2018-07-18 RX ADMIN — FLUOXETINE 20 MG: 20 CAPSULE ORAL at 08:36

## 2018-07-18 RX ADMIN — CARVEDILOL 6.25 MG: 6.25 TABLET, FILM COATED ORAL at 08:36

## 2018-07-18 RX ADMIN — OLANZAPINE 10 MG: 10 TABLET, FILM COATED ORAL at 17:53

## 2018-07-18 NOTE — PROGRESS NOTES
NUTRITION Nutrition Screen RECOMMENDATIONS / PLAN:  
 
- Change diet order to cardiac diet. - Continue RD inpatient monitoring and evaluation. NUTRITION DIAGNOSIS & INTERVENTIONS:  
 
[x] Meals/snacks: modify composition Nutrition Diagnosis:  Altered nutrition related laboratory values related to undesirable food choices as evidenced by pt with elevated cholesterol, LDL levels, and hypernatremia. ASSESSMENT:  
 
Pt unavailable during visit, in group therapy. Per staff pt reports good meal intake and appetite. Tolerating diet. Hypernatremia noted along with elevated cholesterol and LDL levels. Average intake adequate to meet patients estimated nutritional needs:   [x] Yes     [] No      [] Unable to determine at this time Diet: DIET REGULAR Food Allergies: NKFA Current Appetite:   [x] Good     [] Fair     [] Poor     [] Other: 
Appetite/meal intake prior to admission:   [] Good     [] Fair     [] Poor     [x] Other: unknown Feeding Limitations:  [] Swallowing Difficulty       [] Chewing Difficulty       [] Other Current Meal Intake: No data found. Gastrointestinal Issues:  [] Yes    [x] No  
Skin Integrity:  WDL Pertinent Medications:  Reviewed Labs:  Reviewed Anthropometrics: 
Ht Readings from Last 1 Encounters:  
07/11/18 5' 7\" (1.702 m) Last 3 Recorded Weights in this Encounter  
 07/11/18 1217 07/12/18 0730 Weight: 31 kg (68 lb 6.4 oz) 72.6 kg (160 lb) Body mass index is 25.06 kg/(m^2). Weight History: Weight gain noted PTA per chart hx review Weight Metrics 7/12/2018 7/10/2018 5/5/2018 11/18/2017 11/12/2017 11/10/2017 11/10/2017 Weight 160 lb 150 lb 157 lb 155 lb 160 lb - 160 lb BMI 25.06 kg/m2 23.49 kg/m2 25.34 kg/m2 24.28 kg/m2 - 25.06 kg/m2 - Admitting Diagnosis: depression Depression Past Medical History:  
Diagnosis Date  Back pain  Hypertension  [facility]11[facility]2017  Psychiatric disorder   
 hallucinations  Psychotic disorder 11/11/2017 Education Needs:        [x] None identified  [] Identified - Not appropriate at this time  []  Identified and addressed - refer to education log Learning Limitations:   [x] None identified  [] Identified Cultural, Mormon & ethnic food preferences identified:  [x] None    [] Yes ESTIMATED NUTRITION NEEDS:  
 
4723-6732 kcal (MSJx1.2-1.3), 58-73 gm protein (0.8-1 gm/kg), 1 mL/kcal 
Based on: 73 kg       [x] Actual BW      [] IBW MONITORING & EVALUATION:  
 
Nutrition Goal(s): 1. Po intake of meals will meet >75% of patient estimated nutritional needs within the next 7 days. Outcome:   [] Met    []  Not Met   [x] New/Initial Goal 
 
Monitor:  [x] Food and beverage intake   [x] Diet order   [x] Nutrition-focused physical findings   [] Weight Previous Recommendations (for follow-up assessments only):     []   Implemented       []   Not Implemented (RD to address)   [] No Longer Appropriate   [] No Recommendation Made Discharge Planning: cardiac diet [x]  Participated in care planning, discharge planning, & interdisciplinary rounds as appropriate Josiah Saravia, RD Pager: [phone]

## 2018-07-18 NOTE — PROGRESS NOTES
Problem: Suicide/Homicide (Adult/Pediatric)  Goal: *STG: Remains safe in hospital  Patient will be assessed daily for safety. Outcome: Progressing Towards Goal  Remains safe  Goal: *STG: Attends activities and groups  Patient will be encouraged to attend 2-3 groups daily. Outcome: Progressing Towards Goal  attending  Goal: *STG/LTG: Complies with medication therapy  Patient will take prescribed medications daily. Outcome: Progressing Towards Goal  compliant    Comments: Patient states he is doing well, denies suicidal thoughts. Medication compliant. Encourage to shower and attend groups.

## 2018-07-18 NOTE — BSMART NOTE
OCCUPATIONAL THERAPY PROGRESS NOTE  Group Time:  1423  Attendance: The patient attended full group. Participation:  The patient participated with minimal elaboration in the activity. .  Attention:  The patient needed redirection to activity at least once. Interaction:  The patient acknowledges others or responds to questions,  with no spontaneous interaction. Difficulty answering all but concrete questions. Little interaction even on approach. Pleasant on approach.

## 2018-07-18 NOTE — ROUTINE PROCESS
Treatment team met -     Medical Director: _____present   Psychiatrist: _x____present   Charge nurse: _x____present   MSW: ___x__present   : __x___present   Nurse Manager: __x___present   Student RNs: _____present   Medical Students: _____present   Art Therapist: _x____present   Clinical Coordinator: _____present    Occupational Therapist: _____present   : ____x___ present  UR  ___x____ present  Crisis Supervisor_______present      Plan of care discussed and updated as appropriate.

## 2018-07-18 NOTE — BSMART NOTE
SW discussed case was discussed In treatment team. Pt. will be d/c today to go to a family member's home.       SW Contact:  SW met with pt to discuss d/c. Pt. Is denying ideations and hallucinations. Pt.expressed having issues with sleep. SW addressed housing pt. Stated he plans  To stay at his mother's home but has not contacted her. SW encouraged pt to contact his family. SW discussed continued medication and treatment compliance. Pt is denying self harm and hallucinations. Pt.'s mood is improving. Pt. 's judgement is fair. Pt. was encouraged to follow-up aftercare with [facility] as a walk-in for [facility] treatment.

## 2018-07-18 NOTE — BH NOTES
GROUP THERAPY PROGRESS NOTE    Cassie Escobedo is not  participating in Expressive   Topic Group. Group time: 1 hour    Personal goal for participation: Denial Group    Goal orientation: community    Group therapy participation: active    Therapeutic interventions reviewed and discussed: Pt refused group with much encouragement by staff.     Impression of participation: Pt refused

## 2018-07-18 NOTE — BH NOTES
[address]  Inpatient Progress Note     Date of Service: 07/18/18  Hospital Day: 7     Subjective/Interval History   07/18/18    Chief Complaint: \" I should be able to have a place to go to tomorrow. \"     Interim History of Inpatient Stay:  Patient's interim clinical status was discussed with nursing and treatment team. According to the report, Marilee Suarez is currently denying ideations and hallucinations. Pt expressed he is compliant with medication. Pt is denying ideations to harm self and others.      I met face to face and interviewed Marilee Suarez today. Patient feels better with the use of medicine. He denies active psychosis or SI or HI     Interim Update to [address] History:  Patient denies side effects to the medicine. Sleep is increased. Appetite is normal.      Interim Update to Social History: Patient will be discharging by tomorrow    Objective     Visit Vitals    /83 (BP 1 Location: Right arm, BP Patient Position: Sitting)    Pulse 93    Temp 98.1 °F (36.7 °C)    Resp 17    Ht 5' 7\" (1.702 m)    Wt 72.6 kg (160 lb)    BMI 25.06 kg/m2       No results found for this or any previous visit (from the past 24 hour(s)). Current Facility-Administered Medications   Medication Dose Route Frequency    OLANZapine (ZyPREXA) tablet 10 mg  10 mg Oral QPM    amLODIPine (NORVASC) tablet 10 mg  10 mg Oral DAILY    carvedilol (COREG) tablet 6.25 mg  6.25 mg Oral BID WITH MEALS    FLUoxetine (PROzac) capsule 20 mg  20 mg Oral DAILY    hydroCHLOROthiazide (HYDRODIURIL) tablet 25 mg  25 mg Oral DAILY    lisinopril (PRINIVIL, ZESTRIL) tablet 20 mg  20 mg Oral DAILY         Mental Status Examination     Appearance/Hygiene 46 y.o.  BLACK OR  male  Hygiene: Adequate   Behavior/Social Relatedness Appropriate   Musculoskeletal Gait/Station: appropriate  Tone (flaccid, cogwheeling, spastic): not assessed  Psychomotor (hyperkinetic, hypokinetic): appropriate   Involuntary movements (tics, dyskinesias, akathisa, stereotypies): none   Speech   Rate, rhythm, volume, fluency and articulation are appropriate   Mood   stable   Affect    stable   Thought Process Linear and goal directed   Thought Content and Perceptual Disturbances Denies self-injurious behavior (SIB), suicidal ideation (SI), aggressive behavior or homicidal ideation (HI)    Denies auditory and visual hallucinations   Sensorium and Cognition  Grossly intact   Insight  limited   Judgment fair        Assessment/Plan      Psychiatric/ Medical Diagnoses:   Patient Active Problem List   Diagnosis Code    MDD (major depressive disorder), recurrent, severe, with psychosis ([facility] [address] F33.3    Major depression F32.9    Alcohol abuse F10.10    Malingering Z76.5    Hypertension I10    Depression F32.9       Neymar Angelo is a 46 y. o. who is currently being treated for mood changes and psychosis.       1. Continue Meds as Ordered. Monitor for safety, complications and side effects to medicine. 2. I have reviewed instructions, risks, benefits and side effects of medications  3. Advised Patient to participate in Individual, Group & [place] [facility].    4.  Discharge Plans: By Dana Knight MD DR. [facility]

## 2018-07-18 NOTE — BH NOTES
GROUP THERAPY PROGRESS NOTE    Shirley Butcher is not participating in Reality orientation and Community. Group time: 30 minutes    Personal goal for participation: Goals/Plans  Pt did not participate in group held this morning choosing to sleep.

## 2018-07-19 VITALS
SYSTOLIC BLOOD PRESSURE: 127 MMHG | RESPIRATION RATE: 18 BRPM | TEMPERATURE: 97.5 F | BODY MASS INDEX: 25.11 KG/M2 | HEART RATE: 88 BPM | HEIGHT: 67 IN | DIASTOLIC BLOOD PRESSURE: 90 MMHG | WEIGHT: 160 LBS

## 2018-07-19 PROCEDURE — 74011250637 HC RX REV CODE- 250/637: Performed by: PSYCHIATRY & NEUROLOGY

## 2018-07-19 RX ADMIN — AMLODIPINE BESYLATE 10 MG: 5 TABLET ORAL at 08:05

## 2018-07-19 RX ADMIN — LISINOPRIL 20 MG: 10 TABLET ORAL at 08:05

## 2018-07-19 RX ADMIN — FLUOXETINE 20 MG: 20 CAPSULE ORAL at 08:05

## 2018-07-19 RX ADMIN — HYDROCHLOROTHIAZIDE 25 MG: 25 TABLET ORAL at 08:05

## 2018-07-19 RX ADMIN — CARVEDILOL 6.25 MG: 6.25 TABLET, FILM COATED ORAL at 08:05

## 2018-07-19 NOTE — BSMART NOTE
SOCIAL WORK GROUP THERAPY PROGRESS NOTE    Group Time:  11amread parts of handout despite flat affect. Group Topic:  Coping Skills    C D Issues    Group Participation:     Pt moderately involved during group discussion but remained attentive. Also listened to basic \"CBT\" principles as well as list of typical cognitive distortions. \"Seven Steps\" for taking responsibility for our Happiness was reviewed including commitment to change, self-care, setting limits, goal setting & letting go.

## 2018-07-19 NOTE — PROGRESS NOTES
Problem: Suicide/Homicide (Adult/Pediatric)  Goal: *STG: Remains safe in hospital  Patient will be assessed daily for safety. Outcome: Progressing Towards Goal  Remains safe. Goal: *STG: Attends activities and groups  Patient will be encouraged to attend 2-3 groups daily. Outcome: Progressing Towards Goal  Attending groups  Goal: *STG/LTG: Complies with medication therapy  Patient will take prescribed medications daily. Outcome: Progressing Towards Goal  Medication compliant. Comments: Patient states he is doing well. Denies suicidal thoughts. Voiced no complaints.

## 2018-07-19 NOTE — DISCHARGE INSTRUCTIONS
BEHAVIORAL HEALTH NURSING DISCHARGE NOTE      The following personal items collected during your admission are returned to you:   Dental Appliance: Dental Appliances: None  Vision: Visual Aid: None  Hearing Aid:    Jewelry: Jewelry: None  Clothing: Clothing: At bedside, [employer], Pat hubbard, [address], With patient (belt,hat,lighter,phone numbers in locker)  Other Valuables: Other Valuables: Lighter/matches, Personal toiletries, With patient  Valuables sent to safe: Personal Items Sent to Safe:  (none)      PATIENT INSTRUCTIONS:             The discharge information has been reviewed with the patient. The patient verbalized understanding.         Patient armband removed and shredded

## 2018-07-19 NOTE — BSMART NOTE
ART THERAPY GROUP PROGRESS NOTE    PATIENT SCHEDULED FOR GROUP AT: 1:00    ATTENDANCE: Patient did not attend group.

## 2018-07-19 NOTE — PROGRESS NOTES
Patient received discharge instructions and bus ticket. Verbalized understanding. All personal items given to patient.

## 2018-07-19 NOTE — BH NOTES
GROUP THERAPY PROGRESS NOTE    Suyapa Azevedo is participating in Recreational Therapy.      Group time: 35 minutes    Personal goal for participation:  movies    Goal orientation: relaxation    Group therapy participation: active    Therapeutic interventions reviewed and discussed:     Impression of participation: enjoyed

## 2018-07-19 NOTE — BH NOTES
Haresh Vicente is participating in Staying Well After Discharge Group    Group time:  1930    Personal goal for participation:   Take responsibility for managing your illness    Goal orientation: personal    Group therapy participation: active    Therapeutic interventions reviewed and discussed: Be patient with yourself and remember there will be ups and downs. Impression of participation: Pt plan to make a detailed list of early warning signs, then write out a plan of action to reduce stress and increase structure and support when those warnings occur.

## 2018-07-19 NOTE — DISCHARGE SUMMARY
[address] [facility] Lyla Morrow  MR#: [id number]  : 1965  ACCOUNT #: [de-identified]   ADMIT DATE: 2018  DISCHARGE DATE: 2018    IDENTIFYING INFORMATION:  The patient is a 50-year-old American male admitted to my service on 2018 and who is being discharged on 2018. TYPE OF DISCHARGE:  Regular to self care with outpatient appointments. PRESENTING PSYCHIATRIC ISSUE:  Patient had presented to the emergency room due to increased psychotic symptomatology and thoughts of wanting to hurt himself. He was admitted to Psychiatry for safety and stabilization. HOSPITAL COURSE:  The patient was admitted to structured inpatient unit. He was maintained on safety checks and nursing assessment was completed and he was seen face-to-face daily for psychiatric rounding. Patient had been noncompliant in taking his medications. He was started back on Prozac 20 mg daily and olanzapine 5 mg daily. It was progressively titrated and consolidated at bedtime to 10 mg of Zyprexa at bedtime. The patient was also restarted on the medications for his blood pressure, which included hydrochlorothiazide, carvedilol, amlodipine and lisinopril. Patient was able to take medications without any significant concerns or side effects. He was cooperative during physician rounding and participated in treatment planning. There were no incidents of agitation or aggressive behavior. Patient was strongly encouraged to participate in group counseling on the unit, but his participation in this was marginal.  Still with time, his mood and thinking improved. Psychotic symptoms subsided and he was felt to be discharge ready on 2018. At the time of discharge, the patient does not show any active psychosis, delusions, suicidal or homicidal ideation. He is willing to take his medications and participate in outpatient treatment.     CONSULTS:  No consults were obtained while he was in the hospital.    LABORATORY DATA:  Hematology is normal except WBCs of 4.2 and hemoglobin of 12.8. Urinalysis was negative. Chemistry is normal except sodium of 148, glucose of 109. Lipid panel showed cholesterol of 247 and LDL of 179. Hemoglobin A1c was 5.7 and TSH was 0.34 with free T4 being normal.    MENTAL STATUS AT DISCHARGE:  The patient is casually groomed. He is alert and oriented in all domains. Psychomotor functioning is within normal limits. No tics or abnormal bodily movements are evident. Speech is appropriate in rate, tone and latency. Affect is neutral.  Thought processes do not indicate racing thoughts, flight of ideas, or loose associations. Cognition is broadly intact. Insight into his illness is limited, but present. There is decrease in immediate recall. Overall, memory is preserved. There are no active psychotic or delusional symptoms and he denies suicidal or homicidal ideation, intent or plan. DIAGNOSES:    1.  Schizoaffective disorder. 2.  Treatment noncompliance. DISCHARGE MEDICATIONS:  Prozac 20 mg p.o. every day, olanzapine 10 mg p.o. at bedtime, Norvasc 10 mg p.o. every day, carvedilol 6.25 mg p.o. b.i.d., hydrochlorothiazide 25 mg p.o. every day and lisinopril 20 mg p.o. every day. FOLLOWUP PLANS:  Patient will complete intake assessment on 07/20/2018 at 8:30 a.m. at [facility]. Patient was given address and phone number for this facility. Sanjuana España MD dictating on behalf of MD COURTNEY Bradford/SHEFALI  D: 07/19/2018 17:04     T: 07/19/2018 18:11  JOB #: [id number]

## 2018-07-19 NOTE — BH NOTES
Pt  participated in Recreation/Discharg planning Group. Pt did not use the phone this shift. Pt  appears calm and cooperative in the milieu socializing with staff and peers. Pt. denies SI,HI and AVH today. Pt contracts for safety on the unit. Pt.denies any new medical/pain issues. Pt. did not have any visitors. Staff encouraged Pt. to communicate concerns to the Treatment Team to ensure accurate treatment is addressed. Pt agreed. Pt. remain free of falls and provided non skid socks. Staff will continue to monitor Pt. for behavior safety and location.

## 2018-07-19 NOTE — BH NOTES
GROUP THERAPY PROGRESS NOTE    Angelica Madisongo is participating in [place].      Group time: 30 minutes    Personal goal for participation: movies    Goal orientation: community    Group therapy participation: active    Therapeutic interventions reviewed and discussed: Discussed community guidelines and personal daily goals    Impression of participation: active

## 2018-07-21 ENCOUNTER — HOSPITAL ENCOUNTER (EMERGENCY)
Age: 53
Discharge: HOME | End: 2018-07-21
Attending: EMERGENCY MEDICINE
Payer: SELF-PAY

## 2018-07-21 VITALS
TEMPERATURE: 98.2 F | DIASTOLIC BLOOD PRESSURE: 97 MMHG | HEIGHT: 67 IN | HEART RATE: 94 BPM | OXYGEN SATURATION: 97 % | RESPIRATION RATE: 14 BRPM | SYSTOLIC BLOOD PRESSURE: 135 MMHG

## 2018-07-21 DIAGNOSIS — Z76.0 MEDICATION REFILL: ICD-10-CM

## 2018-07-21 DIAGNOSIS — R03.0 ELEVATED BLOOD PRESSURE READING: Primary | ICD-10-CM

## 2018-07-21 PROCEDURE — 99282 EMERGENCY DEPT VISIT SF MDM: CPT

## 2018-07-21 RX ORDER — LISINOPRIL 20 MG/1
20 TABLET ORAL DAILY
Qty: 30 TAB | Refills: 0 | Status: SHIPPED | OUTPATIENT
Start: 2018-07-21 | End: 2018-08-09 | Stop reason: SDUPTHER

## 2018-07-21 NOTE — ED PROVIDER NOTES
EMERGENCY DEPARTMENT HISTORY AND PHYSICAL EXAM    2:10 AM      Date: 7/21/2018  Patient Name: Purnima Gautam    History of Presenting Illness     Chief Complaint   Patient presents with    Hypertension    Dizziness         History Provided By: Patient    Chief Complaint: htn  Duration:  2 weeks  Timing:  Constant  Location: generalized  Modifying Factors: He says he ran out of his Lisinopril. Associated Symptoms: Denies fever and HA      Additional History (Context): Purnima Gautam is a 46 y.o. male with hypertension who presents with constant generalized HTN for the past 2 weeks. He says he ran out of his Lisinopril. Denies fever and HA. Pt smokes and drinks. PCP: None    Current Outpatient Prescriptions   Medication Sig Dispense Refill    lisinopril (PRINIVIL, ZESTRIL) 20 mg tablet Take 1 Tab by mouth daily. Indications: hypertension 30 Tab 0    amLODIPine (NORVASC) 10 mg tablet Take 1 Tab by mouth daily. Indications: hypertension 14 Tab 0    hydroCHLOROthiazide (HYDRODIURIL) 25 mg tablet Take 1 Tab by mouth daily. Indications: hypertension 14 Tab 0    carvedilol (COREG) 6.25 mg tablet Take 1 Tab by mouth two (2) times daily (with meals). Indications: hypertension 14 Tab 0    FLUoxetine (PROZAC) 20 mg capsule Take 1 Cap by mouth daily. Indications: ANXIETY WITH DEPRESSION 14 Cap 0    OLANZapine (ZYPREXA) 10 mg tablet Take 1 Tab by mouth every evening. Indications: Schizophrenia 14 Tab 0       Past History     Past Medical History:  Past Medical History:   Diagnosis Date    Back pain     Hypertension     MDD (major depressive disorder), recurrent, severe, with psychosis ([facility] [address] 11/11/2017    Psychiatric disorder     hallucinations    Psychotic disorder 11/11/2017       Past Surgical History:  Past Surgical History:   Procedure Laterality Date    HX OTHER SURGICAL      oral sx       Family History:  History reviewed. No pertinent family history.     Social History:  Social History   Substance Use Topics    Smoking status: Current Some Day Smoker     Packs/day: 0.25    Smokeless tobacco: Never Used    Alcohol use Yes      Comment: beer once prior to admission- had been sober        Allergies:  No Known Allergies      Review of Systems     Review of Systems   Constitutional: Negative for fever. Positive for HTN   Neurological: Negative for headaches. All other systems reviewed and are negative. Visit Vitals    BP (!) 135/97 (BP 1 Location: Right arm, BP Patient Position: Sitting)    Pulse 94    Temp 98.2 °F (36.8 °C)    Resp 14    Ht 5' 7\" (1.702 m)    SpO2 97%           Physical Exam / Medical Decision Making   I am the first provider for this patient. I reviewed the vital signs, available nursing notes, past medical history, past surgical history, family history and social history. Vital Signs-Reviewed the patient's vital signs. Physical exam:  General:  Well-developed, well-nourished, no apparent distress. Head:  Normocephalic atraumatic. Eyes:  Pupils midrange extraocular movements intact. No pallor or conjunctival injection. Nose:  No rhinorrhea, inspection grossly normal.    Ears:  Grossly normal to inspection, no discharge. Mouth:  Mucous membranes moist, no appreciable intraoral lesion. Neck/Back:  Trachea midline, no asymmetry. Chest:  Grossly normal inspection, symmetric chest rise. Pulmonary:  Clear to auscultation bilaterally no wheezes rhonchi or rales. Cardiovascular:  S1-S2 no murmurs rubs or gallops. Extremities:  Grossly normal to inspection, peripheral pulses intact    Neurologic:  Alert and oriented no appreciable focal neurologic deficit     Skin:  Warm and dry  Psychiatric:  Grossly normal mood and affect  Nursing note reviewed, vital signs reviewed. ED course:  Patient requesting refill of blood pressure medication, he has run out of her lisinopril 2 weeks ago has no symptoms.   He is only requesting blood pressure refill he is afebrile and not tachycardic saturation normal on room air blood pressure not markedly elevated    Patient's presentation, history, physical exam and laboratory evaluations were reviewed. At this time patient was felt to be stable for outpatient management and follow with primary care/specialist.  Patient was instructed to return to the emergency department with any concerns. Disposition:    Discharged home      Portions of this chart were created with Dragon medical speech to text program.   Unrecognized errors may be present. Diagnostic Study Results     Labs -  No results found for this or any previous visit (from the past 12 hour(s)). Radiologic Studies -   No orders to display       Diagnosis     Clinical Impression:   1. Elevated blood pressure reading    2. Medication refill            Follow-up Information     Follow up With Details Comments [address] [facility] [address] Call in 2 days  [address] [place] [address] EMERGENCY DEPT  As needed, If symptoms worsen [address]  [phone]           Patient's Medications   Start Taking    No medications on file   Continue Taking    AMLODIPINE (NORVASC) 10 MG TABLET    Take 1 Tab by mouth daily. Indications: hypertension    CARVEDILOL (COREG) 6.25 MG TABLET    Take 1 Tab by mouth two (2) times daily (with meals). Indications: hypertension    FLUOXETINE (PROZAC) 20 MG CAPSULE    Take 1 Cap by mouth daily. Indications: ANXIETY WITH DEPRESSION    HYDROCHLOROTHIAZIDE (HYDRODIURIL) 25 MG TABLET    Take 1 Tab by mouth daily. Indications: hypertension    OLANZAPINE (ZYPREXA) 10 MG TABLET    Take 1 Tab by mouth every evening. Indications: Schizophrenia   These Medications have changed    Modified Medication Previous Medication    LISINOPRIL (PRINIVIL, ZESTRIL) 20 MG TABLET lisinopril (PRINIVIL, ZESTRIL) 20 mg tablet       Take 1 Tab by mouth daily.  Indications: hypertension    Take 1 Tab by mouth daily. Indications: hypertension   Stop Taking    No medications on file     _______________________________    Attestations:  [id number]  [id number] acting as a scribe for and in the presence of Chilango Salgado MD      July 21, 2018 at 2:13 AM       Provider Attestation:      I personally performed the services described in the documentation, reviewed the documentation, as recorded by the scribe in my presence, and it accurately and completely records my words and actions.  July 21, 2018 at 2:13 AM - Chilango Salgado MD    _______________________________

## 2018-07-21 NOTE — ED TRIAGE NOTES
I have reviewed discharge instructions with the patient. The patient verbalized understanding. Patient armband removed and shredded. 1 prescription given. All questions answered. Pt denies dizziness, denie SI/HI.   Pt d/c'd to home awake, alert and in NAD

## 2018-07-21 NOTE — DISCHARGE INSTRUCTIONS
Elevated Blood Pressure: Care Instructions  Your Care Instructions    Blood pressure is a measure of how hard the blood pushes against the walls of your arteries. It's normal for blood pressure to go up and down throughout the day. But if it stays up over time, you have high blood pressure. Two numbers tell you your blood pressure. The first number is the systolic pressure. It shows how hard the blood pushes when your heart is pumping. The second number is the diastolic pressure. It shows how hard the blood pushes between heartbeats, when your heart is relaxed and filling with blood. An ideal blood pressure in adults is less than 120/80 (say \"120 over 80\"). High blood pressure is 140/90 or higher. You have high blood pressure if your top number is 140 or higher or your bottom number is 90 or higher, or both. The main test for high blood pressure is simple, fast, and painless. To diagnose high blood pressure, your doctor will test your blood pressure at different times. After testing your blood pressure, your doctor may ask you to test it again when you are home. If you are diagnosed with high blood pressure, you can work with your doctor to make a long-term plan to manage it. Follow-up care is a key part of your treatment and safety. Be sure to make and go to all appointments, and call your doctor if you are having problems. It's also a good idea to know your test results and keep a list of the medicines you take. How can you care for yourself at home? · Do not smoke. Smoking increases your risk for heart attack and stroke. If you need help quitting, talk to your doctor about stop-smoking programs and medicines. These can increase your chances of quitting for good. · Stay at a healthy weight. · Try to limit how much sodium you eat to less than 2,300 milligrams (mg) a day. Your doctor may ask you to try to eat less than 1,500 mg a day. · Be physically active.  Get at least 30 minutes of exercise on most days of the week. Walking is a good choice. You also may want to do other activities, such as running, swimming, cycling, or playing tennis or team sports. · Avoid or limit alcohol. Talk to your doctor about whether you can drink any alcohol. · Eat plenty of fruits, vegetables, and low-fat dairy products. Eat less saturated and total fats. · Learn how to check your blood pressure at home. When should you call for help? Call your doctor now or seek immediate medical care if:  ? · Your blood pressure is much higher than normal (such as 180/110 or higher). ? · You think high blood pressure is causing symptoms such as:  ¨ Severe headache. ¨ Blurry vision. ? Watch closely for changes in your health, and be sure to contact your doctor if:  ? · You do not get better as expected. Where can you learn more? Go to http://jasper-teresa.info/. Enter O625 in the search box to learn more about \"Elevated Blood Pressure: Care Instructions. \"  Current as of: September 21, 2016  Content Version: 11.4  © 1988-0708 [employer]. Care instructions adapted under license by [employer] (which disclaims liability or warranty for this information). If you have questions about a medical condition or this instruction, always ask your healthcare professional. [address] any warranty or liability for your use of this information.

## 2018-07-26 ENCOUNTER — HOSPITAL ENCOUNTER (EMERGENCY)
Age: 53
Discharge: PSYCHIATRIC OR SUBSTANCE USE FACILITY | End: 2018-07-28
Attending: EMERGENCY MEDICINE

## 2018-07-26 DIAGNOSIS — R44.0 AUDITORY HALLUCINATION: ICD-10-CM

## 2018-07-26 DIAGNOSIS — F17.210 CIGARETTE SMOKER: ICD-10-CM

## 2018-07-26 DIAGNOSIS — R45.851 SUICIDAL IDEATION: Primary | ICD-10-CM

## 2018-07-26 LAB
ANION GAP SERPL CALC-SCNC: 10 MMOL/L (ref 3–18)
BASOPHILS # BLD: 0 K/UL (ref 0–0.1)
BASOPHILS NFR BLD: 0 % (ref 0–2)
BUN SERPL-MCNC: 13 MG/DL (ref 7–18)
BUN/CREAT SERPL: 14 (ref 12–20)
CALCIUM SERPL-MCNC: 9 MG/DL (ref 8.5–10.1)
CHLORIDE SERPL-SCNC: 107 MMOL/L (ref 100–108)
CO2 SERPL-SCNC: 26 MMOL/L (ref 21–32)
CREAT SERPL-MCNC: 0.95 MG/DL (ref 0.6–1.3)
DIFFERENTIAL METHOD BLD: ABNORMAL
EOSINOPHIL # BLD: 0.1 K/UL (ref 0–0.4)
EOSINOPHIL NFR BLD: 2 % (ref 0–5)
ERYTHROCYTE [DISTWIDTH] IN BLOOD BY AUTOMATED COUNT: 15.8 % (ref 11.6–14.5)
ETHANOL SERPL-MCNC: 88 MG/DL (ref 0–3)
GLUCOSE SERPL-MCNC: 126 MG/DL (ref 74–99)
HCT VFR BLD AUTO: 40.3 % (ref 36–48)
HGB BLD-MCNC: 13.4 G/DL (ref 13–16)
LYMPHOCYTES # BLD: 2.2 K/UL (ref 0.9–3.6)
LYMPHOCYTES NFR BLD: 51 % (ref 21–52)
MCH RBC QN AUTO: 26 PG (ref 24–34)
MCHC RBC AUTO-ENTMCNC: 33.3 G/DL (ref 31–37)
MCV RBC AUTO: 78.3 FL (ref 74–97)
MONOCYTES # BLD: 0.2 K/UL (ref 0.05–1.2)
MONOCYTES NFR BLD: 5 % (ref 3–10)
NEUTS SEG # BLD: 1.8 K/UL (ref 1.8–8)
NEUTS SEG NFR BLD: 42 % (ref 40–73)
PLATELET # BLD AUTO: 309 K/UL (ref 135–420)
PMV BLD AUTO: 9.3 FL (ref 9.2–11.8)
POTASSIUM SERPL-SCNC: 3.2 MMOL/L (ref 3.5–5.5)
RBC # BLD AUTO: 5.15 M/UL (ref 4.7–5.5)
SODIUM SERPL-SCNC: 143 MMOL/L (ref 136–145)
WBC # BLD AUTO: 4.3 K/UL (ref 4.6–13.2)

## 2018-07-26 PROCEDURE — 80048 BASIC METABOLIC PNL TOTAL CA: CPT | Performed by: EMERGENCY MEDICINE

## 2018-07-26 PROCEDURE — 80307 DRUG TEST PRSMV CHEM ANLYZR: CPT | Performed by: EMERGENCY MEDICINE

## 2018-07-26 PROCEDURE — 85025 COMPLETE CBC W/AUTO DIFF WBC: CPT | Performed by: EMERGENCY MEDICINE

## 2018-07-26 PROCEDURE — 99285 EMERGENCY DEPT VISIT HI MDM: CPT

## 2018-07-27 VITALS
SYSTOLIC BLOOD PRESSURE: 103 MMHG | RESPIRATION RATE: 12 BRPM | DIASTOLIC BLOOD PRESSURE: 56 MMHG | OXYGEN SATURATION: 98 % | TEMPERATURE: 97.6 F | HEART RATE: 60 BPM

## 2018-07-27 PROCEDURE — 74011250637 HC RX REV CODE- 250/637: Performed by: EMERGENCY MEDICINE

## 2018-07-27 PROCEDURE — 80307 DRUG TEST PRSMV CHEM ANLYZR: CPT | Performed by: EMERGENCY MEDICINE

## 2018-07-27 RX ORDER — LISINOPRIL 20 MG/1
20 TABLET ORAL
Status: COMPLETED | OUTPATIENT
Start: 2018-07-27 | End: 2018-07-27

## 2018-07-27 RX ORDER — OLANZAPINE 5 MG/1
5 TABLET ORAL DAILY
Status: DISCONTINUED | OUTPATIENT
Start: 2018-07-27 | End: 2018-07-28 | Stop reason: HOSPADM

## 2018-07-27 RX ORDER — OLANZAPINE 5 MG/1
10 TABLET ORAL EVERY EVENING
Status: DISCONTINUED | OUTPATIENT
Start: 2018-07-27 | End: 2018-07-28 | Stop reason: HOSPADM

## 2018-07-27 RX ORDER — FLUOXETINE HYDROCHLORIDE 20 MG/1
20 CAPSULE ORAL DAILY
Status: DISCONTINUED | OUTPATIENT
Start: 2018-07-27 | End: 2018-07-28 | Stop reason: HOSPADM

## 2018-07-27 RX ORDER — HYDROCHLOROTHIAZIDE 25 MG/1
25 TABLET ORAL DAILY
Status: DISCONTINUED | OUTPATIENT
Start: 2018-07-27 | End: 2018-07-28 | Stop reason: HOSPADM

## 2018-07-27 RX ORDER — POTASSIUM CHLORIDE 20 MEQ/1
20 TABLET, EXTENDED RELEASE ORAL DAILY
Status: DISCONTINUED | OUTPATIENT
Start: 2018-07-27 | End: 2018-07-28 | Stop reason: HOSPADM

## 2018-07-27 RX ORDER — CARVEDILOL 12.5 MG/1
6.25 TABLET ORAL 2 TIMES DAILY WITH MEALS
Status: DISCONTINUED | OUTPATIENT
Start: 2018-07-27 | End: 2018-07-28 | Stop reason: HOSPADM

## 2018-07-27 RX ADMIN — LISINOPRIL 20 MG: 20 TABLET ORAL at 08:24

## 2018-07-27 RX ADMIN — OLANZAPINE 5 MG: 5 TABLET, FILM COATED ORAL at 08:24

## 2018-07-27 RX ADMIN — FLUOXETINE 20 MG: 20 CAPSULE ORAL at 08:24

## 2018-07-27 RX ADMIN — CARVEDILOL 6.25 MG: 12.5 TABLET, FILM COATED ORAL at 17:00

## 2018-07-27 RX ADMIN — OLANZAPINE 10 MG: 5 TABLET, FILM COATED ORAL at 18:29

## 2018-07-27 RX ADMIN — CARVEDILOL 6.25 MG: 12.5 TABLET, FILM COATED ORAL at 08:23

## 2018-07-27 RX ADMIN — HYDROCHLOROTHIAZIDE 25 MG: 25 TABLET ORAL at 08:25

## 2018-07-27 RX ADMIN — POTASSIUM CHLORIDE 20 MEQ: 20 TABLET, EXTENDED RELEASE ORAL at 08:24

## 2018-07-27 NOTE — PROGRESS NOTES
[facility] psych resident paged and received a return call from Dr. Marge Cabral. Dr. Marge Cabral states that he is familiar with the patient and informed me that I can fax the information to him, however he has several patients in the ED at [place] [address] to be seen. Information faxed. Call placed to [facility] and spoke with [facility] in admissions. [facility] requested information to be faxed. Information was faxed. Call placed to [facility] and spoke with [facility] in the admissions office. Per [facility], she will need to review pt's information first to determine if she has an appropriate bed available. Call placed to [facility] and spoke with charge nurse, Lopez Short. Per Lopez Short, she currently has no beds available, however beds may become available later this afternoon after discharges. Information was faxed to Lopez Short. Call placed to [address] [place] [address] and spoke with Edgar Frias. Edgar rFias requested information to be faxed. Information was faxed. Awaiting a return call once pt's information has been reviewed with a possible bed offer. Maki Loyd RN, BSN, ACM 
ED Outcomes Manager Thursdays & Fridays  
[phone] (phone) 
[phone] (pager)

## 2018-07-27 NOTE — ED PROVIDER NOTES
[facility] EMERGENCY DEPT      10:41 PM    Date: 7/26/2018  Patient Name: Dawna Agudelo    History of Presenting Illness     Chief Complaint   Patient presents with    Suicidal       History Provided By: Patient    Chief Complaint: SI  Duration:  1 year  Timing:  Constant  Location: generalized  Severity: Severe  Modifying Factors: He says he hears voices and they tell him to jump off a bridge  Associated Symptoms: auditory hallucinations. Denies Self injury. 46 y.o. male with noted past medical history who presents to the emergency department with a constant severe generalized SI problem that has been going on for 1 year. He says he hears voices and they tell him to jump off a bridge. Denies any self injury. He smokes. No other complaints. Nursing notes regarding the HPI and triage nursing notes were reviewed. Prior medical records were reviewed. Current Outpatient Prescriptions   Medication Sig Dispense Refill    lisinopril (PRINIVIL, ZESTRIL) 20 mg tablet Take 1 Tab by mouth daily. Indications: hypertension 30 Tab 0    hydroCHLOROthiazide (HYDRODIURIL) 25 mg tablet Take 1 Tab by mouth daily. Indications: hypertension 14 Tab 0    amLODIPine (NORVASC) 10 mg tablet Take 1 Tab by mouth daily. Indications: hypertension 14 Tab 0    carvedilol (COREG) 6.25 mg tablet Take 1 Tab by mouth two (2) times daily (with meals). Indications: hypertension 14 Tab 0    FLUoxetine (PROZAC) 20 mg capsule Take 1 Cap by mouth daily. Indications: ANXIETY WITH DEPRESSION 14 Cap 0    OLANZapine (ZYPREXA) 10 mg tablet Take 1 Tab by mouth every evening.  Indications: Schizophrenia 14 Tab 0       Past History     Past Medical History:  Past Medical History:   Diagnosis Date    Back pain     Hypertension     MDD (major depressive disorder), recurrent, severe, with psychosis ([facility] [address] 11/11/2017    Psychiatric disorder     hallucinations    Psychotic disorder 11/11/2017       Past Surgical History:  Past Surgical History:   Procedure Laterality Date    HX OTHER SURGICAL      oral sx       Family History:  No family history on file. Social History:  Social History   Substance Use Topics    Smoking status: Current Some Day Smoker     Packs/day: 0.25    Smokeless tobacco: Never Used    Alcohol use Yes      Comment: beer once prior to admission- had been sober        Allergies:  No Known Allergies    Patient's primary care provider (as noted in EPIC):  None    Review of Systems   Constitutional: Negative for diaphoresis. HENT: Negative for congestion. Eyes: Negative for discharge. Respiratory: Negative for stridor. Cardiovascular: Negative for palpitations. Gastrointestinal: Negative for diarrhea. Genitourinary: Negative for flank pain. Musculoskeletal: Negative for back pain. Neurological: Negative for weakness. Psychiatric/Behavioral: Positive for suicidal ideas. Negative for hallucinations and self-injury. All other systems reviewed and are negative. Visit Vitals    /86    Pulse (!) 106    Temp 98.1 °F (36.7 °C)    Resp 18    SpO2 97%       PHYSICAL EXAM:    CONSTITUTIONAL:  Alert, in no apparent distress;  well developed;  well nourished. HEAD:  Normocephalic, atraumatic. EYES:  EOMI. Non-icteric sclera. Normal conjunctiva. ENTM:  Nose:  no rhinorrhea. Throat:  no erythema or exudate, mucous membranes moist.  NECK:  No JVD. Supple  RESPIRATORY:  Chest clear, equal breath sounds, good air movement. CARDIOVASCULAR:  Regular rate and rhythm. No murmurs, rubs, or gallops. GI:  Normal bowel sounds, abdomen soft and non-tender. No rebound or guarding. BACK:  Non-tender. UPPER EXT:  Normal inspection. LOWER EXT:  No edema, no calf tenderness. Distal pulses intact. NEURO:  Moves all four extremities, and grossly normal motor exam.  SKIN:  No rashes;  Normal for age. PSYCH:  Alert and normal affect.     DIFFERENTIAL DIAGNOSES/ MEDICAL DECISION MAKING: Differential diagnoses/impression: Need to rule out obvious organic causes versus psychological etiology. Based on patient's presentation and lab work, I do not believe that there is an obvious organic etiology for the patient's suicidal ideation. I believe the patient needs psychiatric evaluation and treatment for the suicidal ideation. ED COURSE:      Diagnostic Study Results     Abnormal lab results from this emergency department encounter:  Labs Reviewed   CBC WITH AUTOMATED DIFF   METABOLIC PANEL, BASIC   ETHYL ALCOHOL   DRUG SCREEN, URINE       Lab values for this patient within approximately the last 12 hours:  No results found for this or any previous visit (from the past 12 hour(s)). Radiologist and cardiologist interpretations if available at time of this note:  No results found. Medication(s) ordered for patient during this emergency visit encounter:  Medications - No data to display    Medical Decision Making     I am the first provider for this patient. I reviewed the vital signs, available nursing notes, past medical history, past surgical history, family history and social history. Vital Signs:  Reviewed the patient's vital signs. ED COURSE:  The patient has no active medical issues. I believe that the patient is medically cleared for admission to a psychiatric unit if this is deemed appropriate by the crisis staff after their evaluation of the patient. IMPRESSION AND MEDICAL DECISION MAKING:  Based upon the patient's presentation with noted HPI and PE, along with the work up done in the emergency department, I believe that the patient is having suicidal ideation that MAY require admission and further evaluation on a psychiatric/ behavioral medicine unit. THE PATIENT IS MEDICALLY CLEARED FOR ADMISSION TO A PSYCHIATRIC UNIT. Consult Tele-Psychiatry    The on call tele-psychiatry was called and the patient was presented for psychiatry consult.  I personally spoke with Dr. Ema Cuevas, in the tele-psychiatry group, about the patient's presentation and management. I spoke to the [facility] physician, Dr. Ema Cuevas, who recommends inpatient admission. Signout Note      Pt care transferred to Dr. Charles Patrick  ,ED provider. History of patient complaint(s), available diagnostic reports and current treatment plan has been discussed thoroughly. Bedside rounding on patient occured : no . Intended disposition of patient : Transfer  Pending diagnostics reports and/or labs (please list): [facility] case management transfer of a voluntary SI patient to a community behavioral medicine bed. Condition:  Stable    Coding Diagnoses     Clinical Impression:   1. Suicidal ideation    2. Auditory hallucination    3. Cigarette smoker        Disposition     Disposition:  Transfer    Satnam Santiago M.D. [facility] Board Certified Emergency Physician    Provider Attestation:  If a scribe was utilized in generation of this patient record, I personally performed the services described in the documentation, reviewed the documentation, as recorded by the scribe in my presence, and it accurately records the patient's history of presenting illness, review of systems, patient physical examination, and procedures performed by me as the attending physician. SMITH German Board Certified Emergency Physician  7/26/2018.  10:41 PM    Scribe [id number] [facility] [address] acting as a scribe for and in the presence of Colt Jordan MD      July 26, 2018 at 10:41 PM       Provider Attestation:      I personally performed the services described in the documentation, reviewed the documentation, as recorded by the scribe in my presence, and it accurately and completely records my words and actions.  July 26, 2018 at 10:41 PM - Colt Jordan MD

## 2018-07-27 NOTE — ED NOTES
Purposeful rounding completed:    Side rails up x 1:  YES  Bed in low position and wheels locked: YES  Call bell within reach: NO  Comfort addressed: YES    Toileting needs addressed: YES  Plan of care reviewed/updated with patient and or family members: YES  IV site assessed: N/A  Pain assessed and addressed: YES, 0

## 2018-07-27 NOTE — PROGRESS NOTES
Pt's bed search extended outside of the area. Call placed to [facility] in [place] and spoke [facility] Luis M New in admissions. Information was faxed per Luis M New' request. 
 
Call placed to [facility] and spoke with [facility] in admissions. Information faxed to [facility] as requested. Call placed to [facility] and spoke with Kameron Wu in admissions. Information was faxed to Kameron Wu as requested. Call placed to [address] [place] [address] and provided information to Abi Hays in intake. Information faxed to Abi Hays per request. 
 
Janie Meza a return call once information has been reviewed for possible bed acceptance. Yousuf Bryant RN, BSN, ACM 
ED Outcomes Manager Thursdays & Fridays  
[phone] (phone) 
[phone] (pager)

## 2018-07-27 NOTE — CONSULTS
PSYCHIATRIC  CONSULTATION  This evaluation was conducted via telepsychiatry with the assistance of onsite staff. The psychiatrist is located in [place], [place]    HPI: The patient a 59-year-old  male the history of schizophrenia. He presented with hallucinations and suicidal thoughts. Patient stated that he hears voices telling him to jump off a bridge. When seen by psychiatry, the patient said he came to the hospital for \"psychological issues. \" \"Nothing is getting better. \" The patient was bizarre and guarded during the conversation. He may several references to \"life issues\" regarding his stressors, but he would not elaborate. The patient has presented to the hospital before with bizarre and confused states. He often talked in circles and sometimes his answers were nonsensical. The psychiatrist recommended inpatient care. The patient agreed. \"I think it could help me. \"  SI/ Self harm: See above  HI/Violence: none  Access to weapons: none  Past Psychiatric History: 2 prior inpatient admissions. Current outpatient treatment-sees PCP. 2 prior suicide attempts   Family Psychiatric History:  father-committed to [facility] in the past  Medical History: Hypertension    Current Meds: Prozac 20 mg daily, Zyprexa 10 mg HS, Coreg 6.25 mg daily, Lisinopril 20 mg daily, HCTZ 25 mg daily, Lisinopril 20 mg daily, Norvasc 10 mg daily  Allergies: pollen  Substance Abuse History: none  Social History: , homeless, HS grad, unemployment   History: none   Abuse: none  Legal History: possession of marijuana, failure to appear in court (1980)  Mental Status Examination: Cooperative, good eye contact, speech within normal limits, no psychomotor agitation, anxious affect, sad mood, linear thought processes,  + auditory hallucinations, bizarre delusions, + SI, no HI, AAO x 1  Diagnosis:  Schizophrenia  Assessment/Plan: The patient is a 59-year-old male with a history of psychosis and suicide attempts.  He presented to the hospital in a bizarre state with SI and command auditory hallucinations telling him to jump off a bridge. Inpatient care is warranted. The patient is agreeable to inpatient care. Admit as voluntary. Continue Prozac 20 mg daily. Start Zyprexa 5 mg QAM and 10 QHS. Silvia Gamino M.D.   [facility]

## 2018-07-27 NOTE — PROGRESS NOTES
Return call received from [facility] notifying me that she does not have a bed for the patient. Stephanie Chiu RN, BSN, ACM 
ED Outcomes Manager Thursdays & Fridays  
[phone] (phone) 
[phone] (pager)

## 2018-07-27 NOTE — ED NOTES
Vitals:  Patient Vitals for the past 12 hrs:   Temp Pulse Resp BP SpO2   07/27/18 1056 97.6 °F (36.4 °C) 68 14 101/68 100 %   07/27/18 0633 97.2 °F (36.2 °C) 61 12 (!) 144/95 99 %   07/27/18 0343 96.7 °F (35.9 °C) 63 12 112/72 100 %         Medications ordered:   Medications   FLUoxetine (PROzac) capsule 20 mg (20 mg Oral Given 7/27/18 0824)   OLANZapine (ZyPREXA) tablet 5 mg (5 mg Oral Given 7/27/18 0824)   OLANZapine (ZyPREXA) tablet 10 mg (not administered)   hydroCHLOROthiazide (HYDRODIURIL) tablet 25 mg (25 mg Oral Given 7/27/18 0825)   carvedilol (COREG) tablet 6.25 mg (6.25 mg Oral Given 7/27/18 0823)   potassium chloride (K-DUR, KLOR-CON) SR tablet 20 mEq (20 mEq Oral Given 7/27/18 0824)   lisinopril (PRINIVIL, ZESTRIL) tablet 20 mg (20 mg Oral Given 7/27/18 0824)         Lab findings:  No results found for this or any previous visit (from the past 12 hour(s)). EKG interpretation by ED Physician:      X-Ray, CT or other radiology findings or impressions:  No orders to display       Progress notes, Consult notes or additional Procedure notes:   T/o from dr Mehrdad Luo to f/u placement for mental health admission  Will t/o dr Toya Philip at approx 4pm to f/u on placement  Reevaluation of patient:   stable    Disposition:  Diagnosis:   1. Suicidal ideation    2. Auditory hallucination    3. Cigarette smoker        Disposition: pending placement    Follow-up Information     None            Patient's Medications   Start Taking    No medications on file   Continue Taking    AMLODIPINE (NORVASC) 10 MG TABLET    Take 1 Tab by mouth daily. Indications: hypertension    CARVEDILOL (COREG) 6.25 MG TABLET    Take 1 Tab by mouth two (2) times daily (with meals). Indications: hypertension    FLUOXETINE (PROZAC) 20 MG CAPSULE    Take 1 Cap by mouth daily. Indications: ANXIETY WITH DEPRESSION    HYDROCHLOROTHIAZIDE (HYDRODIURIL) 25 MG TABLET    Take 1 Tab by mouth daily.  Indications: hypertension    LISINOPRIL ([place]) 20 MG TABLET    Take 1 Tab by mouth daily. Indications: hypertension    OLANZAPINE (ZYPREXA) 10 MG TABLET    Take 1 Tab by mouth every evening.  Indications: Schizophrenia   These Medications have changed    No medications on file   Stop Taking    No medications on file

## 2018-07-27 NOTE — ED NOTES
Verbal shift change report given to Elmo Cadena (oncoming nurse) by Cong Butterfield (offgoing nurse). Report included the following information SBAR, ED Summary and MAR.

## 2018-07-27 NOTE — ED NOTES
Side rails up x 2:  YES Bed in low position and wheels locked: YES Call bell within reach: NO. Sitter at bedside Comfort addressed: YES Toileting needs addressed: YES Plan of care reviewed/updated with patient and or family members: YES 
IV site assessed: N/A Pain assessed and addressed: YES, 0

## 2018-07-27 NOTE — PROGRESS NOTES
Call placed to [address] and left a message with pt's name, , and MRN requesting their review for placement. Richard Nelson RN, BSN, ACM 
ED Outcomes Manager  &   
[phone] (phone) 
[phone] (pager)

## 2018-07-27 NOTE — ED NOTES
6:27 PM : Pt care transferred to Dr. Lucía Connolly  ,ED provider. History of patient complaint(s), available diagnostic reports and current treatment plan has been discussed thoroughly. Bedside rounding on patient occured : yes . Intended disposition of patient : Admit Pending diagnostics reports and/or labs (please list): Inpatient psych bed placement. 2:50 AM 
[facility]

## 2018-07-27 NOTE — PROGRESS NOTES
Call received from [facility] with [address] notifying me that pt has been declined as he requires an acute bed and there are currently no acute beds in the system. Maye Maciel RN, BSN, ACM 
ED Outcomes Manager Thursdays & Fridays  
[phone] (phone) 
[phone] (pager)

## 2018-07-27 NOTE — PROGRESS NOTES
Follow up call made to [facility] psych resident, Dr. Wiliam Sullivan to discuss bed availability and per Dr. Wiliam Sullivan, he currently has no available beds and does not anticipate any bed availability today. He also informed me that the patient was just discharged from their ED, and it was felt that pt was \"malingering\" at that time. Follow up call placed to [facility] and was informed that pt was declined by her physician, Dr. Missy Eugene because of the acuity on their unit. Follow up call placed to Laura at [facility] and per Sandip Choudhury, she has received the patient's information, however she has 12 patients at her facility that are a priority for admission and will contact [facility] ED if she has bed availability. Follow up call placed to [facility] and confirmed with Karolina Downs that fax was received from earlier, however patient has not been reviewed due to [place] several people in our lobby. \" Per Karoilna Downs, she will contact the ED if she has bed availability. Naresh Burroughs RN, BSN, ACM 
ED Outcomes Manager Thursdays & Fridays  
[phone] (phone) 
[phone] (pager)

## 2018-07-27 NOTE — ED NOTES
3:47 PM :Pt care assumed from Dr. Kristen Bain, ED provider. Pt complaint(s), current treatment plan, progression and available diagnostic results have been discussed thoroughly. Rounding occurred: yes Intended Disposition: Transfer to inpatient psych Pending diagnostic reports and/or labs (please list): Psych placement 5:53 PM : Pt care transferred to Dr. Mary Anne Clayton, ED provider. History of patient complaint(s), available diagnostic reports and current treatment plan has been discussed thoroughly. Bedside rounding on patient occured : yes . Intended disposition of patient : Transfer Pending diagnostics reports and/or labs (please list): Pt is pending psychiatric placement Shabbir Casillas MD 
7/27/2018 Scribe Attestation Lionel Wyatt acting as a scribe for and in the presence of Shabbir Casillas MD     
July 27, 2018 at 3:48 PM 
    
Provider Attestation:     
I personally performed the services described in the documentation, reviewed the documentation, as recorded by the scribe in my presence, and it accurately and completely records my words and actions.  July 27, 2018 at 3:48 PM - Shabbir Casillas MD

## 2018-07-27 NOTE — ED NOTES
Assume care of patient, patient resting in bed, sitter at bedside, patient waiting for placement. Purposeful rounding completed: 
 
Side rails up x 2:  YES Bed in low position and wheels locked: YES Call bell within reach: NO. Sitter at bedside Comfort addressed: YES Toileting needs addressed: YES Plan of care reviewed/updated with patient and or family members: YES 
IV site assessed: N/A Pain assessed and addressed: YES, 0

## 2018-07-27 NOTE — ED NOTES
Verbal shift change report given to Alberto Law (oncoming nurse) by Joe Donovan RN (offgoing nurse). Report included the following information SBAR and ED Summary.

## 2018-07-27 NOTE — ED NOTES
Purposeful rounding completed:    Side rails up x 1:  YES  Bed in low position and wheels locked: YES  Call bell within reach: NO  Comfort addressed: YES    Toileting needs addressed: YES  Plan of care reviewed/updated with patient and or family members: NO. Pt sleeping. IV site assessed: N/A  Pain assessed and addressed: YES. Pt sleeping comfortably.

## 2018-08-09 ENCOUNTER — HOSPITAL ENCOUNTER (EMERGENCY)
Age: 53
Discharge: PSYCHIATRIC OR SUBSTANCE USE FACILITY | End: 2018-08-10
Attending: EMERGENCY MEDICINE
Payer: SELF-PAY

## 2018-08-09 ENCOUNTER — HOSPITAL ENCOUNTER (EMERGENCY)
Age: 53
Discharge: HOME | End: 2018-08-09
Attending: EMERGENCY MEDICINE

## 2018-08-09 VITALS
TEMPERATURE: 97.9 F | BODY MASS INDEX: 24.11 KG/M2 | HEIGHT: 66 IN | SYSTOLIC BLOOD PRESSURE: 172 MMHG | OXYGEN SATURATION: 100 % | WEIGHT: 150 LBS | RESPIRATION RATE: 16 BRPM | DIASTOLIC BLOOD PRESSURE: 107 MMHG | HEART RATE: 75 BPM

## 2018-08-09 DIAGNOSIS — R42 LIGHTHEADED: ICD-10-CM

## 2018-08-09 DIAGNOSIS — I10 HYPERTENSION, UNSPECIFIED TYPE: Primary | ICD-10-CM

## 2018-08-09 DIAGNOSIS — R45.851 SUICIDAL IDEATIONS: Primary | ICD-10-CM

## 2018-08-09 DIAGNOSIS — Z91.14 NONCOMPLIANCE WITH MEDICATION REGIMEN: ICD-10-CM

## 2018-08-09 DIAGNOSIS — F32.A DEPRESSION, UNSPECIFIED DEPRESSION TYPE: ICD-10-CM

## 2018-08-09 LAB
ALBUMIN SERPL-MCNC: 4.1 G/DL (ref 3.4–5)
ALBUMIN/GLOB SERPL: 1.2 {RATIO} (ref 0.8–1.7)
ALP SERPL-CCNC: 85 U/L (ref 45–117)
ALT SERPL-CCNC: 40 U/L (ref 16–61)
AMPHET UR QL SCN: NEGATIVE
ANION GAP SERPL CALC-SCNC: 6 MMOL/L (ref 3–18)
ANION GAP SERPL CALC-SCNC: 7 MMOL/L (ref 3–18)
AST SERPL-CCNC: 12 U/L (ref 15–37)
BARBITURATES UR QL SCN: NEGATIVE
BASOPHILS # BLD: 0 K/UL (ref 0–0.1)
BASOPHILS # BLD: 0 K/UL (ref 0–0.1)
BASOPHILS NFR BLD: 0 % (ref 0–2)
BASOPHILS NFR BLD: 0 % (ref 0–2)
BENZODIAZ UR QL: NEGATIVE
BILIRUB SERPL-MCNC: 0.6 MG/DL (ref 0.2–1)
BUN SERPL-MCNC: 12 MG/DL (ref 7–18)
BUN SERPL-MCNC: 17 MG/DL (ref 7–18)
BUN/CREAT SERPL: 13 (ref 12–20)
BUN/CREAT SERPL: 16 (ref 12–20)
CALCIUM SERPL-MCNC: 8.8 MG/DL (ref 8.5–10.1)
CALCIUM SERPL-MCNC: 9 MG/DL (ref 8.5–10.1)
CANNABINOIDS UR QL SCN: NEGATIVE
CHLORIDE SERPL-SCNC: 106 MMOL/L (ref 100–108)
CHLORIDE SERPL-SCNC: 107 MMOL/L (ref 100–108)
CO2 SERPL-SCNC: 29 MMOL/L (ref 21–32)
CO2 SERPL-SCNC: 30 MMOL/L (ref 21–32)
COCAINE UR QL SCN: NEGATIVE
CREAT SERPL-MCNC: 0.96 MG/DL (ref 0.6–1.3)
CREAT SERPL-MCNC: 1.05 MG/DL (ref 0.6–1.3)
DIFFERENTIAL METHOD BLD: ABNORMAL
DIFFERENTIAL METHOD BLD: ABNORMAL
EOSINOPHIL # BLD: 0.1 K/UL (ref 0–0.4)
EOSINOPHIL # BLD: 0.1 K/UL (ref 0–0.4)
EOSINOPHIL NFR BLD: 2 % (ref 0–5)
EOSINOPHIL NFR BLD: 2 % (ref 0–5)
ERYTHROCYTE [DISTWIDTH] IN BLOOD BY AUTOMATED COUNT: 16.4 % (ref 11.6–14.5)
ERYTHROCYTE [DISTWIDTH] IN BLOOD BY AUTOMATED COUNT: 16.5 % (ref 11.6–14.5)
ETHANOL SERPL-MCNC: <3 MG/DL (ref 0–3)
GLOBULIN SER CALC-MCNC: 3.3 G/DL (ref 2–4)
GLUCOSE SERPL-MCNC: 91 MG/DL (ref 74–99)
GLUCOSE SERPL-MCNC: 92 MG/DL (ref 74–99)
HCT VFR BLD AUTO: 38.3 % (ref 36–48)
HCT VFR BLD AUTO: 40.3 % (ref 36–48)
HDSCOM,HDSCOM: NORMAL
HGB BLD-MCNC: 12.6 G/DL (ref 13–16)
HGB BLD-MCNC: 13.2 G/DL (ref 13–16)
LYMPHOCYTES # BLD: 1.7 K/UL (ref 0.9–3.6)
LYMPHOCYTES # BLD: 1.9 K/UL (ref 0.9–3.6)
LYMPHOCYTES NFR BLD: 38 % (ref 21–52)
LYMPHOCYTES NFR BLD: 48 % (ref 21–52)
MCH RBC QN AUTO: 25.5 PG (ref 24–34)
MCH RBC QN AUTO: 25.6 PG (ref 24–34)
MCHC RBC AUTO-ENTMCNC: 32.8 G/DL (ref 31–37)
MCHC RBC AUTO-ENTMCNC: 32.9 G/DL (ref 31–37)
MCV RBC AUTO: 77.4 FL (ref 74–97)
MCV RBC AUTO: 78.3 FL (ref 74–97)
METHADONE UR QL: NEGATIVE
MONOCYTES # BLD: 0.2 K/UL (ref 0.05–1.2)
MONOCYTES # BLD: 0.3 K/UL (ref 0.05–1.2)
MONOCYTES NFR BLD: 6 % (ref 3–10)
MONOCYTES NFR BLD: 7 % (ref 3–10)
NEUTS SEG # BLD: 1.5 K/UL (ref 1.8–8)
NEUTS SEG # BLD: 2.7 K/UL (ref 1.8–8)
NEUTS SEG NFR BLD: 43 % (ref 40–73)
NEUTS SEG NFR BLD: 54 % (ref 40–73)
OPIATES UR QL: NEGATIVE
PCP UR QL: NEGATIVE
PLATELET # BLD AUTO: 182 K/UL (ref 135–420)
PLATELET # BLD AUTO: 194 K/UL (ref 135–420)
PMV BLD AUTO: 10.2 FL (ref 9.2–11.8)
PMV BLD AUTO: 9.6 FL (ref 9.2–11.8)
POTASSIUM SERPL-SCNC: 3.5 MMOL/L (ref 3.5–5.5)
POTASSIUM SERPL-SCNC: 3.7 MMOL/L (ref 3.5–5.5)
PROT SERPL-MCNC: 7.4 G/DL (ref 6.4–8.2)
RBC # BLD AUTO: 4.95 M/UL (ref 4.7–5.5)
RBC # BLD AUTO: 5.15 M/UL (ref 4.7–5.5)
SODIUM SERPL-SCNC: 142 MMOL/L (ref 136–145)
SODIUM SERPL-SCNC: 143 MMOL/L (ref 136–145)
TROPONIN I SERPL-MCNC: <0.02 NG/ML (ref 0–0.04)
WBC # BLD AUTO: 3.5 K/UL (ref 4.6–13.2)
WBC # BLD AUTO: 5 K/UL (ref 4.6–13.2)

## 2018-08-09 PROCEDURE — 80307 DRUG TEST PRSMV CHEM ANLYZR: CPT | Performed by: EMERGENCY MEDICINE

## 2018-08-09 PROCEDURE — 80053 COMPREHEN METABOLIC PANEL: CPT

## 2018-08-09 PROCEDURE — 99285 EMERGENCY DEPT VISIT HI MDM: CPT

## 2018-08-09 PROCEDURE — 85025 COMPLETE CBC W/AUTO DIFF WBC: CPT

## 2018-08-09 PROCEDURE — 74011250636 HC RX REV CODE- 250/636: Performed by: EMERGENCY MEDICINE

## 2018-08-09 PROCEDURE — 84484 ASSAY OF TROPONIN QUANT: CPT

## 2018-08-09 PROCEDURE — 96361 HYDRATE IV INFUSION ADD-ON: CPT

## 2018-08-09 PROCEDURE — 74011250637 HC RX REV CODE- 250/637: Performed by: EMERGENCY MEDICINE

## 2018-08-09 PROCEDURE — 93005 ELECTROCARDIOGRAM TRACING: CPT

## 2018-08-09 PROCEDURE — 96360 HYDRATION IV INFUSION INIT: CPT

## 2018-08-09 RX ORDER — HYDROCHLOROTHIAZIDE 25 MG/1
25 TABLET ORAL DAILY
Qty: 30 TAB | Refills: 0 | Status: SHIPPED | OUTPATIENT
Start: 2018-08-09 | End: 2018-09-08

## 2018-08-09 RX ORDER — AMLODIPINE BESYLATE 10 MG/1
10 TABLET ORAL DAILY
Qty: 30 TAB | Refills: 0 | Status: SHIPPED | OUTPATIENT
Start: 2018-08-09 | End: 2018-09-08

## 2018-08-09 RX ORDER — AMLODIPINE BESYLATE 5 MG/1
10 TABLET ORAL
Status: COMPLETED | OUTPATIENT
Start: 2018-08-09 | End: 2018-08-09

## 2018-08-09 RX ORDER — LISINOPRIL 20 MG/1
20 TABLET ORAL DAILY
Status: DISCONTINUED | OUTPATIENT
Start: 2018-08-10 | End: 2018-08-10 | Stop reason: HOSPADM

## 2018-08-09 RX ORDER — LISINOPRIL 20 MG/1
20 TABLET ORAL
Status: COMPLETED | OUTPATIENT
Start: 2018-08-09 | End: 2018-08-09

## 2018-08-09 RX ORDER — AMLODIPINE BESYLATE 5 MG/1
10 TABLET ORAL DAILY
Status: DISCONTINUED | OUTPATIENT
Start: 2018-08-10 | End: 2018-08-10 | Stop reason: HOSPADM

## 2018-08-09 RX ORDER — HYDROCHLOROTHIAZIDE 25 MG/1
25 TABLET ORAL
Status: COMPLETED | OUTPATIENT
Start: 2018-08-09 | End: 2018-08-09

## 2018-08-09 RX ORDER — ARIPIPRAZOLE 5 MG/1
5 TABLET ORAL DAILY
Status: DISCONTINUED | OUTPATIENT
Start: 2018-08-10 | End: 2018-08-10 | Stop reason: HOSPADM

## 2018-08-09 RX ORDER — CARVEDILOL 6.25 MG/1
6.25 TABLET ORAL 2 TIMES DAILY
Qty: 60 TAB | Refills: 0 | Status: SHIPPED | OUTPATIENT
Start: 2018-08-09 | End: 2018-09-08

## 2018-08-09 RX ORDER — LISINOPRIL 20 MG/1
20 TABLET ORAL DAILY
Qty: 30 TAB | Refills: 0 | Status: SHIPPED | OUTPATIENT
Start: 2018-08-09 | End: 2018-09-08

## 2018-08-09 RX ORDER — CARVEDILOL 12.5 MG/1
6.25 TABLET ORAL
Status: COMPLETED | OUTPATIENT
Start: 2018-08-09 | End: 2018-08-09

## 2018-08-09 RX ORDER — BUPROPION HYDROCHLORIDE 100 MG/1
100 TABLET ORAL DAILY
Status: DISCONTINUED | OUTPATIENT
Start: 2018-08-10 | End: 2018-08-10 | Stop reason: HOSPADM

## 2018-08-09 RX ADMIN — LISINOPRIL 20 MG: 20 TABLET ORAL at 08:23

## 2018-08-09 RX ADMIN — CARVEDILOL 6.25 MG: 12.5 TABLET, FILM COATED ORAL at 08:22

## 2018-08-09 RX ADMIN — HYDROCHLOROTHIAZIDE 25 MG: 25 TABLET ORAL at 08:23

## 2018-08-09 RX ADMIN — AMLODIPINE BESYLATE 10 MG: 5 TABLET ORAL at 08:23

## 2018-08-09 RX ADMIN — SODIUM CHLORIDE 1000 ML: 900 INJECTION, SOLUTION INTRAVENOUS at 06:50

## 2018-08-09 NOTE — ED NOTES
Shift report received from nurse, patient resting in the bed, denies pain, states the dizziness has lessened.  Patient states he has medicaid but no PCP to prescribe his HTN meds

## 2018-08-09 NOTE — ED TRIAGE NOTES
Patient brought self to ED for c/o dizziness x1 week. States he has been out of his blood pressure medication for a \"while\". C/o slight blurred vision.  Denies pain and SOB

## 2018-08-09 NOTE — DISCHARGE INSTRUCTIONS
IF YOU ARE IN NEED OF A PRIMARY CARE DOCTOR, HERE ARE SOME OPTIONS:  FREE AND LOW COST CLINICS IN [address] [place] [address]  The "Mobile Location, IP"una Jeremiah is a free medical service that provides general medical care to uninsured adults and children in [address] [place] [address]. Services include routine evaluation and treatment of common acute illnesses. The [address] [place] [address] also offers sports physicals, children's health insurance enrollment, and health education services. Medical conditions that are beyond the team's scope of care are referred to another care setting. The [address] [place] [address] team is bilingual and composed of registered nurses, licensed practical nurses, physicians, patient technicians, and outreach workers. To learn more about our [address] [place] [address] services please call us at [phone] ([phone]). [facility] in 2011 as the first and only student-run free clinic in [place] [address] ([employer] [facility] [employer]) [facility] is staffed by [employer], residents and physicians. The purpose of [facility] is to serve uninsured citizens of [place] with long term and specialty care. Clinic Days/Hours: Thursdays 6:00 PM- 9:00 PM  Location:  [address] [address]  Contact:  The clinic is appointment based only. Please call [phone] if you are a new patient or returning patient  Specialty Services:  [facility], Dermatology, Ophthalmology, Orthopedics and [facility] clinics with scheduled appointments and walk-in hours. Services include Child Health, Immunizations, Family Planning and Sexual Health. Payment plans available if necessary. Clients seeking services will not be turned away if unable to pay. There are two locations:   [facility] [address] [address] Phone: [phone].   Multi-Service Clinic at [address], [facility] [address]  Phone: [phone]. [facility]    Offers affordable/sliding scale primary care, womens care, family planning, behavioral health, and dental care in 5 locations.   Contact phone for new patients is Emilie Nicole [address]  [address] [facility], [facility] Marcin Cisneros  8am-5pm Monday - Friday [place] [facility] [address]  [address] [facility]  8am-5pm Monday - Friday     [facility]  [address] [place] [address] [address] [facility], [address]  8am-5pm Monday - Friday   [facility]  [address] [facility], [facility], [address]  8am-5pm Monday - Friday     [facility]  [address] [facility], [address] [facility] [address]  8am-5pm Monday, Wednesday, Friday

## 2018-08-09 NOTE — ED PROVIDER NOTES
EMERGENCY DEPARTMENT HISTORY AND PHYSICAL EXAM    6:27 AM      Date: 8/9/2018  Patient Name: Nan Franklin    History of Presenting Illness     Chief Complaint   Patient presents with    Dizziness         History Provided By: Patient    Chief Complaint: Lightheadedness  Duration: 1 Weeks  Timing:  Intermittent  Location: Head  Quality: similar to prior  Severity: Mild  Modifying Factors: worse when off HTN meds  Associated Symptoms: denies any other associated signs or symptoms      Additional History (Context): Nan Franklin is a 46 y.o. male with hypertension and psychiatric disorder who presents with mild intermittent lightheadedness x1 wk which pt states is similar to other times he has been out of HTN meds. Pt has been out of Lisinopril, HCTZ, Norvasc, Coreg for approximately 1wk. They were all filled last month but he left them in [place] in a bag and does not know why. He denies HA, pain, SOB, N/V, abd pain, numbness, tingling. Denies any other complaints at this time. PCP: None    Current Outpatient Prescriptions   Medication Sig Dispense Refill    lisinopril (PRINIVIL, ZESTRIL) 20 mg tablet Take 1 Tab by mouth daily for 30 doses. 30 Tab 0    hydroCHLOROthiazide (HYDRODIURIL) 25 mg tablet Take 1 Tab by mouth daily for 30 doses. 30 Tab 0    amLODIPine (NORVASC) 10 mg tablet Take 1 Tab by mouth daily for 30 doses. 30 Tab 0    carvedilol (COREG) 6.25 mg tablet Take 1 Tab by mouth two (2) times a day for 30 days. 60 Tab 0    FLUoxetine (PROZAC) 20 mg capsule Take 1 Cap by mouth daily. Indications: ANXIETY WITH DEPRESSION 14 Cap 0    OLANZapine (ZYPREXA) 10 mg tablet Take 1 Tab by mouth every evening.  Indications: Schizophrenia 14 Tab 0       Past History     Past Medical History:  Past Medical History:   Diagnosis Date    Back pain     Hypertension     MDD (major depressive disorder), recurrent, severe, with psychosis ([facility] [address] 11/11/2017    Psychiatric disorder     hallucinations    Psychotic disorder 11/11/2017       Past Surgical History:  Past Surgical History:   Procedure Laterality Date    HX OTHER SURGICAL      oral sx       Family History:  History reviewed. No pertinent family history. Social History:  Social History   Substance Use Topics    Smoking status: Current Some Day Smoker     Packs/day: 0.25    Smokeless tobacco: Never Used    Alcohol use Yes      Comment: beer once prior to admission- had been sober        Allergies:  No Known Allergies      Review of Systems       Review of Systems   Constitutional: Negative for chills. HENT: Negative for congestion and sore throat. Respiratory: Negative for cough and shortness of breath. Cardiovascular: Negative for chest pain. Gastrointestinal: Negative for abdominal pain, diarrhea, nausea and vomiting. Genitourinary: Negative for dysuria. Musculoskeletal: Negative for back pain. Skin: Negative for rash. Neurological: Positive for dizziness and light-headedness. Negative for weakness, numbness and headaches. All other systems reviewed and are negative. Physical Exam     Visit Vitals    BP (!) 172/107    Pulse 76    Temp 97.9 °F (36.6 °C)    Resp 15    Ht 5' 6\" (1.676 m)    Wt 68 kg (150 lb)    SpO2 100%    BMI 24.21 kg/m2         Physical Exam  General Exam: Patient is a well developed and well nourished in no distress. Patient does not appear acutely ill or toxic. Eye Exam: Lids and conjunctiva are normal  ENT Exam: The general head and facial exam is normal.  The neck is supple without meningeal signs. No significant adenopathy. Pulmonary Exam: No respiratory distress. The respiratory rate is normal.  No stridor. The breath sounds are equal bilaterally. There are no wheezes, rales, or rhonchi noted. Cardiac Exam: The cardiac rate and rhythm are normal.  No significant murmurs, rubs, or gallops. The peripheral pulses are normal.  Abdominal Exam: Abdomen is soft and non-distended.   No pulsatile masses. There is no local tenderness. There is no rebound or guarding noted. Skin and Soft Tissue: The skin is warm and dry, without significant abnormality. Good color. Musculoskeletal Exam: No peripheral edema. The musculoskeletal exam of the lower extremities is normal without significant local tenderness. Psychiatric: Normal adult with appropriate demeanor and interpersonal interaction. Is oriented to person, place, and time. Diagnostic Study Results     Labs -  Recent Results (from the past 12 hour(s))   CBC WITH AUTOMATED DIFF    Collection Time: 08/09/18  6:32 AM   Result Value Ref Range    WBC 3.5 (L) 4.6 - 13.2 K/uL    RBC 5.15 4.70 - 5.50 M/uL    HGB 13.2 13.0 - 16.0 g/dL    HCT 40.3 36.0 - 48.0 %    MCV 78.3 74.0 - 97.0 FL    MCH 25.6 24.0 - 34.0 PG    MCHC 32.8 31.0 - 37.0 g/dL    RDW 16.5 (H) 11.6 - 14.5 %    PLATELET 214 741 - 841 K/uL    MPV 10.2 9.2 - 11.8 FL    NEUTROPHILS 43 40 - 73 %    LYMPHOCYTES 48 21 - 52 %    MONOCYTES 7 3 - 10 %    EOSINOPHILS 2 0 - 5 %    BASOPHILS 0 0 - 2 %    ABS. NEUTROPHILS 1.5 (L) 1.8 - 8.0 K/UL    ABS. LYMPHOCYTES 1.7 0.9 - 3.6 K/UL    ABS. MONOCYTES 0.2 0.05 - 1.2 K/UL    ABS. EOSINOPHILS 0.1 0.0 - 0.4 K/UL    ABS.  BASOPHILS 0.0 0.0 - 0.1 K/UL    DF AUTOMATED     METABOLIC PANEL, BASIC    Collection Time: 08/09/18  6:32 AM   Result Value Ref Range    Sodium 143 136 - 145 mmol/L    Potassium 3.7 3.5 - 5.5 mmol/L    Chloride 107 100 - 108 mmol/L    CO2 30 21 - 32 mmol/L    Anion gap 6 3.0 - 18 mmol/L    Glucose 91 74 - 99 mg/dL    BUN 12 7.0 - 18 MG/DL    Creatinine 0.96 0.6 - 1.3 MG/DL    BUN/Creatinine ratio 13 12 - 20      GFR est AA >60 >60 ml/min/1.73m2    GFR est non-AA >60 >60 ml/min/1.73m2    Calcium 8.8 8.5 - 10.1 MG/DL   TROPONIN I    Collection Time: 08/09/18  6:32 AM   Result Value Ref Range    Troponin-I, Qt. <0.02 0.0 - 0.045 NG/ML   EKG, 12 LEAD, INITIAL    Collection Time: 08/09/18  6:34 AM   Result Value Ref Range    Ventricular Rate 74 BPM    Atrial Rate 74 BPM    P-R Interval 212 ms    QRS Duration 88 ms    Q-T Interval 406 ms    QTC Calculation (Bezet) 450 ms    Calculated P Axis 45 degrees    Calculated R Axis -27 degrees    Calculated T Axis 69 degrees    Diagnosis       Sinus rhythm with 1st degree AV block  Nonspecific T wave abnormality  Abnormal ECG  When compared with ECG of 10-JUL-2018 20:51,  No significant change was found         Radiologic Studies -   No orders to display         Medical Decision Making   I am the first provider for this patient. I reviewed the vital signs, available nursing notes, past medical history, past surgical history, family history and social history. Vital Signs-Reviewed the patient's vital signs. Pulse Oximetry Analysis -  100% on room air (Interpretation) wnl    Cardiac Monitor:  Rate: 74      EKG: Interpreted by the EP. Time Interpreted: 3814   Rate: 74   Rhythm: 1st degree AV block   Interpretation: no STEMI, no ST depressions      Records Reviewed: Nursing Notes (Time of Review: 6:27 AM)    ED Course: Progress Notes, Reevaluation, and Consults:        Provider Notes (Medical Decision Making): Pt with report of feeling lightheaded and out of BP meds, recently discharged from psych facility. Ambulating in ED with no neuro deficits. No indication for imaging at this time - exam and history do not suggest ICH/CVA. Labs reassuring with no signs of end organ damage. Pt restarted on BP meds. Given prescriptions and list of low cost/free clinics. Pt ambulating in ED without additional complaints and comfortable with discharge. Diagnosis     Clinical Impression:   1. Hypertension, unspecified type    2. Noncompliance with medication regimen    3.  Lightheaded        Disposition: Discharge     Follow-up Information     Follow up With Details Comments Contact [facility] EMERGENCY DEPT  As needed, If symptoms worsen [address] [place] [address]  [phone]       YOU NEED A FAMILY DOCTOR. FOLLOW UP WITH ONE OF THE LOW COST/FREE CLINICS ON THE LIST PROVIDED TO YOU. Patient's Medications   Start Taking    AMLODIPINE (NORVASC) 10 MG TABLET    Take 1 Tab by mouth daily for 30 doses. CARVEDILOL (COREG) 6.25 MG TABLET    Take 1 Tab by mouth two (2) times a day for 30 days. HYDROCHLOROTHIAZIDE (HYDRODIURIL) 25 MG TABLET    Take 1 Tab by mouth daily for 30 doses. LISINOPRIL (PRINIVIL, ZESTRIL) 20 MG TABLET    Take 1 Tab by mouth daily for 30 doses. Continue Taking    FLUOXETINE (PROZAC) 20 MG CAPSULE    Take 1 Cap by mouth daily. Indications: ANXIETY WITH DEPRESSION    OLANZAPINE (ZYPREXA) 10 MG TABLET    Take 1 Tab by mouth every evening. Indications: Schizophrenia   These Medications have changed    No medications on file   Stop Taking    AMLODIPINE (NORVASC) 10 MG TABLET    Take 1 Tab by mouth daily. Indications: hypertension    CARVEDILOL (COREG) 6.25 MG TABLET    Take 1 Tab by mouth two (2) times daily (with meals). Indications: hypertension    HYDROCHLOROTHIAZIDE (HYDRODIURIL) 25 MG TABLET    Take 1 Tab by mouth daily. Indications: hypertension    LISINOPRIL (PRINIVIL, ZESTRIL) 20 MG TABLET    Take 1 Tab by mouth daily. Indications: hypertension     _______________________________    Attestations:  Scribe Attestation     Olayinka Jimenez acting as a scribe for and in the presence of Kiera Camarillo MD      August 09, 2018 at 9:06 AM       Provider Attestation:      I personally performed the services described in the documentation, reviewed the documentation, as recorded by the scribe in my presence, and it accurately and completely records my words and actions.  August 09, 2018 at 9:06 AM - Kiera Camarillo MD    _______________________________

## 2018-08-10 ENCOUNTER — HOSPITAL ENCOUNTER (INPATIENT)
Age: 53
LOS: 11 days | Discharge: HOME | DRG: 885 | End: 2018-08-21
Attending: PSYCHIATRY & NEUROLOGY | Admitting: PSYCHIATRY & NEUROLOGY
Payer: SELF-PAY

## 2018-08-10 VITALS
DIASTOLIC BLOOD PRESSURE: 71 MMHG | HEIGHT: 66 IN | WEIGHT: 150 LBS | SYSTOLIC BLOOD PRESSURE: 127 MMHG | TEMPERATURE: 99.4 F | OXYGEN SATURATION: 97 % | HEART RATE: 67 BPM | RESPIRATION RATE: 16 BRPM | BODY MASS INDEX: 24.11 KG/M2

## 2018-08-10 LAB
APPEARANCE UR: CLEAR
BILIRUB UR QL: NEGATIVE
COLOR UR: YELLOW
GLUCOSE UR STRIP.AUTO-MCNC: NEGATIVE MG/DL
HGB UR QL STRIP: NEGATIVE
KETONES UR QL STRIP.AUTO: NEGATIVE MG/DL
LEUKOCYTE ESTERASE UR QL STRIP.AUTO: NEGATIVE
NITRITE UR QL STRIP.AUTO: NEGATIVE
PH UR STRIP: 5.5 [PH] (ref 5–8)
PROT UR STRIP-MCNC: NEGATIVE MG/DL
SP GR UR REFRACTOMETRY: 1.01 (ref 1–1.03)
UROBILINOGEN UR QL STRIP.AUTO: 0.2 EU/DL (ref 0.2–1)

## 2018-08-10 PROCEDURE — 74011250637 HC RX REV CODE- 250/637: Performed by: EMERGENCY MEDICINE

## 2018-08-10 PROCEDURE — 74011250637 HC RX REV CODE- 250/637: Performed by: PHYSICIAN ASSISTANT

## 2018-08-10 PROCEDURE — 81003 URINALYSIS AUTO W/O SCOPE: CPT | Performed by: EMERGENCY MEDICINE

## 2018-08-10 PROCEDURE — 65220000005 HC RM SEMIPRIVATE PSYCH 3 OR 4 BED

## 2018-08-10 RX ORDER — DIPHENHYDRAMINE HCL 25 MG
25 CAPSULE ORAL
Status: COMPLETED | OUTPATIENT
Start: 2018-08-10 | End: 2018-08-10

## 2018-08-10 RX ADMIN — BUPROPION HYDROCHLORIDE 100 MG: 100 TABLET, FILM COATED ORAL at 09:24

## 2018-08-10 RX ADMIN — LISINOPRIL 20 MG: 20 TABLET ORAL at 09:24

## 2018-08-10 RX ADMIN — DIPHENHYDRAMINE HYDROCHLORIDE 25 MG: 25 CAPSULE ORAL at 21:02

## 2018-08-10 RX ADMIN — ARIPIPRAZOLE 5 MG: 5 TABLET ORAL at 09:23

## 2018-08-10 RX ADMIN — AMLODIPINE BESYLATE 10 MG: 5 TABLET ORAL at 09:24

## 2018-08-10 NOTE — ED PROVIDER NOTES
HPI Comments: Karen Medel is a 46 y.o. Male with h/o psychosis who was just released from [facility] 2 days ago for si presents again for si, stating he would jump off bridge. Thoughts exacerbated by being homeless. Denies drugs, alcohol. Has repeated visits for same and keeps representing shortly after d/c. The history is provided by the patient. Past Medical History:   Diagnosis Date    Back pain     Hypertension     MDD (major depressive disorder), recurrent, severe, with psychosis ([facility] [address] 11/11/2017    Psychiatric disorder     hallucinations    Psychotic disorder 11/11/2017       Past Surgical History:   Procedure Laterality Date    HX OTHER SURGICAL      oral sx         History reviewed. No pertinent family history. Social History     Social History    Marital status:      Spouse name: N/A    Number of children: N/A    Years of education: N/A     Occupational History    Not on file. Social History Main Topics    Smoking status: Current Some Day Smoker     Packs/day: 0.25    Smokeless tobacco: Never Used    Alcohol use Yes      Comment: beer once prior to admission- had been sober     Drug use: Yes     Special: Marijuana, Cocaine      Comment: denies current use.  Sexual activity: Not on file     Other Topics Concern    Not on file     Social History Narrative     but  with 2 daughters. Homeless and was asked to leave due to his drinking    GED    No current legal charges pending but has possession charges in the past.             ALLERGIES: Review of patient's allergies indicates no known allergies. Review of Systems   Constitutional: Negative for fever. HENT: Negative for sore throat and trouble swallowing. Eyes: Negative for visual disturbance. Respiratory: Negative for cough and shortness of breath. Cardiovascular: Negative for chest pain. Gastrointestinal: Negative for abdominal pain. Endocrine: Negative for polyuria. Genitourinary: Negative for difficulty urinating. Musculoskeletal: Negative for gait problem. Skin: Negative for rash. Allergic/Immunologic: Negative for immunocompromised state. Neurological: Negative for syncope. Psychiatric/Behavioral: Positive for sleep disturbance and suicidal ideas. Vitals:    08/09/18 2117   BP: 115/81   Pulse: 81   Resp: 16   Temp: 98.3 °F (36.8 °C)   SpO2: 94%   Weight: 68 kg (150 lb)   Height: 5' 6\" (1.676 m)            Physical Exam   Constitutional: He is oriented to person, place, and time. Non-toxic appearance. He does not appear ill. No distress. HENT:   Head: Normocephalic and atraumatic. Right Ear: External ear normal.   Left Ear: External ear normal.   Nose: Nose normal.   Mouth/Throat: Oropharynx is clear and moist. No oropharyngeal exudate. Eyes: Conjunctivae are normal.   Neck: Normal range of motion. Cardiovascular: Normal rate, regular rhythm, normal heart sounds and intact distal pulses. Pulmonary/Chest: Effort normal and breath sounds normal. No respiratory distress. Abdominal: Soft. There is no tenderness. Musculoskeletal: Normal range of motion. He exhibits no edema. Neurological: He is alert and oriented to person, place, and time. Skin: Skin is warm and dry. He is not diaphoretic. Psychiatric: His speech is normal and behavior is normal. Thought content is not paranoid and not delusional. He exhibits a depressed mood. He expresses suicidal ideation. He expresses no homicidal ideation. He expresses suicidal plans. He expresses no homicidal plans. Nursing note and vitals reviewed.        [facility]      ED Course       Procedures    Vitals:  Patient Vitals for the past 12 hrs:   Temp Pulse Resp BP SpO2   08/09/18 2117 98.3 °F (36.8 °C) 81 16 115/81 94 %         Medications ordered:   Medications   amLODIPine (NORVASC) tablet 10 mg (not administered)   buPROPion (WELLBUTRIN) tablet 100 mg (not administered)   lisinopril (Marge Kristi) tablet 20 mg (not administered)   ARIPiprazole (ABILIFY) tablet 5 mg (not administered)         Lab findings:  Recent Results (from the past 12 hour(s))   CBC WITH AUTOMATED DIFF    Collection Time: 08/09/18  9:46 PM   Result Value Ref Range    WBC 5.0 4.6 - 13.2 K/uL    RBC 4.95 4.70 - 5.50 M/uL    HGB 12.6 (L) 13.0 - 16.0 g/dL    HCT 38.3 36.0 - 48.0 %    MCV 77.4 74.0 - 97.0 FL    MCH 25.5 24.0 - 34.0 PG    MCHC 32.9 31.0 - 37.0 g/dL    RDW 16.4 (H) 11.6 - 14.5 %    PLATELET 629 123 - 327 K/uL    MPV 9.6 9.2 - 11.8 FL    NEUTROPHILS 54 40 - 73 %    LYMPHOCYTES 38 21 - 52 %    MONOCYTES 6 3 - 10 %    EOSINOPHILS 2 0 - 5 %    BASOPHILS 0 0 - 2 %    ABS. NEUTROPHILS 2.7 1.8 - 8.0 K/UL    ABS. LYMPHOCYTES 1.9 0.9 - 3.6 K/UL    ABS. MONOCYTES 0.3 0.05 - 1.2 K/UL    ABS. EOSINOPHILS 0.1 0.0 - 0.4 K/UL    ABS. BASOPHILS 0.0 0.0 - 0.1 K/UL    DF AUTOMATED     METABOLIC PANEL, COMPREHENSIVE    Collection Time: 08/09/18  9:46 PM   Result Value Ref Range    Sodium 142 136 - 145 mmol/L    Potassium 3.5 3.5 - 5.5 mmol/L    Chloride 106 100 - 108 mmol/L    CO2 29 21 - 32 mmol/L    Anion gap 7 3.0 - 18 mmol/L    Glucose 92 74 - 99 mg/dL    BUN 17 7.0 - 18 MG/DL    Creatinine 1.05 0.6 - 1.3 MG/DL    BUN/Creatinine ratio 16 12 - 20      GFR est AA >60 >60 ml/min/1.73m2    GFR est non-AA >60 >60 ml/min/1.73m2    Calcium 9.0 8.5 - 10.1 MG/DL    Bilirubin, total 0.6 0.2 - 1.0 MG/DL    ALT (SGPT) 40 16 - 61 U/L    AST (SGOT) 12 (L) 15 - 37 U/L    Alk.  phosphatase 85 45 - 117 U/L    Protein, total 7.4 6.4 - 8.2 g/dL    Albumin 4.1 3.4 - 5.0 g/dL    Globulin 3.3 2.0 - 4.0 g/dL    A-G Ratio 1.2 0.8 - 1.7     ETHYL ALCOHOL    Collection Time: 08/09/18  9:46 PM   Result Value Ref Range    ALCOHOL(ETHYL),SERUM <3 0 - 3 MG/DL   DRUG SCREEN, URINE    Collection Time: 08/09/18  9:50 PM   Result Value Ref Range    BENZODIAZEPINES NEGATIVE  NEG      BARBITURATES NEGATIVE  NEG      THC (TH-CANNABINOL) NEGATIVE  NEG      OPIATES NEGATIVE  NEG PCP(PHENCYCLIDINE) NEGATIVE  NEG      COCAINE NEGATIVE  NEG      AMPHETAMINES NEGATIVE  NEG      METHADONE NEGATIVE  NEG      HDSCOM (NOTE)        EKG interpretation by ED Physician:      X-Ray, CT or other radiology findings or impressions:  No orders to display       Progress notes, Consult notes or additional Procedure notes:   Seen by dr Ayla Salvador from telepsych who rec inpt treatment. No change in meds  Will need case management assistance for placement  Will t/o to dr Janneth De Leon at approx 6am to f/u on placement    Reevaluation of patient:   Stable with no acute medical condition that would prevent transfer to mental health facility    Disposition:  Diagnosis:   1. Suicidal ideations    2. Depression, unspecified depression type        Disposition: pending placement    Follow-up Information     None            Patient's Medications   Start Taking    No medications on file   Continue Taking    AMLODIPINE (NORVASC) 10 MG TABLET    Take 1 Tab by mouth daily for 30 doses. CARVEDILOL (COREG) 6.25 MG TABLET    Take 1 Tab by mouth two (2) times a day for 30 days. FLUOXETINE (PROZAC) 20 MG CAPSULE    Take 1 Cap by mouth daily. Indications: ANXIETY WITH DEPRESSION    HYDROCHLOROTHIAZIDE (HYDRODIURIL) 25 MG TABLET    Take 1 Tab by mouth daily for 30 doses. LISINOPRIL (PRINIVIL, ZESTRIL) 20 MG TABLET    Take 1 Tab by mouth daily for 30 doses. OLANZAPINE (ZYPREXA) 10 MG TABLET    Take 1 Tab by mouth every evening.  Indications: Schizophrenia   These Medications have changed    No medications on file   Stop Taking    No medications on file

## 2018-08-10 NOTE — CONSULTS
PSYCHIATRIC  CONSULTATION  This evaluation was conducted via telepsychiatry with the assistance of onsite staff. The psychiatrist is located in [place], [place]    HPI: The patient a 50-year-old  male the history of schizophrenia. He presented with hallucinations and suicidal thoughts. Patient stated that he hears voices telling him to jump off a bridge. When seen by psychiatry, the patient said he came to the hospital for \"a mental breakdown. \" The patient was bizarre and guarded during the conversation with long pauses before answers (thought blocking). He was unable to reveal any stressors. He was recently admitted and was noncompliant with meds once discharged. The psychiatrist recommended inpatient care. The patient agreed. SI/ Self harm: See above  HI/Violence: none  Access to weapons: none  Past Psychiatric History: 2-3 prior inpatient admissions. Current outpatient treatment-sees PCP. 2 prior suicide attempts   Family Psychiatric History:  father-committed to [place] in the past  Medical History: Hypertension    Current Meds: Abilify 5 mg daily, Wellbutrin 100 mg daily  Allergies: pollen  Substance Abuse History: none  Social History: , homeless, HS grad, unemployment   History: none   Abuse: none  Legal History: possession of marijuana, failure to appear in court (1980)  Mental Status Examination: Cooperative, good eye contact, speech within normal limits, no psychomotor agitation, anxious affect, sad mood, linear thought processes,  + auditory hallucinations, bizarre delusions, + SI, no HI, AAO x 1  Diagnosis:  Schizophrenia  Assessment/Plan: The patient is a 50-year-old male with a history of psychosis and suicide attempts. He presented to the hospital in a bizarre state with SI and command auditory hallucinations telling him to jump off a bridge. Inpatient care is warranted. The patient is agreeable to inpatient care. Admit as voluntary.  Continue Abilify 5 mg daily, Wellbutrin 100 mg daily. Erin Tovar M.D.   [facility]

## 2018-08-10 NOTE — ED NOTES
Side rails up x 2:  YES  Bed in low position and wheels locked: YES  Call bell within reach: NO. Sitter at bedside  Comfort addressed: YES    Toileting needs addressed: YES  Plan of care reviewed/updated with patient and or family members: YES  IV site assessed: N/A  Pain assessed and addressed: YES

## 2018-08-10 NOTE — IP AVS SNAPSHOT
[address] [facility] 
 
 
 [address] Patient: Luz Silva MRN: [id number] :1965 About your hospitalization You were admitted on:  August 10, 2018 You last received care in the:  [facility] You were discharged on:  2018 Why you were hospitalized Your primary diagnosis was:  Mdd (Major Depressive Disorder), Recurrent, Severe, With Psychosis (Hcc) Your diagnoses also included:  Depression, Major Neurocognitive Disorder Follow-up Information Follow up With Details Comments Contact Info Patient will complete intake assessment on 18 @ [address] [facility] [place] [address] [phone]: [facility] [phone] Discharge Orders None A check preeti indicates which time of day the medication should be taken. My Medications START taking these medications Instructions Each Dose to Equal  
 Morning Noon Evening Bedtime  
 traZODone 150 mg tablet Commonly known as:  Orma Mian Your last dose was: Your next dose is: Take 1 Tab by mouth nightly as needed for Sleep. Indications: insomnia associated with depression 150 mg CHANGE how you take these medications Instructions Each Dose to Equal  
 Morning Noon Evening Bedtime * ABILIFY 5 mg tablet Generic drug:  ARIPiprazole What changed:  Another medication with the same name was added. Make sure you understand how and when to take each. Your last dose was: Your next dose is: Take 5 mg by mouth daily. 5 mg * ARIPiprazole 5 mg tablet Commonly known as:  ABILIFY Start taking on:  2018 What changed: You were already taking a medication with the same name, and this prescription was added.  Make sure you understand how and when to take each. Your last dose was: Your next dose is: Take 1 Tab by mouth daily. Indications: DEPRESSION TREATMENT ADJUNCT  
 5 mg * amLODIPine 10 mg tablet Commonly known as:  Balbir Presser What changed:  Another medication with the same name was added. Make sure you understand how and when to take each. Your last dose was: Your next dose is: Take 1 Tab by mouth daily for 30 doses. 10 mg  
    
   
   
   
  
 * amLODIPine 10 mg tablet Commonly known as:  Balbir Presser Start taking on:  8/22/2018 What changed: You were already taking a medication with the same name, and this prescription was added. Make sure you understand how and when to take each. Your last dose was: Your next dose is: Take 1 Tab by mouth daily. Indications: hypertension 10 mg  
    
   
   
   
  
 * carvedilol 6.25 mg tablet Commonly known as:  Tiera Buster What changed:  Another medication with the same name was added. Make sure you understand how and when to take each. Your last dose was: Your next dose is: Take 1 Tab by mouth two (2) times a day for 30 days. 6.25 mg  
    
   
   
   
  
 * carvedilol 6.25 mg tablet Commonly known as:  Tiera Buster What changed: You were already taking a medication with the same name, and this prescription was added. Make sure you understand how and when to take each. Your last dose was: Your next dose is: Take 1 Tab by mouth two (2) times daily (with meals). Indications: hypertension 6.25 mg  
    
   
   
   
  
 * hydroCHLOROthiazide 25 mg tablet Commonly known as:  HYDRODIURIL What changed:  Another medication with the same name was added. Make sure you understand how and when to take each. Your last dose was: Your next dose is: Take 1 Tab by mouth daily for 30 doses.   
 25 mg  
    
   
   
   
  
 * hydroCHLOROthiazide 25 mg tablet Commonly known as:  HYDRODIURIL Start taking on:  8/22/2018 What changed: You were already taking a medication with the same name, and this prescription was added. Make sure you understand how and when to take each. Your last dose was: Your next dose is: Take 1 Tab by mouth daily. Indications: hypertension 25 mg  
    
   
   
   
  
 * lisinopril 20 mg tablet Commonly known as:  Robertha Roup What changed:  Another medication with the same name was added. Make sure you understand how and when to take each. Your last dose was: Your next dose is: Take 1 Tab by mouth daily for 30 doses. 20 mg  
    
   
   
   
  
 * lisinopril 20 mg tablet Commonly known as:  Robertha Roup Start taking on:  8/22/2018 What changed: You were already taking a medication with the same name, and this prescription was added. Make sure you understand how and when to take each. Your last dose was: Your next dose is: Take 1 Tab by mouth daily. Indications: hypertension 20 mg  
    
   
   
   
  
 * WELLBUTRIN 100 mg tablet Generic drug:  buPROPion What changed:  Another medication with the same name was added. Make sure you understand how and when to take each. Your last dose was: Your next dose is: Take  by mouth. * buPROPion 100 mg tablet Commonly known as:  [facility] What changed: You were already taking a medication with the same name, and this prescription was added. Make sure you understand how and when to take each. Your last dose was: Your next dose is: Take 1 Tab by mouth two (2) times a day. Indications: major depressive disorder 100 mg * Notice:   This list has 12 medication(s) that are the same as other medications prescribed for you. Read the directions carefully, and ask your doctor or other care provider to review them with you. STOP taking these medications FLUoxetine 20 mg capsule Commonly known as:  PROzac OLANZapine 10 mg tablet Commonly known as:  ZyPREXA Where to Get Your Medications Information on where to get these meds will be given to you by the nurse or doctor. ! Ask your nurse or doctor about these medications  
  amLODIPine 10 mg tablet ARIPiprazole 5 mg tablet buPROPion 100 mg tablet  
 carvedilol 6.25 mg tablet  
 hydroCHLOROthiazide 25 mg tablet  
 lisinopril 20 mg tablet  
 traZODone 150 mg tablet Discharge Instructions BEHAVIORAL HEALTH NURSING DISCHARGE NOTE The following personal items collected during your admission are returned to you:  
Dental Appliance: Dental Appliances: None Vision: Visual Aid: None Hearing Aid:   
Jewelry: Jewelry: None Clothing: Clothing: Belt, Jose, Shorts, Shirt, Undergarments, Footwear Other Valuables: Other Valuables: Lighter/matches Valuables sent to safe: Personal Items Sent to Safe:  (none) PATIENT INSTRUCTIONS: 
 
 
  
 
 
The discharge information has been reviewed with the patient. The patient verbalized understanding. Patient armband removed and shredded Introducing [facility] [place] [facility]! Maxine Flood introduces [employer] patient portal. Now you can access parts of your medical record, email your doctor's office, and request medication refills online. 1. In your internet browser, go to [email or web address] 2. Click on the First Time User? Click Here link in the Sign In box. You will see the New Member Sign Up page. 3. Enter your [employer] Access Code exactly as it appears below. You will not need to use this code after youve completed the sign-up process. If you do not sign up before the expiration date, you must request a new code. · [email or web address] Access Code: [email or web address] Expires: 10/27/2018  5:18 AM 
 
4. Enter the last four digits of your Social Security Number (xxxx) and Date of Birth (mm/dd/yyyy) as indicated and click Submit. You will be taken to the next sign-up page. 5. Create a [employer] etot ID. This will be your [email or web address] login ID and cannot be changed, so think of one that is secure and easy to remember. 6. Create a [email or web address] password. You can change your password at any time. 7. Enter your Password Reset Question and Answer. This can be used at a later time if you forget your password. 8. Enter your e-mail address. You will receive e-mail notification when new information is available in [address] 9. Click Sign Up. You can now view and download portions of your medical record. 10. Click the Download Summary menu link to download a portable copy of your medical information. If you have questions, please visit the Frequently Asked Questions section of the [email or web address] website. Remember, [email or web address] is NOT to be used for urgent needs. For medical emergencies, dial 911. Now available from your iPhone and Android! Introducing Mino Muniz As a [employer] patient, I wanted to make you aware of our electronic visit tool called Mino Coyleamitawander. [employer] 24/7 allows you to connect within minutes with a medical provider 24 hours a day, seven days a week via a mobile device or tablet or logging into a secure website from your computer. You can access Mino Muniz from anywhere in the [place]. A virtual visit might be right for you when you have a simple condition and feel like you just dont want to get out of bed, or cant get away from work for an appointment, when your regular [employer] provider is not available (evenings, weekends or holidays), or when youre out of town and need minor care.   Electronic visits cost only $49 and if the [facility] 24/7 provider determines a prescription is needed to treat your condition, one can be electronically transmitted to a nearby pharmacy*. Please take a moment to enroll today if you have not already done so. The enrollment process is free and takes just a few minutes. To enroll, please download the [employer] 24/7 fish to your tablet or phone, or visit [email or web address] to enroll on your computer. And, as an [address] [place] [address] patient with a [employer] account, the results of your visits will be scanned into your electronic medical record and your primary care provider will be able to view the scanned results. We urge you to continue to see your regular [employer] provider for your ongoing medical care. And while your primary care provider may not be the one available when you seek a [employer] virtual visit, the peace of mind you get from getting a real diagnosis real time can be priceless. For more information on [employer], view our Frequently Asked Questions (FAQs) at [email or web address]. Sincerely, 
 
Zeeshan Finney MD 
Chief Medical Officer [employer] *:  certain medications cannot be prescribed via [employer] Providers Seen During Your Hospitalization Provider Specialty Primary office phone Priscille Bosworth, MD Psychiatry [phone] Your Primary Care Physician (PCP) Primary Care Physician Office Phone Office Fax NONE ** None ** ** None ** You are allergic to the following No active allergies Recent Documentation Height Weight BMI Smoking Status 1.676 m 68 kg 24.21 kg/m2 Current Some Day Smoker Emergency Contacts Name Discharge Info Relation Home Work Mobile Skye Barney DISCHARGE CAREGIVER [3] Friend [5] [phone] Patient Belongings The following personal items are in your possession at time of discharge: Dental Appliances: None  Visual Aid: None      Home Medications: None (prescriptions in patient locker)   Jewelry: None  Clothing: Belt, Pat park, Shorts, Shirt, Undergarments, Footwear    Other Valuables: Lighter/matches  Personal Items Sent to Safe:  (none) Please provide this summary of care documentation to your next provider. Signatures-by signing, you are acknowledging that this After Visit Summary has been reviewed with you and you have received a copy. Patient Signature:  ____________________________________________________________ Date:  ____________________________________________________________  
  
[facility] ([place], [place]) Provider Signature:  ____________________________________________________________ Date:  ____________________________________________________________

## 2018-08-10 NOTE — PROGRESS NOTES
Call received from Karen Isaacs with [address] [place] requesting a UA on patient. Notified charge nurse of need.           Silke Vela RN, BSN, [facility]  ED Outcomes Manager  Thursdays & Fridays   [phone] (phone)  [phone] (pager)

## 2018-08-10 NOTE — PROGRESS NOTES
Psych consult noted with recommendation for inpatient voluntary placement. Call placed to [address] and confirmed that they are not aware of patient. Spoke with Derian Lizzeth and provided Derian Lizzeth with pt's MRN and requested their review.            Will Aden RN, BSN, [facility]  ED Outcomes Manager  Thursdays & Fridays   [phone] (phone)  [phone] (pager)

## 2018-08-10 NOTE — ED NOTES
Purposeful rounding completed:    Side rails up x 2:  YES  Bed in low position and wheels locked: YES  Call bell within reach: NO  Comfort addressed: YES    Toileting needs addressed: YES  Plan of care reviewed/updated with patient and or family members: NO pt sleeping comfortably. IV site assessed: N/A  Pain assessed and addressed: YES, no signs and symptoms of pain at this time. Pt sleeping comfortably.

## 2018-08-10 NOTE — ED NOTES
Assume care of patient, patient resting at this time, sitter at bedside, patient waiting for bed placement.   Purposeful rounding completed:    Side rails up x 2:  YES  Bed in low position and wheels locked: YES  Call bell within reach: NO. Sitter at bedside  Comfort addressed: YES    Toileting needs addressed: YES  Plan of care reviewed/updated with patient and or family members: YES  IV site assessed: N/A  Pain assessed and addressed: YES, 0

## 2018-08-10 NOTE — IP AVS SNAPSHOT
Summary of Care Report The Summary of Care report has been created to help improve care coordination. Users with access to [employer] or [employer] (Web-based application) may access additional patient information including the Discharge Summary. If you are not currently a [employer] user and need more information, please call the number listed below in the [place] [address] section and ask to be connected with Medical Records. Facility Information Name Address Phone [place] [address] [facility] [address] [phone] Patient Information Patient Name Sex GEOVANY Hubbard ([id number]) Male 1965 Discharge Information Admitting Provider Service Area Unit Ana Riojas MD / [address] / [phone] Discharge Provider Discharge Date/Time Discharge Disposition Destination (none) 2018 (Pending) AHR (none) Patient Language Language ENGLISH [13] Hospital Problems as of 2018  Reviewed: 2018  8:11 AM by Chavo Agee MD  
  
  
  
 Class Noted - Resolved Last Modified POA Active Problems * (Principal)MDD (major depressive disorder), recurrent, severe, with psychosis ([facility] [address]  2017 - Present 2018 by Chavo Agee MD Yes Entered by Asia Gallegos MD  
  Major neurocognitive disorder  2018 - Present 2018 by Chavo Agee MD Unknown Entered by Chavo Agee MD  
  
Non-Hospital Problems as of 2018  Reviewed: 2018  8:11 AM by Chavo Agee MD  
  
  
  
 Class Noted - Resolved Last Modified Active Problems   Alcohol abuse  2018 - Present 2018 by Jennifer Whitehead MD  
  Entered by Jennifer Whitehead MD  
  Hypertension  Unknown - Present 2018 by Jennifer Whitehead MD  
  Entered by Jennifer Whitehead MD  
  
 You are allergic to the following No active allergies Current Discharge Medication List  
  
START taking these medications Dose & Instructions Dispensing Information Comments  
 traZODone 150 mg tablet Commonly known as:  Norah López Dose:  150 mg Take 1 Tab by mouth nightly as needed for Sleep. Indications: insomnia associated with depression Quantity:  30 Tab Refills:  0 CONTINUE these medications which have CHANGED Dose & Instructions Dispensing Information Comments * ABILIFY 5 mg tablet Generic drug:  ARIPiprazole What changed:  Another medication with the same name was added. Make sure you understand how and when to take each. Dose:  5 mg Take 5 mg by mouth daily. Refills:  0  
   
 * ARIPiprazole 5 mg tablet Commonly known as:  ABILIFY Start taking on:  8/22/2018 What changed: You were already taking a medication with the same name, and this prescription was added. Make sure you understand how and when to take each. Dose:  5 mg Take 1 Tab by mouth daily. Indications: DEPRESSION TREATMENT ADJUNCT Quantity:  30 Tab Refills:  0  
   
 * amLODIPine 10 mg tablet Commonly known as:  Ronit Ny What changed:  Another medication with the same name was added. Make sure you understand how and when to take each. Dose:  10 mg Take 1 Tab by mouth daily for 30 doses. Quantity:  30 Tab Refills:  0  
   
 * amLODIPine 10 mg tablet Commonly known as:  Ronit Ny Start taking on:  8/22/2018 What changed: You were already taking a medication with the same name, and this prescription was added. Make sure you understand how and when to take each. Dose:  10 mg Take 1 Tab by mouth daily. Indications: hypertension Quantity:  30 Tab Refills:  0  
   
 * carvedilol 6.25 mg tablet Commonly known as:  Stoney Marquez What changed:  Another medication with the same name was added. Make sure you understand how and when to take each. Dose:  6.25 mg Take 1 Tab by mouth two (2) times a day for 30 days. Quantity:  60 Tab Refills:  0  
   
 * carvedilol 6.25 mg tablet Commonly known as:  Lorenzo Fisher What changed: You were already taking a medication with the same name, and this prescription was added. Make sure you understand how and when to take each. Dose:  6.25 mg Take 1 Tab by mouth two (2) times daily (with meals). Indications: hypertension Quantity:  60 Tab Refills:  0  
   
 * hydroCHLOROthiazide 25 mg tablet Commonly known as:  HYDRODIURIL What changed:  Another medication with the same name was added. Make sure you understand how and when to take each. Dose:  25 mg Take 1 Tab by mouth daily for 30 doses. Quantity:  30 Tab Refills:  0  
   
 * hydroCHLOROthiazide 25 mg tablet Commonly known as:  HYDRODIURIL Start taking on:  8/22/2018 What changed: You were already taking a medication with the same name, and this prescription was added. Make sure you understand how and when to take each. Dose:  25 mg Take 1 Tab by mouth daily. Indications: hypertension Quantity:  30 Tab Refills:  0  
   
 * lisinopril 20 mg tablet Commonly known as:  Dean Mirelesing What changed:  Another medication with the same name was added. Make sure you understand how and when to take each. Dose:  20 mg Take 1 Tab by mouth daily for 30 doses. Quantity:  30 Tab Refills:  0  
   
 * lisinopril 20 mg tablet Commonly known as:  Ronalee Ruffing Start taking on:  8/22/2018 What changed: You were already taking a medication with the same name, and this prescription was added. Make sure you understand how and when to take each. Dose:  20 mg Take 1 Tab by mouth daily. Indications: hypertension Quantity:  30 Tab Refills:  0  
   
 * WELLBUTRIN 100 mg tablet Generic drug:  buPROPion What changed:  Another medication with the same name was added.  Make sure you understand how and when to take each. Take  by mouth. Refills:  0  
   
 * buPROPion 100 mg tablet Commonly known as:  [facility] What changed: You were already taking a medication with the same name, and this prescription was added. Make sure you understand how and when to take each. Dose:  100 mg Take 1 Tab by mouth two (2) times a day. Indications: major depressive disorder Quantity:  60 Tab Refills:  0  
   
 * Notice: This list has 12 medication(s) that are the same as other medications prescribed for you. Read the directions carefully, and ask your doctor or other care provider to review them with you. STOP taking these medications Comments FLUoxetine 20 mg capsule Commonly known as:  PROzac OLANZapine 10 mg tablet Commonly known as:  ZyPREXA Current Immunizations Name Date Influenza Vaccine 3/2/2017 Follow-up Information Follow up With Details Comments Contact Info Patient will complete intake assessment on 8/21/18 @ [address] [place] [address] [phone]: [facility] [phone]:JOHN Discharge Instructions BEHAVIORAL HEALTH NURSING DISCHARGE NOTE The following personal items collected during your admission are returned to you:  
Dental Appliance: Dental Appliances: None Vision: Visual Aid: None Hearing Aid:   
Jewelry: Jewelry: None Clothing: Clothing: Belt, Jose, Shorts, Shirt, Undergarments, Footwear Other Valuables: Other Valuables: Lighter/matches Valuables sent to safe: Personal Items Sent to Safe:  (none) PATIENT INSTRUCTIONS: 
 
 
  
 
 
The discharge information has been reviewed with the patient. The patient verbalized understanding. Patient armband removed and shredded Chart Review Routing History No Routing History on File

## 2018-08-10 NOTE — ED NOTES
Assumed care of patient from Dr. Gracia Louis at [place] [address].  He came in shortly after his recent discharge again complaining of SI. He is medically cleared and chronic meds have been ordered. Discussed with case management. Currently he does not have an accepting facility. [facility] was consulted. Evaluated patient and discussed plan. He is resting comfortably in bed.        Bogdan Dent PA-C   7:01 PM

## 2018-08-10 NOTE — IP AVS SNAPSHOT
[id number] Patient: Idris Sequeira MRN: [id number] :1965 A check preeti indicates which time of day the medication should be taken. My Medications START taking these medications Instructions Each Dose to Equal  
 Morning Noon Evening Bedtime  
 traZODone 150 mg tablet Commonly known as:  Enrique Hardin Your last dose was: Your next dose is: Take 1 Tab by mouth nightly as needed for Sleep. Indications: insomnia associated with depression 150 mg CHANGE how you take these medications Instructions Each Dose to Equal  
 Morning Noon Evening Bedtime * ABILIFY 5 mg tablet Generic drug:  ARIPiprazole What changed:  Another medication with the same name was added. Make sure you understand how and when to take each. Your last dose was: Your next dose is: Take 5 mg by mouth daily. 5 mg * ARIPiprazole 5 mg tablet Commonly known as:  ABILIFY Start taking on:  2018 What changed: You were already taking a medication with the same name, and this prescription was added. Make sure you understand how and when to take each. Your last dose was: Your next dose is: Take 1 Tab by mouth daily. Indications: DEPRESSION TREATMENT ADJUNCT  
 5 mg * amLODIPine 10 mg tablet Commonly known as:  Arlyss Edmonson What changed:  Another medication with the same name was added. Make sure you understand how and when to take each. Your last dose was: Your next dose is: Take 1 Tab by mouth daily for 30 doses. 10 mg  
    
   
   
   
  
 * amLODIPine 10 mg tablet Commonly known as:  Arlyss Edmonson Start taking on:  2018 What changed:   You were already taking a medication with the same name, and this prescription was added. Make sure you understand how and when to take each. Your last dose was: Your next dose is: Take 1 Tab by mouth daily. Indications: hypertension 10 mg  
    
   
   
   
  
 * carvedilol 6.25 mg tablet Commonly known as:  Cleatis Yifan What changed:  Another medication with the same name was added. Make sure you understand how and when to take each. Your last dose was: Your next dose is: Take 1 Tab by mouth two (2) times a day for 30 days. 6.25 mg  
    
   
   
   
  
 * carvedilol 6.25 mg tablet Commonly known as:  Cleatis Yifan What changed: You were already taking a medication with the same name, and this prescription was added. Make sure you understand how and when to take each. Your last dose was: Your next dose is: Take 1 Tab by mouth two (2) times daily (with meals). Indications: hypertension 6.25 mg  
    
   
   
   
  
 * hydroCHLOROthiazide 25 mg tablet Commonly known as:  HYDRODIURIL What changed:  Another medication with the same name was added. Make sure you understand how and when to take each. Your last dose was: Your next dose is: Take 1 Tab by mouth daily for 30 doses. 25 mg  
    
   
   
   
  
 * hydroCHLOROthiazide 25 mg tablet Commonly known as:  HYDRODIURIL Start taking on:  8/22/2018 What changed: You were already taking a medication with the same name, and this prescription was added. Make sure you understand how and when to take each. Your last dose was: Your next dose is: Take 1 Tab by mouth daily. Indications: hypertension 25 mg  
    
   
   
   
  
 * lisinopril 20 mg tablet Commonly known as:  [employer] What changed:  Another medication with the same name was added. Make sure you understand how and when to take each. Your last dose was: Your next dose is: Take 1 Tab by mouth daily for 30 doses. 20 mg  
    
   
   
   
  
 * lisinopril 20 mg tablet Commonly known as:  Ann Chavez Start taking on:  8/22/2018 What changed: You were already taking a medication with the same name, and this prescription was added. Make sure you understand how and when to take each. Your last dose was: Your next dose is: Take 1 Tab by mouth daily. Indications: hypertension 20 mg  
    
   
   
   
  
 * WELLBUTRIN 100 mg tablet Generic drug:  buPROPion What changed:  Another medication with the same name was added. Make sure you understand how and when to take each. Your last dose was: Your next dose is: Take  by mouth. * buPROPion 100 mg tablet Commonly known as:  [facility] What changed: You were already taking a medication with the same name, and this prescription was added. Make sure you understand how and when to take each. Your last dose was: Your next dose is: Take 1 Tab by mouth two (2) times a day. Indications: major depressive disorder 100 mg * Notice: This list has 12 medication(s) that are the same as other medications prescribed for you. Read the directions carefully, and ask your doctor or other care provider to review them with you. STOP taking these medications FLUoxetine 20 mg capsule Commonly known as:  PROzac OLANZapine 10 mg tablet Commonly known as:  ZyPREXA Where to Get Your Medications Information on where to get these meds will be given to you by the nurse or doctor. ! Ask your nurse or doctor about these medications  
  amLODIPine 10 mg tablet ARIPiprazole 5 mg tablet buPROPion 100 mg tablet  
 carvedilol 6.25 mg tablet  
 hydroCHLOROthiazide 25 mg tablet  
 lisinopril 20 mg tablet  
 traZODone 150 mg tablet

## 2018-08-10 NOTE — ANCILLARY DISCHARGE INSTRUCTIONS
Call made to [facility] for an update, spoke with Mana Lyles, they are waiting on a UA, ed made aware.

## 2018-08-10 NOTE — PROGRESS NOTES
Spoke with Franny Quijano at [address]  and confirmed that pt will be reviewed. Per Franny Quijano, there were several patients needing to be reviewed in their ED.            Gavino Dyer RN, BSN, [facility]  ED Outcomes Manager  Thursdays & Fridays   [phone] (phone)  [phone] (pager)

## 2018-08-10 NOTE — ED NOTES
6:00 AM :Pt care assumed from Dr. Pat Contreras, ED provider. Pt complaint(s), current treatment plan, progression and available diagnostic results have been discussed thoroughly. Patient with history of SI with plan. Rounding occurred: yes  Intended Disposition: TBD   Pending diagnostic reports and/or labs (please list): Case management follow up.      4:06 PM : Pt care transferred to Jayme Gross MD and Lavinia Sadler PA-C (ED providers). History of patient complaint(s), available diagnostic reports and current treatment plan has been discussed thoroughly. Bedside rounding on patient occured : Yes  Intended disposition of patient : Transfer  Pending diagnostics reports and/or labs (please list): Pending bed placement by case management. Scribe [address] acting as a scribe for and in the presence of Brent Curry MD      August 10, 2018 at 12:38 PM       Provider Attestation:      I personally performed the services described in the documentation, reviewed the documentation, as recorded by the scribe in my presence, and it accurately and completely records my words and actions.  August 10, 2018 at 12:38 PM - Brent Curry MD

## 2018-08-11 LAB
ATRIAL RATE: 74 BPM
CALCULATED P AXIS, ECG09: 45 DEGREES
CALCULATED R AXIS, ECG10: -27 DEGREES
CALCULATED T AXIS, ECG11: 69 DEGREES
DIAGNOSIS, 93000: NORMAL
P-R INTERVAL, ECG05: 212 MS
Q-T INTERVAL, ECG07: 406 MS
QRS DURATION, ECG06: 88 MS
QTC CALCULATION (BEZET), ECG08: 450 MS
VENTRICULAR RATE, ECG03: 74 BPM

## 2018-08-11 PROCEDURE — 74011250637 HC RX REV CODE- 250/637: Performed by: PSYCHIATRY & NEUROLOGY

## 2018-08-11 PROCEDURE — 65220000005 HC RM SEMIPRIVATE PSYCH 3 OR 4 BED

## 2018-08-11 RX ORDER — LORAZEPAM 2 MG/ML
1 INJECTION INTRAMUSCULAR
Status: DISCONTINUED | OUTPATIENT
Start: 2018-08-11 | End: 2018-08-21 | Stop reason: HOSPADM

## 2018-08-11 RX ORDER — BUPROPION HYDROCHLORIDE 100 MG/1
100 TABLET ORAL DAILY
Status: DISCONTINUED | OUTPATIENT
Start: 2018-08-11 | End: 2018-08-11

## 2018-08-11 RX ORDER — FLUOXETINE HYDROCHLORIDE 20 MG/1
20 CAPSULE ORAL DAILY
Status: DISCONTINUED | OUTPATIENT
Start: 2018-08-11 | End: 2018-08-11

## 2018-08-11 RX ORDER — LORAZEPAM 2 MG/ML
2 INJECTION INTRAMUSCULAR
Status: DISCONTINUED | OUTPATIENT
Start: 2018-08-11 | End: 2018-08-12

## 2018-08-11 RX ORDER — LORAZEPAM 1 MG/1
2 TABLET ORAL
Status: DISCONTINUED | OUTPATIENT
Start: 2018-08-11 | End: 2018-08-12

## 2018-08-11 RX ORDER — CARVEDILOL 6.25 MG/1
6.25 TABLET ORAL 2 TIMES DAILY WITH MEALS
Status: DISCONTINUED | OUTPATIENT
Start: 2018-08-11 | End: 2018-08-21 | Stop reason: HOSPADM

## 2018-08-11 RX ORDER — HYDROCHLOROTHIAZIDE 25 MG/1
25 TABLET ORAL DAILY
Status: DISCONTINUED | OUTPATIENT
Start: 2018-08-11 | End: 2018-08-21 | Stop reason: HOSPADM

## 2018-08-11 RX ORDER — BUPROPION HYDROCHLORIDE 100 MG/1
100 TABLET ORAL 2 TIMES DAILY
Status: DISCONTINUED | OUTPATIENT
Start: 2018-08-11 | End: 2018-08-21 | Stop reason: HOSPADM

## 2018-08-11 RX ORDER — ARIPIPRAZOLE 5 MG/1
5 TABLET ORAL DAILY
Status: ON HOLD | COMMUNITY
End: 2018-12-07 | Stop reason: SDUPTHER

## 2018-08-11 RX ORDER — BENZTROPINE MESYLATE 1 MG/1
2 TABLET ORAL
Status: DISCONTINUED | OUTPATIENT
Start: 2018-08-11 | End: 2018-08-21 | Stop reason: HOSPADM

## 2018-08-11 RX ORDER — LISINOPRIL 10 MG/1
20 TABLET ORAL DAILY
Status: DISCONTINUED | OUTPATIENT
Start: 2018-08-11 | End: 2018-08-21 | Stop reason: HOSPADM

## 2018-08-11 RX ORDER — AMLODIPINE BESYLATE 10 MG/1
10 TABLET ORAL DAILY
Status: DISCONTINUED | OUTPATIENT
Start: 2018-08-11 | End: 2018-08-21 | Stop reason: HOSPADM

## 2018-08-11 RX ORDER — BENZTROPINE MESYLATE 1 MG/ML
1 INJECTION INTRAMUSCULAR; INTRAVENOUS
Status: DISCONTINUED | OUTPATIENT
Start: 2018-08-11 | End: 2018-08-21 | Stop reason: HOSPADM

## 2018-08-11 RX ORDER — LORAZEPAM 1 MG/1
1 TABLET ORAL
Status: DISCONTINUED | OUTPATIENT
Start: 2018-08-11 | End: 2018-08-21 | Stop reason: HOSPADM

## 2018-08-11 RX ORDER — ARIPIPRAZOLE 5 MG/1
5 TABLET ORAL DAILY
Status: DISCONTINUED | OUTPATIENT
Start: 2018-08-11 | End: 2018-08-21 | Stop reason: HOSPADM

## 2018-08-11 RX ORDER — HALOPERIDOL 5 MG/ML
5 INJECTION INTRAMUSCULAR
Status: DISCONTINUED | OUTPATIENT
Start: 2018-08-11 | End: 2018-08-12

## 2018-08-11 RX ORDER — BENZTROPINE MESYLATE 1 MG/1
1 TABLET ORAL
Status: DISCONTINUED | OUTPATIENT
Start: 2018-08-11 | End: 2018-08-21 | Stop reason: HOSPADM

## 2018-08-11 RX ORDER — HALOPERIDOL 5 MG/1
5 TABLET ORAL
Status: DISCONTINUED | OUTPATIENT
Start: 2018-08-11 | End: 2018-08-21 | Stop reason: HOSPADM

## 2018-08-11 RX ORDER — IBUPROFEN 400 MG/1
400 TABLET ORAL
Status: DISCONTINUED | OUTPATIENT
Start: 2018-08-11 | End: 2018-08-21 | Stop reason: HOSPADM

## 2018-08-11 RX ORDER — BENZTROPINE MESYLATE 1 MG/ML
2 INJECTION INTRAMUSCULAR; INTRAVENOUS
Status: DISCONTINUED | OUTPATIENT
Start: 2018-08-11 | End: 2018-08-21 | Stop reason: HOSPADM

## 2018-08-11 RX ORDER — BUPROPION HYDROCHLORIDE 100 MG/1
TABLET ORAL
COMMUNITY
End: 2018-11-06

## 2018-08-11 RX ADMIN — ARIPIPRAZOLE 5 MG: 5 TABLET ORAL at 08:09

## 2018-08-11 RX ADMIN — CARVEDILOL 6.25 MG: 6.25 TABLET, FILM COATED ORAL at 08:09

## 2018-08-11 RX ADMIN — LISINOPRIL 20 MG: 10 TABLET ORAL at 09:00

## 2018-08-11 RX ADMIN — AMLODIPINE BESYLATE 10 MG: 10 TABLET ORAL at 08:09

## 2018-08-11 RX ADMIN — BUPROPION HYDROCHLORIDE 100 MG: 100 TABLET, FILM COATED ORAL at 08:09

## 2018-08-11 RX ADMIN — FLUOXETINE 20 MG: 20 CAPSULE ORAL at 08:09

## 2018-08-11 RX ADMIN — HYDROCHLOROTHIAZIDE 25 MG: 25 TABLET ORAL at 08:09

## 2018-08-11 RX ADMIN — CARVEDILOL 6.25 MG: 6.25 TABLET, FILM COATED ORAL at 16:53

## 2018-08-11 RX ADMIN — BUPROPION HYDROCHLORIDE 100 MG: 100 TABLET, FILM COATED ORAL at 20:20

## 2018-08-11 NOTE — H&P
[address] [facility] [address]  Inpatient Admission Note    Date of Service:  08/11/18    Historian(s): Shahnaz Diggs and chart review  Referral Source: ER    Chief Complaint   \"I was in the park, trying to pull my life together, but I could not come up with a plan. Marie Sloan Marieaurora Silva \"    History of Present Illness     Shahnaz Diggs is a 46 y.o. BLACK OR  male who presented voluntarily after he walked in the ER of [facility]. According to the consulting psychiatrist : \"The patient a 50-year-old  male the history of schizophrenia. He presented with hallucinations and suicidal thoughts. Patient stated that he hears voices telling him to jump off a bridge. When seen by psychiatry, the patient said he came to the hospital for \"a mental breakdown. \" The patient was bizarre and guarded during the conversation with long pauses before answers (thought blocking). He was unable to reveal any stressors. He was recently admitted and was noncompliant with meds once discharged. The psychiatrist recommended inpatient care. The patient agreed. \"  Pt reported he was never dx w/schizophrenia, but was hospitalized about a year ago for depression w/SI. Pt. Reported he did not continue w/outpatient treatment. Pt reported he started to struggle with depression about 5 years ago, when he lost his business, home, wife left him. 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 Pt reported he became homeless, and only had temporary employment. Psychiatric Review of Systems   Depression: has depressed mood, episodic tearfulness, anhedonia and feelings of guilt, hopelessness, helplessness and worthlessness. Energy is low. Had SI to jump off the bridge. Anxiety: Denied  panic attacks and OCD. Irritability: Denied low threshold of frustration or anger.     Bipolar symptoms: Denied history of decreased need for sleep associated with increased energy, racing thoughts, rapid speech and risky behavior. Abuse/Trauma/PTSD: Denied history of verbal, physical or sexual abuse. Denies avoidant behavior related to trauma triggers, flashbacks, hypervigilance or nightmares. Psychosis: reported mood congruent A/H    Medical Review of Systems     Sleep: fair  Appetite: decreased    10 point review of systems was completed. Significant findings are found in the HPI or MSE. Psychiatric Treatment History     Self-injurious behavior/risky thoughts or behaviors (past suicidal ideation/attempt): yes - SI, no attempts reported      Violence/Risk to others (past homicidal ideation/attempt):   Denies any prior history of violence or homicidal ideation. Previous psychiatric medication trials: past RX Abilify, Bupropin, reported some benefits, but has not been taking ot since he was discharged from previous [place] [address] admission    Previous psychiatric hospitalizations: reported one hospital;ization about a year ago    Current therapist: roxana Current psychiatric provider: roxana Allergies    No Known Allergies    Medical History     Past Medical History:   Diagnosis Date    Back pain     Hypertension     MDD (major depressive disorder), recurrent, severe, with psychosis ([facility] [address] 11/11/2017    Psychiatric disorder     hallucinations    Psychotic disorder 11/11/2017       History of neurological illness: denied  History of head injuries: denied    Medication(s)     Prior to Admission Medications   Prescriptions Last Dose Informant Patient Reported? Taking? ARIPiprazole (ABILIFY) 5 mg tablet 8/8/2018 at 0800 Transfer Papers Yes Yes   Sig: Take 5 mg by mouth daily. FLUoxetine (PROZAC) 20 mg capsule 8/8/2018 at 0800  No No   Sig: Take 1 Cap by mouth daily. Indications: ANXIETY WITH DEPRESSION   OLANZapine (ZYPREXA) 10 mg tablet Unknown at Unknown time  No No   Sig: Take 1 Tab by mouth every evening.  Indications: Schizophrenia   amLODIPine (NORVASC) 10 mg tablet 8/8/2018 at 0800  No No   Sig: Take 1 Tab by mouth daily for 30 doses. buPROPion (WELLBUTRIN) 100 mg tablet 8/8/2018 at 0800  Yes Yes   Sig: Take  by mouth. carvedilol (COREG) 6.25 mg tablet 8/8/2018 at 0800  No No   Sig: Take 1 Tab by mouth two (2) times a day for 30 days. hydroCHLOROthiazide (HYDRODIURIL) 25 mg tablet 8/8/2018 at 0800  No No   Sig: Take 1 Tab by mouth daily for 30 doses. lisinopril (PRINIVIL, ZESTRIL) 20 mg tablet 8/8/2018 at 0800  No No   Sig: Take 1 Tab by mouth daily for 30 doses. Facility-Administered Medications: None         Substance Abuse History     Tobacco: denied  Alcohol: reported past use  Marijuana: reported remote use  Cocaine: denied  Opiate: denied  Benzodiazepine: denied  Other: denied    Consequences: none    History of detox: none    History of substance abuse treatment: none    Family History     No family history on file. Psychiatric Family History  Maternal: denied  Paternal: father, sister had depression    Family history of suicide? No    Social History     Living Situation: homeless    Employment: currently unemployed    Education: some college      Relationships/Children: , has grown children    Legal: denied    Spirituality/Confucianist: n.a.     Vitals/Labs      Vitals:    08/10/18 2357 08/11/18 0830   BP: 127/89 (!) 139/96   Pulse: 73 61   Resp: 16 17   Temp: 98.3 °F (36.8 °C) 97.8 °F (36.6 °C)   Weight: 68 kg (150 lb)    Height: 5' 6\" (1.676 m)        Labs:   Results for orders placed or performed during the hospital encounter of 08/09/18   CBC WITH AUTOMATED DIFF   Result Value Ref Range    WBC 5.0 4.6 - 13.2 K/uL    RBC 4.95 4.70 - 5.50 M/uL    HGB 12.6 (L) 13.0 - 16.0 g/dL    HCT 38.3 36.0 - 48.0 %    MCV 77.4 74.0 - 97.0 FL    MCH 25.5 24.0 - 34.0 PG    MCHC 32.9 31.0 - 37.0 g/dL    RDW 16.4 (H) 11.6 - 14.5 %    PLATELET 519 519 - 708 K/uL    MPV 9.6 9.2 - 11.8 FL    NEUTROPHILS 54 40 - 73 %    LYMPHOCYTES 38 21 - 52 %    MONOCYTES 6 3 - 10 %    EOSINOPHILS 2 0 - 5 %    BASOPHILS 0 0 - 2 %    ABS. NEUTROPHILS 2.7 1.8 - 8.0 K/UL    ABS. LYMPHOCYTES 1.9 0.9 - 3.6 K/UL    ABS. MONOCYTES 0.3 0.05 - 1.2 K/UL    ABS. EOSINOPHILS 0.1 0.0 - 0.4 K/UL    ABS. BASOPHILS 0.0 0.0 - 0.1 K/UL    DF AUTOMATED     METABOLIC PANEL, COMPREHENSIVE   Result Value Ref Range    Sodium 142 136 - 145 mmol/L    Potassium 3.5 3.5 - 5.5 mmol/L    Chloride 106 100 - 108 mmol/L    CO2 29 21 - 32 mmol/L    Anion gap 7 3.0 - 18 mmol/L    Glucose 92 74 - 99 mg/dL    BUN 17 7.0 - 18 MG/DL    Creatinine 1.05 0.6 - 1.3 MG/DL    BUN/Creatinine ratio 16 12 - 20      GFR est AA >60 >60 ml/min/1.73m2    GFR est non-AA >60 >60 ml/min/1.73m2    Calcium 9.0 8.5 - 10.1 MG/DL    Bilirubin, total 0.6 0.2 - 1.0 MG/DL    ALT (SGPT) 40 16 - 61 U/L    AST (SGOT) 12 (L) 15 - 37 U/L    Alk. phosphatase 85 45 - 117 U/L    Protein, total 7.4 6.4 - 8.2 g/dL    Albumin 4.1 3.4 - 5.0 g/dL    Globulin 3.3 2.0 - 4.0 g/dL    A-G Ratio 1.2 0.8 - 1.7     ETHYL ALCOHOL   Result Value Ref Range    ALCOHOL(ETHYL),SERUM <3 0 - 3 MG/DL   DRUG SCREEN, URINE   Result Value Ref Range    BENZODIAZEPINES NEGATIVE  NEG      BARBITURATES NEGATIVE  NEG      THC (TH-CANNABINOL) NEGATIVE  NEG      OPIATES NEGATIVE  NEG      PCP(PHENCYCLIDINE) NEGATIVE  NEG      COCAINE NEGATIVE  NEG      AMPHETAMINES NEGATIVE  NEG      METHADONE NEGATIVE  NEG      HDSCOM (NOTE)    URINALYSIS W/ RFLX MICROSCOPIC   Result Value Ref Range    Color YELLOW      Appearance CLEAR      Specific gravity 1.015 1.005 - 1.030      pH (UA) 5.5 5.0 - 8.0      Protein NEGATIVE  NEG mg/dL    Glucose NEGATIVE  NEG mg/dL    Ketone NEGATIVE  NEG mg/dL    Bilirubin NEGATIVE  NEG      Blood NEGATIVE  NEG      Urobilinogen 0.2 0.2 - 1.0 EU/dL    Nitrites NEGATIVE  NEG      Leukocyte Esterase NEGATIVE  NEG         Mental Status Examination     Appearance/Hygiene 46 y.o.  BLACK OR  male  Hygiene: wnl   Behavior/Social Relatedness withdrawn   Musculoskeletal Gait/Station: appropriate  Tone (flaccid, cogwheeling, spastic): not assessed  Psychomotor (hyperkinetic, hypokinetic): calm  Involuntary movements (tics, dyskinesias, akathisa, stereotypies): none   Speech   Low volume   Mood   depressed   Affect    flat   Thought Process With Vagueness,  perseveration   Thought Content and Perceptual Disturbances Denies delusions, ideas of reference, overvalued ideas, ruminations, obsession, compulsions, and phobias    Denies self-injurious behavior (SIB),  aggressive behavior or homicidal ideation (HI)    Reported mood congruent auditory and visual hallucinations   Sensorium and Cognition  AOx4, attention decreased, memory decreased, fund of knowledge age appropriate   Insight  marginal   Judgment partial       Suicide Risk Assessment     Admission  Date/Time: 08/11/18    [] Admission  [] Discharge     Key Factors:   Current admission precipitated by suicide attempt?   []  Yes     2    [x]  No     1     Suicide Attempt History  [] Past attempts of high lethality    2 []  Past attempts of low lethality    1 [x]  No previous attempts       0   Suicidal Ideation []  Constant suicidal thoughts      2 [x]  Intermittent or fleeting suicidal  thoughts  1 []  Denies current suicidal thoughts    0   Suicide Plan   [x]  Has plan with actual OR potential access to planned method    2 []  Has plan without access to planned method      1 []  No plan            0   Plan Lethality [x]  Highly lethal plan (Carbon monoxide, gun, hanging, jumping)    2 []  Moderate lethality of plan          1 []  Low lethality of plan (biting, head banging, superficial scratching, pillow over face)  0   Safety Plan Agreement  []  Unwilling OR unable to agree due to impaired reality testing   2   []  Patient is ambivalent and/or guarded      1 [x]  Reliably agrees        0   Current Morbid Thoughts (reunion fantasies, preoccupations with death) []  Constantly     2     [x]  Frequently    1 []  Rarely    0   Elopement Risk  []  High risk     2 []  Moderate risk    1 [x]   Low risk    0   Symptoms    [x]  Hopeless  [x]  Helpless  [x]  Anhedonia   [x]  Guilt/shame  []  Anger/rage  []  Anxiety  []  Insomnia   []  Agitation   []  Impulsivity  []  5-6 symptoms present    2 [x]  3-4 symptoms present    1  []  0-2 symptoms present    0     Total Score: 7  --------------------------------------------------------------------------------------------------------------  Subjective Appraisal of Risk:  []  Patient replies not trustworthy: several non-verbal cues. []  Patient replies questionable: trustworthy: at least 1 non-verbal cue. [x]  Patient replies appear trustworthy. Protective measures (select all that apply):  []  Successful past responses to stress  []  Spiritual/Pentecostalism beliefs  [x]  Capacity for reality testing  []  Positive therapeutic relationships  []  Social supports/connections  []  Positive coping skills  []  Frustration tolerance/optimism  []  Children or pets in the home  []  Sense of responsibility to family  [x]  Agrees to treatment plan and follow up    High Risk Diagnoses (select all that apply):  [x]  Depression/Bipolar Disorder  []  Dual Diagnosis  []  Cardiovascular Disease  []  Schizophrenia  []  Chronic Pain  []  Epilepsy  []  Cancer  []  Personality Disorder  []  HIV/AIDS  []  Multiple Sclerosis    Dangerousness Assessment (Suicide, homicide, property destruction. ..)    Risk Factors reviewed and risk assessed to be:  [] low  [] low-moderate  [] moderate   [x] moderate-high  [] high     Protection factors reviewed and risk assessed to be:  [] low  [x] low-moderate  [] moderate   [] moderate-high  [] high     Response to treatment and risk assessed to be:  [] low  [] low-moderate  [x] moderate   [] moderate-high  [] high     Support reviewed and risk assessed to be:  [x] low  [] low-moderate  [] moderate   [] moderate-high  [] high     Acceptance of Discharge and outpatient treatment reviewed and risk assessed to be:    [] low  [] low-moderate  [x] moderate   [] moderate-high  [] high   Overall risk assessed to be:  [] low  [] low-moderate  [] moderate   [x] moderate-high  [] high       Assessment and Plan     Psychiatric Diagnoses:   Patient Active Problem List   Diagnosis Code    MDD (major depressive disorder), recurrent, severe, with psychosis ([facility] [address] F33.3    Major depression F32.9    Alcohol abuse F10.10    Malingering Z76.5    Hypertension I10    Depression F32.9       Medical Diagnoses: HTN    Psychosocial and contextual factors: homeless, limited support system    Level of impairment/disability: severe Janett Cordial is a 46 y. o. who is currently requiring acute stabilization for MDD, recurrent, severe w/psychotic features    1. Admit to locked inpatient behavioral health unit. Start milieu, group, art and occupation therapy. 2. Resume Abilify and Bupropion  3. Routine labs ordered and reviewed by this provider. 4. Reviewed instructions, risks, benefits and side effects. 5. Start disposition planning; verify upcoming outpatient appointments with therapist and/or psychiatric medication prescriber; housing resources assistance  6.  Tentative date of discharge: 10-14 days       Noemí Cornelius MD  [address] [facility]

## 2018-08-11 NOTE — ED NOTES
Call placed to [facility] to request probability for crisis stabilization placement. Told my [facility] that the patient is actively suicidal he doesn't meet criteria for crisis stabilization.

## 2018-08-11 NOTE — BSMART NOTE
ART THERAPY GROUP PROGRESS NOTE    PATIENT SCHEDULED FOR GROUP AT: 2:30    ATTENDANCE: Patient attended the full group. PARTICIPATION LEVEL: Participates fully in the art process. ATTENTION LEVEL : Able to focus on task. He communicated within the group and with the therapist.     Goyo Hardin: Watercolor and relaxation. SYMBOLIC & THEMATIC CONTENT AS NOTED IN IMAGERY: The group focused on creating a watercolor to express themselves. Patient created a concrete representation of a sun, tree, and lawn with a \"dog, cat, bunny\" animal and birds. Patient did not relate this image to himself.

## 2018-08-11 NOTE — ED NOTES
Patient accepted at [facility] by Dr. Dano Eid. Awaiting transfer. Patient was given benadryl earlier to help with sleep.       Bogdan Dent PA-C  10:16 PM

## 2018-08-11 NOTE — BH NOTES
Patient admitted to [facility], report given to [address] from [facility]. Patient was cooperative but had some thought blocking in answering questions. Patient at first denied suicide ideations and then later admit to suicide ideations with plan to jump off the bridge. Reports having no auditory hallucinations for past 2 days. He is currently homeless and has been feeling depressed and hopeless. Reports he has no support system. Patient does verbally contract for safety this shift.

## 2018-08-11 NOTE — BH NOTES
Pt spent most of his shift in the day room sitting quietly. Pt mood appears fair no major behavior issues. Pt ate meals and took his medication. Pt denies SI,HI and AVH during this shift. Staff will continue to monitor pt behavior and safety.

## 2018-08-11 NOTE — ED NOTES
Pt stated pr feels too anxious with the environment and asked for something to calm him down. [place] updated.

## 2018-08-11 NOTE — BH NOTES
GROUP THERAPY PROGRESS NOTE    Haresh Vicente is participating in Distraction as a Strategy  Group    Group time:  1915    Personal goal for participation: to educate on  When I should use distraction  as a coping skill    Goal orientation: social    Group therapy participation: active    Therapeutic interventions reviewed and discussed: To educate patient that Activities are a great way for us to distract ourselves from our current emotions until we are better able to cope. When our levels of stress is too high, we may not be able to effectively handle a situation and  need ways to bring our emotional state down    Impression of participation:  Pt was given a list of distractions to use as a coping skill  to decrease stress when stress levels are high.

## 2018-08-11 NOTE — BH NOTES
Pt has been isolating to self for majority of shift. When in milieu pt observed to have limited interactions with peers and staff. Upon assessment pt has difficulty with following questions and responding appropriately. Rounds maintained Q 15 mins.  Staff will continue to offer a safe and supportive enviornment

## 2018-08-12 PROCEDURE — 65220000005 HC RM SEMIPRIVATE PSYCH 3 OR 4 BED

## 2018-08-12 PROCEDURE — 74011250637 HC RX REV CODE- 250/637: Performed by: PSYCHIATRY & NEUROLOGY

## 2018-08-12 RX ORDER — LORAZEPAM 2 MG/ML
2 INJECTION INTRAMUSCULAR
Status: DISCONTINUED | OUTPATIENT
Start: 2018-08-12 | End: 2018-08-21 | Stop reason: HOSPADM

## 2018-08-12 RX ORDER — HALOPERIDOL 5 MG/ML
5 INJECTION INTRAMUSCULAR
Status: DISCONTINUED | OUTPATIENT
Start: 2018-08-12 | End: 2018-08-21 | Stop reason: HOSPADM

## 2018-08-12 RX ORDER — LORAZEPAM 1 MG/1
2 TABLET ORAL
Status: DISCONTINUED | OUTPATIENT
Start: 2018-08-12 | End: 2018-08-21 | Stop reason: HOSPADM

## 2018-08-12 RX ADMIN — BUPROPION HYDROCHLORIDE 100 MG: 100 TABLET, FILM COATED ORAL at 08:10

## 2018-08-12 RX ADMIN — HYDROCHLOROTHIAZIDE 25 MG: 25 TABLET ORAL at 08:10

## 2018-08-12 RX ADMIN — CARVEDILOL 6.25 MG: 6.25 TABLET, FILM COATED ORAL at 16:27

## 2018-08-12 RX ADMIN — LISINOPRIL 20 MG: 10 TABLET ORAL at 08:10

## 2018-08-12 RX ADMIN — CARVEDILOL 6.25 MG: 6.25 TABLET, FILM COATED ORAL at 08:10

## 2018-08-12 RX ADMIN — BUPROPION HYDROCHLORIDE 100 MG: 100 TABLET, FILM COATED ORAL at 20:17

## 2018-08-12 RX ADMIN — ARIPIPRAZOLE 5 MG: 5 TABLET ORAL at 08:10

## 2018-08-12 RX ADMIN — AMLODIPINE BESYLATE 10 MG: 10 TABLET ORAL at 08:10

## 2018-08-12 NOTE — BH NOTES
GROUP THERAPY PROGRESS NOTE    Walter Brooke Did not  participate in Community. Group time: 30 minutes    Personal goal for participation: Learn the rules of the unit, and also discuss goals for the day    Goal orientation: community    Group therapy participation: Refused    Therapeutic interventions reviewed and discussed: Discussed the rules of the unit, and the staff answered any questions that pts had, also pt goals for the day were discussed. Impression of participation: Despite staff encouragement the pt did not participate in group.

## 2018-08-12 NOTE — BSMART NOTE
ART THERAPY GROUP PROGRESS NOTE    PATIENT SCHEDULED FOR GROUP AT: 2:30    ATTENDANCE: Patient attended the full group. PARTICIPATION LEVEL: Participates fully in the art process and shared his artwork with the other group members. ATTENTION LEVEL: Able to focus on task. FOCUS: Self exploration collage    SYMBOLIC & THEMATIC CONTENT AS NOTED IN IMAGERY: Group focused on creating a collage to represent self. Patient shared his collage with the group members and therapist. He initiated conversation amongst the group. Patient was creative in his collage making by incorporating two images togetherthat appeared as one image.

## 2018-08-12 NOTE — BH NOTES
Pt mood was fair, he was calm and cooperative no major behavioral issues. Pt had  minium interaction with peers and staff. Pt ate all meals, and took medication. Pt luis HI,SI and AVH during this shift. Staff will continue to monitor pt behavior and safety.

## 2018-08-12 NOTE — PROGRESS NOTES
[address]  Inpatient Progress Note     Date of Service: 08/12/18  Hospital Day: 2     Subjective/Interval History   08/12/18    Treatment Team Notes:  Notes reviewed and/or discussed and report that Nan Franklin is 46 y.o. BLACK OR  male who presented voluntarily after he walked in the ER of [facility]. According to the consulting psychiatrist : \"The patient a 29-year-old  male the history of schizophrenia. He presented with hallucinations and suicidal thoughts. Patient stated that he hears voices telling him to jump off a bridge. When seen by psychiatry, the patient said he came to the hospital for \"a mental breakdown. \" The patient was bizarre and guarded during the conversation with long pauses before answers (thought blocking). He was unable to reveal any stressors. He was recently admitted and was noncompliant with meds once discharged. The psychiatrist recommended inpatient care. The patient agreed. \"  Pt reported he was never dx w/schizophrenia, but was hospitalized about a year ago for depression w/SI. Pt. Reported he did not continue w/outpatient treatment. Pt reported he started to struggle with depression about 5 years ago, when he lost his business, home, wife left him. 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 Pt reported he became homeless, and only had temporary employment. Patient interview: Nan Franklin was interviewed by this writer today. Pt reported improved sleep, fair appetite. Pt participates in milieu activities, but remains reserved. Compliant, no ADR. Objective     Visit Vitals    BP (!) 144/101 (BP 1 Location: Right arm, BP Patient Position: Sitting)    Pulse (!) 54    Temp 97.8 °F (36.6 °C)    Resp 17    Ht 5' 6\" (1.676 m)    Wt 68 kg (150 lb)    BMI 24.21 kg/m2       No results found for this or any previous visit (from the past 24 hour(s)).     Active Orders   Diet DIET REGULAR     Frequency: DIET EFFECTIVE NOW     Number of Occurrences:  Until Specified   Nursing    VITAL SIGNS PER UNIT ROUTINE     Frequency: CONTINUOUS     Number of Occurrences:  Until Specified   Precaution    SUICIDE PRECAUTIONS     Frequency: CONTINUOUS     Number of Occurrences:  Until Specified   Medications    amLODIPine (NORVASC) tablet 10 mg     Frequency: DAILY     Dose: 10 mg     Route: Oral    ARIPiprazole (ABILIFY) tablet 5 mg     Frequency: DAILY     Dose: 5 mg     Route: Oral    benztropine (COGENTIN) injection 1 mg     Frequency: Q4H PRN     Dose: 1 mg     Route: IntraMUSCular    benztropine (COGENTIN) injection 2 mg     Frequency: Q4H PRN     Dose: 2 mg     Route: IntraMUSCular    benztropine (COGENTIN) tablet 1 mg     Frequency: Q4H PRN     Dose: 1 mg     Route: Oral    benztropine (COGENTIN) tablet 2 mg     Frequency: Q4H PRN     Dose: 2 mg     Route: Oral    buPROPion (WELLBUTRIN) tablet 100 mg     Frequency: BID     Dose: 100 mg     Route: Oral    carvedilol (COREG) tablet 6.25 mg     Frequency: BID WITH MEALS     Dose: 6.25 mg     Route: Oral    haloperidol (HALDOL) tablet 5 mg     Frequency: Q4H PRN     Dose: 5 mg     Route: Oral    haloperidol lactate (HALDOL) injection 5 mg     Frequency: Q4H PRN     Dose: 5 mg     Route: IntraMUSCular    hydroCHLOROthiazide (HYDRODIURIL) tablet 25 mg     Frequency: DAILY     Dose: 25 mg     Route: Oral    ibuprofen (MOTRIN) tablet 400 mg     Frequency: Q6H PRN     Dose: 400 mg     Route: Oral    lisinopril (PRINIVIL, ZESTRIL) tablet 20 mg     Frequency: DAILY     Dose: 20 mg     Route: Oral    LORazepam (ATIVAN) injection 1 mg     Frequency: Q4H PRN     Dose: 1 mg     Route: IntraMUSCular    LORazepam (ATIVAN) injection 2 mg     Frequency: Q4H PRN     Dose: 2 mg     Route: IntraMUSCular    LORazepam (ATIVAN) tablet 1 mg     Frequency: Q4H PRN     Dose: 1 mg     Route: Oral    LORazepam (ATIVAN) tablet 2 mg     Frequency: Q4H PRN     Dose: 2 mg Route: Oral    traZODone (DESYREL) tablet 150 mg     Frequency: QHS PRN     Dose: 150 mg     Route: Oral   Ancillary Consult    BEHAVIORAL HEALTH H&P CONSULT     Frequency: ONE TIME     Number of Occurrences:  1 Occurrences       Mental Status Examination     Appearance/Hygiene 46 y.o. BLACK OR  male  Hygiene: wnl   Behavior/Social Relatedness Appropriate   Musculoskeletal Gait/Station: appropriate  Tone (flaccid, cogwheeling, spastic): not assessed  Psychomotor (hyperkinetic, hypokinetic): calm   Involuntary movements (tics, dyskinesias, akathisa, stereotypies): none   Speech   Low Rate, rhythm, volume, fluency;  articulation - appropriate   Mood   Depressed   Affect    Flat   Thought Process Linear and goal directed   Thought Content and Perceptual Disturbances Denies self-injurious behavior (SIB), suicidal ideation (SI), aggressive behavior or homicidal ideation (HI)    Denies auditory and visual hallucinations   Sensorium and Cognition  Grossly intact   Insight  adequate   Judgment adequate        Assessment/Plan      Psychiatric Diagnoses:   MDD F33.3/severe w/psychotic teatures    Patient Active Problem List   Diagnosis Code    MDD (major depressive disorder), recurrent, severe, with psychosis ([facility] [address] F33.3    Major depression F32.9    Alcohol abuse F10.10    Malingering Z76.5    Hypertension I10    Depression F32.9       Medical Diagnoses: HTN    Psychosocial and contextual factors: homeless, unemployed, limited support system    Level of impairment/disability: severe Neoma Moros is a 46 y. o. who is currently hospitalized and progressing towards [address] goals    1. Continue current RX regiment  2. Reviewed instructions, risks, benefits and side effects of medications  3.   [facility]

## 2018-08-13 PROCEDURE — 65220000003 HC RM SEMIPRIVATE PSYCH

## 2018-08-13 PROCEDURE — 74011250637 HC RX REV CODE- 250/637: Performed by: PSYCHIATRY & NEUROLOGY

## 2018-08-13 RX ADMIN — ARIPIPRAZOLE 5 MG: 5 TABLET ORAL at 08:26

## 2018-08-13 RX ADMIN — BUPROPION HYDROCHLORIDE 100 MG: 100 TABLET, FILM COATED ORAL at 08:26

## 2018-08-13 RX ADMIN — HYDROCHLOROTHIAZIDE 25 MG: 25 TABLET ORAL at 08:26

## 2018-08-13 RX ADMIN — LISINOPRIL 20 MG: 10 TABLET ORAL at 08:26

## 2018-08-13 RX ADMIN — BUPROPION HYDROCHLORIDE 100 MG: 100 TABLET, FILM COATED ORAL at 20:14

## 2018-08-13 RX ADMIN — AMLODIPINE BESYLATE 10 MG: 10 TABLET ORAL at 08:29

## 2018-08-13 RX ADMIN — CARVEDILOL 6.25 MG: 6.25 TABLET, FILM COATED ORAL at 08:26

## 2018-08-13 RX ADMIN — CARVEDILOL 6.25 MG: 6.25 TABLET, FILM COATED ORAL at 16:24

## 2018-08-13 NOTE — PROGRESS NOTES
Problem: Falls - Risk of  Goal: *Absence of Falls  Patient will remain fall free during hospital stay  Document Roxann Fall Risk and appropriate interventions in the flowsheet. Outcome: Progressing Towards Goal  Fall Risk Interventions:absent daily of falls while in hospital            Medication Interventions: Teach patient to arise slowly                  Problem: Suicide/Homicide (Adult/Pediatric)  Goal: *STG: Remains safe in hospital  Pt will verbally contract for safety daily while hospitalized. Outcome: Progressing Towards Goal  Remains safe daily while in hospital.  Goal: *STG: Attends activities and groups  Patient will attend at least 2 groups daily. Outcome: Progressing Towards Goal  Attends activities and groups daily while in hospital.    Comments: Denies SI HI AVH. Offers no complaints. Quiet and keeps to self. Eats and tolerates evening meal. Gripper socks and 15 minute checks in place for safety. Takes medicines without incident. Interacts with staff and peers but, minimally. Will continue to monitor and support.

## 2018-08-13 NOTE — BH NOTES
MHT note: Pt mood was calm and cooperative no major behavioral issues. Pt had  minium interactions with peers and staff. Pt ate all meals, and took medication. Pt shruti HI,SI and AVH during this shift. Staff will continue to monitor pt behavior, safety and location.

## 2018-08-13 NOTE — BH NOTES
GROUP THERAPY PROGRESS NOTE    Félix Leal is participating in [place].      Group time: 30 minutes    Personal goal for participation: communication    Goal orientation: community    Group therapy participation: minimal    Therapeutic interventions reviewed and discussed: community guidelines    Impression of participation: quiet

## 2018-08-13 NOTE — BSMART NOTE
ART THERAPY GROUP PROGRESS NOTE    Group time:945    The patient did not awaken/get up when called for group.

## 2018-08-13 NOTE — BH NOTES
GROUP THERAPY PROGRESS NOTE    Nicanor Neely is participating in Recreational Therapy. Group time: 45 minutes    Personal goal for participation: Exercise    Goal orientation: social    Group therapy participation: active    Therapeutic interventions reviewed and discussed: Patient was encouraged by staff, he played foosball.     Impression of participation: Happy

## 2018-08-13 NOTE — BSMART NOTE
SW assessment/Intervention:  Chart reviewed and discussed it reports Walter Brooke was interviewed by this writer today. Pt states he is feeling optimistic and hopeful. He denies SI, HI, hallucinations or paranoia. Pt reported improved sleep, fair appetite and low energy. Pt participates in milieu activities, but remains reserved. Compliant, no ADR. SW made contact with patient to address disposition. Patient reports as poor historian. Patient displays thought blocking and is hesitant to answer questions. Patient is attentive and polite and expressed treatment goals. Patient reports he is currently homeless in [place]. SW will assist with resources and information on the [employer]. SW encouraged patient to continue to address goals in group as well as engage in positive peer interaction. SW will continue to monitor patient during hospitalization.     MAURISIO Bell    [phone]

## 2018-08-13 NOTE — PROGRESS NOTES
[address]  Inpatient Progress Note     Date of Service: 08/13/18  Hospital Day: 3     Subjective/Interval History   08/13/18    Treatment Team Notes:  Notes reviewed and/or discussed and report that Adela Can is 46 y.o. BLACK OR  male who presented voluntarily after he walked in the ER of [facility]. According to the consulting psychiatrist : \"The patient a 57-year-old  male the history of schizophrenia. He presented with hallucinations and suicidal thoughts. Patient stated that he hears voices telling him to jump off a bridge. When seen by psychiatry, the patient said he came to the hospital for \"a mental breakdown. \" The patient was bizarre and guarded during the conversation with long pauses before answers (thought blocking). He was unable to reveal any stressors. He was recently admitted and was noncompliant with meds once discharged. The psychiatrist recommended inpatient care. The patient agreed. \"  Pt reported he was never dx w/schizophrenia, but was hospitalized about a year ago for depression w/SI. Pt. Reported he did not continue w/outpatient treatment. Pt reported he started to struggle with depression about 5 years ago, when he lost his business, home, wife left him. Pt reported he had [employer] business, and lived in a [address] [place] [address] in [place]. Pt reported he became homeless, and only had temporary employment. Patient interview: Adela Can was interviewed by this writer today. Pt states he is feeling optimistic and hopeful. He denies SI, HI, hallucinations or paranoia. Pt reported improved sleep, fair appetite and low energy. Pt participates in milieu activities, but remains reserved. Compliant, no ADR.       Objective     Visit Vitals    /87    Pulse (!) 54    Temp 97.2 °F (36.2 °C)    Resp 18    Ht 5' 6\" (1.676 m)    Wt 68 kg (150 lb)    BMI 24.21 kg/m2       No results found for this or any previous visit (from the past 24 hour(s)).     Active Orders   Diet    DIET REGULAR     Frequency: DIET EFFECTIVE NOW     Number of Occurrences:  Until Specified   Nursing    VITAL SIGNS PER UNIT ROUTINE     Frequency: CONTINUOUS     Number of Occurrences:  Until Specified   Precaution    SUICIDE PRECAUTIONS     Frequency: CONTINUOUS     Number of Occurrences:  Until Specified   Medications    amLODIPine (NORVASC) tablet 10 mg     Frequency: DAILY     Dose: 10 mg     Route: Oral    ARIPiprazole (ABILIFY) tablet 5 mg     Frequency: DAILY     Dose: 5 mg     Route: Oral    benztropine (COGENTIN) injection 1 mg     Frequency: Q4H PRN     Dose: 1 mg     Route: IntraMUSCular    benztropine (COGENTIN) injection 2 mg     Frequency: Q4H PRN     Dose: 2 mg     Route: IntraMUSCular    benztropine (COGENTIN) tablet 1 mg     Frequency: Q4H PRN     Dose: 1 mg     Route: Oral    benztropine (COGENTIN) tablet 2 mg     Frequency: Q4H PRN     Dose: 2 mg     Route: Oral    buPROPion (WELLBUTRIN) tablet 100 mg     Frequency: BID     Dose: 100 mg     Route: Oral    carvedilol (COREG) tablet 6.25 mg     Frequency: BID WITH MEALS     Dose: 6.25 mg     Route: Oral    haloperidol (HALDOL) tablet 5 mg     Frequency: Q4H PRN     Dose: 5 mg     Route: Oral    haloperidol lactate (HALDOL) injection 5 mg     Frequency: Q4H PRN     Dose: 5 mg     Route: IntraMUSCular    hydroCHLOROthiazide (HYDRODIURIL) tablet 25 mg     Frequency: DAILY     Dose: 25 mg     Route: Oral    ibuprofen (MOTRIN) tablet 400 mg     Frequency: Q6H PRN     Dose: 400 mg     Route: Oral    lisinopril (PRINIVIL, ZESTRIL) tablet 20 mg     Frequency: DAILY     Dose: 20 mg     Route: Oral    LORazepam (ATIVAN) injection 1 mg     Frequency: Q4H PRN     Dose: 1 mg     Route: IntraMUSCular    LORazepam (ATIVAN) injection 2 mg     Frequency: Q4H PRN     Dose: 2 mg     Route: IntraMUSCular    LORazepam (ATIVAN) tablet 1 mg     Frequency: Q4H PRN     Dose: 1 mg     Route: Oral    LORazepam (ATIVAN) tablet 2 mg Frequency: Q4H PRN     Dose: 2 mg     Route: Oral    traZODone (DESYREL) tablet 150 mg     Frequency: QHS PRN     Dose: 150 mg     Route: Oral   Ancillary Consult    BEHAVIORAL HEALTH H&P CONSULT     Frequency: ONE TIME     Number of Occurrences:  1 Occurrences       Mental Status Examination     Appearance/Hygiene 46 y.o. BLACK OR  male  Hygiene: wnl   Behavior/Social Relatedness Appropriate   Musculoskeletal Gait/Station: appropriate  Tone (flaccid, cogwheeling, spastic): not assessed  Psychomotor (hyperkinetic, hypokinetic): calm   Involuntary movements (tics, dyskinesias, akathisa, stereotypies): none   Speech   Low Rate, rhythm, volume, fluency;  articulation - appropriate   Mood   Depressed   Affect    Showing a range of affect   Thought Process Linear and goal directed   Thought Content and Perceptual Disturbances Denies self-injurious behavior (SIB), suicidal ideation (SI), aggressive behavior or homicidal ideation (HI)    Denies auditory and visual hallucinations   Sensorium and Cognition  Grossly intact   Insight  adequate   Judgment adequate        Assessment/Plan      Psychiatric Diagnoses:   MDD F33.3/severe w/psychotic teatures    Patient Active Problem List   Diagnosis Code    MDD (major depressive disorder), recurrent, severe, with psychosis ([facility] [address] F33.3    Major depression F32.9    Alcohol abuse F10.10    Malingering Z76.5    Hypertension I10    Depression F32.9       Medical Diagnoses: HTN    Psychosocial and contextual factors: homeless, unemployed, limited support system    Level of impairment/disability: severe     Jez is a 46 y. o. who is currently hospitalized and progressing towards [address] goals    1. Continue current RX regiment  2. Reviewed instructions, risks, benefits and side effects of medications  3.   Disposition/Discharge Date: TBD    Mauricio Angulo MD  [facility]  Psychiatry

## 2018-08-13 NOTE — PROGRESS NOTES
Problem: Falls - Risk of  Goal: *Absence of Falls  Patient will remain fall free during hospital stay  Document Roxann Fall Risk and appropriate interventions in the flowsheet. Outcome: Progressing Towards Goal  Fall Risk Interventions:    Medication Interventions: Assess postural VS orthostatic hypotension, Evaluate medications/consider consulting pharmacy, Teach patient to arise slowly    Remains free of falls     Problem: Altered Thought Process (Adult/Pediatric)  Goal: *STG: Complies with medication therapy  Will take scheduled medications daily as ordered during her hospital stay under direct supervision. Outcome: Progressing Towards Goal  Takes medication as prescribed. Comments: Patient has been observed in day area relaxed and watching tv. Patient was minimally interactive with peers (quiet.)  No distress noted. Denied SI/HI or AVH at current. Patient took evening meds without difficulty and stated that \"they help keep me stabilized. \"  Encouraged patient to continue to take medication even though he feels stabilized.

## 2018-08-13 NOTE — BH NOTES
GROUP THERAPY PROGRESS NOTE    Haresh Vicente did not  participate  in Leisure-Creative Group. Group time: 30 minutes    Personal goal for participation: Social skills    Goal orientation: relaxation    Group therapy participation: passive    Therapeutic interventions reviewed and discussed: Pt were in the activity room playing games as a team. During activity as a team will help the pt to with their social skills. Impression of participation: Staff tried to encourage pt refuse.

## 2018-08-13 NOTE — BH NOTES
Jessica Noel is not participating in  [address] time:  2133    Personal goal for participation:   Educate Pt that doing things to take care of your mind, body and soul by engaging in activities that promote well-being and reduce stress. Goal orientation: non- social    Group therapy participation: non- active    Therapeutic interventions reviewed and discussed:  Ideally, we engage in regular self-care in which we do things that make us feel taken care of mentally, physically, and emotionally. But this dont always happen, and we may need to stop and take the time to remind ourselves we are important. Impression of participation: Pt  chose to  Participate in off the unit recreation.

## 2018-08-13 NOTE — BSMART NOTE
OCCUPATIONAL THERAPY PROGRESS NOTE  Group Time:  5084  Attendance: The patient attended full group. Participation:. The patient participated with minimal elaboration in the activity. Attention:  The patient was able to focus on the activity. Interaction:  The patient acknowledges others or responds to questions,  with no spontaneous interaction. Participated as asked. Able to ID some superficial changes to work on.

## 2018-08-14 PROCEDURE — 65220000003 HC RM SEMIPRIVATE PSYCH

## 2018-08-14 PROCEDURE — 74011250637 HC RX REV CODE- 250/637: Performed by: PSYCHIATRY & NEUROLOGY

## 2018-08-14 RX ADMIN — BUPROPION HYDROCHLORIDE 100 MG: 100 TABLET, FILM COATED ORAL at 20:16

## 2018-08-14 RX ADMIN — CARVEDILOL 6.25 MG: 6.25 TABLET, FILM COATED ORAL at 08:40

## 2018-08-14 RX ADMIN — CARVEDILOL 6.25 MG: 6.25 TABLET, FILM COATED ORAL at 16:18

## 2018-08-14 RX ADMIN — LISINOPRIL 20 MG: 10 TABLET ORAL at 08:41

## 2018-08-14 RX ADMIN — BUPROPION HYDROCHLORIDE 100 MG: 100 TABLET, FILM COATED ORAL at 08:40

## 2018-08-14 RX ADMIN — AMLODIPINE BESYLATE 10 MG: 10 TABLET ORAL at 08:40

## 2018-08-14 RX ADMIN — ARIPIPRAZOLE 5 MG: 5 TABLET ORAL at 08:40

## 2018-08-14 RX ADMIN — HYDROCHLOROTHIAZIDE 25 MG: 25 TABLET ORAL at 08:41

## 2018-08-14 NOTE — BSMART NOTE
SOCIAL WORK GROUP THERAPY PROGRESS NOTE    Group Time:  11am    Group Topic:  Coping Skills    Group Participation:     Pt was unavailable for half of group due to working on personal hygiene & wanting to see Dr. He was presented with outline on how to keep anger journal.

## 2018-08-14 NOTE — BSMART NOTE
SW assessment/Intervention:  SW made contact with patient as he engaged with others within the milieu. Patient is attentive, less thought blocking. Patient is focused on discharge process and reports having minimal resources. SW will assist patient with discharge process.   SW will continue to monitor patient during hospitalization consulting with treating psychiatrist.    MAURISIO Coffey

## 2018-08-14 NOTE — BH NOTES
GROUP THERAPY PROGRESS NOTE    Ailyn Bashir is participating in [place].      Group time: 30 minutes    Personal goal for participation: have a better day today    Goal orientation: community    Group therapy participation: active    Therapeutic interventions reviewed and discussed: unit rules and personal goals

## 2018-08-14 NOTE — BSMART NOTE
OCCUPATIONAL THERAPY PROGRESS NOTE  Group Time:  5151  Attendance: The patient attended full group. Participation:  The patient participated with minimal elaboration in the activity. Attention:  The patient was able to focus on the activity. Interaction:  The patient acknowledges others or responds to questions,  with no spontaneous interaction. Activity answers somewhat vague at times, possibly disorganized. Did attempt to comply with activity and answered all questions.

## 2018-08-14 NOTE — BH NOTES
Astrid Godinez is participating in hygiene. Group time: 30 minutes    Personal goal for participation: feeling clean    Goal orientation: personal    Group therapy participation: active    Therapeutic interventions reviewed and discussed: Staff encouraged Pt.  To practice good hygiene    Impression of participation: Pt. Agreed that good hygiene makes you feel good

## 2018-08-14 NOTE — PROGRESS NOTES
[address]  Inpatient Progress Note     Date of Service: 08/14/18  Hospital Day: 4     Subjective/Interval History   08/14/18    Treatment Team Notes:  Notes reviewed and/or discussed and report that Rosalie Heredia is 46 y.o. BLACK OR  male who presented voluntarily complaining of SI with plan to jump off a bridge and auditory hallucinations. No acute events overnight per nursing staff. Pt has been attending groups on the milieu and compliant with treatment plan. Patient interview: Rosalie Heredia was interviewed by this writer today. Pt reports improvement in mood and thought process. States he can think more clearly and he is worrying less about his situation. He denies SI, HI, hallucinations or paranoia. Pt reported improved sleep, fair appetite and good energy level. Pt states his goal is \"to get to tomorrow\". Objective     Visit Vitals    /73 (BP 1 Location: Left arm, BP Patient Position: At rest)    Pulse 67    Temp 97.1 °F (36.2 °C)    Resp 16    Ht 5' 6\" (1.676 m)    Wt 68 kg (150 lb)    BMI 24.21 kg/m2       No results found for this or any previous visit (from the past 24 hour(s)).     Active Orders   Diet    DIET REGULAR     Frequency: DIET EFFECTIVE NOW     Number of Occurrences:  Until Specified   Nursing    VITAL SIGNS PER UNIT ROUTINE     Frequency: CONTINUOUS     Number of Occurrences:  Until Specified   Precaution    SUICIDE PRECAUTIONS     Frequency: CONTINUOUS     Number of Occurrences:  Until Specified   Medications    amLODIPine (NORVASC) tablet 10 mg     Frequency: DAILY     Dose: 10 mg     Route: Oral    ARIPiprazole (ABILIFY) tablet 5 mg     Frequency: DAILY     Dose: 5 mg     Route: Oral    benztropine (COGENTIN) injection 1 mg     Frequency: Q4H PRN     Dose: 1 mg     Route: IntraMUSCular    benztropine (COGENTIN) injection 2 mg     Frequency: Q4H PRN     Dose: 2 mg     Route: IntraMUSCular    benztropine (COGENTIN) tablet 1 mg Frequency: Q4H PRN     Dose: 1 mg     Route: Oral    benztropine (COGENTIN) tablet 2 mg     Frequency: Q4H PRN     Dose: 2 mg     Route: Oral    buPROPion (WELLBUTRIN) tablet 100 mg     Frequency: BID     Dose: 100 mg     Route: Oral    carvedilol (COREG) tablet 6.25 mg     Frequency: BID WITH MEALS     Dose: 6.25 mg     Route: Oral    haloperidol (HALDOL) tablet 5 mg     Frequency: Q4H PRN     Dose: 5 mg     Route: Oral    haloperidol lactate (HALDOL) injection 5 mg     Frequency: Q4H PRN     Dose: 5 mg     Route: IntraMUSCular    hydroCHLOROthiazide (HYDRODIURIL) tablet 25 mg     Frequency: DAILY     Dose: 25 mg     Route: Oral    ibuprofen (MOTRIN) tablet 400 mg     Frequency: Q6H PRN     Dose: 400 mg     Route: Oral    lisinopril (PRINIVIL, ZESTRIL) tablet 20 mg     Frequency: DAILY     Dose: 20 mg     Route: Oral    LORazepam (ATIVAN) injection 1 mg     Frequency: Q4H PRN     Dose: 1 mg     Route: IntraMUSCular    LORazepam (ATIVAN) injection 2 mg     Frequency: Q4H PRN     Dose: 2 mg     Route: IntraMUSCular    LORazepam (ATIVAN) tablet 1 mg     Frequency: Q4H PRN     Dose: 1 mg     Route: Oral    LORazepam (ATIVAN) tablet 2 mg     Frequency: Q4H PRN     Dose: 2 mg     Route: Oral    traZODone (DESYREL) tablet 150 mg     Frequency: QHS PRN     Dose: 150 mg     Route: Oral   Ancillary Consult    BEHAVIORAL HEALTH H&P CONSULT     Frequency: ONE TIME     Number of Occurrences:  1 Occurrences       Mental Status Examination     Appearance/Hygiene 46 y.o.  BLACK OR  male  Hygiene: wnl   Behavior/Social Relatedness Appropriate   Musculoskeletal Gait/Station: appropriate  Tone (flaccid, cogwheeling, spastic): not assessed  Psychomotor (hyperkinetic, hypokinetic): calm   Involuntary movements (tics, dyskinesias, akathisa, stereotypies): none   Speech   Low Rate, rhythm, volume, fluency;  articulation - appropriate   Mood   Euthymic   Affect    Showing a range of affect, congruent   Thought Process Linear and goal directed   Thought Content and Perceptual Disturbances Denies self-injurious behavior (SIB), suicidal ideation (SI), aggressive behavior or homicidal ideation (HI)    Denies auditory and visual hallucinations   Sensorium and Cognition  Grossly intact   Insight  adequate   Judgment adequate        Assessment/Plan      Psychiatric Diagnoses:   MDD F33.3/severe w/psychotic teatures    Patient Active Problem List   Diagnosis Code    MDD (major depressive disorder), recurrent, severe, with psychosis ([facility] [place] [address] F33.3    Major depression F32.9    Alcohol abuse F10.10    Malingering Z76.5    Hypertension I10    Depression F32.9       Medical Diagnoses: HTN    Psychosocial and contextual factors: homeless, unemployed, limited support system    Level of impairment/disability: severe    Uziel Lay is a 46 y. o. who is currently hospitalized and progressing towards [address] goals and doing better. 1.  Continue current RX regiment of Abilify 5mg daily for psychosis and mood; Wellbutrin 100mg bid for depression. 2.  Reviewed instructions, risks, benefits and side effects of medications  3. Disposition/Discharge Date: Possible Thursday or Friday discharge to homeless shelter.     Lorena Jefferson MD DR. [facility]  Psychiatry

## 2018-08-14 NOTE — PROGRESS NOTES
Problem: Suicide/Homicide (Adult/Pediatric)  Goal: *STG: Remains safe in hospital  Pt will verbally contract for safety daily while hospitalized. Outcome: Progressing Towards Goal  Patient remains safe. Goal: *STG: Attends activities and groups  Patient will attend at least 2 groups daily. Outcome: Progressing Towards Goal  Attending groups. Goal: *STG/LTG:  No longer expresses self destructive or suicidal/homicidal thoughts  Patient will no longer express suicidal/homicidal ideation prior to discharge with staff. Outcome: Progressing Towards Goal  Patient denies suicidal thoughts. Comments: Patient is pleasant, cooperative, he is attending groups. More social. Denies suicidal thoughts and a&v hallucinations.

## 2018-08-15 PROCEDURE — 74011250637 HC RX REV CODE- 250/637: Performed by: PSYCHIATRY & NEUROLOGY

## 2018-08-15 PROCEDURE — 65220000005 HC RM SEMIPRIVATE PSYCH 3 OR 4 BED

## 2018-08-15 RX ADMIN — CARVEDILOL 6.25 MG: 6.25 TABLET, FILM COATED ORAL at 09:09

## 2018-08-15 RX ADMIN — LISINOPRIL 20 MG: 10 TABLET ORAL at 09:09

## 2018-08-15 RX ADMIN — BUPROPION HYDROCHLORIDE 100 MG: 100 TABLET, FILM COATED ORAL at 20:31

## 2018-08-15 RX ADMIN — BUPROPION HYDROCHLORIDE 100 MG: 100 TABLET, FILM COATED ORAL at 09:09

## 2018-08-15 RX ADMIN — ARIPIPRAZOLE 5 MG: 5 TABLET ORAL at 09:00

## 2018-08-15 RX ADMIN — HYDROCHLOROTHIAZIDE 25 MG: 25 TABLET ORAL at 09:00

## 2018-08-15 RX ADMIN — CARVEDILOL 6.25 MG: 6.25 TABLET, FILM COATED ORAL at 16:54

## 2018-08-15 RX ADMIN — AMLODIPINE BESYLATE 10 MG: 10 TABLET ORAL at 09:08

## 2018-08-15 RX ADMIN — TRAZODONE HYDROCHLORIDE 150 MG: 100 TABLET ORAL at 20:30

## 2018-08-15 NOTE — BH NOTES
GROUP THERAPY PROGRESS NOTE    Latisha García is participating in [address] [facility]    Group time:  5101    Personal goal for participation: To have written down your support network    Goal orientation: social    Group therapy participation: active    Therapeutic interventions reviewed and discussed: The people who can support you on your recovery journey and decisions are called your support network. Impression of participation:  Pt wrote down support network and phone numbers on a worksheet to have on hand when  a crisis occur.

## 2018-08-15 NOTE — BSMART NOTE
ART THERAPY GROUP PROGRESS NOTE    PATIENT SCHEDULED FOR GROUP AT: 10:45    ATTENDANCE: Full    PARTICIPATION LEVEL: Participates fully in the art process    ATTENTION LEVEL : Able to focus on task    FOCUS: [address]: He was calm, compliant, and presented with a brighter mood than noted when spoken to yesterday. He was invested in the task at hand and kept to himself. His approach to task was planned-out.

## 2018-08-15 NOTE — BH NOTES
Patient was in good spirits today, he attended group therapy, he was calm pleasant and co-operative, he ate all meals , and took his medication staff will continue, to monitor patient for health and safety.

## 2018-08-15 NOTE — BSMART NOTE
OCCUPATIONAL THERAPY PROGRESS NOTE  Group Time:  1931  Attendance: The patient attended full group. Participation:  The patient participated with moderate elaboration in the activity. Attention:  The patient was able to focus on the activity. Interaction:  The patient acknowledges others or responds to questions,  with no spontaneous interaction. Participated as asked. More elaboration and slightly brighter affect than yesterday. Insight limited.

## 2018-08-15 NOTE — BH NOTES
During connect care downtime this patient rounds sheet/sheets are/is in patient chart.   Downtime was from 0100 to 0500

## 2018-08-15 NOTE — BSMART NOTE
SW assessment/Intervention:  SW made contact with patient who engaged with peers within the milieu. Patient was attentive and focused on discharge plan. SW will assist patient with resources and will link with services. SW will continue to monitor patient during hospitalization.     MAURISIO Multani, LCSW-E     [phone]

## 2018-08-15 NOTE — PROGRESS NOTES
Problem: Suicide/Homicide (Adult/Pediatric)  Goal: *STG: Remains safe in hospital  Pt will verbally contract for safety daily while hospitalized. Outcome: Progressing Towards Goal  Remains safe. Goal: *STG/LTG: Complies with medication therapy  Will take scheduled medications daily as ordered during her hospital stay under direct supervision. Outcome: Progressing Towards Goal  Medication complaint. Goal: *STG/LTG:  No longer expresses self destructive or suicidal/homicidal thoughts  Patient will no longer express suicidal/homicidal ideation prior to discharge with staff. Outcome: Progressing Towards Goal  Denies suicidal thoughts. Comments: Patient has been more social, attending  groups. Medication compliant. Hygiene has improved. Denies suicidal thoughts.

## 2018-08-15 NOTE — PROGRESS NOTES
[address]  Inpatient Progress Note     Date of Service: 08/15/18  Hospital Day: 5     Subjective/Interval History   08/15/18    Treatment Team Notes:  Notes reviewed and/or discussed and report that Edie Mendoza is 46 y.o. BLACK OR  male who presented voluntarily complaining of SI with plan to jump off a bridge and auditory hallucinations. No acute events overnight per nursing staff. Pt has been attending groups on the milieu and compliant with treatment plan. Patient interview: Edie Mendoza was interviewed by this writer today. Pt reports he is \"doin well, no complaints. \" He denies SI, HI, hallucinations or paranoia. Mood is euthymic. Pt reported improved sleep, fair appetite and good energy level. Pt states his goal is to follow up on his Disability application when discharged. Objective     Visit Vitals    /84    Pulse 72    Temp 98 °F (36.7 °C)    Resp 16    Ht 5' 6\" (1.676 m)    Wt 68 kg (150 lb)    BMI 24.21 kg/m2       No results found for this or any previous visit (from the past 24 hour(s)).     Active Orders   Diet    DIET REGULAR     Frequency: DIET EFFECTIVE NOW     Number of Occurrences:  Until Specified   Nursing    VITAL SIGNS PER UNIT ROUTINE     Frequency: CONTINUOUS     Number of Occurrences:  Until Specified   Precaution    SUICIDE PRECAUTIONS     Frequency: CONTINUOUS     Number of Occurrences:  Until Specified   Medications    amLODIPine (NORVASC) tablet 10 mg     Frequency: DAILY     Dose: 10 mg     Route: Oral    ARIPiprazole (ABILIFY) tablet 5 mg     Frequency: DAILY     Dose: 5 mg     Route: Oral    benztropine (COGENTIN) injection 1 mg     Frequency: Q4H PRN     Dose: 1 mg     Route: IntraMUSCular    benztropine (COGENTIN) injection 2 mg     Frequency: Q4H PRN     Dose: 2 mg     Route: IntraMUSCular    benztropine (COGENTIN) tablet 1 mg     Frequency: Q4H PRN     Dose: 1 mg     Route: Oral    benztropine (COGENTIN) tablet 2 mg     Frequency: Q4H PRN     Dose: 2 mg     Route: Oral    buPROPion (WELLBUTRIN) tablet 100 mg     Frequency: BID     Dose: 100 mg     Route: Oral    carvedilol (COREG) tablet 6.25 mg     Frequency: BID WITH MEALS     Dose: 6.25 mg     Route: Oral    haloperidol (HALDOL) tablet 5 mg     Frequency: Q4H PRN     Dose: 5 mg     Route: Oral    haloperidol lactate (HALDOL) injection 5 mg     Frequency: Q4H PRN     Dose: 5 mg     Route: IntraMUSCular    hydroCHLOROthiazide (HYDRODIURIL) tablet 25 mg     Frequency: DAILY     Dose: 25 mg     Route: Oral    ibuprofen (MOTRIN) tablet 400 mg     Frequency: Q6H PRN     Dose: 400 mg     Route: Oral    lisinopril (PRINIVIL, ZESTRIL) tablet 20 mg     Frequency: DAILY     Dose: 20 mg     Route: Oral    LORazepam (ATIVAN) injection 1 mg     Frequency: Q4H PRN     Dose: 1 mg     Route: IntraMUSCular    LORazepam (ATIVAN) injection 2 mg     Frequency: Q4H PRN     Dose: 2 mg     Route: IntraMUSCular    LORazepam (ATIVAN) tablet 1 mg     Frequency: Q4H PRN     Dose: 1 mg     Route: Oral    LORazepam (ATIVAN) tablet 2 mg     Frequency: Q4H PRN     Dose: 2 mg     Route: Oral    traZODone (DESYREL) tablet 150 mg     Frequency: QHS PRN     Dose: 150 mg     Route: Oral   Ancillary Consult    BEHAVIORAL HEALTH H&P CONSULT     Frequency: ONE TIME     Number of Occurrences:  1 Occurrences       Mental Status Examination     Appearance/Hygiene 46 y.o.  BLACK OR  male  Hygiene: wnl   Behavior/Social Relatedness Appropriate   Musculoskeletal Gait/Station: appropriate  Tone (flaccid, cogwheeling, spastic): not assessed  Psychomotor (hyperkinetic, hypokinetic): calm   Involuntary movements (tics, dyskinesias, akathisa, stereotypies): none   Speech   Low Rate, rhythm, volume, fluency;  articulation - appropriate   Mood   Euthymic   Affect    Showing a range of affect, congruent   Thought Process Linear and goal directed   Thought Content and Perceptual Disturbances Denies self-injurious behavior (SIB), suicidal ideation (SI), aggressive behavior or homicidal ideation (HI)    Denies auditory and visual hallucinations   Sensorium and Cognition  Grossly intact   Insight  adequate   Judgment adequate        Assessment/Plan      Psychiatric Diagnoses:   MDD F33.3/severe w/psychotic teatures    Patient Active Problem List   Diagnosis Code    MDD (major depressive disorder), recurrent, severe, with psychosis ([facility] [address] F33.3    Major depression F32.9    Alcohol abuse F10.10    Malingering Z76.5    Hypertension I10    Depression F32.9       Medical Diagnoses: HTN    Psychosocial and contextual factors: homeless, unemployed, limited support system    Level of impairment/disability: miguel a Mcmahon is a 46 y. o. who is currently hospitalized and progressing towards [place] goals and doing better. 1.  Continue current RX regiment of Abilify 5mg daily for psychosis and mood; Wellbutrin 100mg bid for depression. 2.  Reviewed instructions, risks, benefits and side effects of medications  3. Disposition/Discharge Date: Possible Thursday or Friday discharge to homeless shelter.     MD [facility]. [facility]  Psychiatry

## 2018-08-15 NOTE — BH NOTES
GROUP THERAPY PROGRESS NOTE    Dawna Agudelo is participating in [employer].      Group time: 30 minutes    Personal goal for participation: discuss guideline compliance, unit issues and community announcements; verify insight and reality orientation    Goal orientation: personal    Group therapy participation: active

## 2018-08-16 LAB — VIT B12 SERPL-MCNC: 367 PG/ML (ref 211–911)

## 2018-08-16 PROCEDURE — 86780 TREPONEMA PALLIDUM: CPT | Performed by: PSYCHIATRY & NEUROLOGY

## 2018-08-16 PROCEDURE — 36415 COLL VENOUS BLD VENIPUNCTURE: CPT | Performed by: PSYCHIATRY & NEUROLOGY

## 2018-08-16 PROCEDURE — 82607 VITAMIN B-12: CPT | Performed by: PSYCHIATRY & NEUROLOGY

## 2018-08-16 PROCEDURE — 65220000005 HC RM SEMIPRIVATE PSYCH 3 OR 4 BED

## 2018-08-16 PROCEDURE — 74011250637 HC RX REV CODE- 250/637: Performed by: PSYCHIATRY & NEUROLOGY

## 2018-08-16 RX ADMIN — HYDROCHLOROTHIAZIDE 25 MG: 25 TABLET ORAL at 08:28

## 2018-08-16 RX ADMIN — CARVEDILOL 6.25 MG: 6.25 TABLET, FILM COATED ORAL at 08:28

## 2018-08-16 RX ADMIN — LISINOPRIL 20 MG: 10 TABLET ORAL at 08:28

## 2018-08-16 RX ADMIN — BUPROPION HYDROCHLORIDE 100 MG: 100 TABLET, FILM COATED ORAL at 20:48

## 2018-08-16 RX ADMIN — TRAZODONE HYDROCHLORIDE 150 MG: 100 TABLET ORAL at 20:48

## 2018-08-16 RX ADMIN — ARIPIPRAZOLE 5 MG: 5 TABLET ORAL at 08:28

## 2018-08-16 RX ADMIN — BUPROPION HYDROCHLORIDE 100 MG: 100 TABLET, FILM COATED ORAL at 08:28

## 2018-08-16 RX ADMIN — AMLODIPINE BESYLATE 10 MG: 10 TABLET ORAL at 08:28

## 2018-08-16 RX ADMIN — CARVEDILOL 6.25 MG: 6.25 TABLET, FILM COATED ORAL at 16:59

## 2018-08-16 NOTE — PROGRESS NOTES
[address]  Inpatient Progress Note     Date of Service: 08/16/18  Hospital Day: 6     Subjective/Interval History   08/16/18    Treatment Team Notes:  Notes reviewed and/or discussed and report that Idris Sequeira is 46 y.o. BLACK OR  male who presented voluntarily complaining of SI with plan to jump off a bridge and auditory hallucinations. No acute events overnight per nursing staff. Pt has been attending groups on the milieu and compliant with treatment plan. Nurse reports pt is very confused and has short term memory loss and easily misplaces things. Patient interview: Idris Sequeira was interviewed by this writer today. Pt reports he is doing well and looking forward to discharge. He denies SI, HI, hallucinations or paranoia. Mood is euthymic. Pt reported improved sleep, fair appetite and good energy level. Pt states his goal is to follow up on his Disability application when discharged. Pt appears to have history of treatment non-compliance and inability to follow up to get resources or apply for Disability due to cognitive impairment and decline. Treatment team thinks it will be better to connect him with more outpatient resources prior to discharge. Objective     Visit Vitals    /84    Pulse 62    Temp 97.3 °F (36.3 °C)    Resp 18    Ht 5' 6\" (1.676 m)    Wt 68 kg (150 lb)    BMI 24.21 kg/m2       No results found for this or any previous visit (from the past 24 hour(s)).     Active Orders   Diet    DIET REGULAR     Frequency: DIET EFFECTIVE NOW     Number of Occurrences:  Until Specified   Nursing    VITAL SIGNS PER UNIT ROUTINE     Frequency: CONTINUOUS     Number of Occurrences:  Until Specified   Precaution    SUICIDE PRECAUTIONS     Frequency: CONTINUOUS     Number of Occurrences:  Until Specified   Medications    amLODIPine (NORVASC) tablet 10 mg     Frequency: DAILY     Dose: 10 mg     Route: Oral    ARIPiprazole (ABILIFY) tablet 5 mg Frequency: DAILY     Dose: 5 mg     Route: Oral    benztropine (COGENTIN) injection 1 mg     Frequency: Q4H PRN     Dose: 1 mg     Route: IntraMUSCular    benztropine (COGENTIN) injection 2 mg     Frequency: Q4H PRN     Dose: 2 mg     Route: IntraMUSCular    benztropine (COGENTIN) tablet 1 mg     Frequency: Q4H PRN     Dose: 1 mg     Route: Oral    benztropine (COGENTIN) tablet 2 mg     Frequency: Q4H PRN     Dose: 2 mg     Route: Oral    buPROPion (WELLBUTRIN) tablet 100 mg     Frequency: BID     Dose: 100 mg     Route: Oral    carvedilol (COREG) tablet 6.25 mg     Frequency: BID WITH MEALS     Dose: 6.25 mg     Route: Oral    haloperidol (HALDOL) tablet 5 mg     Frequency: Q4H PRN     Dose: 5 mg     Route: Oral    haloperidol lactate (HALDOL) injection 5 mg     Frequency: Q4H PRN     Dose: 5 mg     Route: IntraMUSCular    hydroCHLOROthiazide (HYDRODIURIL) tablet 25 mg     Frequency: DAILY     Dose: 25 mg     Route: Oral    ibuprofen (MOTRIN) tablet 400 mg     Frequency: Q6H PRN     Dose: 400 mg     Route: Oral    lisinopril (PRINIVIL, ZESTRIL) tablet 20 mg     Frequency: DAILY     Dose: 20 mg     Route: Oral    LORazepam (ATIVAN) injection 1 mg     Frequency: Q4H PRN     Dose: 1 mg     Route: IntraMUSCular    LORazepam (ATIVAN) injection 2 mg     Frequency: Q4H PRN     Dose: 2 mg     Route: IntraMUSCular    LORazepam (ATIVAN) tablet 1 mg     Frequency: Q4H PRN     Dose: 1 mg     Route: Oral    LORazepam (ATIVAN) tablet 2 mg     Frequency: Q4H PRN     Dose: 2 mg     Route: Oral    traZODone (DESYREL) tablet 150 mg     Frequency: QHS PRN     Dose: 150 mg     Route: Oral   Ancillary Consult    BEHAVIORAL HEALTH H&P CONSULT     Frequency: ONE TIME     Number of Occurrences:  1 Occurrences       Mental Status Examination     Appearance/Hygiene 46 y.o.  BLACK OR  male  Hygiene: wnl   Behavior/Social Relatedness Appropriate   Musculoskeletal Gait/Station: appropriate  Tone (flaccid, cogwheeling, spastic): not assessed  Psychomotor (hyperkinetic, hypokinetic): calm   Involuntary movements (tics, dyskinesias, akathisa, stereotypies): none   Speech   Low Rate, rhythm, volume, fluency;  articulation - appropriate   Mood   Euthymic   Affect    Showing a range of affect, congruent   Thought Process Linear and goal directed   Thought Content and Perceptual Disturbances Denies self-injurious behavior (SIB), suicidal ideation (SI), aggressive behavior or homicidal ideation (HI)    Denies auditory and visual hallucinations   Sensorium and Cognition  Grossly intact   Insight  adequate   Judgment adequate        Assessment/Plan      Psychiatric Diagnoses:   MDD F33.3/severe w/psychotic features    Patient Active Problem List   Diagnosis Code    MDD (major depressive disorder), recurrent, severe, with psychosis ([facility] [address] F33.3    Major depression F32.9    Alcohol abuse F10.10    Malingering Z76.5    Hypertension I10    Depression F32.9       Medical Diagnoses: HTN    Psychosocial and contextual factors: homeless, unemployed, limited support system    Level of impairment/disability: mild    Astrid Godinez is a 46 y. o. who is currently hospitalized and progressing towards [place] goals and doing better. 1.  Continue current RX regiment of Abilify 5mg daily for psychosis and mood; Wellbutrin 100mg bid for depression. 2. Dementia work up: Check RPR, B12, MoCA  3. Reviewed instructions, risks, benefits and side effects of medications  4. Disposition/Discharge Date: TBD.  Pt in need of more resources and outpatient support due to his level of disability    Genevieve Escudero MD DR. [facility]  Psychiatry

## 2018-08-16 NOTE — BSMART NOTE
ACTIVITIES THERAPY PROGRESS NOTE    Group time:1230    Initially came to group, was called out after 10 minutes.

## 2018-08-16 NOTE — BSMART NOTE
SW ENCOUNTER: The patient reported that he is feeling \"a little better today mentally. I'm trying to pull myself back together from this mental and nervous breakdown. I'm trying to be more engaging but I still have moments of confusion\". The patient stated that he needs assistance with housing; plans to return to work as soon as he is stabilized. 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 He denied current SI/HI and AVH; however, reported some depression. The patient talked about how he doesn't have any family supports and how he hasn't seen his son in year. He stated that he has a lot of things to deal with but needs to priorize his health. The SW addressed self-care and how to build strong supportive relationships.

## 2018-08-16 NOTE — BH NOTES
Pt mood was calm and pleasant, no behavioral issue. Pt ate all meals including snack. Pt took all his medication. Pt spent majority of the shift in the dayroom sitting quietly or watching television. Pt had minium interaction with peers and staff. Pt denies HI, SI and AVH during this shift. Staff will continue to monitor pt behavior and safety.

## 2018-08-16 NOTE — BH NOTES
GROUP THERAPY PROGRESS NOTE    Karen Norfletcheresequiel was encouraged by staff but refused to participate in  Goals Group and Community.

## 2018-08-16 NOTE — PROGRESS NOTES
Problem: Suicide/Homicide (Adult/Pediatric)  Goal: *STG: Remains safe in hospital  Pt will verbally contract for safety daily while hospitalized. Outcome: Progressing Towards Goal  Pt has not engaged in any self injurious behaviors  Goal: *STG: Attends activities and groups  Patient will attend at least 2 groups daily. Outcome: Progressing Towards Goal  Pt attending and participating in scheduled groups    Comments: Pt has been more visible in the milieu. Pt denies current SI. Pt still presents with confusion and disorientation. Pt compliant with meals and meds. Rounds maintained Q 15 mins.  Staff will continue to offer a safe and supportive environment

## 2018-08-16 NOTE — BH NOTES
Stephanie Ta is not participating in [address] Group    Group time: 1 hour    Personal goal for participation:  Seek information on Alcohol  abuse    Goal orientation:  Passive    Group therapy participation:  refuse    Therapeutic interventions reviewed and discussed: Staff encouraged Pt. To participate in Group    Impression of participation:  Pt.  Refuse chose to take a shower despite staff encouragement

## 2018-08-17 LAB — T PALLIDUM AB SER QL IA: NONREACTIVE

## 2018-08-17 PROCEDURE — 65220000005 HC RM SEMIPRIVATE PSYCH 3 OR 4 BED

## 2018-08-17 PROCEDURE — 74011250637 HC RX REV CODE- 250/637: Performed by: PSYCHIATRY & NEUROLOGY

## 2018-08-17 RX ADMIN — BUPROPION HYDROCHLORIDE 100 MG: 100 TABLET, FILM COATED ORAL at 08:34

## 2018-08-17 RX ADMIN — TRAZODONE HYDROCHLORIDE 150 MG: 100 TABLET ORAL at 20:17

## 2018-08-17 RX ADMIN — CARVEDILOL 6.25 MG: 6.25 TABLET, FILM COATED ORAL at 16:16

## 2018-08-17 RX ADMIN — BUPROPION HYDROCHLORIDE 100 MG: 100 TABLET, FILM COATED ORAL at 20:17

## 2018-08-17 RX ADMIN — HYDROCHLOROTHIAZIDE 25 MG: 25 TABLET ORAL at 08:34

## 2018-08-17 RX ADMIN — CARVEDILOL 6.25 MG: 6.25 TABLET, FILM COATED ORAL at 08:34

## 2018-08-17 RX ADMIN — LISINOPRIL 20 MG: 10 TABLET ORAL at 08:34

## 2018-08-17 RX ADMIN — AMLODIPINE BESYLATE 10 MG: 10 TABLET ORAL at 08:34

## 2018-08-17 RX ADMIN — ARIPIPRAZOLE 5 MG: 5 TABLET ORAL at 08:35

## 2018-08-17 NOTE — PROGRESS NOTES
Problem: Suicide/Homicide (Adult/Pediatric)  Goal: *STG: Remains safe in hospital  Pt will verbally contract for safety daily while hospitalized. Outcome: Progressing Towards Goal  Pt has not engaged in any self injurious behaviors  Goal: *STG/LTG: Complies with medication therapy  Will take scheduled medications daily as ordered during her hospital stay under direct supervision. Outcome: Progressing Towards Goal  Pt compliant with prescribed meds    Comments: Pt in milieu interacting well with peers and staff. Pt denies SI. Pt attends select groups. Rounds maintained Q 15 mins.  Staff will continue to offer a safe and supportive environment

## 2018-08-17 NOTE — BH NOTES
\"I look human again\". Staff assisted pt with shaving. Pt participated in ([facility] Relapse Group) Pt isolated resting in bed this shift. Pt. denies SI,HI and AVH today. Pt contracts for safety on the unit. Pt.denies any new medical/pain issues. Pt. did not have any visitors. Staff encouraged Pt. to communicate concerns to the Treatment Team to ensure accurate treatment is addressed. Pt agreed. Pt. remain free of falls and provided non skid socks. Staff will continue to monitor Pt. for behavior safety and location.

## 2018-08-17 NOTE — BSMART NOTE
ART THERAPY GROUP PROGRESS NOTE    PATIENT SCHEDULED FOR GROUP AT: 9:30    ATTENDANCE: Patient stated he would come to group but did not come out of his room.

## 2018-08-17 NOTE — BSMART NOTE
OCCUPATIONAL THERAPY PROGRESS NOTE  Group Time:  1056  Attendance: The patient attended full group. .  Participation:  The patient participated fully in the activity. Attention:  The patient was able to focus on the activity. Interaction:  The patient acknowledges others or responds to questions,  with no spontaneous interaction. Reponses often vague or disorganized and hard to follow as to actual realistic meaning. Did attempt to ID and practice a positive affirmation which was actually a Congregational reference about all things being possible with prayer/God. Could not refine to more personal or specific ideas.

## 2018-08-17 NOTE — PROGRESS NOTES
[address] [facility] [address]  Inpatient Progress Note     Date of Service: 08/17/18  Hospital Day: 7     Subjective/Interval History   08/17/18    Treatment Team Notes:  Notes reviewed and/or discussed and report that Naa Henderson is 46 y.o. BLACK OR  male who presented voluntarily complaining of SI with plan to jump off a bridge and auditory hallucinations. No acute events overnight per nursing staff. Pt has been attending groups on the milieu and compliant with treatment plan. Nurse reports pt is very confused and has short term memory loss and easily misplaces things. Patient interview: Naa Henderson was interviewed by this writer today. Pt reports he is doing well and looking forward to discharge. He denies SI, HI, hallucinations or paranoia. Mood is euthymic. Pt reported improved sleep, fair appetite and good energy level. Pt states his goal is to follow up on his Disability application when discharged. Pt appears to have history of treatment non-compliance and inability to follow up to get resources or apply for Disability due to cognitive impairment and decline. Treatment team thinks it will be better to connect him with more outpatient resources prior to discharge. Objective     Visit Vitals    /88    Pulse 64    Temp 95.9 °F (35.5 °C)    Resp 16    Ht 5' 6\" (1.676 m)    Wt 68 kg (150 lb)    BMI 24.21 kg/m2       Recent Results (from the past 24 hour(s))   VITAMIN B12    Collection Time: 08/16/18  7:51 PM   Result Value Ref Range    Vitamin B12 367 211 - 911 pg/mL   T PALLIDUM AB    Collection Time: 08/16/18  7:51 PM   Result Value Ref Range    T. pallidum interpretation.  NONREACTIVE NR         Active Orders   Diet    DIET REGULAR     Frequency: DIET EFFECTIVE NOW     Number of Occurrences:  Until Specified   Nursing    VITAL SIGNS PER UNIT ROUTINE     Frequency: CONTINUOUS     Number of Occurrences:  Until Specified   Precaution    SUICIDE PRECAUTIONS Frequency: CONTINUOUS     Number of Occurrences:  Until Specified   Medications    amLODIPine (NORVASC) tablet 10 mg     Frequency: DAILY     Dose: 10 mg     Route: Oral    ARIPiprazole (ABILIFY) tablet 5 mg     Frequency: DAILY     Dose: 5 mg     Route: Oral    benztropine (COGENTIN) injection 1 mg     Frequency: Q4H PRN     Dose: 1 mg     Route: IntraMUSCular    benztropine (COGENTIN) injection 2 mg     Frequency: Q4H PRN     Dose: 2 mg     Route: IntraMUSCular    benztropine (COGENTIN) tablet 1 mg     Frequency: Q4H PRN     Dose: 1 mg     Route: Oral    benztropine (COGENTIN) tablet 2 mg     Frequency: Q4H PRN     Dose: 2 mg     Route: Oral    buPROPion (WELLBUTRIN) tablet 100 mg     Frequency: BID     Dose: 100 mg     Route: Oral    carvedilol (COREG) tablet 6.25 mg     Frequency: BID WITH MEALS     Dose: 6.25 mg     Route: Oral    haloperidol (HALDOL) tablet 5 mg     Frequency: Q4H PRN     Dose: 5 mg     Route: Oral    haloperidol lactate (HALDOL) injection 5 mg     Frequency: Q4H PRN     Dose: 5 mg     Route: IntraMUSCular    hydroCHLOROthiazide (HYDRODIURIL) tablet 25 mg     Frequency: DAILY     Dose: 25 mg     Route: Oral    ibuprofen (MOTRIN) tablet 400 mg     Frequency: Q6H PRN     Dose: 400 mg     Route: Oral    lisinopril (PRINIVIL, ZESTRIL) tablet 20 mg     Frequency: DAILY     Dose: 20 mg     Route: Oral    LORazepam (ATIVAN) injection 1 mg     Frequency: Q4H PRN     Dose: 1 mg     Route: IntraMUSCular    LORazepam (ATIVAN) injection 2 mg     Frequency: Q4H PRN     Dose: 2 mg     Route: IntraMUSCular    LORazepam (ATIVAN) tablet 1 mg     Frequency: Q4H PRN     Dose: 1 mg     Route: Oral    LORazepam (ATIVAN) tablet 2 mg     Frequency: Q4H PRN     Dose: 2 mg     Route: Oral    traZODone (DESYREL) tablet 150 mg     Frequency: QHS PRN     Dose: 150 mg     Route: Oral   Ancillary Consult    BEHAVIORAL HEALTH H&P CONSULT     Frequency: ONE TIME     Number of Occurrences:  1 Occurrences       Mental Status Examination     Appearance/Hygiene 46 y.o. BLACK OR  male  Hygiene: wnl   Behavior/Social Relatedness Appropriate   Musculoskeletal Gait/Station: appropriate  Tone (flaccid, cogwheeling, spastic): not assessed  Psychomotor (hyperkinetic, hypokinetic): calm   Involuntary movements (tics, dyskinesias, akathisa, stereotypies): none   Speech   Low Rate, rhythm, volume, fluency;  articulation - appropriate   Mood   Euthymic   Affect    Showing a range of affect, congruent   Thought Process Linear and goal directed   Thought Content and Perceptual Disturbances Denies self-injurious behavior (SIB), suicidal ideation (SI), aggressive behavior or homicidal ideation (HI)    Denies auditory and visual hallucinations   Sensorium and Cognition  Grossly intact   Insight  adequate   Judgment adequate        Assessment/Plan      Psychiatric Diagnoses:   MDD F33.3/severe w/psychotic features    Patient Active Problem List   Diagnosis Code    MDD (major depressive disorder), recurrent, severe, with psychosis ([facility] [address] F33.3    Major depression F32.9    Alcohol abuse F10.10    Malingering Z76.5    Hypertension I10    Depression F32.9       Medical Diagnoses: HTN    Psychosocial and contextual factors: homeless, unemployed, limited support system    Level of impairment/disability: mild    Neymar Angelo is a 46 y. o. who is currently hospitalized and progressing towards [address] goals and doing better. 1.  Continue current RX regiment of Abilify 5mg daily for psychosis and mood; Wellbutrin 100mg bid for depression. 2. Dementia work up: Check RPR, B12, MoCA  3. Reviewed instructions, risks, benefits and side effects of medications  4. Disposition/Discharge Date: TBD.  Pt in need of more resources and outpatient support due to his level of disability    Chavo Agee MD DR. [facility]  Psychiatry

## 2018-08-17 NOTE — BSMART NOTE
SW assessment/Intervention:  Chart reviewed and discussed it reports by treating psychiatrist Darlene Carballo is 46 y.o. BLACK OR  male who presented voluntarily complaining of SI with plan to jump off a bridge and auditory hallucinations. SW made contact with patient as he engaged within the milieu. Patient was attentive and addressed possible disposition. Patient continues to display feelings of depression. Patient is homeless and is unsure of placement upon discharge. SW contacted emergency services to address continuity of care. SW provided necessary paperwork to assist with possible admittance to crisis stabilization. SW will continue to monitor patient during hospitalization.     Marge Hickman, MAURISIO, LCSW-E

## 2018-08-17 NOTE — PROGRESS NOTES
NUTRITION    Nutrition Screen    RECOMMENDATIONS / PLAN:     - No nutrition intervention indicated at this time. Will re-screen as appropriate. NUTRITION DIAGNOSIS & INTERVENTIONS:     Nutrition Diagnosis:  No nutrition diagnosis at this time. ASSESSMENT:     Pt reports good meal intake/appetite currently and PTA. Tolerating diet. Pt with questions about his blood pressure and what his reading was, question answered by nursing. Briefly discussed the importance of low-sodium diet. Pt understandable. Average intake adequate to meet patients estimated nutritional needs:   [x] Yes     [] No      [] Unable to determine at this time    Diet: DIET REGULAR    Food Allergies: NKFA  Current Appetite:   [] Good     [] Fair     [] Poor     [] Other:  Appetite/meal intake prior to admission:   [] Good     [] Fair     [] Poor     [] Other:   Feeding Limitations:  [] Swallowing Difficulty       [] Chewing Difficulty       [] Other   Current Meal Intake: No data found. Gastrointestinal Issues:  [] Yes    [x] No   Skin Integrity:  WDL    Pertinent Medications:  Reviewed   Labs:  Reviewed     Anthropometrics:  Ht Readings from Last 1 Encounters:   08/10/18 5' 6\" (1.676 m)       Last 3 Recorded Weights in this Encounter    08/10/18 2357   Weight: 68 kg (150 lb)       Body mass index is 24.21 kg/(m^2). Weight History: Pt reports stable weight hx PTA.     Weight Metrics 8/10/2018 8/9/2018 8/9/2018 7/12/2018 7/10/2018 5/5/2018 11/18/2017   Weight 150 lb 150 lb 150 lb 160 lb 150 lb 157 lb 155 lb   BMI 24.21 kg/m2 24.21 kg/m2 24.21 kg/m2 25.06 kg/m2 23.49 kg/m2 25.34 kg/m2 24.28 kg/m2       Admitting Diagnosis: depression  Depression  Past Medical History:   Diagnosis Date    Back pain     Hypertension     MDD (major depressive disorder), recurrent, severe, with psychosis ([facility] [address] 11/11/2017    Psychiatric disorder     hallucinations    Psychotic disorder 11/11/2017        Education Needs:        [x] None identified [] Identified - Not appropriate at this time  []  Identified and addressed - refer to education log  Learning Limitations:   [x] None identified  [] Identified    Cultural, Sabianist & ethnic food preferences identified:  [x] None    [] Yes      ESTIMATED NUTRITION NEEDS:     2582-0568 kcal (MSJx1.2-1.3), 54-68 gm protein (0.8-1 gm/kg), 1 mL/kcal  Based on: 68 kg       [x] Actual BW      [] IBW          MONITORING & EVALUATION:     Nutrition Goal(s):   1. Po intake of meals will meet >75% of patient estimated nutritional needs within the next 7 days.   Outcome:   [] Met    []  Not Met   [x] New/Initial Goal    Monitor:  [x] Food and beverage intake   [x] Diet order   [x] Nutrition-focused physical findings   [] Weight      Previous Recommendations (for follow-up assessments only):     []   Implemented       []   Not Implemented (RD to address)   [] No Longer Appropriate   [] No Recommendation Made       Discharge Planning: regular diet   [x]  Participated in care planning, discharge planning, & interdisciplinary rounds as appropriate      Nan Betancur RD   Pager: [phone]

## 2018-08-18 PROCEDURE — 74011250637 HC RX REV CODE- 250/637: Performed by: PSYCHIATRY & NEUROLOGY

## 2018-08-18 PROCEDURE — 65220000005 HC RM SEMIPRIVATE PSYCH 3 OR 4 BED

## 2018-08-18 RX ADMIN — BUPROPION HYDROCHLORIDE 100 MG: 100 TABLET, FILM COATED ORAL at 08:30

## 2018-08-18 RX ADMIN — ARIPIPRAZOLE 5 MG: 5 TABLET ORAL at 08:30

## 2018-08-18 RX ADMIN — AMLODIPINE BESYLATE 10 MG: 10 TABLET ORAL at 08:30

## 2018-08-18 RX ADMIN — BUPROPION HYDROCHLORIDE 100 MG: 100 TABLET, FILM COATED ORAL at 20:25

## 2018-08-18 RX ADMIN — CARVEDILOL 6.25 MG: 6.25 TABLET, FILM COATED ORAL at 08:30

## 2018-08-18 RX ADMIN — HYDROCHLOROTHIAZIDE 25 MG: 25 TABLET ORAL at 08:31

## 2018-08-18 RX ADMIN — LISINOPRIL 20 MG: 10 TABLET ORAL at 08:31

## 2018-08-18 RX ADMIN — CARVEDILOL 6.25 MG: 6.25 TABLET, FILM COATED ORAL at 18:18

## 2018-08-18 NOTE — BH NOTES
GROUP THERAPY PROGRESS NOTE    Karen Medel  Despite staff encouragement pt refused to join for self-esteem group run by visiting students.

## 2018-08-18 NOTE — BH NOTES
GROUP THERAPY PROGRESS NOTE    Isidoro Alexander is participating in Post.      Group time: 30 minutes    Personal goal for participation: give it to god    Goal orientation: personal    Group therapy participation: active    Therapeutic interventions reviewed and discussed: unit guidelines/ pt concerns    Impression of participation: hopeful

## 2018-08-18 NOTE — BH NOTES
GROUP THERAPY PROGRESS NOTE    Stephen Carcamo is participating in Recreational Therapy. Group time: 30 minutes    Personal goal for participation: exercise and relaxation.     Goal orientation: social    Group therapy participation: active    Therapeutic interventions reviewed and discussed: Patient was encouraged by staff, he played foosball    Impression of participation: Happy

## 2018-08-18 NOTE — BH NOTES
Edie Mendoza is participating in Staying Well After Discharge Group    Group time: 20 minutes    Personal goal for participation:   Take responsibility for managing your illness    Goal orientation: personal    Group therapy participation: active    Therapeutic interventions reviewed and discussed: Be patient with yourself and remember there will be ups and downs. Impression of participation: Pt plan to make a detailed list of early warning signs, then write out a plan of action to reduce stress and increase structure and support when those warnings occur.

## 2018-08-18 NOTE — BSMART NOTE
ART THERAPY GROUP PROGRESS NOTE    PATIENT SCHEDULED FOR GROUP AT: 2:30    ATTENDANCE: Patient attended the full group. PARTICIPATION LEVEL: Participates fully in the art process. He chose to share his image with the group and therapist and share verbally. ATTENTION LEVEL : Able to focus on task. He was detail oriented in his art making. FOCUS: Relaxation and watercolor    SYMBOLIC & THEMATIC CONTENT AS NOTED IN IMAGERY: Group focused on creating a watercolor painting while focusing on relaxation. Patient chose to focus his relaxation watercolor painting on an image that he has created before. A beach scene where he would \"be looking in, [my] perspective\". Patient stated that this place makes him feel relaxed although he says he has never been to a place specifically like this before.

## 2018-08-18 NOTE — BH NOTES
MHT NOTE: The patient spent the majority of the shift out in the day room sitting quietly . He had little interaction with staff and surrounding patients. The pt did participate in group down in the activity room, and he also ate his dinner and snack this shift. The pt complied with utilizing the non- skid socks that were provided for his safety , and did not experience any falls. The pt will continue to be monitored for location and safety for the duration of the shift.

## 2018-08-18 NOTE — BH NOTES
Patient reports that he is feeling much better. \"I don't feel that fog around me. \"  Denied SI at current. Will continue to monitor and provide support as needed.

## 2018-08-18 NOTE — PROGRESS NOTES
[address]     Physician Daily Progress Note    Patient:  Suyapa Azevedo Age:  46 y.o. :  1965     SEX:  male MRN:  [id number] CSN:  [id number]    Admit Date:  8/10/2018 Attending:  Maverick Cardenas MD    Subjective: The patient a 58-year-old  male the history of schizophrenia. He presented with hallucinations and suicidal thoughts, He reports better mood. States voices are not as strong now.      Current Medications:    Current Facility-Administered Medications   Medication Dose Route Frequency Provider Last Rate Last Dose    haloperidol lactate (HALDOL) injection 5 mg  5 mg IntraMUSCular Q4H PRN Livingston Smaller, MD        LORazepam (ATIVAN) injection 2 mg  2 mg IntraMUSCular Q4H PRN Livingston Smaller, MD        LORazepam (ATIVAN) tablet 2 mg  2 mg Oral Q4H PRN Livingston Smaller, MD        traZODone (DESYREL) tablet 150 mg  150 mg Oral QHS PRN Livingston Smaller, MD   150 mg at 18    ibuprofen (MOTRIN) tablet 400 mg  400 mg Oral Q6H PRN Livingston Smaller, MD        LORazepam (ATIVAN) tablet 1 mg  1 mg Oral Q4H PRN Livingston Smaller, MD        LORazepam (ATIVAN) injection 1 mg  1 mg IntraMUSCular Q4H PRN Livingston Smaller, MD        haloperidol (HALDOL) tablet 5 mg  5 mg Oral Q4H PRN Livingston Smaller, MD        benztropine (COGENTIN) injection 2 mg  2 mg IntraMUSCular Q4H PRN Livingston Smaller, MD        benztropine (COGENTIN) injection 1 mg  1 mg IntraMUSCular Q4H PRN Livingston Smaller, MD        benztropine (COGENTIN) tablet 1 mg  1 mg Oral Q4H PRN Livingston Smaller, MD        benztropine (COGENTIN) tablet 2 mg  2 mg Oral Q4H PRN Livingston Smaller, MD        ARIPiprazole (ABILIFY) tablet 5 mg  5 mg Oral DAILY Livingston Smaller, MD   5 mg at 18    amLODIPine (NORVASC) tablet 10 mg  10 mg Oral DAILY Livingston Smaller, MD   10 mg at 18 0830    carvedilol (COREG) tablet 6.25 mg  6.25 mg Oral BID WITH MEALS Delgado Shepherd MD   6.25 mg at 08/18/18 1818    hydroCHLOROthiazide (HYDRODIURIL) tablet 25 mg  25 mg Oral DAILY Delgado Shepherd MD   25 mg at 08/18/18 0831    lisinopril (PRINIVIL, ZESTRIL) tablet 20 mg  20 mg Oral DAILY Delgado Shepherd MD   20 mg at 08/18/18 0831    buPROPion [facility]) tablet 100 mg  100 mg Oral BID Delgado Shepherd MD   100 mg at 08/18/18 0830       Compliant with medication:  Yes      Side effects from medications:  No     Mental Status Exam      Appearance    General Behavior   Dishavelled   Speech form and content,  Language  Associations  Form of Thought   Normal flow and volume  TP : Logical, goal oriented   Mood, Affect  Self-Attitude  Vital Sense  SI/HI/PDW   Irritable  No SI, HI, hopelessness   Abnormal Perceptions and illusions   AH's   Delusions   None   Anxiety    Denies   COGNITION Intelligence Abstraction   Intact   Judgement Insight   Limited        Diagnoses/Impressions:      Psychiatric:    Active Problems:    Depression (7/11/2018) POA: Unknown            Medical:     Visit Vitals    /72    Pulse 73    Temp 97.5 °F (36.4 °C)    Resp 20    Ht 5' 6\" (1.676 m)    Wt 68 kg (150 lb)    BMI 24.21 kg/m2       No lab exists for component: 24H    Recommendations/Plan:      []  Dangerous and will not contract for safety in the community    []  Response to medications is not adequate    []  Appropriate disposition not finalized    []  Collateral history needed to determine safety    [x]  Continue current medications and follow MSE for sxs improvement    []  Medication changes as follows:     [x]  Continue to build rapport    [x]  Supportive psychotherapy    [x]  Insight oriented therapy    [x]  Group attendance/processing    [x]  Relapse prevention      [x]  Somatic Management    [x]  Disposition planning                                   Adilson Branham MD               8/18/2018          6:30 PM

## 2018-08-18 NOTE — PROGRESS NOTES
Problem: Suicide/Homicide (Adult/Pediatric)  Goal: *STG: Remains safe in hospital  Pt will verbally contract for safety daily while hospitalized. Outcome: Progressing Towards Goal  Remained safe. Goal: *STG: Attends activities and groups  Patient will attend at least 2 groups daily. Outcome: Progressing Towards Goal  Attending groups. Goal: *STG/LTG:  No longer expresses self destructive or suicidal/homicidal thoughts  Patient will no longer express suicidal/homicidal ideation prior to discharge with staff. Outcome: Progressing Towards Goal  Denies suicidal thoughts. Comments: Patient states he is getting better, \" I needed a few more days to healed\". Patient denies suicidal thoughts, up and dressed , hygiene is good.

## 2018-08-19 LAB
BASOPHILS # BLD: 0 K/UL (ref 0–0.1)
BASOPHILS NFR BLD: 0 % (ref 0–2)
DIFFERENTIAL METHOD BLD: ABNORMAL
EOSINOPHIL # BLD: 0 K/UL (ref 0–0.4)
EOSINOPHIL NFR BLD: 1 % (ref 0–5)
ERYTHROCYTE [DISTWIDTH] IN BLOOD BY AUTOMATED COUNT: 16.1 % (ref 11.6–14.5)
HCT VFR BLD AUTO: 37.9 % (ref 36–48)
HGB BLD-MCNC: 12.6 G/DL (ref 13–16)
LYMPHOCYTES # BLD: 1.5 K/UL (ref 0.9–3.6)
LYMPHOCYTES NFR BLD: 38 % (ref 21–52)
MCH RBC QN AUTO: 25.4 PG (ref 24–34)
MCHC RBC AUTO-ENTMCNC: 33.2 G/DL (ref 31–37)
MCV RBC AUTO: 76.4 FL (ref 74–97)
MONOCYTES # BLD: 0.4 K/UL (ref 0.05–1.2)
MONOCYTES NFR BLD: 10 % (ref 3–10)
NEUTS SEG # BLD: 2 K/UL (ref 1.8–8)
NEUTS SEG NFR BLD: 51 % (ref 40–73)
PLATELET # BLD AUTO: 212 K/UL (ref 135–420)
PMV BLD AUTO: 9.6 FL (ref 9.2–11.8)
RBC # BLD AUTO: 4.96 M/UL (ref 4.7–5.5)
WBC # BLD AUTO: 3.8 K/UL (ref 4.6–13.2)

## 2018-08-19 PROCEDURE — 74011250637 HC RX REV CODE- 250/637: Performed by: PSYCHIATRY & NEUROLOGY

## 2018-08-19 PROCEDURE — 36415 COLL VENOUS BLD VENIPUNCTURE: CPT

## 2018-08-19 PROCEDURE — 65220000005 HC RM SEMIPRIVATE PSYCH 3 OR 4 BED

## 2018-08-19 PROCEDURE — 85025 COMPLETE CBC W/AUTO DIFF WBC: CPT

## 2018-08-19 RX ADMIN — CARVEDILOL 6.25 MG: 6.25 TABLET, FILM COATED ORAL at 16:37

## 2018-08-19 RX ADMIN — BUPROPION HYDROCHLORIDE 100 MG: 100 TABLET, FILM COATED ORAL at 08:29

## 2018-08-19 RX ADMIN — TRAZODONE HYDROCHLORIDE 150 MG: 100 TABLET ORAL at 20:25

## 2018-08-19 RX ADMIN — BUPROPION HYDROCHLORIDE 100 MG: 100 TABLET, FILM COATED ORAL at 20:25

## 2018-08-19 RX ADMIN — HYDROCHLOROTHIAZIDE 25 MG: 25 TABLET ORAL at 08:29

## 2018-08-19 RX ADMIN — AMLODIPINE BESYLATE 10 MG: 10 TABLET ORAL at 08:29

## 2018-08-19 RX ADMIN — ARIPIPRAZOLE 5 MG: 5 TABLET ORAL at 08:29

## 2018-08-19 RX ADMIN — CARVEDILOL 6.25 MG: 6.25 TABLET, FILM COATED ORAL at 08:29

## 2018-08-19 RX ADMIN — LISINOPRIL 20 MG: 10 TABLET ORAL at 08:29

## 2018-08-19 NOTE — BH NOTES
MMilkaH.T. Note: -S/O- The above pt has been pleasant and cooperative during this shift he has not been a management problem this shift. HE has been polite and cooperative during this shift. He has been compliant with medications this shift. He has participated in all groups this shift. He has been very animated and talking with staff and peers about his plans upon discharge. He appeared to be eager for discharge. He verbally contract for safety and denies any self-harm at this time. -A-Problem #1 cont. -P-Maintain a safe and supportive environment.

## 2018-08-19 NOTE — PROGRESS NOTES
[address]     Physician Daily Progress Note    Patient:  Purnima Gautam Age:  46 y.o. :  1965     SEX:  male MRN:  [id number] CSN:  [id number]    Admit Date:  8/10/2018 Attending:  Ailyn Rutledge MD    Subjective: The patient a 79-year-old  male the history of schizophrenia. He presented with hallucinations and suicidal thoughts, He reports better mood. States voices are not as strong now. He is on Wellbutrin and Abilify currently. No reports of any behavioral outbursts recently.      Current Medications:    Current Facility-Administered Medications   Medication Dose Route Frequency Provider Last Rate Last Dose    haloperidol lactate (HALDOL) injection 5 mg  5 mg IntraMUSCular Q4H PRN Dean Palacio MD        LORazepam (ATIVAN) injection 2 mg  2 mg IntraMUSCular Q4H PRN Dean Palacio MD        LORazepam (ATIVAN) tablet 2 mg  2 mg Oral Q4H PRN Dean Palacio MD        traZODone (DESYREL) tablet 150 mg  150 mg Oral QHS PRN Dean Palacio MD   150 mg at 18    ibuprofen (MOTRIN) tablet 400 mg  400 mg Oral Q6H PRN Dean Palacio MD        LORazepam (ATIVAN) tablet 1 mg  1 mg Oral Q4H PRN Dean Palacio MD        LORazepam (ATIVAN) injection 1 mg  1 mg IntraMUSCular Q4H PRN Dean Palacio MD        haloperidol (HALDOL) tablet 5 mg  5 mg Oral Q4H PRN Dean Palacio MD        benztropine (COGENTIN) injection 2 mg  2 mg IntraMUSCular Q4H PRN Dean Palacio MD        benztropine (COGENTIN) injection 1 mg  1 mg IntraMUSCular Q4H PRN Dean Palacio MD        benztropine (COGENTIN) tablet 1 mg  1 mg Oral Q4H PRN Dean Palacio MD        benztropine (COGENTIN) tablet 2 mg  2 mg Oral Q4H PRN Dean Palacio MD        ARIPiprazole (ABILIFY) tablet 5 mg  5 mg Oral DAILY Dean Palacio MD   5 mg at 18 0829    amLODIPine (NORVASC) tablet 10 mg  10 mg Oral DAILY Noemí Cornelius MD   10 mg at 08/19/18 9055    carvedilol (COREG) tablet 6.25 mg  6.25 mg Oral BID WITH MEALS Noemí Cornelius MD   6.25 mg at 08/19/18 1979    hydroCHLOROthiazide (HYDRODIURIL) tablet 25 mg  25 mg Oral DAILY Noemí Cornelius MD   25 mg at 08/19/18 1095    lisinopril (PRINIVIL, ZESTRIL) tablet 20 mg  20 mg Oral DAILY Noemí Cornelius MD   20 mg at 08/19/18 4072    buPROPion [facility]) tablet 100 mg  100 mg Oral BID Noemí Cornelius MD   100 mg at 08/19/18 2073       Compliant with medication:  Yes      Side effects from medications:  No     Mental Status Exam    Appearance    General Behavior   Dishavelled   Speech form and content,  Language  Associations  Form of Thought   Normal flow and volume  TP : Logical, goal oriented   Mood, Affect  Self-Attitude  Vital Sense  SI/HI/PDW   Irritable  No SI, HI, hopelessness   Abnormal Perceptions and illusions   AH's   Delusions   None   Anxiety    Denies   COGNITION Intelligence Abstraction   Intact   Judgement Insight   Limited          Diagnoses/Impressions:      Psychiatric:    Active Problems:    Depression (7/11/2018) POA: Unknown            Medical:     Visit Vitals    /87    Pulse 71    Temp 96.6 °F (35.9 °C)    Resp 18    Ht 5' 6\" (1.676 m)    Wt 68 kg (150 lb)    BMI 24.21 kg/m2       No lab exists for component: 24H    Recommendations/Plan:      []  Dangerous and will not contract for safety in the community    []  Response to medications is not adequate    []  Appropriate disposition not finalized    []  Collateral history needed to determine safety    [x]  Continue current medications and follow MSE for sxs improvement    []  Medication changes as follows:     [x]  Continue to build rapport    [x]  Supportive psychotherapy    [x]  Insight oriented therapy    [x]  Group attendance/processing    [x]  Relapse prevention      [x]  Somatic Management    [x]  Disposition planning                                   Adilson Ritu Lopez MD               8/19/2018          8:39 AM

## 2018-08-19 NOTE — BH NOTES
GROUP THERAPY PROGRESS NOTE    Milena Story is participating in Recreational Therapy. Group time: 45 minutes    Personal goal for participation: games/ fresh air    Goal orientation: social    Group therapy participation: active    Therapeutic interventions reviewed and discussed: He was cooperative and appeared more animated while outside during group. Impression of participation: He was cooperative and alert and cooperative during group he appeared to enjoy playing table games and fresh air during group.

## 2018-08-19 NOTE — BH NOTES
Pt has been observed in milieu but interacts minimally with peers. Pt is pleasant and cooperative. Rounds maintained Q 15 mins.  Staff will continue to offer a safe and supportive environment

## 2018-08-19 NOTE — PROGRESS NOTES
Problem: Suicide/Homicide (Adult/Pediatric)  Goal: *STG: Seeks staff when feelings of self harm or harm towards others arise  Patient will be able to seek staff when feelings/thoughts of self harm or harming others arise daily and as needed. Outcome: Progressing Towards Goal  Patient is progressing as evidence by zara for safety while on unit. Patient denies thoughts of self harm/harm to others at this time. Problem: Hypertension  Goal: *Blood pressure within specified parameters  Blood pressure will be monitor daily as ordered and will be within normal limits . Also patient will take his medication for hypertension as ordered. Outcome: Progressing Towards Goal  Patient is progressing as evidence by blood pressure WNL during this nurse's shift. Patient has been compliant with medications. Comments: Patient has been in day area most of this shift. Patient has interacted appropriately with staff and peers. Patient was given shoes from clothes closet due to complaint of \"these hard floors are killing my knees. \" patient has eaten all meals and snacks and has taken medications as prescribed and on schedule. Patient has not received PRN medications this shift. Patient agrees to come to staff if feelings of self harm or harm to others arise. Patient voices readiness for discharge tomorrow. Will continue to monitor and provide safe and therapeutic interventions as needed.

## 2018-08-20 PROCEDURE — 65220000005 HC RM SEMIPRIVATE PSYCH 3 OR 4 BED

## 2018-08-20 PROCEDURE — 74011250637 HC RX REV CODE- 250/637: Performed by: PSYCHIATRY & NEUROLOGY

## 2018-08-20 RX ADMIN — BUPROPION HYDROCHLORIDE 100 MG: 100 TABLET, FILM COATED ORAL at 20:41

## 2018-08-20 RX ADMIN — TRAZODONE HYDROCHLORIDE 150 MG: 100 TABLET ORAL at 20:43

## 2018-08-20 RX ADMIN — AMLODIPINE BESYLATE 10 MG: 10 TABLET ORAL at 08:30

## 2018-08-20 RX ADMIN — HYDROCHLOROTHIAZIDE 25 MG: 25 TABLET ORAL at 08:30

## 2018-08-20 RX ADMIN — CARVEDILOL 6.25 MG: 6.25 TABLET, FILM COATED ORAL at 08:30

## 2018-08-20 RX ADMIN — CARVEDILOL 6.25 MG: 6.25 TABLET, FILM COATED ORAL at 16:42

## 2018-08-20 RX ADMIN — LISINOPRIL 20 MG: 10 TABLET ORAL at 08:29

## 2018-08-20 RX ADMIN — ARIPIPRAZOLE 5 MG: 5 TABLET ORAL at 08:29

## 2018-08-20 RX ADMIN — BUPROPION HYDROCHLORIDE 100 MG: 100 TABLET, FILM COATED ORAL at 08:30

## 2018-08-20 NOTE — BSMART NOTE
SOCIAL WORK GROUP THERAPY PROGRESS NOTE    Group Time:  11am    Group Topic:  Coping Skills    C D Issues    Group Participation:      Pt moderately involved during group discussion but remained attentive. Taught client about relationship between mood disorders & self medicating them. Reviewed strategies to keep a \"Journal\" for moods, cognitions, behavior & outcome. From CBT perspective he needs to not overgeneralize, stop jumping to conclusions & not minimize.

## 2018-08-20 NOTE — BSMART NOTE
Pt.'s case was discussed in treatment team this a.m.     MARILU Contact:  SW met with pt to discuss d/c planning. Pt. expressed he feels better. Pt is denying ideations and hallucinations. SW discussed continued medication and treatment compliance in the community. Pt. Stated he rocha snot want to reach out to his family members. Pt. Stated he is family, thinks that he is still successful and has a place to dwell. SW ask pt. how does he know this to be true, if he has not had any contact with his family. 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 SW discussed positive coping and ways to cope with losses. Pt. talked about his limited support in the community. Social issues: lack of community support, no income and lacks insight. SW will refer pt to [address] [place] [address] Avenue: pt. Will have Case management services , [facility] services (housing and homeless case management).

## 2018-08-20 NOTE — BH NOTES
Cassie Escobedo is not participating in Recreational Therapy. Group time: 2627    Personal goal for participation: fresh air break    Goal orientation: social    Group therapy participation: refuse    Therapeutic interventions reviewed and discussed: Staff encouraged Pt.  To participate in group    Impression of participation: Pt refuse, chose to rest in bed despite staff encouragement

## 2018-08-20 NOTE — BSMART NOTE
OCCUPATIONAL THERAPY PROGRESS NOTE  Group Time:  0213  Attendance: The patient attended full group. The patient left and returned to activity at least once. Participation:  The patient participated with minimal elaboration in the activity. .  Attention:  The patient was able to focus on the activity. Interaction:  The patient acknowledges others or responds to questions,  with no spontaneous interaction. Remains vague and unable to explain or be more specific in responses. Thinking appears disorganized for more complex issues. [place] at times. Has difficulty applying generalizations to self.

## 2018-08-20 NOTE — BH NOTES
GROUP THERAPY PROGRESS NOTE    Milena Story is participating in [place].      Group time: 30 minutes    Personal goal for participation: \"to make it through today and get to tomorrow\"    Goal orientation: personal    Group therapy participation: active    Therapeutic interventions reviewed and discussed: discuss daily Tx goal(s); discuss guideline compliance, unit issues and community announcements

## 2018-08-20 NOTE — BH NOTES
GROUP THERAPY PROGRESS NOTE    Rodrick Frey is participating in  Put Your Fear in a Hat Group    Group time:  3214-8528    Personal goal for participation: to write down your fear    Goal orientation: social    Group therapy participation: active    Therapeutic interventions reviewed and discussed:  Fear is completely natural. Also by recognizing your own fears and acknowledging these fears will give them less power over you. Patients randomly matthew a piece of paper and read the fear out loud. Staff and patients then discussed this fear in a group, with the original writer remaining anonymous. Impression of participation:  Pt was able to see their fear in a new light. By hearing other people talking about their own fears,  The fear became trivia.

## 2018-08-20 NOTE — PROGRESS NOTES
Problem: Suicide/Homicide (Adult/Pediatric)  Goal: *STG: Remains safe in hospital  Pt will verbally contract for safety daily while hospitalized. Outcome: Progressing Towards Goal  Patient is progressing as evidence by being free of harm during this nurse's shift. Patient agrees to come to staff if feelings of harm to self/others arise. Problem: Altered Thought Process (Adult/Pediatric)  Goal: *STG: Seeks staff when feelings of anxiety and fear arise  Patient will be able to regulate anxiety and emotions effectively and express them during 1:1 with staff daily. Outcome: Progressing Towards Goal  Patient is progressing as evidence by agreeing to come to staff if feelings of anxiousness arise and/or confusion regarding treatment. Patient continues to demonstrate cooperative and pleasant behaviors. Patient has eaten all meals and snacks and has taken medications as prescribed and on schedule. Patient has interacted appropriately with staff and peers. Patient has attended groups and activities and has been active participant with appropriate questions and responses. Will continue to monitor and provide safe and therapeutic interventions as needed and as appropriate.

## 2018-08-20 NOTE — BSMART NOTE
ART THERAPY GROUP PROGRESS NOTE    PATIENT SCHEDULED FOR GROUP AT: 13:30    ATTENDANCE: Full    PARTICIPATION LEVEL: Participates  in the art process    ATTENTION LEVEL: Able to focus on task    FOCUS: Kaushal Spann AS NOTED IN IMAGERY: He was calm and compliant. He occasionally interacted with group members. His responses were vague and guarded, approach to task was slightly disorganized.

## 2018-08-20 NOTE — PROGRESS NOTES
[address]  Inpatient Progress Note     Date of Service: 08/20/18  Hospital Day: 10     Subjective/Interval History   08/20/18    Treatment Team Notes:  Notes reviewed and/or discussed and report that Latisha García is 46 y.o. BLACK OR  male who presented voluntarily complaining of SI with plan to jump off a bridge and auditory hallucinations. No acute events overnight per nursing staff. Pt has been attending groups on the milieu and compliant with treatment plan. Nurse reports pt is very confused and has short term memory loss and easily misplaces things. Patient interview: Latisha García was interviewed by this writer today. Pt reports he is doing well and looking forward to discharge. He denies SI, HI, hallucinations or paranoia. Mood is euthymic. Pt reported improved sleep, fair appetite and good energy level. Pt states his goal is to follow up on his Disability application when discharged. Pt appears to have history of treatment non-compliance and inability to follow up to get resources or apply for Disability due to cognitive impairment and decline. Treatment team thinks it will be better to connect him with more outpatient resources prior to discharge. Objective     Visit Vitals    /75    Pulse 70    Temp 97.5 °F (36.4 °C)    Resp 17    Ht 5' 6\" (1.676 m)    Wt 68 kg (150 lb)    BMI 24.21 kg/m2       No results found for this or any previous visit (from the past 24 hour(s)).     Active Orders   Diet    DIET REGULAR     Frequency: DIET EFFECTIVE NOW     Number of Occurrences:  Until Specified   Nursing    VITAL SIGNS PER UNIT ROUTINE     Frequency: CONTINUOUS     Number of Occurrences:  Until Specified   Precaution    SUICIDE PRECAUTIONS     Frequency: CONTINUOUS     Number of Occurrences:  Until Specified   Medications    amLODIPine (NORVASC) tablet 10 mg     Frequency: DAILY     Dose: 10 mg     Route: Oral    ARIPiprazole (ABILIFY) tablet 5 mg     Frequency: DAILY     Dose: 5 mg     Route: Oral    benztropine (COGENTIN) injection 1 mg     Frequency: Q4H PRN     Dose: 1 mg     Route: IntraMUSCular    benztropine (COGENTIN) injection 2 mg     Frequency: Q4H PRN     Dose: 2 mg     Route: IntraMUSCular    benztropine (COGENTIN) tablet 1 mg     Frequency: Q4H PRN     Dose: 1 mg     Route: Oral    benztropine (COGENTIN) tablet 2 mg     Frequency: Q4H PRN     Dose: 2 mg     Route: Oral    buPROPion (WELLBUTRIN) tablet 100 mg     Frequency: BID     Dose: 100 mg     Route: Oral    carvedilol (COREG) tablet 6.25 mg     Frequency: BID WITH MEALS     Dose: 6.25 mg     Route: Oral    haloperidol (HALDOL) tablet 5 mg     Frequency: Q4H PRN     Dose: 5 mg     Route: Oral    haloperidol lactate (HALDOL) injection 5 mg     Frequency: Q4H PRN     Dose: 5 mg     Route: IntraMUSCular    hydroCHLOROthiazide (HYDRODIURIL) tablet 25 mg     Frequency: DAILY     Dose: 25 mg     Route: Oral    ibuprofen (MOTRIN) tablet 400 mg     Frequency: Q6H PRN     Dose: 400 mg     Route: Oral    lisinopril (PRINIVIL, ZESTRIL) tablet 20 mg     Frequency: DAILY     Dose: 20 mg     Route: Oral    LORazepam (ATIVAN) injection 1 mg     Frequency: Q4H PRN     Dose: 1 mg     Route: IntraMUSCular    LORazepam (ATIVAN) injection 2 mg     Frequency: Q4H PRN     Dose: 2 mg     Route: IntraMUSCular    LORazepam (ATIVAN) tablet 1 mg     Frequency: Q4H PRN     Dose: 1 mg     Route: Oral    LORazepam (ATIVAN) tablet 2 mg     Frequency: Q4H PRN     Dose: 2 mg     Route: Oral    traZODone (DESYREL) tablet 150 mg     Frequency: QHS PRN     Dose: 150 mg     Route: Oral   Ancillary Consult    BEHAVIORAL HEALTH H&P CONSULT     Frequency: ONE TIME     Number of Occurrences:  1 Occurrences       Mental Status Examination     Appearance/Hygiene 46 y.o.  BLACK OR  male  Hygiene: wnl   Behavior/Social Relatedness Appropriate   Musculoskeletal Gait/Station: appropriate  Tone (flaccid, cogwheeling, spastic): not assessed  Psychomotor (hyperkinetic, hypokinetic): calm   Involuntary movements (tics, dyskinesias, akathisa, stereotypies): none   Speech   Low Rate, rhythm, volume, fluency;  articulation - appropriate   Mood   Euthymic   Affect    Showing a range of affect, congruent   Thought Process Linear and goal directed   Thought Content and Perceptual Disturbances Denies self-injurious behavior (SIB), suicidal ideation (SI), aggressive behavior or homicidal ideation (HI)    Denies auditory and visual hallucinations   Sensorium and Cognition  Grossly intact   Insight  adequate   Judgment adequate        Assessment/Plan      Psychiatric Diagnoses:   MDD F33.3/severe w/psychotic features    Patient Active Problem List   Diagnosis Code    MDD (major depressive disorder), recurrent, severe, with psychosis ([address] F33.3    Major depression F32.9    Alcohol abuse F10.10    Malingering Z76.5    Hypertension I10    Depression F32.9       Medical Diagnoses: HTN    Psychosocial and contextual factors: homeless, unemployed, limited support system    Level of impairment/disability: mild    Latisha García is a 46 y. o. who is currently hospitalized and progressing towards [address] goals and doing better. 1.  Continue current RX regiment of Abilify 5mg daily for psychosis and mood; Wellbutrin 100mg bid for depression. 2. Dementia work up: MoCA pending. B12 367, RPR nonreactive. 3.  Reviewed instructions, risks, benefits and side effects of medications  4. Disposition/Discharge Date: TBD.  Pt in need of more resources and outpatient support due to his level of disability    Yesenia Dougherty MD [facility]. [facility] HospitalDENISSEUniversity [facility]  Psychiatry

## 2018-08-21 VITALS
SYSTOLIC BLOOD PRESSURE: 116 MMHG | TEMPERATURE: 97.5 F | WEIGHT: 150 LBS | HEART RATE: 70 BPM | RESPIRATION RATE: 18 BRPM | DIASTOLIC BLOOD PRESSURE: 64 MMHG | BODY MASS INDEX: 24.11 KG/M2 | HEIGHT: 66 IN

## 2018-08-21 PROBLEM — F03.90 MAJOR NEUROCOGNITIVE DISORDER (HCC): Status: ACTIVE | Noted: 2018-08-21

## 2018-08-21 PROBLEM — F32.A DEPRESSION: Status: RESOLVED | Noted: 2018-07-11 | Resolved: 2018-08-21

## 2018-08-21 PROBLEM — Z76.5 MALINGERING: Status: RESOLVED | Noted: 2018-05-07 | Resolved: 2018-08-21

## 2018-08-21 PROBLEM — F32.9 MAJOR DEPRESSION: Status: RESOLVED | Noted: 2018-05-06 | Resolved: 2018-08-21

## 2018-08-21 PROCEDURE — 74011250637 HC RX REV CODE- 250/637: Performed by: PSYCHIATRY & NEUROLOGY

## 2018-08-21 RX ORDER — AMLODIPINE BESYLATE 10 MG/1
10 TABLET ORAL DAILY
Qty: 30 TAB | Refills: 0 | Status: SHIPPED | OUTPATIENT
Start: 2018-08-22 | End: 2018-11-06

## 2018-08-21 RX ORDER — BUPROPION HYDROCHLORIDE 100 MG/1
100 TABLET ORAL 2 TIMES DAILY
Qty: 60 TAB | Refills: 0 | Status: SHIPPED | OUTPATIENT
Start: 2018-08-21 | End: 2018-11-06

## 2018-08-21 RX ORDER — CARVEDILOL 6.25 MG/1
6.25 TABLET ORAL 2 TIMES DAILY WITH MEALS
Qty: 60 TAB | Refills: 0 | Status: SHIPPED | OUTPATIENT
Start: 2018-08-21 | End: 2018-11-06

## 2018-08-21 RX ORDER — HYDROCHLOROTHIAZIDE 25 MG/1
25 TABLET ORAL DAILY
Qty: 30 TAB | Refills: 0 | Status: ON HOLD | OUTPATIENT
Start: 2018-08-22 | End: 2018-12-07 | Stop reason: SDUPTHER

## 2018-08-21 RX ORDER — ARIPIPRAZOLE 5 MG/1
5 TABLET ORAL DAILY
Qty: 30 TAB | Refills: 0 | Status: SHIPPED | OUTPATIENT
Start: 2018-08-22 | End: 2018-12-07

## 2018-08-21 RX ORDER — LISINOPRIL 20 MG/1
20 TABLET ORAL DAILY
Qty: 30 TAB | Refills: 0 | Status: ON HOLD | OUTPATIENT
Start: 2018-08-22 | End: 2018-12-07 | Stop reason: SDUPTHER

## 2018-08-21 RX ORDER — TRAZODONE HYDROCHLORIDE 150 MG/1
150 TABLET ORAL
Qty: 30 TAB | Refills: 0 | Status: SHIPPED | OUTPATIENT
Start: 2018-08-21 | End: 2018-11-06

## 2018-08-21 RX ADMIN — ARIPIPRAZOLE 5 MG: 5 TABLET ORAL at 08:25

## 2018-08-21 RX ADMIN — AMLODIPINE BESYLATE 10 MG: 10 TABLET ORAL at 08:25

## 2018-08-21 RX ADMIN — HYDROCHLOROTHIAZIDE 25 MG: 25 TABLET ORAL at 08:28

## 2018-08-21 RX ADMIN — LISINOPRIL 20 MG: 10 TABLET ORAL at 08:25

## 2018-08-21 RX ADMIN — CARVEDILOL 6.25 MG: 6.25 TABLET, FILM COATED ORAL at 08:25

## 2018-08-21 RX ADMIN — BUPROPION HYDROCHLORIDE 100 MG: 100 TABLET, FILM COATED ORAL at 08:25

## 2018-08-21 NOTE — DISCHARGE SUMMARY
[facility]  Inpatient Psychiatry   Discharge Summary     Admit date: 8/10/2018    Discharge date and time: 8/21/2018 11:38 AM    Discharge Physician: Mauricio Angulo MD    DISCHARGE DIAGNOSES     Psychiatric Diagnoses:   Patient Active Problem List   Diagnosis Code    MDD (major depressive disorder), recurrent, severe, with psychosis ([facility] [address] F33.3    Alcohol abuse F10.10    Hypertension I10    Major neurocognitive disorder F03.90       Level of impairment/disability: Severe    HOSPITAL COURSE     Milena Story is a 46 y.o. BLACK OR  male who presented voluntarily to the inpatient unit for management of SI and hallucinations. Milena Story is a 46 y.o. BLACK OR  male who presented voluntarily after he walked in the ER of [facility]. According to the consulting psychiatrist : \"The patient a 58-year-old  male the history of schizophrenia. He presented with hallucinations and suicidal thoughts. Patient stated that he hears voices telling him to jump off a bridge. When seen by psychiatry, the patient said he came to the hospital for \"a mental breakdown. \" The patient was bizarre and guarded during the conversation with long pauses before answers (thought blocking). He was unable to reveal any stressors. He was recently admitted and was noncompliant with meds once discharged. The psychiatrist recommended inpatient care. The patient agreed. \"  Pt reported he was never dx w/schizophrenia, but was hospitalized about a year ago for depression w/SI. Pt. Reported he did not continue w/outpatient treatment. Pt reported he started to struggle with depression about 5 years ago, when he lost his business, home, wife left him. 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 Pt reported he became homeless, and only had temporary employment. Pt admitted and treated with biopsychosocial treatment plan.  He received individual, group and medication management. He was started on Wellbutrin and Abilify for mood. He was treated for HTN with Lisinopril, HCTZ, Norvasc and Coreg. BP was wnl in hospital. Pt tolerated medications without adverse reaction and reported improvement in mood. Pt was found to have cognitive limitations, short term memory loss and confusion. MOCA was administered with pt scoring 16/30 with severe deficits in memory and delayed recall. Pt with Dementia at this time. RPR unreactive, Vit B12 level wnl, head CT with mild cerebral atrophy. Pt will need further work up in the outpatient setting. He may benefit from Aricept. Pt needs community support team and possible PACT team due to frequent hospitalizations, no social support, cognitive disorder and homelessness. He needs help to follow through with application for services or benefits and to remember outpatient appointments. Pt was pleasant on the milieu and was not a behavior problem. Denied SI consistently throughout hospital course. He is not psychotic but unable to process information due to cognitive decline. DISPOSITION/FOLLOW-UP     Disposition: [id number] [facility]    Follow-up Appointments: Follow-up Information     Follow up With Details Comments Contact Info       Patient will complete intake assessment on 8/21/18 @ [address] [place] [address] Abhishek Alvares [facility] [phone]: main [phone]:BERT            MEDICATION CHANGES   Outpatient medications:  No current facility-administered medications on file prior to encounter. Current Outpatient Prescriptions on File Prior to Encounter   Medication Sig Dispense Refill    lisinopril (PRINIVIL, ZESTRIL) 20 mg tablet Take 1 Tab by mouth daily for 30 doses. 30 Tab 0    hydroCHLOROthiazide (HYDRODIURIL) 25 mg tablet Take 1 Tab by mouth daily for 30 doses. 30 Tab 0    amLODIPine (NORVASC) 10 mg tablet Take 1 Tab by mouth daily for 30 doses.  30 Tab 0    carvedilol (COREG) 6.25 mg tablet Take 1 Tab by mouth two (2) times a day for 30 days. 60 Tab 0         Medications discontinued during hospitalization:  Medications Discontinued During This Encounter   Medication Reason    FLUoxetine (PROzac) capsule 20 mg     buPROPion (WELLBUTRIN) tablet 100 mg     haloperidol lactate (HALDOL) injection 5 mg     LORazepam (ATIVAN) injection 2 mg     LORazepam (ATIVAN) tablet 2 mg     FLUoxetine (PROZAC) 20 mg capsule Discontinued at Discharge    OLANZapine (ZYPREXA) 10 mg tablet Discontinued at Discharge         Discharged medication:  Discharge Medication List as of 8/21/2018 10:30 AM      START taking these medications    Details   !! amLODIPine (NORVASC) 10 mg tablet Take 1 Tab by mouth daily. Indications: hypertension, Print, Disp-30 Tab, R-0      !! ARIPiprazole (ABILIFY) 5 mg tablet Take 1 Tab by mouth daily. Indications: DEPRESSION TREATMENT ADJUNCT, Print, Disp-30 Tab, R-0      !! buPROPion (WELLBUTRIN) 100 mg tablet Take 1 Tab by mouth two (2) times a day. Indications: major depressive disorder, Print, Disp-60 Tab, R-0      !! carvedilol (COREG) 6.25 mg tablet Take 1 Tab by mouth two (2) times daily (with meals). Indications: hypertension, Print, Disp-60 Tab, R-0      !! hydroCHLOROthiazide (HYDRODIURIL) 25 mg tablet Take 1 Tab by mouth daily. Indications: hypertension, Print, Disp-30 Tab, R-0      !! lisinopril (PRINIVIL, ZESTRIL) 20 mg tablet Take 1 Tab by mouth daily. Indications: hypertension, Print, Disp-30 Tab, R-0      traZODone (DESYREL) 150 mg tablet Take 1 Tab by mouth nightly as needed for Sleep. Indications: insomnia associated with depression, Print, Disp-30 Tab, R-0       !! - Potential duplicate medications found. Please discuss with provider. CONTINUE these medications which have NOT CHANGED    Details   !! ARIPiprazole (ABILIFY) 5 mg tablet Take 5 mg by mouth daily. , Historical Med      !! buPROPion (WELLBUTRIN) 100 mg tablet Take  by mouth., Historical Med !! lisinopril (PRINIVIL, ZESTRIL) 20 mg tablet Take 1 Tab by mouth daily for 30 doses. , Print, Disp-30 Tab, R-0      !! hydroCHLOROthiazide (HYDRODIURIL) 25 mg tablet Take 1 Tab by mouth daily for 30 doses. , Print, Disp-30 Tab, R-0      !! amLODIPine (NORVASC) 10 mg tablet Take 1 Tab by mouth daily for 30 doses. , Print, Disp-30 Tab, R-0      !! carvedilol (COREG) 6.25 mg tablet Take 1 Tab by mouth two (2) times a day for 30 days. , Print, Disp-60 Tab, R-0       !! - Potential duplicate medications found. Please discuss with provider. STOP taking these medications       FLUoxetine (PROZAC) 20 mg capsule Comments:   Reason for Stopping:         OLANZapine (ZYPREXA) 10 mg tablet Comments:   Reason for Stopping:               Instructions, risks (black box warning), benefits and side effects (EPS, TD, NMS) were discussed in detail prior to discharge. Patient denied any adverse medication side effects prior to discharge. LABS/IMAGING DURING ADMISSION     Results for orders placed or performed during the hospital encounter of 08/10/18   VITAMIN B12   Result Value Ref Range    Vitamin B12 367 211 - 911 pg/mL   T PALLIDUM AB   Result Value Ref Range    T. pallidum interpretation. NONREACTIVE NR     CBC WITH AUTOMATED DIFF   Result Value Ref Range    WBC 3.8 (L) 4.6 - 13.2 K/uL    RBC 4.96 4.70 - 5.50 M/uL    HGB 12.6 (L) 13.0 - 16.0 g/dL    HCT 37.9 36.0 - 48.0 %    MCV 76.4 74.0 - 97.0 FL    MCH 25.4 24.0 - 34.0 PG    MCHC 33.2 31.0 - 37.0 g/dL    RDW 16.1 (H) 11.6 - 14.5 %    PLATELET 751 619 - 365 K/uL    MPV 9.6 9.2 - 11.8 FL    NEUTROPHILS 51 40 - 73 %    LYMPHOCYTES 38 21 - 52 %    MONOCYTES 10 3 - 10 %    EOSINOPHILS 1 0 - 5 %    BASOPHILS 0 0 - 2 %    ABS. NEUTROPHILS 2.0 1.8 - 8.0 K/UL    ABS. LYMPHOCYTES 1.5 0.9 - 3.6 K/UL    ABS. MONOCYTES 0.4 0.05 - 1.2 K/UL    ABS. EOSINOPHILS 0.0 0.0 - 0.4 K/UL    ABS.  BASOPHILS 0.0 0.0 - 0.1 K/UL    DF AUTOMATED          DISCHARGE MENTAL STATUS EVALUATION Appearance/Hygiene 46 y.o. BLACK OR  male  Hygiene: Fair   Attitude/Behavior/Social Relatedness Appropriate, calm and pleasant   Musculoskeletal Gait/Station: appropriate  Tone (flaccid, cogwheeling, spastic): not assessed  Psychomotor (hyperkinetic, hypokinetic): calm  Involuntary movements (tics, dyskinesias, akathisa, stereotypies): none   Speech   Rate, rhythm, volume, fluency and articulation are appropriate   Mood   euthymic   Affect    euthymic   Thought Process Linear and goal directed   Thought Content and Perceptual Disturbances Denies self-injurious behavior (SIB), suicidal ideation (SI), aggressive behavior or homicidal ideation (HI)    Denies auditory and visual hallucinations   Sensorium and Cognition  Grossly intact   Insight  limited   Judgment fair       SUICIDE RISK ASSESSMENT     [] Admission  [x] Discharge     Key Factors:   Current admission precipitated by suicide attempt?   []  Yes     2    [x]  No     1     Suicide Attempt History  [] Past attempts of high lethality    2 [x]  Past attempts of low lethality    1 []  No previous attempts       0   Suicidal Ideation []  Constant suicidal thoughts      2 []  Intermittent or fleeting suicidal  thoughts  1 [x]  Denies current suicidal thoughts    0   Suicide Plan   []  Has plan with actual OR potential access to planned method    2 []  Has plan without access to planned method      1 [x]  No plan            0   Plan Lethality []  Highly lethal plan (Carbon monoxide, gun, hanging, jumping)    2 []  Moderate lethality of plan          1 [x]  Low lethality of plan (biting, head banging, superficial scratching, pillow over face)  0   Safety Plan Agreement  []  Unwilling OR unable to agree due to impaired reality testing   2   []  Patient is ambivalent and/or guarded      1 [x]  Reliably agrees        0   Current Morbid Thoughts (reunion fantasies, preoccupations with death) []  Constantly     2     []  Frequently    1 [x] Rarely    0   Elopement Risk  []  High risk     2 []  Moderate risk    1 [x]   Low risk    0   Symptoms    []  Hopeless  []  Helpless  []  Anhedonia   []  Guilt/shame  []  Anger/rage  []  Anxiety  []  Insomnia   []  Agitation   []  Impulsivity  []  5-6 symptoms present    2 []  3-4 symptoms present    1  [x]  0-2 symptoms present    0     Scoring Key:  10 or higher = Imminent Risk (consider 1:1)  4 - 9 = Moderate Risk (consider q 15 minute observation)Attended alcohol, tobacco, prescription and other drug psychoeducation group.   0 - 3 = Low Risk (consider q 30 minute observation)    Total Score: 1  ------------------------------------------------------------------------------------------------------------------  PLEASE ADDRESS THE FOLLOWING 5 ISSUES     Physician's Subjective Appraisal of Risk (check one):  []  Patient replies not trustworthy: several non-verbal cues. []  Patient replies questionable: trustworthy: at least 1 non-verbal cue. [x]  Patient replies appear trustworthy. Family History of Suicide? []  Yes  [x]  No    Protective measures (select all that apply):  []  Successful past responses to stress  []  Spiritual/Adventism beliefs  [x]  Capacity for reality testing  []  Positive therapeutic relationships  []  Social supports/connections  []  Positive coping skills  []  Frustration tolerance/optimism  []  Children or pets in the home  []  Sense of responsibility to family  [x]  Agrees to treatment plan and follow up    Others (list):    High Risk Diagnoses (select all that apply):  [x]  Depression/Bipolar Disorder  []  Dual Diagnosis  []  Cardiovascular Disease  []  Schizophrenia  []  Chronic Pain  []  Epilepsy  []  Cancer  []  Personality Disorder  []  HIV/AIDS  []  Multiple Sclerosis    Dangerousness Assessment (Suicide, homicide, property destruction. ..)    Risk Factors reviewed and risk assessed to be:  [x] low  [] low-moderate  [] moderate   [] moderate-high  [] high     Protection factors reviewed and risk assessed to be:  [x] low  [] low-moderate  [] moderate   [] moderate-high  [] high     Response to treatment and risk assessed to be:  [x] low  [] low-moderate  [] moderate   [] moderate-high  [] high     Support reviewed and risk assessed to be:  [x] low  [] low-moderate  [] moderate   [] moderate-high  [] high     Acceptance of Discharge and outpatient treatment reviewed and risk assessed to be:    [x] low  [] low-moderate  [] moderate   [] moderate-high  [] high   Overall risk assessed to be:  [x] low  [] low-moderate  [] moderate   [] moderate-high  [] high     Completion of discharge was greater than 30 minutes. Over 50% of today's discharge was geared towards counseling and coordination of care.           Edouard Gilman MD  Psychiatry  [facility]

## 2018-08-21 NOTE — DISCHARGE INSTRUCTIONS
BEHAVIORAL HEALTH NURSING DISCHARGE NOTE      The following personal items collected during your admission are returned to you:   Dental Appliance: Dental Appliances: None  Vision: Visual Aid: None  Hearing Aid:    Jewelry: Jewelry: None  Clothing: Clothing: Belt, [place], Shorts, Shirt, Undergarments, Footwear  Other Valuables: Other Valuables: Lighter/matches  Valuables sent to safe: Personal Items Sent to Safe:  (none)      PATIENT INSTRUCTIONS:             The discharge information has been reviewed with the patient. The patient verbalized understanding.         Patient armband removed and shredded

## 2018-08-21 NOTE — BH NOTES
Latisha García is participating in Hygiene Group. Group time: 30 minutes    Personal goal for participation: feeling clean    Goal orientation: personal    Group therapy participation: active    Therapeutic interventions reviewed and discussed: Staff encouraged Pt.  To practice good hygiene    Impression of participation: Pt. Agreed that good hygiene makes you feel good

## 2018-08-21 NOTE — PROGRESS NOTES
Patient received discharge instructions, verbalized understanding of appt. and medications. All personal items given to patient. Patient discharged at this time.

## 2018-08-21 NOTE — BH NOTES
Received care of patient via wheelchair and medical transport from [facility] ER with diagnosis of depression. Patient explained that \"pressure of life caused me to. .. I tried to commit suicide by jumping of [employer] bridge. I came in just looking for help. I need to get back to work. And join the regular people of society. \" Patient denied feelings of SI/HI or AVH, but stated that he has had VH \"before. I know if I see people climbing up the hernandez to get help. \"  Fiona Hernandez stated that the only mental health diagnosis he has is a \" schizophrenic, multiple personalities. \"  Patient stated that he last partook in illicit drug use a month prior: cocaine, THC and drank alcohol and smoked cigarettes last month as well, but the stated, \"I don't know how long ago it was really. Time has escaped me. I could pass a drug test.\"  Patient stated that he gets \"an adequate amount of sleep a night (about 8 hours. )\"  Patient explained that he only had the one suicide attempt before and denied feeling depressed, but then stated, \"I don't know the word for how I feel. \"  Opportunity for question provided. Dinner provided and then was escorted to room. Will continue to monitor for safety and provided support as needed. no

## 2018-08-21 NOTE — BSMART NOTE
Pt.'s case was discussed in treatment team this a.mMilka MICHEL Contact/DC Plan:  SW met with pt to discuss d/c plan. SW discussed continued medication and treatment compliance. Pt. Stated he will. Pt. Kept expressing gratitude for the staff helping him. Pt. was advised to go to [facility] for shelter. The pt. Will follow-up aftercare with Jesse ZURITA. Pt denies ideations and hallucinations. Pt. Was assisted with transportation via Lyft to the [facility].

## 2018-08-21 NOTE — BH NOTES
Patient complied with prompts from staff. Patient attended group and interacted with peers and staff positively. Thoughts did not align with reality at times but remained appropriate. Patient was able to eat meals and complied to medication protocol.

## 2018-11-06 ENCOUNTER — HOSPITAL ENCOUNTER (EMERGENCY)
Age: 53
Discharge: OTHER INSTITUTION | End: 2018-11-06
Attending: EMERGENCY MEDICINE
Payer: SELF-PAY

## 2018-11-06 ENCOUNTER — APPOINTMENT (OUTPATIENT)
Dept: CT IMAGING | Age: 53
End: 2018-11-06
Attending: EMERGENCY MEDICINE
Payer: SELF-PAY

## 2018-11-06 VITALS
TEMPERATURE: 97.8 F | HEART RATE: 59 BPM | WEIGHT: 168 LBS | DIASTOLIC BLOOD PRESSURE: 84 MMHG | SYSTOLIC BLOOD PRESSURE: 118 MMHG | RESPIRATION RATE: 17 BRPM | OXYGEN SATURATION: 98 % | BODY MASS INDEX: 27.12 KG/M2

## 2018-11-06 DIAGNOSIS — R41.0 CONFUSION: Primary | ICD-10-CM

## 2018-11-06 LAB
ALBUMIN SERPL-MCNC: 3.7 G/DL (ref 3.4–5)
ALBUMIN/GLOB SERPL: 1.1 {RATIO} (ref 0.8–1.7)
ALP SERPL-CCNC: 103 U/L (ref 45–117)
ALT SERPL-CCNC: 58 U/L (ref 16–61)
AMMONIA PLAS-SCNC: 37 UMOL/L (ref 11–32)
AMPHET UR QL SCN: NEGATIVE
ANION GAP SERPL CALC-SCNC: 6 MMOL/L (ref 3–18)
APPEARANCE UR: CLEAR
AST SERPL-CCNC: 22 U/L (ref 15–37)
BARBITURATES UR QL SCN: NEGATIVE
BASOPHILS # BLD: 0 K/UL (ref 0–0.1)
BASOPHILS NFR BLD: 0 % (ref 0–2)
BENZODIAZ UR QL: NEGATIVE
BILIRUB SERPL-MCNC: 0.5 MG/DL (ref 0.2–1)
BILIRUB UR QL: NEGATIVE
BUN SERPL-MCNC: 13 MG/DL (ref 7–18)
BUN/CREAT SERPL: 14 (ref 12–20)
CALCIUM SERPL-MCNC: 8.9 MG/DL (ref 8.5–10.1)
CANNABINOIDS UR QL SCN: NEGATIVE
CHLORIDE SERPL-SCNC: 104 MMOL/L (ref 100–108)
CK MB CFR SERPL CALC: 0.9 % (ref 0–4)
CK MB SERPL-MCNC: 1 NG/ML (ref 5–25)
CK SERPL-CCNC: 111 U/L (ref 39–308)
CO2 SERPL-SCNC: 31 MMOL/L (ref 21–32)
COCAINE UR QL SCN: NEGATIVE
COLOR UR: YELLOW
CREAT SERPL-MCNC: 0.93 MG/DL (ref 0.6–1.3)
DIFFERENTIAL METHOD BLD: ABNORMAL
EOSINOPHIL # BLD: 0.1 K/UL (ref 0–0.4)
EOSINOPHIL NFR BLD: 1 % (ref 0–5)
ERYTHROCYTE [DISTWIDTH] IN BLOOD BY AUTOMATED COUNT: 16 % (ref 11.6–14.5)
GLOBULIN SER CALC-MCNC: 3.4 G/DL (ref 2–4)
GLUCOSE SERPL-MCNC: 101 MG/DL (ref 74–99)
GLUCOSE UR STRIP.AUTO-MCNC: NEGATIVE MG/DL
HCT VFR BLD AUTO: 40.5 % (ref 36–48)
HDSCOM,HDSCOM: NORMAL
HGB BLD-MCNC: 12.9 G/DL (ref 13–16)
HGB UR QL STRIP: NEGATIVE
KETONES UR QL STRIP.AUTO: NEGATIVE MG/DL
LEUKOCYTE ESTERASE UR QL STRIP.AUTO: NEGATIVE
LYMPHOCYTES # BLD: 1.9 K/UL (ref 0.9–3.6)
LYMPHOCYTES NFR BLD: 37 % (ref 21–52)
MAGNESIUM SERPL-MCNC: 2.3 MG/DL (ref 1.6–2.6)
MCH RBC QN AUTO: 25.1 PG (ref 24–34)
MCHC RBC AUTO-ENTMCNC: 31.9 G/DL (ref 31–37)
MCV RBC AUTO: 78.9 FL (ref 74–97)
METHADONE UR QL: NEGATIVE
MONOCYTES # BLD: 0.3 K/UL (ref 0.05–1.2)
MONOCYTES NFR BLD: 6 % (ref 3–10)
NEUTS SEG # BLD: 2.8 K/UL (ref 1.8–8)
NEUTS SEG NFR BLD: 56 % (ref 40–73)
NITRITE UR QL STRIP.AUTO: NEGATIVE
OPIATES UR QL: NEGATIVE
PCP UR QL: NEGATIVE
PH UR STRIP: 6.5 [PH] (ref 5–8)
PLATELET # BLD AUTO: 228 K/UL (ref 135–420)
PMV BLD AUTO: 9.9 FL (ref 9.2–11.8)
POTASSIUM SERPL-SCNC: 3.6 MMOL/L (ref 3.5–5.5)
PROT SERPL-MCNC: 7.1 G/DL (ref 6.4–8.2)
PROT UR STRIP-MCNC: NEGATIVE MG/DL
RBC # BLD AUTO: 5.13 M/UL (ref 4.7–5.5)
SODIUM SERPL-SCNC: 141 MMOL/L (ref 136–145)
SP GR UR REFRACTOMETRY: 1.02 (ref 1–1.03)
TROPONIN I SERPL-MCNC: <0.02 NG/ML (ref 0–0.04)
UROBILINOGEN UR QL STRIP.AUTO: 0.2 EU/DL (ref 0.2–1)
WBC # BLD AUTO: 5.1 K/UL (ref 4.6–13.2)

## 2018-11-06 PROCEDURE — 81003 URINALYSIS AUTO W/O SCOPE: CPT | Performed by: EMERGENCY MEDICINE

## 2018-11-06 PROCEDURE — 80053 COMPREHEN METABOLIC PANEL: CPT | Performed by: EMERGENCY MEDICINE

## 2018-11-06 PROCEDURE — 96360 HYDRATION IV INFUSION INIT: CPT

## 2018-11-06 PROCEDURE — 85025 COMPLETE CBC W/AUTO DIFF WBC: CPT | Performed by: EMERGENCY MEDICINE

## 2018-11-06 PROCEDURE — 93005 ELECTROCARDIOGRAM TRACING: CPT

## 2018-11-06 PROCEDURE — 82140 ASSAY OF AMMONIA: CPT | Performed by: EMERGENCY MEDICINE

## 2018-11-06 PROCEDURE — 80307 DRUG TEST PRSMV CHEM ANLYZR: CPT | Performed by: EMERGENCY MEDICINE

## 2018-11-06 PROCEDURE — 70450 CT HEAD/BRAIN W/O DYE: CPT

## 2018-11-06 PROCEDURE — 82550 ASSAY OF CK (CPK): CPT | Performed by: EMERGENCY MEDICINE

## 2018-11-06 PROCEDURE — 83735 ASSAY OF MAGNESIUM: CPT | Performed by: EMERGENCY MEDICINE

## 2018-11-06 PROCEDURE — 74011250636 HC RX REV CODE- 250/636: Performed by: EMERGENCY MEDICINE

## 2018-11-06 PROCEDURE — 99285 EMERGENCY DEPT VISIT HI MDM: CPT

## 2018-11-06 RX ADMIN — SODIUM CHLORIDE 1000 ML: 900 INJECTION, SOLUTION INTRAVENOUS at 18:44

## 2018-11-06 NOTE — ED PROVIDER NOTES
EMERGENCY DEPARTMENT HISTORY AND PHYSICAL EXAM 
 
5:24 PM 
 
 
Date: 11/6/2018 Patient Name: Damian Adams History of Presenting Illness Chief Complaint Patient presents with  Altered mental status History Provided By: Patient and [facility] Chief Complaint: Confusion Duration:  Today Timing:  Acute Location: Psych Quality: N/A Severity: Moderate Modifying Factors: There are no modifying factors Associated Symptoms: denies any other associated signs or symptoms Additional History (Context): 5:26 PM Damian Adams is a 46 y.o. male with h/o major depressive disorder, hallucinations, and drinks ETOH who presents to ED complaining of acute moderate confusion onset today. The pt says that he woke up this morning and was confused and did not know where he was. He was at [facility] and had been there for the past week due to ETOH withdrawal and suicidal ideation. The counselor says that he woke up this morning confused and it has gotten worse over the day. He knows where he is now but does not know what year it is. He has been admitted once before to the [facility] but the counselor said he was much different then. He denies any headaches or any hallucinations. He has not had any changes in his medication in the past year. No other concerns or symptoms at this time. PCP: None Current Facility-Administered Medications Medication Dose Route Frequency Provider Last Rate Last Dose  sodium chloride 0.9 % bolus infusion 1,000 mL  1,000 mL IntraVENous ONCE Nancy Shha MD      
 
Current Outpatient Medications Medication Sig Dispense Refill  ARIPiprazole (ABILIFY) 5 mg tablet Take 1 Tab by mouth daily. Indications: DEPRESSION TREATMENT ADJUNCT 30 Tab 0  
 hydroCHLOROthiazide (HYDRODIURIL) 25 mg tablet Take 1 Tab by mouth daily.  Indications: hypertension 30 Tab 0  
  lisinopril (PRINIVIL, ZESTRIL) 20 mg tablet Take 1 Tab by mouth daily. Indications: hypertension 30 Tab 0  ARIPiprazole (ABILIFY) 5 mg tablet Take 5 mg by mouth daily. Past History Past Medical History: 
Past Medical History:  
Diagnosis Date  Back pain  Hyperlipemia  Hypertension  MDD (major depressive disorder), recurrent, severe, with psychosis ([facility] [address] 11/11/2017  Psychiatric disorder   
 hallucinations  Psychotic disorder ([facility] 11/11/2017 Past Surgical History: 
Past Surgical History:  
Procedure Laterality Date  HX OTHER SURGICAL    
 oral sx Family History: 
History reviewed. No pertinent family history. Social History: 
Social History Tobacco Use  Smoking status: Current Some Day Smoker Packs/day: 0.25  Smokeless tobacco: Never Used Substance Use Topics  Alcohol use: Yes Comment: beer once prior to admission- had been sober  Drug use: Yes Types: Marijuana, Cocaine Comment: denies current use. Allergies: 
No Known Allergies Review of Systems Review of Systems Cardiovascular: Negative for chest pain. Neurological: Negative for headaches. Psychiatric/Behavioral: Positive for confusion. Negative for hallucinations. All other systems reviewed and are negative. Physical Exam  
 
Visit Vitals /84 Pulse (!) 59 Temp 97.8 °F (36.6 °C) Resp 17 Wt 76.2 kg (168 lb) SpO2 98% BMI 27.12 kg/m² Physical Exam  
Constitutional: He is oriented to person, place, and time. He appears well-developed and well-nourished. No distress. HENT:  
Head: Normocephalic and atraumatic. Mouth/Throat: Oropharynx is clear and moist.  
Eyes: Conjunctivae and EOM are normal. Pupils are equal, round, and reactive to light. No scleral icterus. Neck: Normal range of motion. Neck supple. Cardiovascular: Normal rate, regular rhythm and normal heart sounds. No murmur heard. Pulmonary/Chest: Effort normal and breath sounds normal. No respiratory distress. Abdominal: Soft. Bowel sounds are normal. He exhibits no distension. There is no tenderness. Musculoskeletal: He exhibits no edema. Lymphadenopathy:  
  He has no cervical adenopathy. Neurological: He is alert and oriented to person, place, and time. Coordination normal.  
Skin: Skin is warm and dry. No rash noted. Psychiatric: He has a normal mood and affect. His behavior is normal.  
Poor affect, disoriented Nursing note and vitals reviewed. Diagnostic Study Results Labs - Recent Results (from the past 12 hour(s)) URINALYSIS W/ RFLX MICROSCOPIC Collection Time: 11/06/18  5:20 PM  
Result Value Ref Range Color YELLOW Appearance CLEAR Specific gravity 1.018 1.005 - 1.030    
 pH (UA) 6.5 5.0 - 8.0 Protein NEGATIVE  NEG mg/dL Glucose NEGATIVE  NEG mg/dL Ketone NEGATIVE  NEG mg/dL Bilirubin NEGATIVE  NEG Blood NEGATIVE  NEG Urobilinogen 0.2 0.2 - 1.0 EU/dL Nitrites NEGATIVE  NEG Leukocyte Esterase NEGATIVE  NEG    
DRUG SCREEN, URINE Collection Time: 11/06/18  5:20 PM  
Result Value Ref Range BENZODIAZEPINES NEGATIVE  NEG    
 BARBITURATES NEGATIVE  NEG    
 THC (TH-CANNABINOL) NEGATIVE  NEG    
 OPIATES NEGATIVE  NEG    
 PCP(PHENCYCLIDINE) NEGATIVE  NEG    
 COCAINE NEGATIVE  NEG    
 AMPHETAMINES NEGATIVE  NEG METHADONE NEGATIVE  NEG HDSCOM (NOTE) CBC WITH AUTOMATED DIFF Collection Time: 11/06/18  5:25 PM  
Result Value Ref Range WBC 5.1 4.6 - 13.2 K/uL  
 RBC 5.13 4.70 - 5.50 M/uL  
 HGB 12.9 (L) 13.0 - 16.0 g/dL HCT 40.5 36.0 - 48.0 % MCV 78.9 74.0 - 97.0 FL  
 MCH 25.1 24.0 - 34.0 PG  
 MCHC 31.9 31.0 - 37.0 g/dL  
 RDW 16.0 (H) 11.6 - 14.5 % PLATELET 839 110 - 623 K/uL MPV 9.9 9.2 - 11.8 FL  
 NEUTROPHILS 56 40 - 73 % LYMPHOCYTES 37 21 - 52 % MONOCYTES 6 3 - 10 % EOSINOPHILS 1 0 - 5 % BASOPHILS 0 0 - 2 % ABS. NEUTROPHILS 2.8 1.8 - 8.0 K/UL  
 ABS. LYMPHOCYTES 1.9 0.9 - 3.6 K/UL  
 ABS. MONOCYTES 0.3 0.05 - 1.2 K/UL  
 ABS. EOSINOPHILS 0.1 0.0 - 0.4 K/UL  
 ABS. BASOPHILS 0.0 0.0 - 0.1 K/UL  
 DF AUTOMATED METABOLIC PANEL, COMPREHENSIVE Collection Time: 11/06/18  5:25 PM  
Result Value Ref Range Sodium 141 136 - 145 mmol/L Potassium 3.6 3.5 - 5.5 mmol/L Chloride 104 100 - 108 mmol/L  
 CO2 31 21 - 32 mmol/L Anion gap 6 3.0 - 18 mmol/L Glucose 101 (H) 74 - 99 mg/dL BUN 13 7.0 - 18 MG/DL Creatinine 0.93 0.6 - 1.3 MG/DL  
 BUN/Creatinine ratio 14 12 - 20 GFR est AA >60 >60 ml/min/1.73m2 GFR est non-AA >60 >60 ml/min/1.73m2 Calcium 8.9 8.5 - 10.1 MG/DL Bilirubin, total 0.5 0.2 - 1.0 MG/DL  
 ALT (SGPT) 58 16 - 61 U/L  
 AST (SGOT) 22 15 - 37 U/L Alk. phosphatase 103 45 - 117 U/L Protein, total 7.1 6.4 - 8.2 g/dL Albumin 3.7 3.4 - 5.0 g/dL Globulin 3.4 2.0 - 4.0 g/dL A-G Ratio 1.1 0.8 - 1.7 MAGNESIUM Collection Time: 11/06/18  5:25 PM  
Result Value Ref Range Magnesium 2.3 1.6 - 2.6 mg/dL CARDIAC PANEL,(CK, CKMB & TROPONIN) Collection Time: 11/06/18  5:25 PM  
Result Value Ref Range  39 - 308 U/L  
 CK - MB 1.0 <3.6 ng/ml CK-MB Index 0.9 0.0 - 4.0 % Troponin-I, Qt. <0.02 0.0 - 0.045 NG/ML  
EKG, 12 LEAD, INITIAL Collection Time: 11/06/18  5:34 PM  
Result Value Ref Range Ventricular Rate 57 BPM  
 Atrial Rate 57 BPM  
 P-R Interval 210 ms QRS Duration 84 ms Q-T Interval 436 ms  
 QTC Calculation (Bezet) 424 ms Calculated P Axis 50 degrees Calculated R Axis -33 degrees Calculated T Axis 116 degrees Diagnosis Sinus bradycardia with 1st degree AV block Left axis deviation T wave abnormality, consider lateral ischemia Abnormal ECG When compared with ECG of 09-AUG-2018 06:34, No significant change was found AMMONIA Collection Time: 11/06/18  5:53 PM  
Result Value Ref Range Ammonia 37 (H) 11 - 32 UMOL/L Radiologic Studies -  
CT HEAD WO CONT Final Result Impression:  
 IMPRESSION: 
 
1.  No CT evidence of an acute intracranial abnormality - in particular, no 
acute cortical infarct, hemorrhage, or mass effect.   
2.  Unchanged, chronic appearing lacunar infarcts. Mild periventricular white 
matter changes, most commonly seen with chronic microvascular disease. Medical Decision Making I am the first provider for this patient. I reviewed the vital signs, available nursing notes, past medical history, past surgical history, family history and social history. Vital Signs-Reviewed the patient's vital signs. Pulse Oximetry Analysis -  100% on room air WNL Cardiac Monitor: 
Rate: 57 bpm   
Rhythm:  Sinus Bradycardia EKG: Interpreted by the EP. Time Interpreted: 17:35 Rate: 57 bpm 
 Rhythm: Sinus Bradycardia Interpretation: T wave inversions, 1 AVLV4-V6 Records Reviewed: Nursing Notes and Old Medical Records (Time of Review: 5:24 PM) 
 
ED Course: Progress Notes, Reevaluation, and Consults: 
6:39 PM 
Pt is sitting comfortably with no complaints Provider Notes (Medical Decision Making): MDM Number of Diagnoses or Management Options Confusion:  
Diagnosis management comments: Altered per crises stab pt alert oriented person and place not year h/o neurocognitive disorder per old records nontoxic appearing remainder of exam nonfocal ct head neg labs neg will dc home Amount and/or Complexity of Data Reviewed Clinical lab tests: ordered and reviewed Tests in the radiology section of CPT®: ordered and reviewed Independent visualization of images, tracings, or specimens: yes Risk of Complications, Morbidity, and/or Mortality Presenting problems: high Diagnostic procedures: moderate Management options: moderate Diagnosis Clinical Impression: 1. Confusion Disposition: Discharged Follow-up Information Follow up With Specialties Details Why Contact [facility] EMERGENCY DEPT Emergency Medicine  As needed, If symptoms worsen [address] 
[phone] [facility] unit Medication List  
  
ASK your doctor about these medications * ABILIFY 5 mg tablet Generic drug:  ARIPiprazole * ARIPiprazole 5 mg tablet Commonly known as:  ABILIFY Take 1 Tab by mouth daily. Indications: DEPRESSION TREATMENT ADJUNCT 
  
hydroCHLOROthiazide 25 mg tablet Commonly known as:  HYDRODIURIL Take 1 Tab by mouth daily. Indications: hypertension 
  
lisinopril 20 mg tablet Commonly known as:  Johnna Gary Take 1 Tab by mouth daily. Indications: hypertension * This list has 2 medication(s) that are the same as other medications prescribed for you. Read the directions carefully, and ask your doctor or other care provider to review them with you.  
  
  
  
 
_______________________________ Attestations: 
Scribe Attestation Jemma Rosario MD acting as a scribe for and in the presence of Gamaliel Hammond MD     
November 06, 2018 at 6:41 PM 
    
Provider Attestation:     
I personally performed the services described in the documentation, reviewed the documentation, as recorded by the scribe in my presence, and it accurately and completely records my words and actions. November 06, 2018 at 6:41 PM - Gamaliel Hammond MD   
_______________________________

## 2018-11-06 NOTE — DISCHARGE INSTRUCTIONS
Learning About Delirium  What is delirium? Delirium is a sudden change in mental condition. It leads to confusion and unusual behavior. Delirium is also called acute confusional state. Delirium affects all age groups. It can result from problems that affect the brain, such as stroke. It can also happen after an infection or when using certain medicines. Pain may also cause the problem. Seeing delirium in a loved one can be scary and sad. But it will go away most of the time. It usually lasts hours to days. The doctor will look for a cause and take steps to treat it and keep your loved one comfortable. What are the symptoms? Symptoms of delirium usually develop over several hours to a few days. Symptoms may change and be more or less severe. Symptoms include:  · A short attention span. · Confusion. This is not knowing where you are, what time it is, or who others are. · Hallucinations. This usually is seeing or hearing things that are not really there. · Delusions. This is believing things that aren't true. · Illusions. This is making a mistake in what you think is real. For example, you think a child is crying, but it's a pillow. · Disorganized thinking. How is delirium treated? The doctor may:  · Find and treat the cause. This could be:  ? Not getting enough fluids. ? An infection. ? A medicine or combination of medicines. ? Another medical problem. · Prescribe a medicine. · Make the hospital room as quiet as possible. You may be able to help your loved one by being present and talking to and touching him or her. Follow-up care is a key part of your treatment and safety. Be sure to make and go to all appointments, and call your doctor if you are having problems. It's also a good idea to know your test results and keep a list of the medicines you take. Where can you learn more? Go to http://jasper-teresa.info/.   Enter U742 in the search box to learn more about \"Learning About Delirium. \"  Current as of: December 7, 2017  Content Version: 11.8  © 5897-7169 [employer], [employer]. Care instructions adapted under license by [employer] (which disclaims liability or warranty for this information). If you have questions about a medical condition or this instruction, always ask your healthcare professional. [place] [address] any warranty or liability for your use of this information.

## 2018-11-06 NOTE — ED TRIAGE NOTES
In pt at crises stabilization x 1 1/2 weeks. There for depressive disorder. Woke today with increased confusion .

## 2018-11-07 LAB
ATRIAL RATE: 57 BPM
CALCULATED P AXIS, ECG09: 50 DEGREES
CALCULATED R AXIS, ECG10: -33 DEGREES
CALCULATED T AXIS, ECG11: 116 DEGREES
DIAGNOSIS, 93000: NORMAL
P-R INTERVAL, ECG05: 210 MS
Q-T INTERVAL, ECG07: 436 MS
QRS DURATION, ECG06: 84 MS
QTC CALCULATION (BEZET), ECG08: 424 MS
VENTRICULAR RATE, ECG03: 57 BPM

## 2018-11-07 NOTE — ED NOTES
I have reviewed discharge instruction with pt and CSB worker. Pt verbalized understanding and has no further questions at this time. Education taught and patient verbalized understanding of education. Teach back method used. 18g IV removed, catheter tip intact on removal.  Armband removed and shredded per patients request.   
Patients pain 0/10. Belongings are with Patient discharged with self and CSB worker to facility.

## 2018-12-01 ENCOUNTER — HOSPITAL ENCOUNTER (EMERGENCY)
Age: 53
Discharge: PSYCHIATRIC OR SUBSTANCE USE FACILITY | End: 2018-12-02
Attending: EMERGENCY MEDICINE | Admitting: EMERGENCY MEDICINE
Payer: SELF-PAY

## 2018-12-01 DIAGNOSIS — R45.851 SUICIDAL IDEATIONS: Primary | ICD-10-CM

## 2018-12-01 LAB
ALBUMIN SERPL-MCNC: 4.3 G/DL (ref 3.4–5)
ALBUMIN/GLOB SERPL: 1.3 {RATIO} (ref 0.8–1.7)
ALP SERPL-CCNC: 92 U/L (ref 45–117)
ALT SERPL-CCNC: 60 U/L (ref 16–61)
AMPHET UR QL SCN: NEGATIVE
ANION GAP SERPL CALC-SCNC: 9 MMOL/L (ref 3–18)
APAP SERPL-MCNC: <2 UG/ML (ref 10–30)
AST SERPL-CCNC: 23 U/L (ref 15–37)
BARBITURATES UR QL SCN: NEGATIVE
BASOPHILS # BLD: 0 K/UL (ref 0–0.1)
BASOPHILS NFR BLD: 0 % (ref 0–2)
BENZODIAZ UR QL: NEGATIVE
BILIRUB SERPL-MCNC: 0.6 MG/DL (ref 0.2–1)
BUN SERPL-MCNC: 12 MG/DL (ref 7–18)
BUN/CREAT SERPL: 11 (ref 12–20)
CALCIUM SERPL-MCNC: 8.9 MG/DL (ref 8.5–10.1)
CANNABINOIDS UR QL SCN: NEGATIVE
CHLORIDE SERPL-SCNC: 112 MMOL/L (ref 100–108)
CO2 SERPL-SCNC: 23 MMOL/L (ref 21–32)
COCAINE UR QL SCN: NEGATIVE
CREAT SERPL-MCNC: 1.11 MG/DL (ref 0.6–1.3)
DIFFERENTIAL METHOD BLD: ABNORMAL
EOSINOPHIL # BLD: 0 K/UL (ref 0–0.4)
EOSINOPHIL NFR BLD: 1 % (ref 0–5)
ERYTHROCYTE [DISTWIDTH] IN BLOOD BY AUTOMATED COUNT: 16.8 % (ref 11.6–14.5)
ETHANOL SERPL-MCNC: 140 MG/DL (ref 0–3)
GLOBULIN SER CALC-MCNC: 3.4 G/DL (ref 2–4)
GLUCOSE SERPL-MCNC: 105 MG/DL (ref 74–99)
HCT VFR BLD AUTO: 42.9 % (ref 36–48)
HDSCOM,HDSCOM: NORMAL
HGB BLD-MCNC: 13.9 G/DL (ref 13–16)
LYMPHOCYTES # BLD: 1.8 K/UL (ref 0.9–3.6)
LYMPHOCYTES NFR BLD: 43 % (ref 21–52)
MCH RBC QN AUTO: 25.8 PG (ref 24–34)
MCHC RBC AUTO-ENTMCNC: 32.4 G/DL (ref 31–37)
MCV RBC AUTO: 79.6 FL (ref 74–97)
METHADONE UR QL: NEGATIVE
MONOCYTES # BLD: 0.2 K/UL (ref 0.05–1.2)
MONOCYTES NFR BLD: 5 % (ref 3–10)
NEUTS SEG # BLD: 2.2 K/UL (ref 1.8–8)
NEUTS SEG NFR BLD: 51 % (ref 40–73)
OPIATES UR QL: NEGATIVE
PCP UR QL: NEGATIVE
PLATELET # BLD AUTO: 243 K/UL (ref 135–420)
PMV BLD AUTO: 9.7 FL (ref 9.2–11.8)
POTASSIUM SERPL-SCNC: 3.8 MMOL/L (ref 3.5–5.5)
PROT SERPL-MCNC: 7.7 G/DL (ref 6.4–8.2)
RBC # BLD AUTO: 5.39 M/UL (ref 4.7–5.5)
SALICYLATES SERPL-MCNC: <2.8 MG/DL (ref 2.8–20)
SODIUM SERPL-SCNC: 144 MMOL/L (ref 136–145)
WBC # BLD AUTO: 4.3 K/UL (ref 4.6–13.2)

## 2018-12-01 PROCEDURE — 85025 COMPLETE CBC W/AUTO DIFF WBC: CPT

## 2018-12-01 PROCEDURE — 80307 DRUG TEST PRSMV CHEM ANLYZR: CPT

## 2018-12-01 PROCEDURE — 99285 EMERGENCY DEPT VISIT HI MDM: CPT

## 2018-12-01 PROCEDURE — 80053 COMPREHEN METABOLIC PANEL: CPT

## 2018-12-02 ENCOUNTER — HOSPITAL ENCOUNTER (INPATIENT)
Age: 53
LOS: 5 days | Discharge: HOME | DRG: 885 | End: 2018-12-07
Attending: PSYCHIATRY & NEUROLOGY | Admitting: PSYCHIATRY & NEUROLOGY
Payer: SELF-PAY

## 2018-12-02 VITALS
HEART RATE: 71 BPM | RESPIRATION RATE: 18 BRPM | SYSTOLIC BLOOD PRESSURE: 141 MMHG | DIASTOLIC BLOOD PRESSURE: 89 MMHG | OXYGEN SATURATION: 98 % | TEMPERATURE: 98.2 F

## 2018-12-02 PROCEDURE — 74011250637 HC RX REV CODE- 250/637: Performed by: EMERGENCY MEDICINE

## 2018-12-02 PROCEDURE — 74011250637 HC RX REV CODE- 250/637: Performed by: PSYCHIATRY & NEUROLOGY

## 2018-12-02 PROCEDURE — 65220000005 HC RM SEMIPRIVATE PSYCH 3 OR 4 BED

## 2018-12-02 RX ORDER — ARIPIPRAZOLE 5 MG/1
5 TABLET ORAL
Status: COMPLETED | OUTPATIENT
Start: 2018-12-02 | End: 2018-12-02

## 2018-12-02 RX ORDER — IBUPROFEN 600 MG/1
600 TABLET ORAL
Status: DISCONTINUED | OUTPATIENT
Start: 2018-12-02 | End: 2018-12-07 | Stop reason: HOSPADM

## 2018-12-02 RX ORDER — BUPROPION HYDROCHLORIDE 100 MG/1
100 TABLET ORAL 2 TIMES DAILY
Status: DISCONTINUED | OUTPATIENT
Start: 2018-12-02 | End: 2018-12-03

## 2018-12-02 RX ORDER — HALOPERIDOL 5 MG/ML
5 INJECTION INTRAMUSCULAR
Status: DISCONTINUED | OUTPATIENT
Start: 2018-12-02 | End: 2018-12-07 | Stop reason: HOSPADM

## 2018-12-02 RX ORDER — HALOPERIDOL 5 MG/1
5 TABLET ORAL
Status: DISCONTINUED | OUTPATIENT
Start: 2018-12-02 | End: 2018-12-07 | Stop reason: HOSPADM

## 2018-12-02 RX ORDER — BENZTROPINE MESYLATE 1 MG/1
1 TABLET ORAL
Status: DISCONTINUED | OUTPATIENT
Start: 2018-12-02 | End: 2018-12-07 | Stop reason: HOSPADM

## 2018-12-02 RX ORDER — BENZTROPINE MESYLATE 1 MG/ML
2 INJECTION INTRAMUSCULAR; INTRAVENOUS
Status: DISCONTINUED | OUTPATIENT
Start: 2018-12-02 | End: 2018-12-07 | Stop reason: HOSPADM

## 2018-12-02 RX ORDER — LORAZEPAM 2 MG/ML
2 INJECTION INTRAMUSCULAR
Status: DISCONTINUED | OUTPATIENT
Start: 2018-12-02 | End: 2018-12-07 | Stop reason: HOSPADM

## 2018-12-02 RX ORDER — BENZTROPINE MESYLATE 1 MG/1
2 TABLET ORAL
Status: DISCONTINUED | OUTPATIENT
Start: 2018-12-02 | End: 2018-12-07 | Stop reason: HOSPADM

## 2018-12-02 RX ORDER — ARIPIPRAZOLE 5 MG/1
5 TABLET ORAL DAILY
Status: DISCONTINUED | OUTPATIENT
Start: 2018-12-03 | End: 2018-12-07 | Stop reason: HOSPADM

## 2018-12-02 RX ORDER — HYDROXYZINE PAMOATE 50 MG/1
50 CAPSULE ORAL
Status: DISCONTINUED | OUTPATIENT
Start: 2018-12-02 | End: 2018-12-07 | Stop reason: HOSPADM

## 2018-12-02 RX ORDER — BENZTROPINE MESYLATE 1 MG/ML
1 INJECTION INTRAMUSCULAR; INTRAVENOUS
Status: DISCONTINUED | OUTPATIENT
Start: 2018-12-02 | End: 2018-12-07 | Stop reason: HOSPADM

## 2018-12-02 RX ORDER — TRAZODONE HYDROCHLORIDE 50 MG/1
50 TABLET ORAL
Status: DISCONTINUED | OUTPATIENT
Start: 2018-12-02 | End: 2018-12-07 | Stop reason: HOSPADM

## 2018-12-02 RX ORDER — HYDROCHLOROTHIAZIDE 25 MG/1
25 TABLET ORAL
Status: COMPLETED | OUTPATIENT
Start: 2018-12-02 | End: 2018-12-02

## 2018-12-02 RX ORDER — BUPROPION HYDROCHLORIDE 100 MG/1
100 TABLET ORAL
Status: COMPLETED | OUTPATIENT
Start: 2018-12-02 | End: 2018-12-02

## 2018-12-02 RX ORDER — LISINOPRIL 20 MG/1
20 TABLET ORAL
Status: COMPLETED | OUTPATIENT
Start: 2018-12-02 | End: 2018-12-02

## 2018-12-02 RX ORDER — LORAZEPAM 2 MG/ML
1 INJECTION INTRAMUSCULAR
Status: DISCONTINUED | OUTPATIENT
Start: 2018-12-02 | End: 2018-12-07 | Stop reason: HOSPADM

## 2018-12-02 RX ORDER — LISINOPRIL 20 MG/1
20 TABLET ORAL DAILY
Status: DISCONTINUED | OUTPATIENT
Start: 2018-12-03 | End: 2018-12-07 | Stop reason: HOSPADM

## 2018-12-02 RX ORDER — HYDROCHLOROTHIAZIDE 25 MG/1
25 TABLET ORAL DAILY
Status: DISCONTINUED | OUTPATIENT
Start: 2018-12-03 | End: 2018-12-07 | Stop reason: HOSPADM

## 2018-12-02 RX ADMIN — BUPROPION HYDROCHLORIDE 100 MG: 100 TABLET, FILM COATED ORAL at 09:22

## 2018-12-02 RX ADMIN — LISINOPRIL 20 MG: 20 TABLET ORAL at 10:35

## 2018-12-02 RX ADMIN — ARIPIPRAZOLE 5 MG: 5 TABLET ORAL at 09:22

## 2018-12-02 RX ADMIN — HYDROCHLOROTHIAZIDE 25 MG: 25 TABLET ORAL at 10:35

## 2018-12-02 RX ADMIN — BUPROPION HYDROCHLORIDE 100 MG: 100 TABLET, FILM COATED ORAL at 20:21

## 2018-12-02 NOTE — PROGRESS NOTES
Discharge Location Discharge Placement: Psychiatric Unit(Accepted at [facility]) Accepted at [facility]  -- Bed 106-2 Address: [address] [place] [address], [address] Number to call report: [phone] Accepting MD:  Dr. Sanjay Martinez Pt is in agreement. Will require stretcher transport as patient is a threat to himself. Updated MD, charge nurse and unit secretary.

## 2018-12-02 NOTE — ED NOTES
TRANSFER - OUT REPORT: 
 
Verbal report given to Lynn RN(name) on Loly Tsang  being transferred to [facility]) for routine progression of care Report consisted of patients Situation, Background, Assessment and  
Recommendations(SBAR). Information from the following report(s) ED Summary, Intake/Output and MAR was reviewed with the receiving nurse. Lines:    
 
Opportunity for questions and clarification was provided. Patient transported with: 
 [facility]

## 2018-12-02 NOTE — ED TRIAGE NOTES
Pt arrived through triage with c/o SI and plan to jump off of bridge. Pt denies HI. Pt seen multiple times in the past for the same.

## 2018-12-02 NOTE — ED NOTES
I performed a brief history of the patient here in triage and I have determined that pt will need further treatment and evaluation from the main side ER physician. I have placed initial orders based on the history to help in expediting patients care.     
 
ANGELIC Martínez 7:57 PM

## 2018-12-02 NOTE — ED NOTES
Assume care of patient, patient resting at this time, sitter at bedside, patient in paper scrubs, patient waiting for placement

## 2018-12-02 NOTE — ED NOTES
Pt noted to have on PJ pants with a long string and underwear. String removed from PJ's and placed with belongings.

## 2018-12-02 NOTE — ED NOTES
Note:  Assuming care of patient at beginning of shift 7:15 AM 
I, Andre Duke MD, assumed care of patient at the beginning of my shift. 7:15 AM 
 
Date: 12/1/2018 Patient Name: Stacia Sweeney History of Presenting Illness Chief Complaint Patient presents with  Mental Health Problem Nursing notes regarding the HPI and triage nursing notes were reviewed. Prior medical records were reviewed. Current Facility-Administered Medications Medication Dose Route Frequency Provider Last Rate Last Dose  ARIPiprazole (ABILIFY) tablet 5 mg  5 mg Oral NOW Alessia Nunez MD      
 buPROPion [facility]) tablet 100 mg  100 mg Oral NOW Alessia Nunez MD      
 
Current Outpatient Medications Medication Sig Dispense Refill  ARIPiprazole (ABILIFY) 5 mg tablet Take 1 Tab by mouth daily. Indications: DEPRESSION TREATMENT ADJUNCT 30 Tab 0  
 hydroCHLOROthiazide (HYDRODIURIL) 25 mg tablet Take 1 Tab by mouth daily. Indications: hypertension 30 Tab 0  
 lisinopril (PRINIVIL, ZESTRIL) 20 mg tablet Take 1 Tab by mouth daily. Indications: hypertension 30 Tab 0  ARIPiprazole (ABILIFY) 5 mg tablet Take 5 mg by mouth daily. Past History Past Medical History: 
Past Medical History:  
Diagnosis Date  Back pain  Hyperlipemia  Hypertension  MDD (major depressive disorder), recurrent, severe, with psychosis ([facility] [address] 11/11/2017  Psychiatric disorder   
 hallucinations  Psychotic disorder ([facility] [address] 11/11/2017 Past Surgical History: 
Past Surgical History:  
Procedure Laterality Date  HX OTHER SURGICAL    
 oral sx Family History: No family history on file. Social History: 
Social History Tobacco Use  Smoking status: Current Some Day Smoker Packs/day: 0.25  Smokeless tobacco: Never Used Substance Use Topics  Alcohol use: Yes Comment: beer once prior to admission- had been sober  Drug use: Yes Types: Marijuana, Cocaine Comment: denies current use. Allergies: 
No Known Allergies Patient's primary care provider (as noted in EPIC):  None Abnormal lab results from this emergency department encounter: 
Labs Reviewed CBC WITH AUTOMATED DIFF - Abnormal; Notable for the following components:  
    Result Value WBC 4.3 (*)   
 RDW 16.8 (*) All other components within normal limits METABOLIC PANEL, COMPREHENSIVE - Abnormal; Notable for the following components:  
 Chloride 112 (*) Glucose 105 (*)   
 BUN/Creatinine ratio 11 (*) All other components within normal limits ETHYL ALCOHOL - Abnormal; Notable for the following components:  
 ALCOHOL(ETHYL),SERUM 140 (*) All other components within normal limits ACETAMINOPHEN - Abnormal; Notable for the following components:  
 Acetaminophen level <2 (*) All other components within normal limits SALICYLATE - Abnormal; Notable for the following components:  
 Salicylate level <6.2 (*) All other components within normal limits DRUG SCREEN, URINE Lab values for this patient within approximately the last 12 hours: 
Recent Results (from the past 12 hour(s)) DRUG SCREEN, URINE Collection Time: 12/01/18  8:05 PM  
Result Value Ref Range BENZODIAZEPINES NEGATIVE  NEG    
 BARBITURATES NEGATIVE  NEG    
 THC (TH-CANNABINOL) NEGATIVE  NEG    
 OPIATES NEGATIVE  NEG    
 PCP(PHENCYCLIDINE) NEGATIVE  NEG    
 COCAINE NEGATIVE  NEG    
 AMPHETAMINES NEGATIVE  NEG METHADONE NEGATIVE  NEG HDSCOM (NOTE) CBC WITH AUTOMATED DIFF Collection Time: 12/01/18  8:35 PM  
Result Value Ref Range WBC 4.3 (L) 4.6 - 13.2 K/uL  
 RBC 5.39 4.70 - 5.50 M/uL  
 HGB 13.9 13.0 - 16.0 g/dL HCT 42.9 36.0 - 48.0 % MCV 79.6 74.0 - 97.0 FL  
 MCH 25.8 24.0 - 34.0 PG  
 MCHC 32.4 31.0 - 37.0 g/dL  
 RDW 16.8 (H) 11.6 - 14.5 % PLATELET 150 963 - 798 K/uL MPV 9.7 9.2 - 11.8 FL  
 NEUTROPHILS 51 40 - 73 % LYMPHOCYTES 43 21 - 52 % MONOCYTES 5 3 - 10 % EOSINOPHILS 1 0 - 5 % BASOPHILS 0 0 - 2 %  
 ABS. NEUTROPHILS 2.2 1.8 - 8.0 K/UL  
 ABS. LYMPHOCYTES 1.8 0.9 - 3.6 K/UL  
 ABS. MONOCYTES 0.2 0.05 - 1.2 K/UL  
 ABS. EOSINOPHILS 0.0 0.0 - 0.4 K/UL  
 ABS. BASOPHILS 0.0 0.0 - 0.1 K/UL  
 DF AUTOMATED METABOLIC PANEL, COMPREHENSIVE Collection Time: 12/01/18  8:35 PM  
Result Value Ref Range Sodium 144 136 - 145 mmol/L Potassium 3.8 3.5 - 5.5 mmol/L Chloride 112 (H) 100 - 108 mmol/L  
 CO2 23 21 - 32 mmol/L Anion gap 9 3.0 - 18 mmol/L Glucose 105 (H) 74 - 99 mg/dL BUN 12 7.0 - 18 MG/DL Creatinine 1.11 0.6 - 1.3 MG/DL  
 BUN/Creatinine ratio 11 (L) 12 - 20 GFR est AA >60 >60 ml/min/1.73m2 GFR est non-AA >60 >60 ml/min/1.73m2 Calcium 8.9 8.5 - 10.1 MG/DL Bilirubin, total 0.6 0.2 - 1.0 MG/DL  
 ALT (SGPT) 60 16 - 61 U/L  
 AST (SGOT) 23 15 - 37 U/L Alk. phosphatase 92 45 - 117 U/L Protein, total 7.7 6.4 - 8.2 g/dL Albumin 4.3 3.4 - 5.0 g/dL Globulin 3.4 2.0 - 4.0 g/dL A-G Ratio 1.3 0.8 - 1.7 ETHYL ALCOHOL Collection Time: 12/01/18  8:35 PM  
Result Value Ref Range ALCOHOL(ETHYL),SERUM 140 (H) 0 - 3 MG/DL  
ACETAMINOPHEN Collection Time: 12/01/18  8:35 PM  
Result Value Ref Range Acetaminophen level <2 (L) 10.0 - 30.0 ug/mL SALICYLATE Collection Time: 12/01/18  8:35 PM  
Result Value Ref Range Salicylate level <0.3 (L) 2.8 - 20.0 MG/DL Radiologist and cardiologist interpretations if available at time of this note: 
Radiology results: No results found. Medication(s) ordered for patient during this emergency visit encounter: 
Medications ARIPiprazole (ABILIFY) tablet 5 mg (not administered) buPROPion [facility]) tablet 100 mg (not administered) Pt care assumed from Dr. Bettina Suárez , ED provider. Pt complaint(s), current treatment plan, progression and available diagnostic results have been discussed thoroughly.  
Rounding occurred: no 
 Intended Disposition: Transfer Pending diagnostic reports and/or labs (please list): [facility] case management transfer of a voluntary SI patient to a behavioral medicine facility. Telepsychiatrist has evaluated patient and recommends admission. Coding Diagnoses Clinical Impression: 1. Suicidal ideations Disposition Disposition:  Transfer. Yisel Burns M.D. [place] Board Certified Emergency Physician

## 2018-12-02 NOTE — PROGRESS NOTES
Problem: Suicide/Homicide (Adult/Pediatric) Goal: *STG: Attends activities and groups Pt will participate in 2 or more groups per day during hospitalization Outcome: Progressing Towards Goal 
aeb pt agrees to contract for safety. Problem: Falls - Risk of 
Goal: *Absence of Falls Document Harleen Evens Fall Risk and appropriate interventions in the flowsheet. Outcome: Progressing Towards Goal 
aeb pt free from falls this shift.

## 2018-12-02 NOTE — CONSULTS
Name: Stew Merino     : 1965  Date: 18      Time:  2:50am   Location of patient: [facility]    Location of doctor: [facility]   This evaluation was conducted via telepsychiatry with the assistance of onsite staff    Chief Complaint: I need help.   History of Present Illness: The patient a 50-year-old  male the history of Schizophrenia. He presented with auditory hallucinations, depressed mood, and suicidal thoughts for the past 1-2 weeks. Patient stated that he hears voices telling him to jump off a bridge. The patient was bizarre and guarded during the conversation with long pauses before answers (thought blocking). He was unable to reveal any stressors. He has been noncompliant with meds (abilify, Wellbutrin). The psychiatrist recommended inpatient care. The patient agreed. Collateral: none  SI/ Self harm: see psych hx  HI/Violence: none  Access to weapons: none  Legal: none  Psychiatric History/Treatment History: 3-4 prior inpatient admissions. Current outpatient treatment-sees PCP. 2 prior suicide attempts (tried to jump off a bridge)  Drug/Alcohol History: hx of cocaine, MJ, heroin-none in years.  Alcohol-none in months  Medical History: Hypertension, Hyperlipidemia, chronic back pain    Medications & Freq: Abilify 5 mg daily, Wellbutrin 100 mg daily, noncompliant with both  Allergies: pollen  Family Psych History/History of suicide: father-committed to [place] in the past  Social History: , lives with [employer]   Employment: unemployment   Education: HS grad   Stressors: none   Strengths/supports: unknown  Mental Status Exam:   Appearance and attire: appears stated age, dressed in hospital attire  Attitude and behavior: cooperative  Speech: WNL  Affect and mood: flat affect, sad mood  Association and thought processes: disorganized  Thought content: + SI, no HI, no delusions  Perception: + AVH  Sensorium, memory, and orientation: AAOx3  Intellectual functioning: average  Insight and judgment: poor  Impression/Risk Assessment: The patient is a 48-year-old male with a history of psychosis and suicide attempts. He presented to the hospital in a bizarre state with SI and command auditory hallucinations telling him to jump off a bridge. Inpatient care is warranted. The patient is agreeable to inpatient care. Admit as voluntary. Restart Abilify 5 mg daily, Wellbutrin 100 mg daily. Diagnosis: Schizophrenia  Treatment Recommendations: inpatient care  Pharmacological: Restart Abilify 5 mg daily, Wellbutrin 100 mg daily  Therapy: Supportive  Level of Care: Inpatient     Ivy Watkins M.D.   [facility]

## 2018-12-02 NOTE — BH NOTES
Pt arrived on unit voluntarily via security and medical transport from [facility]. Pt presented to the ED in [place] [address] with a plan to jump off of a bridge. Pt states that he is here to get help and he does not feel suicidal while here in the hospital and has no plan to harm himself. Pt denies HI and AVH at this time but agrees to come to staff if feelings of SI/HI arise. Pt has no NKA. Pt has a med hx of HTN. Pt has a psych hx of MDD and schizophrenia. Pt states that he has not been compliant with his medications because he ran out several months ago. Pt denies EtOH in the last 6 months and any marijuana use in 3 years. Pt last used tobacco yesterday. Pt states that he experimented with cocaine and heroin years ago. Pt is currently homeless, states that he cannot go back to [employer] but cannot remember why. Pt states his biggest stressor is \"living. \" Pt states that he has not significant other or family in the area and has no other social supports.

## 2018-12-02 NOTE — ED PROVIDER NOTES
César Bourgeois is a 46year old male who presents to the ED with a c/o suicide thoughts. States he has not tried to harm himself today, but is feeling overwhelmed with the idea of harming himself. He admits to trying to kill himself in the past by jumping off a bridge. Denies  alcohol and drugs. Past Medical History:  
Diagnosis Date  Back pain  Hyperlipemia  Hypertension  MDD (major depressive disorder), recurrent, severe, with psychosis ([facility] [address] 11/11/2017  Psychiatric disorder   
 hallucinations  Psychotic disorder ([facility] 11/11/2017 Past Surgical History:  
Procedure Laterality Date  HX OTHER SURGICAL    
 oral sx No family history on file. Social History Socioeconomic History  Marital status:  Spouse name: Not on file  Number of children: Not on file  Years of education: Not on file  Highest education level: Not on file Social Needs  Financial resource strain: Not on file  Food insecurity - worry: Not on file  Food insecurity - inability: Not on file  Transportation needs - medical: Not on file  Transportation needs - non-medical: Not on file Occupational History  Not on file Tobacco Use  Smoking status: Current Some Day Smoker Packs/day: 0.25  Smokeless tobacco: Never Used Substance and Sexual Activity  Alcohol use: Yes Comment: beer once prior to admission- had been sober  Drug use: Yes Types: Marijuana, Cocaine Comment: denies current use.  Sexual activity: Not on file Other Topics Concern  Not on file Social History Narrative  but  with 2 daughters. Homeless and was asked to leave due to his drinking GED No current legal charges pending but has possession charges in the past. ALLERGIES: Patient has no known allergies. Review of Systems Constitutional: Negative. HENT: Negative. Eyes: Negative. Respiratory: Negative. Cardiovascular: Negative. Gastrointestinal: Negative. Genitourinary: Negative. Musculoskeletal: Negative. Skin: Negative. Neurological: Negative. Psychiatric/Behavioral: Positive for suicidal ideas. Vitals:  
 12/01/18 1956 BP: (!) 148/111 Pulse: (!) 101 Resp: 16 Temp: 97.7 °F (36.5 °C) SpO2: 100% Physical Exam  
Constitutional: He appears well-developed and well-nourished. No distress. HENT:  
Head: Normocephalic and atraumatic. Nose: Nose normal.  
Eyes: EOM are normal. Right eye exhibits no discharge. Left eye exhibits no discharge. No scleral icterus. Neck: Normal range of motion. Neck supple. No tracheal deviation present. Cardiovascular: Normal rate, regular rhythm and intact distal pulses. Pulmonary/Chest: Effort normal and breath sounds normal.  
Abdominal: Soft. He exhibits no distension. Genitourinary:  
Genitourinary Comments: NE 
  
Musculoskeletal: He exhibits no deformity. Neurological: He is alert. Coordination normal.  
Skin: Skin is warm and dry. NE. Psychiatric:  
Pt holds a normal and cohesive conversation, but his thought process is askew in that he is entertaining thoughts of hurting himself. Nursing note and vitals reviewed. MDM Number of Diagnoses or Management Options Diagnosis management comments: PROGRESS NOTE:    Care of the PT has been turned over to [facility] at time of DC. Marivel Frye NP  2:53 AM 
 
 
 
  
 
Procedures I was personally available for consultation in the emergency department. I have reviewed the chart and agree with the documentation recorded by the [facility], including the assessment, treatment plan, and disposition. Parveen Guzman MD 
 
[id number]  Assumed primary care of pt. Case d/w Blanca Florez, who knows this patient from prior encounter in the ED. Feels pt needs in patient care and pt is agreeable. Recommends restarting Abilify 5 mg and Wellbutrin 100 mg as in-patient. Begin search for psych bed 
 
6:57 AM 
turnoed over to Dr Mariella Olmos to follow and make dispo

## 2018-12-02 NOTE — PROGRESS NOTES
Placed call to [facility] and made referral to Dylan. Will review and call this writer back. [phone]--Spoke to patient who is in agreement to go to outside of local area for IP Psych treatment if needed. Shameka Mcfarland RN Outcomes Manager 
[phone]

## 2018-12-03 PROBLEM — F32.A DEPRESSION: Status: ACTIVE | Noted: 2018-12-03

## 2018-12-03 PROBLEM — F32.A DEPRESSION: Status: RESOLVED | Noted: 2018-12-03 | Resolved: 2018-12-03

## 2018-12-03 PROCEDURE — 74011250637 HC RX REV CODE- 250/637: Performed by: PSYCHIATRY & NEUROLOGY

## 2018-12-03 PROCEDURE — 65220000005 HC RM SEMIPRIVATE PSYCH 3 OR 4 BED

## 2018-12-03 RX ORDER — BUPROPION HYDROCHLORIDE 150 MG/1
150 TABLET ORAL
Status: DISCONTINUED | OUTPATIENT
Start: 2018-12-04 | End: 2018-12-07 | Stop reason: HOSPADM

## 2018-12-03 RX ADMIN — HYDROCHLOROTHIAZIDE 25 MG: 25 TABLET ORAL at 08:44

## 2018-12-03 RX ADMIN — LISINOPRIL 20 MG: 20 TABLET ORAL at 08:44

## 2018-12-03 RX ADMIN — BUPROPION HYDROCHLORIDE 100 MG: 100 TABLET, FILM COATED ORAL at 08:44

## 2018-12-03 RX ADMIN — ARIPIPRAZOLE 5 MG: 5 TABLET ORAL at 08:44

## 2018-12-03 NOTE — BSMART NOTE
OCCUPATIONAL THERAPY PROGRESS NOTE Group Time:  0813 Attendance: The patient attended full group. Participation: The patient participated with minimal elaboration in the activity. . 
Attention: The patient was able to focus on the activity. Sandra Greenwood Interaction: The patient acknowledges others or responds to questions,  with no spontaneous interaction. Responded appropriately to direct questions. Little elaboration, somewhat concrete in responses.

## 2018-12-03 NOTE — H&P
[address] [facility] [address] Inpatient Admission Note Date of Service:  12/03/18 Historian(s): Gabriel Suarez and chart review Referral Source: [facility] Chief Complaint \"I had a breakdown at the park. \" History of Present Illness Gabriel Suarez is a 46 y.o. BLACK OR  male with a history of MDD with psychosis and Major Neurocognitive Disorder who was admitted from [facility] for suicidal ideations and auditory hallucinations of voices telling him to jump off a bridge. Pt has had previous admissions to this facility and was discharged in August to the [facility]. Pt has been homeless for some years and has no social support. He has no contact with family members. He also has Dementia and is unable to follow up and process applications needed for Disability or other Social amenities such as housing. Pt states he was working with a  at the [facility] who was supposed to help with housing but he has no idea where that stands. He also has not applied for Disability yet. Pt states he is frustrated with his current living situation and social life. \"I just want to be a productive member of society, to have a stable place to live and have a job. \" Pt denies SI at this time. Pt describes his mood in the past two weeks as \"confusing\". He says he wa hoping to get help from people at [facility] but has not been helped and does not know what to do. He denies hallucinations and paranoia. He denies neurovegetative symptoms of depression. He cannot remember if he has been taking antidepressants recently or is he has had outpatient follow up with [facility]. He denies use of alcohol or drugs. BAL was 140 in ED. Pt states he had drank a 32 oz beer prior to going to the ED and it was the first beer he drank in several months. Psychiatric Review of Systems See HPI Medical Review of Systems Sleep: good Appetite: good 10 point review of systems was completed. Significant findings are found in the HPI or MSE. Psychiatric Treatment History Self-injurious behavior/risky thoughts or behaviors (past suicidal ideation/attempt):  
Denies any prior history of suicide attempts Violence/Risk to others (past homicidal ideation/attempt):  
Denies any prior history of violence or homicidal ideation. Previous psychiatric medication trials: Prozac, Zoloft, Abilify, Wellbutrin Previous psychiatric hospitalizations: At least 3 prior hospitalizations to [facility] Current psychiatric provider: Unknown, should be [facility] Allergies No Known Allergies Medical History Past Medical History:  
Diagnosis Date  Back pain  Hyperlipemia  Hypertension  MDD (major depressive disorder), recurrent, severe, with psychosis ([facility] [address] 11/11/2017  Psychiatric disorder   
 hallucinations  Psychotic disorder ([facility] [address] 11/11/2017 History of neurological illness: Denies History of head injuries: Denies Medication(s) Prior to Admission Medications Prescriptions Last Dose Informant Patient Reported? Taking? ARIPiprazole (ABILIFY) 5 mg tablet 12/2/2018 at Unknown time Transfer Papers Yes Yes Sig: Take 5 mg by mouth daily. ARIPiprazole (ABILIFY) 5 mg tablet 12/2/2018 at Unknown time  No Yes Sig: Take 1 Tab by mouth daily. Indications: DEPRESSION TREATMENT ADJUNCT  
hydroCHLOROthiazide (HYDRODIURIL) 25 mg tablet 12/2/2018 at Unknown time  No Yes Sig: Take 1 Tab by mouth daily. Indications: hypertension  
lisinopril (PRINIVIL, ZESTRIL) 20 mg tablet 12/2/2018 at Unknown time  No Yes Sig: Take 1 Tab by mouth daily. Indications: hypertension Facility-Administered Medications: None Substance Abuse History Tobacco: denied Alcohol: recent use Marijuana: denied Cocaine: denied Opiate: denied Benzodiazepine: denied Other: denied Consequences: none History of detox: none History of substance abuse treatment: none Family History No family history on file. Psychiatric Family History Father and sister had depression Family history of suicide? No 
 
Social History Living Situation: [employer] Employment: Unemployed Education: Some college Relationships/Children: , adult children. He has no contact with ex-wife or children. Lost their phone numbers Legal: Denies Vitals/Labs Vitals:  
 12/02/18 2018 12/03/18 0820 BP: 140/84 (!) 133/98 Pulse: 66 88 Resp: 18 17 Temp: 97.9 °F (36.6 °C) 97.7 °F (36.5 °C) Physical Exam  
Constitutional: He appears well-developed and well-nourished. No distress. HENT:  
Head: Normocephalic and atraumatic. Nose: Nose normal.  
Eyes: EOM are normal. Right eye exhibits no discharge. Left eye exhibits no discharge. No scleral icterus. Neck: Normal range of motion. Neck supple. No tracheal deviation present. Cardiovascular: Normal rate, regular rhythm and intact distal pulses. Pulmonary/Chest: Effort normal and breath sounds normal.  
Abdominal: Soft. He exhibits no distension. Musculoskeletal: He exhibits no deformity. Neurological: He is alert. Coordination normal.  
Skin: Skin is warm and dry. Labs: 
Results for orders placed or performed during the hospital encounter of 12/01/18 CBC WITH AUTOMATED DIFF Result Value Ref Range WBC 4.3 (L) 4.6 - 13.2 K/uL  
 RBC 5.39 4.70 - 5.50 M/uL  
 HGB 13.9 13.0 - 16.0 g/dL HCT 42.9 36.0 - 48.0 % MCV 79.6 74.0 - 97.0 FL  
 MCH 25.8 24.0 - 34.0 PG  
 MCHC 32.4 31.0 - 37.0 g/dL  
 RDW 16.8 (H) 11.6 - 14.5 % PLATELET 636 472 - 101 K/uL MPV 9.7 9.2 - 11.8 FL  
 NEUTROPHILS 51 40 - 73 % LYMPHOCYTES 43 21 - 52 % MONOCYTES 5 3 - 10 % EOSINOPHILS 1 0 - 5 % BASOPHILS 0 0 - 2 %  
 ABS. NEUTROPHILS 2.2 1.8 - 8.0 K/UL  
 ABS. LYMPHOCYTES 1.8 0.9 - 3.6 K/UL  
 ABS. MONOCYTES 0.2 0.05 - 1.2 K/UL ABS. EOSINOPHILS 0.0 0.0 - 0.4 K/UL  
 ABS. BASOPHILS 0.0 0.0 - 0.1 K/UL  
 DF AUTOMATED METABOLIC PANEL, COMPREHENSIVE Result Value Ref Range Sodium 144 136 - 145 mmol/L Potassium 3.8 3.5 - 5.5 mmol/L Chloride 112 (H) 100 - 108 mmol/L  
 CO2 23 21 - 32 mmol/L Anion gap 9 3.0 - 18 mmol/L Glucose 105 (H) 74 - 99 mg/dL BUN 12 7.0 - 18 MG/DL Creatinine 1.11 0.6 - 1.3 MG/DL  
 BUN/Creatinine ratio 11 (L) 12 - 20 GFR est AA >60 >60 ml/min/1.73m2 GFR est non-AA >60 >60 ml/min/1.73m2 Calcium 8.9 8.5 - 10.1 MG/DL Bilirubin, total 0.6 0.2 - 1.0 MG/DL  
 ALT (SGPT) 60 16 - 61 U/L  
 AST (SGOT) 23 15 - 37 U/L Alk. phosphatase 92 45 - 117 U/L Protein, total 7.7 6.4 - 8.2 g/dL Albumin 4.3 3.4 - 5.0 g/dL Globulin 3.4 2.0 - 4.0 g/dL A-G Ratio 1.3 0.8 - 1.7 DRUG SCREEN, URINE Result Value Ref Range BENZODIAZEPINES NEGATIVE  NEG    
 BARBITURATES NEGATIVE  NEG    
 THC (TH-CANNABINOL) NEGATIVE  NEG    
 OPIATES NEGATIVE  NEG    
 PCP(PHENCYCLIDINE) NEGATIVE  NEG    
 COCAINE NEGATIVE  NEG    
 AMPHETAMINES NEGATIVE  NEG METHADONE NEGATIVE  NEG HDSCOM (NOTE) ETHYL ALCOHOL Result Value Ref Range ALCOHOL(ETHYL),SERUM 140 (H) 0 - 3 MG/DL  
ACETAMINOPHEN Result Value Ref Range Acetaminophen level <2 (L) 10.0 - 30.0 ug/mL SALICYLATE Result Value Ref Range Salicylate level <4.5 (L) 2.8 - 20.0 MG/DL Mental Status Examination Appearance/Hygiene 46 y.o. BLACK OR  male Hygiene: fair Behavior/Social Relatedness Appropriate Musculoskeletal Gait/Station: appropriate Tone (flaccid, cogwheeling, spastic): not assessed Psychomotor (hyperkinetic, hypokinetic): calm Involuntary movements (tics, dyskinesias, akathisa, stereotypies): none Speech   Rate, rhythm, volume, fluency and articulation are appropriate Mood   Mildly dysphoric Affect    congruent Thought Process Linear and goal directed Vagueness, incoherence, circumstantiality, tangentiality, neologisms, perseveration, flight of ideas, or self-contradictory statements not present on assessment Thought Content and Perceptual Disturbances Denies delusions, ideas of reference, overvalued ideas, ruminations, obsession, compulsions, and phobias Denies self-injurious behavior (SIB), suicidal ideation (SI), aggressive behavior or homicidal ideation (HI) Denies auditory and visual hallucinations Sensorium and Cognition  Alert and oriented x 3. Does not know date but knows month and year. [facility]  limited Judgment fair Suicide Risk Assessment Admission  Date/Time: 12/03/18 [x] Admission  [] Discharge Key Factors:  
Current admission precipitated by suicide attempt? []  Yes 2    [x]  No  
 
1 Suicide Attempt History  [] Past attempts of high lethality 2 [x]  Past attempts of low lethality 1 [x]  No previous attempts  
 
 
0 Suicidal Ideation []  Constant suicidal thoughts 
 
 
2 []  Intermittent or fleeting suicidal  thoughts 1 [x]  Denies current suicidal thoughts 
 
0 Suicide Plan   []  Has plan with actual OR potential access to planned method 
 
2 []  Has plan without access to planned method 
 
 
1 []  No plan 
 
 
 
 
 
0 Plan Lethality []  Highly lethal plan (Carbon monoxide, gun, hanging, jumping) 2 []  Moderate lethality of plan 
 
 
 
 
1 []  Low lethality of plan (biting, head banging, superficial scratching, pillow over face) 0 Safety Plan Agreement  []  Unwilling OR unable to agree due to impaired reality testing 2   []  Patient is ambivalent and/or guarded 1 [x]  Reliably agrees 
 
 
 
0 Current Morbid Thoughts (reunion fantasies, preoccupations with death) []  Constantly 2 []  Frequently 1 [x]  Rarely 0 Elopement Risk  []  High risk 2 []  Moderate risk 1 [x]   Low risk 0 Symptoms   
[]  Hopeless [x]  Helpless 
[]  Anhedonia  
[]  Guilt/shame []  Anger/rage [x]  Anxiety 
[]  Insomnia  
[]  Agitation  
[]  Impulsivity  []  5-6 symptoms present 2 []  3-4 symptoms present 1  []  0-2 symptoms present 
 
0 Total Score: 4  
-------------------------------------------------------------------------------------------------------------- Subjective Appraisal of Risk: 
[]  Patient replies not trustworthy: several non-verbal cues. []  Patient replies questionable: trustworthy: at least 1 non-verbal cue. [x]  Patient replies appear trustworthy. Protective measures (select all that apply): 
[x]  Successful past responses to stress 
[]  Spiritual/Yarsani beliefs [x]  Capacity for reality testing 
[]  Positive therapeutic relationships 
[]  Social supports/connections []  Positive coping skills []  Frustration tolerance/optimism 
[]  Children or pets in the home 
[]  Sense of responsibility to family []  Agrees to treatment plan and follow up High Risk Diagnoses (select all that apply): 
[x]  Depression/Bipolar Disorder 
[]  Dual Diagnosis 
[]  Cardiovascular Disease 
[]  Schizophrenia 
[]  Chronic Pain 
[]  Epilepsy 
[]  Cancer 
[]  Personality Disorder 
[]  HIV/AIDS []  Multiple Sclerosis Dangerousness Assessment (Suicide, homicide, property destruction. ..) Risk Factors reviewed and risk assessed to be:  [] low  [] low-moderate  [] moderate 
 [] moderate-high  [x] high Protection factors reviewed and risk assessed to be:  [] low  [] low-moderate  [x] moderate 
 [] moderate-high  [] high Response to treatment and risk assessed to be:  [x] low  [] low-moderate  [] moderate 
 [] moderate-high  [] high Support reviewed and risk assessed to be:  [] low  [] low-moderate  [] moderate [x] moderate-high  [] high Acceptance of Discharge and outpatient treatment reviewed and risk assessed to be: 
  [x] low  [] low-moderate  [] moderate 
 [] moderate-high  [] high Overall risk assessed to be:  [] low  [] low-moderate  [] moderate 
 [] moderate-high  [x] high Assessment and Plan Psychiatric Diagnoses:  
Patient Active Problem List  
Diagnosis Code  MDD (major depressive disorder), recurrent, severe, with psychosis ([facility] [address] F33.3  Alcohol abuse F10.10  Hypertension I10  Major neurocognitive disorder F01.50 Psychosocial and contextual factors: homeless, limited social support Level of impairment/disability: severe Jules Murphy is a 46 y. o. who is currently requiring acute stabilization for worsening depression and SI with plan to jump off a bridge. 1. Admit to locked inpatient behavioral health unit. Start milieu, group, art and occupation therapy. 2. Restart Abilify and Wellbutrin for mood 3. Routine labs ordered and reviewed by this provider. 4. Reviewed instructions, risks, benefits and side effects. 5. Start disposition planning; verify upcoming outpatient appointments with therapist and/or psychiatric medication prescriber. 6. Tentative date of discharge: 3-5 days Marcus Salguero MD 
Psychiatrist 
[facility]

## 2018-12-03 NOTE — PROGRESS NOTES
Problem: Suicide/Homicide (Adult/Pediatric) Goal: *STG: Remains safe in hospital 
Pt will make no attempts to harm self or others during hospitalization Outcome: Progressing Towards Goal 
Remains safe. Goal: *STG: Attends activities and groups Pt will participate in 2 or more groups per day during hospitalization Outcome: Progressing Towards Goal 
Attending groups. Goal: *STG/LTG:  No longer expresses self destructive or suicidal/homicidal thoughts Pt will deny any thoughts of harming self or others prior to discharge Outcome: Progressing Towards Goal 
Denies suicidal thoughts. Comments: Patient states he is feeling better, he denies suicidal thoughts. He is depressed, encourage to attend groups.

## 2018-12-03 NOTE — BH NOTES
GROUP THERAPY PROGRESS NOTE Amy Ramesh is not participating in [employer]. Group time: 30 minutes Personal goal for participation: none Goal orientation: community Group therapy participation: minimal 
 
Therapeutic interventions reviewed and discussed: goals and procedues Impression of participation: encouraged

## 2018-12-03 NOTE — BH NOTES
The writer has observed the patient's behavior throughout this shift. The patient has been calm and pleasant towards the staff, but stays to himself. Patient does not interact with other peers. Pt states that he is homeless and has no family members in this area. Patient has been free of falls and other harms and staff will continue to monitor patient's behavior fot the duration of this shift.

## 2018-12-03 NOTE — BSMART NOTE
SOCIAL WORK GROUP THERAPY PROGRESS NOTE Group Time:  10am 
 
Group Topic:  Coping Skills Group Participation: Pt was unavailable for group due to sleeping in room & waiting to see his

## 2018-12-04 PROCEDURE — 65220000005 HC RM SEMIPRIVATE PSYCH 3 OR 4 BED

## 2018-12-04 PROCEDURE — 74011250637 HC RX REV CODE- 250/637: Performed by: PSYCHIATRY & NEUROLOGY

## 2018-12-04 RX ADMIN — ARIPIPRAZOLE 5 MG: 5 TABLET ORAL at 08:31

## 2018-12-04 RX ADMIN — BUPROPION HYDROCHLORIDE 150 MG: 150 TABLET, FILM COATED, EXTENDED RELEASE ORAL at 07:00

## 2018-12-04 RX ADMIN — LISINOPRIL 20 MG: 20 TABLET ORAL at 08:31

## 2018-12-04 RX ADMIN — TRAZODONE HYDROCHLORIDE 50 MG: 50 TABLET ORAL at 22:54

## 2018-12-04 RX ADMIN — HYDROCHLOROTHIAZIDE 25 MG: 25 TABLET ORAL at 08:31

## 2018-12-04 NOTE — PROGRESS NOTES
Problem: Suicide/Homicide (Adult/Pediatric) Goal: *STG: Remains safe in hospital 
Pt will make no attempts to harm self or others during hospitalization Outcome: Progressing Towards Goal 
Remains safe Goal: *STG: Attends activities and groups Pt will participate in 2 or more groups per day during hospitalization Outcome: Progressing Towards Goal 
attending Goal: *STG/LTG: Complies with medication therapy Pt will comply daily with all prescribed medication while hospitalized Outcome: Progressing Towards Goal 
Compliant. Goal: *STG/LTG:  No longer expresses self destructive or suicidal/homicidal thoughts Pt will deny any thoughts of harming self or others prior to discharge Outcome: Progressing Towards Goal 
Denies Comments: Patient states he is feeling better, denies suicidal thoughts. Patient is medication compliant and attending most groups.

## 2018-12-04 NOTE — PROGRESS NOTES
GROUP THERAPY PROGRESS NOTE Les Barragan is participating in [facility] educational group. Group time: 30 minutes Personal goal for participation: Identify at least 2 coping skills to utilize with increased stressors. Goal orientation: community Group therapy participation: active Therapeutic interventions reviewed and discussed: Identifying coping skills to utilize when presented with increased stressors. Impression of participation: calm

## 2018-12-04 NOTE — PROGRESS NOTES
[address] [facility] [address] Inpatient Progress Note Date of Service: 12/04/18 Hospital Day: 2 Subjective/Interval History 12/04/18 Treatment Team Notes:  Notes reviewed and/or discussed and report that Taylor Porter is doing better. He has been compliant with treatment plan. He denies SI or hallucinations. Patient interview: Taylor Porter was interviewed by this writer today. Pt denies depressed mood. Reports his mood as \"upbeat\". He denies SI or hallucinations. Sleeping and eating well. No medication side effects. Pt with no social support and cognitive impairment. Mainly here to get help with housing which cannot be offered due to pt having no source of income. He does not know if he has applied for Disability or not. Not sure what else to do for him. Objective Visit Vitals BP (!) 143/98 (BP 1 Location: Right arm, BP Patient Position: Sitting) Pulse 68 Temp 97.3 °F (36.3 °C) Resp 18 No results found for this or any previous visit (from the past 24 hour(s)). Mental Status Examination Appearance/Hygiene 46 y.o. BLACK OR  male Hygiene: Juan Luis Fuentes Behavior/Social Relatedness Appropriate Musculoskeletal Gait/Station: appropriate Tone (flaccid, cogwheeling, spastic): not assessed Psychomotor (hyperkinetic, hypokinetic): calm Involuntary movements (tics, dyskinesias, akathisa, stereotypies): none Speech   Rate, rhythm, volume, fluency and articulation are appropriate Mood   euthymic Affect    congruent Thought Process Linear and goal directed Thought Content and Perceptual Disturbances Denies self-injurious behavior (SIB), suicidal ideation (SI), aggressive behavior or homicidal ideation (HI) Denies auditory and visual hallucinations Sensorium and Cognition  alert, cognitive impairment Insight  limited Judgment limited Assessment/Plan Psychiatric Diagnoses:  
Patient Active Problem List  
Diagnosis Code  MDD (major depressive disorder), recurrent, severe, with psychosis ([facility] [address] F33.3  Alcohol abuse F10.10  Hypertension I10  Major neurocognitive disorder F01.50 Psychosocial and contextual factors: homeless, limited social support Level of impairment/disability: moderate Jihan Marino is a 46 y. o. who is currently admitted for SI but says he was not suicidal when he went to the ED. He needs help with homelessness. 1.  Continue current treatment plan. 2.  Reviewed instructions, risks, benefits and side effects of medications 3. Disposition/Discharge Date: self-care/home, possibly tomorrow. Candelaria Murphy MD DR. [facility]

## 2018-12-04 NOTE — BSMART NOTE
Pt.is a 47year old male with history of depression and past hospitalization at this facility. Pt was admitted for ideations to harm self. Case was discussed in treatment team.   
  
MARILU Contact:  MARILU met with pt. To discuss this admission. Pt.  admits he ran out of medications. Pt. stated he feels better today . pt. stated he feels more focus , does not hear voices  and feels clear. Pt. explained he has been staying at the [facility] as advised when he was d./c form this facility in August 2018. Pt. stated the [facility] staff  have been assisting him with having a place to stay, clothing and meals. Pt. stated the staff help him apply for social security . The pt. stated he has been asking the stay for weeks want is next and what he needs to do . The pt. stated the staff told him just continue to sit tight. Pt. expressed he was feeling as though progress  such as housing and SSDI was not happening. MARILU explained the SSDI  it can take 3-6 months. SW also explained to pt, the staff probably had referred him to the [employer] program for housing. Pt. Admits he received an approval letter for disability. Pt. Stated he suppose to receives  900.+ monthly. Pt. Stated he ask the staff at [facility]  About the next process. MARILU explained to pt. He is in a supportive program /environment at [facility]. SW ask pt. To allow this SW to contact the staff at [facility] . The pt. Declined. SW will assist the pt with contacting [address] [place] [address] office to verify the approval letter. MARILU can refer pt to board and care provider [place] or [facility] if the [facility] office can verify approval letter and projected day of disbursement. Pt. Is calm, appears less confused and limited insight.

## 2018-12-04 NOTE — BSMART NOTE
ART THERAPY GROUP PROGRESS NOTE Group time:1300 The patient did not awaken/get up when called for group.

## 2018-12-04 NOTE — PROGRESS NOTES
Patient attended group today, he was calm pleasant and co-operative,he ate all meals ans took his medication, staff will continue to monitor patient for safety.

## 2018-12-04 NOTE — BH NOTES
GROUP THERAPY PROGRESS NOTE Henry Callahan is participating in [place]. Group time: 30 minutes Personal goal for participation: have a good day Goal orientation: community Group therapy participation: minimal 
 
Therapeutic interventions reviewed and discussed: goals and procedures Impression of participation: encouraged

## 2018-12-05 PROCEDURE — 65220000005 HC RM SEMIPRIVATE PSYCH 3 OR 4 BED

## 2018-12-05 PROCEDURE — 74011250637 HC RX REV CODE- 250/637: Performed by: PSYCHIATRY & NEUROLOGY

## 2018-12-05 RX ADMIN — LISINOPRIL 20 MG: 20 TABLET ORAL at 09:55

## 2018-12-05 RX ADMIN — BUPROPION HYDROCHLORIDE 150 MG: 150 TABLET, FILM COATED, EXTENDED RELEASE ORAL at 06:52

## 2018-12-05 RX ADMIN — TRAZODONE HYDROCHLORIDE 50 MG: 50 TABLET ORAL at 20:13

## 2018-12-05 RX ADMIN — HYDROCHLOROTHIAZIDE 25 MG: 25 TABLET ORAL at 09:55

## 2018-12-05 RX ADMIN — ARIPIPRAZOLE 5 MG: 5 TABLET ORAL at 09:55

## 2018-12-05 NOTE — PROGRESS NOTES
Patient was calm pleasant and co-operative, he attended group. ate all meals  and took his medication staff will continue to monitor Patient for safety.

## 2018-12-05 NOTE — PROGRESS NOTES
[address] Inpatient Progress Note Date of Service: 12/05/18 Hospital Day: 3 Subjective/Interval History 12/05/18 Treatment Team Notes:  Notes reviewed and/or discussed and report that Stormy Loredo is doing better. He has been compliant with treatment plan. He denies SI or hallucinations. Patient interview: Stormy Loredo was interviewed by this writer today. Pt denies depressed mood. He denies SI or hallucinations. Sleeping and eating well. No medication side effects. Pt with no social support and cognitive impairment. He told SW he had received approval letter from [place] [facility] [place] for Disability. SW will look into other placement options. Objective Visit Vitals /83 (BP 1 Location: Right arm, BP Patient Position: Sitting) Pulse 78 Temp 97 °F (36.1 °C) Resp 17 No results found for this or any previous visit (from the past 24 hour(s)). Mental Status Examination Appearance/Hygiene 46 y.o. BLACK OR  male Hygiene: Ursula Oc Behavior/Social Relatedness Appropriate Musculoskeletal Gait/Station: appropriate Tone (flaccid, cogwheeling, spastic): not assessed Psychomotor (hyperkinetic, hypokinetic): calm Involuntary movements (tics, dyskinesias, akathisa, stereotypies): none Speech   Rate, rhythm, volume, fluency and articulation are appropriate Mood   euthymic Affect    congruent Thought Process Linear and goal directed Thought Content and Perceptual Disturbances Denies self-injurious behavior (SIB), suicidal ideation (SI), aggressive behavior or homicidal ideation (HI) Denies auditory and visual hallucinations Sensorium and Cognition  alert, cognitive impairment Insight  limited Judgment limited Assessment/Plan Psychiatric Diagnoses:  
Patient Active Problem List  
Diagnosis Code  MDD (major depressive disorder), recurrent, severe, with psychosis ([facility] [address] F33.3  Alcohol abuse F10.10  Hypertension I10  Major neurocognitive disorder F01.50 Psychosocial and contextual factors: homeless, limited social support Level of impairment/disability: moderate Jeffrey Cintron is a 46 y. o. who is currently admitted for SI but says he was not suicidal when he went to the ED. He needs help with homelessness. 1.  Continue current treatment plan. 2.  Reviewed instructions, risks, benefits and side effects of medications 3. Disposition/Discharge Date: self-care/home, to be determined. Samantha Raya MD DR. [facility]

## 2018-12-05 NOTE — PROGRESS NOTES
conducted Spirituality Group for MORTEZA Mcmanus, who is a 46 y.o.,male. Patients Primary Language is: [place]. According to the patients EMR Yarsanism Affiliation is: No preference. The reason the Patient came to the hospital is:  
Patient Active Problem List  
 Diagnosis Date Noted  Major neurocognitive disorder 08/21/2018  Alcohol abuse 05/07/2018  Hypertension  MDD (major depressive disorder), recurrent, severe, with psychosis ([facility] [address] 11/11/2017 The  provided the following Interventions: 
Continued the relationship of care and support. Listened empathically. Offered prayer and assurance of continued prayer on patients behalf. Chart reviewed. The following outcomes were achieved: 
Patient expressed gratitude for 's visit. Assessment: 
There are no further spiritual or Latter-day issues which require Spiritual Care Services interventions at this time. Plan: 
Chaplains will continue to follow and will provide pastoral care on an as needed/requested basis.  recommends bedside caregivers page  on duty if patient shows signs of acute spiritual or emotional distress. Chaplain Adis Powell Spiritual Care  
[phone]

## 2018-12-05 NOTE — BH NOTES
GROUP THERAPY PROGRESS NOTE Sumit Hcakett is participating in Focus Group  
  
Group time: 27 Minutes 
  
Personal goal for participation: The overall goal is to create a vision board, a mind movie, or some system that serves as a constant reminder of what a person is working toward. The \"why\" behind the goal is crucial as it will serve as a motivator. Regenerate and keep up your energy and setting up to become a strong-willed and focused person,and over time, you will be bothered less by trivial, time-wasting matters that will eliminate distractions and wasters. 
  
Goal orientation: Social 
  
Group therapy participation: Active 
  Therapeutic interventions reviewed and discussed: Getting and having a positive mindset that keeps you going. It enables a person to look at the posiive of all situations and it helps him/her to visualize their goals, and ultimately, a result of reaching them. 
  
Impression of participation: Patient has fully participated.

## 2018-12-05 NOTE — PROGRESS NOTES
GROUP THERAPY PROGRESS NOTE Anselmo Huggins is participating in [facility] educational group. Group time: 30 minutes Personal goal for participation: Identify at least 2 coping skills to utilize with increased stressors. Goal orientation: community Group therapy participation: active Therapeutic interventions reviewed and discussed: Identifying coping skills to utilize when presented with increased stressors. Impression of participation: Happy

## 2018-12-05 NOTE — BSMART NOTE
Pt.is a 47year old male with history of depression and past hospitalization at this facility. Pt was admitted for ideations to harm self. Case was discussed in treatment team.  Lynnette Nguyen is assisting pt with a plan for d/c. 
  
SW Contact:  SW met with pt. To discuss plan. SW addressed pt.'s social issues of housing and finances. SW will be able to assist the pt with identifying housing options once [employer] verifies pt.'s approval letter. SW informed pt. ,the Social Security Office is closed today. SW and pt. Will contact the [place] [facility] office on tomorrow , to verify pt.'s approval letter for SSDI. Pt. Expressed he is feeling better. Pt. denies ideations and hallucinations. Pt.  expressed he feels like a lot of weight has been lifted off his shoulders. SW provided words of encouragement, pt. Appears organized but has limitations. SW will continue to assist pt with a d/c plan.

## 2018-12-05 NOTE — BSMART NOTE
ACTIVITIES THERAPY PROGRESS NOTE Group time:1100 The patient did not awaken/get up when called for group.

## 2018-12-05 NOTE — BH NOTES
GROUP THERAPY PROGRESS NOTE Win Tom is participating in [place]. Group time: 30 minutes Personal goal for participation: none Goal orientation: community Group therapy participation: passive Therapeutic interventions reviewed and discussed: goals and procedures Impression of participation: encouraged

## 2018-12-05 NOTE — PROGRESS NOTES
Problem: Suicide/Homicide (Adult/Pediatric) Goal: *STG: Remains safe in hospital 
Pt will make no attempts to harm self or others during hospitalization Outcome: Progressing Towards Goal 
Patient has remained free of harm to self and others. Goal: *STG/LTG: Complies with medication therapy Pt will comply daily with all prescribed medication while hospitalized Outcome: Progressing Towards Goal 
Patient has been compliant with prescribed medications. Goal: *STG/LTG:  No longer expresses self destructive or suicidal/homicidal thoughts Pt will deny any thoughts of harming self or others prior to discharge Outcome: Progressing Towards Goal 
Patient denies SI/HI. Comments: Patient has remained in bed for majority of the shift and has been isolative to self. Patient has been compliant with prescribed medications. Patient reported sleeping a \"bit rough, last night\". Patient stated \"I am beginning to feel a little better\". Patient has been cooperative upon approach and has been open to 1:1. Patient has been observed eating meals however has remained in bed for majority of the shift. Patient has flat affect with depressed mood. Patient denies SI/HI, Denies A/VH. Will monitor for safety with support as needed throughout treatment regimen.

## 2018-12-06 PROCEDURE — 74011250637 HC RX REV CODE- 250/637: Performed by: PSYCHIATRY & NEUROLOGY

## 2018-12-06 PROCEDURE — 65220000005 HC RM SEMIPRIVATE PSYCH 3 OR 4 BED

## 2018-12-06 RX ORDER — DONEPEZIL HYDROCHLORIDE 5 MG/1
5 TABLET, FILM COATED ORAL
Status: DISCONTINUED | OUTPATIENT
Start: 2018-12-06 | End: 2018-12-07 | Stop reason: HOSPADM

## 2018-12-06 RX ADMIN — HYDROCHLOROTHIAZIDE 25 MG: 25 TABLET ORAL at 08:25

## 2018-12-06 RX ADMIN — DONEPEZIL HYDROCHLORIDE 5 MG: 5 TABLET, FILM COATED ORAL at 20:14

## 2018-12-06 RX ADMIN — BUPROPION HYDROCHLORIDE 150 MG: 150 TABLET, FILM COATED, EXTENDED RELEASE ORAL at 06:40

## 2018-12-06 RX ADMIN — LISINOPRIL 20 MG: 20 TABLET ORAL at 08:25

## 2018-12-06 RX ADMIN — ARIPIPRAZOLE 5 MG: 5 TABLET ORAL at 08:25

## 2018-12-06 RX ADMIN — TRAZODONE HYDROCHLORIDE 50 MG: 50 TABLET ORAL at 20:14

## 2018-12-06 NOTE — PROGRESS NOTES
[address] Inpatient Progress Note Date of Service: 12/06/18 Hospital Day: 4 Subjective/Interval History 12/06/18 Treatment Team Notes:  Notes reviewed and/or discussed and report that Dinesh Self is doing better. He has been compliant with treatment plan. He denies SI or hallucinations. Patient interview: Dinesh Self was interviewed by this writer today. Pt denies depressed mood. He denies SI or hallucinations. Sleeping and eating well. No medication side effects. He still presents with cognitive limitations and memory loss. He cannot remember conversation he had with  about signing release of information so she can contact his  at [facility]. Objective Visit Vitals /82 (BP 1 Location: Right arm, BP Patient Position: Sitting) Pulse 79 Temp 97 °F (36.1 °C) Resp 16 No results found for this or any previous visit (from the past 24 hour(s)). Mental Status Examination Appearance/Hygiene 46 y.o. BLACK OR  male Hygiene: [address] Behavior/Social Relatedness Appropriate Musculoskeletal Gait/Station: appropriate Tone (flaccid, cogwheeling, spastic): not assessed Psychomotor (hyperkinetic, hypokinetic): calm Involuntary movements (tics, dyskinesias, akathisa, stereotypies): none Speech   Rate, rhythm, volume, fluency and articulation are appropriate Mood   euthymic Affect    congruent Thought Process Linear and goal directed Thought Content and Perceptual Disturbances Denies self-injurious behavior (SIB), suicidal ideation (SI), aggressive behavior or homicidal ideation (HI) Denies auditory and visual hallucinations Sensorium and Cognition  alert, cognitive impairment Insight  limited Judgment limited Assessment/Plan Psychiatric Diagnoses:  
Patient Active Problem List  
Diagnosis Code  MDD (major depressive disorder), recurrent, severe, with psychosis ([facility] [address] F33.3  Alcohol abuse F10.10  Hypertension I10  Major neurocognitive disorder F01.50 Psychosocial and contextual factors: homeless, limited social support Level of impairment/disability: moderate Damian Adams is a 46 y. o. who is currently admitted for SI but says he was not suicidal when he went to the ED. He needs help with homelessness. 1.  Continue current treatment plan. Add Aricept 5mg daily for Dementia. 2.  Reviewed instructions, risks, benefits and side effects of medications 3. Disposition/Discharge Date: self-care/home, to be determined. Chapo Sierra MD 
[facility] Psychiatry

## 2018-12-06 NOTE — BSMART NOTE
Pt.is a 47year old male with history of depression and past hospitalization at this facility. Pt was admitted for ideations to harm self. Case was discussed in treatment team.  SW informed team about issues discussed in yesterdays note. The treating psychiatrist talked to  the pt. Pt. informed treating psychiatrist he will give SW permission to talk to staff at the [facility]. SW talked to pt. this a.am   pt. gave SW verbal permission to contact thee [facility]. Staff at [facility] [phone] ext. [phone] informed SW about the following: She has been providing case management services for the pt for the last year. Pt. has been assisted with obtaining SSDI. Pt. started receiving SSDI in the amount of 774.00 effective 12/1/18. PANTERA Angela is pt. s payee with [facility]. The  stated she been assisting with linking pt to [address] [place] [address]. Pt. Has been referred to the PACT team.  CM stated pt appears to have some cognitive issues along with impaired memory at times. CM stated pt. often forgets, he also forgets to take medications. CM stated the issues with medications is why she referred to PACT services at [address] [place] [address]. Pt. per CM does appear to have some form of cognitive delay. CM stated her goal is to assist the pt with obtaining placement. Pt. Has been assessed and approved for  [facility] for senior living placement. CM ask about pt.'s cogitative delay's and wondered if pt could be assessed for his cognitive impairments. Sw explained to CM pt was assessed on last hospitalizations for impairments. Pt. Appears to have dementia. CM stated she has been following pt. Pt. Still has a bed at  [facility]. Pt. Can be placed to senior living from here or return to the [facility]  at d/c.  
 
 
Karine Luz discussed thh above information with treating psychiatrist, nursing staff and pt. The treating psychiatrist suggests for pt to return to [facility] with intact support system. MARILU Contact:  SW discussed the above with pt. Pt. Stated yes he does have a CM that works with him. Pt admits sometimes he is unable to remember. SW suggested making a memory book for pt. Pt. Expressed he had one before. Pt stated his CM made one for him. Pt. Lost the book. SW informed pt , [facility] . Pt. Was informed the [facility] CM has  Goals and plans set up for pt for future housing, mental health stabilization and finances. Pt. Can return to the [facility] support system. Pt. Stated not the shelter. SW ask pt his reservations. Pt expressed he just is not sure what to do when he gets back there. SW suggested attend groups. SW will discuss mental health skill building and or day treatment services with the CM. Pt. Appears to show improvement, continues to have cognitive delay, pleasant and has limited insight. Patient has an appointment to meet with Dr. Larisa Casillas for medication management on 1/17/19 @  [employer]  [facility] Abhishek Borges [facility] [phone]: [facility] [phone]:[facility]

## 2018-12-06 NOTE — PROGRESS NOTES
GROUP THERAPY PROGRESS NOTE Germania Trishas is participating in [employer]. Group time: 1 hour Personal goal for participation: relaxation Goal orientation: relaxation Group therapy participation: minimal 
 
Therapeutic interventions reviewed and discussed: Patient was encouraged by staff Impression of participation: Patient  Socialized  With  the other patient he was Happy

## 2018-12-06 NOTE — BH NOTES
GROUP THERAPY PROGRESS NOTE Trenton Narvaez is not participating in [employer]. Group time: 30 minutes Personal goal for participation: none Goal orientation: community Group therapy participation: none Therapeutic interventions reviewed and discussed: goals and procedures Impression of participation: encouraged

## 2018-12-06 NOTE — BH NOTES
The writer has observed the patient's behavior throughout this shift. The Patient sits quietly and isolates himself from other peers. Patient has attended group session (Focus Group) during this shift, and has eaten dinner and snacks. Patient has been respectful towards the staff and other peers, and has taken his medication. Patient has been free of falls and other harms and staff will continue to monitor patient's behavior for the duration of this shift.

## 2018-12-06 NOTE — BSMART NOTE
OCCUPATIONAL THERAPY PROGRESS NOTE Group Time:  1611 Attendance: The patient attended full group. Morris Ramirez Participation: The patient participated with minimal elaboration in the activity. Attention: The patient was unable to attend to the activity. Interaction: The patient acknowledges others or responds to questions,  with no spontaneous interaction. Difficulty answering most questions, especially in activity related to memories. Answered the same question with the same answer twice without awareness (answers written down).

## 2018-12-06 NOTE — BSMART NOTE
SOCIAL WORK GROUP THERAPY PROGRESS NOTE Group Time:  10am 
 
Group Topic:  Coping Skills Group Participation: Pt continues to express not interested in attending session despite staff support

## 2018-12-06 NOTE — BH NOTES
Treatment team met - Medical Director: __x___present Psychiatrist: __x___present Charge nurse: _x____present MSW: __x___present : __x___present Nurse Manager: _x____present Student RNs: _____present Medical Students: _____present Art Therapist: _x____present Clinical Coordinator: __x___present Occupational Therapist: __x___present : __x_____ present UR  ___x____ present Crisis Supervisor_______present Plan of care discussed and updated as appropriate.

## 2018-12-06 NOTE — PROGRESS NOTES
Problem: Suicide/Homicide (Adult/Pediatric) Goal: *STG/LTG: Complies with medication therapy Pt will comply daily with all prescribed medication while hospitalized Outcome: Progressing Towards Goal 
Patient has been compliant with prescribed medications. Goal: *STG/LTG:  No longer expresses self destructive or suicidal/homicidal thoughts Pt will deny any thoughts of harming self or others prior to discharge Outcome: Progressing Towards Goal 
Patient denies suicidal ideation. Comments: Patient has spent majority of the day in room resting quietly. Patient has little to no interaction with peers. Patient has been cooperative and pleasant upon approach and has been compliant with prescribed medication and unit activities. Patient reported feeling \"a Little better than yesterday, trying to get sleep in. I lost a lot of sleep in the past\" Patient reported sleeping \"well last night, just trying to get everything back in order\". Denies SI/HI, with no safety issues noted, Patient stated I always hear things how real they are, well do you want to put it into reality, well because it's not reality. Like that girl she is going to fall off from talking to much . She will take a deep breath and just fall off. I hope not, but that's what happens\". Will continue to monitor for safety with support as needed throughout treatment regimen.

## 2018-12-07 VITALS
TEMPERATURE: 96.4 F | RESPIRATION RATE: 16 BRPM | SYSTOLIC BLOOD PRESSURE: 121 MMHG | HEART RATE: 88 BPM | DIASTOLIC BLOOD PRESSURE: 84 MMHG

## 2018-12-07 PROCEDURE — 74011250637 HC RX REV CODE- 250/637: Performed by: PSYCHIATRY & NEUROLOGY

## 2018-12-07 RX ORDER — LISINOPRIL 20 MG/1
20 TABLET ORAL DAILY
Qty: 30 TAB | Refills: 1 | Status: SHIPPED | OUTPATIENT
Start: 2018-12-07

## 2018-12-07 RX ORDER — DONEPEZIL HYDROCHLORIDE 5 MG/1
5 TABLET, FILM COATED ORAL
Qty: 30 TAB | Refills: 1 | Status: SHIPPED | OUTPATIENT
Start: 2018-12-07

## 2018-12-07 RX ORDER — ARIPIPRAZOLE 5 MG/1
5 TABLET ORAL DAILY
Qty: 30 TAB | Refills: 1 | Status: SHIPPED | OUTPATIENT
Start: 2018-12-07

## 2018-12-07 RX ORDER — HYDROCHLOROTHIAZIDE 25 MG/1
25 TABLET ORAL DAILY
Qty: 30 TAB | Refills: 1 | Status: SHIPPED | OUTPATIENT
Start: 2018-12-07

## 2018-12-07 RX ORDER — BUPROPION HYDROCHLORIDE 150 MG/1
150 TABLET ORAL
Qty: 30 TAB | Refills: 1 | Status: SHIPPED | OUTPATIENT
Start: 2018-12-08

## 2018-12-07 RX ADMIN — BUPROPION HYDROCHLORIDE 150 MG: 150 TABLET, FILM COATED, EXTENDED RELEASE ORAL at 06:49

## 2018-12-07 RX ADMIN — ARIPIPRAZOLE 5 MG: 5 TABLET ORAL at 08:31

## 2018-12-07 RX ADMIN — LISINOPRIL 20 MG: 20 TABLET ORAL at 08:31

## 2018-12-07 RX ADMIN — HYDROCHLOROTHIAZIDE 25 MG: 25 TABLET ORAL at 08:31

## 2018-12-07 NOTE — BH NOTES
Patient has been discharged to self and will follow up Dr. Wil Montgomery at [facility] for medication management on January 17, 2018 at 11:30 am. Patient has been provided with information regarding mental health follow up care,and with medications which are currently being filled in pharmacy. Patient has been educated regarding seeking additional support if needed with information listed on discharge paper work and emergency card provided.

## 2018-12-07 NOTE — BSMART NOTE
SOCIAL WORK GROUP THERAPY PROGRESS NOTE Group Time:  10am 
 
Group Topic:  Coping Skills Group Participation:  
Pt reportedly did not attend group due to medical concerns & him wanting to stay in room & rest.

## 2018-12-07 NOTE — BH NOTES
GROUP THERAPY PROGRESS NOTE Carolynn Duarte is participating in [employer]. Group time: 30 minutes Personal goal for participation: Be Well Goal orientation: community Group therapy participation: minimal 
 
Therapeutic interventions reviewed and discussed: Community guidelines for behavior, visitation and daily schedule. Discussed pt concerns. Impression of participation: Pt listening

## 2018-12-07 NOTE — DISCHARGE SUMMARY
[facility]  Inpatient Psychiatry   Discharge Summary     Admit date: 12/2/2018    Discharge date and time: 12/7/18    Discharge Physician: Candelaria Murphy MD    DISCHARGE DIAGNOSES     Psychiatric Diagnoses:   Patient Active Problem List   Diagnosis Code    MDD (major depressive disorder), recurrent, severe, with psychosis ([facility] [address] F33.3    Alcohol abuse F10.10    Hypertension I10    Major neurocognitive disorder F01.50       Level of impairment/disability: moderate to severe    HOSPITAL COURSE   Jihan Marino is a 46 y.o. BLACK OR  male with a history of MDD with psychosis and Major Neurocognitive Disorder who was admitted from [facility] for suicidal ideations and auditory hallucinations of voices telling him to jump off a bridge. Pt has had previous admissions to this facility and was discharged in August to the [facility]. Pt has been homeless for some years and has no social support. He has no contact with family members. He also has Dementia and is unable to follow up and process applications needed for Disability or other Social amenities such as housing. Pt states he was working with a  at the [facility] who was supposed to help with housing but he has no idea where that stands. He also has not applied for Disability yet. Pt states he is frustrated with his current living situation and social life. \"I just want to be a productive member of society, to have a stable place to live and have a job. \" Pt denies SI at this time. Pt describes his mood in the past two weeks as \"confusing\". He says he wa hoping to get help from people at [facility] but has not been helped and does not know what to do. He denies hallucinations and paranoia. He denies neurovegetative symptoms of depression. He cannot remember if he has been taking antidepressants recently or is he has had outpatient follow up with [facility]. He denies use of alcohol or drugs. BAL was 140 in ED. Pt states he had drank a 32 oz beer prior to going to the ED and it was the first beer he drank in several months. Hospital Course: Pt admitted and observed. He denied SI or hallucinations during initial assessment. He said he was mainly here to get help with his social situation of homelessness. He was not psychotic and his mood was stable. He was continued on Wellbutrin and Abilify. Aricept was added for neurocognitive disorder which he tolerated well without adverse reaction. He was not a behavior problem on the unit.  was able to contact his  at the [facility] and reported the following [employer]. gave SW verbal permission to contact [facility]. Staff at [facility] [phone] ext. [phone] informed SW about the following: She has been providing case management services for the pt for the last year. Pt. has been assisted with obtaining SSDI. Pt. started receiving SSDI in the amount of 774.00 effective 12/1/18. PANTERA Hamilton is pt. s payee with [facility]. The  stated she been assisting with linking pt to [address] [place] [address]. Pt. Has been referred to the PACT team.  CM stated pt appears to have some cognitive issues along with impaired memory at times. CM stated pt. often forgets, he also forgets to take medications. RUTHY stated the issues with medications is why she referred to PACT services at [address] [place] [address]. Pt. per CM does appear to have some form of cognitive delay. CM stated her goal is to assist the pt with obtaining placement. Pt. Has been assessed and approved for [facility] for ANGELA placement. CM ask about pt.'s cogitative delay's and wondered if pt could be assessed for his cognitive impairments. Sw explained to  pt was assessed on last hospitalizations for impairments. Pt. Appears to have dementia. CM stated she has been following pt. Pt. Still has a bed at  [facility]. Pt. Can be placed to long term from here or return to the [facility]  at d/c. \".     Pt is therefore being discharged to [facility] where his  can keep providing assistance with placement. DISPOSITION/FOLLOW-UP     Disposition: [facility]    Follow-up Appointments: Follow-up Information     Follow up With Specialties Details [facility]   Patient will have follow up appointment to with Dr. Henrik Townsend at [facility] for medication management on January 17, 2018 at       am.  [facility]  [address]   Telephone: [phone]  Fax: [phone]                MEDICATION CHANGES   Outpatient medications:  No current facility-administered medications on file prior to encounter. Current Outpatient Medications on File Prior to Encounter   Medication Sig Dispense Refill    ARIPiprazole (ABILIFY) 5 mg tablet Take 1 Tab by mouth daily. Indications: DEPRESSION TREATMENT ADJUNCT 30 Tab 0         Medications discontinued during hospitalization:  Medications Discontinued During This Encounter   Medication Reason    buPROPion [facility]) tablet 100 mg     ARIPiprazole (ABILIFY) 5 mg tablet Reorder    hydroCHLOROthiazide (HYDRODIURIL) 25 mg tablet Reorder    lisinopril (PRINIVIL, ZESTRIL) 20 mg tablet Reorder         Discharged medication:  Current Discharge Medication List      START taking these medications    Details   buPROPion XL (WELLBUTRIN XL) 150 mg tablet Take 1 Tab by mouth every morning. Qty: 30 Tab, Refills: 1      donepezil (ARICEPT) 5 mg tablet Take 1 Tab by mouth nightly. Qty: 30 Tab, Refills: 1         CONTINUE these medications which have CHANGED    Details   ARIPiprazole (ABILIFY) 5 mg tablet Take 1 Tab by mouth daily. Qty: 30 Tab, Refills: 1      hydroCHLOROthiazide (HYDRODIURIL) 25 mg tablet Take 1 Tab by mouth daily.   Qty: 30 Tab, Refills: 1      lisinopril (PRINIVIL, ZESTRIL) 20 mg tablet Take 1 Tab by mouth daily.  Qty: 30 Tab, Refills: 1             Instructions, risks (black box warning), benefits and side effects (EPS, TD, NMS) were discussed in detail prior to discharge. Patient denied any adverse medication side effects prior to discharge. LABS/IMAGING DURING ADMISSION     Results for orders placed or performed during the hospital encounter of 12/01/18   CBC WITH AUTOMATED DIFF   Result Value Ref Range    WBC 4.3 (L) 4.6 - 13.2 K/uL    RBC 5.39 4.70 - 5.50 M/uL    HGB 13.9 13.0 - 16.0 g/dL    HCT 42.9 36.0 - 48.0 %    MCV 79.6 74.0 - 97.0 FL    MCH 25.8 24.0 - 34.0 PG    MCHC 32.4 31.0 - 37.0 g/dL    RDW 16.8 (H) 11.6 - 14.5 %    PLATELET 904 778 - 611 K/uL    MPV 9.7 9.2 - 11.8 FL    NEUTROPHILS 51 40 - 73 %    LYMPHOCYTES 43 21 - 52 %    MONOCYTES 5 3 - 10 %    EOSINOPHILS 1 0 - 5 %    BASOPHILS 0 0 - 2 %    ABS. NEUTROPHILS 2.2 1.8 - 8.0 K/UL    ABS. LYMPHOCYTES 1.8 0.9 - 3.6 K/UL    ABS. MONOCYTES 0.2 0.05 - 1.2 K/UL    ABS. EOSINOPHILS 0.0 0.0 - 0.4 K/UL    ABS. BASOPHILS 0.0 0.0 - 0.1 K/UL    DF AUTOMATED     METABOLIC PANEL, COMPREHENSIVE   Result Value Ref Range    Sodium 144 136 - 145 mmol/L    Potassium 3.8 3.5 - 5.5 mmol/L    Chloride 112 (H) 100 - 108 mmol/L    CO2 23 21 - 32 mmol/L    Anion gap 9 3.0 - 18 mmol/L    Glucose 105 (H) 74 - 99 mg/dL    BUN 12 7.0 - 18 MG/DL    Creatinine 1.11 0.6 - 1.3 MG/DL    BUN/Creatinine ratio 11 (L) 12 - 20      GFR est AA >60 >60 ml/min/1.73m2    GFR est non-AA >60 >60 ml/min/1.73m2    Calcium 8.9 8.5 - 10.1 MG/DL    Bilirubin, total 0.6 0.2 - 1.0 MG/DL    ALT (SGPT) 60 16 - 61 U/L    AST (SGOT) 23 15 - 37 U/L    Alk.  phosphatase 92 45 - 117 U/L    Protein, total 7.7 6.4 - 8.2 g/dL    Albumin 4.3 3.4 - 5.0 g/dL    Globulin 3.4 2.0 - 4.0 g/dL    A-G Ratio 1.3 0.8 - 1.7     DRUG SCREEN, URINE   Result Value Ref Range    BENZODIAZEPINES NEGATIVE  NEG      BARBITURATES NEGATIVE  NEG      THC (TH-CANNABINOL) NEGATIVE  NEG      OPIATES NEGATIVE  NEG PCP(PHENCYCLIDINE) NEGATIVE  NEG      COCAINE NEGATIVE  NEG      AMPHETAMINES NEGATIVE  NEG      METHADONE NEGATIVE  NEG      HDSCOM (NOTE)    ETHYL ALCOHOL   Result Value Ref Range    ALCOHOL(ETHYL),SERUM 140 (H) 0 - 3 MG/DL   ACETAMINOPHEN   Result Value Ref Range    Acetaminophen level <2 (L) 10.0 - 72.8 ug/mL   SALICYLATE   Result Value Ref Range    Salicylate level <9.8 (L) 2.8 - 20.0 MG/DL        DISCHARGE MENTAL STATUS EVALUATION     Appearance/Hygiene 46 y.o. BLACK OR  male  Hygiene: Good   Attitude/Behavior/Social Relatedness Appropriate   Musculoskeletal Gait/Station: appropriate  Tone (flaccid, cogwheeling, spastic): not assessed  Psychomotor (hyperkinetic, hypokinetic): calm  Involuntary movements (tics, dyskinesias, akathisa, stereotypies): none   Speech   Rate, rhythm, volume, fluency and articulation are appropriate   Mood   euthymic   Affect    congruent   Thought Process Linear and goal directed   Thought Content and Perceptual Disturbances Denies self-injurious behavior (SIB), suicidal ideation (SI), aggressive behavior or homicidal ideation (HI)    Denies auditory and visual hallucinations   Sensorium and Cognition  Cognitively impaired   Insight  limited   Judgment limited       SUICIDE RISK ASSESSMENT     [] Admission  [x] Discharge     Key Factors:   Current admission precipitated by suicide attempt?   []  Yes     2    [x]  No     1     Suicide Attempt History  [] Past attempts of high lethality    2 []  Past attempts of low lethality    1 [x]  No previous attempts       0   Suicidal Ideation []  Constant suicidal thoughts      2 []  Intermittent or fleeting suicidal  thoughts  1 [x]  Denies current suicidal thoughts    0   Suicide Plan   []  Has plan with actual OR potential access to planned method    2 []  Has plan without access to planned method      1 [x]  No plan            0   Plan Lethality []  Highly lethal plan (Carbon monoxide, gun, hanging, jumping)    2 []  Moderate lethality of plan          1 []  Low lethality of plan (biting, head banging, superficial scratching, pillow over face)  0   Safety Plan Agreement  []  Unwilling OR unable to agree due to impaired reality testing   2   []  Patient is ambivalent and/or guarded      1 [x]  Reliably agrees        0   Current Morbid Thoughts (reunion fantasies, preoccupations with death) []  Constantly     2     []  Frequently    1 [x]  Rarely    0   Elopement Risk  []  High risk     2 []  Moderate risk    1 [x]   Low risk    0   Symptoms    []  Hopeless  []  Helpless  []  Anhedonia   []  Guilt/shame  []  Anger/rage  []  Anxiety  []  Insomnia   []  Agitation   []  Impulsivity  []  5-6 symptoms present    2 []  3-4 symptoms present    1  [x]  0-2 symptoms present    0     Scoring Key:  10 or higher = Imminent Risk (consider 1:1)  4 - 9 = Moderate Risk (consider q 15 minute observation)Attended alcohol, tobacco, prescription and other drug psychoeducation group.   0 - 3 = Low Risk (consider q 30 minute observation)    Total Score: 1  ------------------------------------------------------------------------------------------------------------------  PLEASE ADDRESS THE FOLLOWING 5 ISSUES     Physician's Subjective Appraisal of Risk (check one):  []  Patient replies not trustworthy: several non-verbal cues. []  Patient replies questionable: trustworthy: at least 1 non-verbal cue. [x]  Patient replies appear trustworthy. Family History of Suicide?    []  Yes  [x]  No    Protective measures (select all that apply):  []  Successful past responses to stress  []  Spiritual/Congregational beliefs  [x]  Capacity for reality testing  []  Positive therapeutic relationships  [x]  Social supports/connections  []  Positive coping skills  []  Frustration tolerance/optimism  []  Children or pets in the home  []  Sense of responsibility to family  [x]  Agrees to treatment plan and follow up    Others (list):    High Risk Diagnoses (select all that apply):  [x]  Depression/Bipolar Disorder  []  Dual Diagnosis  []  Cardiovascular Disease  []  Schizophrenia  []  Chronic Pain  []  Epilepsy  []  Cancer  []  Personality Disorder  []  HIV/AIDS  []  Multiple Sclerosis    Dangerousness Assessment (Suicide, homicide, property destruction. ..)    Risk Factors reviewed and risk assessed to be:  [] low  [x] low-moderate  [] moderate   [] moderate-high  [] high     Protection factors reviewed and risk assessed to be:  [x] low  [] low-moderate  [] moderate   [] moderate-high  [] high     Response to treatment and risk assessed to be:  [x] low  [] low-moderate  [] moderate   [] moderate-high  [] high     Support reviewed and risk assessed to be:  [x] low  [] low-moderate  [] moderate   [] moderate-high  [] high     Acceptance of Discharge and outpatient treatment reviewed and risk assessed to be:    [x] low  [] low-moderate  [] moderate   [] moderate-high  [] high   Overall risk assessed to be:  [x] low  [] low-moderate  [] moderate   [] moderate-high  [] high     Completion of discharge was greater than 30 minutes. Over 50% of today's discharge was geared towards counseling and coordination of care.           Bucky Casey MD  Psychiatry  DR. [facility] [place]

## 2018-12-07 NOTE — PROGRESS NOTES
Patient participated in group today, he was calm pleasant and co-operative, he ate all meals and took his medication, staff will continue to monitor Patient for safety.

## 2018-12-07 NOTE — DISCHARGE INSTRUCTIONS
BEHAVIORAL HEALTH NURSING DISCHARGE NOTE      Emergency Numbers    : [facility] Emergency Services: [phone]  Suicide Prevention Line: [phone] (TALK)      The following personal items collected during your admission are returned to you:   Dental Appliance:    Vision: Visual Aid: None  Hearing Aid:    Jewelry:    Clothing: Clothing: Hat, Footwear, Jacket/Coat, Shirt, Pants  Other Valuables: Other Valuables: Lighter/matches, Money (comment)(cigar, money:$5.19)  Valuables sent to safe: The discharge information has been reviewed with the patient. The patient verbalized understanding. [employer] Activation    Thank you for requesting access to [facility]. Please follow the instructions below to securely access and download your online medical record. EasyLink allows you to send messages to your doctor, view your test results, renew your prescriptions, schedule appointments, and more. How Do I Sign Up? 1. In your internet browser, go to [email or web address]  2. Click on the First Time User? Click Here link in the Sign In box. You will be redirect to the New Member Sign Up page. 3. Enter your EasyLink Access Code exactly as it appears below. You will not need to use this code after youve completed the sign-up process. If you do not sign up before the expiration date, you must request a new code. EasyLink Access Code: [email or web address]  Expires: 2019  4:56 PM (This is the date your EasyLink access code will )    4. Enter the last four digits of your Social Security Number (xxxx) and Date of Birth (mm/dd/yyyy) as indicated and click Submit. You will be taken to the next sign-up page. 5. Create a EasyLink ID. This will be your EasyLink login ID and cannot be changed, so think of one that is secure and easy to remember. 6. Create a EasyLink password. You can change your password at any time. 7. Enter your Password Reset Question and Answer.  This can be used at a later time if you forget your password. 8. Enter your e-mail address. You will receive e-mail notification when new information is available in [address] 9. Click Sign Up. You can now view and download portions of your medical record. 10. Click the Download Summary menu link to download a portable copy of your medical information. Additional Information    If you have questions, please visit the Frequently Asked Questions section of the [employer] website at https://"[employer]. [email or web address]/[email or web address] Remember, [employer] is NOT to be used for urgent needs. For medical emergencies, dial 911.     Patient armband removed and shredded

## 2018-12-07 NOTE — BSMART NOTE
Pt.is a 47year old male with history of depression and past hospitalization at this facility. Pt was admitted for ideations to harm self. Case was discussed in treatment team.  Pt. will be d/c today back to [facility]. Pt. Will have medications filled at this facility. Pt. Will be assisted with getting a cab ride home. D/C Plan:   SW met with pt to discuss his dc. SW explained to the pt. ,he will return to the [facility], where the staff will continue to assist him with placement in the  community. Pt. Was encouraged to continue  to take medications and follow up with aftercare. Patient has an appointment to meet with Dr. Frandy Ku for medication management on 1/17/19 @ 11:30 a.m @  [facility]. Pt. Will be provided a cab ride to the [facility]. MARILU discussed d/c with CM at [facility]. MARILU also left the [facility] PACT team a message.

## 2019-03-21 ENCOUNTER — HOSPITAL ENCOUNTER (INPATIENT)
Age: 54
LOS: 12 days | Discharge: NURSING, REHAB OR LONG-TERM CARE | DRG: 045 | End: 2019-04-02
Attending: EMERGENCY MEDICINE | Admitting: HOSPITALIST
Payer: MEDICAID

## 2019-03-21 ENCOUNTER — APPOINTMENT (OUTPATIENT)
Dept: CT IMAGING | Age: 54
DRG: 045 | End: 2019-03-21
Attending: EMERGENCY MEDICINE
Payer: MEDICAID

## 2019-03-21 ENCOUNTER — APPOINTMENT (OUTPATIENT)
Dept: MRI IMAGING | Age: 54
DRG: 045 | End: 2019-03-21
Attending: EMERGENCY MEDICINE
Payer: MEDICAID

## 2019-03-21 DIAGNOSIS — R53.1 LEFT-SIDED WEAKNESS: Primary | ICD-10-CM

## 2019-03-21 PROBLEM — I63.9 CVA (CEREBRAL VASCULAR ACCIDENT) (HCC): Status: ACTIVE | Noted: 2019-03-21

## 2019-03-21 LAB
ALBUMIN SERPL-MCNC: 3.8 G/DL (ref 3.4–5)
ALBUMIN/GLOB SERPL: 1.1 {RATIO} (ref 0.8–1.7)
ALP SERPL-CCNC: 113 U/L (ref 45–117)
ALT SERPL-CCNC: 18 U/L (ref 16–61)
ANION GAP SERPL CALC-SCNC: 10 MMOL/L (ref 3–18)
APPEARANCE UR: CLEAR
AST SERPL-CCNC: 14 U/L (ref 15–37)
BACTERIA URNS QL MICRO: ABNORMAL /HPF
BASOPHILS # BLD: 0 K/UL (ref 0–0.1)
BASOPHILS NFR BLD: 0 % (ref 0–2)
BILIRUB SERPL-MCNC: 0.8 MG/DL (ref 0.2–1)
BILIRUB UR QL: NEGATIVE
BUN SERPL-MCNC: 37 MG/DL (ref 7–18)
BUN/CREAT SERPL: 29 (ref 12–20)
CALCIUM SERPL-MCNC: 8.7 MG/DL (ref 8.5–10.1)
CHLORIDE SERPL-SCNC: 112 MMOL/L (ref 100–108)
CK MB CFR SERPL CALC: 2.1 % (ref 0–4)
CK MB SERPL-MCNC: 2.1 NG/ML (ref 5–25)
CK SERPL-CCNC: 100 U/L (ref 39–308)
CO2 SERPL-SCNC: 25 MMOL/L (ref 21–32)
COLOR UR: ABNORMAL
CREAT SERPL-MCNC: 1.26 MG/DL (ref 0.6–1.3)
DIFFERENTIAL METHOD BLD: ABNORMAL
EOSINOPHIL # BLD: 0.1 K/UL (ref 0–0.4)
EOSINOPHIL NFR BLD: 1 % (ref 0–5)
EPITH CASTS URNS QL MICRO: ABNORMAL /LPF (ref 0–5)
ERYTHROCYTE [DISTWIDTH] IN BLOOD BY AUTOMATED COUNT: 16.1 % (ref 11.6–14.5)
GLOBULIN SER CALC-MCNC: 3.6 G/DL (ref 2–4)
GLUCOSE BLD STRIP.AUTO-MCNC: 56 MG/DL (ref 70–110)
GLUCOSE BLD STRIP.AUTO-MCNC: 58 MG/DL (ref 70–110)
GLUCOSE BLD STRIP.AUTO-MCNC: 64 MG/DL (ref 70–110)
GLUCOSE SERPL-MCNC: 87 MG/DL (ref 74–99)
GLUCOSE UR STRIP.AUTO-MCNC: NEGATIVE MG/DL
GRAN CASTS URNS QL MICRO: ABNORMAL /LPF
HCT VFR BLD AUTO: 46.6 % (ref 36–48)
HGB BLD-MCNC: 14.8 G/DL (ref 13–16)
HGB UR QL STRIP: NEGATIVE
KETONES UR QL STRIP.AUTO: 80 MG/DL
LEUKOCYTE ESTERASE UR QL STRIP.AUTO: NEGATIVE
LYMPHOCYTES # BLD: 1.4 K/UL (ref 0.9–3.6)
LYMPHOCYTES NFR BLD: 28 % (ref 21–52)
MAGNESIUM SERPL-MCNC: 2.5 MG/DL (ref 1.6–2.6)
MCH RBC QN AUTO: 25.1 PG (ref 24–34)
MCHC RBC AUTO-ENTMCNC: 31.8 G/DL (ref 31–37)
MCV RBC AUTO: 79 FL (ref 74–97)
MONOCYTES # BLD: 0.2 K/UL (ref 0.05–1.2)
MONOCYTES NFR BLD: 5 % (ref 3–10)
NEUTS SEG # BLD: 3.3 K/UL (ref 1.8–8)
NEUTS SEG NFR BLD: 66 % (ref 40–73)
NITRITE UR QL STRIP.AUTO: NEGATIVE
PH UR STRIP: 5.5 [PH] (ref 5–8)
PLATELET # BLD AUTO: 273 K/UL (ref 135–420)
PMV BLD AUTO: 9.8 FL (ref 9.2–11.8)
POTASSIUM SERPL-SCNC: 4 MMOL/L (ref 3.5–5.5)
PROT SERPL-MCNC: 7.4 G/DL (ref 6.4–8.2)
PROT UR STRIP-MCNC: 100 MG/DL
RBC # BLD AUTO: 5.9 M/UL (ref 4.7–5.5)
RBC #/AREA URNS HPF: 0 /HPF (ref 0–5)
SODIUM SERPL-SCNC: 147 MMOL/L (ref 136–145)
SP GR UR REFRACTOMETRY: 1.03 (ref 1–1.03)
TROPONIN I SERPL-MCNC: <0.02 NG/ML (ref 0–0.04)
UROBILINOGEN UR QL STRIP.AUTO: 1 EU/DL (ref 0.2–1)
WBC # BLD AUTO: 4.9 K/UL (ref 4.6–13.2)
WBC URNS QL MICRO: ABNORMAL /HPF (ref 0–4)

## 2019-03-21 PROCEDURE — 74011000258 HC RX REV CODE- 258: Performed by: EMERGENCY MEDICINE

## 2019-03-21 PROCEDURE — 70450 CT HEAD/BRAIN W/O DYE: CPT

## 2019-03-21 PROCEDURE — 70551 MRI BRAIN STEM W/O DYE: CPT

## 2019-03-21 PROCEDURE — 93005 ELECTROCARDIOGRAM TRACING: CPT

## 2019-03-21 PROCEDURE — 83735 ASSAY OF MAGNESIUM: CPT

## 2019-03-21 PROCEDURE — 99285 EMERGENCY DEPT VISIT HI MDM: CPT

## 2019-03-21 PROCEDURE — 65270000029 HC RM PRIVATE

## 2019-03-21 PROCEDURE — 74011250637 HC RX REV CODE- 250/637: Performed by: EMERGENCY MEDICINE

## 2019-03-21 PROCEDURE — 96361 HYDRATE IV INFUSION ADD-ON: CPT

## 2019-03-21 PROCEDURE — 82550 ASSAY OF CK (CPK): CPT

## 2019-03-21 PROCEDURE — 82962 GLUCOSE BLOOD TEST: CPT

## 2019-03-21 PROCEDURE — 85025 COMPLETE CBC W/AUTO DIFF WBC: CPT

## 2019-03-21 PROCEDURE — 77030021352 HC CBL LD SYS DISP COVD -B

## 2019-03-21 PROCEDURE — 74011000258 HC RX REV CODE- 258: Performed by: HOSPITALIST

## 2019-03-21 PROCEDURE — 70496 CT ANGIOGRAPHY HEAD: CPT

## 2019-03-21 PROCEDURE — 80053 COMPREHEN METABOLIC PANEL: CPT

## 2019-03-21 PROCEDURE — 72125 CT NECK SPINE W/O DYE: CPT

## 2019-03-21 PROCEDURE — 81001 URINALYSIS AUTO W/SCOPE: CPT

## 2019-03-21 PROCEDURE — 74011250636 HC RX REV CODE- 250/636: Performed by: EMERGENCY MEDICINE

## 2019-03-21 PROCEDURE — 72141 MRI NECK SPINE W/O DYE: CPT

## 2019-03-21 PROCEDURE — 96360 HYDRATION IV INFUSION INIT: CPT

## 2019-03-21 PROCEDURE — 74011636320 HC RX REV CODE- 636/320: Performed by: EMERGENCY MEDICINE

## 2019-03-21 RX ORDER — SODIUM CHLORIDE 9 MG/ML
150 INJECTION, SOLUTION INTRAVENOUS ONCE
Status: COMPLETED | OUTPATIENT
Start: 2019-03-21 | End: 2019-03-21

## 2019-03-21 RX ORDER — SODIUM CHLORIDE 0.9 % (FLUSH) 0.9 %
5-40 SYRINGE (ML) INJECTION EVERY 8 HOURS
Status: DISCONTINUED | OUTPATIENT
Start: 2019-03-21 | End: 2019-04-02 | Stop reason: HOSPADM

## 2019-03-21 RX ORDER — SODIUM CHLORIDE 0.9 % (FLUSH) 0.9 %
5-40 SYRINGE (ML) INJECTION AS NEEDED
Status: DISCONTINUED | OUTPATIENT
Start: 2019-03-21 | End: 2019-04-02 | Stop reason: HOSPADM

## 2019-03-21 RX ORDER — MAGNESIUM SULFATE 100 %
16 CRYSTALS MISCELLANEOUS AS NEEDED
Status: DISCONTINUED | OUTPATIENT
Start: 2019-03-21 | End: 2019-04-02 | Stop reason: HOSPADM

## 2019-03-21 RX ORDER — SODIUM CHLORIDE 9 MG/ML
100 INJECTION, SOLUTION INTRAVENOUS ONCE
Status: COMPLETED | OUTPATIENT
Start: 2019-03-21 | End: 2019-03-21

## 2019-03-21 RX ORDER — ASPIRIN 300 MG/1
300 SUPPOSITORY RECTAL
Status: COMPLETED | OUTPATIENT
Start: 2019-03-21 | End: 2019-03-21

## 2019-03-21 RX ORDER — DEXTROSE MONOHYDRATE 25 G/50ML
25-50 INJECTION, SOLUTION INTRAVENOUS AS NEEDED
Status: DISCONTINUED | OUTPATIENT
Start: 2019-03-21 | End: 2019-04-02 | Stop reason: HOSPADM

## 2019-03-21 RX ORDER — INSULIN LISPRO 100 [IU]/ML
INJECTION, SOLUTION INTRAVENOUS; SUBCUTANEOUS
Status: DISCONTINUED | OUTPATIENT
Start: 2019-03-22 | End: 2019-03-27

## 2019-03-21 RX ADMIN — Medication 10 ML: at 23:02

## 2019-03-21 RX ADMIN — ASPIRIN 300 MG: 300 SUPPOSITORY RECTAL at 16:00

## 2019-03-21 RX ADMIN — SODIUM CHLORIDE 150 ML/HR: 900 INJECTION, SOLUTION INTRAVENOUS at 17:35

## 2019-03-21 RX ADMIN — Medication 10 ML: at 22:53

## 2019-03-21 RX ADMIN — DEXTROSE MONOHYDRATE 12.5 G: 25 INJECTION, SOLUTION INTRAVENOUS at 23:05

## 2019-03-21 RX ADMIN — IOPAMIDOL 80 ML: 755 INJECTION, SOLUTION INTRAVENOUS at 15:48

## 2019-03-21 RX ADMIN — SODIUM CHLORIDE 1000 ML: 900 INJECTION, SOLUTION INTRAVENOUS at 13:17

## 2019-03-21 RX ADMIN — SODIUM CHLORIDE 95 ML: 900 INJECTION, SOLUTION INTRAVENOUS at 15:49

## 2019-03-21 NOTE — ED PROVIDER NOTES
EMERGENCY DEPARTMENT HISTORY AND PHYSICAL EXAM 
 
12:58 PM 
 
 
Date: 3/21/2019 Patient Name: Adela Can History of Presenting Illness Chief Complaint Patient presents with  Lethargy History Provided By: Patient Additional History (Context): Adela Can is a 46 y.o. male who presents with a history of fall to the ground patient could not give me a full history is exactly how he got on the ground states he has been down for 2 days has not been able to get up he was found by a  who called EMS. Patient states he has a history of a previous stroke with left-sided weakness he denies any new focal neurological weakness states he just did not have enough energy to get denies any fevers chills nausea vomiting chest pain shortness of breath denies any pain or trauma at this time. Patient is extremely poor historian had a difficult time getting this much information. PCP: None Current Facility-Administered Medications Medication Dose Route Frequency Provider Last Rate Last Dose  
 0.9% sodium chloride infusion  150 mL/hr IntraVENous ONCE Rafael Mathur MD      
 aspirin (ASA) suppository 300 mg  300 mg Rectal NOW Rafael Mathur MD      
 
Current Outpatient Medications Medication Sig Dispense Refill  ARIPiprazole (ABILIFY) 5 mg tablet Take 1 Tab by mouth daily. 30 Tab 1  
 hydroCHLOROthiazide (HYDRODIURIL) 25 mg tablet Take 1 Tab by mouth daily. 30 Tab 1  
 lisinopril (PRINIVIL, ZESTRIL) 20 mg tablet Take 1 Tab by mouth daily. 30 Tab 1  
 buPROPion XL (WELLBUTRIN XL) 150 mg tablet Take 1 Tab by mouth every morning. 30 Tab 1  
 donepezil (ARICEPT) 5 mg tablet Take 1 Tab by mouth nightly. 30 Tab 1 Past History Past Medical History: 
Past Medical History:  
Diagnosis Date  Back pain  Hyperlipemia  Hypertension  MDD (major depressive disorder), recurrent, severe, [facility] 11/11/2017  Psychiatric disorder hallucinations  Psychotic disorder ([facility] 11/11/2017 Past Surgical History: 
Past Surgical History:  
Procedure Laterality Date  HX OTHER SURGICAL    
 oral sx Family History: 
History reviewed. No pertinent family history. Social History: 
Social History Tobacco Use  Smoking status: Current Some Day Smoker Packs/day: 0.25  Smokeless tobacco: Never Used Substance Use Topics  Alcohol use: Yes Comment: beer once prior to admission- had been sober  Drug use: Yes Types: Marijuana, Cocaine Comment: denies current use. Allergies: 
No Known Allergies Review of Systems Review of Systems Constitutional: Negative for chills, diaphoresis and fever. HENT: Negative for congestion. Eyes: Negative for visual disturbance. Respiratory: Negative for cough, chest tightness and shortness of breath. Cardiovascular: Negative for chest pain. Gastrointestinal: Negative for abdominal pain, diarrhea, nausea and vomiting. Musculoskeletal: Negative for back pain. Skin: Negative for rash. Neurological: Positive for weakness. Negative for dizziness and syncope. All other systems reviewed and are negative. Physical Exam  
 
Visit Vitals BP (!) 154/93 Pulse 62 Temp 97.5 °F (36.4 °C) Resp 9 SpO2 100% Physical Exam  
Constitutional: He is oriented to person, place, and time. He appears well-developed and well-nourished. He appears lethargic. No distress. Dry mucous membranes, with poor skin turgor appears dehydrated clinically HENT:  
Head: Normocephalic and atraumatic. Eyes: Pupils are equal, round, and reactive to light. Conjunctivae and EOM are normal. No scleral icterus. Neck: Normal range of motion. Neck supple. Cardiovascular: Normal rate, regular rhythm and normal heart sounds. No murmur heard. Pulmonary/Chest: Effort normal and breath sounds normal. No respiratory distress. Abdominal: Soft. Bowel sounds are normal. He exhibits no distension. There is no tenderness. Musculoskeletal: He exhibits no edema. Lymphadenopathy:  
  He has no cervical adenopathy. Neurological: He is oriented to person, place, and time. He appears lethargic. No cranial nerve deficit. He exhibits normal muscle tone. Coordination normal.  
Dense hemiparesis to the left side. Unable to hold right lower extremity up more than a count of 4. Skin: Skin is warm and dry. No rash noted. Psychiatric: He has a normal mood and affect. His behavior is normal.  
Nursing note and vitals reviewed. Diagnostic Study Results Labs - Recent Results (from the past 12 hour(s)) CBC WITH AUTOMATED DIFF Collection Time: 03/21/19  1:15 PM  
Result Value Ref Range WBC 4.9 4.6 - 13.2 K/uL  
 RBC 5.90 (H) 4.70 - 5.50 M/uL  
 HGB 14.8 13.0 - 16.0 g/dL HCT 46.6 36.0 - 48.0 % MCV 79.0 74.0 - 97.0 FL  
 MCH 25.1 24.0 - 34.0 PG  
 MCHC 31.8 31.0 - 37.0 g/dL  
 RDW 16.1 (H) 11.6 - 14.5 % PLATELET 095 241 - 282 K/uL MPV 9.8 9.2 - 11.8 FL  
 NEUTROPHILS 66 40 - 73 % LYMPHOCYTES 28 21 - 52 % MONOCYTES 5 3 - 10 % EOSINOPHILS 1 0 - 5 % BASOPHILS 0 0 - 2 %  
 ABS. NEUTROPHILS 3.3 1.8 - 8.0 K/UL  
 ABS. LYMPHOCYTES 1.4 0.9 - 3.6 K/UL  
 ABS. MONOCYTES 0.2 0.05 - 1.2 K/UL  
 ABS. EOSINOPHILS 0.1 0.0 - 0.4 K/UL  
 ABS. BASOPHILS 0.0 0.0 - 0.1 K/UL  
 DF AUTOMATED METABOLIC PANEL, COMPREHENSIVE Collection Time: 03/21/19  1:15 PM  
Result Value Ref Range Sodium 147 (H) 136 - 145 mmol/L Potassium 4.0 3.5 - 5.5 mmol/L Chloride 112 (H) 100 - 108 mmol/L  
 CO2 25 21 - 32 mmol/L Anion gap 10 3.0 - 18 mmol/L Glucose 87 74 - 99 mg/dL BUN 37 (H) 7.0 - 18 MG/DL Creatinine 1.26 0.6 - 1.3 MG/DL  
 BUN/Creatinine ratio 29 (H) 12 - 20 GFR est AA >60 >60 ml/min/1.73m2 GFR est non-AA 60 (L) >60 ml/min/1.73m2 Calcium 8.7 8.5 - 10.1 MG/DL  Bilirubin, total 0.8 0.2 - 1.0 MG/DL  
 ALT (SGPT) 18 16 - 61 U/L  
 AST (SGOT) 14 (L) 15 - 37 U/L Alk. phosphatase 113 45 - 117 U/L Protein, total 7.4 6.4 - 8.2 g/dL Albumin 3.8 3.4 - 5.0 g/dL Globulin 3.6 2.0 - 4.0 g/dL A-G Ratio 1.1 0.8 - 1.7 MAGNESIUM Collection Time: 03/21/19  1:15 PM  
Result Value Ref Range Magnesium 2.5 1.6 - 2.6 mg/dL CARDIAC PANEL,(CK, CKMB & TROPONIN) Collection Time: 03/21/19  1:15 PM  
Result Value Ref Range  39 - 308 U/L  
 CK - MB 2.1 <3.6 ng/ml CK-MB Index 2.1 0.0 - 4.0 % Troponin-I, QT <0.02 0.0 - 0.045 NG/ML  
EKG, 12 LEAD, INITIAL Collection Time: 03/21/19  1:17 PM  
Result Value Ref Range Ventricular Rate 68 BPM  
 Atrial Rate 68 BPM  
 P-R Interval 178 ms QRS Duration 82 ms Q-T Interval 436 ms  
 QTC Calculation (Bezet) 463 ms Calculated P Axis 43 degrees Calculated R Axis -34 degrees Calculated T Axis 102 degrees Diagnosis Normal sinus rhythm Left axis deviation T wave abnormality, consider lateral ischemia Prolonged QT Abnormal ECG When compared with ECG of 06-NOV-2018 17:34, 
IL interval has decreased URINALYSIS W/ RFLX MICROSCOPIC Collection Time: 03/21/19  3:20 PM  
Result Value Ref Range Color DARK YELLOW Appearance CLEAR Specific gravity 1.030 1.005 - 1.030    
 pH (UA) 5.5 5.0 - 8.0 Protein 100 (A) NEG mg/dL Glucose NEGATIVE  NEG mg/dL Ketone 80 (A) NEG mg/dL Bilirubin NEGATIVE  NEG Blood NEGATIVE  NEG Urobilinogen 1.0 0.2 - 1.0 EU/dL Nitrites NEGATIVE  NEG Leukocyte Esterase NEGATIVE  NEG    
URINE MICROSCOPIC ONLY Collection Time: 03/21/19  3:20 PM  
Result Value Ref Range WBC 4 to 6 0 - 4 /hpf  
 RBC 0 0 - 5 /hpf Epithelial cells FEW 0 - 5 /lpf Bacteria 1+ (A) NEG /hpf  
 Granular cast 8 to 10 NEG /lpf Radiologic Studies -  
CT SPINE CERV WO CONT Final Result IMPRESSION:  
  
No acute fracture or subluxation of the cervical spine. Please note that this cervical spine CT was performed supine and does not  
evaluate for ligamentous injury or instability. If the patient has persistent  
symptoms or if otherwise clinically indicated, erect cervical spine plain films  
are recommended. CT HEAD WO CONT Final Result IMPRESSION:  
  
Moderate burden of chronic lacunar disease, not appreciably progressed. Chronic right orbital floor posttraumatic defect. No acute findings. _______________ CTA HEAD NECK W WO CONT    (Results Pending) MRI BRAIN WO CONT    (Results Pending) MRI CERV SPINE WO CONT    (Results Pending) Medical Decision Making I am the first provider for this patient. I reviewed the vital signs, available nursing notes, past medical history, past surgical history, family history and social history. Vital Signs-Reviewed the patient's vital signs. EKG: EKG read at 1317 shows a sinus rhythm at a rate of 68 with T wave inversions in V5 and V6 nonspecific ST-T wave changes QTC is 463 Records Reviewed: Nursing Notes and Old Medical Records (Time of Review: 12:58 PM) ED Course: Progress Notes, Reevaluation, and Consults: 
 
 
Provider Notes (Medical Decision Making): MDM Number of Diagnoses or Management Options Left-sided weakness:  
Diagnosis management comments: Per patient history of old CVA now increased weakness unable to get off the ground under his own power now appears clinically dehydrated. 2:29 PM 
After discussion with  patient does not have a history of left-sided weakness this is new last time that they saw him was approximately 8 days ago. When discussed with patient again he could not give a history is when his last time normal was. Code S was not called due to unknown last time normal patient states he was down for 2 days I believe this is a subacute stroke at this point.  
 
2:41 PM 
 Discussed with Tele neurology for consult Dr Arsalan Lynch recommends CTA for head and neck for possible thrombectomy followed by MRI of head and neck for further evaluation 4:10 PM 
PER tele-neurology CTA showed no evidence of a occlusive vessel suitable for thrombectomy. Will admit for further evaluation MRI of the head MRI of the C-spine per recommendation of neurology Amount and/or Complexity of Data Reviewed Clinical lab tests: ordered Tests in the radiology section of CPT®: ordered Critical Care Time:  
 
For Hospitalized Patients: 
 
1. Hospitalization Decision Time: The decision to hospitalize the patient was made by Dr. Eliseo Marques at 4:35 PM 
 on 3/21/2019 Diagnosis Clinical Impression: 1. Left-sided weakness Disposition: admit Follow-up Information None Patient's Medications Start Taking No medications on file Continue Taking ARIPIPRAZOLE (ABILIFY) 5 MG TABLET    Take 1 Tab by mouth daily. BUPROPION XL (WELLBUTRIN XL) 150 MG TABLET    Take 1 Tab by mouth every morning. DONEPEZIL (ARICEPT) 5 MG TABLET    Take 1 Tab by mouth nightly. HYDROCHLOROTHIAZIDE (HYDRODIURIL) 25 MG TABLET    Take 1 Tab by mouth daily. LISINOPRIL (PRINIVIL, ZESTRIL) 20 MG TABLET    Take 1 Tab by mouth daily. These Medications have changed No medications on file Stop Taking No medications on file  
 
_______________________________

## 2019-03-21 NOTE — ED NOTES
TRANSFER - ED to INPATIENT REPORT: 
 
SBAR report made available to receiving floor on this patient being transferred to [facility] ([id number])  for routine progression of care Admitting diagnosis CVA (cerebral vascular accident) ([facility] [I63.9] Information from the following report(s) SBAR, Kardex, ED Summary, Intake/Output, MAR and Recent Results was made available to receiving floor. Lines:    
 
Medication list updated today Opportunity for questions and clarification was provided. Patient is only aware of  place, person and situation Last NIH 3 Patient is  continent and non-ambulatory Valuables transported with patient Patient transported with: 
 Registered Nurse MAP (Monitor): 127 =Monitored (most recent) Vitals w/ MEWS Score (last day) Date/Time MEWS Score Pulse Resp Temp BP Level of Consciousness SpO2  
 03/21/19 1900  0  53  (Abnormal)   14  97.4 °F (36.3 °C)  156/98  (Abnormal)   Alert  93 % 03/21/19 1810  0  54  (Abnormal)   11  97.6 °F (36.4 °C)  176/109  (Abnormal)   Alert  98 % 03/21/19 1700    53  (Abnormal)   9    167/92  (Abnormal)     98 % 03/21/19 1620    63  11    160/94  (Abnormal)     99 % 03/21/19 1610    57  (Abnormal)   10    177/90    98 % 03/21/19 1550    62  9    154/93  (Abnormal)     100 % 03/21/19 1500    71  14    151/120  (Abnormal)     97 % 03/21/19 1432          146/118  (Abnormal)       
 03/21/19 12:56:31        97.5 °F (36.4 °C)        
 03/21/19 1233        98 °F (36.7 °C)    Alert    
 03/21/19 1230    73  13    144/100  (Abnormal)     97 % 03/21/19 1225    83  18    145/97  (Abnormal)   Alert  97 % Septic Patients:  
 
Lactic Acid No results found for: LACPOC (Most recent on top) Repeat NIH done at bedside with Alexa VILLANUEVA , Score of 2.

## 2019-03-21 NOTE — PROGRESS NOTES
completed the initial Spiritual Assessment of the patient in bed 12 of the emergency room and offered Pastoral Care support.,. Patient does not have any Gnosticist/cultural needs that will affect patients preferences in health care. Chaplains will continue to follow and will provide pastoral care on an as needed/requested basis. Christina Rhodes Board Certified [place] [facility] [phone]

## 2019-03-21 NOTE — PROGRESS NOTES
Pt arrived from ED alert and oriented, vitals WNL with HR running baseline low, NIH score of 3 (unchanged) will continue to monitor.

## 2019-03-22 PROBLEM — F10.20 ALCOHOLISM (HCC): Status: ACTIVE | Noted: 2019-03-22

## 2019-03-22 LAB
ATRIAL RATE: 68 BPM
CALCULATED P AXIS, ECG09: 43 DEGREES
CALCULATED R AXIS, ECG10: -34 DEGREES
CALCULATED T AXIS, ECG11: 102 DEGREES
CHOLEST SERPL-MCNC: 243 MG/DL
DIAGNOSIS, 93000: NORMAL
EST. AVERAGE GLUCOSE BLD GHB EST-MCNC: 128 MG/DL
GLUCOSE BLD STRIP.AUTO-MCNC: 110 MG/DL (ref 70–110)
GLUCOSE BLD STRIP.AUTO-MCNC: 118 MG/DL (ref 70–110)
GLUCOSE BLD STRIP.AUTO-MCNC: 122 MG/DL (ref 70–110)
GLUCOSE BLD STRIP.AUTO-MCNC: 123 MG/DL (ref 70–110)
GLUCOSE BLD STRIP.AUTO-MCNC: 135 MG/DL (ref 70–110)
HBA1C MFR BLD: 6.1 % (ref 4.2–5.6)
HDLC SERPL-MCNC: 32 MG/DL (ref 40–60)
HDLC SERPL: 7.6 {RATIO} (ref 0–5)
LDLC SERPL CALC-MCNC: 179.4 MG/DL (ref 0–100)
LIPID PROFILE,FLP: ABNORMAL
P-R INTERVAL, ECG05: 178 MS
Q-T INTERVAL, ECG07: 436 MS
QRS DURATION, ECG06: 82 MS
QTC CALCULATION (BEZET), ECG08: 463 MS
TRIGL SERPL-MCNC: 158 MG/DL (ref ?–150)
VENTRICULAR RATE, ECG03: 68 BPM
VLDLC SERPL CALC-MCNC: 31.6 MG/DL

## 2019-03-22 PROCEDURE — 74011250637 HC RX REV CODE- 250/637: Performed by: HOSPITALIST

## 2019-03-22 PROCEDURE — 97166 OT EVAL MOD COMPLEX 45 MIN: CPT

## 2019-03-22 PROCEDURE — 74011250637 HC RX REV CODE- 250/637: Performed by: PSYCHIATRY & NEUROLOGY

## 2019-03-22 PROCEDURE — 65270000029 HC RM PRIVATE

## 2019-03-22 PROCEDURE — 77030032490 HC SLV COMPR SCD KNE COVD -B

## 2019-03-22 PROCEDURE — 83036 HEMOGLOBIN GLYCOSYLATED A1C: CPT

## 2019-03-22 PROCEDURE — 97162 PT EVAL MOD COMPLEX 30 MIN: CPT

## 2019-03-22 PROCEDURE — 36415 COLL VENOUS BLD VENIPUNCTURE: CPT

## 2019-03-22 PROCEDURE — 82962 GLUCOSE BLOOD TEST: CPT

## 2019-03-22 PROCEDURE — 80061 LIPID PANEL: CPT

## 2019-03-22 PROCEDURE — 74011000258 HC RX REV CODE- 258: Performed by: HOSPITALIST

## 2019-03-22 PROCEDURE — 97535 SELF CARE MNGMENT TRAINING: CPT

## 2019-03-22 RX ORDER — LORAZEPAM 1 MG/1
1 TABLET ORAL
Status: DISCONTINUED | OUTPATIENT
Start: 2019-03-22 | End: 2019-03-25

## 2019-03-22 RX ORDER — SODIUM CHLORIDE 0.9 % (FLUSH) 0.9 %
5-40 SYRINGE (ML) INJECTION AS NEEDED
Status: DISCONTINUED | OUTPATIENT
Start: 2019-03-22 | End: 2019-04-02 | Stop reason: HOSPADM

## 2019-03-22 RX ORDER — CLOPIDOGREL BISULFATE 75 MG/1
75 TABLET ORAL DAILY
Status: DISCONTINUED | OUTPATIENT
Start: 2019-03-22 | End: 2019-04-02 | Stop reason: HOSPADM

## 2019-03-22 RX ORDER — LORAZEPAM 2 MG/ML
1 INJECTION INTRAMUSCULAR
Status: DISCONTINUED | OUTPATIENT
Start: 2019-03-22 | End: 2019-03-25

## 2019-03-22 RX ORDER — LORAZEPAM 2 MG/ML
2 INJECTION INTRAMUSCULAR
Status: DISCONTINUED | OUTPATIENT
Start: 2019-03-22 | End: 2019-03-25

## 2019-03-22 RX ORDER — ASPIRIN 325 MG/1
100 TABLET, FILM COATED ORAL DAILY
Status: DISCONTINUED | OUTPATIENT
Start: 2019-03-23 | End: 2019-04-02 | Stop reason: HOSPADM

## 2019-03-22 RX ORDER — DEXTROSE MONOHYDRATE AND SODIUM CHLORIDE 5; .45 G/100ML; G/100ML
75 INJECTION, SOLUTION INTRAVENOUS CONTINUOUS
Status: DISCONTINUED | OUTPATIENT
Start: 2019-03-22 | End: 2019-04-02 | Stop reason: HOSPADM

## 2019-03-22 RX ORDER — ATORVASTATIN CALCIUM 40 MG/1
80 TABLET, FILM COATED ORAL
Status: DISCONTINUED | OUTPATIENT
Start: 2019-03-22 | End: 2019-04-02 | Stop reason: HOSPADM

## 2019-03-22 RX ORDER — LORAZEPAM 1 MG/1
2 TABLET ORAL
Status: DISCONTINUED | OUTPATIENT
Start: 2019-03-22 | End: 2019-03-25

## 2019-03-22 RX ORDER — LORAZEPAM 2 MG/ML
3 INJECTION INTRAMUSCULAR
Status: DISCONTINUED | OUTPATIENT
Start: 2019-03-22 | End: 2019-03-25

## 2019-03-22 RX ORDER — THERA TABS 400 MCG
1 TAB ORAL DAILY
Status: DISCONTINUED | OUTPATIENT
Start: 2019-03-23 | End: 2019-04-02 | Stop reason: HOSPADM

## 2019-03-22 RX ORDER — ASPIRIN 325 MG
325 TABLET ORAL DAILY
Status: DISCONTINUED | OUTPATIENT
Start: 2019-03-22 | End: 2019-04-02 | Stop reason: HOSPADM

## 2019-03-22 RX ORDER — FOLIC ACID 1 MG/1
1 TABLET ORAL DAILY
Status: DISCONTINUED | OUTPATIENT
Start: 2019-03-23 | End: 2019-04-02 | Stop reason: HOSPADM

## 2019-03-22 RX ORDER — SODIUM CHLORIDE 0.9 % (FLUSH) 0.9 %
5-40 SYRINGE (ML) INJECTION EVERY 8 HOURS
Status: DISCONTINUED | OUTPATIENT
Start: 2019-03-22 | End: 2019-04-02 | Stop reason: HOSPADM

## 2019-03-22 RX ADMIN — Medication 10 ML: at 22:45

## 2019-03-22 RX ADMIN — DEXTROSE MONOHYDRATE AND SODIUM CHLORIDE 75 ML/HR: 5; .45 INJECTION, SOLUTION INTRAVENOUS at 00:30

## 2019-03-22 RX ADMIN — Medication 10 ML: at 12:28

## 2019-03-22 RX ADMIN — Medication 10 ML: at 15:21

## 2019-03-22 RX ADMIN — ASPIRIN 325 MG ORAL TABLET 325 MG: 325 PILL ORAL at 12:28

## 2019-03-22 RX ADMIN — DEXTROSE MONOHYDRATE AND SODIUM CHLORIDE 75 ML/HR: 5; .45 INJECTION, SOLUTION INTRAVENOUS at 15:24

## 2019-03-22 RX ADMIN — ATORVASTATIN CALCIUM 80 MG: 40 TABLET, FILM COATED ORAL at 22:00

## 2019-03-22 RX ADMIN — Medication 10 ML: at 22:44

## 2019-03-22 RX ADMIN — CLOPIDOGREL BISULFATE 75 MG: 75 TABLET, FILM COATED ORAL at 15:20

## 2019-03-22 NOTE — H&P
History and Physical 
 
Late Entry:  Patient seen yesterday and H+P performed. I think it was inadvertently deleted Patient: Milena Story               Sex: male          DOA: 3/21/2019 YOB: 1965      Age:  46 y.o.        LOS:  LOS: 1 day HPI:  
 
Milena Story is a 46 y.o. male who presented to the ER with difficulty with speech and left sided weakness. This had been worsening for the day of admission. He did not have chest pain or SOB. No syncope. He has had issues with alcoholism in the past.  He has also had Psychiatric hospitalization in the past.  In the ER he was found to have CVA but was not candidate for tPA. He will be admitted for ongoing management. Past Medical History:  
Diagnosis Date  Back pain  Hyperlipemia  Hypertension  MDD (major depressive disorder), recurrent, severe, with psychosis ([facility] 11/11/2017  Psychiatric disorder   
 hallucinations  [facility] Social History: 
 Tobacco use:  Patient has smoked for years Alcohol use:  Patient uses alcohol regularly Occupation:  Patient is not working Family History: 
 Multiple family members with HTN Review of Systems Constitutional:  No fever or weight loss HEENT:  No headache or visual changes Cardiovascular:  No chest pain or diaphoresis Respiratory:  No coughing, wheezing, or shortness of breath. GI:  No nausea or vomitting. No diarrhea :  No hematuria or dysuria Skin:  No rashes or moles Neuro:  Left sided weakness as above, no seizures or syncope Hematological:  No bruising or bleeding Endocrine:  No diabetes or thyroid disease Physical Exam:  
  
Visit Vitals /82 (BP 1 Location: Right arm, BP Patient Position: At rest) Pulse 73 Temp 98.3 °F (36.8 °C) Resp 15 Wt 72.6 kg (159 lb 15.7 oz) SpO2 97% BMI 25.43 kg/m² Physical Exam: 
 
Gen:  No distress, alert HEENT:  Normal cephalic atraumatic, extra-occular movements are intact. Neck:  Supple, No JVD Lungs:  Clear bilaterally, no wheeze, no rales, normal effort Heart:  Regular Rate and Rhythm, normal S1 and S2, no edema Abdomen:  Soft, non tender, normal bowel sounds, no guarding. Extremities:  Well perfused, no cyanosis or edema Neurological:  Awake and alert, CN's are intact, normal strength throughout extremities Skin:  No rashes or moles Mental Status:  Normal thought process, does not appear anxious Laboratory Studies: All lab results for the last 24 hours reviewed. Assessment/Plan Principal Problem: 
  CVA (cerebral vascular accident) ([facility] (3/21/2019) Active Problems: Hypertension () Alcoholism ([facility] (3/22/2019) PLAN: 
 
Proceed with CVA protocol ASA Statin Neurology consult BP control Mobilize as possible. CIWA protocol

## 2019-03-22 NOTE — PROGRESS NOTES
Problem: Self Care Deficits Care Plan (Adult) Goal: *Acute Goals and Plan of Care (Insert Text) Description Occupational Therapy Goals Initiated 3/22/2019 within 7 day(s). 1.  Patient will perform grooming tasks while standing with minimal assistance for balance and additional time to incorporate LUE as gross assist. 
2.  Patient will perform upper body dressing with minimal assistance utilizing adaptive strategies, prn. 
3.  Patient will perform lower body dressing with moderate assistance and minimal verbal cues. 4.  Patient will perform toilet transfers with minimal assistance/contact guard assist. 
5.  Patient will perform all aspects of toileting with minimal assistance/contact guard assist. 
6.  Patient will participate in upper extremity therapeutic exercise/activities with minimal assistance/contact guard assist for 8 minutes to increase BUE strength/AROM for functional transfers and ADLs. 7.  Patient will utilize energy conservation techniques during functional activities with minimal verbal cues. Outcome: Progressing Towards Goal 
 
 [facility] 
OCCUPATIONAL THERAPY: INITIAL ASSESSMENT INPATIENT: Medicaid: Hospital Day: 2 Patient: Luz Silva (37 y.o. male)    Date: 3/22/2019 Primary Diagnosis: CVA (cerebral vascular accident) ([facility] [I63.9]  
 ,  ,  
Precautions: Confused and Falls PLOF: Pt is poor historian; pt reporting independence in ADLs PTA; unclear description of PLOF with regard to functional mobility PTA. ASSESSMENT:  
Based on the objective data described below, the patient presents with impairments with regard to bed mobility, activity tolerance, LUE function/strength and independence in ADLs secondary to CVA. Pt supine on arrival, agreeable to therapy, A&O x2. Minimal AROM/strength of LUE; pt reports this is new. Pt is poor historian, inconsistent & vague responses with regard to PLOF. Max A x1 for supine-->sit.  PT joined session to maximize pt participation & safety. Min/mod A x2 with RW & max verbal cues for RW management and body positioning due to impulsivity and poor safety awareness. Increased time required to manage LLE during transfer. Max A for pericare due to impaired standing balance. Pt returned back to EOB, again impulsive & requiring vc's for safety. Returned supine with min/mod A x2. Needs within reach. Pt denies pain or dizziness t/o session. Michelle Woodard RN aware of session. Recommend SNF upon d/c. Recommendations for the next treatment session: ADLs incorporating LUE Mr. Keila Garcia will benefit from skilled intervention to address the above impairments. His rehabilitation potential is considered to be Fair. EDUCATION Education:  Patient was educated on the following topics: role of OT, POC; safety Barriers to Learning/Limitations: yes;  cognitive (?) Compensate with: visual, verbal, tactile, kinesthetic cues/model PLAN OF CARE:  
Problems:  Decreased ROM, Decreased strength affecting function, Decreased ADL/functioning of activities and Decreased transfer abilities Recommendations and Planned Interventions: 
?                  Self Care Training                   ? Therapeutic Activities ? Functional Mobility Training    ? Cognitive Retraining 
? Therapeutic Exercises            ? Endurance Activities ? Balance Training                     ? Neuromuscular Re-ed ? Visual/Perceptual Training      ? Home Safety Training 
? Patient Education                    ? Family Training/Education ? Other (comment): Frequency/Duration: Patient will be followed by occupational therapy 4-7 times a week to address goals. Discharge Recommendations: Abraham Davenport Further Equipment Recommendations for Discharge: bedside commode SUBJECTIVE:  
 Patient stated: \"I haven't gotten to that challenge yet\" (when asked how he got around PTA) OBJECTIVE/TREATMENT:  
 
Past Medical History:  
Diagnosis Date Back pain Hyperlipemia Hypertension MDD (major depressive disorder), recurrent, severe, with psychosis ([facility] 11/11/2017 Psychiatric disorder   
 hallucinations Psychotic disorder ([facility] 11/11/2017 Past Surgical History:  
Procedure Laterality Date HX OTHER SURGICAL    
 oral sx Eval Complexity: History: HIGH Complexity : Extensive review of history including physical, cognitive and psychosocial history ; Examination: MEDIUM Complexity : 3-5 performance deficits relating to physical, cognitive , or psychosocial skils that result in activity limitations and / or participation restrictions; Decision Making:MEDIUM Complexity : Patient may present with comorbidities that affect occupational performnce. Miniml to moderate modification of tasks or assistance (eg, physical or verbal ) with assesment(s) is necessary to enable patient to complete evaluation Prior Level of Function/Home Situation:  
Pt is poor historian; extremely difficult to obtain PLOF. Pt providing inconsistent & vague responses to PLOF questioning Cognitive/Behavioral Status:  
Neurologic State: alert Orientation: only aware of  place and person Cognition:  decreased attention/concentration, following commands  follows two step commands/direction, impaired decision making and impulsive Safety/Judgement: Decreased awareness of need for assistance, Decreased awareness of need for safety and Decreased insight into deficits ROM: Moderately limited, severely limited (< 1/4 active shoulder flex of LUE); 1/2 L elbow flex against gravity MMT: RUE 4/5; LUE 2/5 Coordination: RUE generally decreased; LUE grossly decreased Hand dominance: Right Skin: Intact (BUEs) Edema: None noted (BUEs) Sensation: Unable to assess Vision/Perceptual: impaired Functional Status Indep Mod I  
Sup. / 
Set- Up SBA CGA Min Assist  
Mod Assist  
Max assist  
Total Assist  
Assist x2 Additional Time NT Comments Rolling ?  ?  ?  ?  ?    ?    ?    ?  ?  ?  ?  ? Supine to sit ?  ?  ?  ?  ?  ?  ?  ?  ?  ?  ?  ? Sit to supine ? ?  ?  ?  ?  ?  ?  ?  ?  ?  ?  ? Sit to stand ?  ?  ?  ?  ?  ?  ?  ?  ?  ?  ?  ? Toilet Transfer ? ?  ?  ?  ?  ?  ?  ?  ?  ?  ?  ? Feeding ? ?  ?  ?  ?  ?  ?  ?  ?  ?  ?  ? Grooming ?  ?  ?  ?  ?  ?  ?  ?  ?  ?  ?  ? Bathing  [phone]  ?    
LB Dressing  ?  ?  ?  ?  ?  ?  ?  ?  ?  ?  ?  ? Toileting ?  ?  ?  ?  ?  ?  ?  ?  ?  ?  ?  ? Balance Good Adron Hearing Poor Unable Comments Sitting static ?  ?  ?  ? Sitting dynamic ?  ?  ?  ? Fair-  
Standing static ?  ?  ?  ? Fair-  
Standing dynamic ?  ?  ?  ?    
ADL Intervention:  
Min/mod A x2 with RW & max verbal cues for RW management and body positioning due to impulsivity and poor safety awareness. Increased time required to manage LLE during transfer. Max A for pericare due to impaired standing balance. Pain:  
Pre treatment:  0/10 Post treatment: 0/10 Scale: numeric Activity tolerance:  fair COMMUNICATION/EDUCATION: Pt educated on role of OT and POC; needs reinforcement. ?         Fall prevention education was provided and the patient/caregiver indicated understanding. ? Patient/family have participated as able in goal setting and plan of care. ?         Patient/family agree to work toward stated goals and plan of care. ?         Patient understands intent and goals of therapy, but is neutral about his/her participation The patient?s plan of care was also discussed with: Physical Therapist, Certified Occupational Therapy Assistant and Registered Nurse. After treatment position/precautions:  
Supine in bed Bed in low position Call light within reach RN notified Recommendations for nursing: up with assist x2 Thank you for this referral. 
Allison Pollard, MS OTR/L Time Calculation: 27 mins

## 2019-03-22 NOTE — PROGRESS NOTES
Nutrition initial assessment/Plan of care RECOMMENDATIONS:  
1. Cardiac Diet 2. Monitor labs, weight and PO intake 3. RD to follow GOALS:  
1. PO intake meets >75% of protein/calorie needs by 3/27 2. Weight Maintenance (+/- 1- 2 lb) by 3/29 ASSESSMENT:  
Wt status is classified as overweight per BMI of 25.4. Adequate PO intake per vitals. Labs noted. BG range () over the past 24 hours; A1c (6.1). Pt w/ hypernatremia, elevated BUN, elevated LDL (179.4) and elevated triglyceride (158). Nutrition recommendations listed. RD to follow. Nutrition Diagnoses: Altered nutrition related lab values related to CVD/prediabetes as evidenced by an elevated LDL (179.4), elevated triglyceride (158) and A1c (6.1). Nutrition Risk:  [] High  [x] Moderate []  Low SUBJECTIVE/OBJECTIVE:  
 Pt admitted for CVA. Noted per documented weight records variability in  weights recorded, but noted weight loss of ~19 lb or 10.6% x 3-4 months. Pt seen in room at lunch; observed 30% of meal consumed. Denies having any food allergies or problems chewing/swallowing and stated stable weight PTA;  lb. Reports having a good appetite normally but then that his meal intake was variable? ? Assume that his ETOH abuse affects his eating habits. Discussed some nutrition recommendations for low sat fat, low Na and CHO choices; handouts provided. Encouraged intake and will monitor. Information Obtained from:  
 [x] Chart Review [x] Patient 
 [] Family/Caregiver 
 [] Nurse/Physician 
 [] Interdisciplinary Meeting/Rounds Diet:Cardiac Diet Medications: [x] Reviewed IV: D5 1/2 NS @75 mL/hr (306 kcal/day) Allergies: [x] Reviewed Encounter Diagnoses ICD-10-CM ICD-9-CM 1. Left-sided weakness R53.1 728.87 Past Medical History:  
Diagnosis Date  Back pain  Hyperlipemia  Hypertension  MDD (major depressive disorder), recurrent, severe, with psychosis ([facility] [address] 11/11/2017  Psychiatric disorder   
 hallucinations  Psychotic disorder ([facility] [address] 11/11/2017 Labs:   
Lab Results Component Value Date/Time Sodium 147 (H) 03/21/2019 01:15 PM  
 Potassium 4.0 03/21/2019 01:15 PM  
 Chloride 112 (H) 03/21/2019 01:15 PM  
 CO2 25 03/21/2019 01:15 PM  
 Anion gap 10 03/21/2019 01:15 PM  
 Glucose 87 03/21/2019 01:15 PM  
 Glucose 92 05/07/2018 04:41 AM  
 BUN 37 (H) 03/21/2019 01:15 PM  
 Creatinine 1.26 03/21/2019 01:15 PM  
 Calcium 8.7 03/21/2019 01:15 PM  
 Magnesium 2.5 03/21/2019 01:15 PM  
 Albumin 3.8 03/21/2019 01:15 PM  
 
Lab Results Component Value Date/Time Cholesterol, total 243 (H) 03/22/2019 04:40 AM  
 HDL Cholesterol 32 (L) 03/22/2019 04:40 AM  
 LDL, calculated 179.4 (H) 03/22/2019 04:40 AM  
 VLDL, calculated 31.6 03/22/2019 04:40 AM  
 Triglyceride 158 (H) 03/22/2019 04:40 AM  
 CHOL/HDL Ratio 7.6 (H) 03/22/2019 04:40 AM  
 
 
Anthropometrics: BMI (calculated): 25.4 Last 3 Recorded Weights in this Encounter 03/22/19 1206 Weight: 72.6 kg (159 lb 15.7 oz) Ht Readings from Last 1 Encounters:  
03/21/19 5' 6.5\" (1.689 m) Weight Metrics 11/6/2018 8/9/2018 8/9/2018 7/10/2018 5/5/2018 11/18/2017 11/10/2017 Weight 168 lb 150 lb 150 lb 150 lb 157 lb 155 lb 160 lb BMI 27.12 kg/m2 24.21 kg/m2 24.21 kg/m2 23.49 kg/m2 25.34 kg/m2 24.28 kg/m2 - Some encounter information is confidential and restricted. Go to Review Flowsheets activity to see all data. Per Care Everywhere: 
 
 
Patient Vitals for the past 100 hrs: 
 % Diet Eaten  
03/22/19 0917 80 % 03/21/19 2300 100 % 03/21/19 2220 100 % IBW: 142 lb %IBW: 112% UBW: 150-160 lb over the past year 
[x] Weight Loss [] Weight Gain [] Weight Stable Estimated Nutrition Needs: [x] MSJ  [] Other: 
Calories: 8440-5214 kcal Based on:   [x] Actual BW   
Protein:   60-72 g Based on:   [x] Actual BW Fluid:       4654-9814 ml Based on:   [x] Actual BW  
 
 [x] No Cultural, Temple or ethnic dietary need identified. [] Cultural, Temple and ethnic food preferences identified and addressed Wt Status:  [] Normal (18.6 - 24.9) [] Underweight (<18.5) [x] Overweight (25 - 29.9) [] Mild Obesity (30 - 34.9)  [] Moderate Obesity (35 - 39.9) [] Morbid Obesity (40+) Nutrition Problems Identified:  
[] Suboptimal PO intake  
[] Food Allergies [] Difficulty chewing/swallowing/poor dentition 
[] Constipation/Diarrhea  
[] Nausea/Vomiting  
[x] None 
[] Other:  
 
Plan:  
[x] Therapeutic Diet 
[]  Obtained/adjusted food preferences/tolerances and/or snacks options  
[]  Supplements added  
[] Occupational therapy following for feeding techniques []  HS snack added  
[]  Modify diet texture  
[]  Modify diet for food allergies [x]  Educate patient  
[]  Assist with menu selection  
[x]  Monitor PO intake on meal rounds  
[x]  Continue inpatient monitoring and intervention  
[]  Participated in discharge planning/Interdisciplinary rounds/Team meetings  
[]  Other:  
 
Education Needs: 
 [] Not appropriate for teaching at this time due to: 
 [x] Identified and addressed Nutrition Monitoring and Evaluation: 
[x] Continue ongoing monitoring and intervention 
[] Other Priyank Tee

## 2019-03-22 NOTE — ADT AUTH CERT NOTES
REQUEST FOR INPATIENT AUTHORIZATION Facility Name: Gita [facility] NPI # [id number] MD Renetta Polk NPI #  [id number]            
  
  
  
  
DIAGNOSIS CODE I63.9 ADMIT DATE 3/21  
   
Patient Demographics Patient Name Sean Back Male  
1965 Address [address] [facility] [address] Phone [phone] (Mobile) *Preferred* Hospital Account Name Acct ID Class Status Primary Coverage Sean Jones [id number] INPATIENT [employer] [place] [place] [employer]  
  
   
Guarantor Account (for Hospital Account [de-identified]) Name Relation to Pt Service Area Active? Acct Type Sean Jones Self [facility] Yes Personal/Family Address Phone [address]     
  
   
Coverage Information (for Hospital Account [de-identified]) F/O Payor/Plan Subscriber  Subscriber Sex Precert #  
[employer] [place] [employer] 63 [address] Subscriber Subscriber # Sean Jones [id number] [facility] # Group Name Address Phone [address] [address] [address] Policy Number Status Effective Date Benefits Phone [id number] -  - Auth/Cert  
  
  
   
Diagnosis Codes Comments Left-sided weakness  ICD-10-CM: R53.1 ICD-9-CM: 728.87   
  
   
Admission Information Arrival Date/Time: 2019 1223 Admit Date/Time: 2019 1224 IP Adm. Date/Time: 2019 1655 Admission Type: Emergency Point of Origin: Non-health Care Facility/self Admit Category:   
Means of Arrival: Ambulance Primary Service: Medicine Secondary Service: N/A Transfer Source:  Service Area: [address] [place] [address] Unit: [facility] 2W NEURO MED Admit Provider: Rosa Maria Clay MD Attending Provider: Papo De La Torre MD Referring Provider:   
Admission Information Attending Provider Admission Dx Admitted On  
Rosa Maria Clay MD CVA (cerebral vascular accident) [facility]) 19 Service Isolation Code Status MEDICINE  Full Code Allergies Advance Care Planning No Known Allergies Jump to the Activity    
Admission Information Unit/Bed: [facility] 2W NEURO MED/01 Service: MEDICINE Admitting provider: Ivon Fermin MD Phone: [phone] Attending provider: Ivon Fermin MD Phone: [phone] PCP: None Phone:   
Admission dx:  Patient class: I Admission type: ER    
Patient Demographics Patient Name [address] 
[id number] Sex Male  
1965 Address [address] [place] [address] [address] [address] Phone [phone] (Mobile) *Preferred*  
CSN:  
[id number] Admit Date: Admit Time Room Bed Mar 21, 2019 12:24 PM 2117 [08054] 01 [[id number]] Attending Providers Provider Pager From To  
Anum Pruitt MD  19 Ivon Fermin MD  19 Emergency Contact(s) Name Relation Home Work Mobile Suzy Ramirez [phone] Utilization Reviews  
 
   
LOC:Acute Adult-Stroke (3/21/2019) by Delisa Woodruff  
 
   
Review Entered Review Status 3/22/2019 11:12 In Primary  
   
Criteria Review REVIEW SUMMARY 
  
Patient: Roxane Mirza Review Number: [id number] Review Status: In Primary 
  
Condition Specific: Yes 
  
Condition Level Of Care Code: ACUTE Condition Level Of Care Description: Acute 
  
  
OUTCOMES Outcome Type: Primary 
  
  
  
REVIEW DETAILS 
  
Service Date: 2019 Admit Date: 2019 Product: [place] Adult Subset: Stroke (Symptom or finding within 24h) 
  (Excludes PO medications unless noted) [X] Select Day, One: 
            [X] Episode Day 1, One: 
                [X] ACUTE, All: 
                ~--Admin, IQ Admin Admin on 2019 11:12 AM--~ 
                Pt is a 48yr old male with history HTN, depression, memory issues; hx ETOH abuse Pt brought by EMS after found unresponsive on floor of apt. Urinated on floor as well Left sided weakness to arm and leg with LOB and strengh-new per case workers Not a TPA candidate due to unknown duration CT head: mod burden of chronic lacunar disase; chronic right orbital floor post traumatic defect CT cervical spine: no acute fracture or subluxation of cervical spine CTA head and neck: no hemodynamically sig cervical vasc stenosis; mild to mod alber of likely atherosclerotic stenosis left P2 segment and right A2 segments of the cerebral arteries MRI Head + for acute infarct right thalamus/corona radiata; chronic hemorrhage to bilat basal ganglia and thalami MRI cervical spine: and cervical stenosis RBC 5.9; rdw 16.1 Na 147; chl 112; BUN 37; AST 14; trop neg UA with 100 protein; 80ketone; 1+ bacteremia D50 12.5gram IV for bld glucose 67 
                NS bolus 1000cc x1 then 150cc/hr ASA supp x1 300mg Neuro checks every 1-4 hours Cardiac monitoring Dysphagis/swallow screen before any po intake or meds Plan: as per neuro checks; supportive care Case management for post acute dispo [X] Contraindicated, Both: 
                        [X] Thrombolysis, contraindicated ~--Admin,  [place] Regino on 03- 10:52 AM--~ 
                        CTA shows no evidence of occulsive vessel suitable for thrombectomy [X] Endovascular intervention, contraindicated [X] Neurological deficit, >= One: [X] Mental status changes (excludes coma, stupor, or obtundation) or Paris Coma Scale (GCS) 9-14 
                        ~--Admin, [employer] Admin Admin on 03- 10:57 AM--~ 
                        Usually has some short term memory issues, but new for LOB and strength changes; Unsure when weakness started and pt unsure when he went down 
                         
                         
                        ~--Admin, IQ Admin Admin on 03- 10:53 AM--~ 
                        Pt states that he ws \"down for 2 days\" [X] Paresis ~--Admin,  Pin [place] Regino on 03- 10:53 AM--~ New left sided weakness [X] Finding by computed tomography (CT) or magnetic resonance imaging (MRI), >= One: 
                        [X] Focal ischemia, actual or suspected ~--Admin,  Pin [place] Regino on 03- 10:56 AM--~ 
                        teleneurology consult: ischemic stroke: NO TPA [X] Hemorrhage ~--Admin, [employer] Admin Admin on 03- 10:59 AM--~ 
                        MRI brain + right frontal infarct and cervical stenosis [X] Intervention, All: 
                        [X] Aspiration risk assessment, Both: 
                            [X] Aspiration risk assessment [X] Risk, One: 
                                [X] No risk and diet as tolerated                                 ~--Admin, [employer] Admin Admin on 03- 11:01 AM--~ 
 Regular cardiac diet [X] Continuous cardiac monitoring (excludes Holter) 
                        ~--Admin, IQ Admin Admin on 03- 10:59 AM--~ Telemetry floor [X] Deep vein thrombosis (DVT) prophylaxis or patient ambulatory ~--Admin, IQ Admin Admin on 03- 11:00 AM--~ 
                        SCDs [X] Neurological assessment at least 6x/24h 
                        ~--Admin, IQ Admin Admin on 03- 11:00 AM--~ 
                        Q4H Neuro checks 
                         
                         
                         
  
Version: InterQual® 2018.1 Josef Polanco  © 2018 [employer] and/or one of its [place]. All Rights Reserved. CPT only © 2017 American Medical Association. All Rights Reserved.

## 2019-03-22 NOTE — PROGRESS NOTES
SLP rec'd evaluation & treat orders. Chart reviewed and upon initial interview, it is deemed that a formal evaluation is not indicated. Pt A&Ox3; effective communicator. Pt's basic cognitive-linguistic function appears at baseline. RN reports patient is tolerating regular/thin diet without difficulty. Patient reports no s/sx of dysphagia. Accordingly, SLP will discontinue MD orders, as evaluation not indicated. SLP available for formal evaluation if further indicated by MD. 
 
SLP educated pt on role of speech therapist in current setting with re: speech/swallow; verbalized comprehension. Results d/w RN, Suzanne Manning Thank you for this referral. 
Lisbeth Garcia [facility] SLP

## 2019-03-22 NOTE — PROGRESS NOTES
Call placed to [facility] to speak with  that has been working with pt, they do not have  named Lio Nath. Transferred me to , no answer, left  for return call. Will cont to follow. Fiona BirdRN,ext [phone]

## 2019-03-22 NOTE — PROGRESS NOTES
Progress Note Patient: Karen Medel               Sex: male          DOA: 3/21/2019 YOB: 1965      Age:  46 y.o.        LOS:  LOS: 1 day CHIEF COMPLAINT:  CVA, alcoholism Subjective:  
 
Patient feels okay No distress Objective:  
  
Visit Vitals /85 (BP 1 Location: Right arm, BP Patient Position: At rest) Pulse 77 Temp 97.8 °F (36.6 °C) Resp 16 Wt 72.6 kg (159 lb 15.7 oz) SpO2 97% BMI 25.43 kg/m² Physical Exam: 
Gen:  No distress, no complaint Lungs:  Clear bilaterally, no wheeze or rhonchi Heart:  Regular rate and rhythm, no murmurs or gallops Abdomen:  Soft, non-tender, normal bowel sounds Neuro:  Some left sided weakness persists Lab/Data Reviewed: 
 
 
 
Assessment/Plan Principal Problem: 
  CVA (cerebral vascular accident) ([facility] [address] (3/21/2019) Active Problems: Hypertension () Alcoholism ([facility] [address] (3/22/2019) Plan: 
Continue with BP control PT work with patient Alcohol withdrawal protocol Psychiatry consult for competency evaluation. DVT prophylaxis.

## 2019-03-22 NOTE — CONSULTS
Neurology Consult Note  Admit Date: 3/21/2019  Length of Stay: 1  Primary Care: None   Principle Problem:      Assessment/Plan    Right Thalamic /corona radiata stroke  Etiology secondary to small vessel ischemic disease  Control microvascular risk factors. -BP already well controlled without antihypertensives. Continue with goal < 140/90  -Liptior 80mg with goal LDL < 70  Aspirin 325mg and plavix 75 mg daily for 1 month ( until 4/23/19). Then stop plavix and continue daily aspirin 81mg thereafter. No tobacco, marijuana, cocaine. PT/OT  Awaiting echo. Alcohol abuse  History of abuse, denies current use but ethanol level 140. On CIWA protocol               History: This is a 46year old gentleman with history of polysubstance abuse (ETOH, Marijuana, Cocaine but reports he has stopped all 3), Major Depression who was found by his  on the floor an unknown period of time and left sided weakness. He was not a TPA candidate due to unknown downtime. The hospital is working on guardianship. Results reviewed:   MRI Brain  3/21/2019  1. Acute infarct right thalamus/corona radiata. 2. Numerous chronic small vessel infarcts bilateral basal ganglia and thalami,  corpus callosum and deep cerebral white matter, chiqui and cerebellum, with areas  of associated chronic hemorrhage. There are additional low gradient signal foci  suggestive of chronic microbleed's usually related to hypertension. 3. Deep white matter signal changes likely chronic microvascular ischemic  disease. MRI C Spine 3/21/2019  1. No acute osseous finding, no intrinsic cord abnormality.      2. Left posterior disc protrusion and degenerative spondylosis C4/5 with cord  flattening and severe canal stenosis.      3. Moderate canal stenosis with mild cord flattening C5/6 and C6/7, mild canal  stenosis C3/4.      4. Multilevel areas of moderate to severe bilateral degenerative foraminal  stenosis as described above.     Earney Sinks Head/Neck  1. No hemodynamically significant cervical vascular stenosis.     2. Mild to moderate areas of likely atherosclerotic stenosis left P2 segment and  right A2 segments of the cerebral arteries.     3. Otherwise unremarkable CTA head, no evidence of large vessel occlusion.     4. Numerous chronic lacunar infarcts deep white matter basal ganglia and thalami  and chiqui.     Lab Results   Component Value Date/Time    Cholesterol, total 243 (H) 03/22/2019 04:40 AM    HDL Cholesterol 32 (L) 03/22/2019 04:40 AM    LDL, calculated 179.4 (H) 03/22/2019 04:40 AM    VLDL, calculated 31.6 03/22/2019 04:40 AM    Triglyceride 158 (H) 03/22/2019 04:40 AM    CHOL/HDL Ratio 7.6 (H) 03/22/2019 04:40 AM     Hemoglobin A1c 6.1%    Discussed with: Dr. Milly Marcelino    Allergies: No Known Allergies   Review of Systems:    Y  N   Constitutional: [] [] recent weight change  [] [] fever  [] [] sleep difficulties   ENT/Mouth:  [] [] hearing loss  [] [x] swallowing problems  [] [] slurred speech   Cardiovascular:  [] [] chest pain   [] [] palpitations    Respiratory: [] [] cough with swallow  [] [] shortness of breath  [] [] sleep apnea  [] [] intubated   Gastrointestinal: [] [] abdominal pain  [] [x] nausea   Genitourinary: [] [] frequent urination  [] [] incontinence    Musculoskeletal:   [] [] joint pain  [] [] muscle pain   Integument:   [] [] rash/itching   Neurological:  [] [] dizziness/vertigo  [] [] sedation  [] [] confusion  [] [] agitation/combativeness  [] [] loss of consciousness  [] [] numbness/tingling sensation  [] [] tremors  [x] [] weakness in limbs  [] [] difficulty with balance  [] [] frequent or recurring headaches  [] [] memory loss   [] [] comatose  [] [] seizures   Psychiatric:   [] [] depression  [] [] hallucinations   Endocrine: [] [] excessive thirst or urination   [] [x] heat or cold intolerance   Hematologic/Lymphatic: [] [] bleeding tendency  [] [] enlarged lymph nodes     PMH:   Past Medical History:   Diagnosis Date    Back pain     Hyperlipemia     Hypertension     MDD (major depressive disorder), recurrent, severe, with psychosis ([facility] 11/11/2017    Psychiatric disorder     hallucinations    Psychotic disorder ([facility] 11/11/2017        Problem List: Principal Problem:    CVA (cerebral vascular accident) ([facility] (3/21/2019)    Active Problems:    Hypertension ()        FH: History reviewed. No pertinent family history. SH:   Social History     Socioeconomic History    Marital status:      Spouse name: Not on file    Number of children: Not on file    Years of education: Not on file    Highest education level: Not on file   Tobacco Use    Smoking status: Current Some Day Smoker     Packs/day: 0.25    Smokeless tobacco: Never Used   Substance and Sexual Activity    Alcohol use: Yes     Comment: beer once prior to admission- had been sober     Drug use: Yes     Types: Marijuana, Cocaine     Comment: denies current use. Social History Narrative     but  with 2 daughters. Homeless and was asked to leave due to his drinking    GED    No current legal charges pending but has possession charges in the past.          Vital Signs:   Visit Vitals  /86 (BP 1 Location: Left arm, BP Patient Position: At rest)   Pulse 73   Temp 98.2 °F (36.8 °C)   Resp 15   Wt 72.6 kg (159 lb 15.7 oz)   SpO2 99%   BMI 25.43 kg/m²      Neurological examination:    Appearance: In no acute distress, well developed, well nourished.  Cardiovascular: Heart is regular rate and rhythm, peripheral edema is absent.  Mental status examination: Alert, awake, Not oriented to year or month. Knows he is in [place], [place] and the president is [facility]. Not aphasic. No significant dysarthria.    Cranial Nerves:     I: smell Not tested   II: visual fields Full to confrontation   II: pupils Equal, round, reactive to light   III,VII: ptosis none   III,IV,VI: extraocular muscles  Full ROM   V: facial light touch sensation Normal to LT   V,VII: corneal reflex     VII: facial muscle function  Left facial weakness   VIII: hearing    IX: soft palate elevation     IX,X: gag reflex    XI: sternocleidomastoid strength    XII: tongue  midline      Fundoscopic:  deferred.  Motor exam: Station, gait:  deferred. Muscle tone, bulk normal: Some antigravity strength of the left arm but not enough to lift it up high enough to touch his nose for FTN. No antigravity strength of LLE but you could see he was trying. Daphney Grayson Sensory: Intact to LT    Coordination: FTN and HTS on on right. Cannot perform HTS on left. FTN attempted on left but he gave up because he felt too weak.  Reflexes: Symmetric and 2+ in bilateral biceps, triceps. 2+ patellar, ankle reflexes. Plantar reflexes are flexor.            Medications:      [x] REVIEWED  Current Facility-Administered Medications   Medication Dose Route Frequency    dextrose 5 % - 0.45% NaCl infusion  75 mL/hr IntraVENous CONTINUOUS    influenza vaccine 2018-19 (6 mos+)(PF) (FLUARIX QUAD/FLULAVAL QUAD) injection 0.5 mL  0.5 mL IntraMUSCular PRIOR TO DISCHARGE    aspirin tablet 325 mg  325 mg Oral DAILY    atorvastatin (LIPITOR) tablet 80 mg  80 mg Oral QHS    sodium chloride (NS) flush 5-40 mL  5-40 mL IntraVENous Q8H    sodium chloride (NS) flush 5-40 mL  5-40 mL IntraVENous PRN    insulin lispro (HUMALOG) injection   SubCUTAneous AC&HS    glucose chewable tablet 16 g  16 g Oral PRN    glucagon (GLUCAGEN) injection 1 mg  1 mg IntraMUSCular PRN    dextrose (D50) infusion 12.5-25 g  25-50 mL IntraVENous PRN      Data:      [x] REVIEWED  Recent Results (from the past 24 hour(s))   CBC WITH AUTOMATED DIFF    Collection Time: 03/21/19  1:15 PM   Result Value Ref Range    WBC 4.9 4.6 - 13.2 K/uL    RBC 5.90 (H) 4.70 - 5.50 M/uL    HGB 14.8 13.0 - 16.0 g/dL    HCT 46.6 36.0 - 48.0 %    MCV 79.0 74.0 - 97.0 FL    MCH 25.1 24.0 - 34.0 PG    MCHC 31.8 31.0 - 37.0 g/dL    RDW 16.1 (H) 11.6 - 14.5 % PLATELET 673 940 - 965 K/uL    MPV 9.8 9.2 - 11.8 FL    NEUTROPHILS 66 40 - 73 %    LYMPHOCYTES 28 21 - 52 %    MONOCYTES 5 3 - 10 %    EOSINOPHILS 1 0 - 5 %    BASOPHILS 0 0 - 2 %    ABS. NEUTROPHILS 3.3 1.8 - 8.0 K/UL    ABS. LYMPHOCYTES 1.4 0.9 - 3.6 K/UL    ABS. MONOCYTES 0.2 0.05 - 1.2 K/UL    ABS. EOSINOPHILS 0.1 0.0 - 0.4 K/UL    ABS. BASOPHILS 0.0 0.0 - 0.1 K/UL    DF AUTOMATED     METABOLIC PANEL, COMPREHENSIVE    Collection Time: 03/21/19  1:15 PM   Result Value Ref Range    Sodium 147 (H) 136 - 145 mmol/L    Potassium 4.0 3.5 - 5.5 mmol/L    Chloride 112 (H) 100 - 108 mmol/L    CO2 25 21 - 32 mmol/L    Anion gap 10 3.0 - 18 mmol/L    Glucose 87 74 - 99 mg/dL    BUN 37 (H) 7.0 - 18 MG/DL    Creatinine 1.26 0.6 - 1.3 MG/DL    BUN/Creatinine ratio 29 (H) 12 - 20      GFR est AA >60 >60 ml/min/1.73m2    GFR est non-AA 60 (L) >60 ml/min/1.73m2    Calcium 8.7 8.5 - 10.1 MG/DL    Bilirubin, total 0.8 0.2 - 1.0 MG/DL    ALT (SGPT) 18 16 - 61 U/L    AST (SGOT) 14 (L) 15 - 37 U/L    Alk.  phosphatase 113 45 - 117 U/L    Protein, total 7.4 6.4 - 8.2 g/dL    Albumin 3.8 3.4 - 5.0 g/dL    Globulin 3.6 2.0 - 4.0 g/dL    A-G Ratio 1.1 0.8 - 1.7     MAGNESIUM    Collection Time: 03/21/19  1:15 PM   Result Value Ref Range    Magnesium 2.5 1.6 - 2.6 mg/dL   CARDIAC PANEL,(CK, CKMB & TROPONIN)    Collection Time: 03/21/19  1:15 PM   Result Value Ref Range     39 - 308 U/L    CK - MB 2.1 <3.6 ng/ml    CK-MB Index 2.1 0.0 - 4.0 %    Troponin-I, QT <0.02 0.0 - 0.045 NG/ML   EKG, 12 LEAD, INITIAL    Collection Time: 03/21/19  1:17 PM   Result Value Ref Range    Ventricular Rate 68 BPM    Atrial Rate 68 BPM    P-R Interval 178 ms    QRS Duration 82 ms    Q-T Interval 436 ms    QTC Calculation (Bezet) 463 ms    Calculated P Axis 43 degrees    Calculated R Axis -34 degrees    Calculated T Axis 102 degrees    Diagnosis       Normal sinus rhythm  Left axis deviation  T wave abnormality, consider lateral ischemia  Prolonged QT  Abnormal ECG  When compared with ECG of 06-NOV-2018 17:34,  CT interval has decreased  Confirmed by Malachi Shirley ([id number]) on 3/22/2019 8:25:11 AM     URINALYSIS W/ RFLX MICROSCOPIC    Collection Time: 03/21/19  3:20 PM   Result Value Ref Range    Color DARK YELLOW      Appearance CLEAR      Specific gravity 1.030 1.005 - 1.030      pH (UA) 5.5 5.0 - 8.0      Protein 100 (A) NEG mg/dL    Glucose NEGATIVE  NEG mg/dL    Ketone 80 (A) NEG mg/dL    Bilirubin NEGATIVE  NEG      Blood NEGATIVE  NEG      Urobilinogen 1.0 0.2 - 1.0 EU/dL    Nitrites NEGATIVE  NEG      Leukocyte Esterase NEGATIVE  NEG     URINE MICROSCOPIC ONLY    Collection Time: 03/21/19  3:20 PM   Result Value Ref Range    WBC 4 to 6 0 - 4 /hpf    RBC 0 0 - 5 /hpf    Epithelial cells FEW 0 - 5 /lpf    Bacteria 1+ (A) NEG /hpf    Granular cast 8 to 10 NEG /lpf   GLUCOSE, POC    Collection Time: 03/21/19 10:08 PM   Result Value Ref Range    Glucose (POC) 64 (L) 70 - 110 mg/dL   GLUCOSE, POC    Collection Time: 03/21/19 10:49 PM   Result Value Ref Range    Glucose (POC) 56 (L) 70 - 110 mg/dL   GLUCOSE, POC    Collection Time: 03/21/19 10:51 PM   Result Value Ref Range    Glucose (POC) 58 (L) 70 - 110 mg/dL   GLUCOSE, POC    Collection Time: 03/21/19 11:58 PM   Result Value Ref Range    Glucose (POC) 110 70 - 110 mg/dL   LIPID PANEL    Collection Time: 03/22/19  4:40 AM   Result Value Ref Range    LIPID PROFILE          Cholesterol, total 243 (H) <200 MG/DL    Triglyceride 158 (H) <150 MG/DL    HDL Cholesterol 32 (L) 40 - 60 MG/DL    LDL, calculated 179.4 (H) 0 - 100 MG/DL    VLDL, calculated 31.6 MG/DL    CHOL/HDL Ratio 7.6 (H) 0 - 5.0     HEMOGLOBIN A1C WITH EAG    Collection Time: 03/22/19  4:40 AM   Result Value Ref Range    Hemoglobin A1c 6.1 (H) 4.2 - 5.6 %    Est. average glucose 128 mg/dL   GLUCOSE, POC    Collection Time: 03/22/19  7:50 AM   Result Value Ref Range    Glucose (POC) 123 (H) 70 - 110 mg/dL   GLUCOSE, POC    Collection Time: 03/22/19 11:36 AM   Result Value Ref Range    Glucose (POC) 135 (H) 70 - 110 mg/dL            Elisabeth Fleischer, MD  12:41 PM  03/22/19

## 2019-03-22 NOTE — CONSULTS
Tele-psychiatry consult is done with the help of onsite staff. Patient location: [facility])  Physician location: [facility]    Patient Name: Luz Silva  Date: 3/22/2019  Time: 3:56 PM   : 1965    Reason for consult: medical decision making capacity      History of Present Illness: Luz Silva is a 46 y. o. with reported mood and neurocognitive disorders admitted 3/21 for an acute CVA with residual weakness. Staff report patient has not had any aggression or other behavioral disturbance. On interview, patient is oriented to person and place but not time. He is withdrawn and tends to answer in monosyllables but also demonstrates impulsivity in trying to climb up out of bed mid-interview. He could not seem to process instruction to push the red nurse button to get help so he doesn't fall trying to get to the bathroom. Patient was able to answer safety questions before he got distracted with trying to get up. He denies SI, HI, hallucinations, and paranoia. He admits he ran out of meds ~1 month ago. He denies being depressed but admits to some anxiety. SI/HI/Self harm/Violence: denies SI & HI; past self harm  Access to weapons: denies    Sources of information: patient, EMR, hospital staff: RICH Squires History/Treatment History: most recent inpatient at [facility] 2018; [facility] outpatient; past meds include abilify, wellbutrin, zyprexa, and prozac     Drug/Alcohol History: UDS: not done this admission but past negative; h/o alcohol, cocaine, and cannabis in past per EMR    Medical History: acute CVA  Past Medical History:   Diagnosis Date    Back pain     Hyperlipemia     Hypertension     MDD (major depressive disorder), recurrent, severe, with psychosis ([facility] 2017    Psychiatric disorder     hallucinations    Psychotic disorder ([facility] 2017     Medications: ran out ~1month ago  Prior to Admission medications    Medication Sig Start Date End Date Taking?  Authorizing Provider   ARIPiprazole (ABILIFY) 5 mg tablet Take 1 Tab by mouth daily. 12/7/18   Wendi Estrada MD   hydroCHLOROthiazide (HYDRODIURIL) 25 mg tablet Take 1 Tab by mouth daily. 12/7/18   Wendi Estrada MD   lisinopril (PRINIVIL, ZESTRIL) 20 mg tablet Take 1 Tab by mouth daily. 12/7/18   Wendi Estrada MD   buPROPion XL (WELLBUTRIN XL) 150 mg tablet Take 1 Tab by mouth every morning. 12/8/18   Wendi Estrada MD   donepezil (ARICEPT) 5 mg tablet Take 1 Tab by mouth nightly.  12/7/18   Wendi Estrada MD     Current Facility-Administered Medications   Medication Dose Route Frequency Provider Last Rate Last Dose    dextrose 5 % - 0.45% NaCl infusion  75 mL/hr IntraVENous CONTINUOUS Shelley Iverson MD 75 mL/hr at 03/22/19 1524 75 mL/hr at 03/22/19 1524    influenza vaccine 2018-19 (6 mos+)(PF) (FLUARIX QUAD/FLULAVAL QUAD) injection 0.5 mL  0.5 mL IntraMUSCular PRIOR TO DISCHARGE Roman Barboza MD        aspirin tablet 325 mg  325 mg Oral DAILY Roman Barboza MD   325 mg at 03/22/19 1228    atorvastatin (LIPITOR) tablet 80 mg  80 mg Oral QHS Roman Barboza MD        sodium chloride (NS) flush 5-40 mL  5-40 mL IntraVENous Q8H Roman Barboza MD   10 mL at 03/22/19 1521    sodium chloride (NS) flush 5-40 mL  5-40 mL IntraVENous PRN Roman Barboza MD        LORazepam (ATIVAN) tablet 1 mg  1 mg Oral Q1H PRN Roman Barboza MD        Or    LORazepam (ATIVAN) injection 1 mg  1 mg IntraVENous Q1H PRN Roman Barboza MD        LORazepam (ATIVAN) tablet 2 mg  2 mg Oral Q1H PRN Roman Barboza MD        Or    LORazepam (ATIVAN) injection 2 mg  2 mg IntraVENous Q1H PRN Roman Barboza MD        LORazepam (ATIVAN) injection 3 mg  3 mg IntraVENous Q15MIN PRN Roman Barboza MD        [START ON 2/17/7271] folic acid (FOLVITE) tablet 1 mg  1 mg Oral DAILY Roman Barboza MD        [START ON 3/23/2019] thiamine mononitrate (B-1) tablet 100 mg  100 mg Oral DAILY Armando Ethel Yu MD        Ines Lonnie ON 3/23/2019] therapeutic multivitamin [facility]) tablet 1 Tab  1 Tab Oral DAILY Josephine Hernandez Ethel Yu MD        clopidogrel (PLAVIX) tablet 75 mg  75 mg Oral DAILY August MD Ngozi   75 mg at 03/22/19 1520    sodium chloride (NS) flush 5-40 mL  5-40 mL IntraVENous Q8H Roman Barboza MD   10 mL at 03/22/19 1228    sodium chloride (NS) flush 5-40 mL  5-40 mL IntraVENous PRN Roman Barboza MD   10 mL at 03/21/19 2253    insulin lispro (HUMALOG) injection   SubCUTAneous AC&HS Shelley Iverson MD   Stopped at 03/22/19 0730    glucose chewable tablet 16 g  16 g Oral PRN Shelley Iverson MD        glucagon [facility]) injection 1 mg  1 mg IntraMUSCular PRN Shelley Iverson MD        dextrose (D50) infusion 12.5-25 g  25-50 mL IntraVENous PRN Shelley Iverson MD   12.5 g at 03/21/19 2305     Allergies: No Known Allergies    Family Psych History/History of suicide: father institutionalized in past per EMR    Social History:    Means of support/occupation: on SSDI   Living situation: [employer] (?)   Stressors: health, limited contact with family   Strengths/supports: has a     Mental Status Exam:   Appearance and attire: disheveled, hospital gown half off  Attitude and behavior: withdrawn but cooperates with questions until the impulse struck to try to climb up out of bed  Speech: latent, slow, monotone, soft  Affect and mood: flat, \"ok\"  Association and thought processes: impoverished but goal directed with straightforward mostly yes/no questions  Thought content: SI: denies; HI: denies   Perception: AVH: denies; Delusions: denies  Sensorium and orientation: awake, oriented to person and place but not time (2015)   Memory and Intellectual functioning: impaired  Insight and judgment: poor    Impression/Risk Assessment:   Stephen Carcamo is a 46 y. o. with dementia and past depression with psychosis admitted 3/21 for an acute CVA.  He is altered but it is unclear how far off he is from baseline impairment. Patient can be redirected to address safety questions and he denies SI, HI, hallucinations, and paranoia. He is less able to engage in discussion of his psychiatric and medical care. He cannot give specifics on his psych meds and says only that he ran out ~1 month ago. He denies being depressed but admits to some anxiety. He is impulsive and easily distracted and demonstrated an inability to process instructions to push the nurse call button before trying to get up out of bed to go to the bathroom. Primary Psychiatric Diagnosis: Delirium, Major neurocognitive disorder    Other Diagnoses:  Patient Active Problem List   Diagnosis Code    MDD (major depressive disorder), recurrent, severe, with psychosis ([facility] F33.3    Alcohol abuse F10.10    Hypertension I10    Major neurocognitive disorder F01.50    CVA (cerebral vascular accident) ([facility] I63.9    [facility] F10.20     Treatment Recommendations:  1. Disposition/psychiatric clearance: medically admitted and does not appear to warrant inpatient psychiatry  2. Psychiatric medications: on none currently; would make zyprexa zydis 5mg bid prn agitation available and confirm outpatient regimen with [facility] before restarting  3. Medical decision making capacity: impaired    The above were discussed with the patient and the referring provider; able parties stated understanding and agreement with the recommendations.     Electronically signed by Alicja Whiting M.D.

## 2019-03-22 NOTE — PROGRESS NOTES
Reason for Admission:   CVA RRAT Score:    27 Resources/supports as identified by patient/family:   Ins Top Challenges facing patient (as identified by patient/family and CM): Finances/Medication cost?   JUSTO ins Transportation? JUSTO transport Support system or lack thereof? Per pt no legal NOK Living arrangements? Lives alone Self-care/ADLs/Cognition? States independent Current Advanced Directive/Advance Care Plan:  Not on file Plan for utilizing home health: Would benefit from [facility] if does not go to rehab Likelihood of readmission:  High/Red Transition of Care Plan:  Home with [facility] VS rehab depending on progress. Chart reviewed. Met with pt., verified all demographics. [facility] has [facility] JUSTO ins. [facility] does not have PCP, referral sent to .  has NO legal NOK. : Fredis Diaz, friend: [phone]. Upon questing pt further,  is an old friend, states NOT ok to speak with her. Asked him if he has any children,  has a dughter but doesn't know where she is or how to contact her. Asked if has any siblings, states doesn't know where they are or how to contact them. States he has  @ the [employer], he thinks her name is Katy Rodriguez, states ok to speak with her. Will try to contact her. Made him aware that he will likely need to go to nursing home at this point as he cannot walk or care for himself, not safe to be home alone, verbalized understanding. Has a cane. Will cont to follow. Fiona Bird RN,ext [phone]. Patient has designated no one  to participate in his/her discharge plan and to receive any needed information. Name:  
Address: 
Phone number: 
 
Care Management Interventions PCP Verified by CM: Yes(States does not have PCP, referral sent to ) Palliative Care Criteria Met (RRAT>21 & CHF Dx)?: No 
Transition of Care Consult (CM Consult): Discharge Planning Discharge Durable Medical Equipment: No 
Physical Therapy Consult: Yes Occupational Therapy Consult: Yes Speech Therapy Consult: Yes Current Support Network: Lives Alone Plan discussed with Pt/Family/Caregiver:  Yes

## 2019-03-22 NOTE — PROGRESS NOTES
Problem: Mobility Impaired (Adult and Pediatric) Goal: *Acute Goals and Plan of Care (Insert Text) Description Physical Therapy Goals Initiated 3/22/2019 and to be accomplished within 7 days. 1.  Patient will complete all bed mobility with moderate assistance  in order to prepare for EOB/OOB activity. 2.  Patient will perform sit <> stand with minimal assistance/contact guard assist in order to prepare for OOB/gait activity. 3.  Patient will perform bed to chair transfers with minimal assistance/contact guard assist in order to promote mobility and encourage seated activity to progress towards their prior level of function. 4.  Patient will ambulate 25 feet with moderate assistance  using LRAD in order to prepare for safe negotiation of their environment. Outcome: Progressing Towards Goals PHYSICAL THERAPY: INITIAL ASSESSMENT INPATIENT: Medicaid: Hospital Day: 2 Patient: Haresh Vicente (49 y.o. male)    Date: 3/22/2019 Primary Diagnosis: CVA (cerebral vascular accident) ([facility] [address] [address] [I63.9]  
 ,    
Precautions: Fall PLOF: Unknown, patient is a poor historian. ASSESSMENT : 
Patient sitting EOB with OT, agreeable to participation with PT. Co-treated with OT to maximize patient safety. Patient is a poor historian; oriented to self and location only. Unable to discern PLOF. MIN A x 2 for sit > stand with RW for support. Poor standing balance; patient requires cuing for erect posture and safety with balance. MOD A for for stand pivot transfer to [facility] and back to bed; L knee buckling throughout tranfers. Patient demo's poor safety awareness with transfers and returning to bed. Patient unable to perform any AROM of L LE; PROM WFL. Patient returned to bed and left supine in bed with all needs within reach. No c/o pain during activity. Recommend d/c to SNF. Patient presents with deficits in: 
Bed Mobility, Transfers, Gait, Strength and Balance Patient will benefit from skilled intervention to address the above impairments. Patient?s rehabilitation potential is considered to be Fair Factors which may influence rehabilitation potential include:  
? None noted ? Mental ability/status ? Medical condition ? Home/family situation and support systems ? Safety awareness 
? Pain tolerance/management 
? Other: PLAN :  
Recommendations and Planned Interventions: 
?           Bed Mobility Training             ? Neuromuscular Re-Education ? Transfer Training                   ? Orthotic/Prosthetic Training 
? Gait Training                          ? Modalities ? Therapeutic Exercises          ? Edema Management/Control ? Therapeutic Activities            ? Patient and Family Training/Education ? Other (comment): EDUCATION:  
Education:  Patient was educated on the following topics: Purpose of PT, PT POC, bed mobility, transfers, safety. Needs reinforcement. Barriers to Learning/Limitations: yes;  cognitive Compensate with: visual, verbal, tactile, kinesthetic cues/model Recommendations for the next treatment session: Suze Stevens Frequency/Duration: Patient will be followed by physical therapy 1-2 times per day/4-7 days per week to address goals. Discharge Recommendations: Abraham Davenport Further Equipment Recommendations for Discharge: rolling walker Factors which may impact discharge planning: N/A  
 
SUBJECTIVE:  
Patient stated ? This is all new. ? OBJECTIVE DATA SUMMARY:  
 
Past Medical History:  
Diagnosis Date Back pain Hyperlipemia Hypertension MDD (major depressive disorder), recurrent, severe, with psychosis ([facility] [address] 11/11/2017 Psychiatric disorder   
 hallucinations Psychotic disorder ([facility] 11/11/2017 Past Surgical History:  
Procedure Laterality Date HX OTHER SURGICAL    
 oral sx Eval Complexity: History: MEDIUM  Complexity : 1-2 comorbidities / personal factors will impact the outcome/ POC Exam:MEDIUM Complexity : 3 Standardized tests and measures addressing body structure, function, activity limitation and / or participation in recreation  Presentation: MEDIUM Complexity : Evolving with changing characteristics  Clinical Decision Making:Medium Complexity MIN A X 2 for transfers  Overall Complexity:MEDIUM Prior Level of Function/Home Situation:  
Home Situation Home Environment: Apartment # Steps to Enter: 0 One/Two Story Residence: One story Living Alone: Yes Support Systems: / Patient Expects to be Discharged to[de-identified] [id number] Current DME Used/Available at Home: None Critical Behavior: 
Neurologic State: Alert Orientation Level: Oriented X4;Oriented to person;Oriented to place;Oriented to time Cognition: Follows commands Psychosocial 
Patient Behaviors: Calm; Cooperative Needs Expressed: Educational 
Purposeful Interaction: Yes Pt Identified Daily Priority: Clinical issues (comment) Caritas Process: Establish trust 
Caring Interventions: Therapeutic modalities Therapeutic Modalities: Humor Manual Muscle Testing (LE) 
R LE 0/5 with activity Tone : Normal 
Sensation: NT 
Range Of Motion: B LE AROM Functional Mobility: 
 
 
Functional Status Indep (I) Mod I Super-vision Min A Mod A Max A Total A Assist x2 Verbal cues Additional time Not tested Comments Rolling ?  ?  ? ?    ?    ?  ?  ? ? ? ? Performed with OT Supine to sit ?  ?  ? ?  ?  ?  ?  ? ? ? ? Performed with OT Sit to supine ? ?  ? ?  ?  ?  ?  ? ? ? ? Performed with OT Sit to stand ?  ?  ? ?  ?  ?  ?  ? ? ? ? Stand to sit ?  ?  ? ?  ?  ?  ?  ? ? ? ? Bed to chair transfers ? ?  ? ?  ?  ?  ?  ? ? ? ? Balance Good Khanh Stage Poor Unable Not tested Comments Sitting static ?  ?  ?  ?  ?   
 Sitting dynamic ?  ?  ?  ?  ? Standing static ?  ?  ?  ?  ? With RW  
Standing dynamic ?  ?  ?  ?  ? With RW  
 
Pain: None Pre treatment pain level: 0 Post treatment pain level: 0 Vital Signs Temp: 98.2 °F (36.8 °C) Pulse (Heart Rate): 73 BP: 126/86 Resp Rate: 15    
O2 Sat (%): 99 % Activity Tolerance:  
Fair Please refer to the flowsheet for vital signs taken during this treatment. After treatment:  
?         Patient left in no apparent distress sitting up in chair ? Patient left in no apparent distress in bed 
? Call bell left within reach ? Nursing notified ? Caregiver present ? Bed alarm activated COMMUNICATION/EDUCATION:  
?         Fall prevention education was provided and the patient/caregiver indicated understanding. ? Patient/family have participated as able in goal setting and plan of care. ?         Patient/family agree to work toward stated goals and plan of care. ?         Patient understands intent and goals of therapy, but is neutral about his/her participation. ? Patient is unable to participate in goal setting and plan of care Thank you for this referral. 
Gigi Roach Time Calculation: 22 mins

## 2019-03-22 NOTE — PROGRESS NOTES
Bedside and Verbal shift change report given to Amara Weiner RN (oncoming nurse) by Rasheed Montiel RN (offgoing nurse). Report included the following information SBAR, Kardex, Intake/Output, MAR and Cardiac Rhythm NSR. Pt resting in bed, no acute concerns noted, vitals WNL, no distress noted, Left side weakness noted, short term memory loss from previous hx, NIH score of 6, alert and oriented x3, bed locked and low, call bell in reach. Will continue to monitor. 0900- Pt completed PT. Recommended x2 assist.  
 
[facility] concerns noted for pt.  came in and began inquiring status updates on pt. RN asked if it was the  spoken with that AM.  Pt had earlier gave RN verbal permission to share care code after speaking with a woman on the phone and confirming her identity. This  here in person denies she called and now security concerns are in question. Will notify charge. 1400-  followed up with RN. Apparently there is a second  involved with pt care and no security concerns noted. 1630- Telepsych consult completed.

## 2019-03-22 NOTE — PROGRESS NOTES
NUTRITIONPatient/Family Education Record FACTORS THAT MAY INFLUENCE PATIENTS ABILITY TO LEARN: []   Language barrier    []   Cultural needs   [x]   Motivation  
 [x]   Cognitive limitation    []   Physical   []   Education  
[]   Physiological factors   []   Hearing/vision/speaking impairment   []   Yazidi beliefs []   Financial limitations    []  Other:   []   No barriers limiting ability to learn Person Instructed: [x]   Patient   []   Family   []  Other Preference for Learning: 
 [x]   Verbal   [x]   Written   []  Demonstration Patient educated on:  
[x] Cardiac/heart healthy diet [x] 2gm Sodium diet 
[] Vitamin K regulated diet (coumadin) [x] Weight loss/portion control 
[] High protein 
[x] Other:CHO choices Goal: 
Patient will demonstrate understanding of modified diet by Outcome:  
[x]  Patient verbalized understanding of education but unsure if willing to comply with recommendations. []  Patient declined education 
[]  Patient needs follow up education 
[x]  Written information provided 
[]  RD contact information provided Sharlene Holt

## 2019-03-22 NOTE — PROGRESS NOTES
Assume care of patient lying carmen bed from Barney Felix, patient just arrived to floor from ED. Alert and oriented to Person, place and situation Noted disoriented to time. Bed locked in lowest position. Call light within reach and understand to use for assistance and needs. 1925 Transported to MRI via bed.  
 
2105 Returned to floor from MRI with call received from Dr. Elina Perdomo, Radiologist  With result from MRI. Patient was positive for right frontal infarct and cervical stenosis 2110 Dr. Sindi Renteria notified of positive for stroke and cervical stenosis. No new orders received at present. . 
 
2210 Blood sugar 64  
 
2215 Snack provided while waiting for meal from supervisor. 2225 Meal provided with snack. 2249 Glucose taken with results 56 after meal eating, recheck and noted 58. 
 
2305 D50 12.5 g  adminstered  via IV for low blood sugar. Will monitor glucose. 2320 Rechecked glucose with results 110.  
 
2355 Call placed to Dr. Sindi Renteria to update blood sugar status. 03/22/2019 
 
0010 Dr. Sindi Renteria returned call and updated condition of patient with new order for D5. 045% NS to infuse at 75 cc/hr. 0100 Patient left side extremely weak with some movement. Patient stated \"This is not new my arm and leg were always weak like this\". 0300 NIH remains scoring 6. No signs of hypoglycemia. Denies headache or pain. 0715 Bedside and Verbal shift change report given to Barney Felix RN (oncoming nurse) by Eder Sanon RN (offgoing nurse). Report given with SBAR, Kardex, Intake/Output, MAR and Recent Results.

## 2019-03-23 LAB
GLUCOSE BLD STRIP.AUTO-MCNC: 104 MG/DL (ref 70–110)
GLUCOSE BLD STRIP.AUTO-MCNC: 105 MG/DL (ref 70–110)
GLUCOSE BLD STRIP.AUTO-MCNC: 120 MG/DL (ref 70–110)
GLUCOSE BLD STRIP.AUTO-MCNC: 131 MG/DL (ref 70–110)

## 2019-03-23 PROCEDURE — 65270000029 HC RM PRIVATE

## 2019-03-23 PROCEDURE — 82962 GLUCOSE BLOOD TEST: CPT

## 2019-03-23 PROCEDURE — 74011000258 HC RX REV CODE- 258: Performed by: HOSPITALIST

## 2019-03-23 PROCEDURE — 97530 THERAPEUTIC ACTIVITIES: CPT

## 2019-03-23 PROCEDURE — 74011250637 HC RX REV CODE- 250/637: Performed by: HOSPITALIST

## 2019-03-23 PROCEDURE — 74011250637 HC RX REV CODE- 250/637: Performed by: PSYCHIATRY & NEUROLOGY

## 2019-03-23 RX ADMIN — FOLIC ACID 1 MG: 1 TABLET ORAL at 09:29

## 2019-03-23 RX ADMIN — CLOPIDOGREL BISULFATE 75 MG: 75 TABLET, FILM COATED ORAL at 09:29

## 2019-03-23 RX ADMIN — Medication 10 ML: at 23:03

## 2019-03-23 RX ADMIN — ASPIRIN 325 MG ORAL TABLET 325 MG: 325 PILL ORAL at 09:29

## 2019-03-23 RX ADMIN — ATORVASTATIN CALCIUM 80 MG: 40 TABLET, FILM COATED ORAL at 21:39

## 2019-03-23 RX ADMIN — Medication 100 MG: at 09:29

## 2019-03-23 RX ADMIN — THERA TABS 1 TABLET: TAB at 09:29

## 2019-03-23 RX ADMIN — Medication 10 ML: at 14:05

## 2019-03-23 RX ADMIN — Medication 10 ML: at 14:06

## 2019-03-23 RX ADMIN — Medication 10 ML: at 22:00

## 2019-03-23 RX ADMIN — DEXTROSE MONOHYDRATE AND SODIUM CHLORIDE 75 ML/HR: 5; .45 INJECTION, SOLUTION INTRAVENOUS at 18:34

## 2019-03-23 NOTE — ROUTINE PROCESS
Assume care of patient from [facility]. Patient received in bed awake. Patient alert and orientated to person, place, and situation, but not time. Denies pain and discomfort. No distress noted. Frequently use items within reach. Bed locked in low position. Call bell within reach and patient verbalized understanding of use for assistance and needs. Dual NIH completed. Noted pt's last BP at 160/113, will do recheck. 9255: BP: recheck 148/100.  
1001: 153/91

## 2019-03-23 NOTE — PROGRESS NOTES
Pt in bed resting alert and oriented to person and place reoriented to time. Pt denies pain at the moment. Dual NIH done no changes. Assessement completed plan of care for the shift explained pt verbalized understanding. IVF infusing well, HOB elevated, bed low and in locked position. Call light and frequently used items within reach. 0200- Pt given a bed bath made comfortable in bed. IVF infusing well. 0630- IV line noted leaking new inserted on the right forearm gauge 20. IVF resumed. Notified by the  Telemetry tech that pt had 2.13 seconds pause in the night 
 
0710 Bedside and Verbal shift change report given to [address] [place] [address] Road (oncoming nurse) by Zander Francis RN (offgoing nurse). Report included the following information SBAR, Kardex, Intake/Output, MAR and Recent Results.

## 2019-03-23 NOTE — PROGRESS NOTES
Neurology Progress Note Admit Date: 3/21/2019 Length of Stay: 2 Primary Care: None Assessment/Plan Right Thalamic /corona radiata stroke Etiology secondary to small vessel ischemic disease Control microvascular risk factors. -BP already well controlled without antihypertensives. Continue with goal < 140/90 
-Liptior 80mg with goal LDL < 70 Aspirin 325mg and plavix 75 mg daily for 1 month ( until 4/23/19). Then stop plavix and continue daily aspirin 81mg thereafter. No tobacco, marijuana, cocaine.  
  
PT/OT Awaiting echo. 
  
Alcohol abuse History of abuse, denies current use but ethanol level 140. On CIWA protocol Interim History:  No significant changes. HPI:This is a 46year old gentleman with history of polysubstance abuse (ETOH, Marijuana, Cocaine but reports he has stopped all 3), Major Depression who was found by his  on the floor an unknown period of time and left sided weakness. He was not a TPA candidate due to unknown downtime.  
  
The hospital is working on guardianship. Results reviewed: MRI Brain  3/21/2019 1. Acute infarct right thalamus/corona radiata. 2. Numerous chronic small vessel infarcts bilateral basal ganglia and thalami, 
corpus callosum and deep cerebral white matter, chiqui and cerebellum, with areas 
of associated chronic hemorrhage. There are additional low gradient signal foci 
suggestive of chronic microbleed's usually related to hypertension. 3. Deep white matter signal changes likely chronic microvascular ischemic 
disease. MRI C Spine 3/21/2019 1. No acute osseous finding, no intrinsic cord abnormality.  
  
2. Left posterior disc protrusion and degenerative spondylosis C4/5 with cord 
flattening and severe canal stenosis.  
  
3.  Moderate canal stenosis with mild cord flattening C5/6 and C6/7, mild canal 
stenosis C3/4.  
  
4. Multilevel areas of moderate to severe bilateral degenerative foraminal 
 stenosis as described above. 
  
 CTA Head/Neck 1. No hemodynamically significant cervical vascular stenosis. 
  
2. Mild to moderate areas of likely atherosclerotic stenosis left P2 segment and 
right A2 segments of the cerebral arteries. 
  
3. Otherwise unremarkable CTA head, no evidence of large vessel occlusion. 
  
4. Numerous chronic lacunar infarcts deep white matter basal ganglia and thalami 
and chiqui. 
  
     
Lab Results Component Value Date/Time  
  Cholesterol, total 243 (H) 03/22/2019 04:40 AM  
  HDL Cholesterol 32 (L) 03/22/2019 04:40 AM  
  LDL, calculated 179.4 (H) 03/22/2019 04:40 AM  
  VLDL, calculated 31.6 03/22/2019 04:40 AM  
  Triglyceride 158 (H) 03/22/2019 04:40 AM  
  CHOL/HDL Ratio 7.6 (H) 03/22/2019 04:40 AM  
 
 
 
 
 
Allergies: No Known Allergies Vital Signs:  
Visit Vitals BP (!) 153/91 (BP 1 Location: Right arm, BP Patient Position: Supine) Pulse 70 Temp 98.3 °F (36.8 °C) Resp 17 Wt 72.6 kg (159 lb 15.7 oz) SpO2 96% BMI 25.43 kg/m² Neurological examination:  
· Appearance: In no acute distress, well developed, well nourished. · Cardiovascular: Heart is regular rate and rhythm, peripheral edema is absent. · Mental status examination: Alert, awake, Not oriented to year or month until he looked at the board on the wall and he admitted to that. Jose Drummondns he is in [place], [place] and the president is [employer]. Not aphasic. No significant dysarthria. · Cranial Nerves:  
  
I: smell Not tested II: visual fields Full to confrontation II: pupils Equal, round, reactive to light  
III,VII: ptosis none III,IV,VI: extraocular muscles  Full ROM V: facial light touch sensation  Normal to LT  
V,VII: corneal reflex     
VII: facial muscle function  Left facial weakness VIII: hearing    
IX: soft palate elevation     
IX,X: gag reflex    
XI: sternocleidomastoid strength    
XII: tongue  midline  
  
· Fundoscopic:  deferred. · Motor exam: Station, gait:  deferred. Muscle tone, bulk normal: Some antigravity strength of the left arm but not enough to lift it up high enough to touch his nose for FTN. No antigravity strength of LLE but you could see he was trying. .  
 
No tremors. ? Review of Systems: Y  N  
Constitutional: [] [] recent weight change 
[] [x] fever 
[] [] sleep difficulties ENT/Mouth:  [] [] hearing loss 
[] [] swallowing problems Cardiovascular:  [] [] chest pain  
[] [] palpitations Respiratory: [] [x] cough  
[] [] shortness of breath 
[] [] sleep apnea 
[] [] intubated Gastrointestinal: [] [] abdominal pain 
[] [x] nausea Genitourinary: [] [] frequent urination 
[] [] incontinence Musculoskeletal:   [] [] joint pain 
[] [] muscle pain Integument:   [] [] rash/itching Neurological:  [] [] dizziness/vertigo 
[] [] speech problems 
[] [] language difficulty 
[] [] sedation 
[] [] confusion 
[] [x] agitation/combativeness 
[] [] loss of consciousness 
[] [] numbness/tingling sensation 
[] [] tremors 
[x] [] weakness in limbs 
[] [] difficulty with balance 
[] [] frequent or recurring headaches 
[] [] memory loss  
[] [] comatose 
[] [] seizures Psychiatric:   [] [] depression 
[] [] hallucinations PMH:  
Past Medical History:  
Diagnosis Date  Back pain  Hyperlipemia  Hypertension  MDD (major depressive disorder), recurrent, severe, with psychosis ([facility] 11/11/2017  Psychiatric disorder   
 hallucinations  Psychotic disorder ([facility] 11/11/2017 FH: History reviewed. No pertinent family history. SH: Social History Socioeconomic History  Marital status:  Spouse name: Not on file  Number of children: Not on file  Years of education: Not on file  Highest education level: Not on file Tobacco Use  Smoking status: Current Some Day Smoker Packs/day: 0.25  Smokeless tobacco: Never Used Substance and Sexual Activity  Alcohol use: Yes Comment: beer once prior to admission- had been sober  Drug use: Yes Types: Marijuana, Cocaine Comment: denies current use. Social History Narrative  but  with 2 daughters. Homeless and was asked to leave due to his drinking GED No current legal charges pending but has possession charges in the past.  
    
 
Medications:   
[x] REVIEWED Current Facility-Administered Medications Medication Dose Route Frequency  dextrose 5 % - 0.45% NaCl infusion  75 mL/hr IntraVENous CONTINUOUS  
 influenza vaccine 2018-19 (6 mos+)(PF) (FLUARIX QUAD/FLULAVAL QUAD) injection 0.5 mL  0.5 mL IntraMUSCular PRIOR TO DISCHARGE  aspirin tablet 325 mg  325 mg Oral DAILY  atorvastatin (LIPITOR) tablet 80 mg  80 mg Oral QHS  sodium chloride (NS) flush 5-40 mL  5-40 mL IntraVENous Q8H  
 sodium chloride (NS) flush 5-40 mL  5-40 mL IntraVENous PRN  
 LORazepam (ATIVAN) tablet 1 mg  1 mg Oral Q1H PRN Or  
 LORazepam (ATIVAN) injection 1 mg  1 mg IntraVENous Q1H PRN  
 LORazepam (ATIVAN) tablet 2 mg  2 mg Oral Q1H PRN Or  
 LORazepam (ATIVAN) injection 2 mg  2 mg IntraVENous Q1H PRN  
 LORazepam (ATIVAN) injection 3 mg  3 mg IntraVENous [id number] PRN  
 folic acid (FOLVITE) tablet 1 mg  1 mg Oral DAILY  thiamine mononitrate (B-1) tablet 100 mg  100 mg Oral DAILY  therapeutic multivitamin (THERAGRAN) tablet 1 Tab  1 Tab Oral DAILY  clopidogrel (PLAVIX) tablet 75 mg  75 mg Oral DAILY  sodium chloride (NS) flush 5-40 mL  5-40 mL IntraVENous Q8H  
 sodium chloride (NS) flush 5-40 mL  5-40 mL IntraVENous PRN  
 insulin lispro (HUMALOG) injection   SubCUTAneous AC&HS  
 glucose chewable tablet 16 g  16 g Oral PRN  
 glucagon (GLUCAGEN) injection 1 mg  1 mg IntraMUSCular PRN  
 dextrose (D50) infusion 12.5-25 g  25-50 mL IntraVENous PRN Data:   
 
[x] REVIEWED Recent Results (from the past 24 hour(s)) GLUCOSE, POC  
 Collection Time: 03/22/19  4:11 PM  
Result Value Ref Range Glucose (POC) 122 (H) 70 - 110 mg/dL GLUCOSE, POC Collection Time: 03/22/19  9:16 PM  
Result Value Ref Range Glucose (POC) 118 (H) 70 - 110 mg/dL GLUCOSE, POC Collection Time: 03/23/19  8:15 AM  
Result Value Ref Range Glucose (POC) 104 70 - 110 mg/dL GLUCOSE, POC Collection Time: 03/23/19 11:35 AM  
Result Value Ref Range Glucose (POC) 131 (H) 70 - 110 mg/dL Duane Minor MD 
1:29 PM 
03/23/19

## 2019-03-23 NOTE — PROGRESS NOTES
Problem: Mobility Impaired (Adult and Pediatric) Goal: *Acute Goals and Plan of Care (Insert Text) Description Physical Therapy Goals Initiated 3/22/2019 and to be accomplished within 7 days. 1.  Patient will complete all bed mobility with moderate assistance  in order to prepare for EOB/OOB activity. 2.  Patient will perform sit <> stand with minimal assistance/contact guard assist in order to prepare for OOB/gait activity. 3.  Patient will perform bed to chair transfers with minimal assistance/contact guard assist in order to promote mobility and encourage seated activity to progress towards their prior level of function. 4.  Patient will ambulate 25 feet with moderate assistance  using LRAD in order to prepare for safe negotiation of their environment. 3/23/2019 1135 by [place] Ground, PTA Outcome: Progressing Towards Goal 
3/23/2019 1130 by [place] Ground, PTA Outcome: Progressing Towards Goal 
 PHYSICAL THERAPY TREATMENT Patient: Suyapa Azevedo (62 y.o. male) Date: 3/23/2019 Diagnosis: CVA (cerebral vascular accident) ([facility] [I63.9] CVA (cerebral vascular accident) ([facility] Precautions: Fall Chart, physical therapy assessment, plan of care and goals were reviewed. PLOF:independent, ambulatory without AD 
ASSESSMENT: 
Pt fearful of getting out of bed due to falling here in the hospital and at home. Increased weakness on the L extremities but able to perform some shoulder ROM and quad contraction on own. ModA to sit up EOB. Fair trunk control, able to support self in sitting with RUE. Elevated bed some to assist with sit to stand, manual placement for L hand on RW and L foot on the floor. Pt bed soiled with urine and feces. Pt returned to sitting EOB, retrieved cleaning supplies, codey-care and bed pad change performed in standing. Pt performed side steps up to [facility] with total A for LLE mobility and positioning and TKE for stability.   Pt able to take steps with RLE laterally without assistance, only VCs. Pt able to assist with scooting up to [facility] by bumping buttocks over. Min/ModA with LLE back in to bed. Reviewed UE and LE ROM strengthening exercises to perform independently. Bed pan is recommended at this time vs [facility] for safety. Education: ROM TE, UE placement for sit to stand, TKE on the L Progression toward goals: 
?      Improving appropriately and progressing toward goals ? Improving slowly and progressing toward goals ? Not making progress toward goals and plan of care will be adjusted PLAN: 
Patient continues to benefit from skilled intervention to address the above impairments. Continue treatment per established plan of care. Discharge Recommendations:  Inpatient Acute Rehab Further Equipment Recommendations for Discharge:  lina walker or rolling walker, TBD SUBJECTIVE:  
Patient stated ? I am scared but I am gonna do what I can for you.? OBJECTIVE DATA SUMMARY:  
Critical Behavior: 
Neurologic State: Alert Orientation Level: Oriented to situation, Oriented to place, Oriented to person, Disoriented to time Cognition: Impaired decision making, Poor safety awareness, Impulsive Functional Mobility Training: 
Bed Mobility: 
Supine to Sit: Moderate assistance Sit to Supine: Minimum assistance; Moderate assistance Scooting: Minimum assistance Transfers: 
Sit to Stand: Moderate assistance Stand to Sit: Minimum assistance Balance: 
Sitting: Intact; With support Standing: Impaired; With support Standing - Static: Fair Standing - Dynamic : Poor Ambulation/Gait Training: 
Distance (ft): 2 Feet (ft) Assistive Device: Gait belt;Walker, rolling Ambulation - Level of Assistance: Maximum assistance Gait Abnormalities: Hemiplegic;Decreased step clearance Base of Support: Shift to right Stance: Weight shift Speed/Elena: Delayed Step Length: Right shortened;Left shortened Swing Pattern: Left asymmetrical 
 Therapeutic Exercises: LUE A/PROM: finger flex/ext, elbow flex/ext, shoulder flex/ext LLE AROM: AP, QS Pain: 
Pre:0 Post:0 Pain Scale 1: Numeric (0 - 10) Pain Intensity 1: 0 Activity Tolerance:  
Fair+, motivated but fearful Please refer to the flowsheet for vital signs taken during this treatment. After treatment:  
? Patient left in no apparent distress sitting up in chair ? Patient left in no apparent distress in bed 
? Call bell left within reach ? Nursing notified ? Caregiver present ? Bed alarm activated Liu aMhajan PTA Time Calculation: 39 mins

## 2019-03-23 NOTE — PROGRESS NOTES
Assume care of patient lying in bed watching TV. Alert and oriented X 3 with forgetfulness at initerval. Denies pain or discomfort at present. . Bed locked in lowest position. Call light with reach and understand to use for assistance an needs. 03/23/2019 
0800 Bedside and Verbal shift change report given to Maria Alejandra Kennedy RN (oncoming nurse) by Hay Naidu RN (offgoing nurse). Report given with SBAR, Kardex, Intake/Output, MAR and Recent Results.

## 2019-03-23 NOTE — PROGRESS NOTES
Problem: Falls - Risk of 
Goal: *Absence of Falls Description Document Easter Getting Fall Risk and appropriate interventions in the flowsheet. Outcome: Progressing Towards Goal 
  
Problem: Patient Education: Go to Patient Education Activity Goal: Patient/Family Education Outcome: Progressing Towards Goal 
  
Problem: Pressure Injury - Risk of 
Goal: *Prevention of pressure injury Description Document Matt Scale and appropriate interventions in the flowsheet. Outcome: Progressing Towards Goal 
  
Problem: Patient Education: Go to Patient Education Activity Goal: Patient/Family Education Outcome: Progressing Towards Goal 
  
Problem: Patient Education: Go to Patient Education Activity Goal: Patient/Family Education Outcome: Progressing Towards Goal 
  
Problem: TIA/CVA Stroke: 0-24 hours Goal: Off Pathway (Use only if patient is Off Pathway) Outcome: Progressing Towards Goal 
Goal: Activity/Safety Outcome: Progressing Towards Goal 
Goal: Consults, if ordered Outcome: Progressing Towards Goal 
Goal: Diagnostic Test/Procedures Outcome: Progressing Towards Goal 
Goal: Nutrition/Diet Outcome: Progressing Towards Goal 
Goal: Discharge Planning Outcome: Progressing Towards Goal 
Goal: Medications Outcome: Progressing Towards Goal 
Goal: Respiratory Outcome: Progressing Towards Goal 
Goal: Treatments/Interventions/Procedures Outcome: Progressing Towards Goal 
Goal: Psychosocial 
Outcome: Progressing Towards Goal 
Goal: *Hemodynamically stable Outcome: Progressing Towards Goal 
Goal: *Neurologically stable Description Absence of additional neurological deficits Outcome: Progressing Towards Goal 
Goal: *Verbalizes anxiety and depression are reduced or absent Outcome: Progressing Towards Goal 
Goal: *Absence of Signs of Aspiration on Current Diet Outcome: Progressing Towards Goal 
Goal: *Absence of deep venous thrombosis signs and symptoms(Stroke Metric) Outcome: Progressing Towards Goal 
 Goal: *Ability to perform ADLs and demonstrates progressive mobility and function Outcome: Progressing Towards Goal 
Goal: *Stroke education started(Stroke Metric) Outcome: Progressing Towards Goal 
Goal: *Dysphagia screen performed(Stroke Metric) Outcome: Progressing Towards Goal 
Goal: *Rehab consulted(Stroke Metric) Outcome: Progressing Towards Goal 
  
Problem: Ischemic Stroke: Discharge Outcomes Goal: *Verbalizes anxiety and depression are reduced or absent Outcome: Progressing Towards Goal 
Goal: *Verbalize understanding of risk factor modification(Stroke Metric) Outcome: Progressing Towards Goal 
Goal: *Hemodynamically stable Outcome: Progressing Towards Goal 
Goal: *Absence of aspiration pneumonia Outcome: Progressing Towards Goal 
Goal: *Aware of needed dietary changes Outcome: Progressing Towards Goal 
Goal: *Verbalize understanding of prescribed medications including anti-coagulants, anti-lipid, and/or anti-platelets(Stroke Metric) Outcome: Progressing Towards Goal 
Goal: *Tolerating diet Outcome: Progressing Towards Goal 
Goal: *Aware of follow-up diagnostics related to anticoagulants Outcome: Progressing Towards Goal 
Goal: *Ability to perform ADLs and demonstrates progressive mobility and function Outcome: Progressing Towards Goal 
Goal: *Absence of DVT(Stroke Metric) Outcome: Progressing Towards Goal 
Goal: *Absence of aspiration Outcome: Progressing Towards Goal 
Goal: *Optimal pain control at patient's stated goal 
Outcome: Progressing Towards Goal 
Goal: *Home safety concerns addressed Outcome: Progressing Towards Goal 
Goal: *Describes available resources and support systems Outcome: Progressing Towards Goal 
Goal: *Verbalizes understanding of activation of EMS(911) for stroke symptoms(Stroke Metric) Outcome: Progressing Towards Goal 
Goal: *Understands and describes signs and symptoms to report to providers(Stroke Metric) Outcome: Progressing Towards Goal 
 Goal: *Neurolgocially stable (absence of additional neurological deficits) Outcome: Progressing Towards Goal 
Goal: *Verbalizes importance of follow-up with primary care physician(Stroke Metric) Outcome: Progressing Towards Goal 
Goal: *Smoking cessation discussed,if applicable(Stroke Metric) Outcome: Progressing Towards Goal 
Goal: *Depression screening completed(Stroke Metric) Outcome: Progressing Towards Goal 
  
Problem: Discharge Planning Goal: *Discharge to safe environment 3/23/2019 1155 by Judy Navarro Outcome: Progressing Towards Goal 
3/23/2019 1154 by Judy Navarro Reactivated Problem: Patient Education: Go to Patient Education Activity Goal: Patient/Family Education Outcome: Progressing Towards Goal 
  
Problem: Patient Education: Go to Patient Education Activity Goal: Patient/Family Education Outcome: Progressing Towards Goal 
  
Problem: TIA/CVA Stroke: Day 2 Until Discharge Goal: Off Pathway (Use only if patient is Off Pathway) Outcome: Progressing Towards Goal 
Goal: Activity/Safety Outcome: Progressing Towards Goal 
Goal: Diagnostic Test/Procedures Outcome: Progressing Towards Goal 
Goal: Nutrition/Diet Outcome: Progressing Towards Goal 
Goal: Discharge Planning Outcome: Progressing Towards Goal 
Goal: Medications Outcome: Progressing Towards Goal 
Goal: Respiratory Outcome: Progressing Towards Goal 
Goal: Treatments/Interventions/Procedures Outcome: Progressing Towards Goal 
Goal: Psychosocial 
Outcome: Progressing Towards Goal 
Goal: *Verbalizes anxiety and depression are reduced or absent Outcome: Progressing Towards Goal 
Goal: *Absence of aspiration Outcome: Progressing Towards Goal 
Goal: *Absence of deep venous thrombosis signs and symptoms(Stroke Metric) Outcome: Progressing Towards Goal 
Goal: *Optimal pain control at patient's stated goal 
Outcome: Progressing Towards Goal 
Goal: *Tolerating diet Outcome: Progressing Towards Goal 
 Goal: *Ability to perform ADLs and demonstrates progressive mobility and function Outcome: Progressing Towards Goal 
Goal: *Stroke education continued(Stroke Metric) Outcome: Progressing Towards Goal 
  
Problem: Pain Goal: *Control of Pain Outcome: Progressing Towards Goal 
Goal: *PALLIATIVE CARE:  Alleviation of Pain Outcome: Progressing Towards Goal 
  
Problem: Patient Education: Go to Patient Education Activity Goal: Patient/Family Education Outcome: Progressing Towards Goal

## 2019-03-24 ENCOUNTER — APPOINTMENT (OUTPATIENT)
Dept: NON INVASIVE DIAGNOSTICS | Age: 54
DRG: 045 | End: 2019-03-24
Attending: PSYCHIATRY & NEUROLOGY
Payer: MEDICAID

## 2019-03-24 LAB
ECHO AO ROOT DIAM: 3 CM
ECHO AV PEAK GRADIENT: 0 MMHG
ECHO AV PEAK VELOCITY: 0 CM/S
ECHO EST RA PRESSURE: 10 MMHG
ECHO LA VOL 2C: 44.99 ML (ref 18–58)
ECHO LA VOL 4C: 72.41 ML (ref 18–58)
ECHO LA VOLUME INDEX A2C: 24.8 ML/M2 (ref 16–28)
ECHO LA VOLUME INDEX A4C: 39.91 ML/M2 (ref 16–28)
ECHO LV EDV A2C: 110.9 ML
ECHO LV EDV A4C: 124.6 ML
ECHO LV EDV BP: 122.5 ML (ref 67–155)
ECHO LV EDV INDEX A4C: 68.7 ML/M2
ECHO LV EDV INDEX BP: 67.5 ML/M2
ECHO LV EDV NDEX A2C: 61.1 ML/M2
ECHO LV EJECTION FRACTION A2C: 50 %
ECHO LV EJECTION FRACTION A4C: 53 %
ECHO LV EJECTION FRACTION BIPLANE: 51.1 % (ref 55–100)
ECHO LV ESV A2C: 55.5 ML
ECHO LV ESV A4C: 58.3 ML
ECHO LV ESV BP: 59.9 ML (ref 22–58)
ECHO LV ESV INDEX A2C: 30.6 ML/M2
ECHO LV ESV INDEX A4C: 32.1 ML/M2
ECHO LV ESV INDEX BP: 33 ML/M2
ECHO LV INTERNAL DIMENSION DIASTOLIC: 4.13 CM (ref 4.2–5.9)
ECHO LV INTERNAL DIMENSION SYSTOLIC: 3.06 CM
ECHO LV IVSD: 1.1 CM (ref 0.6–1)
ECHO LV MASS 2D: 211.3 G (ref 88–224)
ECHO LV MASS INDEX 2D: 116.5 G/M2 (ref 49–115)
ECHO LV POSTERIOR WALL DIASTOLIC: 1.37 CM (ref 0.6–1)
ECHO LVOT DIAM: 1.76 CM
ECHO LVOT PEAK GRADIENT: 7.1 MMHG
ECHO LVOT PEAK VELOCITY: 133.41 CM/S
ECHO MV A VELOCITY: 81.39 CM/S
ECHO MV AREA PHT: 5.7 CM2
ECHO MV E DECELERATION TIME (DT): 133 MS
ECHO MV E VELOCITY: 46.89 CM/S
ECHO MV E/A RATIO: 0.58
ECHO MV PRESSURE HALF TIME (PHT): 38.6 MS
ECHO PULMONARY ARTERY SYSTOLIC PRESSURE (PASP): 26 MMHG
ECHO RIGHT VENTRICULAR SYSTOLIC PRESSURE (RVSP): 10 MMHG
ECHO TV REGURGITANT MAX VELOCITY: 0 CM/S
ECHO TV REGURGITANT PEAK GRADIENT: 0 MMHG
GLUCOSE BLD STRIP.AUTO-MCNC: 101 MG/DL (ref 70–110)
GLUCOSE BLD STRIP.AUTO-MCNC: 105 MG/DL (ref 70–110)
GLUCOSE BLD STRIP.AUTO-MCNC: 90 MG/DL (ref 70–110)
GLUCOSE BLD STRIP.AUTO-MCNC: 98 MG/DL (ref 70–110)

## 2019-03-24 PROCEDURE — 82962 GLUCOSE BLOOD TEST: CPT

## 2019-03-24 PROCEDURE — 97110 THERAPEUTIC EXERCISES: CPT

## 2019-03-24 PROCEDURE — 74011250637 HC RX REV CODE- 250/637: Performed by: HOSPITALIST

## 2019-03-24 PROCEDURE — 93306 TTE W/DOPPLER COMPLETE: CPT

## 2019-03-24 PROCEDURE — 97530 THERAPEUTIC ACTIVITIES: CPT

## 2019-03-24 PROCEDURE — 74011250637 HC RX REV CODE- 250/637: Performed by: PSYCHIATRY & NEUROLOGY

## 2019-03-24 PROCEDURE — 65270000029 HC RM PRIVATE

## 2019-03-24 RX ORDER — LISINOPRIL 20 MG/1
20 TABLET ORAL DAILY
Status: DISCONTINUED | OUTPATIENT
Start: 2019-03-24 | End: 2019-04-02 | Stop reason: HOSPADM

## 2019-03-24 RX ADMIN — Medication 10 ML: at 23:05

## 2019-03-24 RX ADMIN — CLOPIDOGREL BISULFATE 75 MG: 75 TABLET, FILM COATED ORAL at 08:45

## 2019-03-24 RX ADMIN — ASPIRIN 325 MG ORAL TABLET 325 MG: 325 PILL ORAL at 08:45

## 2019-03-24 RX ADMIN — Medication 100 MG: at 08:45

## 2019-03-24 RX ADMIN — FOLIC ACID 1 MG: 1 TABLET ORAL at 08:46

## 2019-03-24 RX ADMIN — Medication 10 ML: at 18:01

## 2019-03-24 RX ADMIN — ATORVASTATIN CALCIUM 80 MG: 40 TABLET, FILM COATED ORAL at 23:02

## 2019-03-24 RX ADMIN — Medication 10 ML: at 14:02

## 2019-03-24 RX ADMIN — Medication 10 ML: at 23:04

## 2019-03-24 RX ADMIN — THERA TABS 1 TABLET: TAB at 08:46

## 2019-03-24 RX ADMIN — Medication 10 ML: at 23:03

## 2019-03-24 RX ADMIN — LISINOPRIL 20 MG: 20 TABLET ORAL at 12:05

## 2019-03-24 NOTE — ROUTINE PROCESS
Bedside and Verbal shift change report given to Kaye Phillips RN (oncoming nurse) by Razia Echols RN (offgoing nurse). Report included the following information SBAR, Kardex, Intake/Output, MAR and Recent Results. Dual [facility] completed.

## 2019-03-24 NOTE — PROGRESS NOTES
Progress Note Late Entry:  Patient seen and managed 3/23/19. Note entered as late entry Patient: Karen Medel               Sex: male          DOA: 3/21/2019 YOB: 1965      Age:  46 y.o.        LOS:  LOS: 3 days CHIEF COMPLAINT:  CVA Subjective:  
 
Patient has continued weakness No distress Objective:  
  
Visit Vitals BP (!) 159/110 Pulse (!) 59 Temp 97.9 °F (36.6 °C) Resp 16 Ht 5' 6\" (1.676 m) Wt 72.1 kg (159 lb) SpO2 98% BMI 25.66 kg/m² Physical Exam: 
Gen:  No distress, no complaint Lungs:  Clear bilaterally, no wheeze or rhonchi Heart:  Regular rate and rhythm, no murmurs or gallops Abdomen:  Soft, non-tender, normal bowel sounds Neuro:  Weakness unchanged from yesterday Lab/Data Reviewed: 
 
Labs reviewed Assessment/Plan Principal Problem: 
  CVA (cerebral vascular accident) ([facility] [address] (3/21/2019) Active Problems: Hypertension () Alcoholism ([facility] [address] (3/22/2019) Plan: 
Continue with medical treatment Mobilize PT Working toward disposition.

## 2019-03-24 NOTE — PROGRESS NOTES
Neurology Progress Note Admit Date: 3/21/2019 Length of Stay: 3 Primary Care: None Assessment/Plan Right Thalamic /corona radiata stroke Etiology secondary to small vessel ischemic disease Control microvascular risk factors. -Can start adding antihypertensives to get his BP under better control. Long term goal < 140/90 
-Liptior 80mg with goal LDL < 70 Aspirin 325mg and plavix 75 mg daily for 1 month ( until 4/23/19). Then stop plavix and continue daily aspirin 81mg thereafter. No tobacco, marijuana, cocaine.  
  
PT/OT 
 
  
Alcohol abuse History of abuse, denies current use but ethanol level 140. On [address] [place] [address] 
 
Neurology will sign off at this point. Please call back if needed. Interim History:  No significant changes.  
  
HPI:This is a 46year old gentleman with history of polysubstance abuse (ETOH, Marijuana, Cocaine but reports he has stopped all 3), Major Depression who was found by his  on the floor an unknown period of time and left sided weakness.  He was not a TPA candidate due to unknown downtime.  
  
The hospital is working on guardianship.  
 
 
Results reviewed: MRI Brain  3/21/2019 1. Acute infarct right thalamus/corona radiata. 2. Numerous chronic small vessel infarcts bilateral basal ganglia and thalami, 
corpus callosum and deep cerebral white matter, chiqui and cerebellum, with areas 
of associated chronic hemorrhage. There are additional low gradient signal foci 
suggestive of chronic microbleed's usually related to hypertension. 3. Deep white matter signal changes likely chronic microvascular ischemic 
disease.  
MRI C Spine 3/21/2019 1. No acute osseous finding, no intrinsic cord abnormality.  
  
2. Left posterior disc protrusion and degenerative spondylosis C4/5 with cord 
flattening and severe canal stenosis.  
  
3.  Moderate canal stenosis with mild cord flattening C5/6 and C6/7, mild canal 
stenosis C3/4.  
  
 4. Multilevel areas of moderate to severe bilateral degenerative foraminal 
stenosis as described above. 
  
 CTA Head/Neck 1. No hemodynamically significant cervical vascular stenosis. 
  
2. Mild to moderate areas of likely atherosclerotic stenosis left P2 segment and 
right A2 segments of the cerebral arteries. 
  
3. Otherwise unremarkable CTA head, no evidence of large vessel occlusion. 
  
4. Numerous chronic lacunar infarcts deep white matter basal ganglia and thalami 
and chiqui. 
  
         
Lab Results Component Value Date/Time  
  Cholesterol, total 243 (H) 03/22/2019 04:40 AM  
  HDL Cholesterol 32 (L) 03/22/2019 04:40 AM  
  LDL, calculated 179.4 (H) 03/22/2019 04:40 AM  
  VLDL, calculated 31.6 03/22/2019 04:40 AM  
  Triglyceride 158 (H) 03/22/2019 04:40 AM  
  CHOL/HDL Ratio 7.6 (H) 03/22/2019 04:40 AM  
  
 Echo : EF 56-60% No intracardiac shunt. Hemoglobin A1c 6.1% Discussed with: Dr. Haydee Luevano Allergies: No Known Allergies Vital Signs:  
Visit Vitals BP (!) 160/103 (BP 1 Location: Left arm, BP Patient Position: Supine) Pulse 63 Temp 97.8 °F (36.6 °C) Resp 16 Ht 5' 6\" (1.676 m) Wt 72.1 kg (159 lb) SpO2 100% BMI 25.66 kg/m² Neurological examination:  
· Appearance: In no acute distress, well developed, well nourished. · Cardiovascular: Heart is regular rate and rhythm, peripheral edema is absent. · Mental status examination: Alert, awake, Not oriented to year or month. Ilsa Castanon he is in [facility] and the president is [employer]. Not aphasic. No significant dysarthria. · Cranial Nerves:  
  
I: smell Not tested II: visual fields Full to confrontation II: pupils Equal, round, reactive to light  
III,VII: ptosis none III,IV,VI: extraocular muscles  Full ROM V: facial light touch sensation  Normal to LT  
V,VII: corneal reflex     
VII: facial muscle function  Left facial weakness VIII: hearing    
IX: soft palate elevation     
IX,X: gag reflex    
 XI: sternocleidomastoid strength    
XII: tongue  midline  
  
· Fundoscopic:  deferred. · Motor exam: Station, gait:  deferred. Muscle tone, bulk normal: Some antigravity strength of the left arm but not enough to lift it up high enough to touch his nose for FTN. Able to briefly lift left leg slightlyup off the bed. Normal right arm and leg strength.   
 
 
? Review of Systems: Y  N  
Constitutional: [] [] recent weight change 
[] [x] fever 
[] [] sleep difficulties ENT/Mouth:  [] [] hearing loss 
[] [] swallowing problems Cardiovascular:  [] [] chest pain  
[] [] palpitations Respiratory: [] [] cough  
[] [x] shortness of breath 
[] [] sleep apnea 
[] [] intubated Gastrointestinal: [] [] abdominal pain 
[] [x] nausea Genitourinary: [] [] frequent urination 
[] [] incontinence Musculoskeletal:   [] [] joint pain 
[] [] muscle pain Integument:   [] [] rash/itching Neurological:  [] [] dizziness/vertigo 
[] [] speech problems 
[] [] language difficulty 
[] [] sedation 
[] [] confusion 
[] [] agitation/combativeness 
[] [] loss of consciousness 
[] [] numbness/tingling sensation 
[] [] tremors 
[x] [] weakness in limbs 
[] [] difficulty with balance 
[] [] frequent or recurring headaches 
[] [] memory loss  
[] [] comatose 
[] [] seizures Psychiatric:   [] [] depression 
[] [] hallucinations PMH:  
Past Medical History:  
Diagnosis Date  Back pain  Hyperlipemia  Hypertension  MDD (major depressive disorder), [facility], [facility]2017  Psychiatric disorder   
 hallucinations  Psychotic disorder ([facility] 11/11/2017 FH: History reviewed. No pertinent family history. SH: Social History Socioeconomic History  Marital status:  Spouse name: Not on file  Number of children: Not on file  Years of education: Not on file  Highest education level: Not on file Tobacco Use  
  Smoking status: Current Some Day Smoker Packs/day: 0.25  Smokeless tobacco: Never Used Substance and Sexual Activity  Alcohol use: Yes Comment: beer once prior to admission- had been sober  Drug use: Yes Types: Marijuana, Cocaine Comment: denies current use. Social History Narrative  but  with 2 daughters. Homeless and was asked to leave due to his drinking GED No current legal charges pending but has possession charges in the past.  
    
 
Medications:   
[x] REVIEWED Current Facility-Administered Medications Medication Dose Route Frequency  lisinopril (PRINIVIL, ZESTRIL) tablet 20 mg  20 mg Oral DAILY  dextrose 5 % - 0.45% NaCl infusion  75 mL/hr IntraVENous CONTINUOUS  
 influenza vaccine 2018-19 (6 mos+)(PF) (FLUARIX QUAD/FLULAVAL QUAD) injection 0.5 mL  0.5 mL IntraMUSCular PRIOR TO DISCHARGE  aspirin tablet 325 mg  325 mg Oral DAILY  atorvastatin (LIPITOR) tablet 80 mg  80 mg Oral QHS  sodium chloride (NS) flush 5-40 mL  5-40 mL IntraVENous Q8H  
 sodium chloride (NS) flush 5-40 mL  5-40 mL IntraVENous PRN  
 LORazepam (ATIVAN) tablet 1 mg  1 mg Oral Q1H PRN Or  
 LORazepam (ATIVAN) injection 1 mg  1 mg IntraVENous Q1H PRN  
 LORazepam (ATIVAN) tablet 2 mg  2 mg Oral Q1H PRN Or  
 LORazepam (ATIVAN) injection 2 mg  2 mg IntraVENous Q1H PRN  
 LORazepam (ATIVAN) injection 3 mg  3 mg IntraVENous [id number] PRN  
 folic acid (FOLVITE) tablet 1 mg  1 mg Oral DAILY  thiamine mononitrate (B-1) tablet 100 mg  100 mg Oral DAILY  therapeutic multivitamin (THERAGRAN) tablet 1 Tab  1 Tab Oral DAILY  clopidogrel (PLAVIX) tablet 75 mg  75 mg Oral DAILY  sodium chloride (NS) flush 5-40 mL  5-40 mL IntraVENous Q8H  
 sodium chloride (NS) flush 5-40 mL  5-40 mL IntraVENous PRN  
 insulin lispro (HUMALOG) injection   SubCUTAneous AC&HS  
 glucose chewable tablet 16 g  16 g Oral PRN  
  glucagon (GLUCAGEN) injection 1 mg  1 mg IntraMUSCular PRN  
 dextrose (D50) infusion 12.5-25 g  25-50 mL IntraVENous PRN Data:   
 
[x] REVIEWED Recent Results (from the past 24 hour(s)) GLUCOSE, POC Collection Time: 03/23/19  4:24 PM  
Result Value Ref Range Glucose (POC) 120 (H) 70 - 110 mg/dL GLUCOSE, POC Collection Time: 03/23/19  8:52 PM  
Result Value Ref Range Glucose (POC) 105 70 - 110 mg/dL GLUCOSE, POC Collection Time: 03/24/19  8:12 AM  
Result Value Ref Range Glucose (POC) 90 70 - 110 mg/dL ECHO ADULT COMPLETE Collection Time: 03/24/19 10:19 AM  
Result Value Ref Range Aortic Valve Systolic Peak Velocity 7.51 cm/s AoV PG 0.0 mmHg LVIDd 4.13 (A) 4.2 - 5.9 cm  
 LVPWd 1.37 (A) 0.6 - 1.0 cm LVIDs 3.06 cm IVSd 1.10 (A) 0.6 - 1.0 cm  
 LV ED Vol A2C 110.9 mL  
 LV ES Vol A4C 58.3 mL  
 LV ES Vol BP 59.9 (A) 22 - 58 mL  
 LVOT d 1.76 cm  
 LVOT Peak Velocity 133.41 cm/s LVOT Peak Gradient 7.1 mmHg MVA (PHT) 5.7 cm2  
 MV A Ranulfo 81.39 cm/s  
 MV E Ranulfo 46.89 cm/s  
 MV E/A 0.58 BP EF 51.1 (A) 55 - 100 % LV Ejection Fraction MOD 4C 53 % LV Ejection Fraction MOD 2C 50 % LA Vol 4C 72.41 (A) 18 - 58 mL  
 LA Vol 2C 44.99 18 - 58 mL  
 LV Mass .3 88 - 224 g LV Mass AL Index 116.5 49 - 115 g/m2 RVSP 10.0 mmHg LV ES Vol A2C 55.5 mL  
 LVES Vol Index BP 33.0 mL/m2 LV ED Vol A4C 124.6 mL  
 LVED Vol Index BP 67.5 mL/m2 Est. RA Pressure 10.0 mmHg Mitral Valve E Wave Deceleration Time 133.0 ms  
 Mitral Valve Pressure Half-time 38.6 ms  
 Triscuspid Valve Regurgitation Peak Gradient 0.0 mmHg LV ED Vol .5 67 - 155 ml  
 TR Max Velocity 0.00 cm/s PASP 26.0 mmHg LA Vol Index 24.80 16 - 28 ml/m2 LA Vol Index 39.91 16 - 28 ml/m2 LVED Vol Index A4C 68.7 mL/m2 LVED Vol Index A2C 61.1 mL/m2 LVES Vol Index A4C 32.1 mL/m2 LVES Vol Index A2C 30.6 mL/m2 Ao Root D 3.00 cm GLUCOSE, POC  
 Collection Time: 03/24/19 11:32 AM  
Result Value Ref Range Glucose (POC) 101 70 - 110 mg/dL Lynette Butler MD 
1:37 PM 
03/24/19

## 2019-03-24 NOTE — PROGRESS NOTES
Problem: Falls - Risk of 
Goal: *Absence of Falls Description Document Edgar Brunner Fall Risk and appropriate interventions in the flowsheet. Outcome: Progressing Towards Goal 
  
Problem: Patient Education: Go to Patient Education Activity Goal: Patient/Family Education Outcome: Progressing Towards Goal 
  
Problem: Pressure Injury - Risk of 
Goal: *Prevention of pressure injury Description Document Matt Scale and appropriate interventions in the flowsheet. Outcome: Progressing Towards Goal 
  
Problem: Patient Education: Go to Patient Education Activity Goal: Patient/Family Education Outcome: Progressing Towards Goal 
  
Problem: Patient Education: Go to Patient Education Activity Goal: Patient/Family Education Outcome: Progressing Towards Goal 
  
Problem: TIA/CVA Stroke: 0-24 hours Goal: *Neurologically stable Description Absence of additional neurological deficits Outcome: Progressing Towards Goal 
  
Problem: Ischemic Stroke: Discharge Outcomes Goal: *Verbalizes anxiety and depression are reduced or absent Outcome: Progressing Towards Goal 
Goal: *Verbalize understanding of risk factor modification(Stroke Metric) Outcome: Progressing Towards Goal 
Goal: *Hemodynamically stable Outcome: Progressing Towards Goal 
Goal: *Absence of aspiration pneumonia Outcome: Progressing Towards Goal 
Goal: *Aware of needed dietary changes Outcome: Progressing Towards Goal 
Goal: *Verbalize understanding of prescribed medications including anti-coagulants, anti-lipid, and/or anti-platelets(Stroke Metric) Outcome: Progressing Towards Goal 
Goal: *Tolerating diet Outcome: Progressing Towards Goal 
Goal: *Aware of follow-up diagnostics related to anticoagulants Outcome: Progressing Towards Goal 
Goal: *Ability to perform ADLs and demonstrates progressive mobility and function Outcome: Progressing Towards Goal 
Goal: *Absence of DVT(Stroke Metric) Outcome: Progressing Towards Goal 
 Goal: *Absence of aspiration Outcome: Progressing Towards Goal 
Goal: *Optimal pain control at patient's stated goal 
Outcome: Progressing Towards Goal 
Goal: *Home safety concerns addressed Outcome: Progressing Towards Goal 
Goal: *Describes available resources and support systems Outcome: Progressing Towards Goal 
Goal: *Verbalizes understanding of activation of EMS(911) for stroke symptoms(Stroke Metric) Outcome: Progressing Towards Goal 
Goal: *Understands and describes signs and symptoms to report to providers(Stroke Metric) Outcome: Progressing Towards Goal 
Goal: *Neurolgocially stable (absence of additional neurological deficits) Outcome: Progressing Towards Goal 
Goal: *Verbalizes importance of follow-up with primary care physician(Stroke Metric) Outcome: Progressing Towards Goal 
Goal: *Smoking cessation discussed,if applicable(Stroke Metric) Outcome: Progressing Towards Goal 
Goal: *Depression screening completed(Stroke Metric) Outcome: Progressing Towards Goal 
  
Problem: Discharge Planning Goal: *Discharge to safe environment Outcome: Progressing Towards Goal 
  
Problem: Mobility Impaired (Adult and Pediatric) Goal: *Acute Goals and Plan of Care (Insert Text) Description Physical Therapy Goals Initiated 3/22/2019 and to be accomplished within 7 days. 1.  Patient will complete all bed mobility with moderate assistance  in order to prepare for EOB/OOB activity. 2.  Patient will perform sit <> stand with minimal assistance/contact guard assist in order to prepare for OOB/gait activity. 3.  Patient will perform bed to chair transfers with minimal assistance/contact guard assist in order to promote mobility and encourage seated activity to progress towards their prior level of function. 4.  Patient will ambulate 25 feet with moderate assistance  using LRAD in order to prepare for safe negotiation of their environment.   
 
  
Outcome: Progressing Towards Goal 
  
 Problem: Patient Education: Go to Patient Education Activity Goal: Patient/Family Education Outcome: Progressing Towards Goal 
  
Problem: Self Care Deficits Care Plan (Adult) Goal: *Acute Goals and Plan of Care (Insert Text) Description Occupational Therapy Goals Initiated 3/22/2019 within 7 day(s). 1.  Patient will perform grooming tasks while standing with minimal assistance for balance and additional time to incorporate LUE as gross assist. 
2.  Patient will perform upper body dressing with minimal assistance utilizing adaptive strategies, prn. 
3.  Patient will perform lower body dressing with moderate assistance and minimal verbal cues. 4.  Patient will perform toilet transfers with minimal assistance/contact guard assist. 
5.  Patient will perform all aspects of toileting with minimal assistance/contact guard assist. 
6.  Patient will participate in upper extremity therapeutic exercise/activities with minimal assistance/contact guard assist for 8 minutes to increase BUE strength/AROM for functional transfers and ADLs. 7.  Patient will utilize energy conservation techniques during functional activities with minimal verbal cues. Outcome: Progressing Towards Goal 
  
Problem: Patient Education: Go to Patient Education Activity Goal: Patient/Family Education Outcome: Progressing Towards Goal 
  
Problem: TIA/CVA Stroke: Day 2 Until Discharge Goal: Off Pathway (Use only if patient is Off Pathway) Outcome: Progressing Towards Goal 
Goal: Activity/Safety Outcome: Progressing Towards Goal 
Goal: Diagnostic Test/Procedures Outcome: Progressing Towards Goal 
Goal: Nutrition/Diet Outcome: Progressing Towards Goal 
Goal: Discharge Planning Outcome: Progressing Towards Goal 
Goal: Medications Outcome: Progressing Towards Goal 
Goal: Respiratory Outcome: Progressing Towards Goal 
Goal: Treatments/Interventions/Procedures Outcome: Progressing Towards Goal 
Goal: Psychosocial 
 Outcome: Progressing Towards Goal 
Goal: *Verbalizes anxiety and depression are reduced or absent Outcome: Progressing Towards Goal 
Goal: *Absence of aspiration Outcome: Progressing Towards Goal 
Goal: *Absence of deep venous thrombosis signs and symptoms(Stroke Metric) Outcome: Progressing Towards Goal 
Goal: *Optimal pain control at patient's stated goal 
Outcome: Progressing Towards Goal 
Goal: *Tolerating diet Outcome: Progressing Towards Goal 
Goal: *Ability to perform ADLs and demonstrates progressive mobility and function Outcome: Progressing Towards Goal 
Goal: *Stroke education continued(Stroke Metric) Outcome: Progressing Towards Goal 
  
Problem: Pain Goal: *Control of Pain Outcome: Progressing Towards Goal 
Goal: *PALLIATIVE CARE:  Alleviation of Pain Outcome: Progressing Towards Goal 
  
Problem: Patient Education: Go to Patient Education Activity Goal: Patient/Family Education Outcome: Progressing Towards Goal 
PHYSICAL THERAPY: DAILY TREATMENT NOTE  
INPATIENT: Medicaid: Hospital Day: 4 Patient: Dawna Agudelo (29 y.o. male)    Date: 3/24/2019 Primary Diagnosis: CVA (cerebral vascular accident) ([facility] [address] [I63.9]  
 ,  ,  
Precautions: Fall FWB Chart, physical therapy assessment, plan of care and goals were reviewed. PLOF:Independent, amb without AD 
 
ASSESSMENT: 
Pt with progress today, note very drowsy initially. CGA for rolling,min A for RLE to assist LLE for sup>sit. Pt able to sit on EOB with RUE support, at end of session no UE support to sit on EOB. demos poor posture; improved with VCs. AAROM/PROM for all LLE TE. Min A for sit<>std and CGA for weight shifting. CNA, Crystal Croissant assist with LLE; Moon>CGA to slide LLE to left for 5 left side stepping with RW. min A for sit>supine for LLE Progression toward goals: 
      Improving appropriately and progressing toward goals(X) Improving slowly and progressing toward goals Not making progress toward goals and plan of care will be adjusted PLAN: 
Patient continues to benefit from skilled intervention to address the above impairments. Continue treatment per established plan of care. EDUCATION:  
Education:  Patient was educated on the following topics: Pt ed on importance + benefits to perform bed mobility and exercises every 1-2 hours to increase/maintain LE/core strength. Pt ed on importance and benefits of proper posture, and positioning every hr to prevent break down of skin and bed sores; all ed to promote functional mobility; pt verbalized understanding Discharge Recommendations:  Inpatient Rehab Further Equipment Recommendations for Discharge:  TBD lina walker/rolling walker and N/A Factors which may impact discharge planning: none SUBJECTIVE:  
Patient stated ? I want to move better, I will keep trying. ? OBJECTIVE DATA SUMMARY:  
Critical Behavior: 
Neurologic State: Alert Orientation Level: Oriented to person, Oriented to place Cognition: Memory loss [address] Standing Balance Scale 
0: Pt performs 25% or less of standing activity (Max assist) CN, 100% impaired. 1: Pt supports self with upper extremities but requires therapist assistance. Pt performs 25-50% of effort (Mod assist) CM, 80% to <100% impaired. 1+: Pt supports self with upper extremities but requires therapist assistance. Pt performs >50% effort. (Min assist). CL, 60% to <80% impaired. 2: Pt supports self independently with both upper extremities (walker, crutches, parallel bars). CL, 60% to <80% impaired. 2+: Pt support self independently with 1 upper extremity (cane, crutch, 1 parallel bar). CK, 40% to <60% impaired. 3: Pt stands without upper extremity support for up to 30 seconds. CK, 40% to <60% impaired. 3+: Pt stands without upper extremity support for 30 seconds or greater. CJ, 20% to <40% impaired. 4: Pt independently moves and returns center of gravity 1-2 inches in one plane. CJ, 20% to <40% impaired. 4+: Pt independently moves and returns center of gravity 1-2 inches in multiple planes. CI, 1% to <20% impaired. 5: Pt independently moves and returns center of gravity in all planes greater than 2 inches. CH, 0% impaired. Functional Mobility: 
 
 
Functional Status Indep (I) Mod I Super-vision Min A Mod A Max A Total A Assist x2 Verbal cues Additional time Not tested Comments Rolling ?  ?  ? ?    ?    ?  ?  ? ? ? ? Supine to sit ?  ?  ? ?  ?  ?  ?  ? ? ? ? Sit to supine ? ?  ? ?  ?  ?  ?  ? ? ? ? Sit to stand ?  ?  ? ?  ?  ?  ?  ? ? ? ? Stand to sit ?  ? ?CGA ? ?  ?  ?  ? ? ? ? Bed to chair transfers ? ?  ? ?  ?  ?  ?  ? ? ? ? Balance Good [place] Poor Unable Not tested Comments Sitting static ?  ?  ?  ?  ? Sitting dynamic ?  ?  ?  ?  ? Standing static ?  ?  ?  ?  ? Standing dynamic ?  ?  ?  ?  ? Mobility/Gait:  
Level of Assistance: Contact guard assistance and Minimum assistance Assistive Device: rolling walker Distance Ambulated: 5 feet   left side steps Base of Support: center of gravity altered, shift to right and widened Speed/Elena: pace decreased (<100 feet/min), shuffled and slow Step Length: left shortened Swing Pattern: left symmetrical 
Stance: left decreased and right decreased Gait Abnormalities: decreased step clearance, path deviations, shuffling gait and step to gait Neuro Re-Education: 
sitting on EOB Therapeutic Exercises: 
 
 
EXERCISE Sets Reps Active Active Assist  
Passive Self ROM Comments Ankle Pumps 1 10  ? RLE ? LLE ? LLE ? Quad Sets/Glut Sets 1 10 ? ? ? ? Hamstring Sets 1 10 ? ? ? ? Short Arc Quads   ? ? ? ? Heel Slides 1 10 ? ? ?LLE ? Straight Leg Raises 1 10 ? ? ?LLE ? Hip Abd/Add   ? ? ? ? Long Arc Quads   ? ? ? ? Seated Marching   ?  ? ? ?   
 Standing Marching   ? ? ? ?   
scap squeezed/SH shrug AAROM LLE with RLE/RUE>LUE Weight Shifting fwd/back/lateral on LLE 1 10ea ? RLE/UE ?LLE/LUE ? ? Vital Signs Temp: 97.8 °F (36.6 °C) Pulse (Heart Rate): 63    
BP: (!) 160/103 Resp Rate: 16    
O2 Sat (%): 100 % Pain:0 
Pre treatment pain level:0 Post treatment pain level:0 Activity Tolerance:  
fair-, fatigues quickly After treatment:  
Patient left in no apparent distress sitting up in chair Patient left in no apparent distress in bed(X) Call bell left within reach(X) Nursing notified(X) Compa Diamond PTA Time Calculation: 31 mins

## 2019-03-24 NOTE — ROUTINE PROCESS
Bedside and Verbal shift change report given to Sharyl Meckel, RN (oncoming nurse) by Justin Cooper RN (offgoing nurse). Report included the following information SBAR, Kardex, Intake/Output, MAR and Recent Results.

## 2019-03-24 NOTE — ROUTINE PROCESS
Assume care of patient from Michele Mcdaniels RN. Patient received in bed awake. Patient alert to self, place, and situation, but not time, denies pain and discomfort. No distress noted. Frequently use items within reach. Bed locked in low position. Call bell within reach and patient verbalized understanding of use for assistance and needs. [facility] completed. 9576- Patient's BP: 161/115. Called and sent spok page to Dr. Lucas Velasco for possible orders. 1030- Lisinopril 20 mg ordered. Will given and recheck BP.

## 2019-03-24 NOTE — PROGRESS NOTES
1924. Pt in bed resting alert and oriented to self and place denies pain at the moment. Assessement completed plan of care for the shift explained pt verbalized understanding. IVF infusing well, HOB elevated, bed low and in locked position. Call light and frequently used items within reach. 0614- Pt with 2nd degree heart block. Not new. 6045- Pt awake resting no concerns voiced. IVF infusing well, call light and urinal within reach. 0740- Bedside and Verbal shift change report given to [address]  RN (oncoming nurse) by Bucky Alejo RN (offgoing nurse). Report included the following information SBAR, Kardex, Intake/Output, MAR and Recent Results.

## 2019-03-24 NOTE — PROGRESS NOTES
Problem: Falls - Risk of 
Goal: *Absence of Falls Description Document Bishop Nielson Fall Risk and appropriate interventions in the flowsheet. Outcome: Progressing Towards Goal 
  
Problem: Patient Education: Go to Patient Education Activity Goal: Patient/Family Education Outcome: Progressing Towards Goal 
  
Problem: Pressure Injury - Risk of 
Goal: *Prevention of pressure injury Description Document Matt Scale and appropriate interventions in the flowsheet. Outcome: Progressing Towards Goal 
  
Problem: Patient Education: Go to Patient Education Activity Goal: Patient/Family Education Outcome: Progressing Towards Goal 
  
Problem: Patient Education: Go to Patient Education Activity Goal: Patient/Family Education Outcome: Progressing Towards Goal 
  
Problem: TIA/CVA Stroke: 0-24 hours Goal: Off Pathway (Use only if patient is Off Pathway) Outcome: Progressing Towards Goal 
Goal: Activity/Safety Outcome: Progressing Towards Goal 
Goal: Consults, if ordered Outcome: Progressing Towards Goal 
Goal: Diagnostic Test/Procedures Outcome: Progressing Towards Goal 
Goal: Nutrition/Diet Outcome: Progressing Towards Goal 
Goal: Discharge Planning Outcome: Progressing Towards Goal 
Goal: Medications Outcome: Progressing Towards Goal 
Goal: Respiratory Outcome: Progressing Towards Goal 
Goal: Treatments/Interventions/Procedures Outcome: Progressing Towards Goal 
Goal: Psychosocial 
Outcome: Progressing Towards Goal 
Goal: *Hemodynamically stable Outcome: Progressing Towards Goal 
Goal: *Neurologically stable Description Absence of additional neurological deficits Outcome: Progressing Towards Goal 
Goal: *Verbalizes anxiety and depression are reduced or absent Outcome: Progressing Towards Goal 
Goal: *Absence of Signs of Aspiration on Current Diet Outcome: Progressing Towards Goal 
Goal: *Absence of deep venous thrombosis signs and symptoms(Stroke Metric) Outcome: Progressing Towards Goal 
 Goal: *Ability to perform ADLs and demonstrates progressive mobility and function Outcome: Progressing Towards Goal 
Goal: *Stroke education started(Stroke Metric) Outcome: Progressing Towards Goal 
Goal: *Dysphagia screen performed(Stroke Metric) Outcome: Progressing Towards Goal 
Goal: *Rehab consulted(Stroke Metric) Outcome: Progressing Towards Goal 
  
Problem: Ischemic Stroke: Discharge Outcomes Goal: *Verbalizes anxiety and depression are reduced or absent Outcome: Progressing Towards Goal 
Goal: *Verbalize understanding of risk factor modification(Stroke Metric) Outcome: Progressing Towards Goal 
Goal: *Hemodynamically stable Outcome: Progressing Towards Goal 
Goal: *Absence of aspiration pneumonia Outcome: Progressing Towards Goal 
Goal: *Aware of needed dietary changes Outcome: Progressing Towards Goal 
Goal: *Verbalize understanding of prescribed medications including anti-coagulants, anti-lipid, and/or anti-platelets(Stroke Metric) Outcome: Progressing Towards Goal 
Goal: *Tolerating diet Outcome: Progressing Towards Goal 
Goal: *Aware of follow-up diagnostics related to anticoagulants Outcome: Progressing Towards Goal 
Goal: *Ability to perform ADLs and demonstrates progressive mobility and function Outcome: Progressing Towards Goal 
Goal: *Absence of DVT(Stroke Metric) Outcome: Progressing Towards Goal 
Goal: *Absence of aspiration Outcome: Progressing Towards Goal 
Goal: *Optimal pain control at patient's stated goal 
Outcome: Progressing Towards Goal 
Goal: *Home safety concerns addressed Outcome: Progressing Towards Goal 
Goal: *Describes available resources and support systems Outcome: Progressing Towards Goal 
Goal: *Verbalizes understanding of activation of EMS(911) for stroke symptoms(Stroke Metric) Outcome: Progressing Towards Goal 
Goal: *Understands and describes signs and symptoms to report to providers(Stroke Metric) Outcome: Progressing Towards Goal 
 Goal: *Neurolgocially stable (absence of additional neurological deficits) Outcome: Progressing Towards Goal 
Goal: *Verbalizes importance of follow-up with primary care physician(Stroke Metric) Outcome: Progressing Towards Goal 
Goal: *Smoking cessation discussed,if applicable(Stroke Metric) Outcome: Progressing Towards Goal 
Goal: *Depression screening completed(Stroke Metric) Outcome: Progressing Towards Goal 
  
Problem: Discharge Planning Goal: *Discharge to safe environment Outcome: Progressing Towards Goal 
  
Problem: Patient Education: Go to Patient Education Activity Goal: Patient/Family Education Outcome: Progressing Towards Goal 
  
Problem: Patient Education: Go to Patient Education Activity Goal: Patient/Family Education Outcome: Progressing Towards Goal 
  
Problem: TIA/CVA Stroke: Day 2 Until Discharge Goal: Off Pathway (Use only if patient is Off Pathway) Outcome: Progressing Towards Goal 
Goal: Activity/Safety Outcome: Progressing Towards Goal 
Goal: Diagnostic Test/Procedures Outcome: Progressing Towards Goal 
Goal: Nutrition/Diet Outcome: Progressing Towards Goal 
Goal: Discharge Planning Outcome: Progressing Towards Goal 
Goal: Medications Outcome: Progressing Towards Goal 
Goal: Respiratory Outcome: Progressing Towards Goal 
Goal: Treatments/Interventions/Procedures Outcome: Progressing Towards Goal 
Goal: Psychosocial 
Outcome: Progressing Towards Goal 
Goal: *Verbalizes anxiety and depression are reduced or absent Outcome: Progressing Towards Goal 
Goal: *Absence of aspiration Outcome: Progressing Towards Goal 
Goal: *Absence of deep venous thrombosis signs and symptoms(Stroke Metric) Outcome: Progressing Towards Goal 
Goal: *Optimal pain control at patient's stated goal 
Outcome: Progressing Towards Goal 
Goal: *Tolerating diet Outcome: Progressing Towards Goal 
Goal: *Ability to perform ADLs and demonstrates progressive mobility and function Outcome: Progressing Towards Goal 
Goal: *Stroke education continued(Stroke Metric) Outcome: Progressing Towards Goal 
  
Problem: Pain Goal: *Control of Pain Outcome: Progressing Towards Goal 
Goal: *PALLIATIVE CARE:  Alleviation of Pain Outcome: Progressing Towards Goal 
  
Problem: Patient Education: Go to Patient Education Activity Goal: Patient/Family Education Outcome: Progressing Towards Goal

## 2019-03-24 NOTE — PROGRESS NOTES
Progress Note Patient: Cassie Escobedo               Sex: male          DOA: 3/21/2019 YOB: 1965      Age:  46 y.o.        LOS:  LOS: 3 days CHIEF COMPLAINT:  CVA Subjective:  
 
Awake and talking No distress Objective:  
  
Visit Vitals BP (!) 150/101 (BP 1 Location: Left arm, BP Patient Position: Supine) Pulse 76 Temp 97.4 °F (36.3 °C) Resp 16 Ht 5' 6\" (1.676 m) Wt 72.1 kg (159 lb) SpO2 97% BMI 25.66 kg/m² Physical Exam: 
Gen:  No distress, no complaint Lungs:  Clear bilaterally, no wheeze or rhonchi Heart:  Regular rate and rhythm, no murmurs or gallops Abdomen:  Soft, non-tender, normal bowel sounds Lab/Data Reviewed: 
 
Labs reviewed Assessment/Plan Principal Problem: 
  CVA (cerebral vascular accident) ([facility] [address] (3/21/2019) Active Problems: Hypertension () Alcoholism ([facility] [address] (3/22/2019) Plan: 
BP control Statin, ASA 
PT Mobilization Working toward disposition

## 2019-03-25 PROBLEM — I63.81 ACUTE LACUNAR INFARCTION (HCC): Status: ACTIVE | Noted: 2019-03-25

## 2019-03-25 LAB
GLUCOSE BLD STRIP.AUTO-MCNC: 111 MG/DL (ref 70–110)
GLUCOSE BLD STRIP.AUTO-MCNC: 112 MG/DL (ref 70–110)
GLUCOSE BLD STRIP.AUTO-MCNC: 123 MG/DL (ref 70–110)
GLUCOSE BLD STRIP.AUTO-MCNC: 93 MG/DL (ref 70–110)

## 2019-03-25 PROCEDURE — 74011250637 HC RX REV CODE- 250/637: Performed by: PSYCHIATRY & NEUROLOGY

## 2019-03-25 PROCEDURE — 74011000258 HC RX REV CODE- 258: Performed by: HOSPITALIST

## 2019-03-25 PROCEDURE — 82962 GLUCOSE BLOOD TEST: CPT

## 2019-03-25 PROCEDURE — 97116 GAIT TRAINING THERAPY: CPT

## 2019-03-25 PROCEDURE — 74011250637 HC RX REV CODE- 250/637: Performed by: HOSPITALIST

## 2019-03-25 PROCEDURE — 65270000029 HC RM PRIVATE

## 2019-03-25 RX ADMIN — CLOPIDOGREL BISULFATE 75 MG: 75 TABLET, FILM COATED ORAL at 09:14

## 2019-03-25 RX ADMIN — Medication 10 ML: at 06:17

## 2019-03-25 RX ADMIN — ASPIRIN 325 MG ORAL TABLET 325 MG: 325 PILL ORAL at 09:14

## 2019-03-25 RX ADMIN — Medication 10 ML: at 14:00

## 2019-03-25 RX ADMIN — THERA TABS 1 TABLET: TAB at 09:14

## 2019-03-25 RX ADMIN — LISINOPRIL 20 MG: 20 TABLET ORAL at 09:14

## 2019-03-25 RX ADMIN — DEXTROSE MONOHYDRATE AND SODIUM CHLORIDE 75 ML/HR: 5; .45 INJECTION, SOLUTION INTRAVENOUS at 00:57

## 2019-03-25 RX ADMIN — Medication 10 ML: at 21:23

## 2019-03-25 RX ADMIN — Medication 10 ML: at 21:21

## 2019-03-25 RX ADMIN — Medication 100 MG: at 09:14

## 2019-03-25 RX ADMIN — ATORVASTATIN CALCIUM 80 MG: 40 TABLET, FILM COATED ORAL at 22:50

## 2019-03-25 RX ADMIN — FOLIC ACID 1 MG: 1 TABLET ORAL at 09:14

## 2019-03-25 NOTE — PROGRESS NOTES
Spoke with pt this am.  He states that he wants to do \"whatever the hospital recoommends - you all know what is best for me\". Pt is oriented to self and place. However, is unable to guess month or year. States \"see that's whats hard for me\". This CM spoke with Dr Fei Gottlieb. Pt has had visits from [facility], , and Vinny Ndiaye, . Per Klaudia Ricketts, will need to repeat psych consult for capacity. Dr. Alcon Hatch informed.

## 2019-03-25 NOTE — PROGRESS NOTES
03/25/19 UAI completed but not submitted In epas. I faxed demographic sheet, H/P, progress note, 2 Psych consults, PT /OT and UAI to [facility] [phone] and got confirmation fax back. I tried to call Katina Vasquez there at [address] [place] [address], she is on vacation and message says to [address] [place] [address] for help desk to see who we need to call. Navya Player. Jenn Toth RN, BSN [facility] [phone], Pager [phone]

## 2019-03-25 NOTE — PROGRESS NOTES
Problem: Falls - Risk of 
Goal: *Absence of Falls Description Document Jameson Vyas Fall Risk and appropriate interventions in the flowsheet. Outcome: Progressing Towards Goal 
  
Problem: Patient Education: Go to Patient Education Activity Goal: Patient/Family Education Outcome: Progressing Towards Goal 
  
Problem: Pressure Injury - Risk of 
Goal: *Prevention of pressure injury Description Document Matt Scale and appropriate interventions in the flowsheet. Outcome: Progressing Towards Goal 
  
Problem: Patient Education: Go to Patient Education Activity Goal: Patient/Family Education Outcome: Progressing Towards Goal 
  
Problem: Patient Education: Go to Patient Education Activity Goal: Patient/Family Education Outcome: Progressing Towards Goal 
  
Problem: TIA/CVA Stroke: 0-24 hours Goal: *Neurologically stable Description Absence of additional neurological deficits Outcome: Progressing Towards Goal 
  
Problem: Ischemic Stroke: Discharge Outcomes Goal: *Verbalizes anxiety and depression are reduced or absent Outcome: Progressing Towards Goal 
Goal: *Verbalize understanding of risk factor modification(Stroke Metric) Outcome: Progressing Towards Goal 
Goal: *Hemodynamically stable Outcome: Progressing Towards Goal 
Goal: *Absence of aspiration pneumonia Outcome: Progressing Towards Goal 
Goal: *Aware of needed dietary changes Outcome: Progressing Towards Goal 
Goal: *Verbalize understanding of prescribed medications including anti-coagulants, anti-lipid, and/or anti-platelets(Stroke Metric) Outcome: Progressing Towards Goal 
Goal: *Tolerating diet Outcome: Progressing Towards Goal 
Goal: *Aware of follow-up diagnostics related to anticoagulants Outcome: Progressing Towards Goal 
Goal: *Ability to perform ADLs and demonstrates progressive mobility and function Outcome: Progressing Towards Goal 
Goal: *Absence of DVT(Stroke Metric) Outcome: Progressing Towards Goal 
 Goal: *Absence of aspiration Outcome: Progressing Towards Goal 
Goal: *Optimal pain control at patient's stated goal 
Outcome: Progressing Towards Goal 
Goal: *Home safety concerns addressed Outcome: Progressing Towards Goal 
Goal: *Describes available resources and support systems Outcome: Progressing Towards Goal 
Goal: *Verbalizes understanding of activation of EMS(911) for stroke symptoms(Stroke Metric) Outcome: Progressing Towards Goal 
Goal: *Understands and describes signs and symptoms to report to providers(Stroke Metric) Outcome: Progressing Towards Goal 
Goal: *Neurolgocially stable (absence of additional neurological deficits) Outcome: Progressing Towards Goal 
Goal: *Verbalizes importance of follow-up with primary care physician(Stroke Metric) Outcome: Progressing Towards Goal 
Goal: *Smoking cessation discussed,if applicable(Stroke Metric) Outcome: Progressing Towards Goal 
Goal: *Depression screening completed(Stroke Metric) Outcome: Progressing Towards Goal 
  
Problem: Discharge Planning Goal: *Discharge to safe environment Outcome: Progressing Towards Goal 
  
Problem: Patient Education: Go to Patient Education Activity Goal: Patient/Family Education Outcome: Progressing Towards Goal 
  
Problem: Patient Education: Go to Patient Education Activity Goal: Patient/Family Education Outcome: Progressing Towards Goal 
  
Problem: TIA/CVA Stroke: Day 2 Until Discharge Goal: Off Pathway (Use only if patient is Off Pathway) Outcome: Progressing Towards Goal 
Goal: Activity/Safety Outcome: Progressing Towards Goal 
Goal: Diagnostic Test/Procedures Outcome: Progressing Towards Goal 
Goal: Nutrition/Diet Outcome: Progressing Towards Goal 
Goal: Discharge Planning Outcome: Progressing Towards Goal 
Goal: Medications Outcome: Progressing Towards Goal 
Goal: Respiratory Outcome: Progressing Towards Goal 
Goal: Treatments/Interventions/Procedures Outcome: Progressing Towards Goal 
 Goal: Psychosocial 
Outcome: Progressing Towards Goal 
Goal: *Verbalizes anxiety and depression are reduced or absent Outcome: Progressing Towards Goal 
Goal: *Absence of aspiration Outcome: Progressing Towards Goal 
Goal: *Absence of deep venous thrombosis signs and symptoms(Stroke Metric) Outcome: Progressing Towards Goal 
Goal: *Optimal pain control at patient's stated goal 
Outcome: Progressing Towards Goal 
Goal: *Tolerating diet Outcome: Progressing Towards Goal 
Goal: *Ability to perform ADLs and demonstrates progressive mobility and function Outcome: Progressing Towards Goal 
Goal: *Stroke education continued(Stroke Metric) Outcome: Progressing Towards Goal 
  
Problem: Pain Goal: *Control of Pain Outcome: Progressing Towards Goal 
Goal: *PALLIATIVE CARE:  Alleviation of Pain Outcome: Progressing Towards Goal 
  
Problem: Patient Education: Go to Patient Education Activity Goal: Patient/Family Education Outcome: Progressing Towards Goal

## 2019-03-25 NOTE — PROGRESS NOTES
Progress Note Patient: Edie Mendoza               Sex: male          DOA: 3/21/2019 YOB: 1965      Age:  46 y.o.        LOS:  LOS: 4 days CHIEF COMPLAINT:  CVA Subjective:  
 
Awake and talking No distress Objective:  
  
Visit Vitals /89 (BP 1 Location: Left arm, BP Patient Position: At rest) Pulse 61 Temp 98 °F (36.7 °C) Resp 14 Ht 5' 6\" (1.676 m) Wt 72.1 kg (159 lb) SpO2 99% BMI 25.66 kg/m² Physical Exam: 
Gen:  No distress, no complaint Lungs:  Clear bilaterally, no wheeze or rhonchi Heart:  Regular rate and rhythm, no murmurs or gallops Abdomen:  Soft, non-tender, normal bowel sounds Lab/Data Reviewed: 
 
Labs reviewed Assessment/Plan Principal Problem: 
  CVA (cerebral vascular accident) ([facility] [address] (3/21/2019) Active Problems: Hypertension () Alcoholism ([facility] [address] (3/22/2019) Acute lacunar infarction (3/25/2019) Plan: 
BP control Statin, ASA 
PT Mobilization Working toward disposition Psych re-eval: states he lacks capacity

## 2019-03-25 NOTE — PROGRESS NOTES
PHYSICAL THERAPY: DAILY TREATMENT NOTE  
INPATIENT: Medicaid: Hospital Day: 5 Patient: Dawna Agudelo (70 y.o. male)    Date: 3/25/2019 Primary Diagnosis: [facility] [I63.9]  
 ,  ,  
Precautions: Fall Chart, physical therapy assessment, plan of care and goals were reviewed. PLOF: Unknown ASSESSMENT: 
Patient supine in bed, agreeable to participation with PT with encouragement. MIN A X 2 for supine > sit with instruction in lina strategy. Fair seated balance. MIN A x 2 for sit > stand with RW for support. Patient demo's fair standing balance with RW. L foot noted to position in ER in standing. MIN A X 2 for ambulation x 15 ft in room; patient requires manual A to weight shift to R to off load L foot for advancement. Patient fatigues with activity. Requires several cues to attend to L foot positioning with gait. Patient returned to bed with MIN A. Left supine in bed with all needs within reach. No c/o pain with activity. Progression toward goals: 
    ?  Improving appropriately and progressing toward goals ? Improving slowly and progressing toward goals ? Not making progress toward goals and plan of care will be adjusted PLAN: 
Patient continues to benefit from skilled intervention to address the above impairments. Continue treatment per established plan of care. EDUCATION:  
Education:  Patient was educated on the following topics: bed mobility, transfers, safety, gait with RW. Verbalizes understanding. Barriers to Learning/Limitations: yes;  altered mental status (i.e.Sedation, Confusion) Compensate with: visual, verbal, tactile, kinesthetic cues/model Discharge Recommendations:  Abraham Davenport Further Equipment Recommendations for Discharge:  rolling walker Factors which may impact discharge planning: N/a  
 
SUBJECTIVE:  
Patient stated ? I thought I was going to rest today. ? OBJECTIVE DATA SUMMARY:  
Critical Behavior: Neurologic State: Alert Orientation Level: Disoriented to person, Disoriented to place Cognition: Follows commands Functional Mobility: 
 
 
Functional Status Indep (I) Mod I Super-vision Min A Mod A Max A Total A Assist x2 Verbal cues Additional time Not tested Comments Rolling ?  ?  ? ?    ?    ?  ?  ? ? ? ? Supine to sit ?  ?  ? ?  ?  ?  ?  ? ? ? ? Sit to supine ? ?  ? ?  ?  ?  ?  ? ? ? ? Sit to stand ?  ?  ? ?  ?  ?  ?  ? ? ? ? Stand to sit ?  ?  ? ?  ?  ?  ?  ? ? ? ? Bed to chair transfers ? ?  ? ?  ?  ?  ?  ? ? ? ? Balance Good Thor Chackoman Poor Unable Not tested Comments Sitting static ?  ?  ?  ?  ? Sitting dynamic ?  ?  ?  ?  ? Standing static ?  ?  ?  ?  ? Standing dynamic ?  ?  ?  ?  ? Mobility/Gait:  
Level of Assistance:  
Assistive Device: rolling walker Distance Ambulated: 15 feet Left Lower Extremity: FWB Right Lower Extremity: FWB Base of Support: center of gravity altered Speed/Elena: shuffled and slow Step Length: right shortened Swing Pattern: left asymmetrical 
Stance: left decreased Gait Abnormalities: altered arm swing and hemiplegic Vital Signs Temp: 98 °F (36.7 °C) Pulse (Heart Rate): 61    
BP: 151/89 Resp Rate: 14    
O2 Sat (%): 99 % Pain: None Pre treatment pain level:0 Post treatment pain level:0 Activity Tolerance:  
Fair After treatment:  
?  Patient left in no apparent distress sitting up in chair ? Patient left in no apparent distress in bed 
? Call bell left within reach ? Nursing notified ? Caregiver present ? Bed alarm activated Jorge Mak Time Calculation: 31 mins

## 2019-03-25 NOTE — PROGRESS NOTES
Problem: Falls - Risk of 
Goal: *Absence of Falls Description Document Rebecca Pino Fall Risk and appropriate interventions in the flowsheet. Outcome: Progressing Towards Goal 
  
Problem: Patient Education: Go to Patient Education Activity Goal: Patient/Family Education Outcome: Progressing Towards Goal 
  
Problem: Pressure Injury - Risk of 
Goal: *Prevention of pressure injury Description Document Matt Scale and appropriate interventions in the flowsheet. Outcome: Progressing Towards Goal 
  
Problem: Patient Education: Go to Patient Education Activity Goal: Patient/Family Education Outcome: Progressing Towards Goal 
  
Problem: Patient Education: Go to Patient Education Activity Goal: Patient/Family Education Outcome: Progressing Towards Goal 
  
Problem: TIA/CVA Stroke: 0-24 hours Goal: *Neurologically stable Description Absence of additional neurological deficits Outcome: Progressing Towards Goal 
  
Problem: Ischemic Stroke: Discharge Outcomes Goal: *Verbalizes anxiety and depression are reduced or absent Outcome: Progressing Towards Goal 
Goal: *Verbalize understanding of risk factor modification(Stroke Metric) Outcome: Progressing Towards Goal 
Goal: *Hemodynamically stable Outcome: Progressing Towards Goal 
Goal: *Absence of aspiration pneumonia Outcome: Progressing Towards Goal 
Goal: *Aware of needed dietary changes Outcome: Progressing Towards Goal 
Goal: *Verbalize understanding of prescribed medications including anti-coagulants, anti-lipid, and/or anti-platelets(Stroke Metric) Outcome: Progressing Towards Goal 
Goal: *Tolerating diet Outcome: Progressing Towards Goal 
Goal: *Aware of follow-up diagnostics related to anticoagulants Outcome: Progressing Towards Goal 
Goal: *Ability to perform ADLs and demonstrates progressive mobility and function Outcome: Progressing Towards Goal 
Goal: *Absence of DVT(Stroke Metric) Outcome: Progressing Towards Goal 
 Goal: *Absence of aspiration Outcome: Progressing Towards Goal 
Goal: *Optimal pain control at patient's stated goal 
Outcome: Progressing Towards Goal 
Goal: *Home safety concerns addressed Outcome: Progressing Towards Goal 
Goal: *Describes available resources and support systems Outcome: Progressing Towards Goal 
Goal: *Verbalizes understanding of activation of EMS(911) for stroke symptoms(Stroke Metric) Outcome: Progressing Towards Goal 
Goal: *Understands and describes signs and symptoms to report to providers(Stroke Metric) Outcome: Progressing Towards Goal 
Goal: *Neurolgocially stable (absence of additional neurological deficits) Outcome: Progressing Towards Goal 
Goal: *Verbalizes importance of follow-up with primary care physician(Stroke Metric) Outcome: Progressing Towards Goal 
Goal: *Smoking cessation discussed,if applicable(Stroke Metric) Outcome: Progressing Towards Goal 
Goal: *Depression screening completed(Stroke Metric) Outcome: Progressing Towards Goal 
  
Problem: Discharge Planning Goal: *Discharge to safe environment Outcome: Progressing Towards Goal 
  
Problem: Patient Education: Go to Patient Education Activity Goal: Patient/Family Education Outcome: Progressing Towards Goal 
  
Problem: Patient Education: Go to Patient Education Activity Goal: Patient/Family Education Outcome: Progressing Towards Goal 
  
Problem: TIA/CVA Stroke: Day 2 Until Discharge Goal: Off Pathway (Use only if patient is Off Pathway) Outcome: Progressing Towards Goal 
Goal: Activity/Safety Outcome: Progressing Towards Goal 
Goal: Diagnostic Test/Procedures Outcome: Progressing Towards Goal 
Goal: Nutrition/Diet Outcome: Progressing Towards Goal 
Goal: Discharge Planning Outcome: Progressing Towards Goal 
Goal: Medications Outcome: Progressing Towards Goal 
Goal: Respiratory Outcome: Progressing Towards Goal 
Goal: Treatments/Interventions/Procedures Outcome: Progressing Towards Goal 
 Goal: Psychosocial 
Outcome: Progressing Towards Goal 
Goal: *Verbalizes anxiety and depression are reduced or absent Outcome: Progressing Towards Goal 
Goal: *Absence of aspiration Outcome: Progressing Towards Goal 
Goal: *Absence of deep venous thrombosis signs and symptoms(Stroke Metric) Outcome: Progressing Towards Goal 
Goal: *Optimal pain control at patient's stated goal 
Outcome: Progressing Towards Goal 
Goal: *Tolerating diet Outcome: Progressing Towards Goal 
Goal: *Ability to perform ADLs and demonstrates progressive mobility and function Outcome: Progressing Towards Goal 
Goal: *Stroke education continued(Stroke Metric) Outcome: Progressing Towards Goal 
  
Problem: Pain Goal: *Control of Pain Outcome: Progressing Towards Goal 
Goal: *PALLIATIVE CARE:  Alleviation of Pain Outcome: Progressing Towards Goal 
  
Problem: Patient Education: Go to Patient Education Activity Goal: Patient/Family Education Outcome: Progressing Towards Goal

## 2019-03-25 NOTE — CONSULTS
Telepsychiatry Follow up    Location of patient: [facility]  Location of provider: [place]    This evaluation was conducted via telepsychiatry with the assistance of onsite staff. Interim History: 45 y/o male with history of some type of neurocognitive disorder, depression with psychotic features, and substance abuse per EMR, admitted on 3/21 due to acute stroke. Pt's baseline cognitive status is unclear, but he was seen by telepsych on 3/22 and determined to have impaired capacity at that time. Per RN, pt has a  but unclear what type of services. Consult requested today for reassessment of capacity, as pt seems to have shown some level of improvement in recent days. Per notes, PT is recommending SNF for further rehab upon discharge. On interview, when asked why he is currently in the hospital, pt states, \"I had a breakdown\", \"I was at a homeless shelter and I broke down\". When asked to elaborate, he states again that he had a mental breakdown. Writer then informs pt of his recent CVA, and pt asks writer to define what this means. After discussing further, pt states that he may recall someone telling him about that but everything is \"just pretty vague\". Pt unaware of any physical impairments from the stroke, but when asked about weakness, he then states, \"just this left side\". Writer explains that this is due to the recent stroke. When asked about past medical history, pt reports history of HTN and depression. He does not recall any other medical issues, and does not recall dx of dementia but states that someone may have told him he has memory problems. When asked about meds, he states that he takes Zoloft for blood pressure. He then remembers taking HCTZ, but is unable to recall any other recent medications. Pt reports that he may have seen PT today, who said \"hang in there or whatever\". Per EMR, pt had most recent PT session yesterday.  Pt denies recollection of any discussions regarding what will happen when pt leaves the hospital. \"Think I'm gonna get with my counselors\". \"I don't know\" whether he wants to resume psychiatric medications at this time. \"I think\" he stopped taking his meds prior to this admission but does not recall. Has  through [facility] but unable to recall her name. States that he has two , that someone came today, then states that he has seen both of them while in the hospital. Writer reviewed purpose of SNF, that this is the next likely step, and reason this recommendation has been made. Pt expressed understanding. Pt denies currently feeling depressed or anxious, denies suicidal or homicidal ideations, denies hallucinations. Later on, when asked again why he is in the hospital, he is able to recall having a stroke but does not provide further details. When asked what the next step will likely be upon discharge, he states, \"I guess the mental health\". When asked if he is having trouble remembering things discussed, pt states that he is and reports, \"I'm not 100%\". Pt states that he may need assistance with medical decisions for the time being. Current Psychiatric Medications: CIWA protocol with Ativan    Mental Status Exam:   Appearance and attire: Appropriate grooming, wearing hospital gown, lying in bed  Attitude and behavior: Pleasant, calm, cooperative; fair eye contact  Speech: Normal rate/volume/tone  Mood: Euthymic currently  Affect: Reactive  Association and thought processes: Goal-directed  Thought content: Denies SI or HI  Perception: No evidence of delusions or hallucinations  Sensorium and orientation: Alert, oriented to person, place, time.   Memory and intellectual functioning: Impaired memory of recent events/discussions; difficulty retaining info discussed during this exam  Insight and judgment: Limited    Impression: 45 y/o male with history of neurocognitive disorder previously on Aricept, MDD with psychotic features, and past substance abuse per EMR, admitted due to acute CVA. Pt likely had acute delirium, and based on current exam, impulsivity and disorientation appear to be improving. However, pt's overall cognitive functioning at baseline is unclear, and he continues to have significant difficulty recalling past medical history, medications, and even reason for current admission. He is able to express understanding as concepts are explained, but then is unable to recall what was said a short time later. This may relate to continued delirium, or could be due to underlying cognitive impairment. Pt denies SI or HI. No evidence of psychotic symptoms at this time. Diagnosis: F05 Delirium due to [address], resolving; R41.9  Unspecified neurocognitive disorder; History of MDD with psychotic features    Treatment Recommendations:  1. Capacity: On exam, pt initially unable to recall reason for current admission or condition for which he is being treated. He is also unable to recall any discussions regarding need for rehab/SNF upon discharge. After being informed of this information, a few minutes later he is able to recall that he had a stroke but still unable to recall discussion of residual weakness and need for SNF. He has difficulty recalling some of his PMH and most of medications required. Pt is therefore unable to adequately discuss current treatment options or possible outcomes. Pt himself reports having continued memory concerns and difficulty with decisions. Therefore, based on current exam, pt still has impaired capacity for discharge medical decisions. Please note that capacity can change over time and may continue to improve as delirium resolves. Reassessment may therefore be indicated to evaluate capacity for future medical decisions. 2. Psychiatric medications:   -Discontinue CIWA when no longer indicated.   -Agree with confirming most recent medication list with [facility] and resuming when appropriate, as previously recommended. Recommendations were discussed with pt who expressed understanding.

## 2019-03-26 LAB
GLUCOSE BLD STRIP.AUTO-MCNC: 109 MG/DL (ref 70–110)
GLUCOSE BLD STRIP.AUTO-MCNC: 84 MG/DL (ref 70–110)
GLUCOSE BLD STRIP.AUTO-MCNC: 97 MG/DL (ref 70–110)
GLUCOSE BLD STRIP.AUTO-MCNC: 98 MG/DL (ref 70–110)

## 2019-03-26 PROCEDURE — 74011250637 HC RX REV CODE- 250/637: Performed by: HOSPITALIST

## 2019-03-26 PROCEDURE — 65270000029 HC RM PRIVATE

## 2019-03-26 PROCEDURE — 82962 GLUCOSE BLOOD TEST: CPT

## 2019-03-26 PROCEDURE — 74011250637 HC RX REV CODE- 250/637: Performed by: PSYCHIATRY & NEUROLOGY

## 2019-03-26 PROCEDURE — 97116 GAIT TRAINING THERAPY: CPT

## 2019-03-26 PROCEDURE — 97535 SELF CARE MNGMENT TRAINING: CPT

## 2019-03-26 RX ORDER — AMLODIPINE BESYLATE 5 MG/1
5 TABLET ORAL DAILY
Status: DISCONTINUED | OUTPATIENT
Start: 2019-03-26 | End: 2019-03-29

## 2019-03-26 RX ADMIN — ASPIRIN 325 MG ORAL TABLET 325 MG: 325 PILL ORAL at 08:50

## 2019-03-26 RX ADMIN — ATORVASTATIN CALCIUM 80 MG: 40 TABLET, FILM COATED ORAL at 22:46

## 2019-03-26 RX ADMIN — LISINOPRIL 20 MG: 20 TABLET ORAL at 08:50

## 2019-03-26 RX ADMIN — Medication 10 ML: at 22:24

## 2019-03-26 RX ADMIN — CLOPIDOGREL BISULFATE 75 MG: 75 TABLET, FILM COATED ORAL at 08:50

## 2019-03-26 RX ADMIN — FOLIC ACID 1 MG: 1 TABLET ORAL at 09:16

## 2019-03-26 RX ADMIN — Medication 100 MG: at 09:16

## 2019-03-26 RX ADMIN — AMLODIPINE BESYLATE 5 MG: 5 TABLET ORAL at 15:53

## 2019-03-26 RX ADMIN — Medication 10 ML: at 15:54

## 2019-03-26 RX ADMIN — THERA TABS 1 TABLET: TAB at 08:50

## 2019-03-26 NOTE — ADVANCED PRACTICE NURSE
Bedside and Verbal shift change report given to Natalie West, RN (oncoming nurse) by Jennifer Teixeira, RICH (offgoing nurse). Report included the following information SBAR, Kardex and Quality Measures. Pt resting in bed, no acute concerns noted, vitals WNL, glucose WNL, alert and oriented, no changes from this RNs previous shift, will continue to monitor. Jennifer Hale 2117 /109. Its been running 145-160s. Nkechi Pack a place something PRN? Thanks- Benson Mckeon [phone] ([place])

## 2019-03-26 NOTE — PROGRESS NOTES
Progress Note Patient: Rosalie Heredia               Sex: male          DOA: 3/21/2019 YOB: 1965      Age:  46 y.o.        LOS:  LOS: 5 days CHIEF COMPLAINT:  CVA Subjective:  
 
Awake and talking No distress Denies complaints Objective:  
  
Visit Vitals /90 (BP 1 Location: Right arm, BP Patient Position: At rest) Pulse 63 Temp 98 °F (36.7 °C) Resp 14 Ht 5' 6\" (1.676 m) Wt 72.1 kg (159 lb) SpO2 97% BMI 25.66 kg/m² Physical Exam: 
Gen:  No distress, no complaint Lungs:  Clear bilaterally, no wheeze or rhonchi Heart:  Regular rate and rhythm, no murmurs or gallops Abdomen:  Soft, non-tender, normal bowel sounds Lab/Data Reviewed: 
 
Labs reviewed Assessment/Plan Principal Problem: 
  CVA (cerebral vascular accident) ([facility] [address] [address] (3/21/2019) Active Problems: Hypertension () Alcoholism ([facility] (3/22/2019) Acute lacunar infarction (3/25/2019) Plan: 
BP control Statin, ASA 
PT Mobilization Working toward disposition Psych re-eval: states he lacks capacity.  Guardianship process started, discussed with CM

## 2019-03-26 NOTE — PROGRESS NOTES
Bedside and Verbal shift change report given to Yoel Benson RN (oncoming nurse) by Anat Sparks RN (offgoing nurse). Report included the following information SBAR, Kardex and Cardiac Rhythm NSR. Pt alert and oriented, no acute concerns, vitals WNL, no distress noted, no changes in NIH, bed alarm on, bed locked and low, call bell within reach. Telepsych consult completed  to unit to follow up with pts DC care options Per Verbal order from [place], switch to Q shift NIH and DC tele. 1800- No changes from baseline. Pt voiced no concerns. Will pass at report.

## 2019-03-26 NOTE — PROGRESS NOTES
Problem: Self Care Deficits Care Plan (Adult) Goal: *Acute Goals and Plan of Care (Insert Text) Description Occupational Therapy Goals Initiated 3/22/2019 within 7 day(s). 1.  Patient will perform grooming tasks while standing with minimal assistance for balance and additional time to incorporate LUE as gross assist. 
2.  Patient will perform upper body dressing with minimal assistance utilizing adaptive strategies, prn. 
3.  Patient will perform lower body dressing with moderate assistance and minimal verbal cues. 4.  Patient will perform toilet transfers with minimal assistance/contact guard assist. 
5.  Patient will perform all aspects of toileting with minimal assistance/contact guard assist. 
6.  Patient will participate in upper extremity therapeutic exercise/activities with minimal assistance/contact guard assist for 8 minutes to increase BUE strength/AROM for functional transfers and ADLs. 7.  Patient will utilize energy conservation techniques during functional activities with minimal verbal cues. Outcome: Progressing Towards Goal 
 OCCUPATIONAL THERAPY TREATMENT Patient: Milena Story (63 y.o. male) Date: 3/26/2019 Diagnosis: CVA (cerebral vascular accident) ([facility] [address] [I63.9] CVA (cerebral vascular accident) ([facility] [address] Precautions: Fall Chart, occupational therapy assessment, plan of care, and goals were reviewed. ASSESSMENT: 
Pt presented alert in chair and agreeable to therapy. Pt educated on compensatory strategies for grooming and dressing. Pt performs grooming tasks seated in chair utilizing LUE as stabilizer and weight bearing on tray table. Pt applied lotion to LUE for sensory stimulation and to increase attention to L side. Pt performs UB dressing with SBA utilizing lina-dressing technique. Pt educated on role of OT and left sitting in chair. Progression toward goals: 
?          Improving appropriately and progressing toward goals ?          Improving slowly and progressing toward goals ? Not making progress toward goals and plan of care will be adjusted PLAN: 
Patient continues to benefit from skilled intervention to address the above impairments. Continue treatment per established plan of care. Discharge Recommendations:  Inpatient Rehab Further Equipment Recommendations for Discharge:  shower chair SUBJECTIVE:  
Patient stated \"I did a lot. ? OBJECTIVE DATA SUMMARY:  
Cognitive/Behavioral Status: 
Neurologic State: Alert Orientation Level: Oriented X4 Cognition: Follows commands Balance: 
Sitting: Intact; With support Sitting - Static: Fair (occasional) Sitting - Dynamic: Fair (occasional) ADL Intervention: 
Grooming Washing Face: Supervision/set-up Washing Hands: Supervision/set-up Brushing Teeth: Compensatory technique training;Stand-by assistance Applying Makeup: Minimum assistance(Lotion application to BUE) Brushing/Combing Hair: Supervision/set-up Cues: Verbal cues provided; Tactile cues provided Upper Body Dressing Assistance Hospital Gown: Stand-by assistance; Compensatory technique training Cues: Verbal cues provided; Tactile cues provided Pain: 
Pt reports 0/10 pain or discomfort prior to treatment. Pt reports 0/10 pain or discomfort post treatment. Activity Tolerance:   
Good Please refer to the flowsheet for vital signs taken during this treatment. After treatment:  
?  Patient left in no apparent distress sitting up in chair ? Patient left in no apparent distress in bed 
? Call bell left within reach ? Nursing notified ? Caregiver present ? Bed alarm activated COMMUNICATION/EDUCATION:  
? Home safety education was provided and the patient/caregiver indicated understanding. ? Patient/family have participated as able in goal setting and plan of care. ? Patient/family agree to work toward stated goals and plan of care. ? Patient understands intent and goals of therapy, but is neutral about his/her participation. ? Patient is unable to participate in goal setting and plan of care. Ej Martin Time Calculation: 26 mins

## 2019-03-26 NOTE — PROGRESS NOTES
Assume care of patient lying in bed and watching TV. Alert and oriented X 3, disoriented to time at intervals. Denies pain or discomfort at present. Bed locked in lowest position. Call light within reach and understand to use for assistance and needs. 03/26/2019 
0740 Bedside and Verbal shift change report given to Zane Cruz RN (oncoming nurse) by Ijeoma Abbott RN (offgoing nurse). Report given with SBAR, Kardex, Intake/Output, MAR and Recent Results.

## 2019-03-26 NOTE — PROGRESS NOTES
Problem: Mobility Impaired (Adult and Pediatric) Goal: *Acute Goals and Plan of Care (Insert Text) Description Physical Therapy Goals Initiated 3/22/2019 and to be accomplished within 7 days. 1.  Patient will complete all bed mobility with moderate assistance  in order to prepare for EOB/OOB activity. 2.  Patient will perform sit <> stand with minimal assistance/contact guard assist in order to prepare for OOB/gait activity. 3.  Patient will perform bed to chair transfers with minimal assistance/contact guard assist in order to promote mobility and encourage seated activity to progress towards their prior level of function. 4.  Patient will ambulate 25 feet with moderate assistance  using LRAD in order to prepare for safe negotiation of their environment. Outcome: Progressing Towards Goal 
 
PHYSICAL THERAPY: DAILY TREATMENT NOTE  
INPATIENT: Medicaid: Hospital Day: 6 Patient: Nan Franklin (64 y.o. male)    Date: 3/26/2019 Primary Diagnosis: CVA (cerebral vascular accident) ([facility] [address] [I63.9]  
 ,  ,  
Precautions: Fall Chart, physical therapy assessment, plan of care and goals were reviewed. PLOF: Independent ASSESSMENT: 
Patient supine in bed, agreeable to participation with PT. MIN A for supine > sit, sit > stand with RW for support. Patient demos fair standing balance. MIN A for ambulation with RW x 10 ft x 15 ft x 10 ft. Patient requires MIN A and manual weight shift to R periodically to advance L foot but demo's improved mobility in L LE with gait. Verbal cuing to attend to L foot position to prevent injury. Patient requires additional time to complete; patient verbalizes fatigue but not demonstrates this with activity. Patient returned to bed with MIN A. Left supine with all needs within reach. Requires several cues for safety with gait and transfers. Patient has no c/o pain with activity. Bed alarm activated. Progression toward goals: ?  Improving appropriately and progressing toward goals ? Improving slowly and progressing toward goals ? Not making progress toward goals and plan of care will be adjusted PLAN: 
Patient continues to benefit from skilled intervention to address the above impairments. Continue treatment per established plan of care. EDUCATION:  
Education:  Patient was educated on the following topics: safety, gait with RW, gait mechanics, bed mobility, transfers. Verbalizes understanding. Barriers to Learning/Limitations: yes;  altered mental status (i.e.Sedation, Confusion) Compensate with: visual, verbal, tactile, kinesthetic cues/model Discharge Recommendations:  A[facility]port Further Equipment Recommendations for Discharge:  rolling walker Factors which may impact discharge planning: N/A  
 
SUBJECTIVE:  
Patient stated ? I have no physical energy. ? OBJECTIVE DATA SUMMARY:  
Critical Behavior: 
Neurologic State: Alert Orientation Level: Oriented X4 Cognition: Follows commands Functional Mobility: 
 
 
Functional Status Indep (I) Mod I Super-vision Min A Mod A Max A Total A Assist x2 Verbal cues Additional time Not tested Comments Rolling ?  ?  ? ?    ?    ?  ?  ? ? ? ? Supine to sit ?  ?  ? ?  ?  ?  ?  ? ? ? ? Sit to supine ? ?  ? ?  ?  ?  ?  ? ? ? ? Sit to stand ?  ?  ? ?  ?  ?  ?  ? ? ? ? Stand to sit ?  ?  ? ?  ?  ?  ?  ? ? ? ? Bed to chair transfers ? ?  ? ?  ?  ?  ?  ? ? ? ? Balance Good Figueroa Pride Poor Unable Not tested Comments Sitting static ?  ?  ?  ?  ? Sitting dynamic ?  ?  ?  ?  ? Standing static ?  ?  ?  ?  ? Standing dynamic ?  ?  ?  ?  ? Mobility/Gait:  
Level of Assistance: Minimum assistance Assistive Device: rolling walker Distance Ambulated: 10 feet x 15 feet x 10 ft Left Lower Extremity: FWB Right Lower Extremity: FWB Base of Support: center of gravity altered Speed/Elena: shuffled and slow Step Length: left shortened and right shortened Swing Pattern: left asymmetrical 
Stance: weight shift Gait Abnormalities: altered arm swing and decreased step clearance Vital Signs Temp: 97.9 °F (36.6 °C) Pulse (Heart Rate): 80    
BP: (!) 169/109 Resp Rate: 18    
O2 Sat (%): 98 % Pain: None Pre treatment pain level:0 Post treatment pain level:0 Activity Tolerance:  
Fair After treatment:  
?  Patient left in no apparent distress sitting up in chair ? Patient left in no apparent distress in bed 
? Call bell left within reach ? Nursing notified ? Caregiver present ? Bed alarm activated Nursing recommendations: Up with assist x 1 Basia Larger Time Calculation: 30 mins

## 2019-03-26 NOTE — PROGRESS NOTES
conducted a Follow up consultation and Spiritual Assessment for Milena Story, who is a 46 y.o.,male. The  provided the following Interventions: 
Continued the relationship of care and support. Listened empathically. Offered prayer and assurance of continued prayer on patients behalf. Chart reviewed. The following outcomes were achieved: 
Patient expressed gratitude for 's visit. Assessment: 
There are no spiritual or Yazidism issues which require intervention at this time. Plan: 
Chaplains will continue to follow and will provide pastoral care on an as needed/requested basis.  recommends bedside caregivers page  on duty if patient shows signs of acute spiritual or emotional distress. Evelin Darby M.Div. , [address] [address] [facility]: [phone]/[facility]: [phone]

## 2019-03-27 LAB
GLUCOSE BLD STRIP.AUTO-MCNC: 104 MG/DL (ref 70–110)
GLUCOSE BLD STRIP.AUTO-MCNC: 98 MG/DL (ref 70–110)

## 2019-03-27 PROCEDURE — 74011250637 HC RX REV CODE- 250/637: Performed by: PSYCHIATRY & NEUROLOGY

## 2019-03-27 PROCEDURE — 97110 THERAPEUTIC EXERCISES: CPT

## 2019-03-27 PROCEDURE — 65270000029 HC RM PRIVATE

## 2019-03-27 PROCEDURE — 74011250637 HC RX REV CODE- 250/637: Performed by: HOSPITALIST

## 2019-03-27 PROCEDURE — 97535 SELF CARE MNGMENT TRAINING: CPT

## 2019-03-27 PROCEDURE — 82962 GLUCOSE BLOOD TEST: CPT

## 2019-03-27 RX ADMIN — ATORVASTATIN CALCIUM 80 MG: 40 TABLET, FILM COATED ORAL at 22:40

## 2019-03-27 RX ADMIN — Medication 10 ML: at 14:00

## 2019-03-27 RX ADMIN — Medication 10 ML: at 06:25

## 2019-03-27 RX ADMIN — Medication 10 ML: at 06:14

## 2019-03-27 RX ADMIN — Medication 100 MG: at 09:33

## 2019-03-27 RX ADMIN — THERA TABS 1 TABLET: TAB at 09:33

## 2019-03-27 RX ADMIN — ASPIRIN 325 MG ORAL TABLET 325 MG: 325 PILL ORAL at 09:00

## 2019-03-27 RX ADMIN — LISINOPRIL 20 MG: 20 TABLET ORAL at 09:32

## 2019-03-27 RX ADMIN — AMLODIPINE BESYLATE 5 MG: 5 TABLET ORAL at 09:32

## 2019-03-27 RX ADMIN — FOLIC ACID 1 MG: 1 TABLET ORAL at 09:33

## 2019-03-27 RX ADMIN — CLOPIDOGREL BISULFATE 75 MG: 75 TABLET, FILM COATED ORAL at 09:33

## 2019-03-27 NOTE — PROGRESS NOTES
Transition of Care/Discharge Planning Called and spoke to Parvez at [facility] regarding Level 2 documents faxed on 3/25/19. Parvez indicated that patient would not require a Level 2 based on the fact that the patient has a primary diagnosis of a neurocognitive disorder(dementia) and that this writer should receive a fax by end of today or tomorrow from the state indicating this. She requests that the MIMR(DMAS 95) be corrected to reflect this so that the state can close this out as not needing a Level 2. Will wait for this formal letter before submitting [facility] in 226 No Rob Chen RN Outcomes Manager 
[phone]

## 2019-03-27 NOTE — PROGRESS NOTES
Patient in bed, flat and very quiet. Previous nurse noticed that this is not his norm. In response dual NIH done. Patient asked if he was okay, he stated that he \"just wants to focus on getting better\". No cause for concern at this time but will continue to monitor patients progress. 2036  Beside and verbal  Shift report given to [facility] RN(oncoming nurse) by Geronimo Wallace RN, (off going nurse). Report include the following information, SBAR, KARDEX, MAR, Recent results, and intake and output

## 2019-03-27 NOTE — PROGRESS NOTES
PT tx session attempted x 2, 1150 and 1440. First attempt patient working with OT. Second attempt, patient declining to participate and reporting fatigue. Will f/u with patient, Nory Alfaro PT, DPT Office extension: [phone] Pager #: [phone]

## 2019-03-27 NOTE — PROGRESS NOTES
Assume care of patient Scott Velazquez , RN pt awake in bed, A&Ox3  ,no distress noted and denies pain. Frequently used items and call bell within reach. Pt verbalized understanding to use call bell for any needs or assistance. Bed lock in lowest position. Dual NIH done as a precaution with the off going nurse.

## 2019-03-27 NOTE — PROGRESS NOTES
Assume care of patient lying in bed watching TV. Alert and oriented X 4. Bed locked in lowest position. Call light within reach and understand to use for assistance and needs. Dual NIH completed with left side weakness remains. Patient appears sad and withdrawn today. Decrease talkative and activity. Noted with flat affect when communicating with staff. 0730 Bedside and Verbal shift change report given to Jeni Mills RN (oncoming nurse) by Vale Olivarez RN (offgoing nurse). Report given with SBAR, Kardex, Intake/Output, MAR and Recent Results.

## 2019-03-27 NOTE — PROGRESS NOTES
Progress Note Patient: Suyapa Azevedo               Sex: male          DOA: 3/21/2019 YOB: 1965      Age:  46 y.o.        LOS:  LOS: 6 days CHIEF COMPLAINT:  CVA Subjective:  
 
Sleeping but awakens easily No distress Denies complaints Objective:  
  
Visit Vitals /89 (BP 1 Location: Left arm, BP Patient Position: At rest;Supine) Pulse 67 Temp 98.3 °F (36.8 °C) Resp 16 Ht 5' 6\" (1.676 m) Wt 72.1 kg (159 lb) SpO2 93% BMI 25.66 kg/m² Physical Exam: 
Gen:  No distress, no complaint Lungs:  Clear bilaterally, no wheeze or rhonchi Heart:  Regular rate and rhythm, no murmurs or gallops Abdomen:  Soft, non-tender, normal bowel sounds Lab/Data Reviewed: 
 
Labs reviewed Assessment/Plan Principal Problem: 
  CVA (cerebral vascular accident) ([facility] [address] (3/21/2019) Active Problems: Hypertension () Alcoholism ([facility] [address] (3/22/2019) Acute lacunar infarction (3/25/2019) Plan: 
BP control Statin, ASA 
PT Mobilization Working toward disposition Psych re-eval: states he lacks capacity.  Guardianship process underway, discussed with RUTHY

## 2019-03-27 NOTE — ROUTINE PROCESS
Bedside report given by RICH gibbs. Pt in bed resting alert and oriented x 3 denies pain. Dual NIH done no changes, assessment completed plan of care for the shift explained pt verbalized understanding. HOB elevated, bed low and in locked position call light and urinal within reach and  Bed alarm on for safety. Will continue with the care 3012- Bedside and Verbal shift change report given to RICH Hill (oncoming nurse) by Brinda Clemente RN (offgoing nurse). Report included the following information SBAR, Kardex, Intake/Output, MAR and Recent Results.

## 2019-03-27 NOTE — PROGRESS NOTES
Problem: Self Care Deficits Care Plan (Adult) Goal: *Acute Goals and Plan of Care (Insert Text) Description Occupational Therapy Goals Initiated 3/22/2019 within 7 day(s). 1.  Patient will perform grooming tasks while standing with minimal assistance for balance and additional time to incorporate LUE as gross assist. 
2.  Patient will perform upper body dressing with minimal assistance utilizing adaptive strategies, prn. 
3.  Patient will perform lower body dressing with moderate assistance and minimal verbal cues. 4.  Patient will perform toilet transfers with minimal assistance/contact guard assist. 
5.  Patient will perform all aspects of toileting with minimal assistance/contact guard assist. 
6.  Patient will participate in upper extremity therapeutic exercise/activities with minimal assistance/contact guard assist for 8 minutes to increase BUE strength/AROM for functional transfers and ADLs. 7.  Patient will utilize energy conservation techniques during functional activities with minimal verbal cues. Outcome: Progressing Towards Goal 
OCCUPATIONAL THERAPY TREATMENT Patient: Purnima Gautam (41 y.o. male) Date: 3/27/2019 Diagnosis: CVA (cerebral vascular accident) ([facility] [address] [I63.9] CVA (cerebral vascular accident) ([facility] [address] Precautions: Fall Chart, occupational therapy assessment, plan of care, and goals were reviewed. ASSESSMENT: 
Pt alert and in chair. Pt requires Min A for sit to stand with RW. Pt performs functional mobility from the chair to the bathroom. Pt requires tapping on the LLE for muscle stimulation. Pt requires assistance for clothing management for toileting and verbal cues for hand placement for toilet transfers. Pt performs grooming task at the sink with CGA for balance. Pt performs functional mobility back to the chair. Pt requires seated rest break 2/2 fatigue. Pt performs grooming tasks seated in the chair. Pt educated on and performs UE TherEx seated in the chair. Throughout the session, Pt requires MAX motivation 2/2 self limiting behaviors. Pt perseverates on BLE weaknesses requiring MAX encouragement for functional mobility. Pt educated on staying positive about progress and energy conservation techniques. Progression toward goals: 
?          Improving appropriately and progressing toward goals ? Improving slowly and progressing toward goals ? Not making progress toward goals and plan of care will be adjusted PLAN: 
Patient continues to benefit from skilled intervention to address the above impairments. Continue treatment per established plan of care. Discharge Recommendations:  Inpatient Rehab Further Equipment Recommendations for Discharge:  shower chair SUBJECTIVE:  
Patient stated I feel like it's starting to send signals to my brain.  OBJECTIVE DATA SUMMARY:  
Cognitive/Behavioral Status: 
Neurologic State: Agitated Orientation Level: Oriented X4 Cognition: Follows commands Functional Mobility and Transfers for ADLs: 
    Transfers: 
    Sit to Stand: Minimum assistance with RW Balance: 
Sitting: Intact; With support Sitting - Static: Good (unsupported) Sitting - Dynamic: Good (unsupported) Standing: With support; Impaired Standing - Static: Fair Standing - Dynamic : Fair ADL Intervention: 
Grooming Washing Face: Supervision/set-up Washing Hands: Contact guard assistance standing at the sink Applying Makeup: Supervision/set-up(Lotion application to BUE) Brushing/Combing Hair: Supervision/set-up Toileting Toileting Assistance: Minimum assistance Bladder Hygiene: Supervision/set-up Bowel Hygiene: Supervision/set-up Clothing Management: Total assistance (dependent) Cues: Verbal cues provided Therapeutic Exercises: SROM for shoulder extension/flexion, elbow extension/flexion, and wrist extension/flexion Pain: 
Pt reports 0/10 pain or discomfort prior to treatment. Pt reports 0/10 pain or discomfort post treatment. Activity Tolerance:   
Fair + Please refer to the flowsheet for vital signs taken during this treatment. After treatment:  
?  Patient left in no apparent distress sitting up in chair ? Patient left in no apparent distress in bed 
? Call bell left within reach ? Nursing notified ? Caregiver present ? Bed alarm activated COMMUNICATION/EDUCATION:  
? Home safety education was provided and the patient/caregiver indicated understanding. ? Patient/family have participated as able in goal setting and plan of care. ? Patient/family agree to work toward stated goals and plan of care. ? Patient understands intent and goals of therapy, but is neutral about his/her participation. ? Patient is unable to participate in goal setting and plan of care. Isaac Yeager Time Calculation: 32 mins

## 2019-03-27 NOTE — PROGRESS NOTES
Chart reviewed. Plan remains long term nursing home placement once guardianship in place & bed available. Will cont to follow. Fiona Bird RN,ext [phone].

## 2019-03-27 NOTE — PROGRESS NOTES
Nutrition follow-up/Plan of care RECOMMENDATIONS:  
1. Cardiac Diet 2. HS snack (Ensure -strawberry) 3. Monitor labs, weight and PO intake 4. RD to follow GOALS:  
1. Met/Ongoing: PO intake meets >75% of protein/calorie needs by 4/3 
2. Ongoing: Weight Maintenance (+/- 1- 2 lb) by 4/3 ASSESSMENT: 
Wt status is classified as overweight per BMI of 25.4. Adequate PO intake per vitals. Labs noted. BG range () over the past 24 hours; A1c (6.1). Nutrition recommendations listed. RD to follow. Nutrition Risk:  [] High  [] Moderate [x]  Low SUBJECTIVE/OBJECTIVE:  
(3/27): Pt has a good appetite and consumes (%) of meals per vitals. Pt seen in room later in the day. Reports his appetite has improved and he is doing well. Noted he would like to have an Ensure for a night time snack so will order. Awaiting guardianship and placement; will monitor. 
 
(3/22): Pt admitted for CVA. Noted per documented weight records variability in  weights recorded, but noted weight loss of ~19 lb or 10.6% x 3-4 months. Pt seen in room at lunch; observed 30% of meal consumed. Denies having any food allergies or problems chewing/swallowing and stated stable weight PTA;  lb. Reports having a good appetite normally but then that his meal intake was variable? ? Assume that his ETOH abuse affects his eating habits. Discussed some nutrition recommendations for low sat fat, low Na and CHO choices; handouts provided. Encouraged intake and will monitor. Information Obtained from:  
 [x] Chart Review [x] Patient 
 [] Family/Caregiver 
 [] Nurse/Physician 
 [] Interdisciplinary Meeting/Rounds Diet:Cardiac Diet Medications: [x] Reviewed IAllergies: [x] Reviewed Encounter Diagnoses ICD-10-CM ICD-9-CM 1. Left-sided weakness R53.1 728.87 Past Medical History:  
Diagnosis Date  Back pain  Hyperlipemia  Hypertension  MDD (major depressive disorder), recurrent, severe, with psychosis ([facility] [address] 11/11/2017  Psychiatric disorder   
 hallucinations  Psychotic disorder ([facility] 11/11/2017 Labs:   
Lab Results Component Value Date/Time Sodium 147 (H) 03/21/2019 01:15 PM  
 Potassium 4.0 03/21/2019 01:15 PM  
 Chloride 112 (H) 03/21/2019 01:15 PM  
 CO2 25 03/21/2019 01:15 PM  
 Anion gap 10 03/21/2019 01:15 PM  
 Glucose 87 03/21/2019 01:15 PM  
 Glucose 92 05/07/2018 04:41 AM  
 BUN 37 (H) 03/21/2019 01:15 PM  
 Creatinine 1.26 03/21/2019 01:15 PM  
 Calcium 8.7 03/21/2019 01:15 PM  
 Magnesium 2.5 03/21/2019 01:15 PM  
 Albumin 3.8 03/21/2019 01:15 PM  
 
Lab Results Component Value Date/Time Cholesterol, total 243 (H) 03/22/2019 04:40 AM  
 HDL Cholesterol 32 (L) 03/22/2019 04:40 AM  
 LDL, calculated 179.4 (H) 03/22/2019 04:40 AM  
 VLDL, calculated 31.6 03/22/2019 04:40 AM  
 Triglyceride 158 (H) 03/22/2019 04:40 AM  
 CHOL/HDL Ratio 7.6 (H) 03/22/2019 04:40 AM  
 
 
Anthropometrics: BMI (calculated): 25.4 Last 3 Recorded Weights in this Encounter 03/22/19 1206 03/24/19 1018 Weight: 72.6 kg (159 lb 15.7 oz) 72.1 kg (159 lb) Ht Readings from Last 1 Encounters:  
03/24/19 5' 6\" (1.676 m) Weight Metrics 3/24/2019 11/6/2018 8/9/2018 8/9/2018 7/10/2018 5/5/2018 11/18/2017 Weight 159 lb 168 lb 150 lb 150 lb 150 lb 157 lb 155 lb BMI 25.66 kg/m2 27.12 kg/m2 24.21 kg/m2 24.21 kg/m2 23.49 kg/m2 25.34 kg/m2 24.28 kg/m2 Some encounter information is confidential and restricted. Go to Review Flowsheets activity to see all data. Per Care Everywhere: 
 
 
Patient Vitals for the past 100 hrs: 
 % Diet Eaten  
03/26/19 0937 100 % 03/25/19 0838 100 % 03/24/19 1722 70 % 03/24/19 1204 80 % 03/24/19 0844 100 % 03/23/19 1727 100 % 03/23/19 0926 80 % UBW: 150-160 lb over the past year 
[x] Weight Loss PTA [] Weight Gain 
[] Weight Stable Estimated Nutrition Needs: [x] MSJ  [] Other: Calories: 8446-0509 kcal Based on:   [x] Actual BW   
Protein:   60-72 g Based on:   [x] Actual BW Fluid:       9371-3553 ml Based on:   [x] Actual BW  
 
 Nutrition Problems Identified:  
[] Suboptimal PO intake  
[] Food Allergies [] Difficulty chewing/swallowing/poor dentition 
[] Constipation/Diarrhea  
[] Nausea/Vomiting  
[x] None 
[] Other:  
 
Plan:  
[x] Therapeutic Diet 
[]  Obtained/adjusted food preferences/tolerances and/or snacks options [x]  Supplements added  
[] Occupational therapy following for feeding techniques [x]  HS snack added  
[]  Modify diet texture  
[]  Modify diet for food allergies [x]  Educate patient (completed) []  Assist with menu selection  
[x]  Monitor PO intake on meal rounds  
[x]  Continue inpatient monitoring and intervention  
[]  Participated in discharge planning/Interdisciplinary rounds/Team meetings  
[]  Other:  
 
Education Needs: 
 [] Not appropriate for teaching at this time due to: 
 [x] Identified and addressed Nutrition Monitoring and Evaluation: 
[x] Continue ongoing monitoring and intervention 
[] Mauricio Gautam

## 2019-03-28 LAB — GLUCOSE BLD STRIP.AUTO-MCNC: 76 MG/DL (ref 70–110)

## 2019-03-28 PROCEDURE — 65270000029 HC RM PRIVATE

## 2019-03-28 PROCEDURE — 82962 GLUCOSE BLOOD TEST: CPT

## 2019-03-28 PROCEDURE — 97116 GAIT TRAINING THERAPY: CPT

## 2019-03-28 PROCEDURE — 74011250637 HC RX REV CODE- 250/637: Performed by: HOSPITALIST

## 2019-03-28 PROCEDURE — 97535 SELF CARE MNGMENT TRAINING: CPT

## 2019-03-28 PROCEDURE — 74011250637 HC RX REV CODE- 250/637: Performed by: PSYCHIATRY & NEUROLOGY

## 2019-03-28 PROCEDURE — 97110 THERAPEUTIC EXERCISES: CPT

## 2019-03-28 RX ADMIN — FOLIC ACID 1 MG: 1 TABLET ORAL at 09:41

## 2019-03-28 RX ADMIN — THERA TABS 1 TABLET: TAB at 09:41

## 2019-03-28 RX ADMIN — Medication 10 ML: at 14:00

## 2019-03-28 RX ADMIN — LISINOPRIL 20 MG: 20 TABLET ORAL at 09:40

## 2019-03-28 RX ADMIN — Medication 10 ML: at 23:10

## 2019-03-28 RX ADMIN — Medication 100 MG: at 09:40

## 2019-03-28 RX ADMIN — ASPIRIN 325 MG ORAL TABLET 325 MG: 325 PILL ORAL at 09:40

## 2019-03-28 RX ADMIN — AMLODIPINE BESYLATE 5 MG: 5 TABLET ORAL at 09:41

## 2019-03-28 RX ADMIN — ATORVASTATIN CALCIUM 80 MG: 40 TABLET, FILM COATED ORAL at 22:45

## 2019-03-28 RX ADMIN — CLOPIDOGREL BISULFATE 75 MG: 75 TABLET, FILM COATED ORAL at 09:41

## 2019-03-28 RX ADMIN — Medication 10 ML: at 23:11

## 2019-03-28 NOTE — PROGRESS NOTES
Transition of Care/Discharge Planning In receipt of State letter indicating NO LEVEL 2 needed for patient d/t primary neurocognitive disorder. Submitted [facility] for processing in EPAS. Form ID [id number]. Emma Hidalgo RN Outcomes Manager 
[phone]

## 2019-03-28 NOTE — PROGRESS NOTES
Problem: Self Care Deficits Care Plan (Adult) Goal: *Acute Goals and Plan of Care (Insert Text) Description Occupational Therapy Goals Initiated 3/22/2019 within 7 day(s). 1.  Patient will perform grooming tasks while standing with minimal assistance for balance and additional time to incorporate LUE as gross assist. 
2.  Patient will perform upper body dressing with minimal assistance utilizing adaptive strategies, prn. 
3.  Patient will perform lower body dressing with moderate assistance and minimal verbal cues. 4.  Patient will perform toilet transfers with minimal assistance/contact guard assist. 
5.  Patient will perform all aspects of toileting with minimal assistance/contact guard assist. 
6.  Patient will participate in upper extremity therapeutic exercise/activities with minimal assistance/contact guard assist for 8 minutes to increase BUE strength/AROM for functional transfers and ADLs. 7.  Patient will utilize energy conservation techniques during functional activities with minimal verbal cues. Outcome: Progressing Towards Goal 
OCCUPATIONAL THERAPY TREATMENT Patient: Rodrick Frey (75 y.o. male) Date: 3/28/2019 Diagnosis: CVA (cerebral vascular accident) ([facility] [address] [I63.9] CVA (cerebral vascular accident) ([facility] [address] Precautions: Fall Chart, occupational therapy assessment, plan of care, and goals were reviewed. ASSESSMENT: 
Pt presented in the chair and agreeable to therapy. Pt able to recall UE TherEx from previous session. Pt performs UE TherEx seated in the chair. Pt showing improvement on ROM for the shoulder. Pt requires MIN A for sit to stand with Rw. Pt felt dizzy once standing, but dizziness subsided after standing rest break. Pt performs functional mobility to bathroom with RW. Pt performs toileting tasks with CGA/SBA. Pt performs functional mobility from the toilet to the sink with RW.  Pt performs grooming tasks standing at the sink with vc's for wider GAYLE. Pt performs functional mobility from the bathroom to the chair with RW. Pt requires CGA for stand to sit in the chair for seated rest break 2/2 fatigue. Pt educated on role of OT, energy conservation and importance of UE TherEx. Pt requires MAX encouragement to participate in therapy session. Pt perseverates on impairments throughout the session. Progression toward goals: 
?          Improving appropriately and progressing toward goals ? Improving slowly and progressing toward goals ? Not making progress toward goals and plan of care will be adjusted PLAN: 
Patient continues to benefit from skilled intervention to address the above impairments. Continue treatment per established plan of care. Discharge Recommendations:  Inpatient Rehab Further Equipment Recommendations for Discharge:  shower chair SUBJECTIVE:  
Patient stated ? thank you for all your motivation and compliments. ? OBJECTIVE DATA SUMMARY:  
Cognitive/Behavioral Status: 
Neurologic State: Alert Orientation Level: Oriented to place, Oriented to person Cognition: Follows commands Functional Mobility and Transfers for ADLs: 
Bed Mobility: 
    Scooting: Stand-by assistance Transfers: 
    Sit to Stand: Minimum assistance Stand to Sit: Contact guard assistance Toilet Transfer : Contact guard assistance Balance: 
Sitting: Intact; With support Sitting - Static: Good (unsupported) Sitting - Dynamic: Good (unsupported) Standing: With support; Impaired Standing - Static: Fair Standing - Dynamic : Fair ADL Intervention: 
Grooming Washing Face: Stand-by assistance Washing Hands: Stand-by assistance Brushing Teeth: Stand-by assistance Toileting Toileting Assistance: Contact guard assistance Bladder Hygiene: Supervision/set-up Clothing Management: Stand-by assistance Cues: Verbal cues provided Therapeutic Exercises: SROM for shoulder extension/flexion, elbow extension/flexion, wrist extension/flexion, ulnar/radial deviation AROM for shoulder extension/flexion, elbow extension/flexion, wrist extension/flexion Pain: 
Pt reports 0/10 pain or discomfort prior to treatment. Pt reports 0/10 pain or discomfort post treatment. Activity Tolerance:   
Fair Please refer to the flowsheet for vital signs taken during this treatment. After treatment:  
?  Patient left in no apparent distress sitting up in chair ? Patient left in no apparent distress in bed 
? Call bell left within reach ? Nursing notified ? Visitor present ? Bed alarm activated COMMUNICATION/EDUCATION:  
? Home safety education was provided and the patient/caregiver indicated understanding. ? Patient/family have participated as able in goal setting and plan of care. ? Patient/family agree to work toward stated goals and plan of care. ? Patient understands intent and goals of therapy, but is neutral about his/her participation. ? Patient is unable to participate in goal setting and plan of care. Candi Levi Time Calculation: 30 mins

## 2019-03-28 NOTE — PROGRESS NOTES
Progress Note Patient: Colt Mcmahon               Sex: male          DOA: 3/21/2019 YOB: 1965      Age:  46 y.o.        LOS:  LOS: 7 days CHIEF COMPLAINT:  CVA Subjective:  
 
Awake alert watching TV Denies complaints Objective:  
  
Visit Vitals BP (!) 159/101 (BP 1 Location: Left arm, BP Patient Position: Sitting) Pulse 69 Temp 97.7 °F (36.5 °C) Resp 17 Ht 5' 6\" (1.676 m) Wt 72.1 kg (159 lb) SpO2 99% BMI 25.66 kg/m² Physical Exam: 
Gen:  No distress, no complaint Lungs:  Clear bilaterally, no wheeze or rhonchi Heart:  Regular rate and rhythm, no murmurs or gallops Abdomen:  Soft, non-tender, normal bowel sounds Lab/Data Reviewed: 
 
Labs reviewed Assessment/Plan Principal Problem: 
  CVA (cerebral vascular accident) ([facility] [address] (3/21/2019) Active Problems: Hypertension () Alcoholism ([facility] [address] (3/22/2019) Acute lacunar infarction (3/25/2019) Plan: 
BP control Statin, ASA 
PT Mobilization Working toward disposition Psych re-eval: states he lacks capacity.  Guardianship process underway, discussed with RUTHY

## 2019-03-28 NOTE — PROGRESS NOTES
Problem: Mobility Impaired (Adult and Pediatric) Goal: *Acute Goals and Plan of Care (Insert Text) Description Physical Therapy Goals Initiated 3/22/2019 and to be accomplished within 7 days. 1.  Patient will complete all bed mobility with moderate assistance  in order to prepare for EOB/OOB activity. 2.  Patient will perform sit <> stand with minimal assistance/contact guard assist in order to prepare for OOB/gait activity. 3.  Patient will perform bed to chair transfers with minimal assistance/contact guard assist in order to promote mobility and encourage seated activity to progress towards their prior level of function. 4.  Patient will ambulate 25 feet with moderate assistance  using LRAD in order to prepare for safe negotiation of their environment. Outcome: Progressing Towards Goal 
 
 
PHYSICAL THERAPY: DAILY TREATMENT NOTE  
INPATIENT: Medicaid: Hospital Day: 8 Patient: Nicanor Neely (75 y.o. male)    Date: 3/28/2019 Primary Diagnosis: CVA (cerebral vascular accident) ([facility] [address] [I63.9] Precautions: Fall Chart, physical therapy assessment, plan of care and goals were reviewed. PLOF: Independent ASSESSMENT: 
Agreeable to OOB with max encouragement. Supervision for supine to sit. Seated EOB with good balance. Donned hospital gown with good balance for reaching. Min A for sit to stand. Amb 10ft with ww and supervision with max encouragement. Completed turn with min A for left leg weakness. Min A for amb 10ft with ww to chair. Patient becoming very anxious with max encouragement to attend to task and safety. Cues for widened base of support. Returned to seated in recliner with min A. BLE elevated and call bell in reach. Educated on call bell. Oriented to person, place, and year. RN Benson aware. Progression toward goals: 
    ?  Improving appropriately and progressing toward goals ? Improving slowly and progressing toward goals ? Not making progress toward goals and plan of care will be adjusted PLAN: 
Patient continues to benefit from skilled intervention to address the above impairments. Continue treatment per established plan of care. EDUCATION:  
Education:  Patient was educated on the following topics: Bed mobility, transfers, ADLs, balance, amb, safety, exercise, role of PT, plan of care, cognition, skin integrity, vitals Barriers to Learning/Limitations: yes;  cognitive Compensate with: visual, verbal, tactile, kinesthetic cues/model Discharge Recommendations:  [facility] Further Equipment Recommendations for Discharge:  rolling walker SUBJECTIVE:  
Patient stated ? It's weak. ? OBJECTIVE DATA SUMMARY:  
Critical Behavior: 
Neurologic State: Alert Orientation Level: Oriented to place, Oriented to person Cognition: Follows commands Functional Mobility: 
 
 
Functional Status Indep (I) Mod I Super-vision Min A Mod A Max A Total A Assist x2 Verbal cues Additional time Not tested Comments Rolling ?  ?  ? ?    ?    ?  ?  ? ? ? ? Supine to sit ?  ?  ? ?  ?  ?  ?  ? ? ? ? Sit to supine ? ?  ? ?  ?  ?  ?  ? ? ? ? Sit to stand ?  ?  ? ?  ?  ?  ?  ? ? ? ? Stand to sit ?  ?  ? ?  ?  ?  ?  ? ? ? ? Bed to chair transfers ? ?  ? ?  ?  ?  ?  ? ? ? ? Balance Good Berdine Becker Poor Unable Not tested Comments Sitting static ?  ?  ?  ?  ? Sitting dynamic ?  ?  ?  ?  ? Standing static ?  ?  ?  ?  ? Standing dynamic ?  ?  ?  ?  ? Mobility/Gait:  
Level of Assistance: Minimum assistance Assistive Device: rolling walker Distance Ambulated: 20 feet Pain: 
Pre treatment pain level: 0 Post treatment pain level: 0 Pain Scale 1: Numeric (0 - 10) Pain Intensity 1: 0 Activity Tolerance:  
Fair After treatment:  
?  Patient left in no apparent distress sitting up in chair ? Patient left in no apparent distress in bed ?  Call bell left within reach ? Nursing notified ? Caregiver present ? Bed alarm activated Rosalie Vazquez, PT Time Calculation: 14 mins

## 2019-03-28 NOTE — PROGRESS NOTES
completed follow up visit with patient in room 2117 today and a Spiritual assessment of patient was done. Patient reports that he is doing better , wishes that things would move faster but he is holding on and maintaining the slow pace to the discharge. Rodell Sandifer will continue to follow and will provide pastoral care on an as needed/requested basis Christina 3 Board Certified [employer] Spiritual Care  
[phone]

## 2019-03-28 NOTE — PROGRESS NOTES
0945: PT tx session attempted, patient refusing to participate at this time despite encouragement. Reports that he is too fatigued after using the restroom recently. Will f/u with patient. Amanda Tavera PT, DPT Office extension: [phone] Pager #: [phone]

## 2019-03-28 NOTE — PROGRESS NOTES
Bedside and Verbal shift change report given to Eldora Peabody, RN (oncoming nurse) by Zenobia Fitzpatrick RN (offgoing nurse). Report included the following information SBAR, Kardex, Intake/Output and Alarm Parameters . Pt resting in bed, alert and oriented, vitals WNL, no distress noted, call bell in reach, bed locked and low, alarm verified, will continue to monitor. No acute changes through shift. Main concern was getting pt to [facility] and emptying urinal. Waiting for guardianship hearing.

## 2019-03-29 PROCEDURE — 74011250637 HC RX REV CODE- 250/637: Performed by: PSYCHIATRY & NEUROLOGY

## 2019-03-29 PROCEDURE — 74011250637 HC RX REV CODE- 250/637: Performed by: HOSPITALIST

## 2019-03-29 PROCEDURE — 65270000029 HC RM PRIVATE

## 2019-03-29 RX ORDER — AMLODIPINE BESYLATE 5 MG/1
5 TABLET ORAL ONCE
Status: COMPLETED | OUTPATIENT
Start: 2019-03-29 | End: 2019-03-29

## 2019-03-29 RX ORDER — AMLODIPINE BESYLATE 5 MG/1
10 TABLET ORAL DAILY
Status: DISCONTINUED | OUTPATIENT
Start: 2019-03-30 | End: 2019-04-02 | Stop reason: HOSPADM

## 2019-03-29 RX ADMIN — THERA TABS 1 TABLET: TAB at 10:14

## 2019-03-29 RX ADMIN — FOLIC ACID 1 MG: 1 TABLET ORAL at 10:14

## 2019-03-29 RX ADMIN — AMLODIPINE BESYLATE 5 MG: 5 TABLET ORAL at 10:14

## 2019-03-29 RX ADMIN — Medication 100 MG: at 10:14

## 2019-03-29 RX ADMIN — ASPIRIN 325 MG ORAL TABLET 325 MG: 325 PILL ORAL at 10:14

## 2019-03-29 RX ADMIN — ATORVASTATIN CALCIUM 80 MG: 40 TABLET, FILM COATED ORAL at 21:13

## 2019-03-29 RX ADMIN — LISINOPRIL 20 MG: 20 TABLET ORAL at 10:14

## 2019-03-29 RX ADMIN — CLOPIDOGREL BISULFATE 75 MG: 75 TABLET, FILM COATED ORAL at 10:14

## 2019-03-29 RX ADMIN — AMLODIPINE BESYLATE 5 MG: 5 TABLET ORAL at 19:48

## 2019-03-29 RX ADMIN — Medication 10 ML: at 14:05

## 2019-03-29 RX ADMIN — Medication 10 ML: at 14:06

## 2019-03-29 NOTE — PROGRESS NOTES
Problem: Falls - Risk of 
Goal: *Absence of Falls Description Document Nina Kitchen Fall Risk and appropriate interventions in the flowsheet. Outcome: Progressing Towards Goal 
  
Problem: Patient Education: Go to Patient Education Activity Goal: Patient/Family Education Outcome: Progressing Towards Goal 
  
Problem: Pressure Injury - Risk of 
Goal: *Prevention of pressure injury Description Document Matt Scale and appropriate interventions in the flowsheet. Outcome: Progressing Towards Goal 
  
Problem: Patient Education: Go to Patient Education Activity Goal: Patient/Family Education Outcome: Progressing Towards Goal 
  
Problem: Patient Education: Go to Patient Education Activity Goal: Patient/Family Education Outcome: Progressing Towards Goal 
  
Problem: TIA/CVA Stroke: 0-24 hours Goal: *Neurologically stable Description Absence of additional neurological deficits Outcome: Progressing Towards Goal 
  
Problem: Ischemic Stroke: Discharge Outcomes Goal: *Verbalizes anxiety and depression are reduced or absent Outcome: Progressing Towards Goal 
Goal: *Verbalize understanding of risk factor modification(Stroke Metric) Outcome: Progressing Towards Goal 
Goal: *Hemodynamically stable Outcome: Progressing Towards Goal 
Goal: *Absence of aspiration pneumonia Outcome: Progressing Towards Goal 
Goal: *Aware of needed dietary changes Outcome: Progressing Towards Goal 
Goal: *Verbalize understanding of prescribed medications including anti-coagulants, anti-lipid, and/or anti-platelets(Stroke Metric) Outcome: Progressing Towards Goal 
Goal: *Tolerating diet Outcome: Progressing Towards Goal 
Goal: *Aware of follow-up diagnostics related to anticoagulants Outcome: Progressing Towards Goal 
Goal: *Ability to perform ADLs and demonstrates progressive mobility and function Outcome: Progressing Towards Goal 
Goal: *Absence of DVT(Stroke Metric) Outcome: Progressing Towards Goal 
 Goal: *Absence of aspiration Outcome: Progressing Towards Goal 
Goal: *Optimal pain control at patient's stated goal 
Outcome: Progressing Towards Goal 
Goal: *Home safety concerns addressed Outcome: Progressing Towards Goal 
Goal: *Describes available resources and support systems Outcome: Progressing Towards Goal 
Goal: *Verbalizes understanding of activation of EMS(911) for stroke symptoms(Stroke Metric) Outcome: Progressing Towards Goal 
Goal: *Understands and describes signs and symptoms to report to providers(Stroke Metric) Outcome: Progressing Towards Goal 
Goal: *Neurolgocially stable (absence of additional neurological deficits) Outcome: Progressing Towards Goal 
Goal: *Verbalizes importance of follow-up with primary care physician(Stroke Metric) Outcome: Progressing Towards Goal 
Goal: *Smoking cessation discussed,if applicable(Stroke Metric) Outcome: Progressing Towards Goal 
Goal: *Depression screening completed(Stroke Metric) Outcome: Progressing Towards Goal 
  
Problem: Discharge Planning Goal: *Discharge to safe environment Outcome: Progressing Towards Goal 
  
Problem: Patient Education: Go to Patient Education Activity Goal: Patient/Family Education Outcome: Progressing Towards Goal 
  
Problem: Patient Education: Go to Patient Education Activity Goal: Patient/Family Education Outcome: Progressing Towards Goal 
  
Problem: TIA/CVA Stroke: Day 2 Until Discharge Goal: Off Pathway (Use only if patient is Off Pathway) Outcome: Progressing Towards Goal 
Goal: Activity/Safety Outcome: Progressing Towards Goal 
Goal: Diagnostic Test/Procedures Outcome: Progressing Towards Goal 
Goal: Nutrition/Diet Outcome: Progressing Towards Goal 
Goal: Discharge Planning Outcome: Progressing Towards Goal 
Goal: Medications Outcome: Progressing Towards Goal 
Goal: Respiratory Outcome: Progressing Towards Goal 
Goal: Treatments/Interventions/Procedures Outcome: Progressing Towards Goal 
 Goal: Psychosocial 
Outcome: Progressing Towards Goal 
Goal: *Verbalizes anxiety and depression are reduced or absent Outcome: Progressing Towards Goal 
Goal: *Absence of aspiration Outcome: Progressing Towards Goal 
Goal: *Absence of deep venous thrombosis signs and symptoms(Stroke Metric) Outcome: Progressing Towards Goal 
Goal: *Optimal pain control at patient's stated goal 
Outcome: Progressing Towards Goal 
Goal: *Tolerating diet Outcome: Progressing Towards Goal 
Goal: *Ability to perform ADLs and demonstrates progressive mobility and function Outcome: Progressing Towards Goal 
Goal: *Stroke education continued(Stroke Metric) Outcome: Progressing Towards Goal 
  
Problem: Pain Goal: *Control of Pain Outcome: Progressing Towards Goal 
Goal: *PALLIATIVE CARE:  Alleviation of Pain Outcome: Progressing Towards Goal 
  
Problem: Patient Education: Go to Patient Education Activity Goal: Patient/Family Education Outcome: Progressing Towards Goal

## 2019-03-29 NOTE — PROGRESS NOTES
Problem: Falls - Risk of 
Goal: *Absence of Falls Description Document Elbert Carter Fall Risk and appropriate interventions in the flowsheet. Outcome: Progressing Towards Goal 
  
Problem: Patient Education: Go to Patient Education Activity Goal: Patient/Family Education Outcome: Progressing Towards Goal 
  
Problem: Pressure Injury - Risk of 
Goal: *Prevention of pressure injury Description Document Matt Scale and appropriate interventions in the flowsheet. Outcome: Progressing Towards Goal 
  
Problem: Patient Education: Go to Patient Education Activity Goal: Patient/Family Education Outcome: Progressing Towards Goal 
  
Problem: Patient Education: Go to Patient Education Activity Goal: Patient/Family Education Outcome: Progressing Towards Goal 
  
Problem: Ischemic Stroke: Discharge Outcomes Goal: *Verbalizes anxiety and depression are reduced or absent Outcome: Progressing Towards Goal 
Goal: *Verbalize understanding of risk factor modification(Stroke Metric) Outcome: Progressing Towards Goal 
Goal: *Hemodynamically stable Outcome: Progressing Towards Goal 
Goal: *Absence of aspiration pneumonia Outcome: Progressing Towards Goal 
Goal: *Aware of needed dietary changes Outcome: Progressing Towards Goal 
Goal: *Verbalize understanding of prescribed medications including anti-coagulants, anti-lipid, and/or anti-platelets(Stroke Metric) Outcome: Progressing Towards Goal 
Goal: *Tolerating diet Outcome: Progressing Towards Goal 
Goal: *Aware of follow-up diagnostics related to anticoagulants Outcome: Progressing Towards Goal 
Goal: *Ability to perform ADLs and demonstrates progressive mobility and function Outcome: Progressing Towards Goal 
Goal: *Absence of DVT(Stroke Metric) Outcome: Progressing Towards Goal 
Goal: *Absence of aspiration Outcome: Progressing Towards Goal 
Goal: *Optimal pain control at patient's stated goal 
Outcome: Progressing Towards Goal 
 Goal: *Home safety concerns addressed Outcome: Progressing Towards Goal 
Goal: *Describes available resources and support systems Outcome: Progressing Towards Goal 
Goal: *Verbalizes understanding of activation of EMS(911) for stroke symptoms(Stroke Metric) Outcome: Progressing Towards Goal 
Goal: *Understands and describes signs and symptoms to report to providers(Stroke Metric) Outcome: Progressing Towards Goal 
Goal: *Neurolgocially stable (absence of additional neurological deficits) Outcome: Progressing Towards Goal 
Goal: *Verbalizes importance of follow-up with primary care physician(Stroke Metric) Outcome: Progressing Towards Goal 
Goal: *Smoking cessation discussed,if applicable(Stroke Metric) Outcome: Progressing Towards Goal 
Goal: *Depression screening completed(Stroke Metric) Outcome: Progressing Towards Goal 
  
Problem: Discharge Planning Goal: *Discharge to safe environment Outcome: Progressing Towards Goal 
  
Problem: Patient Education: Go to Patient Education Activity Goal: Patient/Family Education Outcome: Progressing Towards Goal 
  
Problem: TIA/CVA Stroke: Day 2 Until Discharge Goal: Off Pathway (Use only if patient is Off Pathway) Outcome: Progressing Towards Goal 
Goal: Activity/Safety Outcome: Progressing Towards Goal 
Goal: Diagnostic Test/Procedures Outcome: Progressing Towards Goal 
Goal: Nutrition/Diet Outcome: Progressing Towards Goal 
Goal: Discharge Planning Outcome: Progressing Towards Goal 
Goal: Medications Outcome: Progressing Towards Goal 
Goal: Respiratory Outcome: Progressing Towards Goal 
Goal: Treatments/Interventions/Procedures Outcome: Progressing Towards Goal 
Goal: Psychosocial 
Outcome: Progressing Towards Goal 
Goal: *Verbalizes anxiety and depression are reduced or absent Outcome: Progressing Towards Goal 
Goal: *Absence of aspiration Outcome: Progressing Towards Goal 
Goal: *Absence of deep venous thrombosis signs and symptoms(Stroke Metric) Outcome: Progressing Towards Goal 
Goal: *Optimal pain control at patient's stated goal 
Outcome: Progressing Towards Goal 
Goal: *Tolerating diet Outcome: Progressing Towards Goal 
Goal: *Ability to perform ADLs and demonstrates progressive mobility and function Outcome: Progressing Towards Goal 
Goal: *Stroke education continued(Stroke Metric) Outcome: Progressing Towards Goal 
  
Problem: Pain Goal: *Control of Pain Outcome: Progressing Towards Goal 
  
Problem: Patient Education: Go to Patient Education Activity Goal: Patient/Family Education Outcome: Progressing Towards Goal

## 2019-03-29 NOTE — PROGRESS NOTES
2000- Report given by Dexter Jung RN. Pt in bed resting no concerns voiced. Pt denies pain . Assessment completed plan of care for the shift explained pt needs reinforcement. HOB elevated, bed low and in locked position call light within reach. Bed alarm on for safety pt instructed to call for any assistance. 0600- Uneventful night 0815-Bedside and Verbal shift change report given to RICH Pugh (oncoming nurse) by Mago Amador RN (offgoing nurse). Report included the following information SBAR, Kardex, Intake/Output, MAR and Recent Results.

## 2019-03-29 NOTE — ROUTINE PROCESS
Assume care of patient from Jose Jeffries, RICH. Patient received in bed awake, denies pain and discomfort. No distress noted. Frequently use items within reach. Bed locked in low position. Call bell within reach and patient verbalized understanding of use for assistance and needs. Was informed by off going nurse that [facility] is D/c for patient. 1833- Patient's BP: 171/110. Noted per neurology note that BP goal is less than 140/90. Called MD for orders regarding elevated BP. Stated to give one time dose of Norvasc 5 mg and to modify Norvasc from 5 to 10 mg daily. RBV. 1940- Bedside and Verbal shift change report given to Jose Luis Sequeira RN (oncoming nurse) by Matt Vega RN (offgoing nurse). Report included the following information SBAR, Kardex, Intake/Output, MAR and Recent Results. [address] obtained. BP: 147/94. One time dose of Norvasc given. Off-going nurse at bedside, will obtain another BP and continue to monitor.

## 2019-03-29 NOTE — PROGRESS NOTES
Transition of Care/Discharge Planning [facility] successfully processed. Uploaded in [facility] along with [facility] Letter that indicates Level 2 not required, copy to chart and copy made for file. RUTHY patel. Yossi Arndt RN Outcomes Manager 
[phone]

## 2019-03-29 NOTE — PROGRESS NOTES
Chart reviewed. Plan remains long term nursing home placement once guardianship in place & bed available. Will cont to follow. Fiona Bird RN,ext [phone].

## 2019-03-29 NOTE — PROGRESS NOTES
Progress Note Patient: Isidoro Alexander               Sex: male          DOA: 3/21/2019 YOB: 1965      Age:  46 y.o.        LOS:  LOS: 8 days CHIEF COMPLAINT:  CVA Subjective:  
 
Awake alert watching TV He said he is working with PT Denies complaints Objective:  
  
Visit Vitals BP (!) 141/95 (BP 1 Location: Left arm, BP Patient Position: At rest) Pulse (!) 59 Temp 97.9 °F (36.6 °C) Resp 18 Ht 5' 6\" (1.676 m) Wt 72.1 kg (159 lb) SpO2 96% BMI 25.66 kg/m² Physical Exam: 
Gen:  No distress, no complaint Lungs:  Clear bilaterally, no wheeze or rhonchi Heart:  Regular rate and rhythm, no murmurs or gallops Abdomen:  Soft, non-tender, normal bowel sounds Lab/Data Reviewed: 
 
Labs reviewed Assessment/Plan Principal Problem: 
  CVA (cerebral vascular accident) ([facility] [address] (3/21/2019) Active Problems: Hypertension () Alcoholism ([facility] [address] (3/22/2019) Acute lacunar infarction (3/25/2019) Plan: 
BP control Statin, ASA 
PT Mobilization Working toward disposition Psych re-eval: states he lacks capacity.  Guardianship process underway, discussed with RUTHY  
 
 
 negative...

## 2019-03-30 LAB — GLUCOSE BLD STRIP.AUTO-MCNC: 119 MG/DL (ref 70–110)

## 2019-03-30 PROCEDURE — 74011250637 HC RX REV CODE- 250/637: Performed by: FAMILY MEDICINE

## 2019-03-30 PROCEDURE — 74011250637 HC RX REV CODE- 250/637: Performed by: HOSPITALIST

## 2019-03-30 PROCEDURE — 74011250637 HC RX REV CODE- 250/637: Performed by: PSYCHIATRY & NEUROLOGY

## 2019-03-30 PROCEDURE — 97530 THERAPEUTIC ACTIVITIES: CPT

## 2019-03-30 PROCEDURE — 65270000029 HC RM PRIVATE

## 2019-03-30 PROCEDURE — 82962 GLUCOSE BLOOD TEST: CPT

## 2019-03-30 RX ORDER — ACETAMINOPHEN 325 MG/1
650 TABLET ORAL
Status: DISCONTINUED | OUTPATIENT
Start: 2019-03-30 | End: 2019-04-02 | Stop reason: HOSPADM

## 2019-03-30 RX ADMIN — ACETAMINOPHEN 650 MG: 325 TABLET, FILM COATED ORAL at 20:53

## 2019-03-30 RX ADMIN — ATORVASTATIN CALCIUM 80 MG: 40 TABLET, FILM COATED ORAL at 22:43

## 2019-03-30 RX ADMIN — Medication 10 ML: at 22:43

## 2019-03-30 RX ADMIN — FOLIC ACID 1 MG: 1 TABLET ORAL at 10:24

## 2019-03-30 RX ADMIN — CLOPIDOGREL BISULFATE 75 MG: 75 TABLET, FILM COATED ORAL at 10:24

## 2019-03-30 RX ADMIN — Medication 100 MG: at 09:00

## 2019-03-30 RX ADMIN — LISINOPRIL 20 MG: 20 TABLET ORAL at 10:24

## 2019-03-30 RX ADMIN — ASPIRIN 325 MG ORAL TABLET 325 MG: 325 PILL ORAL at 10:24

## 2019-03-30 RX ADMIN — AMLODIPINE BESYLATE 10 MG: 5 TABLET ORAL at 10:24

## 2019-03-30 RX ADMIN — THERA TABS 1 TABLET: TAB at 10:24

## 2019-03-30 NOTE — PROGRESS NOTES
Progress Note Patient: Joesph Casper               Sex: male          DOA: 3/21/2019 YOB: 1965      Age:  46 y.o.        LOS:  LOS: 9 days CHIEF COMPLAINT:  CVA Subjective:  
 
Awake alert watching TV Pt  Denies complaints Objective:  
  
Visit Vitals /87 Pulse 70 Temp 97.8 °F (36.6 °C) Resp 16 Ht 5' 6\" (1.676 m) Wt 72.1 kg (159 lb) SpO2 95% BMI 25.66 kg/m² Physical Exam: 
Gen:  No distress, no complaint Lungs:  Clear bilaterally, no wheeze or rhonchi Heart:  Regular rate and rhythm, no murmurs or gallops Abdomen:  Soft, non-tender, normal bowel sounds Lab/Data Reviewed: 
 
Labs reviewed Assessment/Plan Principal Problem: 
  CVA (cerebral vascular accident) ([facility] [address] (3/21/2019) Active Problems: Hypertension () Alcoholism ([facility] [address] (3/22/2019) Acute lacunar infarction (3/25/2019) Plan: 
BP control Statin, ASA 
PT Mobilization Working toward disposition Psych re-eval: states he lacks capacity.  Guardianship process underway, discussed with RUTHY

## 2019-03-30 NOTE — PROGRESS NOTES
Problem: Mobility Impaired (Adult and Pediatric) Goal: *Acute Goals and Plan of Care (Insert Text) Description Physical Therapy Goals Initiated 3/22/2019 and to be accomplished within 7 days. 1.  Patient will complete all bed mobility with moderate assistance  in order to prepare for EOB/OOB activity. 2.  Patient will perform sit <> stand with minimal assistance/contact guard assist in order to prepare for OOB/gait activity. 3.  Patient will perform bed to chair transfers with minimal assistance/contact guard assist in order to promote mobility and encourage seated activity to progress towards their prior level of function. 4.  Patient will ambulate 25 feet with moderate assistance  using LRAD in order to prepare for safe negotiation of their environment. Outcome: Progressing Towards Goal 
 
 
PHYSICAL THERAPY: DAILY TREATMENT NOTE  
INPATIENT: Medicaid: Hospital Day: 10 Patient: Cassie Escobedo (88 y.o. male)    Date: 3/30/2019 Primary Diagnosis: CVA (cerebral vascular accident) ([facility] [address] [I63.9]  
 ,  ,  
Precautions: Fall Chart, physical therapy assessment, plan of care and goals were reviewed. PLOF:unknown patient poor historian. ASSESSMENT: 
Pt supine in bed. Pt transferred to bedside commode with improved use of B LE with equal WB after VC. Pt performed stand pivot transfer to commode with min (A). Pt used RW for transfer but required VC for safe STS. Pt motivated to transfer but too fatigued to perform gait training today. Pt reports \"all the energy is out of my body after that\". Pt supervision for bed mobility. Pt left supine in bed with call bell in reach. No c/o pain. Progression toward goals: 
    ?  Improving appropriately and progressing toward goals ? Improving slowly and progressing toward goals ? Not making progress toward goals and plan of care will be adjusted PLAN: 
Patient continues to benefit from skilled intervention to address the above impairments. Continue treatment per established plan of care. EDUCATION:  
Education:  Patient was educated on the following topics: fall prevention, use of B LE, safety awareness, use of RW. Barriers to Learning/Limitations: None Compensate with: visual, verbal, tactile, kinesthetic cues/model Discharge Recommendations:  SNF Further Equipment Recommendations for Discharge:  rolling walker Factors which may impact discharge planning: progress with PT.   
 
SUBJECTIVE:  
Patient stated ? Im in no pain, my energy is low.? OBJECTIVE DATA SUMMARY:  
Critical Behavior: 
Neurologic State: Alert, Confused Orientation Level: Disoriented to time Cognition: Follows commands Functional Mobility: 
 
 
Functional Status Indep (I) Mod I Super-vision Min A Mod A Max A Total A Assist x2 Verbal cues Additional time Not tested Comments Rolling ?  ?  ? ?    ?    ?  ?  ? ? ? ? Supine to sit ?  ?  ? ?  ?  ?  ?  ? ? ? ? Sit to supine ? ?  ? ?  ?  ?  ?  ? ? ? ? Sit to stand ?  ?  ? ?  ?  ?  ?  ? ? ? ? Stand to sit ?  ?  ? ?  ?  ?  ?  ? ? ? ? Bed to chair transfers ? ?  ? ?  ?  ?  ?  ? ? ? ? Balance Good [address] Poor Unable Not tested Comments Sitting static ?  ?  ?  ?  ? Sitting dynamic ?  ?  ?  ?  ? Standing static ?  ?  ?  ?  ? Standing dynamic ?  ?  ?  ?  ? Vital Signs Temp: 97.8 °F (36.6 °C) Pulse (Heart Rate): 70 BP: 137/87 Resp Rate: 16    
O2 Sat (%): 95 % Pain: 
Pre treatment pain level:0/10 Post treatment pain level:0/10 Pain Scale 1: Numeric (0 - 10) Pain Intensity 1: 0 Activity Tolerance:  
good After treatment:  
?  Patient left in no apparent distress sitting up in chair ? Patient left in no apparent distress in bed 
? Call bell left within reach ? Nursing notified ? Caregiver present ? Bed alarm activated Bozena Caraballo PT Time Calculation: 25 mins

## 2019-03-30 NOTE — PROGRESS NOTES
Problem: Falls - Risk of 
Goal: *Absence of Falls Description Document Maverick Patel Fall Risk and appropriate interventions in the flowsheet. Outcome: Progressing Towards Goal 
  
Problem: Patient Education: Go to Patient Education Activity Goal: Patient/Family Education Outcome: Progressing Towards Goal 
  
Problem: Pressure Injury - Risk of 
Goal: *Prevention of pressure injury Description Document Matt Scale and appropriate interventions in the flowsheet. Outcome: Progressing Towards Goal 
  
Problem: Patient Education: Go to Patient Education Activity Goal: Patient/Family Education Outcome: Progressing Towards Goal 
  
Problem: Patient Education: Go to Patient Education Activity Goal: Patient/Family Education Outcome: Progressing Towards Goal 
  
Problem: Ischemic Stroke: Discharge Outcomes Goal: *Verbalizes anxiety and depression are reduced or absent Outcome: Progressing Towards Goal 
Goal: *Verbalize understanding of risk factor modification(Stroke Metric) Outcome: Progressing Towards Goal 
Goal: *Hemodynamically stable Outcome: Progressing Towards Goal 
Goal: *Absence of aspiration pneumonia Outcome: Progressing Towards Goal 
Goal: *Aware of needed dietary changes Outcome: Progressing Towards Goal 
Goal: *Verbalize understanding of prescribed medications including anti-coagulants, anti-lipid, and/or anti-platelets(Stroke Metric) Outcome: Progressing Towards Goal 
Goal: *Tolerating diet Outcome: Progressing Towards Goal 
Goal: *Aware of follow-up diagnostics related to anticoagulants Outcome: Progressing Towards Goal 
Goal: *Ability to perform ADLs and demonstrates progressive mobility and function Outcome: Progressing Towards Goal 
Goal: *Absence of DVT(Stroke Metric) Outcome: Progressing Towards Goal 
Goal: *Absence of aspiration Outcome: Progressing Towards Goal 
Goal: *Optimal pain control at patient's stated goal 
Outcome: Progressing Towards Goal 
 Goal: *Home safety concerns addressed Outcome: Progressing Towards Goal 
Goal: *Describes available resources and support systems Outcome: Progressing Towards Goal 
Goal: *Verbalizes understanding of activation of EMS(911) for stroke symptoms(Stroke Metric) Outcome: Progressing Towards Goal 
Goal: *Understands and describes signs and symptoms to report to providers(Stroke Metric) Outcome: Progressing Towards Goal 
Goal: *Neurolgocially stable (absence of additional neurological deficits) Outcome: Progressing Towards Goal 
Goal: *Verbalizes importance of follow-up with primary care physician(Stroke Metric) Outcome: Progressing Towards Goal 
Goal: *Smoking cessation discussed,if applicable(Stroke Metric) Outcome: Progressing Towards Goal 
Goal: *Depression screening completed(Stroke Metric) Outcome: Progressing Towards Goal 
  
Problem: Discharge Planning Goal: *Discharge to safe environment Outcome: Progressing Towards Goal 
  
Problem: Patient Education: Go to Patient Education Activity Goal: Patient/Family Education Outcome: Progressing Towards Goal 
  
Problem: Patient Education: Go to Patient Education Activity Goal: Patient/Family Education Outcome: Progressing Towards Goal 
  
Problem: TIA/CVA Stroke: Day 2 Until Discharge Goal: Off Pathway (Use only if patient is Off Pathway) Outcome: Progressing Towards Goal 
Goal: Activity/Safety Outcome: Progressing Towards Goal 
Goal: Diagnostic Test/Procedures Outcome: Progressing Towards Goal 
Goal: Nutrition/Diet Outcome: Progressing Towards Goal 
Goal: Discharge Planning Outcome: Progressing Towards Goal 
Goal: Medications Outcome: Progressing Towards Goal 
Goal: Respiratory Outcome: Progressing Towards Goal 
Goal: Treatments/Interventions/Procedures Outcome: Progressing Towards Goal 
Goal: Psychosocial 
Outcome: Progressing Towards Goal 
Goal: *Verbalizes anxiety and depression are reduced or absent Outcome: Progressing Towards Goal 
 Goal: *Absence of aspiration Outcome: Progressing Towards Goal 
Goal: *Absence of deep venous thrombosis signs and symptoms(Stroke Metric) Outcome: Progressing Towards Goal 
Goal: *Optimal pain control at patient's stated goal 
Outcome: Progressing Towards Goal 
Goal: *Tolerating diet Outcome: Progressing Towards Goal 
Goal: *Ability to perform ADLs and demonstrates progressive mobility and function Outcome: Progressing Towards Goal 
Goal: *Stroke education continued(Stroke Metric) Outcome: Progressing Towards Goal 
  
Problem: Pain Goal: *Control of Pain Outcome: Progressing Towards Goal 
Goal: *PALLIATIVE CARE:  Alleviation of Pain Outcome: Progressing Towards Goal 
  
Problem: Patient Education: Go to Patient Education Activity Goal: Patient/Family Education Outcome: Progressing Towards Goal

## 2019-03-30 NOTE — PROGRESS NOTES
1958: Assumed patient care. Received report from [address], [facility] (offgoing nurse). Report included SBAR, Kardex, and MAR. Patient resting in bed, in no signs of pain or distress. Call light and possessions within reach. Bed in lowest setting. Uneventful shift; no complaints of pain 0805: Bedside and Verbal shift change report given to Yolanda Paulson RN (oncoming nurse) by RICH Kenny (offgoing nurse). Report included the following information SBAR, Kardex and MAR.

## 2019-03-31 PROCEDURE — 97164 PT RE-EVAL EST PLAN CARE: CPT

## 2019-03-31 PROCEDURE — 74011250637 HC RX REV CODE- 250/637: Performed by: HOSPITALIST

## 2019-03-31 PROCEDURE — 65270000029 HC RM PRIVATE

## 2019-03-31 PROCEDURE — 74011250637 HC RX REV CODE- 250/637: Performed by: PSYCHIATRY & NEUROLOGY

## 2019-03-31 RX ADMIN — AMLODIPINE BESYLATE 10 MG: 5 TABLET ORAL at 09:07

## 2019-03-31 RX ADMIN — Medication 10 ML: at 22:00

## 2019-03-31 RX ADMIN — Medication 10 ML: at 05:49

## 2019-03-31 RX ADMIN — FOLIC ACID 1 MG: 1 TABLET ORAL at 09:07

## 2019-03-31 RX ADMIN — THERA TABS 1 TABLET: TAB at 09:07

## 2019-03-31 RX ADMIN — LISINOPRIL 20 MG: 20 TABLET ORAL at 09:07

## 2019-03-31 RX ADMIN — CLOPIDOGREL BISULFATE 75 MG: 75 TABLET, FILM COATED ORAL at 09:06

## 2019-03-31 RX ADMIN — Medication 100 MG: at 09:07

## 2019-03-31 RX ADMIN — ASPIRIN 325 MG ORAL TABLET 325 MG: 325 PILL ORAL at 09:07

## 2019-03-31 NOTE — PROGRESS NOTES
Progress Note Patient: Adela Can               Sex: male          DOA: 3/21/2019 YOB: 1965      Age:  46 y.o.        LOS:  LOS: 10 days CHIEF COMPLAINT:  CVA Subjective:  
 
Awake alert watching TV Pt denies complaints Objective:  
  
Visit Vitals /88 (BP 1 Location: Right arm, BP Patient Position: At rest) Pulse 69 Temp 98.4 °F (36.9 °C) Resp 16 Ht 5' 6\" (1.676 m) Wt 72.1 kg (159 lb) SpO2 96% BMI 25.66 kg/m² Physical Exam: 
Gen:  No distress, no complaint Lungs:  Clear bilaterally, no wheeze or rhonchi Heart:  Regular rate and rhythm, no murmurs or gallops Abdomen:  Soft, non-tender, normal bowel sounds Lab/Data Reviewed: 
 
Labs reviewed Assessment/Plan Principal Problem: 
  CVA (cerebral vascular accident) ([facility] [address] (3/21/2019) Active Problems: Hypertension () Alcoholism ([facility] [address] (3/22/2019) Acute lacunar infarction (3/25/2019) Plan: 
BP control Statin, ASA 
PT Mobilization Working toward disposition Psych re-eval: states he lacks capacity. Guardianship process underway, discussed with CM, plan for Tuesday 4/2.

## 2019-03-31 NOTE — ROUTINE PROCESS
2030 Pt in bed c/o slight pain in left wrist call placed to Dr. Jazzmine Rasmussen order given for tylenol po q4h  
2053 Pt medicated with tylenol for left wrist pain

## 2019-03-31 NOTE — PROGRESS NOTES
1091  Assume care of patient from [address] Peters  , RN pt awake in bed, A&O4x  ,no distress noted and denies pain. Frequently used items and call bell within reach. Pt verbalized understanding to use call bell for any needs or assistance. Bed lock in lowest position. 1945  Beside and verbal  Shift report given to Filiberto Peacock RN_(oncoming nurse) by Clarence Grayson RN, (off going nurse). Report include the following information, SBAR, KARDEX, MAR, Recent results, and intake and output

## 2019-03-31 NOTE — ROUTINE PROCESS
Bedside and Verbal shift change report given to Anjel Mendes RN (oncoming nurse) by Quintin Jiang RN 
 (offgoing nurse). Report included the following information SBAR, ED Summary, Intake/Output, MAR, Recent Results and Med Rec Status.

## 2019-03-31 NOTE — PROGRESS NOTES
Problem: Mobility Impaired (Adult and Pediatric) Goal: *Acute Goals and Plan of Care (Insert Text) Description Physical Therapy Goals Updated 3/31/2019 and to be accomplished within 7 day(s) 1. Patient will move from supine to sit and sit to supine  in bed with modified independence. 2.  Patient will transfer from bed to chair and chair to bed with modified independence using the least restrictive device. 3.  Patient will perform sit to stand with modified independence. 4.  Patient will ambulate with modified independence for 50 feet with the least restrictive device. Initiated 3/22/2019 and to be accomplished within 7 days. 1.  Patient will complete all bed mobility with moderate assistance  in order to prepare for EOB/OOB activity. -met 3/31/2019 2. Patient will perform sit <> stand with minimal assistance/contact guard assist in order to prepare for OOB/gait activity. -met 3/31/2019 3. Patient will perform bed to chair transfers with minimal assistance/contact guard assist in order to promote mobility and encourage seated activity to progress towards their prior level of function. -met 3/31/2019 4. Patient will ambulate 25 feet with moderate assistance  using LRAD in order to prepare for safe negotiation of their environment. -met 3/31/2019 Outcome: Progressing Towards Goal 
PHYSICAL THERAPY: RE-EVALUATION 
INPATIENT: Medicaid: Hospital Day: 11 Patient: Ailyn Bashir (87 y.o. male)    Date: 3/31/2019 Primary Diagnosis: CVA (cerebral vascular accident) ([facility] [address] [I63.9] Precautions: Fall PLOF: Independent ASSESSMENT: 
Agreeable to OOB with encouragement. Continues to perseverate on left hemiparesis. Self limiting with all mobility. Requires max encouragement to try compensatory technique and demonstrate carryover from previous mobility sessions. Mod A for supine to site. Cues for lina technique for LLE. Seated EOB with good balance. Min A for sit to stand.  Cues for sequencing for safe transfer to ww. Amb 10ft with min A and ww. Shuffling gait, decreased speed and narrow base of support. Seated in chair with chair alarm, BLE elevated and call krishna. RN Liz patel. Educated patient on need for participation in therapy to progress strength and independence in mobility. EDUCATION:  
Education:  Patient was educated on the following topics: Bed mobility, transfers, ADLs, balance, amb, safety, exercise, role of PT, plan of care, cognition, skin integrity, vitals Barriers to Learning/Limitations: yes;  cognitive Compensate with: visual, verbal, tactile, kinesthetic cues/model Patient's progression toward goals since last assessment: previously established goals met; new goals added to reflect progress PLAN: 
Goals have been updated based on progression since last assessment. Patient continues to benefit from skilled intervention to address the above impairments. Continue to follow the patient 1-2 times per day/4-7 days per week to address goals. Planned Interventions: 
?     Bed Mobility Training          ? Neuromuscular Re-Education ? Transfer Training                ? Orthotic/Prosthetic Training 
? Gait Training                       ? Modalities ? Therapeutic Exercises       ? Edema Management/Control ? Therapeutic Activities         ? Patient and Family Training/Education ? Other (comment): 
Discharge Recommendations: Abraham Davenport Further Equipment Recommendations for Discharge: rolling walker SUBJECTIVE:  
Patient stated ? You always come in here. ? OBJECTIVE DATA SUMMARY:  
 
Critical Behavior: 
Neurologic State: Alert Orientation Level: Oriented to person Cognition: Impaired decision making Manual Muscle Testing (LE) 
       R     L Hip Flexion:  5/5 4/5 Knee EXT:  5/5 4/5 Knee FLEX:  5/5 4/5 Ankle DF:  5/5 4/5 
_________________________________________________ Tone : BLE normal 
 Sensation: Not tested Range Of Motion: AROM RLE WFL; LLE AROM decreased/functional 
Functional Mobility: 
 
 
Functional Status Indep (I) Mod I Super-vision Min A Mod A Max A Total A Assist x2 Verbal cues Additional time Not tested Comments Rolling ?  ?  ? ?    ?    ?  ?  ? ? ? ? Supine to sit ?  ?  ? ?  ?  ?  ?  ? ? ? ? Sit to supine ? ?  ? ?  ?  ?  ?  ? ? ? ? Seated in chair Sit to stand ?  ?  ? ?  ?  ?  ?  ? ? ? ? Stand to sit ?  ?  ? ?  ?  ?  ?  ? ? ? ? Bed to chair transfers ? ?  ? ?  ?  ?  ?  ? ? ? ? Balance Good Darline Anon Poor Unable Not tested Comments Sitting static ?  ?  ?  ?  ? Sitting dynamic ?  ?  ?  ?  ? Standing static ?  ?  ?  ?  ? Standing dynamic ?  ?  ?  ?  ? Mobility/Gait:  
Level of Assistance: Minimum assistance Assistive Device: rolling walker Distance Ambulated: 10 feet Gait Abnormalities: shuffling gait Pain: 
Pre treatment pain:  0 Post treatment pain: 0 Pain Scale 1: Numeric (0 - 10) Pain Intensity 1: 0 Activity Tolerance:  
Fair Please refer to the flowsheet for vital signs taken during this treatment. After treatment:  
?  Patient left in no apparent distress sitting up in chair ? Patient left in no apparent distress in bed 
? Call bell left within reach ? Nursing notified ? Caregiver present ? Bed alarm activated Pravin Altamirano PT Time Calculation: 8 mins

## 2019-03-31 NOTE — ROUTINE PROCESS
Bedside Verbal report received from Beatriz Vera 103 on pt pt resting quietly in bed, pt denies pain. Dual NIH assessment done. Call bell in pt reach

## 2019-04-01 PROCEDURE — 97116 GAIT TRAINING THERAPY: CPT

## 2019-04-01 PROCEDURE — 65270000029 HC RM PRIVATE

## 2019-04-01 PROCEDURE — 97168 OT RE-EVAL EST PLAN CARE: CPT

## 2019-04-01 PROCEDURE — 74011250637 HC RX REV CODE- 250/637: Performed by: HOSPITALIST

## 2019-04-01 PROCEDURE — 74011250637 HC RX REV CODE- 250/637: Performed by: PSYCHIATRY & NEUROLOGY

## 2019-04-01 PROCEDURE — 74011250637 HC RX REV CODE- 250/637: Performed by: FAMILY MEDICINE

## 2019-04-01 PROCEDURE — 97535 SELF CARE MNGMENT TRAINING: CPT

## 2019-04-01 RX ADMIN — AMLODIPINE BESYLATE 10 MG: 5 TABLET ORAL at 08:37

## 2019-04-01 RX ADMIN — Medication 10 ML: at 22:08

## 2019-04-01 RX ADMIN — THERA TABS 1 TABLET: TAB at 08:38

## 2019-04-01 RX ADMIN — Medication 10 ML: at 14:00

## 2019-04-01 RX ADMIN — Medication 100 MG: at 08:37

## 2019-04-01 RX ADMIN — ASPIRIN 325 MG ORAL TABLET 325 MG: 325 PILL ORAL at 08:38

## 2019-04-01 RX ADMIN — CLOPIDOGREL BISULFATE 75 MG: 75 TABLET, FILM COATED ORAL at 08:37

## 2019-04-01 RX ADMIN — Medication 10 ML: at 06:00

## 2019-04-01 RX ADMIN — ATORVASTATIN CALCIUM 80 MG: 40 TABLET, FILM COATED ORAL at 21:48

## 2019-04-01 RX ADMIN — FOLIC ACID 1 MG: 1 TABLET ORAL at 08:37

## 2019-04-01 RX ADMIN — ATORVASTATIN CALCIUM 80 MG: 40 TABLET, FILM COATED ORAL at 00:47

## 2019-04-01 RX ADMIN — ACETAMINOPHEN 650 MG: 325 TABLET, FILM COATED ORAL at 10:21

## 2019-04-01 RX ADMIN — LISINOPRIL 20 MG: 20 TABLET ORAL at 08:37

## 2019-04-01 NOTE — PROGRESS NOTES
1942-Bedside report given by Nathalie Tang RN. Pt in bed resting alert and oriented x3 denies pain at the moment. Assessement completed plan of care for the shift explained pt verbalized understanding. Dual NIH done no changes. HOB elevated, bed low and in locked position. Call light and frequently used items within reach. 0600- uneventful night. 0730- Bedside and Verbal shift change report given to RICH Gray (oncoming nurse) by Luis Eduardo Sánchez RN (offgoing nurse). Report included the following information SBAR, Kardex, Intake/Output, MAR and Recent Results.

## 2019-04-01 NOTE — PROGRESS NOTES
Progress Note Patient: Naa Henderson               Sex: male          DOA: 3/21/2019 YOB: 1965      Age:  46 y.o.        LOS:  LOS: 11 days CHIEF COMPLAINT:  CVA Subjective:  
 
Patient is awake and alert No distress Objective:  
  
Visit Vitals /81 (BP 1 Location: Right arm, BP Patient Position: At rest) Pulse (!) 54 Temp 97.8 °F (36.6 °C) Resp 16 Ht 5' 6\" (1.676 m) Wt 72.1 kg (159 lb) SpO2 97% BMI 25.66 kg/m² Physical Exam: 
Gen:  No distress, no complaint Lungs:  Clear bilaterally, no wheeze or rhonchi Heart:  Regular rate and rhythm, no murmurs or gallops Abdomen:  Soft, non-tender, normal bowel sounds Lab/Data Reviewed: 
 
Labs reviewed Assessment/Plan Principal Problem: 
  CVA (cerebral vascular accident) ([facility] [address] (3/21/2019) Active Problems: Hypertension () Alcoholism ([facility] [address] (3/22/2019) Acute lacunar infarction ([facility] [address] (3/25/2019) Plan: 
Continue with PT 
Guardianship hearing is tomorrow. BP control Working toward disposition.

## 2019-04-01 NOTE — PROGRESS NOTES
Problem: Mobility Impaired (Adult and Pediatric) Goal: *Acute Goals and Plan of Care (Insert Text) Description Physical Therapy Goals Updated 3/31/2019 and to be accomplished within 7 day(s) 1. Patient will move from supine to sit and sit to supine  in bed with modified independence. 2.  Patient will transfer from bed to chair and chair to bed with modified independence using the least restrictive device. 3.  Patient will perform sit to stand with modified independence. 4.  Patient will ambulate with modified independence for 50 feet with the least restrictive device. Initiated 3/22/2019 and to be accomplished within 7 days. 1.  Patient will complete all bed mobility with moderate assistance  in order to prepare for EOB/OOB activity. -met 3/31/2019 2. Patient will perform sit <> stand with minimal assistance/contact guard assist in order to prepare for OOB/gait activity. -met 3/31/2019 3. Patient will perform bed to chair transfers with minimal assistance/contact guard assist in order to promote mobility and encourage seated activity to progress towards their prior level of function. -met 3/31/2019 4. Patient will ambulate 25 feet with moderate assistance  using LRAD in order to prepare for safe negotiation of their environment. -met 3/31/2019 Outcome: Progressing Towards Goal 
 
 
PHYSICAL THERAPY: DAILY TREATMENT NOTE  
INPATIENT: Medicaid: Hospital Day: 12 Patient: Edie Mendoza (9 y.o. male)    Date: 4/1/2019 Primary Diagnosis: CVA (cerebral vascular accident) ([facility] [address] [I63.9]  
 ,  ,  
Precautions: Fall Chart, physical therapy assessment, plan of care and goals were reviewed. PLOF: Unknown ASSESSMENT: 
Patient supine in bed, agreeable to participation with PT. Patient requires min encouragement to participate. SBA for supine > sit, patient incorporating lina strategies with bed mobility.  CGA for sit > stand with RW for support. Patient reporting weakness upon standing; encouraged to continue with gait. CGA for ambulation x 35 ft with RW. Patient ambulates with narrowed GAYLE, decreased step clearance on L. Requires cuing for step clearance on L. Patient requesting to return to chair. Patient assisted into chair with cuing for safety. Positioned for comfort and left in recliner with all needs within reach. Instructed to call for assist back to bed/bathroom when needed. Patient has no c/o pain with activity. RICH Estevez notified of tx session. Progression toward goals: 
    ?  Improving appropriately and progressing toward goals ? Improving slowly and progressing toward goals ? Not making progress toward goals and plan of care will be adjusted PLAN: 
Patient continues to benefit from skilled intervention to address the above impairments. Continue treatment per established plan of care. EDUCATION:  
Education:  Patient was educated on the following topics: safety, gait with RW, transfers. Verbalizes understanding. Barriers to Learning/Limitations: None Compensate with: visual, verbal, tactile, kinesthetic cues/model Discharge Recommendations:  Abraham Davenport Further Equipment Recommendations for Discharge:  rolling walker Factors which may impact discharge planning: N/A  
 
SUBJECTIVE:  
Patient stated ? I'm so weak and it's early. ? OBJECTIVE DATA SUMMARY:  
Critical Behavior: 
Neurologic State: Alert Orientation Level: Oriented X4 Cognition: Follows commands Functional Mobility: 
 
 
Functional Status Indep (I) Mod I Super-vision Min A Mod A Max A Total A Assist x2 Verbal cues Additional time Not tested Comments Rolling ?  ?  ? ?    ?    ?  ?  ? ? ? ? Supine to sit ?  ?  ? ?  ?  ?  ?  ? ? ? ? Sit to supine ? ?  ? ?  ?  ?  ?  ? ? ? ? Sit to stand ? ?  ? 
CGA ?  ?  ?  ?  ? ? ? ?    
Stand to sit ?  ?  ? ?  ?  ?  ?  ? ? ? ?   
 Bed to chair transfers ? ?  ? ?  ?  ?  ?  ? ? ? ? Balance Good Lorence Net Poor Unable Not tested Comments Sitting static ?  ?  ?  ?  ? Sitting dynamic ?  ?  ?  ?  ? Standing static ?  ?  ?  ?  ? Standing dynamic ?  ?  ?  ?  ? Mobility/Gait:  
Level of Assistance: Contact guard assistance Assistive Device: rolling walker Distance Ambulated: 35 feet Left Lower Extremity: FWB Right Lower Extremity: FWB Base of Support: narrowed Speed/Elena: pace decreased (<100 feet/min) and slow Step Length: left shortened Swing Pattern: left asymmetrical 
Stance: left decreased Gait Abnormalities: altered arm swing and decreased step clearance Vital Signs Temp: 98.4 °F (36.9 °C) Pulse (Heart Rate): 66    
BP: 122/66 Resp Rate: 18    
O2 Sat (%): 98 % Pain: None Pre treatment pain level:0 Post treatment pain level:0 Activity Tolerance:  
Fair After treatment:  
?  Patient left in no apparent distress sitting up in chair ? Patient left in no apparent distress in bed 
? Call bell left within reach ? Nursing notified ? Caregiver present ? Bed alarm activated Vinod Right Time Calculation: 18 mins

## 2019-04-01 NOTE — ROUTINE PROCESS
Bedside and Verbal shift change report given to Rolando Cody, RN (oncoming nurse) by Barbee Barthel RN, BSN (offgoing nurse). Report given with SBAR, Kardex, Intake/Output, MAR and Recent Results.

## 2019-04-01 NOTE — PROGRESS NOTES
Problem: Self Care Deficits Care Plan (Adult) Goal: *Acute Goals and Plan of Care (Insert Text) Description Occupational Therapy Goals Initiated 3/22/2019 within 7 day(s). Re-evaluated on 4/1/19 following one week on skilled OT caseload. Pt making good progress towards functional goals. Updated goals listed below. 1.  Patient will perform grooming tasks while standing with minimal assistance for balance and additional time to incorporate LUE as gross assist. 
2.  Patient will perform upper body dressing with minimal assistance utilizing adaptive strategies, prn. 
3.  Patient will perform lower body dressing with moderate assistance and minimal verbal cues. 4.  Patient will perform toilet transfers with minimal assistance/contact guard assist. 
5.  Patient will perform all aspects of toileting with minimal assistance/contact guard assist. 
6.  Patient will participate in upper extremity therapeutic exercise/activities with minimal assistance/contact guard assist for 8 minutes to increase BUE strength/AROM for functional transfers and ADLs. 7.  Patient will utilize energy conservation techniques during functional activities with minimal verbal cues. UPDATED GOALS Nitza Torres MS, OTR/L) 1. Patient will perform grooming tasks while standing with supervision/set-up and SBA to include LUE as gross assist.  
2.  Patient will perform upper body dressing with supervision/set-up with SBA utilizing adaptive strategy, prn. 
3.  Patient will perform functional task in standing for 8 minutes with stand-by assistance for balance and minimal verbal cues for activity pacing in prep for ADLs. 4.  Patient will perform toilet transfers with supervision/set-up. 5.  Patient will perform all aspects of toileting with supervision/set-up. 6.  Patient will participate in upper extremity therapeutic exercise/activities with minimal assistance for LUE to increase AROM/strength for functional transfers and ADLs. 7.  Patient will utilize energy conservation techniques during functional activities with minimal verbal cues. Outcome: Progressing Towards Goal 
 
[facility] 
OCCUPATIONAL THERAPY: RE-ASSESSMENT INPATIENT: Medicaid: Hospital Day: 12 Patient: Adela Can (36 y.o. male)    Date: 4/1/2019 Primary Diagnosis: CVA (cerebral vascular accident) ([facility] [address] [I63.9]  
 ,  ,  
Precautions: Falls PLOF: Pt was independent with basic self care tasks and functional mobility PTA. ASSESSMENT:  
Based on the objective data described below, the patient presents with impairments with regard to bed mobility, activity tolerance, LUE function/strength and independence in ADLs secondary to CVA. Pt re-evaluated following one week on skilled OT caseload. Min A for supine-->sit. LUE ~1/2 shoulder flex against gravity; weak L grasp. Decreased coordination of LUE. Mod A for UB bathing; SBA for bilateral underarm bathing with skilled instruction and additional time to incorporate LUE secondary to weakness. Mod A for UB/back bathing secondary to LUE weakness and decreased ROM. Min/mod A to stand with RW with mod A to position LUE. Pt deferred OOB to chair; min A for sit-->supine with assist for LLE. HOB elevated ,needs within reach. Recommend SNF upon d/c. Recommendations for the next treatment session: BSC transfer; ADLs at EOB Mr. Roderick Hale will benefit from skilled intervention to address the above impairments. His rehabilitation potential is considered to be Good. EDUCATION Education:  Patient was educated on the following topics: Role of oT and updated POC Barriers to Learning/Limitations: None Compensate with: visual, verbal, tactile, kinesthetic cues/model PLAN OF CARE:  
Problems:  Decreased ROM, Decreased strength affecting function, Decreased ADL/functioning of activities, Decreased flexibility/joint flexibility, Decreased transfer abilities, Decreased activity tolerance and Increased fatigue affecting function Recommendations and Planned Interventions: 
?                  Self Care Training                   ? Therapeutic Activities ? Functional Mobility Training    ? Cognitive Retraining 
? Therapeutic Exercises            ? Endurance Activities ? Balance Training                     ? Neuromuscular Re-ed ? Visual/Perceptual Training      ? Home Safety Training 
? Patient Education                    ? Family Training/Education ? Other (comment): Frequency/Duration: Patient will be followed by occupational therapy 4-7 times a week to address goals. Discharge Recommendations: Abraham Davenport Further Equipment Recommendations for Discharge: shower chair SUBJECTIVE:  
Patient stated: \"I was resting. \" OBJECTIVE/TREATMENT:  
 
Past Medical History:  
Diagnosis Date Back pain Hyperlipemia Hypertension MDD (major depressive disorder), recurrent, severe, with psychosis ([facility] [address] 11/11/2017 Psychiatric disorder   
 hallucinations Psychotic disorder ([facility] [address] 11/11/2017 Past Surgical History:  
Procedure Laterality Date HX OTHER SURGICAL    
 oral sx Eval Complexity: History: MEDIUM Complexity : Expanded review of history including physical, cognitive and psychosocial  history ; Examination: MEDIUM Complexity : 3-5 performance deficits relating to physical, cognitive , or psychosocial skils that result in activity limitations and / or participation restrictions; Decision Making:MEDIUM Complexity : Patient may present with comorbidities that affect occupational performnce. Miniml to moderate modification of tasks or assistance (eg, physical or verbal ) with assesment(s) is necessary to enable patient to complete evaluation Prior Level of Function/Home Situation: Restricted in physically strenuous activity but ambulatory and able to carry out work of a light or sedentary nature (e.g., light house work, office work). Lives alone 
none reported Cognitive/Behavioral Status:  
Neurologic State: alert Orientation: only aware of  place and person Cognition:  following commands  follows multi-step simple commands/direction Safety/Judgement: Insight into deficits ROM: Moderately limited (RUE WFL; LUE ~90 degrees anterior/lateral flexion); LUE limited digit flex/ext. MMT: RUE 4/5; LUE 2+/5 Coordination: RUE WFL; LUE generally decreased Hand dominance: Right Skin: Intact (BUEs) Edema: None noted (BUEs) Sensation: Intact (BUEs) Vision/Perceptual: normal 
 
 
Functional Status Indep Mod I  
Sup. / 
Set- Up SBA CGA Min Assist  
Mod Assist  
Max assist  
Total Assist  
Assist x2 Additional Time NT Comments Rolling ?  ?  ?  ?  ?    ?    ?    ?  ?  ?  ?  ? Supine to sit ?  ?  ?  ?  ?  ?  ?  ?  ?  ?  ?  ? Sit to supine ? ?  ?  ?  ?  ?  ?  ?  ?  ?  ?  ? Sit to stand ?  ?  ?  ?  ?  ?  ?  ?  ?  ?  ?  ? Toilet Transfer ? ?  ?  ?  ?  ?  ?  ?  ?  ?  ?  ? Feeding ? ?  ?  ?  ?  ?  ?  ?  ?  ?  ?  ? Grooming ?  ?  ?  ?  ?  ?  ?  ?  ?  ?  ?  ? Bathing  [phone]  ?    
LB Dressing  ?  ?  ?  ?  ?  ?  ?  ?  ?  ?  ?  ? Toileting ?  ?  ?  ?  ?  ?  ?  ?  ?  ?  ?  ? Balance Good Lee Berg Poor Unable Comments Sitting static ?  ?  ?  ? Sitting dynamic ?  ?  ?  ? Standing static ?  ?  ?  ? Standing dynamic ?  ?  ?  ? Fair-  
ADL Intervention: Mod A for UB bathing; SBA for bilateral underarm bathing with skilled instruction and additional time to incorporate LUE secondary to weakness. Mod A for UB/back bathing secondary to LUE weakness and decreased ROM. Pain:  
Pre treatment:  0/10 Post treatment: 0/10 Scale: numeric Activity tolerance:  fair COMMUNICATION/EDUCATION: Pt educated on role of OT and POC; he verbalized understanding. ?         Fall prevention education was provided and the patient/caregiver indicated understanding. ? Patient/family have participated as able in goal setting and plan of care. ?         Patient/family agree to work toward stated goals and plan of care. ?         Patient understands intent and goals of therapy, but is neutral about his/her participation The patient?s plan of care was also discussed with: Physical Therapist, Certified Occupational Therapy Assistant and Registered Nurse. After treatment position/precautions:  
Supine in bed Bed alarm/tab alert on Bed in low position Call light within reach RN notified Recommendations for nursing: up with assist x1 & RW Thank you for this referral. 
Caro Causey MS OTR/L Time Calculation: 26 mins

## 2019-04-01 NOTE — PROGRESS NOTES
Patient received in bed awake. Patient A&Ox4, denies pain and discomfort. Has neuro checks in place q4hrs; see NIH for results. No distress noted. Frequently use items within reach. Bed locked in low position and call bell w/in reach.

## 2019-04-01 NOTE — PROGRESS NOTES
2000: Assumed pt care. Received pt resting in bed, pt is alert and oriented x 4. Denies any pain at this time. Dual NIH done with RICH Shin. Bed on lowest position, wheels locked, call bell within reach. 
 
4-1-19 
 
9130: Bedside and Verbal shift change report given to ST. HIEU MARIEE (oncoming nurse) by Bruna Nath 
 (offgoing nurse). Report included the following information SBAR, Kardex, Intake/Output, MAR and Recent Results.

## 2019-04-01 NOTE — PROGRESS NOTES
Chart reviewed. Guardianship hearing tomorrow. Plan long term nursing home placement. Available as needed. Fiona Bird RN,ext [phone].

## 2019-04-01 NOTE — PROGRESS NOTES
0730.Bedside and Verbal shift change report given to this nurse Joe Murphy (oncoming nurse) by Boni Salgado (offgoing nurse). Report included the following information SBAR, Kardex and MAR.  
 
4716. .Bedside and Verbal shift change report given to Jasson Willis (oncoming nurse) by this nurse Bandar Neil (offgoing nurse). Report included the following information SBAR, Kardex and MAR.

## 2019-04-02 VITALS
BODY MASS INDEX: 25.55 KG/M2 | DIASTOLIC BLOOD PRESSURE: 79 MMHG | RESPIRATION RATE: 16 BRPM | HEART RATE: 70 BPM | SYSTOLIC BLOOD PRESSURE: 121 MMHG | OXYGEN SATURATION: 97 % | TEMPERATURE: 98.3 F | HEIGHT: 66 IN | WEIGHT: 159 LBS

## 2019-04-02 PROCEDURE — 74011250636 HC RX REV CODE- 250/636: Performed by: HOSPITALIST

## 2019-04-02 PROCEDURE — 97535 SELF CARE MNGMENT TRAINING: CPT

## 2019-04-02 PROCEDURE — 74011250637 HC RX REV CODE- 250/637: Performed by: PSYCHIATRY & NEUROLOGY

## 2019-04-02 PROCEDURE — 74011250637 HC RX REV CODE- 250/637: Performed by: HOSPITALIST

## 2019-04-02 PROCEDURE — 90471 IMMUNIZATION ADMIN: CPT

## 2019-04-02 PROCEDURE — 90686 IIV4 VACC NO PRSV 0.5 ML IM: CPT | Performed by: HOSPITALIST

## 2019-04-02 RX ORDER — CLOPIDOGREL BISULFATE 75 MG/1
75 TABLET ORAL DAILY
Qty: 30 TAB | Refills: 0 | Status: SHIPPED
Start: 2019-04-03

## 2019-04-02 RX ORDER — AMLODIPINE BESYLATE 10 MG/1
10 TABLET ORAL DAILY
Qty: 30 TAB | Refills: 0 | Status: SHIPPED
Start: 2019-04-03

## 2019-04-02 RX ORDER — ASPIRIN 325 MG
325 TABLET ORAL DAILY
Qty: 30 TAB | Refills: 0 | Status: SHIPPED
Start: 2019-04-03

## 2019-04-02 RX ORDER — ATORVASTATIN CALCIUM 80 MG/1
80 TABLET, FILM COATED ORAL
Qty: 90 TAB | Refills: 0 | Status: SHIPPED
Start: 2019-04-02

## 2019-04-02 RX ADMIN — ASPIRIN 325 MG ORAL TABLET 325 MG: 325 PILL ORAL at 10:27

## 2019-04-02 RX ADMIN — INFLUENZA VIRUS VACCINE 0.5 ML: 15; 15; 15; 15 SUSPENSION INTRAMUSCULAR at 13:04

## 2019-04-02 RX ADMIN — Medication 100 MG: at 10:27

## 2019-04-02 RX ADMIN — LISINOPRIL 20 MG: 20 TABLET ORAL at 10:27

## 2019-04-02 RX ADMIN — AMLODIPINE BESYLATE 10 MG: 5 TABLET ORAL at 10:27

## 2019-04-02 RX ADMIN — CLOPIDOGREL BISULFATE 75 MG: 75 TABLET, FILM COATED ORAL at 10:27

## 2019-04-02 RX ADMIN — THERA TABS 1 TABLET: TAB at 10:27

## 2019-04-02 RX ADMIN — FOLIC ACID 1 MG: 1 TABLET ORAL at 10:27

## 2019-04-02 NOTE — PROGRESS NOTES
Transportation at Discharge: 4/2/19 Transport Company/Representative: Saroj Leon / [employer] Phone number: [phone] Method of Transport: Deidre [place] / [employer] Estimated pick-up time: 1:00P Destination:  [employer] Insurance Info: Zack [facility] Authorization: conf# [id number]  /  Run# [id number] Requesting Outcomes Manager:  Siri [place] Corrinne Hammonds, Care- ext [phone]

## 2019-04-02 NOTE — PROGRESS NOTES
Guardianship hearing today, [address] appointed as legal guardian, Rose Yadi is , met with her & made her aware that pt is medically ready for transfer & that [facility] is only accepting facility, agreeable to transfer. [facility] set up for 1300. Pt's nurse made aware of above. Fiona BirdRN,ext [phone]. Care Management Interventions PCP Verified by CM: Yes(States does not have PCP, referral sent to ) Palliative Care Criteria Met (RRAT>21 & CHF Dx)?: No 
Mode of Transport at Discharge: [facility] Transition of Care Consult (CM Consult): [address], [address]) Discharge Durable Medical Equipment: No 
Physical Therapy Consult: Yes Occupational Therapy Consult: Yes Speech Therapy Consult: Yes Current Support Network: Lives Alone Plan discussed with Pt/Family/Caregiver: Yes Discharge [facility])

## 2019-04-02 NOTE — PROGRESS NOTES
Problem: Falls - Risk of 
Goal: *Absence of Falls Description Document Crow President Fall Risk and appropriate interventions in the flowsheet. Outcome: Progressing Towards Goal 
  
Problem: Patient Education: Go to Patient Education Activity Goal: Patient/Family Education Outcome: Progressing Towards Goal 
  
Problem: Pressure Injury - Risk of 
Goal: *Prevention of pressure injury Description Document Matt Scale and appropriate interventions in the flowsheet. Outcome: Progressing Towards Goal 
  
Problem: Patient Education: Go to Patient Education Activity Goal: Patient/Family Education Outcome: Progressing Towards Goal 
  
Problem: Patient Education: Go to Patient Education Activity Goal: Patient/Family Education Outcome: Progressing Towards Goal 
  
Problem: Ischemic Stroke: Discharge Outcomes Goal: *Verbalizes anxiety and depression are reduced or absent Outcome: Progressing Towards Goal 
Goal: *Verbalize understanding of risk factor modification(Stroke Metric) Outcome: Progressing Towards Goal 
Goal: *Hemodynamically stable Outcome: Progressing Towards Goal 
Goal: *Absence of aspiration pneumonia Outcome: Progressing Towards Goal 
Goal: *Aware of needed dietary changes Outcome: Progressing Towards Goal 
Goal: *Verbalize understanding of prescribed medications including anti-coagulants, anti-lipid, and/or anti-platelets(Stroke Metric) Outcome: Progressing Towards Goal 
Goal: *Tolerating diet Outcome: Progressing Towards Goal 
Goal: *Aware of follow-up diagnostics related to anticoagulants Outcome: Progressing Towards Goal 
Goal: *Ability to perform ADLs and demonstrates progressive mobility and function Outcome: Progressing Towards Goal 
Goal: *Absence of DVT(Stroke Metric) Outcome: Progressing Towards Goal 
Goal: *Absence of aspiration Outcome: Progressing Towards Goal 
Goal: *Optimal pain control at patient's stated goal 
Outcome: Progressing Towards Goal 
 Goal: *Home safety concerns addressed Outcome: Progressing Towards Goal 
Goal: *Describes available resources and support systems Outcome: Progressing Towards Goal 
Goal: *Verbalizes understanding of activation of EMS(911) for stroke symptoms(Stroke Metric) Outcome: Progressing Towards Goal 
Goal: *Understands and describes signs and symptoms to report to providers(Stroke Metric) Outcome: Progressing Towards Goal 
Goal: *Neurolgocially stable (absence of additional neurological deficits) Outcome: Progressing Towards Goal 
Goal: *Verbalizes importance of follow-up with primary care physician(Stroke Metric) Outcome: Progressing Towards Goal 
Goal: *Smoking cessation discussed,if applicable(Stroke Metric) Outcome: Progressing Towards Goal 
Goal: *Depression screening completed(Stroke Metric) Outcome: Progressing Towards Goal 
  
Problem: Discharge Planning Goal: *Discharge to safe environment Outcome: Progressing Towards Goal 
  
Problem: Patient Education: Go to Patient Education Activity Goal: Patient/Family Education Outcome: Progressing Towards Goal 
  
Problem: Patient Education: Go to Patient Education Activity Goal: Patient/Family Education Outcome: Progressing Towards Goal 
  
Problem: TIA/CVA Stroke: Day 2 Until Discharge Goal: Off Pathway (Use only if patient is Off Pathway) Outcome: Progressing Towards Goal 
Goal: Activity/Safety Outcome: Progressing Towards Goal 
Goal: Diagnostic Test/Procedures Outcome: Progressing Towards Goal 
Goal: Nutrition/Diet Outcome: Progressing Towards Goal 
Goal: Discharge Planning Outcome: Progressing Towards Goal 
Goal: Medications Outcome: Progressing Towards Goal 
Goal: Respiratory Outcome: Progressing Towards Goal 
Goal: Treatments/Interventions/Procedures Outcome: Progressing Towards Goal 
Goal: Psychosocial 
Outcome: Progressing Towards Goal 
Goal: *Verbalizes anxiety and depression are reduced or absent Outcome: Progressing Towards Goal 
 Goal: *Absence of aspiration Outcome: Progressing Towards Goal 
Goal: *Absence of deep venous thrombosis signs and symptoms(Stroke Metric) Outcome: Progressing Towards Goal 
Goal: *Optimal pain control at patient's stated goal 
Outcome: Progressing Towards Goal 
Goal: *Tolerating diet Outcome: Progressing Towards Goal 
Goal: *Ability to perform ADLs and demonstrates progressive mobility and function Outcome: Progressing Towards Goal 
Goal: *Stroke education continued(Stroke Metric) Outcome: Progressing Towards Goal 
  
Problem: Pain Goal: *Control of Pain Outcome: Progressing Towards Goal 
  
Problem: Patient Education: Go to Patient Education Activity Goal: Patient/Family Education Outcome: Progressing Towards Goal

## 2019-04-02 NOTE — DISCHARGE INSTRUCTIONS

## 2019-04-02 NOTE — PROGRESS NOTES
PT tx session attempted x 2, 0884 and 6161. Patient sleeping soundly on first attempt; second attempt patient working with OT. Maria Fernanda Sahu PT, DPT Office extension: [phone] Pager #: [phone]

## 2019-04-02 NOTE — PROGRESS NOTES
completed follow up visit with patient in room 2117 today and a Spiritual assessment of patient was done. Patient reports that he is still hanging in there and simply needed more prayer. Garrett Garvey will continue to follow and will provide pastoral care on an as needed/requested basis Christina 3 Board Certified [employer] Spiritual Care  
[phone]

## 2019-04-02 NOTE — PROGRESS NOTES
Problem: Self Care Deficits Care Plan (Adult) Goal: *Acute Goals and Plan of Care (Insert Text) Description Occupational Therapy Goals Initiated 3/22/2019 within 7 day(s). Re-evaluated on 4/1/19 following one week on skilled OT caseload. Pt making good progress towards functional goals. Updated goals listed below. 1.  Patient will perform grooming tasks while standing with minimal assistance for balance and additional time to incorporate LUE as gross assist. 
2.  Patient will perform upper body dressing with minimal assistance utilizing adaptive strategies, prn. 
3.  Patient will perform lower body dressing with moderate assistance and minimal verbal cues. 4.  Patient will perform toilet transfers with minimal assistance/contact guard assist. 
5.  Patient will perform all aspects of toileting with minimal assistance/contact guard assist. 
6.  Patient will participate in upper extremity therapeutic exercise/activities with minimal assistance/contact guard assist for 8 minutes to increase BUE strength/AROM for functional transfers and ADLs. 7.  Patient will utilize energy conservation techniques during functional activities with minimal verbal cues. UPDATED GOALS Libertad Ann MS, OTR/L) 1. Patient will perform grooming tasks while standing with supervision/set-up and SBA to include LUE as gross assist.  
2.  Patient will perform upper body dressing with supervision/set-up with SBA utilizing adaptive strategy, prn. 
3.  Patient will perform functional task in standing for 8 minutes with stand-by assistance for balance and minimal verbal cues for activity pacing in prep for ADLs. 4.  Patient will perform toilet transfers with supervision/set-up. 5.  Patient will perform all aspects of toileting with supervision/set-up. 6.  Patient will participate in upper extremity therapeutic exercise/activities with minimal assistance for LUE to increase AROM/strength for functional transfers and ADLs. 7.  Patient will utilize energy conservation techniques during functional activities with minimal verbal cues. Outcome: Progressing Towards Goal 
 OCCUPATIONAL THERAPY TREATMENT Patient: Bettina Donato (6 y.o. male) Date: 4/2/2019 Diagnosis: CVA (cerebral vascular accident) ([facility] [address] [I63.9] CVA (cerebral vascular accident) ([facility] [address] Precautions: Fall Chart, occupational therapy assessment, plan of care, and goals were reviewed. ASSESSMENT: 
Pt presented in bed. Pt required SBA for bed mobility to sit at the EOB. Pt performs grooming task of washing his face seated at the EOB. Pt performs UB dressing with MIN A to orient shirt and vc's for lina-dressing. Pt educated on AE for LB dressing. Pt performs LB dressing using reacher. Pt required MIN A for sit to stand to hike boxers and pants. Pt required MIN A to button pants 2/2 decreased FM coordination. Pt required seated rest break 2/2 fatigue and frustration of trying to button pants. Pt requires MIN A for sit to stand with RW. Pt performs functional mobility from the bed to the chair with CGA and RW. Pt completed LB dressing of socks and shoes in chair. Pt performs LB dressing using sockaid and long handled shoe horn. Pt required MOD A to don socks 2/2 Pt's request to have 2 pairs on. Pt required MOD A to don shoes 2/2 LLE weakness and to tie laces. Pt requested to brush teeth. Pt required MIN A for sit to stand with RW. Pt performs functional mobility to the bathroom with RW, CGA and vc's to widen steps with LLE. Pt stood at the sink to perform grooming tasks with CGA. Pt required vc's to widen GAYLE. Pt performs functional mobility from the bathroom to the chair with CGA and RW for a seated rest break. Pt educated on energy conservation during ADLs. Pt requires encouragement and redirection from deficits throughout session. Progression toward goals: 
?          Improving appropriately and progressing toward goals ?          Improving slowly and progressing toward goals ? Not making progress toward goals and plan of care will be adjusted PLAN: 
Patient continues to benefit from skilled intervention to address the above impairments. Continue treatment per established plan of care. Discharge Recommendations:  [facility] Further Equipment Recommendations for Discharge:  shower chair SUBJECTIVE:  
Patient stated ? I never knew getting dressed would take so much energy. ? OBJECTIVE DATA SUMMARY:  
Cognitive/Behavioral Status: 
Neurologic State: Alert Orientation Level: Oriented X4 Cognition: Follows commands Functional Mobility and Transfers for ADLs: 
Bed Mobility: 
Rolling: Stand-by assistance Supine to Sit: Stand-by assistance Scooting: Stand-by assistance Transfers: 
Sit to Stand: Minimum assistance Bed to Chair: Minimum assistance Balance: 
Sitting: Intact Sitting - Static: Good (unsupported) Sitting - Dynamic: Fair (occasional) Standing: Impaired; With support Standing - Static: Fair Standing - Dynamic : Fair ADL Intervention: 
Grooming Washing Face: Supervision/set-up Washing Hands: Supervision/set-up Brushing Teeth: Contact guard assistance Upper Body Dressing Assistance Pullover Shirt: Minimum assistance Cues: Verbal cues provided Lower Body Dressing Assistance Underpants: Minimum assistance; Compensatory technique training Pants With Button/Zipper: Minimum assistance; Compensatory technique training Socks: Moderate assistance; Compensatory technique training Shoes with Cloth Laces: Moderate assistance; Compensatory technique training Leg Crossed Method Used: No 
Position Performed: Seated in chair Cues: Verbal cues provided Adaptive Equipment Used: Long handled shoe horn;Reacher;Sock aid Pain: 
Pt reports 0/10 pain or discomfort prior to treatment. Pt reports 0/10 pain or discomfort post treatment. Activity Tolerance:   
Fair Please refer to the flowsheet for vital signs taken during this treatment. After treatment:  
?  Patient left in no apparent distress sitting up in chair ? Patient left in no apparent distress in bed 
? Call bell left within reach ? Nursing notified ? Caregiver present ? Chair alarm activated COMMUNICATION/EDUCATION:  
? Home safety education was provided and the patient/caregiver indicated understanding. ? Patient/family have participated as able in goal setting and plan of care. ? Patient/family agree to work toward stated goals and plan of care. ? Patient understands intent and goals of therapy, but is neutral about his/her participation. ? Patient is unable to participate in goal setting and plan of care. Charles Cardenas Time Calculation: 53 mins

## 2019-04-02 NOTE — PROGRESS NOTES
Pt dressed and ready for DC, no acute concerns noted, vitals WNL, belongings packed, dressed in own clothes, Report called to [phone]. Waiting for confirmed transport at 1 pm. 
Backpack and other belongings with transport, packet with transport for next facility

## 2020-07-02 ENCOUNTER — HOSPITAL ENCOUNTER (OUTPATIENT)
Dept: LAB | Age: 55
Discharge: HOME | End: 2020-07-02

## 2020-07-02 LAB
ANION GAP SERPL CALC-SCNC: 5 MMOL/L (ref 3–18)
APPEARANCE UR: CLEAR
BASOPHILS # BLD: 0 K/UL (ref 0–0.1)
BASOPHILS NFR BLD: 0 % (ref 0–2)
BILIRUB UR QL: NEGATIVE
BUN SERPL-MCNC: 12 MG/DL (ref 7–18)
BUN/CREAT SERPL: 11 (ref 12–20)
CALCIUM SERPL-MCNC: 8.7 MG/DL (ref 8.5–10.1)
CHLORIDE SERPL-SCNC: 104 MMOL/L (ref 100–111)
CO2 SERPL-SCNC: 31 MMOL/L (ref 21–32)
COLOR UR: YELLOW
CREAT SERPL-MCNC: 1.05 MG/DL (ref 0.6–1.3)
DIFFERENTIAL METHOD BLD: ABNORMAL
EOSINOPHIL # BLD: 0 K/UL (ref 0–0.4)
EOSINOPHIL NFR BLD: 1 % (ref 0–5)
ERYTHROCYTE [DISTWIDTH] IN BLOOD BY AUTOMATED COUNT: 15.6 % (ref 11.6–14.5)
FAX TO INFO,FAXT: NORMAL
FAX TO NUMBER,FAXN: NORMAL
GLUCOSE SERPL-MCNC: 104 MG/DL (ref 74–99)
GLUCOSE UR STRIP.AUTO-MCNC: NEGATIVE MG/DL
HCT VFR BLD AUTO: 39.8 % (ref 36–48)
HGB BLD-MCNC: 12.6 G/DL (ref 13–16)
HGB UR QL STRIP: NEGATIVE
KETONES UR QL STRIP.AUTO: NEGATIVE MG/DL
LEUKOCYTE ESTERASE UR QL STRIP.AUTO: NEGATIVE
LYMPHOCYTES # BLD: 0.7 K/UL (ref 0.9–3.6)
LYMPHOCYTES NFR BLD: 19 % (ref 21–52)
MCH RBC QN AUTO: 25.5 PG (ref 24–34)
MCHC RBC AUTO-ENTMCNC: 31.7 G/DL (ref 31–37)
MCV RBC AUTO: 80.4 FL (ref 74–97)
MONOCYTES # BLD: 0.4 K/UL (ref 0.05–1.2)
MONOCYTES NFR BLD: 11 % (ref 3–10)
NEUTS SEG # BLD: 2.7 K/UL (ref 1.8–8)
NEUTS SEG NFR BLD: 69 % (ref 40–73)
NITRITE UR QL STRIP.AUTO: NEGATIVE
PH UR STRIP: 5 [PH] (ref 5–8)
PLATELET # BLD AUTO: 228 K/UL (ref 135–420)
PMV BLD AUTO: 10.3 FL (ref 9.2–11.8)
POTASSIUM SERPL-SCNC: 3.2 MMOL/L (ref 3.5–5.5)
PROT UR STRIP-MCNC: NEGATIVE MG/DL
RBC # BLD AUTO: 4.95 M/UL (ref 4.7–5.5)
SODIUM SERPL-SCNC: 140 MMOL/L (ref 136–145)
SP GR UR REFRACTOMETRY: 1.02 (ref 1–1.03)
UROBILINOGEN UR QL STRIP.AUTO: 1 EU/DL (ref 0.2–1)
WBC # BLD AUTO: 3.9 K/UL (ref 4.6–13.2)

## 2020-07-02 PROCEDURE — 85025 COMPLETE CBC W/AUTO DIFF WBC: CPT

## 2020-07-02 PROCEDURE — 81003 URINALYSIS AUTO W/O SCOPE: CPT

## 2020-07-02 PROCEDURE — 80048 BASIC METABOLIC PNL TOTAL CA: CPT

## 2020-09-23 NOTE — ED NOTES
Pt is accepted at [address].  Room# 106, bed 4    Admitting physician Dr. Worthington Been Patient requests all Lab and Radiology Results on their Discharge Instructions

## 2021-10-14 NOTE — PROGRESS NOTES
[address]  Inpatient Progress Note     Date of Service: 11/12/17  Hospital Day: 2     Subjective/Interval History   11/12/17    Treatment Team Notes:  Notes reviewed and/or discussed and report that Rodrick Frey is behaviorally appropriate and medication compliant. Patient interview: Rodrick Frey was interviewed by this writer today. Vital signs reviewed and BP elevated. Calm, cooperative and more organized. Feels medications are helping him to focus. Does state he has RITU when asked about AVH but he denies AVH at this time. Denies SI and HI. Pt is medication compliant and denies medication side effects including TD, EPS, akathisia and mood derangements. No complaints. Objective     Vitals:    11/10/17 2350 11/11/17 0828 11/11/17 2005 11/12/17 0220   BP: (!) 159/95 (!) 154/105 (!) 148/95    Pulse: 73 67 70    Resp: 18 18 16    Temp: 98 °F (36.7 °C) 97.7 °F (36.5 °C) 97.9 °F (36.6 °C)    Weight:    72.6 kg (160 lb)   Height:    5' 7\" (1.702 m)       Mental Status Examination     Appearance/Hygiene 47 y.o. AAM. -Body odor, fair hygiene   Behavior/Social Relatedness Appropriate, piercing eye contact at times. Musculoskeletal Gait/Station: appropriate  Tone (flaccid, cogwheeling, spastic): not assessed  Psychomotor (hyperkinetic, hypokinetic): appropriate   Involuntary movements (tics, dyskinesias, akathisia, stereotypies): none   Speech                          Rate, rhythm, volume, fluency and articulation are appropriate   Mood                          euthymic   Affect                                                   Restricted    Thought Process Bizarre  Associations remain loose but are improving.      Vagueness, incoherence, circumstantiality, tangentiality, neologisms, perseveration, flight of ideas, or self-contradictory statements not present on assessment   Thought Content and Perceptual Disturbances +delusions  Hyper-Jehovah's witness tones absent.    Denies self-injurious behavior (SIB), suicidal ideation (SI), aggressive behavior or homicidal ideation (HI)     Denies auditory and visual hallucinations   Sensorium and Cognition              AOx4, attention intact, memory intact, language use appropriate, and fund of knowledge age appropriate   Insight              poor   Judgment poor        Assessment/Plan      Psychiatric Diagnoses:   Patient Active Problem List   Diagnosis Code    Psychotic disorder F29     Medical Diagnoses: HTN    Psychosocial and contextual factors:    1. Medication non-compliance   2. Separation from family. Level of impairment/disability: Severe Rebecka Barns is a 47 y.o. who is currently slowly improving. Remains bizarre with delusions. Will increase aripiprazole to 10mg po every day on 11-. 1.  Psychotic disorder   - Continue aripiprazole 5mg po every day today and increase to 10mg po every day on 11-.   2.  Reviewed instructions, risks, benefits and side effects of medications  3.   Disposition/Discharge Date: self-care/11-    Stephen Garsia MD DR. [facility]  Psychiatry Glabellar Complex Units: 21

## 2022-06-07 NOTE — ED NOTES
Pt discharged to home ambulatory and in company of self  Discharge instructions provided via discussion and handout. Teaching to patient. Verbalized understanding. No questions voiced. Discharged with 0 RX.
Upon chart review pt was seen at [facility] [place] this morning medicated with his HTN mediations at [address] and d/c'd at [address] Provider aware.
may cause psychosis with continued use

## 2022-07-28 NOTE — BH NOTES
Pt spent much of the evening in bed, he tolerated all medications without difficulty. He denies any pain or discomfort. He reports feeling better since being admitted. He denies any active suicidal thoughts or active hallucinations. <--- Click to Launch [employer] for PreOp, PostOp and Procedure

## 2022-11-23 NOTE — ANCILLARY DISCHARGE INSTRUCTIONS
Spoke with Winifred Ventura from [facility], there are no male beds at this time. [facility] Postcare Instructions: The HEMIGARD strips are to remain completely dry for at least 5-7 days.

## 2023-03-21 NOTE — BH NOTES
GROUP THERAPY PROGRESS NOTE     Big discuss was encouraged by staff but refused to participate in Community. diminished

## 2023-08-10 NOTE — ED PROVIDER NOTES
[facility] EMERGENCY DEPT      9:05 PM    Date: 5/5/2018  Patient Name: Leonel Lemus    History of Presenting Illness     Chief Complaint   Patient presents with    Mental Health Problem       History Provided By: Patient    Chief Complaint: SI  Duration: few days  Timing:  constant  Location: N/a  Quality: \"Is past talking\"  Severity: 0/10  Modifying Factors: None  Associated Symptoms: pt denies all other sx    47 y.o. male with noted past medical history who presents to the emergency department with constant SI for the past few days. The pt reports he is going to \"jump off a bridge or use razor blades to end it quickly. \" He is \"past talking\", but states he is willing to talk to counselor \"if they are nice. \" The pt is homeless, and he was kicked out of the [employer]. The pt hoped that he would get better since he arrived at the shelter over 1 year ago, but there is a zero tolerance policy for ETOH and a few nights ago he tested positive on a breathalyzer. Pt denies HI. No other concerns, modifying factors, or symptoms were expressed by the pt at this time. Nursing notes regarding the HPI and triage nursing notes were reviewed. Prior medical records were reviewed. Current Outpatient Prescriptions   Medication Sig Dispense Refill    amLODIPine (NORVASC) 10 mg tablet Take 1 Tab by mouth daily. Indications: hypertension 30 Tab 0    ARIPiprazole (ABILIFY) 15 mg tablet Take 1 Tab by mouth daily. Indications: DEPRESSION TREATMENT ADJUNCT 30 Tab 0    carvedilol (COREG) 6.25 mg tablet Take 1 Tab by mouth every twelve (12) hours. Indications: hypertension 60 Tab 0    FLUoxetine (PROZAC) 20 mg capsule Take 1 Cap by mouth daily. Indications: major depressive disorder 30 Cap 0    hydroCHLOROthiazide (HYDRODIURIL) 25 mg tablet Take 1 Tab by mouth daily.  Indications: hypertension 30 Tab 0       Past History     Past Medical History:  Past Medical History:   Diagnosis Date    Back pain     Hypertension     MDD (major depressive disorder), recurrent, severe, with psychosis ([facility] [address] 11/11/2017    Psychiatric disorder     hallucinations    Psychotic disorder 11/11/2017       Past Surgical History:  Past Surgical History:   Procedure Laterality Date    HX OTHER SURGICAL      oral sx       Family History:  No family history on file. Social History:  Social History   Substance Use Topics    Smoking status: Current Some Day Smoker     Packs/day: 0.25    Smokeless tobacco: Never Used    Alcohol use Yes      Comment: beer       Allergies:  No Known Allergies    Patient's primary care provider (as noted in EPIC):  None    Review of Systems   Constitutional: Negative for diaphoresis. HENT: Negative for congestion. Eyes: Negative for discharge. Respiratory: Negative for stridor. Cardiovascular: Negative for palpitations. Gastrointestinal: Negative for diarrhea. Genitourinary: Negative for flank pain. Musculoskeletal: Negative for back pain. Neurological: Negative for weakness. Psychiatric/Behavioral: Positive for suicidal ideas. Negative for hallucinations and self-injury. All other systems reviewed and are negative. Visit Vitals    BP (!) 174/117 (BP 1 Location: Right arm, BP Patient Position: At rest)    Pulse 97    Temp 98.1 °F (36.7 °C)    Resp 16    Ht 5' 6\" (1.676 m)    Wt 71.2 kg (157 lb)    SpO2 97%    BMI 25.34 kg/m2       PHYSICAL EXAM:    CONSTITUTIONAL:  Alert, in no apparent distress;  well developed;  well nourished. HEAD:  Normocephalic, atraumatic. EYES:  EOMI. Non-icteric sclera. Normal conjunctiva. ENTM:  Nose:  no rhinorrhea. Throat:  no erythema or exudate, mucous membranes moist.  NECK:  No JVD. Supple  RESPIRATORY:  Chest clear, equal breath sounds, good air movement. CARDIOVASCULAR:  Regular rate and rhythm. No murmurs, rubs, or gallops. GI:  Normal bowel sounds, abdomen soft and non-tender. No rebound or guarding.   BACK: Non-tender. UPPER EXT:  Normal inspection. LOWER EXT:  No edema, no calf tenderness. Distal pulses intact. NEURO:  Moves all four extremities, and grossly normal motor exam.  SKIN:  No rashes;  Normal for age. PSYCH:  Alert and normal affect. DIFFERENTIAL DIAGNOSES/ MEDICAL DECISION MAKING:   Differential diagnoses/impression: Need to rule out obvious organic causes versus psychological etiology. Based on patient's presentation and lab work, I do not believe that there is an obvious organic etiology for the patient's suicidal ideation. I believe the patient needs psychiatric evaluation and treatment for the suicidal ideation. ED COURSE:      Diagnostic Study Results     Abnormal lab results from this emergency department encounter:  Labs Reviewed   CBC WITH AUTOMATED DIFF - Abnormal; Notable for the following:        Result Value    RDW 16.2 (*)     All other components within normal limits   METABOLIC PANEL, BASIC - Abnormal; Notable for the following:     Potassium 3.2 (*)     Glucose 132 (*)     BUN/Creatinine ratio 11 (*)     All other components within normal limits   ETHYL ALCOHOL - Abnormal; Notable for the following:     ALCOHOL(ETHYL),SERUM 117 (*)     All other components within normal limits   DRUG SCREEN, URINE       Lab values for this patient within approximately the last 12 hours:  Recent Results (from the past 12 hour(s))   CBC WITH AUTOMATED DIFF    Collection Time: 05/05/18  9:25 PM   Result Value Ref Range    WBC 5.1 4.6 - 13.2 K/uL    RBC 5.35 4.70 - 5.50 M/uL    HGB 13.5 13.0 - 16.0 g/dL    HCT 41.2 36.0 - 48.0 %    MCV 77.0 74.0 - 97.0 FL    MCH 25.2 24.0 - 34.0 PG    MCHC 32.8 31.0 - 37.0 g/dL    RDW 16.2 (H) 11.6 - 14.5 %    PLATELET 750 687 - 567 K/uL    MPV 9.8 9.2 - 11.8 FL    NEUTROPHILS 58 40 - 73 %    LYMPHOCYTES 36 21 - 52 %    MONOCYTES 5 3 - 10 %    EOSINOPHILS 1 0 - 5 %    BASOPHILS 0 0 - 2 %    ABS. NEUTROPHILS 3.0 1.8 - 8.0 K/UL    ABS.  LYMPHOCYTES 1.8 0.9 - 3.6 K/UL    ABS. MONOCYTES 0.2 0.05 - 1.2 K/UL    ABS. EOSINOPHILS 0.0 0.0 - 0.4 K/UL    ABS. BASOPHILS 0.0 0.0 - 0.06 K/UL    DF AUTOMATED     METABOLIC PANEL, BASIC    Collection Time: 05/05/18  9:25 PM   Result Value Ref Range    Sodium 145 136 - 145 mmol/L    Potassium 3.2 (L) 3.5 - 5.5 mmol/L    Chloride 107 100 - 108 mmol/L    CO2 29 21 - 32 mmol/L    Anion gap 9 3.0 - 18 mmol/L    Glucose 132 (H) 74 - 99 mg/dL    BUN 12 7.0 - 18 MG/DL    Creatinine 1.14 0.6 - 1.3 MG/DL    BUN/Creatinine ratio 11 (L) 12 - 20      GFR est AA >60 >60 ml/min/1.73m2    GFR est non-AA >60 >60 ml/min/1.73m2    Calcium 8.5 8.5 - 10.1 MG/DL   ETHYL ALCOHOL    Collection Time: 05/05/18  9:25 PM   Result Value Ref Range    ALCOHOL(ETHYL),SERUM 117 (H) 0 - 3 MG/DL       Radiologist and cardiologist interpretations if available at time of this note:  No results found. Medication(s) ordered for patient during this emergency visit encounter:  Medications - No data to display    Medical Decision Making     I am the first provider for this patient. I reviewed the vital signs, available nursing notes, past medical history, past surgical history, family history and social history. Vital Signs:  Reviewed the patient's vital signs. ED COURSE:  The patient has no active medical issues. I believe that the patient is medically cleared for admission to a psychiatric unit if this is deemed appropriate by the crisis staff after their evaluation of the patient. IMPRESSION AND MEDICAL DECISION MAKING:  Based upon the patient's presentation with noted HPI and PE, along with the work up done in the emergency department, I believe that the patient is having suicidal ideation that MAY require admission and further evaluation on a psychiatric/ behavioral medicine unit. THE PATIENT IS MEDICALLY CLEARED FOR ADMISSION TO A PSYCHIATRIC UNIT. I spoke to the [address] [place] [address], Dr. Carlos Lyles, who recommends admission of high risk patient. Signout Note      Pt care transferred to Dr. Eric Silva  ,ED provider. History of patient complaint(s), available diagnostic reports and current treatment plan has been discussed thoroughly. Bedside rounding on patient occured : no . Intended disposition of patient : Transfer  Pending diagnostics reports and/or labs (please list):  [id number] crisis worker transfer of voluntary SI patient to a behavioral medicine facility. Condition:  Stable    DIAGNOSIS:  1. Suicidal ideation. PLAN:   1. Transfer    Coding Diagnoses     Clinical Impression:   1. Suicidal ideations        Disposition     Disposition:  Transfer. SMITH Garcia Board Certified Emergency Physician    Scribe Attestation     Nida Hicks acting as a scribe for and in the presence of Franki Lesch, MD      May 05, 2018 at 9:27 PM       Provider Attestation:  If a scribe was utilized in generation of this patient record, I personally performed the services described in the documentation, reviewed the documentation, as recorded by the scribe in my presence, and it accurately records the patient's history of presenting illness, review of systems, patient physical examination, and procedures performed by me as the attending physician. [place]SM[place] Del Val[place] Board Certified Emergency Physician  5/5/2018.  9:06 PM    Note:  Assuming care of patient   from leaving provider    7:18 PM  I, Franki Lesch, MD, assumed care of patient from another provider who is ending their shift in the emergency department . 7:18 PM    Date: 5/5/2018  Patient Name: Fely Dunaway    History of Presenting Illness     Chief Complaint   Patient presents with   [address] [place] [address] Problem       Nursing notes regarding the HPI and triage nursing notes were reviewed. Prior medical records were reviewed.      Current Facility-Administered Medications   Medication Dose Route Frequency Provider Last Rate Last Dose    LORazepam (ATIVAN) tablet 1 mg  1 mg Oral ONCE PRN Benitez Marrero MD         Current Outpatient Prescriptions   Medication Sig Dispense Refill    amLODIPine (NORVASC) 10 mg tablet Take 1 Tab by mouth daily. Indications: hypertension 30 Tab 0    ARIPiprazole (ABILIFY) 15 mg tablet Take 1 Tab by mouth daily. Indications: DEPRESSION TREATMENT ADJUNCT 30 Tab 0    carvedilol (COREG) 6.25 mg tablet Take 1 Tab by mouth every twelve (12) hours. Indications: hypertension 60 Tab 0    FLUoxetine (PROZAC) 20 mg capsule Take 1 Cap by mouth daily. Indications: major depressive disorder 30 Cap 0    hydroCHLOROthiazide (HYDRODIURIL) 25 mg tablet Take 1 Tab by mouth daily. Indications: hypertension 30 Tab 0       Past History     Past Medical History:  Past Medical History:   Diagnosis Date    Back pain     Hypertension     MDD (major depressive disorder), recurrent, severe, with psychosis ([facility] [address] 11/11/2017    Psychiatric disorder     hallucinations    Psychotic disorder 11/11/2017       Past Surgical History:  Past Surgical History:   Procedure Laterality Date    HX OTHER SURGICAL      oral sx       Family History:  No family history on file.     Social History:  Social History   Substance Use Topics    Smoking status: Current Some Day Smoker     Packs/day: 0.25    Smokeless tobacco: Never Used    Alcohol use Yes      Comment: beer       Allergies:  No Known Allergies    Patient's primary care provider (as noted in EPIC):  None    Abnormal lab results from this emergency department encounter:  Labs Reviewed   CBC WITH AUTOMATED DIFF - Abnormal; Notable for the following:        Result Value    RDW 16.2 (*)     All other components within normal limits   METABOLIC PANEL, BASIC - Abnormal; Notable for the following:     Potassium 3.2 (*)     Glucose 132 (*)     BUN/Creatinine ratio 11 (*)     All other components within normal limits   ETHYL ALCOHOL - Abnormal; Notable for the following:     ALCOHOL(ETHYL),SERUM 117 (*)     All other components within normal limits   DRUG SCREEN, URINE       Lab values for this patient within approximately the last 12 hours:  No results found for this or any previous visit (from the past 12 hour(s)). Radiologist and cardiologist interpretations if available at time of this note:  Radiology results:  No results found. Medication(s) ordered for patient during this emergency visit encounter:  Medications   LORazepam (ATIVAN) tablet 1 mg (not administered)   carvedilol (COREG) tablet 6.25 mg (6.25 mg Oral Given 5/6/18 1205)   hydroCHLOROthiazide (HYDRODIURIL) tablet 25 mg (25 mg Oral Given 5/6/18 1205)   amLODIPine (NORVASC) tablet 5 mg (5 mg Oral Given 5/6/18 1206)   amLODIPine (NORVASC) tablet 5 mg (5 mg Oral Given 5/6/18 1508)   cloNIDine HCl (CATAPRES) tablet 0.2 mg (0.2 mg Oral Given 5/6/18 1508)   potassium chloride (K-DUR, KLOR-CON) SR tablet 40 mEq (40 mEq Oral Given 5/6/18 1826)       Pt care assumed from Dr. Heather Esqueda , ED provider. Pt complaint(s), current treatment plan, progression and available diagnostic results have been discussed thoroughly. Rounding occurred: no  Intended Disposition: Transfer   Pending diagnostic reports and/or labs (please list):  Patient scheduled for transfer to [place] behavioral medicine bed. Coding Diagnoses     Clinical Impression:   1. Suicidal ideations        Disposition     Disposition:  Transfer. Tomer Gill M.D.   [place] Board Certified Emergency Physician Paramedian Forehead Flap Text: A decision was made to reconstruct the defect utilizing an interpolation axial flap and a staged reconstruction.  A telfa template was made of the defect.  This telfa template was then used to outline the paramedian forehead pedicle flap.  The donor area for the pedicle flap was then injected with anesthesia.  The flap was excised through the skin and subcutaneous tissue down to the layer of the underlying musculature.  The pedicle flap was carefully excised within this deep plane to maintain its blood supply.  The edges of the donor site were undermined.   The donor site was closed in a primary fashion.  The pedicle was then rotated into position and sutured.  Once the tube was sutured into place, adequate blood supply was confirmed with blanching and refill.  The pedicle was then wrapped with xeroform gauze and dressed appropriately with a telfa and gauze bandage to ensure continued blood supply and protect the attached pedicle.